# Patient Record
Sex: FEMALE | Race: WHITE | NOT HISPANIC OR LATINO | Employment: OTHER | URBAN - METROPOLITAN AREA
[De-identification: names, ages, dates, MRNs, and addresses within clinical notes are randomized per-mention and may not be internally consistent; named-entity substitution may affect disease eponyms.]

---

## 2017-01-04 RX ORDER — WARFARIN SODIUM 5 MG/1
TABLET ORAL
Qty: 135 TABLET | Refills: 0 | Status: SHIPPED | OUTPATIENT
Start: 2017-01-04 | End: 2017-04-14 | Stop reason: SDUPTHER

## 2017-01-09 ENCOUNTER — HOSPITAL ENCOUNTER (OUTPATIENT)
Dept: CARDIOLOGY | Facility: HOSPITAL | Age: 72
Setting detail: RECURRING SERIES
Discharge: HOME | End: 2017-01-09

## 2017-01-09 PROCEDURE — 85610 PROTHROMBIN TIME: CPT | Performed by: INTERNAL MEDICINE

## 2017-01-09 PROCEDURE — 36416 COLLJ CAPILLARY BLOOD SPEC: CPT | Performed by: INTERNAL MEDICINE

## 2017-01-13 RX ORDER — POTASSIUM CHLORIDE 20 MEQ/1
TABLET, EXTENDED RELEASE ORAL
Qty: 180 TABLET | Refills: 0 | Status: SHIPPED | OUTPATIENT
Start: 2017-01-13 | End: 2017-04-13 | Stop reason: SDUPTHER

## 2017-01-23 ENCOUNTER — HOSPITAL ENCOUNTER (OUTPATIENT)
Dept: CARDIOLOGY | Facility: HOSPITAL | Age: 72
Setting detail: RECURRING SERIES
Discharge: HOME | End: 2017-01-23

## 2017-01-23 PROCEDURE — 36416 COLLJ CAPILLARY BLOOD SPEC: CPT | Performed by: INTERNAL MEDICINE

## 2017-01-23 PROCEDURE — 85610 PROTHROMBIN TIME: CPT | Performed by: INTERNAL MEDICINE

## 2017-01-27 ENCOUNTER — APPOINTMENT (OUTPATIENT)
Dept: WOMENS IMAGING | Facility: HOSPITAL | Age: 72
End: 2017-01-27

## 2017-01-27 PROCEDURE — 77063 BREAST TOMOSYNTHESIS BI: CPT | Performed by: RADIOLOGY

## 2017-01-27 PROCEDURE — G0202 SCR MAMMO BI INCL CAD: HCPCS | Performed by: RADIOLOGY

## 2017-02-07 ENCOUNTER — TELEPHONE (OUTPATIENT)
Dept: ORTHOPEDIC SURGERY | Facility: CLINIC | Age: 72
End: 2017-02-07

## 2017-02-21 ENCOUNTER — HOSPITAL ENCOUNTER (OUTPATIENT)
Dept: CARDIOLOGY | Facility: HOSPITAL | Age: 72
Setting detail: RECURRING SERIES
Discharge: HOME | End: 2017-02-21

## 2017-02-21 PROCEDURE — 85610 PROTHROMBIN TIME: CPT

## 2017-02-21 PROCEDURE — 36416 COLLJ CAPILLARY BLOOD SPEC: CPT

## 2017-03-21 ENCOUNTER — HOSPITAL ENCOUNTER (OUTPATIENT)
Dept: CARDIOLOGY | Facility: HOSPITAL | Age: 72
Setting detail: RECURRING SERIES
Discharge: HOME | End: 2017-03-21

## 2017-03-21 PROCEDURE — 85610 PROTHROMBIN TIME: CPT

## 2017-03-21 PROCEDURE — 36416 COLLJ CAPILLARY BLOOD SPEC: CPT

## 2017-03-28 ENCOUNTER — HOSPITAL ENCOUNTER (OUTPATIENT)
Dept: CARDIOLOGY | Facility: HOSPITAL | Age: 72
Setting detail: RECURRING SERIES
Discharge: HOME | End: 2017-03-28

## 2017-03-28 PROCEDURE — 36416 COLLJ CAPILLARY BLOOD SPEC: CPT

## 2017-03-28 PROCEDURE — 85610 PROTHROMBIN TIME: CPT

## 2017-04-14 ENCOUNTER — OFFICE VISIT (OUTPATIENT)
Dept: CARDIOLOGY | Facility: CLINIC | Age: 72
End: 2017-04-14

## 2017-04-14 VITALS
BODY MASS INDEX: 26 KG/M2 | SYSTOLIC BLOOD PRESSURE: 122 MMHG | HEIGHT: 59 IN | HEART RATE: 78 BPM | DIASTOLIC BLOOD PRESSURE: 80 MMHG | WEIGHT: 129 LBS

## 2017-04-14 DIAGNOSIS — I10 ESSENTIAL HYPERTENSION: ICD-10-CM

## 2017-04-14 DIAGNOSIS — E78.2 MIXED HYPERLIPIDEMIA: ICD-10-CM

## 2017-04-14 DIAGNOSIS — I26.99 OTHER PULMONARY EMBOLISM WITHOUT ACUTE COR PULMONALE, UNSPECIFIED CHRONICITY (HCC): Primary | ICD-10-CM

## 2017-04-14 PROCEDURE — 93000 ELECTROCARDIOGRAM COMPLETE: CPT | Performed by: INTERNAL MEDICINE

## 2017-04-14 PROCEDURE — 99213 OFFICE O/P EST LOW 20 MIN: CPT | Performed by: INTERNAL MEDICINE

## 2017-04-14 RX ORDER — WARFARIN SODIUM 5 MG/1
7.5 TABLET ORAL
Qty: 135 TABLET | Refills: 0 | Status: SHIPPED | OUTPATIENT
Start: 2017-04-14 | End: 2017-09-03 | Stop reason: SDUPTHER

## 2017-04-14 RX ORDER — POTASSIUM CHLORIDE 20 MEQ/1
TABLET, EXTENDED RELEASE ORAL
Qty: 180 TABLET | Refills: 3 | Status: SHIPPED | OUTPATIENT
Start: 2017-04-14 | End: 2018-02-28 | Stop reason: SDUPTHER

## 2017-04-14 NOTE — PROGRESS NOTES
Date of Office Visit: 17  Encounter Provider: Vijay Arango MD  Place of Service: [facility] CARDIOLOGY  Patient Name: Rachel Moser  :1945      Chief Complaint   Patient presents with   • Pulmonary Embolism     History of Present Illness  HPI Comments: Ms. Moser is a pleasant 71-year-old white female who presented to the office in 2011  acutely short of breath, markedly hypoxemic, positive D-dimer. She had a CTA that showed  multiple pulmonary emboli. She was admitted to the hospital and started on Lovenox and  then warfarin. Was found to have bilateral DVTs. She did also have some pleuritic chest  pain with that. She had a followup CT that showed no new clots. She then needed her hip  operated on, and she ended up having inferior vena cava filter placed for that. She comes  in for followup now. She has been doing great. No chest pain or pressure and no   real shortness of breath, orthopnea, or PND.  She did have another hip surgery since she was last here and apparently has some more cyst behind her hip that's getting her need to be removed.  But overall she feels like she's doing well.        Past Medical History:   Diagnosis Date   • High cholesterol    • History of DVT (deep vein thrombosis)    • History of kidney infection     1 MONTH AGO   • History of pulmonary embolus (PE)    • Hypertension    • Paralabral cyst of left hip    • PONV (postoperative nausea and vomiting)    • Presence of vena cava filter    • Rheumatoid arteritis          Past Surgical History:   Procedure Laterality Date   • BLADDER SUSPENSION     • CATARACT EXTRACTION, BILATERAL     • HIP SURGERY Left    • HIP SURGERY Left    • HIP SURGERY Left    • INCISION AND DRAINAGE HIP Left 2016    Procedure: HIP INCISION AND DRAINAGE;  Surgeon: Obed Barney MD;  Location: [facility];  Service:    • TOTAL ABDOMINAL HYSTERECTOMY     • VENA CAVA FILTER INSERTION           Current  Outpatient Prescriptions on File Prior to Visit   Medication Sig Dispense Refill   • albuterol (PROAIR RESPICLICK) 108 (90 BASE) MCG/ACT inhaler TK 2 PUFFS PO TID PRN     • calcium-vitamin D (CALCIUM + D) 250-125 MG-UNIT per tablet Take 1 tablet by mouth 2 (Two) Times a Day.     • Cranberry 600 MG tablet Take  by mouth.     • ferrous sulfate (FERROUSUL) 325 (65 FE) MG tablet Take  by mouth.     • folic acid (FOLVITE) 800 MCG tablet Take 800 mcg by mouth Daily.     • furosemide (LASIX) 40 MG tablet Take 40 mg by mouth Daily.     • HYDROcodone-acetaminophen (NORCO) 7.5-325 MG per tablet 1-2 po q 4-6 hr prn pain 100 tablet 0   • inFLIXimab (REMICADE) 100 MG injection Infuse  into a venous catheter Every 30 (Thirty) Days.     • ipratropium (ATROVENT) 0.03 % nasal spray 2 sprays into each nostril 2 (Two) Times a Day As Needed for rhinitis.     • leucovorin 5 MG tablet Take 5 mg by mouth 1 (One) Time Per Week. Patient takes only on Sunday     • methotrexate 2.5 MG tablet Take 20 mg by mouth 1 (One) Time Per Week. TAKES 8 TABLETS ON SATURDAYS     • metoprolol succinate XL (TOPROL-XL) 50 MG 24 hr tablet Take 50 mg by mouth Every Morning.     • Multiple Vitamin (MULTIVITAMIN) capsule Take  by mouth.     • olopatadine (PATANOL) 0.1 % ophthalmic solution Administer 1 drop to both eyes 2 (Two) Times a Day.     • potassium chloride (K-DUR,KLOR-CON) 20 MEQ CR tablet TAKE 2 TABLETS BY MOUTH DAILY 180 tablet 0   • PredniSONE (DELTASONE) 10 MG (21) tablet pack Take  by mouth Daily.     • simvastatin (ZOCOR) 40 MG tablet Take 40 mg by mouth Every Morning.     • warfarin (COUMADIN) 5 MG tablet TAKE 1 TABLET BY MOUTH DAILY  tablet 0   • warfarin (COUMADIN) 7.5 MG tablet Take 7.5 mg by mouth See Admin Instructions. SUNDAYS ONLY      • [DISCONTINUED] B COMPLEX-BIOTIN-FA ER PO Take 100 mg by mouth Daily.     • [DISCONTINUED] B COMPLEX-BIOTIN-FA ER PO Take  by mouth.     • [DISCONTINUED] Calcium Carbonate-Vitamin D 600-200 MG-UNIT  capsule Take  by mouth.     • [DISCONTINUED] calcium-vitamin D (OSCAL) 250-125 MG-UNIT per tablet Take  by mouth.     • [DISCONTINUED] chlorpheniramine (WAL-FINATE) 4 MG tablet Take 4 mg by mouth 3 (Three) Times a Day.     • [DISCONTINUED] chlorpheniramine (WAL-FINATE) 4 MG tablet TK 1 T PO TID     • [DISCONTINUED] Cranberry 600 MG tablet Take 1 tablet by mouth 2 (Two) Times a Day.     • [DISCONTINUED] ferrous sulfate (FERROUSUL) 325 (65 FE) MG tablet Take 325 mg by mouth Daily With Breakfast.     • [DISCONTINUED] inFLIXimab in sodium chloride 0.9 % 250 mL IVPB Infuse  into a venous catheter.     • [DISCONTINUED] montelukast (SINGULAIR) 10 MG tablet TK 1 T PO QD     • [DISCONTINUED] Multiple Vitamin (MULTIVITAMIN) capsule Take 1 capsule by mouth Daily.     • [DISCONTINUED] Omega-3 Fatty Acids (FISH OIL) 1200 MG capsule capsule Take  by mouth.     • [DISCONTINUED] ondansetron (ZOFRAN) 4 MG tablet Take 1 tablet by mouth Every 6 (Six) Hours As Needed for nausea or vomiting for up to 10 doses. 10 tablet 0   • [DISCONTINUED] polyethylene glycol-electrolytes (NULYTELY) 420 G solution MIX AND DRINK UTD PER INSTRUCTIONS GIVEN FOR COLONOSCOPY     • [DISCONTINUED] predniSONE (DELTASONE) 10 MG tablet Take 10 mg by mouth As Needed (TAKES 1 OR 2 AS NEEDED).     • [DISCONTINUED] predniSONE (DELTASONE) 10 MG tablet TK 1 TO 2 TS PO QAM. TAKE WITH FOOD       No current facility-administered medications on file prior to visit.          Social History     Social History   • Marital status:      Spouse name: N/A   • Number of children: N/A   • Years of education: N/A     Occupational History   • Not on file.     Social History Main Topics   • Smoking status: Never Smoker   • Smokeless tobacco: Never Used   • Alcohol use No   • Drug use: No   • Sexual activity: Not on file     Other Topics Concern   • Not on file     Social History Narrative       No family history on file.      Review of Systems   Constitution: Negative for  "decreased appetite, diaphoresis, fever, weakness, malaise/fatigue, weight gain and weight loss.   HENT: Negative for congestion, headaches, hearing loss, nosebleeds and tinnitus.    Eyes: Negative for blurred vision, double vision, vision loss in left eye, vision loss in right eye and visual disturbance.   Cardiovascular:        As noted in HPI   Respiratory:        As noted HPI   Endocrine: Negative for cold intolerance and heat intolerance.   Hematologic/Lymphatic: Negative for bleeding problem. Does not bruise/bleed easily.   Skin: Negative for color change, flushing, itching and rash.   Musculoskeletal: Positive for joint pain. Negative for arthritis, back pain, joint swelling, muscle weakness and myalgias.   Gastrointestinal: Negative for bloating, abdominal pain, constipation, diarrhea, dysphagia, heartburn, hematemesis, hematochezia, melena, nausea and vomiting.   Genitourinary: Negative for bladder incontinence, dysuria, frequency, nocturia and urgency.   Neurological: Negative for dizziness, focal weakness, light-headedness, loss of balance, numbness, paresthesias and vertigo.   Psychiatric/Behavioral: Negative for depression, memory loss and substance abuse.       Procedures      ECG 12 Lead  Date/Time: 4/14/2017 11:45 AM  Performed by: TOÑO HARDWICK  Authorized by: TOÑO HARDWICK   Comparison: compared with previous ECG   Similar to previous ECG  Rhythm: sinus rhythm  Rate: normal  QRS axis: normal                 Objective:    /80  Pulse 78  Ht 59\" (149.9 cm)  Wt 129 lb (58.5 kg)  BMI 26.05 kg/m2       Physical Exam  Physical Exam   Constitutional: She is oriented to person, place, and time. She appears well-developed and well-nourished. No distress.   HENT:   Head: Normocephalic.   Eyes: Conjunctivae are normal. Pupils are equal, round, and reactive to light. No scleral icterus.   Neck: Normal carotid pulses, no hepatojugular reflux and no JVD present. Carotid bruit is not present. No " tracheal deviation, no edema and no erythema present. No thyromegaly present.   Cardiovascular: Normal rate, regular rhythm, S1 normal, S2 normal, normal heart sounds and intact distal pulses.   No extrasystoles are present. PMI is not displaced.  Exam reveals no gallop, no distant heart sounds and no friction rub.    No murmur heard.  Pulses:       Carotid pulses are 2+ on the right side, and 2+ on the left side.       Radial pulses are 2+ on the right side, and 2+ on the left side.        Femoral pulses are 2+ on the right side, and 2+ on the left side.       Dorsalis pedis pulses are 2+ on the right side, and 2+ on the left side.        Posterior tibial pulses are 2+ on the right side, and 2+ on the left side.   Pulmonary/Chest: Effort normal and breath sounds normal. No respiratory distress. She has no decreased breath sounds. She has no wheezes. She has no rhonchi. She has no rales. She exhibits no tenderness.   Abdominal: Soft. Bowel sounds are normal. She exhibits no distension and no mass. There is no hepatosplenomegaly. There is no tenderness. There is no rebound and no guarding.   Musculoskeletal: She exhibits no edema, tenderness or deformity.   Neurological: She is alert and oriented to person, place, and time.   Skin: Skin is warm and dry. No rash noted. She is not diaphoretic. No cyanosis or erythema. No pallor. Nails show no clubbing.   Psychiatric: She has a normal mood and affect. Her speech is normal and behavior is normal. Judgment and thought content normal.           Assessment:    1. This is a 71  -year-old white female with history of pulmonary emboli, bilateral lower extremity DVT status post vena cava filter. Now doing well. She is to continue the same and see us in follow up in one year.   2. Hyperlipidemia, on simvastatin.  3. Hypertension. Blood pressure adequately controlled.   4. Stop DL with hip issues is probably need another surgery 40 years and.         Plan:

## 2017-04-26 ENCOUNTER — HOSPITAL ENCOUNTER (OUTPATIENT)
Dept: CARDIOLOGY | Facility: HOSPITAL | Age: 72
Setting detail: RECURRING SERIES
Discharge: HOME | End: 2017-04-26

## 2017-04-26 PROCEDURE — 85610 PROTHROMBIN TIME: CPT

## 2017-04-26 PROCEDURE — 36416 COLLJ CAPILLARY BLOOD SPEC: CPT

## 2017-05-25 ENCOUNTER — HOSPITAL ENCOUNTER (OUTPATIENT)
Dept: CARDIOLOGY | Facility: HOSPITAL | Age: 72
Setting detail: RECURRING SERIES
Discharge: HOME | End: 2017-05-25

## 2017-05-25 PROCEDURE — 36416 COLLJ CAPILLARY BLOOD SPEC: CPT

## 2017-05-25 PROCEDURE — 85610 PROTHROMBIN TIME: CPT

## 2017-06-23 ENCOUNTER — HOSPITAL ENCOUNTER (OUTPATIENT)
Dept: CARDIOLOGY | Facility: HOSPITAL | Age: 72
Setting detail: RECURRING SERIES
Discharge: HOME | End: 2017-06-23

## 2017-06-23 PROCEDURE — 36416 COLLJ CAPILLARY BLOOD SPEC: CPT

## 2017-06-23 PROCEDURE — 85610 PROTHROMBIN TIME: CPT

## 2017-07-06 ENCOUNTER — OFFICE VISIT (OUTPATIENT)
Dept: ORTHOPEDIC SURGERY | Facility: CLINIC | Age: 72
End: 2017-07-06

## 2017-07-06 VITALS — TEMPERATURE: 97.4 F | WEIGHT: 131 LBS | HEIGHT: 59 IN | BODY MASS INDEX: 26.41 KG/M2

## 2017-07-06 DIAGNOSIS — Z96.642 HISTORY OF LEFT HIP REPLACEMENT: ICD-10-CM

## 2017-07-06 DIAGNOSIS — Z98.890 HISTORY OF HIP SURGERY: Primary | ICD-10-CM

## 2017-07-06 PROCEDURE — 99213 OFFICE O/P EST LOW 20 MIN: CPT | Performed by: ORTHOPAEDIC SURGERY

## 2017-07-06 PROCEDURE — 73502 X-RAY EXAM HIP UNI 2-3 VIEWS: CPT | Performed by: ORTHOPAEDIC SURGERY

## 2017-07-06 NOTE — PROGRESS NOTES
Patient: Rachel Moser  YOB: 1945 71 y.o. female  Medical Record Number: [id number]    Chief Complaint:   Chief Complaint   Patient presents with   • Left Hip - Follow-up       History of Present Illness:Rachel Moser is a 71 y.o. female who presents for follow-up of  Left hip.  We performed excision of pseudotumor 11/2/16 she still has some mild numbness about the left thigh but overall her pain is markedly improved.  She's getting around without a cane.    Allergies:   Allergies   Allergen Reactions   • Sulfa Antibiotics Rash       Medications:   Current Outpatient Prescriptions   Medication Sig Dispense Refill   • albuterol (PROAIR RESPICLICK) 108 (90 BASE) MCG/ACT inhaler TK 2 PUFFS PO TID PRN     • calcium-vitamin D (CALCIUM + D) 250-125 MG-UNIT per tablet Take 1 tablet by mouth 2 (Two) Times a Day.     • Cranberry 600 MG tablet Take  by mouth.     • ferrous sulfate (FERROUSUL) 325 (65 FE) MG tablet Take  by mouth.     • folic acid (FOLVITE) 800 MCG tablet Take 800 mcg by mouth Daily.     • furosemide (LASIX) 40 MG tablet Take 40 mg by mouth Daily.     • HYDROcodone-acetaminophen (NORCO) 7.5-325 MG per tablet 1-2 po q 4-6 hr prn pain 100 tablet 0   • inFLIXimab (REMICADE) 100 MG injection Infuse  into a venous catheter Every 30 (Thirty) Days.     • ipratropium (ATROVENT) 0.03 % nasal spray 2 sprays into each nostril 2 (Two) Times a Day As Needed for rhinitis.     • leucovorin 5 MG tablet Take 5 mg by mouth 1 (One) Time Per Week. Patient takes only on Sunday     • methotrexate 2.5 MG tablet Take 20 mg by mouth 1 (One) Time Per Week. TAKES 8 TABLETS ON SATURDAYS     • metoprolol succinate XL (TOPROL-XL) 50 MG 24 hr tablet Take 50 mg by mouth Every Morning.     • Multiple Vitamin (MULTIVITAMIN) capsule Take  by mouth.     • olopatadine (PATANOL) 0.1 % ophthalmic solution Administer 1 drop to both eyes 2 (Two) Times a Day.     • potassium chloride (K-DUR,KLOR-CON) 20 MEQ CR tablet TAKE 2 TABLETS BY  "MOUTH DAILY 180 tablet 3   • PredniSONE (DELTASONE) 10 MG (21) tablet pack Take  by mouth Daily.     • simvastatin (ZOCOR) 40 MG tablet Take 40 mg by mouth Every Morning.     • warfarin (COUMADIN) 5 MG tablet Take 1.5 tablets by mouth Daily. Or as directed. 135 tablet 0     No current facility-administered medications for this visit.          The following portions of the patient's history were reviewed and updated as appropriate: allergies, current medications, past family history, past medical history, past social history, past surgical history and problem list.    Review of Systems:   A 14 point review of systems was performed. All systems negative except pertinent positives/negative listed in HPI above    Physical Exam:   Vitals:    07/06/17 1525   Temp: 97.4 °F (36.3 °C)   Weight: 131 lb (59.4 kg)   Height: 59\" (149.9 cm)       General: A and O x 3, ASA, NAD    SCLERA:    Normal    DENTITION:   Normal  There some mild swelling about the left thigh the incision around the buttock is nicely healed I see no palpable masses are no palpable areas of swelling there tender hip motion is non-irritable she walks with a slight Trendelenburg gait    Radiology:    Xrays 2views left hip (AP bilateral hips and lateral hip) were ordered and reviewed for evaluation of hip pain demonstrating a well positioned total hip without evidence of wear, loosening, although there does appear to be a slight lucency around the custom acetabular implant.  The screws show no evidence of lucency and appear to be well fixed within the pelvic bone      Assessment/Plan:  Left hip pseudotumor symptoms are currently stable.  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  Being that her symptoms are currently stable I would favor continued observation.  I will get a CT angiogram and we'll plan on seeing her in 6 months however if the pseudotumor 4 to be " increasing in size we may have to consider discussion with an oncologist or vascular specialist for possible resection and potentially having to consider revision of the acetabular component      Obed Barney MD  7/6/2017

## 2017-07-13 ENCOUNTER — HOSPITAL ENCOUNTER (OUTPATIENT)
Dept: CT IMAGING | Facility: HOSPITAL | Age: 72
Discharge: HOME | End: 2017-07-13
Attending: ORTHOPAEDIC SURGERY | Admitting: ORTHOPAEDIC SURGERY

## 2017-07-13 DIAGNOSIS — Z98.890 HISTORY OF HIP SURGERY: ICD-10-CM

## 2017-07-13 LAB — CREAT BLDA-MCNC: 0.7 MG/DL (ref 0.6–1.3)

## 2017-07-13 PROCEDURE — 72191 CT ANGIOGRAPH PELV W/O&W/DYE: CPT

## 2017-07-13 PROCEDURE — 82565 ASSAY OF CREATININE: CPT

## 2017-07-13 PROCEDURE — 0 IOPAMIDOL PER 1 ML: Performed by: ORTHOPAEDIC SURGERY

## 2017-07-13 RX ADMIN — IOPAMIDOL 95 ML: 755 INJECTION, SOLUTION INTRAVENOUS at 15:15

## 2017-07-20 ENCOUNTER — TELEPHONE (OUTPATIENT)
Dept: ORTHOPEDIC SURGERY | Facility: CLINIC | Age: 72
End: 2017-07-20

## 2017-07-21 ENCOUNTER — HOSPITAL ENCOUNTER (OUTPATIENT)
Dept: CARDIOLOGY | Facility: HOSPITAL | Age: 72
Setting detail: RECURRING SERIES
Discharge: HOME | End: 2017-07-21

## 2017-07-21 PROCEDURE — 36416 COLLJ CAPILLARY BLOOD SPEC: CPT

## 2017-07-21 PROCEDURE — 85610 PROTHROMBIN TIME: CPT

## 2017-08-21 ENCOUNTER — HOSPITAL ENCOUNTER (OUTPATIENT)
Dept: CARDIOLOGY | Facility: HOSPITAL | Age: 72
Setting detail: RECURRING SERIES
Discharge: HOME | End: 2017-08-21

## 2017-08-21 PROCEDURE — 36416 COLLJ CAPILLARY BLOOD SPEC: CPT

## 2017-08-21 PROCEDURE — 85610 PROTHROMBIN TIME: CPT

## 2017-09-05 RX ORDER — WARFARIN SODIUM 5 MG/1
TABLET ORAL
Qty: 135 TABLET | Refills: 0 | Status: SHIPPED | OUTPATIENT
Start: 2017-09-05 | End: 2017-12-13 | Stop reason: SDUPTHER

## 2017-09-18 ENCOUNTER — HOSPITAL ENCOUNTER (OUTPATIENT)
Dept: CARDIOLOGY | Facility: HOSPITAL | Age: 72
Setting detail: RECURRING SERIES
Discharge: HOME | End: 2017-09-18

## 2017-09-18 PROCEDURE — 85610 PROTHROMBIN TIME: CPT

## 2017-09-18 PROCEDURE — 36416 COLLJ CAPILLARY BLOOD SPEC: CPT

## 2017-10-16 ENCOUNTER — HOSPITAL ENCOUNTER (OUTPATIENT)
Dept: CARDIOLOGY | Facility: HOSPITAL | Age: 72
Setting detail: RECURRING SERIES
Discharge: HOME | End: 2017-10-16

## 2017-10-16 PROCEDURE — 36416 COLLJ CAPILLARY BLOOD SPEC: CPT

## 2017-10-16 PROCEDURE — 85610 PROTHROMBIN TIME: CPT

## 2017-10-23 ENCOUNTER — HOSPITAL ENCOUNTER (OUTPATIENT)
Dept: CARDIOLOGY | Facility: HOSPITAL | Age: 72
Setting detail: RECURRING SERIES
Discharge: HOME | End: 2017-10-23

## 2017-10-23 PROCEDURE — 85610 PROTHROMBIN TIME: CPT

## 2017-10-23 PROCEDURE — 36416 COLLJ CAPILLARY BLOOD SPEC: CPT

## 2017-11-06 ENCOUNTER — HOSPITAL ENCOUNTER (OUTPATIENT)
Dept: CARDIOLOGY | Facility: HOSPITAL | Age: 72
Setting detail: RECURRING SERIES
Discharge: HOME | End: 2017-11-06

## 2017-11-06 PROCEDURE — 85610 PROTHROMBIN TIME: CPT

## 2017-11-06 PROCEDURE — 36416 COLLJ CAPILLARY BLOOD SPEC: CPT

## 2017-11-21 ENCOUNTER — HOSPITAL ENCOUNTER (OUTPATIENT)
Dept: CARDIOLOGY | Facility: HOSPITAL | Age: 72
Setting detail: RECURRING SERIES
Discharge: HOME | End: 2017-11-21

## 2017-11-21 PROCEDURE — 85610 PROTHROMBIN TIME: CPT

## 2017-11-21 PROCEDURE — 36416 COLLJ CAPILLARY BLOOD SPEC: CPT

## 2017-11-28 ENCOUNTER — HOSPITAL ENCOUNTER (OUTPATIENT)
Dept: CARDIOLOGY | Facility: HOSPITAL | Age: 72
Setting detail: RECURRING SERIES
Discharge: HOME | End: 2017-11-28

## 2017-11-28 PROCEDURE — 36416 COLLJ CAPILLARY BLOOD SPEC: CPT

## 2017-11-28 PROCEDURE — 85610 PROTHROMBIN TIME: CPT

## 2017-12-01 ENCOUNTER — HOSPITAL ENCOUNTER (OUTPATIENT)
Dept: CARDIOLOGY | Facility: HOSPITAL | Age: 72
Setting detail: RECURRING SERIES
Discharge: HOME | End: 2017-12-01

## 2017-12-01 PROCEDURE — 36416 COLLJ CAPILLARY BLOOD SPEC: CPT

## 2017-12-01 PROCEDURE — 85610 PROTHROMBIN TIME: CPT

## 2017-12-08 ENCOUNTER — HOSPITAL ENCOUNTER (OUTPATIENT)
Dept: CARDIOLOGY | Facility: HOSPITAL | Age: 72
Setting detail: RECURRING SERIES
Discharge: HOME | End: 2017-12-08

## 2017-12-08 PROCEDURE — 36416 COLLJ CAPILLARY BLOOD SPEC: CPT

## 2017-12-08 PROCEDURE — 85610 PROTHROMBIN TIME: CPT

## 2017-12-13 RX ORDER — WARFARIN SODIUM 5 MG/1
TABLET ORAL
Qty: 135 TABLET | Refills: 0 | Status: SHIPPED | OUTPATIENT
Start: 2017-12-13 | End: 2018-02-28 | Stop reason: SDUPTHER

## 2017-12-17 ENCOUNTER — APPOINTMENT (OUTPATIENT)
Dept: GENERAL RADIOLOGY | Facility: HOSPITAL | Age: 72
End: 2017-12-17

## 2017-12-17 ENCOUNTER — HOSPITAL ENCOUNTER (OUTPATIENT)
Facility: HOSPITAL | Age: 72
Discharge: HOME | End: 2017-12-19
Attending: EMERGENCY MEDICINE | Admitting: ORTHOPAEDIC SURGERY

## 2017-12-17 DIAGNOSIS — S73.005A DISLOCATION OF LEFT HIP, INITIAL ENCOUNTER (HCC): Primary | ICD-10-CM

## 2017-12-17 LAB
ALBUMIN SERPL-MCNC: 4.1 G/DL (ref 3.5–5.2)
ALBUMIN/GLOB SERPL: 1.3 G/DL
ALP SERPL-CCNC: 73 U/L (ref 39–117)
ALT SERPL W P-5'-P-CCNC: 16 U/L (ref 1–33)
ANION GAP SERPL CALCULATED.3IONS-SCNC: 17.7 MMOL/L
AST SERPL-CCNC: 25 U/L (ref 1–32)
BASOPHILS # BLD AUTO: 0.03 10*3/MM3 (ref 0–0.2)
BASOPHILS NFR BLD AUTO: 0.3 % (ref 0–1.5)
BILIRUB SERPL-MCNC: 0.4 MG/DL (ref 0.1–1.2)
BUN BLD-MCNC: 14 MG/DL (ref 8–23)
BUN/CREAT SERPL: 20.6 (ref 7–25)
CALCIUM SPEC-SCNC: 9.5 MG/DL (ref 8.6–10.5)
CHLORIDE SERPL-SCNC: 102 MMOL/L (ref 98–107)
CO2 SERPL-SCNC: 23.3 MMOL/L (ref 22–29)
CREAT BLD-MCNC: 0.68 MG/DL (ref 0.57–1)
DEPRECATED RDW RBC AUTO: 53 FL (ref 37–54)
EOSINOPHIL # BLD AUTO: 0.19 10*3/MM3 (ref 0–0.7)
EOSINOPHIL NFR BLD AUTO: 1.9 % (ref 0.3–6.2)
ERYTHROCYTE [DISTWIDTH] IN BLOOD BY AUTOMATED COUNT: 14.9 % (ref 11.7–13)
GFR SERPL CREATININE-BSD FRML MDRD: 85 ML/MIN/1.73
GLOBULIN UR ELPH-MCNC: 3.2 GM/DL
GLUCOSE BLD-MCNC: 103 MG/DL (ref 65–99)
HCT VFR BLD AUTO: 43.5 % (ref 35.6–45.5)
HGB BLD-MCNC: 13.9 G/DL (ref 11.9–15.5)
HOLD SPECIMEN: NORMAL
HOLD SPECIMEN: NORMAL
IMM GRANULOCYTES # BLD: 0.03 10*3/MM3 (ref 0–0.03)
IMM GRANULOCYTES NFR BLD: 0.3 % (ref 0–0.5)
INR PPP: 2.82 (ref 0.9–1.1)
LYMPHOCYTES # BLD AUTO: 2.15 10*3/MM3 (ref 0.9–4.8)
LYMPHOCYTES NFR BLD AUTO: 21.4 % (ref 19.6–45.3)
MCH RBC QN AUTO: 31.5 PG (ref 26.9–32)
MCHC RBC AUTO-ENTMCNC: 32 G/DL (ref 32.4–36.3)
MCV RBC AUTO: 98.6 FL (ref 80.5–98.2)
MONOCYTES # BLD AUTO: 0.73 10*3/MM3 (ref 0.2–1.2)
MONOCYTES NFR BLD AUTO: 7.3 % (ref 5–12)
NEUTROPHILS # BLD AUTO: 6.92 10*3/MM3 (ref 1.9–8.1)
NEUTROPHILS NFR BLD AUTO: 68.8 % (ref 42.7–76)
PLATELET # BLD AUTO: 254 10*3/MM3 (ref 140–500)
PMV BLD AUTO: 12.9 FL (ref 6–12)
POTASSIUM BLD-SCNC: 3.3 MMOL/L (ref 3.5–5.2)
PROT SERPL-MCNC: 7.3 G/DL (ref 6–8.5)
PROTHROMBIN TIME: 28.8 SECONDS (ref 11.7–14.2)
RBC # BLD AUTO: 4.41 10*6/MM3 (ref 3.9–5.2)
SODIUM BLD-SCNC: 143 MMOL/L (ref 136–145)
WBC NRBC COR # BLD: 10.05 10*3/MM3 (ref 4.5–10.7)
WHOLE BLOOD HOLD SPECIMEN: NORMAL
WHOLE BLOOD HOLD SPECIMEN: NORMAL

## 2017-12-17 PROCEDURE — 73502 X-RAY EXAM HIP UNI 2-3 VIEWS: CPT

## 2017-12-17 PROCEDURE — 85025 COMPLETE CBC W/AUTO DIFF WBC: CPT | Performed by: PHYSICIAN ASSISTANT

## 2017-12-17 PROCEDURE — 85610 PROTHROMBIN TIME: CPT | Performed by: PHYSICIAN ASSISTANT

## 2017-12-17 PROCEDURE — 80053 COMPREHEN METABOLIC PANEL: CPT | Performed by: PHYSICIAN ASSISTANT

## 2017-12-17 PROCEDURE — G0378 HOSPITAL OBSERVATION PER HR: HCPCS

## 2017-12-17 PROCEDURE — 96375 TX/PRO/DX INJ NEW DRUG ADDON: CPT

## 2017-12-17 PROCEDURE — 25010000002 HYDROMORPHONE PER 4 MG: Performed by: EMERGENCY MEDICINE

## 2017-12-17 PROCEDURE — 25010000002 ONDANSETRON PER 1 MG: Performed by: EMERGENCY MEDICINE

## 2017-12-17 PROCEDURE — 96376 TX/PRO/DX INJ SAME DRUG ADON: CPT

## 2017-12-17 PROCEDURE — 99284 EMERGENCY DEPT VISIT MOD MDM: CPT

## 2017-12-17 PROCEDURE — 96374 THER/PROPH/DIAG INJ IV PUSH: CPT

## 2017-12-17 RX ORDER — ONDANSETRON 2 MG/ML
4 INJECTION INTRAMUSCULAR; INTRAVENOUS ONCE
Status: COMPLETED | OUTPATIENT
Start: 2017-12-17 | End: 2017-12-17

## 2017-12-17 RX ORDER — PREDNISONE 10 MG/1
10 TABLET ORAL EVERY 24 HOURS
Status: DISCONTINUED | OUTPATIENT
Start: 2017-12-17 | End: 2017-12-18

## 2017-12-17 RX ORDER — SODIUM CHLORIDE 0.9 % (FLUSH) 0.9 %
10 SYRINGE (ML) INJECTION AS NEEDED
Status: DISCONTINUED | OUTPATIENT
Start: 2017-12-17 | End: 2017-12-18 | Stop reason: HOSPADM

## 2017-12-17 RX ORDER — METOPROLOL SUCCINATE 50 MG/1
50 TABLET, EXTENDED RELEASE ORAL DAILY
Status: DISCONTINUED | OUTPATIENT
Start: 2017-12-18 | End: 2017-12-18 | Stop reason: HOSPADM

## 2017-12-17 RX ORDER — SENNA AND DOCUSATE SODIUM 50; 8.6 MG/1; MG/1
2 TABLET, FILM COATED ORAL 2 TIMES DAILY
Status: DISCONTINUED | OUTPATIENT
Start: 2017-12-18 | End: 2017-12-18 | Stop reason: HOSPADM

## 2017-12-17 RX ORDER — WARFARIN SODIUM 5 MG/1
5 TABLET ORAL
Status: DISCONTINUED | OUTPATIENT
Start: 2017-12-17 | End: 2017-12-18 | Stop reason: HOSPADM

## 2017-12-17 RX ORDER — OXYCODONE HYDROCHLORIDE AND ACETAMINOPHEN 5; 325 MG/1; MG/1
1 TABLET ORAL EVERY 4 HOURS PRN
Status: DISCONTINUED | OUTPATIENT
Start: 2017-12-17 | End: 2017-12-18 | Stop reason: HOSPADM

## 2017-12-17 RX ORDER — HYDROMORPHONE HYDROCHLORIDE 1 MG/ML
0.5 INJECTION, SOLUTION INTRAMUSCULAR; INTRAVENOUS; SUBCUTANEOUS
Status: DISCONTINUED | OUTPATIENT
Start: 2017-12-17 | End: 2017-12-17

## 2017-12-17 RX ORDER — ALBUTEROL SULFATE 2.5 MG/3ML
2.5 SOLUTION RESPIRATORY (INHALATION)
Status: DISCONTINUED | OUTPATIENT
Start: 2017-12-17 | End: 2017-12-18

## 2017-12-17 RX ORDER — IPRATROPIUM BROMIDE 21 UG/1
2 SPRAY, METERED NASAL 2 TIMES DAILY PRN
Status: DISCONTINUED | OUTPATIENT
Start: 2017-12-17 | End: 2017-12-18 | Stop reason: HOSPADM

## 2017-12-17 RX ORDER — POTASSIUM CHLORIDE 750 MG/1
40 CAPSULE, EXTENDED RELEASE ORAL DAILY
Status: DISCONTINUED | OUTPATIENT
Start: 2017-12-18 | End: 2017-12-18 | Stop reason: HOSPADM

## 2017-12-17 RX ORDER — NALOXONE HCL 0.4 MG/ML
0.4 VIAL (ML) INJECTION
Status: DISCONTINUED | OUTPATIENT
Start: 2017-12-17 | End: 2017-12-18 | Stop reason: HOSPADM

## 2017-12-17 RX ORDER — SODIUM CHLORIDE 0.9 % (FLUSH) 0.9 %
1-10 SYRINGE (ML) INJECTION AS NEEDED
Status: DISCONTINUED | OUTPATIENT
Start: 2017-12-17 | End: 2017-12-18 | Stop reason: HOSPADM

## 2017-12-17 RX ORDER — FUROSEMIDE 40 MG/1
40 TABLET ORAL DAILY
Status: DISCONTINUED | OUTPATIENT
Start: 2017-12-18 | End: 2017-12-18 | Stop reason: HOSPADM

## 2017-12-17 RX ORDER — FAMOTIDINE 40 MG/1
40 TABLET, FILM COATED ORAL DAILY
Status: DISCONTINUED | OUTPATIENT
Start: 2017-12-18 | End: 2017-12-18 | Stop reason: HOSPADM

## 2017-12-17 RX ORDER — KETOTIFEN FUMARATE 0.35 MG/ML
1 SOLUTION/ DROPS OPHTHALMIC 2 TIMES DAILY
Status: DISCONTINUED | OUTPATIENT
Start: 2017-12-18 | End: 2017-12-18 | Stop reason: HOSPADM

## 2017-12-17 RX ORDER — HYDROMORPHONE HYDROCHLORIDE 1 MG/ML
0.25 INJECTION, SOLUTION INTRAMUSCULAR; INTRAVENOUS; SUBCUTANEOUS ONCE
Status: COMPLETED | OUTPATIENT
Start: 2017-12-17 | End: 2017-12-17

## 2017-12-17 RX ORDER — ONDANSETRON 2 MG/ML
4 INJECTION INTRAMUSCULAR; INTRAVENOUS EVERY 6 HOURS PRN
Status: DISCONTINUED | OUTPATIENT
Start: 2017-12-17 | End: 2017-12-18 | Stop reason: HOSPADM

## 2017-12-17 RX ADMIN — HYDROMORPHONE HYDROCHLORIDE 0.25 MG: 1 INJECTION, SOLUTION INTRAMUSCULAR; INTRAVENOUS; SUBCUTANEOUS at 21:48

## 2017-12-17 RX ADMIN — ONDANSETRON 4 MG: 2 INJECTION INTRAMUSCULAR; INTRAVENOUS at 19:50

## 2017-12-17 RX ADMIN — HYDROMORPHONE HYDROCHLORIDE 0.5 MG: 1 INJECTION, SOLUTION INTRAMUSCULAR; INTRAVENOUS; SUBCUTANEOUS at 19:52

## 2017-12-17 RX ADMIN — HYDROMORPHONE HYDROCHLORIDE 0.5 MG: 1 INJECTION, SOLUTION INTRAMUSCULAR; INTRAVENOUS; SUBCUTANEOUS at 20:56

## 2017-12-18 ENCOUNTER — ANESTHESIA (OUTPATIENT)
Dept: PERIOP | Facility: HOSPITAL | Age: 72
End: 2017-12-18

## 2017-12-18 ENCOUNTER — APPOINTMENT (OUTPATIENT)
Dept: GENERAL RADIOLOGY | Facility: HOSPITAL | Age: 72
End: 2017-12-18

## 2017-12-18 ENCOUNTER — ANESTHESIA EVENT (OUTPATIENT)
Dept: PERIOP | Facility: HOSPITAL | Age: 72
End: 2017-12-18

## 2017-12-18 PROCEDURE — 27266 TREAT HIP DISLOCATION: CPT | Performed by: ORTHOPAEDIC SURGERY

## 2017-12-18 PROCEDURE — 96376 TX/PRO/DX INJ SAME DRUG ADON: CPT

## 2017-12-18 PROCEDURE — G0378 HOSPITAL OBSERVATION PER HR: HCPCS

## 2017-12-18 PROCEDURE — 72170 X-RAY EXAM OF PELVIS: CPT

## 2017-12-18 PROCEDURE — 25010000002 PROPOFOL 10 MG/ML EMULSION: Performed by: NURSE ANESTHETIST, CERTIFIED REGISTERED

## 2017-12-18 PROCEDURE — 25010000002 FENTANYL CITRATE (PF) 100 MCG/2ML SOLUTION: Performed by: NURSE ANESTHETIST, CERTIFIED REGISTERED

## 2017-12-18 PROCEDURE — 25010000002 ONDANSETRON PER 1 MG: Performed by: ORTHOPAEDIC SURGERY

## 2017-12-18 PROCEDURE — 25010000003 CEFAZOLIN IN DEXTROSE 2-4 GM/100ML-% SOLUTION: Performed by: ORTHOPAEDIC SURGERY

## 2017-12-18 PROCEDURE — 25010000002 MIDAZOLAM PER 1 MG: Performed by: ANESTHESIOLOGY

## 2017-12-18 PROCEDURE — L1830 KO IMMOB CANVAS LONG PRE OTS: HCPCS | Performed by: ORTHOPAEDIC SURGERY

## 2017-12-18 PROCEDURE — 25010000002 FENTANYL CITRATE (PF) 100 MCG/2ML SOLUTION: Performed by: ANESTHESIOLOGY

## 2017-12-18 PROCEDURE — 25010000002 HYDROMORPHONE PER 4 MG: Performed by: ORTHOPAEDIC SURGERY

## 2017-12-18 PROCEDURE — 25010000002 ONDANSETRON PER 1 MG: Performed by: NURSE ANESTHETIST, CERTIFIED REGISTERED

## 2017-12-18 RX ORDER — ONDANSETRON 4 MG/1
4 TABLET, ORALLY DISINTEGRATING ORAL EVERY 6 HOURS PRN
Status: DISCONTINUED | OUTPATIENT
Start: 2017-12-18 | End: 2017-12-19 | Stop reason: HOSPADM

## 2017-12-18 RX ORDER — SODIUM CHLORIDE, SODIUM LACTATE, POTASSIUM CHLORIDE, CALCIUM CHLORIDE 600; 310; 30; 20 MG/100ML; MG/100ML; MG/100ML; MG/100ML
9 INJECTION, SOLUTION INTRAVENOUS CONTINUOUS
Status: DISCONTINUED | OUTPATIENT
Start: 2017-12-18 | End: 2017-12-18 | Stop reason: HOSPADM

## 2017-12-18 RX ORDER — HYDROCODONE BITARTRATE AND ACETAMINOPHEN 7.5; 325 MG/1; MG/1
1 TABLET ORAL ONCE AS NEEDED
Status: DISCONTINUED | OUTPATIENT
Start: 2017-12-18 | End: 2017-12-18 | Stop reason: HOSPADM

## 2017-12-18 RX ORDER — ONDANSETRON 4 MG/1
4 TABLET, FILM COATED ORAL EVERY 6 HOURS PRN
Status: DISCONTINUED | OUTPATIENT
Start: 2017-12-18 | End: 2017-12-19 | Stop reason: HOSPADM

## 2017-12-18 RX ORDER — GLYCOPYRROLATE 0.2 MG/ML
INJECTION INTRAMUSCULAR; INTRAVENOUS AS NEEDED
Status: DISCONTINUED | OUTPATIENT
Start: 2017-12-18 | End: 2017-12-18 | Stop reason: SURG

## 2017-12-18 RX ORDER — ONDANSETRON 2 MG/ML
INJECTION INTRAMUSCULAR; INTRAVENOUS AS NEEDED
Status: DISCONTINUED | OUTPATIENT
Start: 2017-12-18 | End: 2017-12-18 | Stop reason: SURG

## 2017-12-18 RX ORDER — MIDAZOLAM HYDROCHLORIDE 1 MG/ML
1 INJECTION INTRAMUSCULAR; INTRAVENOUS
Status: DISCONTINUED | OUTPATIENT
Start: 2017-12-18 | End: 2017-12-18 | Stop reason: HOSPADM

## 2017-12-18 RX ORDER — DIPHENHYDRAMINE HYDROCHLORIDE 50 MG/ML
12.5 INJECTION INTRAMUSCULAR; INTRAVENOUS
Status: DISCONTINUED | OUTPATIENT
Start: 2017-12-18 | End: 2017-12-18 | Stop reason: HOSPADM

## 2017-12-18 RX ORDER — OXYCODONE AND ACETAMINOPHEN 7.5; 325 MG/1; MG/1
1 TABLET ORAL ONCE AS NEEDED
Status: DISCONTINUED | OUTPATIENT
Start: 2017-12-18 | End: 2017-12-18 | Stop reason: HOSPADM

## 2017-12-18 RX ORDER — SODIUM CHLORIDE 0.9 % (FLUSH) 0.9 %
1-10 SYRINGE (ML) INJECTION AS NEEDED
Status: DISCONTINUED | OUTPATIENT
Start: 2017-12-18 | End: 2017-12-18 | Stop reason: HOSPADM

## 2017-12-18 RX ORDER — HYDROCODONE BITARTRATE AND ACETAMINOPHEN 7.5; 325 MG/1; MG/1
2 TABLET ORAL EVERY 4 HOURS PRN
Status: DISCONTINUED | OUTPATIENT
Start: 2017-12-18 | End: 2017-12-19 | Stop reason: HOSPADM

## 2017-12-18 RX ORDER — FAMOTIDINE 10 MG/ML
20 INJECTION, SOLUTION INTRAVENOUS ONCE
Status: COMPLETED | OUTPATIENT
Start: 2017-12-18 | End: 2017-12-18

## 2017-12-18 RX ORDER — FERROUS SULFATE 325(65) MG
325 TABLET ORAL
Status: DISCONTINUED | OUTPATIENT
Start: 2017-12-19 | End: 2017-12-19 | Stop reason: HOSPADM

## 2017-12-18 RX ORDER — FENTANYL CITRATE 50 UG/ML
50 INJECTION, SOLUTION INTRAMUSCULAR; INTRAVENOUS
Status: DISCONTINUED | OUTPATIENT
Start: 2017-12-18 | End: 2017-12-18 | Stop reason: HOSPADM

## 2017-12-18 RX ORDER — DOCUSATE SODIUM 100 MG/1
100 CAPSULE, LIQUID FILLED ORAL 2 TIMES DAILY
Status: DISCONTINUED | OUTPATIENT
Start: 2017-12-18 | End: 2017-12-19 | Stop reason: HOSPADM

## 2017-12-18 RX ORDER — PROMETHAZINE HYDROCHLORIDE 25 MG/1
25 SUPPOSITORY RECTAL ONCE AS NEEDED
Status: DISCONTINUED | OUTPATIENT
Start: 2017-12-18 | End: 2017-12-18 | Stop reason: HOSPADM

## 2017-12-18 RX ORDER — PROMETHAZINE HYDROCHLORIDE 25 MG/ML
12.5 INJECTION, SOLUTION INTRAMUSCULAR; INTRAVENOUS ONCE AS NEEDED
Status: DISCONTINUED | OUTPATIENT
Start: 2017-12-18 | End: 2017-12-18 | Stop reason: HOSPADM

## 2017-12-18 RX ORDER — FLUMAZENIL 0.1 MG/ML
0.2 INJECTION INTRAVENOUS AS NEEDED
Status: DISCONTINUED | OUTPATIENT
Start: 2017-12-18 | End: 2017-12-18 | Stop reason: HOSPADM

## 2017-12-18 RX ORDER — HYDROCODONE BITARTRATE AND ACETAMINOPHEN 7.5; 325 MG/1; MG/1
1 TABLET ORAL EVERY 4 HOURS PRN
Status: DISCONTINUED | OUTPATIENT
Start: 2017-12-18 | End: 2017-12-19 | Stop reason: HOSPADM

## 2017-12-18 RX ORDER — FENTANYL CITRATE 50 UG/ML
INJECTION, SOLUTION INTRAMUSCULAR; INTRAVENOUS AS NEEDED
Status: DISCONTINUED | OUTPATIENT
Start: 2017-12-18 | End: 2017-12-18 | Stop reason: SURG

## 2017-12-18 RX ORDER — LABETALOL HYDROCHLORIDE 5 MG/ML
5 INJECTION, SOLUTION INTRAVENOUS
Status: DISCONTINUED | OUTPATIENT
Start: 2017-12-18 | End: 2017-12-18 | Stop reason: HOSPADM

## 2017-12-18 RX ORDER — ACETAMINOPHEN 325 MG/1
650 TABLET ORAL EVERY 4 HOURS PRN
Status: DISCONTINUED | OUTPATIENT
Start: 2017-12-18 | End: 2017-12-19 | Stop reason: HOSPADM

## 2017-12-18 RX ORDER — PROPOFOL 10 MG/ML
VIAL (ML) INTRAVENOUS AS NEEDED
Status: DISCONTINUED | OUTPATIENT
Start: 2017-12-18 | End: 2017-12-18 | Stop reason: SURG

## 2017-12-18 RX ORDER — CEFAZOLIN SODIUM 2 G/100ML
2 INJECTION, SOLUTION INTRAVENOUS EVERY 8 HOURS
Status: COMPLETED | OUTPATIENT
Start: 2017-12-18 | End: 2017-12-19

## 2017-12-18 RX ORDER — DIPHENHYDRAMINE HCL 25 MG
25 CAPSULE ORAL EVERY 6 HOURS PRN
Status: DISCONTINUED | OUTPATIENT
Start: 2017-12-18 | End: 2017-12-19 | Stop reason: HOSPADM

## 2017-12-18 RX ORDER — MIDAZOLAM HYDROCHLORIDE 1 MG/ML
2 INJECTION INTRAMUSCULAR; INTRAVENOUS
Status: DISCONTINUED | OUTPATIENT
Start: 2017-12-18 | End: 2017-12-18 | Stop reason: HOSPADM

## 2017-12-18 RX ORDER — HYDROMORPHONE HYDROCHLORIDE 1 MG/ML
0.5 INJECTION, SOLUTION INTRAMUSCULAR; INTRAVENOUS; SUBCUTANEOUS
Status: DISCONTINUED | OUTPATIENT
Start: 2017-12-18 | End: 2017-12-18 | Stop reason: HOSPADM

## 2017-12-18 RX ORDER — HYDRALAZINE HYDROCHLORIDE 20 MG/ML
5 INJECTION INTRAMUSCULAR; INTRAVENOUS
Status: DISCONTINUED | OUTPATIENT
Start: 2017-12-18 | End: 2017-12-18 | Stop reason: HOSPADM

## 2017-12-18 RX ORDER — ONDANSETRON 2 MG/ML
4 INJECTION INTRAMUSCULAR; INTRAVENOUS ONCE AS NEEDED
Status: DISCONTINUED | OUTPATIENT
Start: 2017-12-18 | End: 2017-12-18 | Stop reason: HOSPADM

## 2017-12-18 RX ORDER — EPHEDRINE SULFATE 50 MG/ML
5 INJECTION, SOLUTION INTRAVENOUS ONCE AS NEEDED
Status: DISCONTINUED | OUTPATIENT
Start: 2017-12-18 | End: 2017-12-18 | Stop reason: HOSPADM

## 2017-12-18 RX ORDER — NALOXONE HCL 0.4 MG/ML
0.2 VIAL (ML) INJECTION AS NEEDED
Status: DISCONTINUED | OUTPATIENT
Start: 2017-12-18 | End: 2017-12-18 | Stop reason: HOSPADM

## 2017-12-18 RX ORDER — PROMETHAZINE HYDROCHLORIDE 25 MG/1
12.5 TABLET ORAL ONCE AS NEEDED
Status: DISCONTINUED | OUTPATIENT
Start: 2017-12-18 | End: 2017-12-18 | Stop reason: HOSPADM

## 2017-12-18 RX ORDER — ONDANSETRON 2 MG/ML
4 INJECTION INTRAMUSCULAR; INTRAVENOUS EVERY 6 HOURS PRN
Status: DISCONTINUED | OUTPATIENT
Start: 2017-12-18 | End: 2017-12-19 | Stop reason: HOSPADM

## 2017-12-18 RX ORDER — PROMETHAZINE HYDROCHLORIDE 25 MG/1
25 TABLET ORAL ONCE AS NEEDED
Status: DISCONTINUED | OUTPATIENT
Start: 2017-12-18 | End: 2017-12-18 | Stop reason: HOSPADM

## 2017-12-18 RX ORDER — LIDOCAINE HYDROCHLORIDE 20 MG/ML
INJECTION, SOLUTION INFILTRATION; PERINEURAL AS NEEDED
Status: DISCONTINUED | OUTPATIENT
Start: 2017-12-18 | End: 2017-12-18 | Stop reason: SURG

## 2017-12-18 RX ADMIN — ONDANSETRON 4 MG: 2 INJECTION INTRAMUSCULAR; INTRAVENOUS at 16:56

## 2017-12-18 RX ADMIN — WARFARIN SODIUM 5 MG: 5 TABLET ORAL at 02:23

## 2017-12-18 RX ADMIN — FENTANYL CITRATE 50 MCG: 50 INJECTION INTRAMUSCULAR; INTRAVENOUS at 15:06

## 2017-12-18 RX ADMIN — FENTANYL CITRATE 50 MCG: 50 INJECTION INTRAMUSCULAR; INTRAVENOUS at 16:39

## 2017-12-18 RX ADMIN — HYDROMORPHONE HYDROCHLORIDE 1 MG: 1 INJECTION, SOLUTION INTRAMUSCULAR; INTRAVENOUS; SUBCUTANEOUS at 08:12

## 2017-12-18 RX ADMIN — CEFAZOLIN SODIUM 2 G: 2 INJECTION, SOLUTION INTRAVENOUS at 22:26

## 2017-12-18 RX ADMIN — KETOTIFEN FUMARATE 1 DROP: 0.35 SOLUTION/ DROPS OPHTHALMIC at 11:05

## 2017-12-18 RX ADMIN — DOCUSATE SODIUM 100 MG: 100 CAPSULE, LIQUID FILLED ORAL at 21:25

## 2017-12-18 RX ADMIN — ONDANSETRON 4 MG: 2 INJECTION INTRAMUSCULAR; INTRAVENOUS at 08:12

## 2017-12-18 RX ADMIN — FENTANYL CITRATE 50 MCG: 50 INJECTION INTRAMUSCULAR; INTRAVENOUS at 16:05

## 2017-12-18 RX ADMIN — Medication 0.5 MG: at 16:05

## 2017-12-18 RX ADMIN — GLYCOPYRROLATE 0.2 MG: 0.2 INJECTION INTRAMUSCULAR; INTRAVENOUS at 16:39

## 2017-12-18 RX ADMIN — LIDOCAINE HYDROCHLORIDE 100 MG: 20 INJECTION, SOLUTION INFILTRATION; PERINEURAL at 16:42

## 2017-12-18 RX ADMIN — PROPOFOL 150 MG: 10 INJECTION, EMULSION INTRAVENOUS at 16:42

## 2017-12-18 RX ADMIN — POLYETHYLENE GLYCOL 3350 17 G: 17 POWDER, FOR SOLUTION ORAL at 21:26

## 2017-12-18 RX ADMIN — HYDROMORPHONE HYDROCHLORIDE 1 MG: 1 INJECTION, SOLUTION INTRAMUSCULAR; INTRAVENOUS; SUBCUTANEOUS at 02:28

## 2017-12-18 RX ADMIN — Medication 0.5 MG: at 15:05

## 2017-12-18 RX ADMIN — ONDANSETRON 4 MG: 2 INJECTION INTRAMUSCULAR; INTRAVENOUS at 02:28

## 2017-12-18 RX ADMIN — FAMOTIDINE 20 MG: 10 INJECTION, SOLUTION INTRAVENOUS at 15:05

## 2017-12-18 RX ADMIN — SODIUM CHLORIDE, POTASSIUM CHLORIDE, SODIUM LACTATE AND CALCIUM CHLORIDE 9 ML/HR: 600; 310; 30; 20 INJECTION, SOLUTION INTRAVENOUS at 15:06

## 2017-12-18 RX ADMIN — HYDROCODONE BITARTRATE AND ACETAMINOPHEN 2 TABLET: 7.5; 325 TABLET ORAL at 21:28

## 2017-12-18 NOTE — ED PROVIDER NOTES
Pt presents to the ED complaining of L hip pain.  Pt was sitting in a chair cleaning a magazine rack when she felt it pop. Pt denies falling or making unusual movements.     I reviewed Pt imaging results.  Discussed diagnosis with Pt.  Discussed admitting Pt to have hip reduced.   Pt agrees with plan.  All questions were answered.      On exam, there is tenderness to L lateral hip , leg is shortened, and internally rotated.  NVI. Currently very stable and no distress.    I supervised care provided by the midlevel provider.    We have discussed this patient's history, physical exam, and treatment plan.   I have reviewed the note and personally saw and examined the patient and agree with the plan of care.    Documentation assistance provided by joel Cross for Dr. Marshall.  Information recorded by the scribe was done at my direction and has been verified and validated by me.       Addie Cross  12/17/17 1922       Tyler Marshall MD  12/17/17 7941

## 2017-12-18 NOTE — ANESTHESIA POSTPROCEDURE EVALUATION
Patient: Rachel BATISTA Stone    Procedure Summary     Date Anesthesia Start Anesthesia Stop Room / Location    12/18/17 1636 1706  [facility]       Procedure Diagnosis Surgeon Provider    LEFT HIP CLOSED REDUCTION (Left Hip) No diagnosis on file. MD Lalito Santos MD          Anesthesia Type: general  Last vitals  BP   103/56 (12/18/17 1710)   Temp   37.1 °C (98.7 °F) (12/18/17 1706)   Pulse   94 (12/18/17 1710)   Resp   14 (12/18/17 1710)     SpO2   100 % (12/18/17 1710)     Post Anesthesia Care and Evaluation    Patient location during evaluation: PACU  Patient participation: complete - patient participated  Level of consciousness: awake and alert  Pain management: adequate  Airway patency: patent  Anesthetic complications: No anesthetic complications    Cardiovascular status: acceptable  Respiratory status: acceptable  Hydration status: acceptable    Comments: --------------------            12/18/17 1710     --------------------   BP:       103/56     Pulse:      94       Resp:       14       Temp:                SpO2:      100%     --------------------

## 2017-12-18 NOTE — ED PROVIDER NOTES
"EMERGENCY DEPARTMENT ENCOUNTER    CHIEF COMPLAINT  Chief Complaint: Hip injury  History given by: pt  History limited by: N/A  Room Number: P888/1  PMD: Manan Cintron MD      HPI:  Pt is a 72 y.o. female who presents complaining of sudden L Hip injury with associated constant pain that occurred PTA. Pt states she went to stand up and felt her L hip dislocated. Pt states she has had previous similar episodes and the current pain feels the same as prior episodes. Pt states that movement aggravates the pain and there is no definitive activity that alleviates the pain. PT has a hx of hip replacements. Pt has no other complaints at this time. Pt is currently taking Coumadin for hx of blood clots.     Duration:  PTA  Onset: sudden   Timing: constant pain   Location: L hip  Radiation: None reported   Quality: \"pain and hip dislocation\"  Intensity/Severity: moderate   Progression: unchanged   Associated Symptoms: None reported   Aggravating Factors: Movement  Alleviating Factors: None reported   Previous Episodes: PT has a hx of previous similar episodes and the current pain feels the same as prior episodes.   Treatment before arrival: Pt is currently taking Coumadin.     PAST MEDICAL HISTORY  Active Ambulatory Problems     Diagnosis Date Noted   • Chronic left hip pain 11/02/2016   • OA (osteoarthritis) of hip 11/02/2016     Resolved Ambulatory Problems     Diagnosis Date Noted   • No Resolved Ambulatory Problems     Past Medical History:   Diagnosis Date   • High cholesterol    • History of DVT (deep vein thrombosis)    • History of kidney infection    • History of pulmonary embolus (PE)    • Hypertension    • Paralabral cyst of left hip    • PONV (postoperative nausea and vomiting)    • Presence of vena cava filter    • Rheumatoid arteritis        PAST SURGICAL HISTORY  Past Surgical History:   Procedure Laterality Date   • BLADDER SUSPENSION     • CATARACT EXTRACTION, BILATERAL     • HIP SURGERY Left 1983   • " HIP SURGERY Left 20069   • HIP SURGERY Left 2011   • INCISION AND DRAINAGE HIP Left 11/2/2016    Procedure: HIP INCISION AND DRAINAGE;  Surgeon: Obed Barney MD;  Location: [facility];  Service:    • TOTAL ABDOMINAL HYSTERECTOMY     • VENA CAVA FILTER INSERTION         FAMILY HISTORY  History reviewed. No pertinent family history.    SOCIAL HISTORY  Social History     Social History   • Marital status:      Spouse name: N/A   • Number of children: N/A   • Years of education: N/A     Occupational History   • Not on file.     Social History Main Topics   • Smoking status: Former Smoker     Types: Cigarettes   • Smokeless tobacco: Never Used      Comment: quit 50 years ago   • Alcohol use No   • Drug use: No   • Sexual activity: Not on file     Other Topics Concern   • Not on file     Social History Narrative       ALLERGIES  Sulfa antibiotics    REVIEW OF SYSTEMS  Review of Systems   Constitutional: Negative for fever.   Respiratory: Negative for shortness of breath.    Cardiovascular: Negative for chest pain.   Musculoskeletal: Positive for arthralgias (L hip injury with pain ).       PHYSICAL EXAM  ED Triage Vitals   Temp Heart Rate Resp BP SpO2   12/17/17 1904 12/17/17 1904 12/17/17 1904 12/17/17 1904 12/17/17 1904   98.2 °F (36.8 °C) 80 17 145/88 99 %      Temp src Heart Rate Source Patient Position BP Location FiO2 (%)   -- -- -- -- --              Physical Exam   Constitutional: She is oriented to person, place, and time and well-developed, well-nourished, and in no distress. No distress.   HENT:   Head: Normocephalic and atraumatic.   Eyes: EOM are normal. Pupils are equal, round, and reactive to light.   Neck: Normal range of motion. Neck supple.   Cardiovascular: Normal rate, regular rhythm and normal heart sounds.    Pulmonary/Chest: Effort normal and breath sounds normal. No respiratory distress.   Abdominal: Soft. There is no tenderness. There is no rebound and no guarding.   Musculoskeletal:  She exhibits tenderness and deformity. She exhibits no edema.   Movement not tested due to deformity   L leg is shortened and internally rotation    Neurological: She is alert and oriented to person, place, and time. She has normal sensation and normal strength.   Skin: Skin is warm and dry. No rash noted.   Psychiatric: Mood and affect normal.   Nursing note and vitals reviewed.      RADIOLOGY  XR Hip With or Without Pelvis 2 - 3 View Left   Preliminary Result   Left total hip prosthesis with changes related to dislocation.                   I ordered the above noted radiological studies. Interpreted by radiologist. Reviewed by me in PACS.       PROCEDURES  Procedures      PROGRESS AND CONSULTS  ED Course     1945  Ordered IVF, labs, and L Hip XR for further evaluation. Ordered Zofran and Dilaudid for pain.     2030  PT on her way to radiology for XR. Pt states her pain is mildly improved. Plan to administer more pain medication to manage pt's pain. Discussed plan to consult ortho.     2053  Placed consult to Ortho.     2119  Rechecked pt who states she is feeling improved. Discussed pending consult to Dr. Obed Barney. Pt is resting comfortably in bed and in no acute distress.     2140  Discussed pt's case with Dr. Boland [on call for Dr. Barney's office] who agrees to admit.     2150  Dr. Marshall evaluated pt and discussed admission plan with pt/ spouse. Pt understands and agrees to plan, all questions addressed at this time.     MEDICAL DECISION MAKING  Results were reviewed/discussed with the patient and they were also made aware of online access. Pt also made aware that some labs, such as cultures, will not be resulted during ER visit and follow up with PMD is necessary.     MDM  Number of Diagnoses or Management Options     Amount and/or Complexity of Data Reviewed  Tests in the radiology section of CPT®: ordered and reviewed (L hip XR  Impression: Left total hip prosthesis with changes related to dislocation.         )  Discussion of test results with the performing providers: yes (Dr. Boland (ortho) )  Independent visualization of images, tracings, or specimens: yes           DIAGNOSIS  Final diagnoses:   Dislocation of left hip, initial encounter       DISPOSITION  ADMISSION    Discussed treatment plan and reason for admission with pt/family and admitting physician.  Pt/family voiced understanding of the plan for admission for further testing/treatment as needed.         Latest Documented Vital Signs:  As of 5:59 AM  BP- 91/53 HR- 63 Temp- 97.2 °F (36.2 °C) (Oral) O2 sat- 92%    --  Documentation assistance provided by joel Ruby for Raymundo STEVEN.  Information recorded by the scribe was done at my direction and has been verified and validated by me.          Shemar Ruby  12/17/17 2146       ANDREZ Madrigal III  12/18/17 1959

## 2017-12-18 NOTE — OP NOTE
Rachel Moser  YOB: 1945    DATE OF SURGERY: 12/18/2017    PREOPERATIVE DIAGNOSIS:LeftTotal hip dislocation    POSTOPERATIVE DIAGNOSIS: Left Total hip dislocation    PROCEDURE PERFORMED: Left  total hip replacement closed reduction under anesthesia    SURGEON: Obed Barney M.D.    EBL : none  Specimens : none  Complications: none    DESCRIPTION OF PROCEDURE: The patient was taken to the operating room.  After adequate induction of general anesthesia the leg was carefully examined and noted to be short in comparison to the opposite side.  Using a gentle flexion abduction and internal rotation technique we were able to reduce the hip with an audible and palpable clunk.  Leg lengths were checked and noted to be roughly equal which was different from pre-reduction.  We then checked carefully the range of motion.  The hip was stable to flexion of 100 degrees, and was stable to 90° of flexion with 10-15 degrees internal rotation.  An intraoperative x-ray was then taken which confirmed concentric reduction of the hip.  A knee immobilizer was then placed.  The patient was then transferred to the PACU for recovery from anesthesia.  Patient will follow up in my office in 2 weeks.          Obed Barney M.D.  12/18/2017

## 2017-12-18 NOTE — ED NOTES
"Pt to room 3 via stretcher for c/o left hip pain.  Pt states was sitting at table and felt left hip \"go out\" - states history of 3 previous hip replacements and numerous dislocations.  Pt distal pulses palpable, good color and warmth to left toes.  Pt was due to see ortho next month to discuss alternative hip replacement.  Pt is alert and oriented X4, PERRLA, respirations are even and unlabored, chest rise and fall is equal in expansion.  Pt does not appear to be in distress at this time.  Bed in low position, call light within reach, side rails up X2.      Katina Painting RN  12/17/17 2020    "

## 2017-12-18 NOTE — PLAN OF CARE
Problem: Patient Care Overview (Adult)  Goal: Plan of Care Review  Outcome: Ongoing (interventions implemented as appropriate)    12/18/17 0551   Coping/Psychosocial Response Interventions   Plan Of Care Reviewed With patient   Patient Care Overview   Progress no change   Outcome Evaluation   Outcome Summary/Follow up Plan Admitted patient with Dislocated Left hip. Patient's LLE is shorter and internally rotated. Placed patient on NPO as ordered. .Spoke with Dr. Boland regarding the Coumadin scheduled for tonight, MD stated that it is okay for the patient to have the Coumadin tonight. Pain controlled with pain medicine.  Educated on the importance of BP monitoring r/t history of Hypertension.       Goal: Adult Individualization and Mutuality  Outcome: Ongoing (interventions implemented as appropriate)  Goal: Discharge Needs Assessment  Outcome: Ongoing (interventions implemented as appropriate)    Problem: Pain, Acute (Adult)  Goal: Acceptable Pain Control/Comfort Level  Outcome: Ongoing (interventions implemented as appropriate)    Problem: Fall Risk (Adult)  Goal: Absence of Falls  Outcome: Ongoing (interventions implemented as appropriate)

## 2017-12-18 NOTE — ANESTHESIA PREPROCEDURE EVALUATION
Anesthesia Evaluation     Patient summary reviewed and Nursing notes reviewed   history of anesthetic complications: PONV  NPO Solid Status: > 8 hours  NPO Liquid Status: > 8 hours     Airway   Mallampati: II  TM distance: >3 FB  Neck ROM: full  no difficulty expected  Dental - normal exam     Pulmonary - negative pulmonary ROS and normal exam   Cardiovascular - normal exam    (+) hypertension, hyperlipidemia      Neuro/Psych- negative ROS  GI/Hepatic/Renal/Endo - negative ROS     Musculoskeletal     Abdominal  - normal exam   Substance History - negative use     OB/GYN          Other   (+) arthritis                                     Anesthesia Plan    ASA 2     general     intravenous induction   Anesthetic plan and risks discussed with patient and spouse/significant other.

## 2017-12-18 NOTE — H&P
Patient Care Team:  Manan Cintron MD as PCP - General (Family Medicine)  Vijay Arango MD as Consulting Physician (Cardiology)    Chief complaint  Left hip pain    Subjective     Hip Injury       The patient is a very pleasant 72-year-old female who is status post left hip revision surgery by Dr. Barney who presented with left hip pain.  She reports she was putting up Bere lights when she felt a pop in the back of her hip.  She difficulty bearing weight.  She was brought to emergency department for evaluation and management.  Radiographs confirmed a periprosthetic hip dislocation without associated fracture.    I thus admitted for stabilization and closed reduction.    Review of Systems   Review of Systems:  Constitutional: Negative  HENT: Negative  Eyes: Negative  Respiratory: Negative; no shortness of breath  Cardiovascular: Negative; no current chest pain  Gastrointestinal: Negative  : Negative  Skin: Negative except as listed in HPI  Neurological: Negative, no numbness or deficits  Hematological: Negative  Muscoloskeletal: Per HPI      Past Medical History:   Diagnosis Date   • High cholesterol    • History of DVT (deep vein thrombosis)    • History of kidney infection     1 MONTH AGO   • History of pulmonary embolus (PE)    • Hypertension    • Paralabral cyst of left hip    • PONV (postoperative nausea and vomiting)    • Presence of vena cava filter    • Rheumatoid arteritis      Past Surgical History:   Procedure Laterality Date   • BLADDER SUSPENSION     • CATARACT EXTRACTION, BILATERAL     • HIP SURGERY Left 1983   • HIP SURGERY Left 20069   • HIP SURGERY Left 2011   • INCISION AND DRAINAGE HIP Left 11/2/2016    Procedure: HIP INCISION AND DRAINAGE;  Surgeon: Obde Barney MD;  Location: [facility];  Service:    • TOTAL ABDOMINAL HYSTERECTOMY     • VENA CAVA FILTER INSERTION       History reviewed. No pertinent family history.  Social History   Substance Use Topics   • Smoking  status: Former Smoker     Types: Cigarettes   • Smokeless tobacco: Never Used      Comment: quit 50 years ago   • Alcohol use No     Prescriptions Prior to Admission   Medication Sig Dispense Refill Last Dose   • calcium-vitamin D (CALCIUM + D) 250-125 MG-UNIT per tablet Take 1 tablet by mouth 2 (Two) Times a Day.   12/17/2017 at Unknown time   • Cranberry 600 MG tablet Take  by mouth 2 (Two) Times a Day.   12/17/2017 at Unknown time   • ferrous sulfate (FERROUSUL) 325 (65 FE) MG tablet Take  by mouth Daily With Breakfast.   12/17/2017 at Unknown time   • folic acid (FOLVITE) 800 MCG tablet Take 800 mcg by mouth Daily.   12/17/2017 at Unknown time   • furosemide (LASIX) 40 MG tablet Take 40 mg by mouth Daily.   12/17/2017 at Unknown time   • inFLIXimab (REMICADE) 100 MG injection Infuse  into a venous catheter Every 30 (Thirty) Days.   12/14/2017   • leucovorin 5 MG tablet Take 5 mg by mouth 1 (One) Time Per Week. Patient takes only on Sunday 12/17/2017   • methotrexate 2.5 MG tablet Take 20 mg by mouth 1 (One) Time Per Week. TAKES 8 TABLETS ON SATURDAYS 12/16/2017   • metoprolol succinate XL (TOPROL-XL) 50 MG 24 hr tablet Take 50 mg by mouth Every Morning.   12/17/2017 at Unknown time   • Multiple Vitamin (MULTIVITAMIN) capsule Take  by mouth Daily.   12/17/2017 at Unknown time   • potassium chloride (K-DUR,KLOR-CON) 20 MEQ CR tablet TAKE 2 TABLETS BY MOUTH DAILY (Patient taking differently: TAKE 1 TABLET BY MOUTH twice a day) 180 tablet 3 12/17/2017 at Unknown time   • simvastatin (ZOCOR) 40 MG tablet Take 40 mg by mouth Every Morning.   12/17/2017 at Unknown time   • warfarin (COUMADIN) 5 MG tablet TAKE 1 AND 1/2 TABLETS BY MOUTH DAILY OR AS DIRECTED (Patient taking differently: 5 mg every Monday and Friday, 7.5 mg every Tuesday, Wednesday,Thursday,Saturday and Sunday.) 135 tablet 0 12/16/2017 at Unknown time   • albuterol (PROAIR RESPICLICK) 108 (90 BASE) MCG/ACT inhaler TK 2 PUFFS PO TID PRN   Unknown at  Unknown time   • HYDROcodone-acetaminophen (NORCO) 7.5-325 MG per tablet 1-2 po q 4-6 hr prn pain 100 tablet 0 Taking   • ipratropium (ATROVENT) 0.03 % nasal spray 2 sprays into each nostril 2 (Two) Times a Day As Needed for rhinitis.   Unknown at Unknown time   • olopatadine (PATANOL) 0.1 % ophthalmic solution Administer 1 drop to both eyes 2 (Two) Times a Day.   Unknown at Unknown time   • PredniSONE (DELTASONE) 10 MG (21) tablet pack Take  by mouth Daily.   Unknown at Unknown time     Allergies:  Sulfa antibiotics    Objective      Vital Signs  Temp:  [96.8 °F (36 °C)-98.2 °F (36.8 °C)] 97.2 °F (36.2 °C)  Heart Rate:  [63-80] 63  Resp:  [16-17] 16  BP: ()/(53-88) 91/53    Physical Exam  ,Physical Exam:  Vital signs reviewed.  Constitutional:  Appears well-developed, well nourished.  HEENT:  Head: normocephalic, atraumatic  Eyes: EOMI, grossly normal.  No scleral icterus.  Neck: Normal range of motion.  Supple, no tracheal deviation.  Cardiovascular: Regular rate.  Pulmonary: Effort normal, symmetric chest expansion, no labored breathing.  Abdominal: Soft, non distended  Neurological: No gross deficits, oriented to person, place, and time.  Skin: Warm and dry  Psychiatric: Normal mood and affect  Musculoskeletal:  Left lower extremity shortened and internally rotated.  Severe pain with logroll of the hip.  No tenderness with palpation of the distal femur or knee.  Active ankle dorsiflexion and plantarflexion.  Sensation is intact to light touch in sural, saphenous, deep and superficial peroneal, tibial nerve distribution.  Toes are warm and well perfused with brisk capillary refill.  Palpable DP            Results Review:   Radiographs from the emergency department confirm a posterior lateral dislocation of a total hip prosthesis.  No evidence of periprosthetic fracture identified.      Assessment/Plan     Active Problems:    Hip dislocation, left      Assessment & Plan    This is a pleasant 72-year-old  female with history of multiple left hip surgeries presenting with a closed left periprosthetic hip dislocation.  She remains neurovascularly intact, and her pain is well controlled.    I have discussed the case with Dr. Barney.  He plans on formal closed reduction in the operating room later today.    - Will continue nothing by mouth  - Continue home meds  - PT: NWKATHY LLE  - DVT: On home coumadin, ERNESTO/SCDs    Dispo: pending, likely home following CR    Claudio Boland Jr, MD  12/18/17  6:52 AM

## 2017-12-18 NOTE — ANESTHESIA PROCEDURE NOTES
Airway  Urgency: elective    Airway not difficult    General Information and Staff    Patient location during procedure: OR  Anesthesiologist: AJ SANTIAGO  CRNA: MAX HOWARD    Indications and Patient Condition  Indications for airway management: airway protection    Preoxygenated: yes  MILS maintained throughout  Mask difficulty assessment: 1 - vent by mask    Final Airway Details  Final airway type: supraglottic airway      Successful airway: unique  Size 4  Cuff Pressure (cm H2O): 14  Airway Seal Pressure (cm H2O): 22    Number of attempts at approach: 1

## 2017-12-19 ENCOUNTER — APPOINTMENT (OUTPATIENT)
Dept: CT IMAGING | Facility: HOSPITAL | Age: 72
End: 2017-12-19
Attending: ORTHOPAEDIC SURGERY

## 2017-12-19 ENCOUNTER — TELEPHONE (OUTPATIENT)
Dept: ORTHOPEDIC SURGERY | Facility: CLINIC | Age: 72
End: 2017-12-19

## 2017-12-19 VITALS
SYSTOLIC BLOOD PRESSURE: 107 MMHG | DIASTOLIC BLOOD PRESSURE: 60 MMHG | OXYGEN SATURATION: 97 % | BODY MASS INDEX: 27.52 KG/M2 | RESPIRATION RATE: 20 BRPM | TEMPERATURE: 98.6 F | WEIGHT: 136.5 LBS | HEART RATE: 78 BPM | HEIGHT: 59 IN

## 2017-12-19 PROCEDURE — G0378 HOSPITAL OBSERVATION PER HR: HCPCS

## 2017-12-19 PROCEDURE — G8979 MOBILITY GOAL STATUS: HCPCS

## 2017-12-19 PROCEDURE — G8980 MOBILITY D/C STATUS: HCPCS

## 2017-12-19 PROCEDURE — 97161 PT EVAL LOW COMPLEX 20 MIN: CPT

## 2017-12-19 PROCEDURE — 72191 CT ANGIOGRAPH PELV W/O&W/DYE: CPT

## 2017-12-19 PROCEDURE — 0 IOPAMIDOL 61 % SOLUTION: Performed by: ORTHOPAEDIC SURGERY

## 2017-12-19 PROCEDURE — G8978 MOBILITY CURRENT STATUS: HCPCS

## 2017-12-19 PROCEDURE — 25010000003 CEFAZOLIN IN DEXTROSE 2-4 GM/100ML-% SOLUTION: Performed by: ORTHOPAEDIC SURGERY

## 2017-12-19 RX ADMIN — CEFAZOLIN SODIUM 2 G: 2 INJECTION, SOLUTION INTRAVENOUS at 06:06

## 2017-12-19 RX ADMIN — DOCUSATE SODIUM 100 MG: 100 CAPSULE, LIQUID FILLED ORAL at 18:02

## 2017-12-19 RX ADMIN — FERROUS SULFATE TAB 325 MG (65 MG ELEMENTAL FE) 325 MG: 325 (65 FE) TAB at 09:16

## 2017-12-19 RX ADMIN — POLYETHYLENE GLYCOL 3350 17 G: 17 POWDER, FOR SOLUTION ORAL at 09:17

## 2017-12-19 RX ADMIN — POLYETHYLENE GLYCOL 3350 17 G: 17 POWDER, FOR SOLUTION ORAL at 18:02

## 2017-12-19 RX ADMIN — DOCUSATE SODIUM 100 MG: 100 CAPSULE, LIQUID FILLED ORAL at 09:17

## 2017-12-19 RX ADMIN — IOPAMIDOL 85 ML: 612 INJECTION, SOLUTION INTRAVENOUS at 12:45

## 2017-12-19 NOTE — THERAPY DISCHARGE NOTE
Acute Care - Physical Therapy Initial Eval/Discharge  [facility]     Patient Name: Rachel Moser  : 1945  MRN: [id number]  Today's Date: 2017   Onset of Illness/Injury or Date of Surgery Date: 17     Referring Physician: Dr. Boland      Admit Date: 2017    Visit Dx:    ICD-10-CM ICD-9-CM   1. Dislocation of left hip, initial encounter S73.005A 835.00     Patient Active Problem List   Diagnosis   • Chronic left hip pain   • OA (osteoarthritis) of hip   • Hip dislocation, left     Past Medical History:   Diagnosis Date   • High cholesterol    • History of DVT (deep vein thrombosis)    • History of kidney infection     1 MONTH AGO   • History of pulmonary embolus (PE)    • Hypertension    • Paralabral cyst of left hip    • PONV (postoperative nausea and vomiting)    • Presence of vena cava filter    • Rheumatoid arteritis      Past Surgical History:   Procedure Laterality Date   • BLADDER SUSPENSION     • CATARACT EXTRACTION, BILATERAL     • HIP SURGERY Left    • HIP SURGERY Left    • HIP SURGERY Left    • INCISION AND DRAINAGE HIP Left 2016    Procedure: HIP INCISION AND DRAINAGE;  Surgeon: Obed Barney MD;  Location: [facility];  Service:    • JOINT REPLACEMENT Left     HIP   • AK CLOSED RX TRAUMATIC HIP DISLOCATN Left 2017    Procedure: LEFT HIP CLOSED REDUCTION;  Surgeon: Obed Barney MD;  Location: [facility];  Service: Orthopedics   • TOTAL ABDOMINAL HYSTERECTOMY     • VENA CAVA FILTER INSERTION            PT ASSESSMENT (last 72 hours)      PT Evaluation       17 1030 17 1247    Rehab Evaluation    Document Type evaluation  -AA     Subjective Information agree to therapy;no complaints  -AA     Patient Effort, Rehab Treatment good  -AA     Symptoms Noted During/After Treatment none  -AA     General Information    Patient Profile Review yes  -AA     Onset of Illness/Injury or Date of Surgery Date 17  -AA     Referring Physician   Krystian  -AA     General Observations Pt sitting in chair no signs distress/discomfort. Pt spouse at bedside. Pt L knee immobilizer donned pre and post therapy evaluation.  -AA     Pertinent History Of Current Problem Pt admitted post dislocation of L hip requiring reduction. Pt has h/o chronic L hip pain.   -AA     Precautions/Limitations fall precautions;hip precautions- left   strict L posterior hip precautions  -AA     Prior Level of Function independent:;all household mobility;ADL's  -AA     Equipment Currently Used at Home walker, rolling;cane, straight;shower chair  -AA walker, rolling;cane, straight  -AR    Plans/Goals Discussed With patient and family  -AA     Barriers to Rehab none identified  -AA     Living Environment    Lives With spouse  -AA     Living Arrangements house  -AA     Home Accessibility no concerns  -AA     Stair Railings at Home none  -AA     Clinical Impression    Patient/Family Goals Statement Pt would like to return home [facility].   -AA     Criteria for Skilled Therapeutic Interventions Met no problems identified which require skilled intervention  -AA     Pain Assessment    Pain Assessment 0-10  -AA     Pain Score 3  -AA     Pain Type Acute pain  -AA     Pain Location Hip  -AA     Pain Orientation Left  -AA     Pain Intervention(s) Ambulation/increased activity  -AA     Response to Interventions tolerated  -AA     Cognitive Assessment/Intervention    Current Cognitive/Communication Assessment functional  -AA     Orientation Status oriented x 4  -AA     Follows Commands/Answers Questions 100% of the time  -AA     Personal Safety WNL/WFL  -AA     Personal Safety Interventions fall prevention program maintained;gait belt;nonskid shoes/slippers when out of bed  -AA     ROM (Range of Motion)    General ROM Detail WNL except LLE  -AA     MMT (Manual Muscle Testing)    General MMT Assessment Detail WNL except LLE  -AA     Bed Mobility, Assessment/Treatment    Bed Mob, Supine to Sit, [place]  not tested  -AA     Bed Mob, Sit to Supine, [place] not tested  -AA     Transfer Assessment/Treatment    Transfers, Sit-Stand [place] supervision required  -AA     Transfers, Stand-Sit [place] supervision required  -AA     Transfers, Sit-Stand-Sit, Assist Device rolling walker  -AA     Gait Assessment/Treatment    Gait, [place] Level supervision required;hand held assist  -AA     Gait, Assistive Device rolling walker;other (see comments)   RW ~10ft; remainder 90ft L hand held assist  -AA     Gait, Distance (Feet) 100  -AA     Gait, Gait Pattern Analysis swing-through gait  -AA     Gait, Comment Pt able to ambulate 10ft with walker SPV and no deviations noted, thus remainder 90ft ambulation with L hand held assist SPV and no LOB or difficulty noted. Pt states she has canes, walkers at home but typically does not use AD.   -AA     Motor Skills/Interventions    Additional Documentation Balance Skills Training (Group)  -AA     Balance Skills Training    Sitting-Level of Assistance Independent  -AA     Sitting-Balance Support Feet supported  -AA     Standing-Level of Assistance Close supervision  -AA     Static Standing Balance Support Left upper extremity supported  -AA     Gait Balance-Level of Assistance Close supervision  -AA     Gait Balance Support Left upper extremity supported  -AA     Positioning and Restraints    Pre-Treatment Position sitting in chair/recliner  -AA     Post Treatment Position chair  -AA     In Chair sitting;reclined;call light within reach;with family/caregiver  -       12/17/17 4844       Living Environment    Lives With spouse  -MI     Living Arrangements house  -MI     Home Accessibility no concerns  -MI       User Key  (r) = Recorded By, (t) = Taken By, (c) = Cosigned By    Initials Name Provider Type    MI Opal De La Garza RN Registered Nurse    CHRISTIANO Wesley RN Registered Nurse    ABDULLAHI Gross, PT Physical Therapist          Physical Therapy Education      Title: PT OT SLP Therapies (Resolved)     Topic: Physical Therapy (Resolved)     Point: Mobility training (Resolved)    Learning Progress Summary    Learner Readiness Method Response Comment Documented by Status   Patient Acceptance E,TIARRA,TONY VU,DU PT provided thorough explanation of L posterior hip precuations with pt ability to teach back. PT also provided education on safety with pivoting, ambulating and transfering (to include not sitting on low surfaces as this breaks precautions). AA 12/19/17 1053 Done   Family Acceptance E,TONY MAYEN,DU PT provided thorough explanation of L posterior hip precuations with pt ability to teach back. PT also provided education on safety with pivoting, ambulating and transfering (to include not sitting on low surfaces as this breaks precautions). AA 12/19/17 1053 Done               Point: Body mechanics (Resolved)    Learning Progress Summary    Learner Readiness Method Response Comment Documented by Status   Patient Acceptance E,TB,D VU,DU PT provided thorough explanation of L posterior hip precuations with pt ability to teach back. PT also provided education on safety with pivoting, ambulating and transfering (to include not sitting on low surfaces as this breaks precautions). AA 12/19/17 1053 Done   Family Acceptance E,TONY MAYEN VU,DU PT provided thorough explanation of L posterior hip precuations with pt ability to teach back. PT also provided education on safety with pivoting, ambulating and transfering (to include not sitting on low surfaces as this breaks precautions). AA 12/19/17 1053 Done               Point: Precautions (Resolved)    Learning Progress Summary    Learner Readiness Method Response Comment Documented by Status   Patient Acceptance E,TONY MAYEN,FLY PT provided thorough explanation of L posterior hip precuations with pt ability to teach back. PT also provided education on safety with pivoting, ambulating and transfering (to include not sitting on low surfaces as this breaks  precautions).  12/19/17 1053 Done   Family Acceptance E,TB,D TALIA,DU PT provided thorough explanation of L posterior hip precuations with pt ability to teach back. PT also provided education on safety with pivoting, ambulating and transfering (to include not sitting on low surfaces as this breaks precautions).  12/19/17 1053 Done                      User Key     Initials Effective Dates Name Provider Type Discipline     09/05/17 -  Chelsea Gross PT Physical Therapist PT                PT Recommendation and Plan  Anticipated Discharge Disposition: home  PT Frequency: evaluation only  Plan of Care Review  Plan Of Care Reviewed With: patient  Outcome Summary/Follow up Plan: Pt admitted secondary to L hip dislocation requiring reduction. Pt has h/o chronic L hip pain. Pt able to perform transfers SPV with RW or no AD. Pt able to ambulate 10ft with RW SPV as well as ambulate 90ft L hand held assist with SPV and no LOB noted. PT educated pt on strict posterior hip precautions with pt ability to teach back. PT recommends pt d/c home maintaining L hip precautions.               Outcome Measures       12/19/17 1000          How much help from another person do you currently need...    Turning from your back to your side while in flat bed without using bedrails? 4  -AA      Moving from lying on back to sitting on the side of a flat bed without bedrails? 4  -AA      Moving to and from a bed to a chair (including a wheelchair)? 4  -AA      Standing up from a chair using your arms (e.g., wheelchair, bedside chair)? 4  -AA      Climbing 3-5 steps with a railing? 3  -AA      To walk in hospital room? 4  -AA      AM-PAC 6 Clicks Score 23  -AA      Functional Assessment    Outcome Measure Options AM-PAC 6 Clicks Basic Mobility (PT)  -AA        User Key  (r) = Recorded By, (t) = Taken By, (c) = Cosigned By    Initials Name Provider Type    AA Chelsea Gross PT Physical Therapist           Time Calculation:         PT  Charges       12/19/17 1058          Time Calculation    Start Time 1030  -AA      Stop Time 1042  -AA      Time Calculation (min) 12 min  -AA      PT Received On 12/19/17  -AA        User Key  (r) = Recorded By, (t) = Taken By, (c) = Cosigned By    Initials Name Provider Type    AA Chelsea Gross PT Physical Therapist          Therapy Charges for Today     Code Description Service Date Service Provider Modifiers Qty    93438271350 HC PT EVAL LOW COMPLEXITY 1 12/19/2017 Chelsea Gross, PT GP 1    95976186107 HC PT MOBILITY CURRENT 12/19/2017 Chelsea Gross, PT GP, [place] 1    58484387156 HC PT MOBILITY DISCHARGE 12/19/2017 Chelsea Gross, PT GP, CH 1          PT G-Codes  Outcome Measure Options: AM-PAC 6 Clicks Basic Mobility (PT)  Functional Limitation: Mobility: Walking and moving around  Mobility: Walking and Moving Around Current Status (): 0 percent impaired, limited or restricted  Mobility: Walking and Moving Around Discharge Status (): 0 percent impaired, limited or restricted    PT Discharge Summary  Anticipated Discharge Disposition: home  Outcomes Achieved: Able to achieve all goals within established timeline  Discharge Destination: Home    Chelsea Gross PT  12/19/2017

## 2017-12-19 NOTE — PLAN OF CARE
Problem: Patient Care Overview (Adult)  Goal: Plan of Care Review  Outcome: Ongoing (interventions implemented as appropriate)    12/18/17 1926   Coping/Psychosocial Response Interventions   Plan Of Care Reviewed With patient   Patient Care Overview   Progress improving   Outcome Evaluation   Outcome Summary/Follow up Plan s/P Closed reduction of L hip. VSS. Denies pain. Voiding without difficulty. Discussed hx PONV. No symptoms noted. Eating and drinking without difficulty. Plans to d/c home.          Problem: Pain, Acute (Adult)  Goal: Identify Related Risk Factors and Signs and Symptoms  Outcome: Outcome(s) achieved Date Met:  12/18/17  Goal: Acceptable Pain Control/Comfort Level  Outcome: Ongoing (interventions implemented as appropriate)    Problem: Fall Risk (Adult)  Goal: Identify Related Risk Factors and Signs and Symptoms  Outcome: Outcome(s) achieved Date Met:  12/18/17  Goal: Absence of Falls  Outcome: Ongoing (interventions implemented as appropriate)    Problem: Perioperative Period (Adult)  Goal: Signs and Symptoms of Listed Potential Problems Will be Absent or Manageable (Perioperative Period)  Outcome: Ongoing (interventions implemented as appropriate)

## 2017-12-19 NOTE — PLAN OF CARE
Problem: Patient Care Overview (Adult)  Goal: Plan of Care Review  Outcome: Ongoing (interventions implemented as appropriate)    12/19/17 0223   Coping/Psychosocial Response Interventions   Plan Of Care Reviewed With patient   Patient Care Overview   Progress progress toward functional goals as expected   Outcome Evaluation   Outcome Summary/Follow up Plan meds per order, pain meds per request, no falls, amb per order, see vs and labs, pt educated on bp monitoring r./t htn       Goal: Adult Individualization and Mutuality  Outcome: Ongoing (interventions implemented as appropriate)  Goal: Discharge Needs Assessment  Outcome: Ongoing (interventions implemented as appropriate)    Problem: Pain, Acute (Adult)  Goal: Acceptable Pain Control/Comfort Level  Outcome: Ongoing (interventions implemented as appropriate)    Problem: Fall Risk (Adult)  Goal: Absence of Falls  Outcome: Ongoing (interventions implemented as appropriate)

## 2017-12-19 NOTE — PLAN OF CARE
Problem: Patient Care Overview (Adult)  Goal: Plan of Care Review    12/19/17 1060   Coping/Psychosocial Response Interventions   Plan Of Care Reviewed With patient   Outcome Evaluation   Outcome Summary/Follow up Plan Pt admitted secondary to L hip dislocation requiring reduction. Pt has h/o chronic L hip pain. Pt able to perform transfers SPV with RW or no AD. Pt able to ambulate 10ft with RW SPV as well as ambulate 90ft L hand held assist with SPV and no LOB noted. PT educated pt on strict posterior hip precautions with pt ability to teach back. PT to d/c from skilled PT services in hospital as she is SPV for all mobility. PT recommends pt d/c home maintaining L hip precautions.

## 2017-12-19 NOTE — PLAN OF CARE
Problem: Fall Risk (Adult)  Goal: Identify Related Risk Factors and Signs and Symptoms  Outcome: Outcome(s) achieved Date Met:  12/18/17  Goal: Absence of Falls  Outcome: Ongoing (interventions implemented as appropriate)    Problem: Perioperative Period (Adult)  Goal: Signs and Symptoms of Listed Potential Problems Will be Absent or Manageable (Perioperative Period)  Outcome: Ongoing (interventions implemented as appropriate)

## 2017-12-19 NOTE — PLAN OF CARE
Problem: Patient Care Overview (Adult)  Goal: Plan of Care Review  Outcome: Ongoing (interventions implemented as appropriate)    12/19/17 1553   Coping/Psychosocial Response Interventions   Plan Of Care Reviewed With patient   Patient Care Overview   Progress improving   Outcome Evaluation   Outcome Summary/Follow up Plan VSS. Pt denies pain this shift. Ambulating with assist x1 and walker. Worked with therapy this morning. Voiding without difficulty. Knee immobilizer remains in place. Plans to d/c home with spouse today. Discussed BP monitoring and med r/t to HTN.          Problem: Pain, Acute (Adult)  Goal: Acceptable Pain Control/Comfort Level  Outcome: Ongoing (interventions implemented as appropriate)    Problem: Fall Risk (Adult)  Goal: Absence of Falls  Outcome: Ongoing (interventions implemented as appropriate)    Problem: Perioperative Period (Adult)  Goal: Signs and Symptoms of Listed Potential Problems Will be Absent or Manageable (Perioperative Period)  Outcome: Ongoing (interventions implemented as appropriate)    Problem: VTE, DVT and PE (Adult)  Goal: Signs and Symptoms of Listed Potential Problems Will be Absent or Manageable (VTE, DVT and PE)  Outcome: Outcome(s) achieved Date Met:  12/19/17 12/19/17 1553   VTE, DVT and PE   Problems Assessed (VTE, DVT, PE) pulmonary embolism  (history of PE)   Problems Present (VTE, DVT, PE) none         12/19/17 1553   VTE, DVT and PE   Problems Assessed (VTE, DVT, PE) pulmonary embolism  (history of PE)   Problems Present (VTE, DVT, PE) none

## 2017-12-19 NOTE — DISCHARGE SUMMARY
Patient Name: Rachel Moser  Patient YOB: 1945    Date of Admission:  12/17/2017  Date of Discharge:  12/19/2017  Discharge Diagnosis: CO CLOSED RX TRAUMATIC HIP DISLOCATN [15104] (LEFT HIP CLOSED REDUCTION)  Presenting Problem/History of Present Illness: Dislocation of left hip, initial encounter [S73.005A]  Admitting Physician: Dr Obed Barney  Consults:   Consults     Date and Time Order Name Status Description    12/17/2017 2053 Ortho (on-call MD unless specified) Completed           DETAILS OF HOSPITAL STAY:  Patient is a 72 y.o. female was admitted to the floor following the above procedure and underwent an uncomplicated hospital stay.  Patient did well with physical therapy and was ambulating well without problems.  She had a CT scan of the left hip for surveillance of what appears to be a pseudotumor.  I explained to the patient that I am going to consult a musculoskeletal oncologist for discussion of further treatment.  I will contact the patient at home for further treatment plans.  She should keep the knee immobilizer on to prevent knee flexion greater than 90°.    Condition on Discharge:  Stable    Vital Signs  Temp:  [97 °F (36.1 °C)-99.5 °F (37.5 °C)] 98.6 °F (37 °C)  Heart Rate:  [] 78  Resp:  [16-20] 20  BP: ()/(53-80) 107/60    LABS:   Admission on 12/17/2017   Component Date Value Ref Range Status   • Extra Tube 12/17/2017 hold for add-on   Final    Auto resulted   • Extra Tube 12/17/2017 Hold for add-ons.   Final    Auto resulted.   • Extra Tube 12/17/2017 hold for add-on   Final    Auto resulted   • Extra Tube 12/17/2017 Hold for add-ons.   Final    Auto resulted.   • Protime 12/17/2017 28.8* 11.7 - 14.2 Seconds Final   • INR 12/17/2017 2.82* 0.90 - 1.10 Final   • Glucose 12/17/2017 103* 65 - 99 mg/dL Final   • BUN 12/17/2017 14  8 - 23 mg/dL Final   • Creatinine 12/17/2017 0.68  0.57 - 1.00 mg/dL Final   • Sodium 12/17/2017 143  136 - 145 mmol/L Final   • Potassium  12/17/2017 3.3* 3.5 - 5.2 mmol/L Final   • Chloride 12/17/2017 102  98 - 107 mmol/L Final   • CO2 12/17/2017 23.3  22.0 - 29.0 mmol/L Final   • Calcium 12/17/2017 9.5  8.6 - 10.5 mg/dL Final   • Total Protein 12/17/2017 7.3  6.0 - 8.5 g/dL Final   • Albumin 12/17/2017 4.10  3.50 - 5.20 g/dL Final   • ALT (SGPT) 12/17/2017 16  1 - 33 U/L Final   • AST (SGOT) 12/17/2017 25  1 - 32 U/L Final   • Alkaline Phosphatase 12/17/2017 73  39 - 117 U/L Final   • Total Bilirubin 12/17/2017 0.4  0.1 - 1.2 mg/dL Final   • eGFR Non African Amer 12/17/2017 85  >60 mL/min/1.73 Final   • Globulin 12/17/2017 3.2  gm/dL Final   • A/G Ratio 12/17/2017 1.3  g/dL Final   • BUN/Creatinine Ratio 12/17/2017 20.6  7.0 - 25.0 Final   • Anion Gap 12/17/2017 17.7  mmol/L Final   • WBC 12/17/2017 10.05  4.50 - 10.70 10*3/mm3 Final   • RBC 12/17/2017 4.41  3.90 - 5.20 10*6/mm3 Final   • Hemoglobin 12/17/2017 13.9  11.9 - 15.5 g/dL Final   • Hematocrit 12/17/2017 43.5  35.6 - 45.5 % Final   • MCV 12/17/2017 98.6* 80.5 - 98.2 fL Final   • MCH 12/17/2017 31.5  26.9 - 32.0 pg Final   • MCHC 12/17/2017 32.0* 32.4 - 36.3 g/dL Final   • RDW 12/17/2017 14.9* 11.7 - 13.0 % Final   • RDW-SD 12/17/2017 53.0  37.0 - 54.0 fl Final   • MPV 12/17/2017 12.9* 6.0 - 12.0 fL Final   • Platelets 12/17/2017 254  140 - 500 10*3/mm3 Final   • Neutrophil % 12/17/2017 68.8  42.7 - 76.0 % Final   • Lymphocyte % 12/17/2017 21.4  19.6 - 45.3 % Final   • Monocyte % 12/17/2017 7.3  5.0 - 12.0 % Final   • Eosinophil % 12/17/2017 1.9  0.3 - 6.2 % Final   • Basophil % 12/17/2017 0.3  0.0 - 1.5 % Final   • Immature Grans % 12/17/2017 0.3  0.0 - 0.5 % Final   • Neutrophils, Absolute 12/17/2017 6.92  1.90 - 8.10 10*3/mm3 Final   • Lymphocytes, Absolute 12/17/2017 2.15  0.90 - 4.80 10*3/mm3 Final   • Monocytes, Absolute 12/17/2017 0.73  0.20 - 1.20 10*3/mm3 Final   • Eosinophils, Absolute 12/17/2017 0.19  0.00 - 0.70 10*3/mm3 Final   • Basophils, Absolute 12/17/2017 0.03  0.00 - 0.20  10*3/mm3 Final   • Immature Grans, Absolute 12/17/2017 0.03  0.00 - 0.03 10*3/mm3 Final       Ct Angiogram Pelvis With & Without Contrast    Result Date: 12/19/2017  Narrative: CT ANGIOGRAM OF THE PELVIS WITH AND WITHOUT CONTRAST  HISTORY: Evaluate total hip replacement, pelvic pseudotumor.  TECHNIQUE: Axial CT images of the pelvis were obtained following administration of intravenous contrast in the angiographic phase. Coronal, sagittal and 3D volume rendered images were then obtained.  COMPARISON: 07/03/2017  FINDINGS: The patient is status post left hip arthroplasty with previously demonstrated dislocation of the femoral head being corrected. 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 The internal component measures approximately 8.8 x 7.8 cm and shows peripheral hyperdensity, most suggestive of evolving calcification. This continues into the region of the iliopsoas bursa.  There is good contrast opacification of the infrarenal abdominal aorta and the pelvic arteries. There is ostial calcification at the origin of bilateral common iliac arteries without narrowing. Bilateral internal and external iliac arteries demonstrate normal luminal caliber. The left external iliac artery is displaced     secondary to the pseudotumor. There is an abrupt occlusion of the left external iliac artery within its most distal portion just as it exits the pelvis for an extremely short segment of approximately 3-4 mm following which it is reconstituted normally as the common femoral artery that demonstrates normal branching into the profunda and superficial femoral artery. The right-sided external iliac, common femoral, superficial femoral and visualized profunda is unremarkable. Partly imaged IVC filter. Visualized small bowel and large bowel loops demonstrate normal caliber. The urinary bladder is unremarkable.  A fluid collection seen along the posterior aspect of the  left hip arthroplasty deep to the gluteus is reidentified, now measuring approximately 8.4 x 4.1 cm without significant change in comparison to prior imaging.      Impression: Recent dislocation of the right hip, postreduction. Known left pelvic pseudotumor and large fluid collection posterior to the left hip arthroplasty remain unchanged. Today's CT demonstrates short segment new occlusion of the most distal left external iliac artery with immediate reconstitution. There is significant medial deviation of the left external iliac artery that is unchanged.  These findings were discussed with Dr. Barney by telephone at 1:20 PM on 12/19/2017.  Radiation dose reduction techniques were utilized, including automated exposure control and exposure modulation based on body size.       Xr Pelvis 1 Or 2 View    Result Date: 12/18/2017  Narrative: XR PELVIS 1 OR 2 VW-  INDICATIONS: Intraoperative evaluation.  TECHNIQUE:     Intraoperative frontal view of the pelvis    COMPARISON: 12/17/2017  FINDINGS:   Intact appearing and properly located left hip arthroplasty hardware is seen. No acute fracture is identified.       Impression:  Interval reduction of previous left hip dislocation.    This report was finalized on 12/18/2017 6:45 PM by Dr. Nahun Brock MD.      Xr Hip With Or Without Pelvis 2 - 3 View Left    Result Date: 12/18/2017  Narrative: 2 VIEWS OF THE LEFT HIP, SINGLE VIEW OF THE PELVIS.  HISTORY: Possible left hip dislocation.  COMPARISON: 11/09/2011.  FINDINGS: Patient is status post left total hip replacement, hardware is intact. Dislocation of the left hip joint is noted, the prosthetic femoral head is displaced superiorly compared to the upper sternum.     Soft tissues deformity.      Impression: Left total hip prosthesis with changes related to dislocation.               Discharge Medications   Rachel Moser   Home Medication Instructions RADHA:[id number]    Printed on:12/19/17 6293   Medication Information                       albuterol (PROAIR RESPICLICK) 108 (90 BASE) MCG/ACT inhaler  TK 2 PUFFS PO TID PRN             calcium-vitamin D (CALCIUM + D) 250-125 MG-UNIT per tablet  Take 1 tablet by mouth 2 (Two) Times a Day.             Cranberry 600 MG tablet  Take  by mouth 2 (Two) Times a Day.             ferrous sulfate (FERROUSUL) 325 (65 FE) MG tablet  Take  by mouth Daily With Breakfast.             folic acid (FOLVITE) 800 MCG tablet  Take 800 mcg by mouth Daily.             furosemide (LASIX) 40 MG tablet  Take 40 mg by mouth Daily.             HYDROcodone-acetaminophen (NORCO) 7.5-325 MG per tablet  1-2 po q 4-6 hr prn pain             inFLIXimab (REMICADE) 100 MG injection  Infuse  into a venous catheter Every 30 (Thirty) Days.             ipratropium (ATROVENT) 0.03 % nasal spray  2 sprays into each nostril 2 (Two) Times a Day As Needed for rhinitis.             leucovorin 5 MG tablet  Take 5 mg by mouth 1 (One) Time Per Week. Patient takes only on Sunday             methotrexate 2.5 MG tablet  Take 20 mg by mouth 1 (One) Time Per Week. TAKES 8 TABLETS ON SATURDAYS             metoprolol succinate XL (TOPROL-XL) 50 MG 24 hr tablet  Take 50 mg by mouth Every Morning.             Multiple Vitamin (MULTIVITAMIN) capsule  Take  by mouth Daily.             olopatadine (PATANOL) 0.1 % ophthalmic solution  Administer 1 drop to both eyes 2 (Two) Times a Day.             potassium chloride (K-DUR,KLOR-CON) 20 MEQ CR tablet  TAKE 2 TABLETS BY MOUTH DAILY             PredniSONE (DELTASONE) 10 MG (21) tablet pack  Take  by mouth Daily.             simvastatin (ZOCOR) 40 MG tablet  Take 40 mg by mouth Every Morning.             warfarin (COUMADIN) 5 MG tablet  TAKE 1 AND 1/2 TABLETS BY MOUTH DAILY OR AS DIRECTED                 Activity at Discharge:     Discharge Instructions:   1)  Patient is to continue with physical therapy exercises daily and continue working with the physical therapist as ordered.  2)  Follow  Posterior hip precautions.   3)  Patient may weight bear as tolerated.   4)  Apply ice regularly if needed for pain. You may ice for long periods of time as long as ice is not directly on the skin. Patient instructed on frequent calf pumping exercises.    5) I will contact patient to arrange further follow-up   6)  Patient will be discharged on coumadin as directed to keep INR 2-3.  Please draw PT / INR in 2 days and call to office during business hours at [phone].    Complete Discharge Diagnosis:    Patient Active Problem List   Diagnosis   • Chronic left hip pain   • OA (osteoarthritis) of hip   • Hip dislocation, left           Follow-up Appointments  Future Appointments  Date Time Provider Department Center   1/11/2018 11:20 AM MD [facility] None   4/13/2018 12:00 PM Vijay Arango MD [facility] None     Additional Instructions for the Follow-ups that You Need to Schedule     Referral to home health    As directed    Should follow posterior hip precautions.  Needs a knee immobilizer on when out of bed   Order Comments:  Should follow posterior hip precautions.  Needs a knee immobilizer on when out of bed     Face to Face Visit Date:  12/19/2017    Follow-up Provider for Plan of Care?:  I will be treating the patient on an ongoing basis.  Please send me the Plan of Care for signature.    Follow-up Provider:  JAQUELIN PATEL [[id number]]    Reason/Clinical Findings:  post op total hip    Describe mobility limitations that make leaving home difficult:  pain, swelling, decreased strength an mobility, gait abnormality, difficulty ambualting without assistive devices    Nursing/Therapeutic Services Requested:  Physicial Therapy                          Jaquelin CHAVEZ. MD [place]  12/19/17  5:54 PM

## 2017-12-20 NOTE — PROGRESS NOTES
Case Management Discharge Note    Final Note: Home, denies home health needs. No additional needs noted.     Discharge Placement     No information found        Other:  (Private car)    Discharge Codes: 01  Discharge to home

## 2017-12-22 ENCOUNTER — HOSPITAL ENCOUNTER (OUTPATIENT)
Dept: CARDIOLOGY | Facility: HOSPITAL | Age: 72
Setting detail: RECURRING SERIES
Discharge: HOME | End: 2017-12-22

## 2017-12-22 PROCEDURE — 85610 PROTHROMBIN TIME: CPT | Performed by: INTERNAL MEDICINE

## 2017-12-22 PROCEDURE — 36416 COLLJ CAPILLARY BLOOD SPEC: CPT | Performed by: INTERNAL MEDICINE

## 2017-12-29 ENCOUNTER — HOSPITAL ENCOUNTER (OUTPATIENT)
Dept: CARDIOLOGY | Facility: HOSPITAL | Age: 72
Setting detail: RECURRING SERIES
Discharge: HOME | End: 2017-12-29

## 2017-12-29 PROCEDURE — 85610 PROTHROMBIN TIME: CPT

## 2017-12-29 PROCEDURE — 36416 COLLJ CAPILLARY BLOOD SPEC: CPT

## 2018-01-11 ENCOUNTER — HOSPITAL ENCOUNTER (OUTPATIENT)
Dept: CARDIOLOGY | Facility: HOSPITAL | Age: 73
Setting detail: RECURRING SERIES
Discharge: HOME | End: 2018-01-11

## 2018-01-11 ENCOUNTER — OFFICE VISIT (OUTPATIENT)
Dept: ORTHOPEDIC SURGERY | Facility: CLINIC | Age: 73
End: 2018-01-11

## 2018-01-11 VITALS — WEIGHT: 134 LBS | TEMPERATURE: 98.3 F | BODY MASS INDEX: 27.01 KG/M2 | HEIGHT: 59 IN

## 2018-01-11 DIAGNOSIS — Z09 SURGERY FOLLOW-UP: Primary | ICD-10-CM

## 2018-01-11 PROCEDURE — 85610 PROTHROMBIN TIME: CPT

## 2018-01-11 PROCEDURE — 36416 COLLJ CAPILLARY BLOOD SPEC: CPT

## 2018-01-11 PROCEDURE — 73502 X-RAY EXAM HIP UNI 2-3 VIEWS: CPT | Performed by: ORTHOPAEDIC SURGERY

## 2018-01-11 PROCEDURE — 99024 POSTOP FOLLOW-UP VISIT: CPT | Performed by: ORTHOPAEDIC SURGERY

## 2018-01-11 NOTE — PROGRESS NOTES
Patient: Rachel Moser  YOB: 1945 72 y.o. female  Medical Record Number: [id number]    Chief Complaint:   Chief Complaint   Patient presents with   • Left Hip - Follow-up       History of Present Illness:Rachel Moser is a 72 y.o. female who presents for follow-up of  Left hip.  She has a large pseudotumor surrounding her multiply revised hip.  She last had a dislocation a few weeks ago and is here for follow-up.  She has been mindful of posterior hip precautions but has not been using the knee immobilizer.  She has a diffuse aching throughout the hip region but otherwise her symptoms are essentially unchanged.    Allergies:   Allergies   Allergen Reactions   • Sulfa Antibiotics Rash       Medications:   Current Outpatient Prescriptions   Medication Sig Dispense Refill   • albuterol (PROAIR RESPICLICK) 108 (90 BASE) MCG/ACT inhaler TK 2 PUFFS PO TID PRN     • calcium-vitamin D (CALCIUM + D) 250-125 MG-UNIT per tablet Take 1 tablet by mouth 2 (Two) Times a Day.     • Cranberry 600 MG tablet Take  by mouth 2 (Two) Times a Day.     • ferrous sulfate (FERROUSUL) 325 (65 FE) MG tablet Take  by mouth Daily With Breakfast.     • folic acid (FOLVITE) 800 MCG tablet Take 800 mcg by mouth Daily.     • furosemide (LASIX) 40 MG tablet Take 40 mg by mouth Daily.     • HYDROcodone-acetaminophen (NORCO) 7.5-325 MG per tablet 1-2 po q 4-6 hr prn pain 100 tablet 0   • inFLIXimab (REMICADE) 100 MG injection Infuse  into a venous catheter Every 30 (Thirty) Days.     • ipratropium (ATROVENT) 0.03 % nasal spray 2 sprays into each nostril 2 (Two) Times a Day As Needed for rhinitis.     • leucovorin 5 MG tablet Take 5 mg by mouth 1 (One) Time Per Week. Patient takes only on Sunday     • methotrexate 2.5 MG tablet Take 20 mg by mouth 1 (One) Time Per Week. TAKES 8 TABLETS ON SATURDAYS     • metoprolol succinate XL (TOPROL-XL) 50 MG 24 hr tablet Take 50 mg by mouth Every Morning.     • Multiple Vitamin (MULTIVITAMIN) capsule  "Take  by mouth Daily.     • olopatadine (PATANOL) 0.1 % ophthalmic solution Administer 1 drop to both eyes 2 (Two) Times a Day.     • potassium chloride (K-DUR,KLOR-CON) 20 MEQ CR tablet TAKE 2 TABLETS BY MOUTH DAILY (Patient taking differently: TAKE 1 TABLET BY MOUTH twice a day) 180 tablet 3   • PredniSONE (DELTASONE) 10 MG (21) tablet pack Take  by mouth Daily.     • simvastatin (ZOCOR) 40 MG tablet Take 40 mg by mouth Every Morning.     • warfarin (COUMADIN) 5 MG tablet TAKE 1 AND 1/2 TABLETS BY MOUTH DAILY OR AS DIRECTED (Patient taking differently: 5 mg every Monday and Friday, 7.5 mg every Tuesday, Wednesday,Thursday,Saturday and Sunday.) 135 tablet 0     No current facility-administered medications for this visit.          The following portions of the patient's history were reviewed and updated as appropriate: allergies, current medications, past family history, past medical history, past social history, past surgical history and problem list.    Review of Systems:   A 14 point review of systems was performed. All systems negative except pertinent positives/negative listed in HPI above    Physical Exam:   Vitals:    01/11/18 1148   Temp: 98.3 °F (36.8 °C)   Weight: 60.8 kg (134 lb)   Height: 149.9 cm (59\")       General: A and O x 3, ASA, NAD    SCLERA:    Normal    DENTITION:   NormalThere is moderate diffuse swelling about the left hip and thigh region nontender to palpation.  She has no pain with gentle hip range of motion she walks with slight antalgic gait.  She has palpable distal pulses and the toes are well-perfused distally.  Her hip incisions are healed without sign of infection    Radiology:    Xrays 2views left hip (AP bilateral hips and lateral hip) were ordered and reviewed for evaluation of hip pain demonstrating a well positioned total hip with custom pelvic reconstruction tri-flange component.  There is no signs of gross loosening however there is a hint of lucency especially in the " superior bone prosthesis interface.  I compared them to previous films and they are unchanged.      Assessment/Plan:  Left pelvic pseudotumor.  This is essentially unchanged in size but the recent dislocation of the hip would suggest that there may be progression with laxity of her soft tissues.  I'm going to try and get in touch with Dr. Kam who is a local tumor/orthopedic reconstruction surgeon.  I explained to Mrs. Moser that removal of an intrapelvic pseudotumor is outside my realm of expertise and I will try and obtain a specialist with familiarity in these types of procedures.      Obed Barney MD  1/11/2018

## 2018-01-15 ENCOUNTER — TELEPHONE (OUTPATIENT)
Dept: ORTHOPEDIC SURGERY | Facility: CLINIC | Age: 73
End: 2018-01-15

## 2018-01-22 ENCOUNTER — HOSPITAL ENCOUNTER (OUTPATIENT)
Dept: CARDIOLOGY | Facility: HOSPITAL | Age: 73
Setting detail: RECURRING SERIES
Discharge: HOME | End: 2018-01-22

## 2018-01-22 PROCEDURE — 85610 PROTHROMBIN TIME: CPT

## 2018-01-22 PROCEDURE — 36416 COLLJ CAPILLARY BLOOD SPEC: CPT

## 2018-02-01 ENCOUNTER — APPOINTMENT (OUTPATIENT)
Dept: WOMENS IMAGING | Facility: HOSPITAL | Age: 73
End: 2018-02-01

## 2018-02-01 PROCEDURE — 77063 BREAST TOMOSYNTHESIS BI: CPT | Performed by: RADIOLOGY

## 2018-02-01 PROCEDURE — 77067 SCR MAMMO BI INCL CAD: CPT | Performed by: RADIOLOGY

## 2018-02-01 PROCEDURE — MDREVIEWSP: Performed by: RADIOLOGY

## 2018-02-06 ENCOUNTER — HOSPITAL ENCOUNTER (OUTPATIENT)
Dept: CARDIOLOGY | Facility: HOSPITAL | Age: 73
Setting detail: RECURRING SERIES
Discharge: HOME | End: 2018-02-06

## 2018-02-06 PROCEDURE — 85610 PROTHROMBIN TIME: CPT

## 2018-02-06 PROCEDURE — 36416 COLLJ CAPILLARY BLOOD SPEC: CPT

## 2018-02-13 ENCOUNTER — TELEPHONE (OUTPATIENT)
Dept: ORTHOPEDIC SURGERY | Facility: CLINIC | Age: 73
End: 2018-02-13

## 2018-02-23 ENCOUNTER — OFFICE VISIT (OUTPATIENT)
Dept: ORTHOPEDIC SURGERY | Facility: CLINIC | Age: 73
End: 2018-02-23

## 2018-02-23 VITALS — WEIGHT: 134 LBS | HEIGHT: 59 IN | BODY MASS INDEX: 27.01 KG/M2 | TEMPERATURE: 98.4 F

## 2018-02-23 DIAGNOSIS — Z96.642 STATUS POST LEFT HIP REPLACEMENT: Primary | ICD-10-CM

## 2018-02-23 PROCEDURE — 99214 OFFICE O/P EST MOD 30 MIN: CPT | Performed by: ORTHOPAEDIC SURGERY

## 2018-02-23 RX ORDER — PREGABALIN 25 MG/1
150 CAPSULE ORAL ONCE
Status: CANCELLED | OUTPATIENT
Start: 2018-04-25 | End: 2018-04-25

## 2018-02-23 RX ORDER — MELOXICAM 7.5 MG/1
15 TABLET ORAL ONCE
Status: CANCELLED | OUTPATIENT
Start: 2018-04-25 | End: 2018-04-25

## 2018-02-23 RX ORDER — CEFAZOLIN SODIUM 2 G/100ML
2 INJECTION, SOLUTION INTRAVENOUS ONCE
Status: CANCELLED | OUTPATIENT
Start: 2018-04-25 | End: 2018-04-25

## 2018-02-23 RX ORDER — VANCOMYCIN HYDROCHLORIDE 1 G/200ML
15 INJECTION, SOLUTION INTRAVENOUS ONCE
Status: CANCELLED | OUTPATIENT
Start: 2018-04-25 | End: 2018-04-25

## 2018-02-23 NOTE — PROGRESS NOTES
Patient: Rachel Moser  YOB: 1945 72 y.o. female  Medical Record Number: [id number]    Chief Complaint:   Chief Complaint   Patient presents with   • Left Hip - Follow-up       History of Present Illness:Rachel Moser is a 72 y.o. female who presents for follow-up of  Left hip.  She has a very large pseudotumor along the inner table of the pelvis which is displacing her femoral artery.  This is believed to be related to her custom acetabular component.  She is having worsening pressure and discomfort within the left hip.  I sent her to see Dr. Kam and he and I have had discussions about this process.  The plan at this point is for Dr. Kam to perform excision of the pseudotumor through the inner table of the pelvis along with the vascular surgeon.  Once that incision is appropriately healed I will then perform an acetabular revision.    Allergies:   Allergies   Allergen Reactions   • Sulfa Antibiotics Rash       Medications:   Current Outpatient Prescriptions   Medication Sig Dispense Refill   • albuterol (PROAIR RESPICLICK) 108 (90 BASE) MCG/ACT inhaler TK 2 PUFFS PO TID PRN     • calcium-vitamin D (CALCIUM + D) 250-125 MG-UNIT per tablet Take 1 tablet by mouth 2 (Two) Times a Day.     • Cranberry 600 MG tablet Take  by mouth 2 (Two) Times a Day.     • ferrous sulfate (FERROUSUL) 325 (65 FE) MG tablet Take  by mouth Daily With Breakfast.     • folic acid (FOLVITE) 800 MCG tablet Take 800 mcg by mouth Daily.     • furosemide (LASIX) 40 MG tablet Take 40 mg by mouth Daily.     • HYDROcodone-acetaminophen (NORCO) 7.5-325 MG per tablet 1-2 po q 4-6 hr prn pain 100 tablet 0   • inFLIXimab (REMICADE) 100 MG injection Infuse  into a venous catheter Every 30 (Thirty) Days.     • ipratropium (ATROVENT) 0.03 % nasal spray 2 sprays into each nostril 2 (Two) Times a Day As Needed for rhinitis.     • leucovorin 5 MG tablet Take 5 mg by mouth 1 (One) Time Per Week. Patient takes only on Sunday     •  "methotrexate 2.5 MG tablet Take 20 mg by mouth 1 (One) Time Per Week. TAKES 8 TABLETS ON SATURDAYS     • metoprolol succinate XL (TOPROL-XL) 50 MG 24 hr tablet Take 50 mg by mouth Every Morning.     • Multiple Vitamin (MULTIVITAMIN) capsule Take  by mouth Daily.     • olopatadine (PATANOL) 0.1 % ophthalmic solution Administer 1 drop to both eyes 2 (Two) Times a Day.     • potassium chloride (K-DUR,KLOR-CON) 20 MEQ CR tablet TAKE 2 TABLETS BY MOUTH DAILY (Patient taking differently: TAKE 1 TABLET BY MOUTH twice a day) 180 tablet 3   • PredniSONE (DELTASONE) 10 MG (21) tablet pack Take  by mouth Daily.     • simvastatin (ZOCOR) 40 MG tablet Take 40 mg by mouth Every Morning.     • warfarin (COUMADIN) 5 MG tablet TAKE 1 AND 1/2 TABLETS BY MOUTH DAILY OR AS DIRECTED (Patient taking differently: 5 mg every Monday and Friday, 7.5 mg every Tuesday, Wednesday,Thursday,Saturday and Sunday.) 135 tablet 0     No current facility-administered medications for this visit.          The following portions of the patient's history were reviewed and updated as appropriate: allergies, current medications, past family history, past medical history, past social history, past surgical history and problem list.    Review of Systems:   A 14 point review of systems was performed. All systems negative except pertinent positives/negative listed in HPI above    Physical Exam:   Vitals:    02/23/18 1625   Temp: 98.4 °F (36.9 °C)   TempSrc: Temporal Artery    Weight: 60.8 kg (134 lb)   Height: 149.9 cm (59\")       General: A and O x 3, ASA, NAD    SCLERA:    Normal    DENTITION:   Normal  Her leg and left thigh are moderately diffusely swollen.  She has well-healed surgical incision she has no pain with gentle range of motion but she does walk with a mild antalgic gait.  She has palpable distal pulses there is no significant distal edema.  The pelvis is nontender    Radiology:    Xrays 2views left hip (AP bilateral hips and lateral hip) taken " previously demonstrating a well positioned total hip there is a custom tri-flange acetabular component and there are slight lucencies which appears circumferential.  The screws are well fixed and there is no signs of loosening.    Assessment/Plan:  Left hip pseudotumor which is believed to be a result of failure of ingrowth into the acetabular component.  This is a very severe and unusual situation.  We have done everything possible to try and avoid significant surgery however the pseudotumor has progressed and is bordering on vascular compromise of the extremity.  After long discussions with Dr. Kam we have elected to proceed with a two-stage procedure.  He will perform an intrapelvic excision of pseudotumor along with a vascular specialist.  Approximately 3 weeks later I will then perform revision of her custom acetabular component.  I plan on using a [employer] readapt cup and locking screws.  I explained that the locking screw mechanism is similar to the current locking screw mechanism and I could not guarantee her that she wouldn't develop a pseudotumor in the future.  She also understands that this is in extreme surgery and complications are certainly possible.  There is also possibility that the implant will not be able to be removed.  We even discussed the possibility of having used a metal cutting bur to remove the flanges and leave them in situ.  She understands that this is a very complicated situation and that it may require some degree of exploration in figuring out the appropriate plan while in surgery.  At this point plan a on my part would be removal of the custom cage placement of press-fit readapt cup with possible use of allograft cancellus or structural bone grafting and also possible use of metal augments.  Planned B would be use a metal cutting bur to remove the flanges or screws and cementing a cup into the existing metal with the use of locking screws.  She understands that there is a  risk of neurovascular damage fracture of the pelvis infection dislocation persistent pain return of pseudotumor and severe situations including loss of life or limb.          Obed Barney MD  2/23/2018

## 2018-02-26 ENCOUNTER — TELEPHONE (OUTPATIENT)
Dept: CARDIOLOGY | Facility: CLINIC | Age: 73
End: 2018-02-26

## 2018-02-26 NOTE — TELEPHONE ENCOUNTER
Patient called, informing me that she an appt scheduled with Dr. Arango on April 13.  But, she needs to have surgery on March 19 to have tumors removed off of her hip.  Then on April 18 she is going to have a hip replacement.  Shima is going to need surgery clearance for these surgeries.  Can MI see her or should I put her on Chelsea's schedule.  Please advise.  Thalia

## 2018-02-27 PROBLEM — Z96.642 STATUS POST LEFT HIP REPLACEMENT: Status: ACTIVE | Noted: 2018-02-27

## 2018-02-28 ENCOUNTER — OFFICE VISIT (OUTPATIENT)
Dept: CARDIOLOGY | Facility: CLINIC | Age: 73
End: 2018-02-28

## 2018-02-28 VITALS
WEIGHT: 136 LBS | HEART RATE: 90 BPM | DIASTOLIC BLOOD PRESSURE: 84 MMHG | HEIGHT: 59 IN | BODY MASS INDEX: 27.42 KG/M2 | SYSTOLIC BLOOD PRESSURE: 136 MMHG

## 2018-02-28 DIAGNOSIS — I10 ESSENTIAL HYPERTENSION: ICD-10-CM

## 2018-02-28 DIAGNOSIS — E78.2 MIXED HYPERLIPIDEMIA: ICD-10-CM

## 2018-02-28 DIAGNOSIS — I26.99 OTHER PULMONARY EMBOLISM WITHOUT ACUTE COR PULMONALE, UNSPECIFIED CHRONICITY (HCC): Primary | ICD-10-CM

## 2018-02-28 PROCEDURE — 99214 OFFICE O/P EST MOD 30 MIN: CPT | Performed by: INTERNAL MEDICINE

## 2018-02-28 PROCEDURE — 93000 ELECTROCARDIOGRAM COMPLETE: CPT | Performed by: INTERNAL MEDICINE

## 2018-02-28 RX ORDER — WARFARIN SODIUM 5 MG/1
TABLET ORAL
Qty: 135 TABLET | Refills: 0 | Status: SHIPPED | OUTPATIENT
Start: 2018-02-28 | End: 2018-04-05

## 2018-02-28 RX ORDER — POTASSIUM CHLORIDE 20 MEQ/1
20 TABLET, EXTENDED RELEASE ORAL 2 TIMES DAILY
Qty: 180 TABLET | Refills: 3 | Status: SHIPPED | OUTPATIENT
Start: 2018-02-28 | End: 2019-02-12 | Stop reason: HOSPADM

## 2018-02-28 NOTE — PROGRESS NOTES
Date of Office Visit: 18  Encounter Provider: Vijay Arango MD  Place of Service: [facility] CARDIOLOGY  Patient Name: Rachel Moser  :1945      Chief Complaint   Patient presents with   • surgery clearance     History of Present Illness  HPI Comments: Ms. Moser is a pleasant 72-year-old white female who presented to the office in 2011  acutely short of breath, markedly hypoxemic, positive D-dimer. She had a CTA that showed  multiple pulmonary emboli. She was admitted to the hospital and started on Lovenox and  then warfarin. Was found to have bilateral DVTs. She did also have some pleuritic chest  pain with that. She had a followup CT that showed no new clots. She then needed her hip  operated on, and she ended up having inferior vena cava filter placed for that. She comes  in for followup now.  She's been doing great.  No chest pain or pressure.  No shortness of breath, orthopnea, or PND.  No palpitations, near-syncope or syncope.  No stroke type symptoms no blood in her stool or black tarry stools.  Unfortunately should have another hip surgery back in March and then again in 2017.  She's now needing surgery again on her hip to have some tumors removed and then have her hip replaced [facility].        Past Medical History:   Diagnosis Date   • High cholesterol    • History of DVT (deep vein thrombosis)    • History of kidney infection     1 MONTH AGO   • History of pulmonary embolus (PE)    • Hypertension    • Paralabral cyst of left hip    • PONV (postoperative nausea and vomiting)    • Presence of vena cava filter    • Rheumatoid arteritis          Past Surgical History:   Procedure Laterality Date   • BLADDER SUSPENSION     • CATARACT EXTRACTION, BILATERAL     • HIP SURGERY Left    • HIP SURGERY Left    • HIP SURGERY Left    • INCISION AND DRAINAGE HIP Left 2016    Procedure: HIP INCISION AND DRAINAGE;  Surgeon: Obed CHAVEZ  MD Ector;  Location: [facility] MAIN OR;  Service:    • JOINT REPLACEMENT Left     HIP   • [place] CLOSED RX TRAUMATIC HIP DISLOCATN Left 12/18/2017    Procedure: LEFT HIP CLOSED REDUCTION;  Surgeon: Obed Barney MD;  Location: [facility] MAIN OR;  Service: Orthopedics   • TOTAL ABDOMINAL HYSTERECTOMY     • VENA CAVA FILTER INSERTION           Current Outpatient Prescriptions on File Prior to Visit   Medication Sig Dispense Refill   • albuterol (PROAIR RESPICLICK) 108 (90 BASE) MCG/ACT inhaler TK 2 PUFFS PO TID PRN     • calcium-vitamin D (CALCIUM + D) 250-125 MG-UNIT per tablet Take 1 tablet by mouth 2 (Two) Times a Day.     • Cranberry 600 MG tablet Take  by mouth 2 (Two) Times a Day.     • ferrous sulfate (FERROUSUL) 325 (65 FE) MG tablet Take  by mouth Daily With Breakfast.     • folic acid (FOLVITE) 800 MCG tablet Take 800 mcg by mouth Daily.     • furosemide (LASIX) 40 MG tablet Take 40 mg by mouth Daily.     • HYDROcodone-acetaminophen (NORCO) 7.5-325 MG per tablet 1-2 po q 4-6 hr prn pain 100 tablet 0   • inFLIXimab (REMICADE) 100 MG injection Infuse  into a venous catheter Every 30 (Thirty) Days.     • ipratropium (ATROVENT) 0.03 % nasal spray 2 sprays into each nostril 2 (Two) Times a Day As Needed for rhinitis.     • leucovorin 5 MG tablet Take 5 mg by mouth 1 (One) Time Per Week. Patient takes only on Sunday     • methotrexate 2.5 MG tablet Take 20 mg by mouth 1 (One) Time Per Week. TAKES 8 TABLETS ON SATURDAYS     • metoprolol succinate XL (TOPROL-XL) 50 MG 24 hr tablet Take 50 mg by mouth Every Morning.     • Multiple Vitamin (MULTIVITAMIN) capsule Take  by mouth Daily.     • olopatadine (PATANOL) 0.1 % ophthalmic solution Administer 1 drop to both eyes As Needed.     • PredniSONE (DELTASONE) 10 MG (21) tablet pack Take  by mouth As Needed.     • simvastatin (ZOCOR) 40 MG tablet Take 40 mg by mouth Every Morning.     • [DISCONTINUED] potassium chloride (K-DUR,KLOR-CON) 20 MEQ CR tablet TAKE 2 TABLETS BY MOUTH  DAILY (Patient taking differently: TAKE 1 TABLET BY MOUTH twice a day) 180 tablet 3   • [DISCONTINUED] warfarin (COUMADIN) 5 MG tablet TAKE 1 AND 1/2 TABLETS BY MOUTH DAILY OR AS DIRECTED (Patient taking differently: 5 mg every Monday and Friday, 7.5 mg every Tuesday, Wednesday,Thursday,Saturday and Sunday.) 135 tablet 0     No current facility-administered medications on file prior to visit.          Social History     Social History   • Marital status:      Spouse name: N/A   • Number of children: N/A   • Years of education: N/A     Occupational History   • Not on file.     Social History Main Topics   • Smoking status: Former Smoker     Types: Cigarettes   • Smokeless tobacco: Never Used      Comment: quit 50 years ago   • Alcohol use No   • Drug use: No   • Sexual activity: Not on file     Other Topics Concern   • Not on file     Social History Narrative       History reviewed. No pertinent family history.      Review of Systems   Constitution: Negative for decreased appetite, diaphoresis, fever, weakness, malaise/fatigue, weight gain and weight loss.   HENT: Negative for congestion, hearing loss, nosebleeds and tinnitus.    Eyes: Negative for blurred vision, double vision, vision loss in left eye, vision loss in right eye and visual disturbance.   Cardiovascular:        As noted in HPI   Respiratory:        As noted HPI   Endocrine: Negative for cold intolerance and heat intolerance.   Hematologic/Lymphatic: Negative for bleeding problem. Does not bruise/bleed easily.   Skin: Negative for color change, flushing, itching and rash.   Musculoskeletal: Negative for arthritis, back pain, joint pain, joint swelling, muscle weakness and myalgias.   Gastrointestinal: Negative for bloating, abdominal pain, constipation, diarrhea, dysphagia, heartburn, hematemesis, hematochezia, melena, nausea and vomiting.   Genitourinary: Negative for bladder incontinence, dysuria, frequency, nocturia and urgency.  "  Neurological: Positive for paresthesias. Negative for dizziness, focal weakness, headaches, light-headedness, loss of balance, numbness and vertigo.   Psychiatric/Behavioral: Negative for depression, memory loss and substance abuse.       Procedures      ECG 12 Lead  Date/Time: 2/28/2018 2:58 PM  Performed by: TOÑO HARDWICK  Authorized by: TOÑO HARDWICK   Comparison: compared with previous ECG   Similar to previous ECG  Rhythm: sinus rhythm  Rate: normal  QRS axis: left                 Objective:    /84 (BP Location: Right arm)  Pulse 90  Ht 149.9 cm (59\")  Wt 61.7 kg (136 lb)  BMI 27.47 kg/m2       Physical Exam  Physical Exam   Constitutional: She is oriented to person, place, and time. She appears well-developed and well-nourished. No distress.   HENT:   Head: Normocephalic.   Eyes: Conjunctivae are normal. Pupils are equal, round, and reactive to light. No scleral icterus.   Neck: Normal carotid pulses, no hepatojugular reflux and no JVD present. Carotid bruit is not present. No tracheal deviation, no edema and no erythema present. No thyromegaly present.   Cardiovascular: Normal rate, regular rhythm, S1 normal, S2 normal, normal heart sounds and intact distal pulses.   No extrasystoles are present. PMI is not displaced.  Exam reveals no gallop, no distant heart sounds and no friction rub.    No murmur heard.  Pulses:       Carotid pulses are 2+ on the right side, and 2+ on the left side.       Radial pulses are 2+ on the right side, and 2+ on the left side.        Femoral pulses are 2+ on the right side, and 2+ on the left side.       Dorsalis pedis pulses are 2+ on the right side, and 2+ on the left side.        Posterior tibial pulses are 2+ on the right side, and 2+ on the left side.   Pulmonary/Chest: Effort normal and breath sounds normal. No respiratory distress. She has no decreased breath sounds. She has no wheezes. She has no rhonchi. She has no rales. She exhibits no tenderness. "   Abdominal: Soft. Bowel sounds are normal. She exhibits no distension and no mass. There is no hepatosplenomegaly. There is no tenderness. There is no rebound and no guarding.   Musculoskeletal: She exhibits no tenderness or deformity. Edema: Trace bilateral pretibial.   Neurological: She is alert and oriented to person, place, and time.   Skin: Skin is warm and dry. No rash noted. She is not diaphoretic. No cyanosis or erythema. No pallor. Nails show no clubbing.   Psychiatric: She has a normal mood and affect. Her speech is normal and behavior is normal. Judgment and thought content normal.           Assessment:    1. This is a 72-year-old white female with history of pulmonary emboli, bilateral lower extremity DVT status post vena cava filter. Now doing well. She is to continue the same and see us in follow up in one year.   2. Hyperlipidemia, on simvastatin.  3. Hypertension. Blood pressure adequately controlled.   4. Recurrent hip problems.  Now requiring surgery again.  She is Italo's revised low risk.  She is out of her high risk for pulmonary emboli and DVT does have an IVC filter in place.  I think be reasonable to hold her warfarin for 5 days but would start Lovenox on the third day stop the Lovenox the day before her surgery and then reinstituted that postoperatively and maintain her on that until her INR is therapeutic.         Plan:

## 2018-03-07 ENCOUNTER — HOSPITAL ENCOUNTER (OUTPATIENT)
Dept: CARDIOLOGY | Facility: HOSPITAL | Age: 73
Setting detail: RECURRING SERIES
Discharge: HOME | End: 2018-03-07

## 2018-03-07 PROCEDURE — 36416 COLLJ CAPILLARY BLOOD SPEC: CPT

## 2018-03-07 PROCEDURE — 85610 PROTHROMBIN TIME: CPT

## 2018-03-27 ENCOUNTER — HOSPITAL ENCOUNTER (OUTPATIENT)
Dept: CARDIOLOGY | Facility: HOSPITAL | Age: 73
Setting detail: RECURRING SERIES
Discharge: HOME | End: 2018-03-27

## 2018-03-27 PROCEDURE — 85610 PROTHROMBIN TIME: CPT

## 2018-03-27 PROCEDURE — 36416 COLLJ CAPILLARY BLOOD SPEC: CPT

## 2018-03-29 ENCOUNTER — HOSPITAL ENCOUNTER (OUTPATIENT)
Dept: CARDIOLOGY | Facility: HOSPITAL | Age: 73
Setting detail: RECURRING SERIES
Discharge: HOME | End: 2018-03-29

## 2018-03-29 PROCEDURE — 36416 COLLJ CAPILLARY BLOOD SPEC: CPT

## 2018-03-29 PROCEDURE — 85610 PROTHROMBIN TIME: CPT

## 2018-04-05 ENCOUNTER — APPOINTMENT (OUTPATIENT)
Dept: PREADMISSION TESTING | Facility: HOSPITAL | Age: 73
End: 2018-04-05

## 2018-04-05 ENCOUNTER — HOSPITAL ENCOUNTER (OUTPATIENT)
Dept: CARDIOLOGY | Facility: HOSPITAL | Age: 73
Setting detail: RECURRING SERIES
Discharge: HOME | End: 2018-04-05

## 2018-04-05 VITALS
TEMPERATURE: 98.2 F | RESPIRATION RATE: 20 BRPM | DIASTOLIC BLOOD PRESSURE: 74 MMHG | HEART RATE: 86 BPM | WEIGHT: 127 LBS | BODY MASS INDEX: 25.6 KG/M2 | SYSTOLIC BLOOD PRESSURE: 114 MMHG | HEIGHT: 59 IN | OXYGEN SATURATION: 97 %

## 2018-04-05 DIAGNOSIS — Z96.642 STATUS POST LEFT HIP REPLACEMENT: ICD-10-CM

## 2018-04-05 LAB
ANION GAP SERPL CALCULATED.3IONS-SCNC: 16.2 MMOL/L
BACTERIA UR QL AUTO: ABNORMAL /HPF
BILIRUB UR QL STRIP: NEGATIVE
BUN BLD-MCNC: 12 MG/DL (ref 8–23)
BUN/CREAT SERPL: 20 (ref 7–25)
CALCIUM SPEC-SCNC: 9.5 MG/DL (ref 8.6–10.5)
CHLORIDE SERPL-SCNC: 101 MMOL/L (ref 98–107)
CLARITY UR: CLEAR
CO2 SERPL-SCNC: 23.8 MMOL/L (ref 22–29)
COLOR UR: YELLOW
CREAT BLD-MCNC: 0.6 MG/DL (ref 0.57–1)
DEPRECATED RDW RBC AUTO: 49.6 FL (ref 37–54)
ERYTHROCYTE [DISTWIDTH] IN BLOOD BY AUTOMATED COUNT: 14.4 % (ref 11.7–13)
GFR SERPL CREATININE-BSD FRML MDRD: 99 ML/MIN/1.73
GLUCOSE BLD-MCNC: 135 MG/DL (ref 65–99)
GLUCOSE UR STRIP-MCNC: NEGATIVE MG/DL
HCT VFR BLD AUTO: 39.9 % (ref 35.6–45.5)
HGB BLD-MCNC: 12.8 G/DL (ref 11.9–15.5)
HGB UR QL STRIP.AUTO: NEGATIVE
HYALINE CASTS UR QL AUTO: ABNORMAL /LPF
KETONES UR QL STRIP: NEGATIVE
LEUKOCYTE ESTERASE UR QL STRIP.AUTO: ABNORMAL
MCH RBC QN AUTO: 30.8 PG (ref 26.9–32)
MCHC RBC AUTO-ENTMCNC: 32.1 G/DL (ref 32.4–36.3)
MCV RBC AUTO: 96.1 FL (ref 80.5–98.2)
NITRITE UR QL STRIP: NEGATIVE
PH UR STRIP.AUTO: 6 [PH] (ref 5–8)
PLATELET # BLD AUTO: 357 10*3/MM3 (ref 140–500)
PMV BLD AUTO: 12.3 FL (ref 6–12)
POTASSIUM BLD-SCNC: 3.6 MMOL/L (ref 3.5–5.2)
PROT UR QL STRIP: NEGATIVE
RBC # BLD AUTO: 4.15 10*6/MM3 (ref 3.9–5.2)
RBC # UR: ABNORMAL /HPF
REF LAB TEST METHOD: ABNORMAL
SODIUM BLD-SCNC: 141 MMOL/L (ref 136–145)
SP GR UR STRIP: 1.01 (ref 1–1.03)
SQUAMOUS #/AREA URNS HPF: ABNORMAL /HPF
UROBILINOGEN UR QL STRIP: ABNORMAL
WBC NRBC COR # BLD: 13.02 10*3/MM3 (ref 4.5–10.7)
WBC UR QL AUTO: ABNORMAL /HPF

## 2018-04-05 PROCEDURE — 81001 URINALYSIS AUTO W/SCOPE: CPT | Performed by: ORTHOPAEDIC SURGERY

## 2018-04-05 PROCEDURE — 80048 BASIC METABOLIC PNL TOTAL CA: CPT | Performed by: ORTHOPAEDIC SURGERY

## 2018-04-05 PROCEDURE — 36416 COLLJ CAPILLARY BLOOD SPEC: CPT

## 2018-04-05 PROCEDURE — 85610 PROTHROMBIN TIME: CPT

## 2018-04-05 PROCEDURE — 85027 COMPLETE CBC AUTOMATED: CPT | Performed by: ORTHOPAEDIC SURGERY

## 2018-04-05 PROCEDURE — 36415 COLL VENOUS BLD VENIPUNCTURE: CPT

## 2018-04-05 RX ORDER — WARFARIN SODIUM 5 MG/1
5 TABLET ORAL
Status: ON HOLD | COMMUNITY
End: 2018-07-07

## 2018-04-05 RX ORDER — PREDNISONE 10 MG/1
10 TABLET ORAL AS NEEDED
COMMUNITY
End: 2018-06-08

## 2018-04-05 RX ORDER — CEFDINIR 300 MG/1
300 CAPSULE ORAL 2 TIMES DAILY
Status: ON HOLD | COMMUNITY
End: 2018-04-25

## 2018-04-05 RX ORDER — CIPROFLOXACIN 250 MG/1
250 TABLET, FILM COATED ORAL 2 TIMES DAILY
Status: ON HOLD | COMMUNITY
End: 2018-04-25

## 2018-04-05 RX ORDER — GABAPENTIN 300 MG/1
300 CAPSULE ORAL AS NEEDED
COMMUNITY
End: 2018-09-20 | Stop reason: SDUPTHER

## 2018-04-05 ASSESSMENT — HOOS JR
HOOS JR SCORE: 12
HOOS JR SCORE: 52.965

## 2018-04-12 ENCOUNTER — TELEPHONE (OUTPATIENT)
Dept: ORTHOPEDIC SURGERY | Facility: CLINIC | Age: 73
End: 2018-04-12

## 2018-04-12 ENCOUNTER — OFFICE VISIT (OUTPATIENT)
Dept: ORTHOPEDIC SURGERY | Facility: CLINIC | Age: 73
End: 2018-04-12

## 2018-04-12 VITALS
SYSTOLIC BLOOD PRESSURE: 120 MMHG | BODY MASS INDEX: 28.18 KG/M2 | TEMPERATURE: 98.3 F | HEIGHT: 57 IN | DIASTOLIC BLOOD PRESSURE: 80 MMHG | WEIGHT: 130.6 LBS

## 2018-04-12 DIAGNOSIS — Z96.642 STATUS POST LEFT HIP REPLACEMENT: Primary | ICD-10-CM

## 2018-04-12 PROCEDURE — S0260 H&P FOR SURGERY: HCPCS | Performed by: NURSE PRACTITIONER

## 2018-04-12 NOTE — H&P
Patient: Rachel Moser    Date of Admission: 4/18/18    YOB: 1945    Medical Record Number: [id number]    Admitting Physician: Dr. Obed Barney    Reason for Admission: Left hip revision    History of Present Illness: 72 y.o. female presents with severe hip pain which has not been responsive to the full compliment of conservative measures. Despite conservative attempts, there is still severe, constant activity limiting hip pain. Given the severity of the pain, the patient has elected to proceed with hip revision.    Allergies:   Allergies   Allergen Reactions   • Sulfa Antibiotics Rash         Current Medications:  Scheduled Meds:  PRN Meds:.    PMH:  Past Medical History:   Diagnosis Date   • High cholesterol    • History of DVT (deep vein thrombosis)    • History of kidney infection     1 MONTH AGO   • History of pulmonary embolus (PE)    • Hypertension    • Paralabral cyst of left hip    • PONV (postoperative nausea and vomiting)    • Presence of vena cava filter    • Rheumatoid arteritis    • Seasonal allergies    • UTI (urinary tract infection)     diagnosed 4/2/18  medically treated presently        PSURGH:  Past Surgical History:   Procedure Laterality Date   • BLADDER SUSPENSION     • CATARACT EXTRACTION, BILATERAL     • ENDOSCOPIC FUNCTIONAL SINUS SURGERY (FESS)     • HIP SURGERY Left 1983   • HIP SURGERY Left 20069   • HIP SURGERY Left 2011   • INCISION AND DRAINAGE HIP Left 11/2/2016    Procedure: HIP INCISION AND DRAINAGE;  Surgeon: Obed Barney MD;  Location: [facility];  Service:    • JOINT REPLACEMENT Left     HIP   • [place] CLOSED RX TRAUMATIC HIP DISLOCATN Left 12/18/2017    Procedure: LEFT HIP CLOSED REDUCTION;  Surgeon: Obed Barney MD;  Location: [facility];  Service: Orthopedics   • SOFT TISSUE MASS EXCISION Left     hip 3/19/18   • TOTAL ABDOMINAL HYSTERECTOMY     • VENA CAVA FILTER INSERTION         SocialHx:  Social History     Occupational History   • Not on file.  "    Social History Main Topics   • Smoking status: Former Smoker     Types: Cigarettes     Quit date: 1975   • Smokeless tobacco: Never Used      Comment: quit 50 years ago   • Alcohol use No   • Drug use: No   • Sexual activity: Not on file    Social History     Social History Narrative   • No narrative on file       FamHx:  Family History   Problem Relation Age of Onset   • Malig Hyperthermia Neg Hx          Review of Systems:   A 14 point review of systems was performed, pertinent positives discussed above, all other systems are negative    Physical Exam: 72 y.o. female  Vital Signs :   Vitals:    04/12/18 1346   BP: 120/80   BP Location: Left arm   Patient Position: Sitting   Cuff Size: Adult   Temp: 98.3 °F (36.8 °C)   TempSrc: Temporal Artery    Weight: 59.2 kg (130 lb 9.6 oz)   Height: 144.8 cm (57\")     General: Alert and Oriented x 3, No acute distress.  Psych: mood and affect appropriate; recent and remote memory intact  Eyes: conjunctiva clear; pupils equally round and reactive, sclera nonicteric  CV: no peripheral edema  Resp: normal respiratory effort  Skin: no rashes or wounds; normal turgor  Musculosketetal; pain with hip range of motion. Positive stinchfeld test. No trochanteric  Tenderness.  Vascular: palpable distal pulses    Labs:    Appointment on 04/05/2018   Component Date Value Ref Range Status   • Glucose 04/05/2018 135* 65 - 99 mg/dL Final   • BUN 04/05/2018 12  8 - 23 mg/dL Final   • Creatinine 04/05/2018 0.60  0.57 - 1.00 mg/dL Final   • Sodium 04/05/2018 141  136 - 145 mmol/L Final   • Potassium 04/05/2018 3.6  3.5 - 5.2 mmol/L Final   • Chloride 04/05/2018 101  98 - 107 mmol/L Final   • CO2 04/05/2018 23.8  22.0 - 29.0 mmol/L Final   • Calcium 04/05/2018 9.5  8.6 - 10.5 mg/dL Final   • eGFR Non African Amer 04/05/2018 99  >60 mL/min/1.73 Final   • BUN/Creatinine Ratio 04/05/2018 20.0  7.0 - 25.0 Final   • Anion Gap 04/05/2018 16.2  mmol/L Final   • WBC 04/05/2018 13.02* 4.50 - 10.70 " 10*3/mm3 Final   • RBC 04/05/2018 4.15  3.90 - 5.20 10*6/mm3 Final   • Hemoglobin 04/05/2018 12.8  11.9 - 15.5 g/dL Final   • Hematocrit 04/05/2018 39.9  35.6 - 45.5 % Final   • MCV 04/05/2018 96.1  80.5 - 98.2 fL Final   • MCH 04/05/2018 30.8  26.9 - 32.0 pg Final   • MCHC 04/05/2018 32.1* 32.4 - 36.3 g/dL Final   • RDW 04/05/2018 14.4* 11.7 - 13.0 % Final   • RDW-SD 04/05/2018 49.6  37.0 - 54.0 fl Final   • MPV 04/05/2018 12.3* 6.0 - 12.0 fL Final   • Platelets 04/05/2018 357  140 - 500 10*3/mm3 Final   • Color, UA 04/05/2018 Yellow  Yellow, Straw Final   • Appearance, UA 04/05/2018 Clear  Clear Final   • pH, UA 04/05/2018 6.0  5.0 - 8.0 Final   • Specific Gravity, UA 04/05/2018 1.006  1.005 - 1.030 Final   • Glucose, UA 04/05/2018 Negative  Negative Final   • Ketones, UA 04/05/2018 Negative  Negative Final   • Bilirubin, UA 04/05/2018 Negative  Negative Final   • Blood, UA 04/05/2018 Negative  Negative Final   • Protein, UA 04/05/2018 Negative  Negative Final   • Leuk Esterase, UA 04/05/2018 Trace* Negative Final   • Nitrite, UA 04/05/2018 Negative  Negative Final   • Urobilinogen, UA 04/05/2018 0.2 E.U./dL  0.2 - 1.0 E.U./dL Final   • RBC, UA 04/05/2018 0-2  None Seen, 0-2 /HPF Final   • WBC, UA 04/05/2018 3-5* None Seen, 0-2 /HPF Final   • Bacteria, UA 04/05/2018 None Seen  None Seen /HPF Final   • Squamous Epithelial Cells, UA 04/05/2018 0-2  None Seen, 0-2 /HPF Final   • Hyaline Casts, UA 04/05/2018 0-2  None Seen /LPF Final   • Methodology 04/05/2018 Automated Microscopy   Final     Assessment:  Painful left hip replacement. Conservative measures have failed.      Plan:  The plan is to proceed with left hip revision .The patient voiced understanding of the risks, benefits, and alternative forms of treatment that were discussed with Dr Barney at the time of scheduling.  Patient is planning on going home after 2-3 day stay.  In addition she has a history of DVT with pulmonary embolus. She has a filter in place  her cardiologist has recommended a Lovenox bridge but no prescription has Been sent in.  Callie our nurse has contacted Dr. Arango's office to make sure that they take care of the prescription to the patient could be properly anticoagulated preoperatively.  Also T acid to be given locally in OR    Zoe Shaw, APRN  4/12/2018

## 2018-04-12 NOTE — TELEPHONE ENCOUNTER
Contacted the patient to let her know the Dr. Arango's office would be calling in her Lovenox.  She is to stop her Coumadin 5 days prior to surgery begin Lovenox 3 days prior and discontinue the Lovenox 24 hours prior to her surgery.  Lovenox and Coumadin should be resumed postoperatively

## 2018-04-25 ENCOUNTER — ANESTHESIA EVENT (OUTPATIENT)
Dept: PERIOP | Facility: HOSPITAL | Age: 73
End: 2018-04-25

## 2018-04-25 ENCOUNTER — APPOINTMENT (OUTPATIENT)
Dept: GENERAL RADIOLOGY | Facility: HOSPITAL | Age: 73
End: 2018-04-25
Attending: ORTHOPAEDIC SURGERY

## 2018-04-25 ENCOUNTER — ANESTHESIA (OUTPATIENT)
Dept: PERIOP | Facility: HOSPITAL | Age: 73
End: 2018-04-25

## 2018-04-25 ENCOUNTER — HOSPITAL ENCOUNTER (INPATIENT)
Facility: HOSPITAL | Age: 73
LOS: 4 days | Discharge: HOME HEALTH | End: 2018-04-29
Attending: ORTHOPAEDIC SURGERY | Admitting: ORTHOPAEDIC SURGERY

## 2018-04-25 DIAGNOSIS — R26.2 DIFFICULTY WALKING: Primary | ICD-10-CM

## 2018-04-25 DIAGNOSIS — Z96.642 STATUS POST LEFT HIP REPLACEMENT: ICD-10-CM

## 2018-04-25 LAB
INR PPP: 1.18 (ref 0.9–1.1)
PROTHROMBIN TIME: 14.8 SECONDS (ref 11.7–14.2)

## 2018-04-25 PROCEDURE — C1713 ANCHOR/SCREW BN/BN,TIS/BN: HCPCS | Performed by: ORTHOPAEDIC SURGERY

## 2018-04-25 PROCEDURE — 25010000002 KETOROLAC TROMETHAMINE PER 15 MG: Performed by: ORTHOPAEDIC SURGERY

## 2018-04-25 PROCEDURE — 87070 CULTURE OTHR SPECIMN AEROBIC: CPT | Performed by: ORTHOPAEDIC SURGERY

## 2018-04-25 PROCEDURE — 82495 ASSAY OF CHROMIUM: CPT | Performed by: ORTHOPAEDIC SURGERY

## 2018-04-25 PROCEDURE — 25010000002 PHENYLEPHRINE PER 1 ML: Performed by: ANESTHESIOLOGY

## 2018-04-25 PROCEDURE — 25010000003 CEFAZOLIN IN DEXTROSE 2-4 GM/100ML-% SOLUTION: Performed by: ORTHOPAEDIC SURGERY

## 2018-04-25 PROCEDURE — 0SRE0J9 REPLACEMENT OF LEFT HIP JOINT, ACETABULAR SURFACE WITH SYNTHETIC SUBSTITUTE, CEMENTED, OPEN APPROACH: ICD-10-PCS | Performed by: ORTHOPAEDIC SURGERY

## 2018-04-25 PROCEDURE — 87075 CULTR BACTERIA EXCEPT BLOOD: CPT | Performed by: ORTHOPAEDIC SURGERY

## 2018-04-25 PROCEDURE — 25010000002 ROPIVACAINE PER 1 MG: Performed by: ORTHOPAEDIC SURGERY

## 2018-04-25 PROCEDURE — 25010000002 PROPOFOL 10 MG/ML EMULSION: Performed by: ANESTHESIOLOGY

## 2018-04-25 PROCEDURE — 73501 X-RAY EXAM HIP UNI 1 VIEW: CPT

## 2018-04-25 PROCEDURE — 27137 REVISE HIP JOINT REPLACEMENT: CPT | Performed by: ORTHOPAEDIC SURGERY

## 2018-04-25 PROCEDURE — 25010000002 ONDANSETRON PER 1 MG: Performed by: ANESTHESIOLOGY

## 2018-04-25 PROCEDURE — C1776 JOINT DEVICE (IMPLANTABLE): HCPCS | Performed by: ORTHOPAEDIC SURGERY

## 2018-04-25 PROCEDURE — 25010000002 DEXAMETHASONE PER 1 MG: Performed by: ANESTHESIOLOGY

## 2018-04-25 PROCEDURE — 25010000002 FENTANYL CITRATE (PF) 100 MCG/2ML SOLUTION: Performed by: ANESTHESIOLOGY

## 2018-04-25 PROCEDURE — 25010000002 MIDAZOLAM PER 1 MG: Performed by: ANESTHESIOLOGY

## 2018-04-25 PROCEDURE — 25010000002 MORPHINE PER 10 MG: Performed by: ORTHOPAEDIC SURGERY

## 2018-04-25 PROCEDURE — 83018 HEAVY METAL QUAN EACH NES: CPT | Performed by: ORTHOPAEDIC SURGERY

## 2018-04-25 PROCEDURE — 25010000002 EPINEPHRINE PER 0.1 MG: Performed by: ORTHOPAEDIC SURGERY

## 2018-04-25 PROCEDURE — 85610 PROTHROMBIN TIME: CPT | Performed by: ORTHOPAEDIC SURGERY

## 2018-04-25 PROCEDURE — 87205 SMEAR GRAM STAIN: CPT | Performed by: ORTHOPAEDIC SURGERY

## 2018-04-25 PROCEDURE — 25010000002 HEPARIN (PORCINE) PER 1000 UNITS: Performed by: ORTHOPAEDIC SURGERY

## 2018-04-25 PROCEDURE — 0SPE0JZ REMOVAL OF SYNTHETIC SUBSTITUTE FROM LEFT HIP JOINT, ACETABULAR SURFACE, OPEN APPROACH: ICD-10-PCS | Performed by: ORTHOPAEDIC SURGERY

## 2018-04-25 PROCEDURE — 87176 TISSUE HOMOGENIZATION CULTR: CPT | Performed by: ORTHOPAEDIC SURGERY

## 2018-04-25 PROCEDURE — 25010000002 HYDROMORPHONE PER 4 MG: Performed by: ANESTHESIOLOGY

## 2018-04-25 PROCEDURE — 25010000002 VANCOMYCIN PER 500 MG: Performed by: ORTHOPAEDIC SURGERY

## 2018-04-25 DEVICE — REDAPT LOCKING SCREW 35MM
Type: IMPLANTABLE DEVICE | Site: HIP | Status: FUNCTIONAL
Brand: REDAPT

## 2018-04-25 DEVICE — CMT BONE PALACOS R HI/VISC 1X40: Type: IMPLANTABLE DEVICE | Site: HIP | Status: FUNCTIONAL

## 2018-04-25 DEVICE — FEMORAL HEAD 14/16 TAPER +0 DIAMETER 28MM: Type: IMPLANTABLE DEVICE | Site: HIP | Status: FUNCTIONAL

## 2018-04-25 DEVICE — REFLECTION SPHERICAL HEAD SCREW 20MM
Type: IMPLANTABLE DEVICE | Site: HIP | Status: FUNCTIONAL
Brand: REFLECTION

## 2018-04-25 DEVICE — REDAPT LOCKING SCREW 20MM
Type: IMPLANTABLE DEVICE | Site: HIP | Status: FUNCTIONAL
Brand: REDAPT

## 2018-04-25 DEVICE — REDAPT LOCKING SCREW 25MM
Type: IMPLANTABLE DEVICE | Site: HIP | Status: FUNCTIONAL
Brand: REDAPT

## 2018-04-25 DEVICE — POLARCUP PE INSERT 47/28 NON-CEMENTED
Type: IMPLANTABLE DEVICE | Site: HIP | Status: FUNCTIONAL
Brand: POLARCUP

## 2018-04-25 DEVICE — REDAPT HOLE COVER KIT
Type: IMPLANTABLE DEVICE | Site: HIP | Status: FUNCTIONAL
Brand: REDAPT

## 2018-04-25 DEVICE — BONE CANC CHIPS 1/4MM 15CC: Type: IMPLANTABLE DEVICE | Site: HIP | Status: FUNCTIONAL

## 2018-04-25 DEVICE — REDAPT FULLY POROUS SHELL 58MM
Type: IMPLANTABLE DEVICE | Site: HIP | Status: FUNCTIONAL
Brand: REDAPT

## 2018-04-25 DEVICE — POLARCUP SHELL 47 CEMENTED
Type: IMPLANTABLE DEVICE | Site: HIP | Status: FUNCTIONAL
Brand: POLARCUP

## 2018-04-25 RX ORDER — PREGABALIN 75 MG/1
CAPSULE ORAL
Status: COMPLETED
Start: 2018-04-25 | End: 2018-04-25

## 2018-04-25 RX ORDER — PREGABALIN 25 MG/1
150 CAPSULE ORAL ONCE
Status: COMPLETED | OUTPATIENT
Start: 2018-04-25 | End: 2018-04-25

## 2018-04-25 RX ORDER — METOPROLOL SUCCINATE 50 MG/1
50 TABLET, EXTENDED RELEASE ORAL EVERY MORNING
Status: DISCONTINUED | OUTPATIENT
Start: 2018-04-26 | End: 2018-04-26

## 2018-04-25 RX ORDER — PROMETHAZINE HYDROCHLORIDE 25 MG/1
25 SUPPOSITORY RECTAL ONCE AS NEEDED
Status: DISCONTINUED | OUTPATIENT
Start: 2018-04-25 | End: 2018-04-25 | Stop reason: HOSPADM

## 2018-04-25 RX ORDER — DEXAMETHASONE SODIUM PHOSPHATE 10 MG/ML
INJECTION INTRAMUSCULAR; INTRAVENOUS AS NEEDED
Status: DISCONTINUED | OUTPATIENT
Start: 2018-04-25 | End: 2018-04-25 | Stop reason: SURG

## 2018-04-25 RX ORDER — FENTANYL CITRATE 50 UG/ML
50 INJECTION, SOLUTION INTRAMUSCULAR; INTRAVENOUS
Status: DISCONTINUED | OUTPATIENT
Start: 2018-04-25 | End: 2018-04-25 | Stop reason: HOSPADM

## 2018-04-25 RX ORDER — PROPOFOL 10 MG/ML
VIAL (ML) INTRAVENOUS AS NEEDED
Status: DISCONTINUED | OUTPATIENT
Start: 2018-04-25 | End: 2018-04-25 | Stop reason: SURG

## 2018-04-25 RX ORDER — LIDOCAINE HYDROCHLORIDE 10 MG/ML
0.5 INJECTION, SOLUTION EPIDURAL; INFILTRATION; INTRACAUDAL; PERINEURAL ONCE AS NEEDED
Status: DISCONTINUED | OUTPATIENT
Start: 2018-04-25 | End: 2018-04-25 | Stop reason: HOSPADM

## 2018-04-25 RX ORDER — ONDANSETRON 2 MG/ML
4 INJECTION INTRAMUSCULAR; INTRAVENOUS EVERY 6 HOURS PRN
Status: DISCONTINUED | OUTPATIENT
Start: 2018-04-25 | End: 2018-04-29 | Stop reason: HOSPADM

## 2018-04-25 RX ORDER — EPHEDRINE SULFATE 50 MG/ML
INJECTION, SOLUTION INTRAVENOUS AS NEEDED
Status: DISCONTINUED | OUTPATIENT
Start: 2018-04-25 | End: 2018-04-25 | Stop reason: SURG

## 2018-04-25 RX ORDER — MELOXICAM 7.5 MG/1
15 TABLET ORAL ONCE
Status: COMPLETED | OUTPATIENT
Start: 2018-04-25 | End: 2018-04-25

## 2018-04-25 RX ORDER — PROMETHAZINE HYDROCHLORIDE 25 MG/ML
12.5 INJECTION, SOLUTION INTRAMUSCULAR; INTRAVENOUS ONCE AS NEEDED
Status: DISCONTINUED | OUTPATIENT
Start: 2018-04-25 | End: 2018-04-25 | Stop reason: HOSPADM

## 2018-04-25 RX ORDER — PROMETHAZINE HYDROCHLORIDE 25 MG/1
25 TABLET ORAL ONCE AS NEEDED
Status: DISCONTINUED | OUTPATIENT
Start: 2018-04-25 | End: 2018-04-25 | Stop reason: HOSPADM

## 2018-04-25 RX ORDER — DOCUSATE SODIUM 100 MG/1
100 CAPSULE, LIQUID FILLED ORAL 2 TIMES DAILY
Status: DISCONTINUED | OUTPATIENT
Start: 2018-04-25 | End: 2018-04-29 | Stop reason: HOSPADM

## 2018-04-25 RX ORDER — ACETAMINOPHEN 10 MG/ML
1000 INJECTION, SOLUTION INTRAVENOUS ONCE
Status: COMPLETED | OUTPATIENT
Start: 2018-04-25 | End: 2018-04-25

## 2018-04-25 RX ORDER — PROMETHAZINE HYDROCHLORIDE 25 MG/1
12.5 TABLET ORAL ONCE AS NEEDED
Status: DISCONTINUED | OUTPATIENT
Start: 2018-04-25 | End: 2018-04-25 | Stop reason: HOSPADM

## 2018-04-25 RX ORDER — ROCURONIUM BROMIDE 10 MG/ML
INJECTION, SOLUTION INTRAVENOUS AS NEEDED
Status: DISCONTINUED | OUTPATIENT
Start: 2018-04-25 | End: 2018-04-25 | Stop reason: SURG

## 2018-04-25 RX ORDER — ALBUTEROL SULFATE 2.5 MG/3ML
2.5 SOLUTION RESPIRATORY (INHALATION) EVERY 4 HOURS PRN
Status: DISCONTINUED | OUTPATIENT
Start: 2018-04-25 | End: 2018-04-29 | Stop reason: HOSPADM

## 2018-04-25 RX ORDER — KETOROLAC TROMETHAMINE 30 MG/ML
15 INJECTION, SOLUTION INTRAMUSCULAR; INTRAVENOUS EVERY 8 HOURS
Status: COMPLETED | OUTPATIENT
Start: 2018-04-25 | End: 2018-04-26

## 2018-04-25 RX ORDER — ONDANSETRON 4 MG/1
4 TABLET, FILM COATED ORAL EVERY 6 HOURS PRN
Status: DISCONTINUED | OUTPATIENT
Start: 2018-04-25 | End: 2018-04-29 | Stop reason: HOSPADM

## 2018-04-25 RX ORDER — SODIUM CHLORIDE, SODIUM LACTATE, POTASSIUM CHLORIDE, CALCIUM CHLORIDE 600; 310; 30; 20 MG/100ML; MG/100ML; MG/100ML; MG/100ML
9 INJECTION, SOLUTION INTRAVENOUS CONTINUOUS
Status: DISCONTINUED | OUTPATIENT
Start: 2018-04-25 | End: 2018-04-25 | Stop reason: HOSPADM

## 2018-04-25 RX ORDER — HYDROCODONE BITARTRATE AND ACETAMINOPHEN 7.5; 325 MG/1; MG/1
1 TABLET ORAL ONCE AS NEEDED
Status: DISCONTINUED | OUTPATIENT
Start: 2018-04-25 | End: 2018-04-25 | Stop reason: HOSPADM

## 2018-04-25 RX ORDER — MELOXICAM 15 MG/1
15 TABLET ORAL DAILY
Status: DISCONTINUED | OUTPATIENT
Start: 2018-04-26 | End: 2018-04-29 | Stop reason: HOSPADM

## 2018-04-25 RX ORDER — FENTANYL CITRATE 50 UG/ML
INJECTION, SOLUTION INTRAMUSCULAR; INTRAVENOUS AS NEEDED
Status: DISCONTINUED | OUTPATIENT
Start: 2018-04-25 | End: 2018-04-25 | Stop reason: SURG

## 2018-04-25 RX ORDER — MIDAZOLAM HYDROCHLORIDE 1 MG/ML
2 INJECTION INTRAMUSCULAR; INTRAVENOUS
Status: DISCONTINUED | OUTPATIENT
Start: 2018-04-25 | End: 2018-04-25 | Stop reason: HOSPADM

## 2018-04-25 RX ORDER — HYDROCODONE BITARTRATE AND ACETAMINOPHEN 7.5; 325 MG/1; MG/1
1 TABLET ORAL EVERY 4 HOURS PRN
Status: DISCONTINUED | OUTPATIENT
Start: 2018-04-25 | End: 2018-04-29 | Stop reason: HOSPADM

## 2018-04-25 RX ORDER — EPHEDRINE SULFATE 50 MG/ML
5 INJECTION, SOLUTION INTRAVENOUS ONCE AS NEEDED
Status: DISCONTINUED | OUTPATIENT
Start: 2018-04-25 | End: 2018-04-25 | Stop reason: HOSPADM

## 2018-04-25 RX ORDER — LABETALOL HYDROCHLORIDE 5 MG/ML
5 INJECTION, SOLUTION INTRAVENOUS
Status: DISCONTINUED | OUTPATIENT
Start: 2018-04-25 | End: 2018-04-25 | Stop reason: HOSPADM

## 2018-04-25 RX ORDER — MIDAZOLAM HYDROCHLORIDE 1 MG/ML
1 INJECTION INTRAMUSCULAR; INTRAVENOUS
Status: DISCONTINUED | OUTPATIENT
Start: 2018-04-25 | End: 2018-04-25 | Stop reason: HOSPADM

## 2018-04-25 RX ORDER — FAMOTIDINE 10 MG/ML
20 INJECTION, SOLUTION INTRAVENOUS ONCE
Status: COMPLETED | OUTPATIENT
Start: 2018-04-25 | End: 2018-04-25

## 2018-04-25 RX ORDER — SCOLOPAMINE TRANSDERMAL SYSTEM 1 MG/1
1 PATCH, EXTENDED RELEASE TRANSDERMAL ONCE
Status: DISCONTINUED | OUTPATIENT
Start: 2018-04-25 | End: 2018-04-26

## 2018-04-25 RX ORDER — HYDROCODONE BITARTRATE AND ACETAMINOPHEN 7.5; 325 MG/1; MG/1
2 TABLET ORAL EVERY 4 HOURS PRN
Status: DISCONTINUED | OUTPATIENT
Start: 2018-04-25 | End: 2018-04-29 | Stop reason: HOSPADM

## 2018-04-25 RX ORDER — WARFARIN SODIUM 5 MG/1
5 TABLET ORAL
Status: DISCONTINUED | OUTPATIENT
Start: 2018-04-26 | End: 2018-04-29 | Stop reason: HOSPADM

## 2018-04-25 RX ORDER — FLUMAZENIL 0.1 MG/ML
0.2 INJECTION INTRAVENOUS AS NEEDED
Status: DISCONTINUED | OUTPATIENT
Start: 2018-04-25 | End: 2018-04-25 | Stop reason: HOSPADM

## 2018-04-25 RX ORDER — FUROSEMIDE 40 MG/1
40 TABLET ORAL EVERY MORNING
Status: DISCONTINUED | OUTPATIENT
Start: 2018-04-26 | End: 2018-04-26

## 2018-04-25 RX ORDER — CEFAZOLIN SODIUM 2 G/100ML
2 INJECTION, SOLUTION INTRAVENOUS EVERY 8 HOURS
Status: COMPLETED | OUTPATIENT
Start: 2018-04-25 | End: 2018-04-26

## 2018-04-25 RX ORDER — OXYCODONE AND ACETAMINOPHEN 7.5; 325 MG/1; MG/1
1 TABLET ORAL ONCE AS NEEDED
Status: DISCONTINUED | OUTPATIENT
Start: 2018-04-25 | End: 2018-04-25 | Stop reason: HOSPADM

## 2018-04-25 RX ORDER — ONDANSETRON 2 MG/ML
INJECTION INTRAMUSCULAR; INTRAVENOUS AS NEEDED
Status: DISCONTINUED | OUTPATIENT
Start: 2018-04-25 | End: 2018-04-25 | Stop reason: SURG

## 2018-04-25 RX ORDER — DIPHENHYDRAMINE HYDROCHLORIDE 50 MG/ML
12.5 INJECTION INTRAMUSCULAR; INTRAVENOUS
Status: DISCONTINUED | OUTPATIENT
Start: 2018-04-25 | End: 2018-04-25 | Stop reason: HOSPADM

## 2018-04-25 RX ORDER — MAGNESIUM HYDROXIDE 1200 MG/15ML
LIQUID ORAL AS NEEDED
Status: DISCONTINUED | OUTPATIENT
Start: 2018-04-25 | End: 2018-04-25 | Stop reason: HOSPADM

## 2018-04-25 RX ORDER — PANTOPRAZOLE SODIUM 40 MG/1
40 TABLET, DELAYED RELEASE ORAL
Status: DISCONTINUED | OUTPATIENT
Start: 2018-04-26 | End: 2018-04-29 | Stop reason: HOSPADM

## 2018-04-25 RX ORDER — LIDOCAINE HYDROCHLORIDE 20 MG/ML
INJECTION, SOLUTION INFILTRATION; PERINEURAL AS NEEDED
Status: DISCONTINUED | OUTPATIENT
Start: 2018-04-25 | End: 2018-04-25 | Stop reason: SURG

## 2018-04-25 RX ORDER — ONDANSETRON 2 MG/ML
4 INJECTION INTRAMUSCULAR; INTRAVENOUS ONCE AS NEEDED
Status: DISCONTINUED | OUTPATIENT
Start: 2018-04-25 | End: 2018-04-25 | Stop reason: HOSPADM

## 2018-04-25 RX ORDER — UREA 10 %
3 LOTION (ML) TOPICAL NIGHTLY PRN
Status: DISCONTINUED | OUTPATIENT
Start: 2018-04-25 | End: 2018-04-29 | Stop reason: HOSPADM

## 2018-04-25 RX ORDER — NALOXONE HCL 0.4 MG/ML
0.2 VIAL (ML) INJECTION AS NEEDED
Status: DISCONTINUED | OUTPATIENT
Start: 2018-04-25 | End: 2018-04-25 | Stop reason: HOSPADM

## 2018-04-25 RX ORDER — LANOLIN ALCOHOL/MO/W.PET/CERES
800 CREAM (GRAM) TOPICAL EVERY MORNING
Status: DISCONTINUED | OUTPATIENT
Start: 2018-04-26 | End: 2018-04-29 | Stop reason: HOSPADM

## 2018-04-25 RX ORDER — CEFAZOLIN SODIUM 2 G/100ML
2 INJECTION, SOLUTION INTRAVENOUS ONCE
Status: COMPLETED | OUTPATIENT
Start: 2018-04-25 | End: 2018-04-25

## 2018-04-25 RX ORDER — SODIUM CHLORIDE 0.9 % (FLUSH) 0.9 %
1-10 SYRINGE (ML) INJECTION AS NEEDED
Status: DISCONTINUED | OUTPATIENT
Start: 2018-04-25 | End: 2018-04-25 | Stop reason: HOSPADM

## 2018-04-25 RX ORDER — MELOXICAM 15 MG/1
TABLET ORAL
Status: COMPLETED
Start: 2018-04-25 | End: 2018-04-25

## 2018-04-25 RX ORDER — VANCOMYCIN HYDROCHLORIDE 1 G/200ML
15 INJECTION, SOLUTION INTRAVENOUS ONCE
Status: COMPLETED | OUTPATIENT
Start: 2018-04-25 | End: 2018-04-25

## 2018-04-25 RX ORDER — ONDANSETRON 4 MG/1
4 TABLET, ORALLY DISINTEGRATING ORAL EVERY 6 HOURS PRN
Status: DISCONTINUED | OUTPATIENT
Start: 2018-04-25 | End: 2018-04-29 | Stop reason: HOSPADM

## 2018-04-25 RX ORDER — IPRATROPIUM BROMIDE 21 UG/1
2 SPRAY, METERED NASAL 2 TIMES DAILY PRN
Status: DISCONTINUED | OUTPATIENT
Start: 2018-04-25 | End: 2018-04-29 | Stop reason: HOSPADM

## 2018-04-25 RX ORDER — HYDRALAZINE HYDROCHLORIDE 20 MG/ML
5 INJECTION INTRAMUSCULAR; INTRAVENOUS
Status: DISCONTINUED | OUTPATIENT
Start: 2018-04-25 | End: 2018-04-25 | Stop reason: HOSPADM

## 2018-04-25 RX ORDER — HYDROMORPHONE HCL 110MG/55ML
0.5 PATIENT CONTROLLED ANALGESIA SYRINGE INTRAVENOUS
Status: DISCONTINUED | OUTPATIENT
Start: 2018-04-25 | End: 2018-04-25 | Stop reason: HOSPADM

## 2018-04-25 RX ADMIN — PHENYLEPHRINE HYDROCHLORIDE 100 MCG: 10 INJECTION INTRAVENOUS at 16:35

## 2018-04-25 RX ADMIN — FENTANYL CITRATE 50 MCG: 50 INJECTION INTRAMUSCULAR; INTRAVENOUS at 15:23

## 2018-04-25 RX ADMIN — ROCURONIUM BROMIDE 15 MG: 10 INJECTION INTRAVENOUS at 16:55

## 2018-04-25 RX ADMIN — FAMOTIDINE 20 MG: 10 INJECTION, SOLUTION INTRAVENOUS at 13:07

## 2018-04-25 RX ADMIN — KETOROLAC TROMETHAMINE 15 MG: 30 INJECTION, SOLUTION INTRAMUSCULAR at 20:23

## 2018-04-25 RX ADMIN — SUGAMMADEX 150 MG: 100 INJECTION, SOLUTION INTRAVENOUS at 18:36

## 2018-04-25 RX ADMIN — ACETAMINOPHEN 1000 MG: 10 INJECTION, SOLUTION INTRAVENOUS at 15:47

## 2018-04-25 RX ADMIN — SODIUM CHLORIDE, POTASSIUM CHLORIDE, SODIUM LACTATE AND CALCIUM CHLORIDE: 600; 310; 30; 20 INJECTION, SOLUTION INTRAVENOUS at 15:23

## 2018-04-25 RX ADMIN — HYDROMORPHONE HYDROCHLORIDE 0.5 MG: 2 INJECTION INTRAMUSCULAR; INTRAVENOUS; SUBCUTANEOUS at 20:36

## 2018-04-25 RX ADMIN — PHENYLEPHRINE HYDROCHLORIDE 100 MCG: 10 INJECTION INTRAVENOUS at 16:17

## 2018-04-25 RX ADMIN — PHENYLEPHRINE HYDROCHLORIDE 100 MCG: 10 INJECTION INTRAVENOUS at 16:55

## 2018-04-25 RX ADMIN — SODIUM CHLORIDE, POTASSIUM CHLORIDE, SODIUM LACTATE AND CALCIUM CHLORIDE 9 ML/HR: 600; 310; 30; 20 INJECTION, SOLUTION INTRAVENOUS at 19:11

## 2018-04-25 RX ADMIN — SODIUM CHLORIDE, POTASSIUM CHLORIDE, SODIUM LACTATE AND CALCIUM CHLORIDE 500 ML: 600; 310; 30; 20 INJECTION, SOLUTION INTRAVENOUS at 12:30

## 2018-04-25 RX ADMIN — FENTANYL CITRATE 100 MCG: 50 INJECTION INTRAMUSCULAR; INTRAVENOUS at 16:55

## 2018-04-25 RX ADMIN — TRANEXAMIC ACID 1000 MG: 100 INJECTION, SOLUTION INTRAVENOUS at 18:25

## 2018-04-25 RX ADMIN — ROCURONIUM BROMIDE 35 MG: 10 INJECTION INTRAVENOUS at 15:24

## 2018-04-25 RX ADMIN — TRANEXAMIC ACID 1000 MG: 100 INJECTION, SOLUTION INTRAVENOUS at 15:46

## 2018-04-25 RX ADMIN — CEFAZOLIN SODIUM 2 G: 2 INJECTION, SOLUTION INTRAVENOUS at 15:45

## 2018-04-25 RX ADMIN — PREGABALIN 150 MG: 75 CAPSULE ORAL at 12:31

## 2018-04-25 RX ADMIN — EPHEDRINE SULFATE 5 MG: 50 INJECTION INTRAMUSCULAR; INTRAVENOUS; SUBCUTANEOUS at 16:33

## 2018-04-25 RX ADMIN — PHENYLEPHRINE HYDROCHLORIDE 100 MCG: 10 INJECTION INTRAVENOUS at 15:50

## 2018-04-25 RX ADMIN — PROPOFOL 200 MG: 10 INJECTION, EMULSION INTRAVENOUS at 15:23

## 2018-04-25 RX ADMIN — FENTANYL CITRATE 50 MCG: 50 INJECTION INTRAMUSCULAR; INTRAVENOUS at 18:44

## 2018-04-25 RX ADMIN — MELOXICAM 15 MG: 15 TABLET ORAL at 12:31

## 2018-04-25 RX ADMIN — MIDAZOLAM HYDROCHLORIDE 1 MG: 2 INJECTION, SOLUTION INTRAMUSCULAR; INTRAVENOUS at 13:11

## 2018-04-25 RX ADMIN — VANCOMYCIN HYDROCHLORIDE 1000 MG: 1 INJECTION, SOLUTION INTRAVENOUS at 12:31

## 2018-04-25 RX ADMIN — EPHEDRINE SULFATE 10 MG: 50 INJECTION INTRAMUSCULAR; INTRAVENOUS; SUBCUTANEOUS at 16:20

## 2018-04-25 RX ADMIN — FENTANYL CITRATE 50 MCG: 50 INJECTION INTRAMUSCULAR; INTRAVENOUS at 20:05

## 2018-04-25 RX ADMIN — ROCURONIUM BROMIDE 10 MG: 10 INJECTION INTRAVENOUS at 17:15

## 2018-04-25 RX ADMIN — PHENYLEPHRINE HYDROCHLORIDE 100 MCG: 10 INJECTION INTRAVENOUS at 15:37

## 2018-04-25 RX ADMIN — PREGABALIN 150 MG: 25 CAPSULE ORAL at 12:31

## 2018-04-25 RX ADMIN — PHENYLEPHRINE HYDROCHLORIDE 100 MCG: 10 INJECTION INTRAVENOUS at 18:05

## 2018-04-25 RX ADMIN — ROCURONIUM BROMIDE 10 MG: 10 INJECTION INTRAVENOUS at 18:02

## 2018-04-25 RX ADMIN — SCOPALAMINE 1 PATCH: 1 PATCH, EXTENDED RELEASE TRANSDERMAL at 13:06

## 2018-04-25 RX ADMIN — DEXAMETHASONE SODIUM PHOSPHATE 8 MG: 10 INJECTION INTRAMUSCULAR; INTRAVENOUS at 15:47

## 2018-04-25 RX ADMIN — SODIUM CHLORIDE, POTASSIUM CHLORIDE, SODIUM LACTATE AND CALCIUM CHLORIDE: 600; 310; 30; 20 INJECTION, SOLUTION INTRAVENOUS at 16:55

## 2018-04-25 RX ADMIN — MELOXICAM 15 MG: 7.5 TABLET ORAL at 12:31

## 2018-04-25 RX ADMIN — ONDANSETRON 4 MG: 2 INJECTION INTRAMUSCULAR; INTRAVENOUS at 18:35

## 2018-04-25 RX ADMIN — LIDOCAINE HYDROCHLORIDE 60 MG: 20 INJECTION, SOLUTION INFILTRATION; PERINEURAL at 15:23

## 2018-04-25 RX ADMIN — PHENYLEPHRINE HYDROCHLORIDE 100 MCG: 10 INJECTION INTRAVENOUS at 17:20

## 2018-04-25 NOTE — ANESTHESIA PROCEDURE NOTES
Airway  Urgency: elective    Airway not difficult    General Information and Staff    Patient location during procedure: OR  Anesthesiologist: TIFFANY SORENSEN    Indications and Patient Condition  Indications for airway management: airway protection    Preoxygenated: yes  Mask difficulty assessment: 1 - vent by mask    Final Airway Details  Final airway type: endotracheal airway      Successful airway: ETT  Cuffed: yes   Successful intubation technique: direct laryngoscopy  Facilitating devices/methods: intubating stylet  Endotracheal tube insertion site: oral  Blade: Isis  Blade size: #3  ETT size: 7.0 mm  Cormack-Lehane Classification: grade I - full view of glottis  Placement verified by: chest auscultation and capnometry   Measured from: gums  Number of attempts at approach: 1

## 2018-04-25 NOTE — ANESTHESIA PREPROCEDURE EVALUATION
Anesthesia Evaluation     history of anesthetic complications: PONV  NPO Solid Status: > 8 hours  NPO Liquid Status: > 8 hours           Airway   Mallampati: II  TM distance: >3 FB  Neck ROM: full  no difficulty expected  Dental - normal exam     Pulmonary - normal exam   Cardiovascular - normal exam    (+) hypertension, hyperlipidemia,       Neuro/Psych  GI/Hepatic/Renal/Endo      Musculoskeletal     Abdominal  - normal exam   Substance History      OB/GYN          Other   (+) arthritis                     Anesthesia Plan    ASA 3     general     intravenous induction   Anesthetic plan and risks discussed with patient and spouse/significant other.

## 2018-04-26 PROBLEM — Z86.718 HISTORY OF DVT (DEEP VEIN THROMBOSIS): Status: ACTIVE | Noted: 2018-04-26

## 2018-04-26 PROBLEM — I10 HYPERTENSION: Status: ACTIVE | Noted: 2018-04-26

## 2018-04-26 PROBLEM — I95.81 POSTOPERATIVE HYPOTENSION: Status: ACTIVE | Noted: 2018-04-26

## 2018-04-26 PROBLEM — M05.20 RHEUMATOID ARTERITIS (HCC): Status: ACTIVE | Noted: 2018-04-26

## 2018-04-26 LAB
ANION GAP SERPL CALCULATED.3IONS-SCNC: 12.2 MMOL/L
BUN BLD-MCNC: 15 MG/DL (ref 8–23)
BUN/CREAT SERPL: 16.5 (ref 7–25)
CALCIUM SPEC-SCNC: 8.3 MG/DL (ref 8.6–10.5)
CHLORIDE SERPL-SCNC: 104 MMOL/L (ref 98–107)
CO2 SERPL-SCNC: 25.8 MMOL/L (ref 22–29)
COBALT SERPL-MCNC: NORMAL UG/L (ref 0–0.9)
CR SERPL-MCNC: 1.1 UG/L (ref 0.1–2.1)
CREAT BLD-MCNC: 0.91 MG/DL (ref 0.57–1)
GFR SERPL CREATININE-BSD FRML MDRD: 61 ML/MIN/1.73
GLUCOSE BLD-MCNC: 169 MG/DL (ref 65–99)
HCT VFR BLD AUTO: 34.7 % (ref 35.6–45.5)
HGB BLD-MCNC: 10.6 G/DL (ref 11.9–15.5)
INR PPP: 1.19 (ref 0.9–1.1)
POTASSIUM BLD-SCNC: 3.6 MMOL/L (ref 3.5–5.2)
PROTHROMBIN TIME: 14.9 SECONDS (ref 11.7–14.2)
SODIUM BLD-SCNC: 142 MMOL/L (ref 136–145)

## 2018-04-26 PROCEDURE — 97110 THERAPEUTIC EXERCISES: CPT | Performed by: PHYSICAL THERAPIST

## 2018-04-26 PROCEDURE — 80048 BASIC METABOLIC PNL TOTAL CA: CPT | Performed by: HOSPITALIST

## 2018-04-26 PROCEDURE — 85018 HEMOGLOBIN: CPT | Performed by: ORTHOPAEDIC SURGERY

## 2018-04-26 PROCEDURE — 99024 POSTOP FOLLOW-UP VISIT: CPT | Performed by: NURSE PRACTITIONER

## 2018-04-26 PROCEDURE — 25010000003 CEFAZOLIN IN DEXTROSE 2-4 GM/100ML-% SOLUTION: Performed by: ORTHOPAEDIC SURGERY

## 2018-04-26 PROCEDURE — 85610 PROTHROMBIN TIME: CPT | Performed by: ORTHOPAEDIC SURGERY

## 2018-04-26 PROCEDURE — 97110 THERAPEUTIC EXERCISES: CPT

## 2018-04-26 PROCEDURE — 25010000002 KETOROLAC TROMETHAMINE PER 15 MG: Performed by: ORTHOPAEDIC SURGERY

## 2018-04-26 PROCEDURE — 97162 PT EVAL MOD COMPLEX 30 MIN: CPT

## 2018-04-26 PROCEDURE — 85014 HEMATOCRIT: CPT | Performed by: ORTHOPAEDIC SURGERY

## 2018-04-26 PROCEDURE — 97150 GROUP THERAPEUTIC PROCEDURES: CPT | Performed by: PHYSICAL THERAPIST

## 2018-04-26 RX ORDER — METOPROLOL SUCCINATE 25 MG/1
25 TABLET, EXTENDED RELEASE ORAL EVERY MORNING
Status: DISCONTINUED | OUTPATIENT
Start: 2018-04-27 | End: 2018-04-29 | Stop reason: HOSPADM

## 2018-04-26 RX ADMIN — DOCUSATE SODIUM 100 MG: 100 CAPSULE, LIQUID FILLED ORAL at 08:56

## 2018-04-26 RX ADMIN — KETOROLAC TROMETHAMINE 15 MG: 30 INJECTION, SOLUTION INTRAMUSCULAR at 14:16

## 2018-04-26 RX ADMIN — POLYETHYLENE GLYCOL 3350 17 G: 17 POWDER, FOR SOLUTION ORAL at 00:38

## 2018-04-26 RX ADMIN — PANTOPRAZOLE SODIUM 40 MG: 40 TABLET, DELAYED RELEASE ORAL at 05:01

## 2018-04-26 RX ADMIN — CEFAZOLIN SODIUM 2 G: 2 INJECTION, SOLUTION INTRAVENOUS at 00:36

## 2018-04-26 RX ADMIN — WARFARIN SODIUM 5 MG: 5 TABLET ORAL at 16:47

## 2018-04-26 RX ADMIN — MUPIROCIN: 20 OINTMENT TOPICAL at 09:00

## 2018-04-26 RX ADMIN — DOCUSATE SODIUM 100 MG: 100 CAPSULE, LIQUID FILLED ORAL at 00:35

## 2018-04-26 RX ADMIN — CEFAZOLIN SODIUM 2 G: 2 INJECTION, SOLUTION INTRAVENOUS at 08:55

## 2018-04-26 RX ADMIN — WARFARIN SODIUM 5 MG: 5 TABLET ORAL at 00:35

## 2018-04-26 RX ADMIN — HYDROCODONE BITARTRATE AND ACETAMINOPHEN 1 TABLET: 7.5; 325 TABLET ORAL at 10:26

## 2018-04-26 RX ADMIN — KETOROLAC TROMETHAMINE 15 MG: 30 INJECTION, SOLUTION INTRAMUSCULAR at 20:15

## 2018-04-26 RX ADMIN — MUPIROCIN: 20 OINTMENT TOPICAL at 00:36

## 2018-04-26 RX ADMIN — POLYETHYLENE GLYCOL 3350 17 G: 17 POWDER, FOR SOLUTION ORAL at 08:56

## 2018-04-26 RX ADMIN — POLYETHYLENE GLYCOL 3350 17 G: 17 POWDER, FOR SOLUTION ORAL at 20:14

## 2018-04-26 RX ADMIN — KETOROLAC TROMETHAMINE 15 MG: 30 INJECTION, SOLUTION INTRAMUSCULAR at 04:55

## 2018-04-26 RX ADMIN — FUROSEMIDE 40 MG: 40 TABLET ORAL at 08:56

## 2018-04-26 RX ADMIN — DOCUSATE SODIUM 100 MG: 100 CAPSULE, LIQUID FILLED ORAL at 20:14

## 2018-04-26 RX ADMIN — MELOXICAM 15 MG: 15 TABLET ORAL at 08:56

## 2018-04-26 RX ADMIN — ACETAMINOPHEN 800 MCG: 400 TABLET ORAL at 08:56

## 2018-04-26 NOTE — ANESTHESIA POSTPROCEDURE EVALUATION
"Patient: Rachel BATISTA Stone    Procedure Summary     Date:  04/25/18 Room / Location:  [facility] OR [address] [place] [address] MAIN OR    Anesthesia Start:  1520 Anesthesia Stop:  1901    Procedure:  REVISION OF LEFT ACETABULAR COMPONENT  (Left Hip) Diagnosis:       Status post left hip replacement      (Status post left hip replacement [Z96.642])    Surgeon:  Obed Barney MD Provider:  Radha Guthrie MD    Anesthesia Type:  general ASA Status:  3          Anesthesia Type: general  Last vitals  BP   106/60 (04/25/18 2015)   Temp   36.2 °C (97.2 °F) (04/25/18 1856)   Pulse   69 (04/25/18 2015)   Resp   12 (04/25/18 2015)     SpO2   97 % (04/25/18 2015)     Post Anesthesia Care and Evaluation    Patient location during evaluation: PACU  Patient participation: complete - patient participated  Level of consciousness: awake  Pain management: adequate  Airway patency: patent  Anesthetic complications: No anesthetic complications    Cardiovascular status: acceptable  Respiratory status: acceptable  Hydration status: acceptable    Comments: /60   Pulse 69   Temp 36.2 °C (97.2 °F) (Oral)   Resp 12   Ht 149.9 cm (59\")   Wt 57.7 kg (127 lb 3.3 oz)   SpO2 97%   BMI 25.69 kg/m²       "

## 2018-04-27 LAB
ANION GAP SERPL CALCULATED.3IONS-SCNC: 14.3 MMOL/L
ANION GAP SERPL CALCULATED.3IONS-SCNC: 9 MMOL/L
BUN BLD-MCNC: 14 MG/DL (ref 8–23)
BUN BLD-MCNC: 14 MG/DL (ref 8–23)
BUN/CREAT SERPL: 22.6 (ref 7–25)
BUN/CREAT SERPL: 23 (ref 7–25)
CALCIUM SPEC-SCNC: 8.6 MG/DL (ref 8.6–10.5)
CALCIUM SPEC-SCNC: 8.7 MG/DL (ref 8.6–10.5)
CHLORIDE SERPL-SCNC: 107 MMOL/L (ref 98–107)
CHLORIDE SERPL-SCNC: 116 MMOL/L (ref 98–107)
CO2 SERPL-SCNC: 25.7 MMOL/L (ref 22–29)
CO2 SERPL-SCNC: 28 MMOL/L (ref 22–29)
CREAT BLD-MCNC: 0.61 MG/DL (ref 0.57–1)
CREAT BLD-MCNC: 0.62 MG/DL (ref 0.57–1)
GFR SERPL CREATININE-BSD FRML MDRD: 95 ML/MIN/1.73
GFR SERPL CREATININE-BSD FRML MDRD: 97 ML/MIN/1.73
GLUCOSE BLD-MCNC: 103 MG/DL (ref 65–99)
GLUCOSE BLD-MCNC: 104 MG/DL (ref 65–99)
HCT VFR BLD AUTO: 30.4 % (ref 35.6–45.5)
HGB BLD-MCNC: 9.3 G/DL (ref 11.9–15.5)
INR PPP: 1.63 (ref 0.9–1.1)
POTASSIUM BLD-SCNC: 3.4 MMOL/L (ref 3.5–5.2)
POTASSIUM BLD-SCNC: 3.6 MMOL/L (ref 3.5–5.2)
PROTHROMBIN TIME: 19 SECONDS (ref 11.7–14.2)
SODIUM BLD-SCNC: 144 MMOL/L (ref 136–145)
SODIUM BLD-SCNC: 156 MMOL/L (ref 136–145)

## 2018-04-27 PROCEDURE — 97110 THERAPEUTIC EXERCISES: CPT

## 2018-04-27 PROCEDURE — 85018 HEMOGLOBIN: CPT | Performed by: ORTHOPAEDIC SURGERY

## 2018-04-27 PROCEDURE — 85014 HEMATOCRIT: CPT | Performed by: ORTHOPAEDIC SURGERY

## 2018-04-27 PROCEDURE — 85610 PROTHROMBIN TIME: CPT | Performed by: ORTHOPAEDIC SURGERY

## 2018-04-27 PROCEDURE — 97150 GROUP THERAPEUTIC PROCEDURES: CPT

## 2018-04-27 PROCEDURE — 80048 BASIC METABOLIC PNL TOTAL CA: CPT | Performed by: HOSPITALIST

## 2018-04-27 RX ADMIN — POLYETHYLENE GLYCOL 3350 17 G: 17 POWDER, FOR SOLUTION ORAL at 21:32

## 2018-04-27 RX ADMIN — PANTOPRAZOLE SODIUM 40 MG: 40 TABLET, DELAYED RELEASE ORAL at 06:43

## 2018-04-27 RX ADMIN — WARFARIN SODIUM 5 MG: 5 TABLET ORAL at 19:37

## 2018-04-27 RX ADMIN — MELOXICAM 15 MG: 15 TABLET ORAL at 09:31

## 2018-04-27 RX ADMIN — ACETAMINOPHEN 800 MCG: 400 TABLET ORAL at 09:30

## 2018-04-27 RX ADMIN — POLYETHYLENE GLYCOL 3350 17 G: 17 POWDER, FOR SOLUTION ORAL at 09:30

## 2018-04-27 RX ADMIN — HYDROCODONE BITARTRATE AND ACETAMINOPHEN 2 TABLET: 7.5; 325 TABLET ORAL at 21:33

## 2018-04-27 RX ADMIN — HYDROCODONE BITARTRATE AND ACETAMINOPHEN 1 TABLET: 7.5; 325 TABLET ORAL at 09:30

## 2018-04-27 RX ADMIN — HYDROCODONE BITARTRATE AND ACETAMINOPHEN 1 TABLET: 7.5; 325 TABLET ORAL at 03:41

## 2018-04-27 RX ADMIN — DOCUSATE SODIUM 100 MG: 100 CAPSULE, LIQUID FILLED ORAL at 09:30

## 2018-04-27 RX ADMIN — DOCUSATE SODIUM 100 MG: 100 CAPSULE, LIQUID FILLED ORAL at 21:33

## 2018-04-28 LAB
ANION GAP SERPL CALCULATED.3IONS-SCNC: 9.7 MMOL/L
BACTERIA SPEC AEROBE CULT: NORMAL
BUN BLD-MCNC: 15 MG/DL (ref 8–23)
BUN/CREAT SERPL: 31.3 (ref 7–25)
CALCIUM SPEC-SCNC: 7.8 MG/DL (ref 8.6–10.5)
CHLORIDE SERPL-SCNC: 107 MMOL/L (ref 98–107)
CO2 SERPL-SCNC: 26.3 MMOL/L (ref 22–29)
CREAT BLD-MCNC: 0.48 MG/DL (ref 0.57–1)
GFR SERPL CREATININE-BSD FRML MDRD: 127 ML/MIN/1.73
GLUCOSE BLD-MCNC: 98 MG/DL (ref 65–99)
GRAM STN SPEC: NORMAL
HCT VFR BLD AUTO: 29.7 % (ref 35.6–45.5)
HGB BLD-MCNC: 9.1 G/DL (ref 11.9–15.5)
INR PPP: 1.7 (ref 0.9–1.1)
POTASSIUM BLD-SCNC: 3.8 MMOL/L (ref 3.5–5.2)
PROTHROMBIN TIME: 19.7 SECONDS (ref 11.7–14.2)
SODIUM BLD-SCNC: 143 MMOL/L (ref 136–145)

## 2018-04-28 PROCEDURE — 80048 BASIC METABOLIC PNL TOTAL CA: CPT | Performed by: HOSPITALIST

## 2018-04-28 PROCEDURE — 85018 HEMOGLOBIN: CPT | Performed by: ORTHOPAEDIC SURGERY

## 2018-04-28 PROCEDURE — 97150 GROUP THERAPEUTIC PROCEDURES: CPT

## 2018-04-28 PROCEDURE — 85610 PROTHROMBIN TIME: CPT | Performed by: ORTHOPAEDIC SURGERY

## 2018-04-28 PROCEDURE — 85014 HEMATOCRIT: CPT | Performed by: ORTHOPAEDIC SURGERY

## 2018-04-28 PROCEDURE — 97110 THERAPEUTIC EXERCISES: CPT

## 2018-04-28 PROCEDURE — 25010000002 ONDANSETRON PER 1 MG: Performed by: ORTHOPAEDIC SURGERY

## 2018-04-28 RX ORDER — ONDANSETRON 4 MG/1
4 TABLET, FILM COATED ORAL EVERY 6 HOURS PRN
Qty: 10 TABLET | Refills: 0 | Status: SHIPPED | OUTPATIENT
Start: 2018-04-28 | End: 2018-05-08

## 2018-04-28 RX ORDER — HYDROCODONE BITARTRATE AND ACETAMINOPHEN 7.5; 325 MG/1; MG/1
TABLET ORAL
Qty: 84 TABLET | Refills: 0 | Status: SHIPPED | OUTPATIENT
Start: 2018-04-28 | End: 2018-07-06

## 2018-04-28 RX ORDER — PANTOPRAZOLE SODIUM 40 MG/1
40 TABLET, DELAYED RELEASE ORAL DAILY
Qty: 14 TABLET | Refills: 0 | Status: SHIPPED | OUTPATIENT
Start: 2018-04-28 | End: 2018-05-12

## 2018-04-28 RX ORDER — PSEUDOEPHEDRINE HCL 30 MG
100 TABLET ORAL 2 TIMES DAILY
Qty: 23 CAPSULE | Refills: 0 | Status: SHIPPED | OUTPATIENT
Start: 2018-04-28 | End: 2018-05-10

## 2018-04-28 RX ADMIN — WARFARIN SODIUM 5 MG: 5 TABLET ORAL at 17:04

## 2018-04-28 RX ADMIN — ACETAMINOPHEN 800 MCG: 400 TABLET ORAL at 06:27

## 2018-04-28 RX ADMIN — HYDROCODONE BITARTRATE AND ACETAMINOPHEN 2 TABLET: 7.5; 325 TABLET ORAL at 06:27

## 2018-04-28 RX ADMIN — HYDROCODONE BITARTRATE AND ACETAMINOPHEN 2 TABLET: 7.5; 325 TABLET ORAL at 10:21

## 2018-04-28 RX ADMIN — ONDANSETRON 4 MG: 2 INJECTION INTRAMUSCULAR; INTRAVENOUS at 13:49

## 2018-04-28 RX ADMIN — PANTOPRAZOLE SODIUM 40 MG: 40 TABLET, DELAYED RELEASE ORAL at 06:27

## 2018-04-28 RX ADMIN — MELOXICAM 15 MG: 15 TABLET ORAL at 09:19

## 2018-04-29 VITALS
DIASTOLIC BLOOD PRESSURE: 70 MMHG | BODY MASS INDEX: 25.64 KG/M2 | HEART RATE: 86 BPM | SYSTOLIC BLOOD PRESSURE: 129 MMHG | WEIGHT: 127.21 LBS | HEIGHT: 59 IN | RESPIRATION RATE: 16 BRPM | OXYGEN SATURATION: 98 % | TEMPERATURE: 97.6 F

## 2018-04-29 LAB
ANION GAP SERPL CALCULATED.3IONS-SCNC: 9.6 MMOL/L
BUN BLD-MCNC: 9 MG/DL (ref 8–23)
BUN/CREAT SERPL: 19.6 (ref 7–25)
CALCIUM SPEC-SCNC: 8.3 MG/DL (ref 8.6–10.5)
CHLORIDE SERPL-SCNC: 109 MMOL/L (ref 98–107)
CO2 SERPL-SCNC: 26.4 MMOL/L (ref 22–29)
CREAT BLD-MCNC: 0.46 MG/DL (ref 0.57–1)
GFR SERPL CREATININE-BSD FRML MDRD: 134 ML/MIN/1.73
GLUCOSE BLD-MCNC: 100 MG/DL (ref 65–99)
HCT VFR BLD AUTO: 32.7 % (ref 35.6–45.5)
HGB BLD-MCNC: 10.1 G/DL (ref 11.9–15.5)
INR PPP: 1.7 (ref 0.9–1.1)
POTASSIUM BLD-SCNC: 4.3 MMOL/L (ref 3.5–5.2)
PROTHROMBIN TIME: 19.7 SECONDS (ref 11.7–14.2)
SODIUM BLD-SCNC: 145 MMOL/L (ref 136–145)

## 2018-04-29 PROCEDURE — 85018 HEMOGLOBIN: CPT | Performed by: ORTHOPAEDIC SURGERY

## 2018-04-29 PROCEDURE — 97150 GROUP THERAPEUTIC PROCEDURES: CPT

## 2018-04-29 PROCEDURE — 85610 PROTHROMBIN TIME: CPT | Performed by: ORTHOPAEDIC SURGERY

## 2018-04-29 PROCEDURE — 97110 THERAPEUTIC EXERCISES: CPT

## 2018-04-29 PROCEDURE — 80048 BASIC METABOLIC PNL TOTAL CA: CPT | Performed by: HOSPITALIST

## 2018-04-29 PROCEDURE — 85014 HEMATOCRIT: CPT | Performed by: ORTHOPAEDIC SURGERY

## 2018-04-29 RX ADMIN — ACETAMINOPHEN 800 MCG: 400 TABLET ORAL at 06:33

## 2018-04-29 RX ADMIN — PANTOPRAZOLE SODIUM 40 MG: 40 TABLET, DELAYED RELEASE ORAL at 06:33

## 2018-04-29 RX ADMIN — MELOXICAM 15 MG: 15 TABLET ORAL at 08:11

## 2018-04-30 ENCOUNTER — TELEPHONE (OUTPATIENT)
Dept: ORTHOPEDIC SURGERY | Facility: CLINIC | Age: 73
End: 2018-04-30

## 2018-04-30 LAB
BACTERIA SPEC ANAEROBE CULT: NORMAL

## 2018-04-30 NOTE — TELEPHONE ENCOUNTER
ProTime today is 18.0 INR is 1.5.  Will increase her Coumadin to 7.5 mg daily and recheck her ProTime again on Thursday.  Was on Lovenox 60 mg preoperatively and will check with RBB to see if she does need to be bridged with Lovenox until her INR is therapeutic

## 2018-05-03 ENCOUNTER — TELEPHONE (OUTPATIENT)
Dept: ORTHOPEDIC SURGERY | Facility: CLINIC | Age: 73
End: 2018-05-03

## 2018-05-07 ENCOUNTER — TELEPHONE (OUTPATIENT)
Dept: ORTHOPEDIC SURGERY | Facility: CLINIC | Age: 73
End: 2018-05-07

## 2018-05-07 NOTE — TELEPHONE ENCOUNTER
Protime today is 42.0, INR 3.5.  Have left a message with home health to hold her Coumadin today and tomorrow and will recheck protime again on Wed, per RBB. AMB

## 2018-05-09 ENCOUNTER — TELEPHONE (OUTPATIENT)
Dept: ORTHOPEDIC SURGERY | Facility: CLINIC | Age: 73
End: 2018-05-09

## 2018-05-09 NOTE — TELEPHONE ENCOUNTER
ProTime today is 28.0 INR is 2.3.  Will restart her Coumadin at 5 mg daily and recheck her ProTime again on Monday per RBB.  Patient was on long-term Coumadin therapy prior to surgery and which was followed by Dr. Arango area did patient is being discharged from home health possibly next week and will have Dr. Arango take over management after of her Coumadin once she is discharged from home health

## 2018-05-10 ENCOUNTER — OFFICE VISIT (OUTPATIENT)
Dept: ORTHOPEDIC SURGERY | Facility: CLINIC | Age: 73
End: 2018-05-10

## 2018-05-10 VITALS — WEIGHT: 130 LBS | BODY MASS INDEX: 26.21 KG/M2 | TEMPERATURE: 97.7 F | HEIGHT: 59 IN

## 2018-05-10 DIAGNOSIS — S74.22XA: ICD-10-CM

## 2018-05-10 DIAGNOSIS — Z96.642 H/O TOTAL HIP ARTHROPLASTY, LEFT: Primary | ICD-10-CM

## 2018-05-10 PROCEDURE — 73502 X-RAY EXAM HIP UNI 2-3 VIEWS: CPT | Performed by: ORTHOPAEDIC SURGERY

## 2018-05-10 PROCEDURE — 99024 POSTOP FOLLOW-UP VISIT: CPT | Performed by: ORTHOPAEDIC SURGERY

## 2018-05-10 NOTE — PROGRESS NOTES
is here for follow-up of her left hip revision.  She is 2 weeks out.  She still has no motor or sensory quad function.  Her incision is healed and she has little pain in the hip to speak of.  Calf is soft, negative Homans sign.    I ordered and reviewed x-rays 2 views of her left hip which show well-positioned revision cup    Femoral nerve palsy following revision.  I'm going to get an EMG.  I'll put her in a drop lock knee brace.  I would like to see her back in 1 month but will to have discussions with her following the EMG

## 2018-05-14 ENCOUNTER — TELEPHONE (OUTPATIENT)
Dept: ORTHOPEDIC SURGERY | Facility: CLINIC | Age: 73
End: 2018-05-14

## 2018-05-14 NOTE — TELEPHONE ENCOUNTER
ProTime today is 24.7 INR is 2.1.  Will continue her Coumadin at 5 mg a day.  Patient is being discharged from home health and was on Coumadin prior to surgery which was followed by Dr. Arango.  Will have the patient follow-up with Dr. Arango for any further instructions regarding her Coumadin and her pro times

## 2018-05-15 ENCOUNTER — HOSPITAL ENCOUNTER (OUTPATIENT)
Dept: INFUSION THERAPY | Facility: HOSPITAL | Age: 73
Discharge: HOME | End: 2018-05-15
Attending: ORTHOPAEDIC SURGERY | Admitting: PSYCHIATRY & NEUROLOGY

## 2018-05-15 ENCOUNTER — TELEPHONE (OUTPATIENT)
Dept: ORTHOPEDIC SURGERY | Facility: CLINIC | Age: 73
End: 2018-05-15

## 2018-05-15 DIAGNOSIS — Z96.642 H/O TOTAL HIP ARTHROPLASTY, LEFT: ICD-10-CM

## 2018-05-15 DIAGNOSIS — S74.22XA: ICD-10-CM

## 2018-05-15 PROCEDURE — 95907 NVR CNDJ TST 1-2 STUDIES: CPT | Performed by: PSYCHIATRY & NEUROLOGY

## 2018-05-15 PROCEDURE — 95886 MUSC TEST DONE W/N TEST COMP: CPT | Performed by: PSYCHIATRY & NEUROLOGY

## 2018-05-15 PROCEDURE — 95886 MUSC TEST DONE W/N TEST COMP: CPT

## 2018-05-15 PROCEDURE — 95907 NVR CNDJ TST 1-2 STUDIES: CPT

## 2018-05-15 NOTE — PROCEDURES
EMG REPORT    Indication: Weakness of left thigh muscles    Findings: Left peroneal motor study showed normal distal latency velocity and amplitude.  Left tibial motor study showed normal distal latency, amplitude, F wave latency.    Concentric needle EMG of the left anterior tibialis, peroneus, gastrocnemius muscles showed no abnormality.  In the left vastus lateralis there was no activity at rest and no motor units under voluntary control.  In the left vastus medialis muscle there was activity at rest and form fibrillation potentials and positive sharp waves.  There are no motor units under voluntary control.  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  Patient is 3-1/2 weeks since onset of symptoms.  There are a few motor units under voluntary control in the iliopsoas muscle.  Prognosis at this point is not good for recovery.  However I would recommend repeat study in 4-6 weeks if there is no significant improvement.    Thank you for sending this patient for further evaluation.  Giovanni Chanel M.D.

## 2018-05-22 ENCOUNTER — HOSPITAL ENCOUNTER (OUTPATIENT)
Dept: CARDIOLOGY | Facility: HOSPITAL | Age: 73
Setting detail: RECURRING SERIES
Discharge: HOME | End: 2018-05-22

## 2018-05-22 PROCEDURE — 85610 PROTHROMBIN TIME: CPT

## 2018-05-22 PROCEDURE — 36416 COLLJ CAPILLARY BLOOD SPEC: CPT

## 2018-06-08 ENCOUNTER — OFFICE VISIT (OUTPATIENT)
Dept: ORTHOPEDIC SURGERY | Facility: CLINIC | Age: 73
End: 2018-06-08

## 2018-06-08 VITALS — WEIGHT: 128 LBS | TEMPERATURE: 98.1 F | BODY MASS INDEX: 25.8 KG/M2 | HEIGHT: 59 IN

## 2018-06-08 DIAGNOSIS — Z96.649 S/P REVISION OF TOTAL HIP: Primary | ICD-10-CM

## 2018-06-08 PROCEDURE — 99024 POSTOP FOLLOW-UP VISIT: CPT | Performed by: ORTHOPAEDIC SURGERY

## 2018-06-08 PROCEDURE — 73502 X-RAY EXAM HIP UNI 2-3 VIEWS: CPT | Performed by: ORTHOPAEDIC SURGERY

## 2018-06-08 NOTE — PROGRESS NOTES
Patient: Rachel Moser  YOB: 1945 72 y.o. female  Medical Record Number: [id number]    Chief Complaint:   Chief Complaint   Patient presents with   • Left Hip - Post-op       History of Present Illness:Rachel Moser is a 72 y.o. female who presents for follow-up of  Left hip.  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  Hip overall is not painful.  Her incision is nicely healed she is walking with cane although she is doing some walking around the house without a cane.    Allergies:   Allergies   Allergen Reactions   • Sulfa Antibiotics Rash       Medications:   Current Outpatient Prescriptions   Medication Sig Dispense Refill   • albuterol (PROAIR RESPICLICK) 108 (90 Base) MCG/ACT inhaler Inhale 2 puffs Every 4 (Four) Hours As Needed for Wheezing.     • calcium-vitamin D (CALCIUM + D) 250-125 MG-UNIT per tablet Take 1 tablet by mouth 2 (Two) Times a Day.     • ferrous sulfate (FERROUSUL) 325 (65 FE) MG tablet Take 325 mg by mouth Daily With Breakfast.     • folic acid (FOLVITE) 800 MCG tablet Take 800 mcg by mouth Every Morning. 4 tabs am     • furosemide (LASIX) 40 MG tablet Take 40 mg by mouth Every Morning.     • gabapentin (NEURONTIN) 300 MG capsule Take 300 mg by mouth As Needed.     • HYDROcodone-acetaminophen (NORCO) 7.5-325 MG per tablet 1-2 po q 4-6 hr prn pain 84 tablet 0   • inFLIXimab (REMICADE) 100 MG injection Infuse  into a venous catheter Every 30 (Thirty) Days. Last dose 2/14/18     • ipratropium (ATROVENT) 0.03 % nasal spray 2 sprays into each nostril 2 (Two) Times a Day As Needed for rhinitis.     • leucovorin 5 MG tablet Take 5 mg by mouth 1 (One) Time Per Week. Patient takes only on Sunday     • methotrexate 2.5 MG tablet Take 20 mg by mouth 1 (One) Time Per Week. TAKES 8 TABLETS ON SATURDAYS     • metoprolol succinate XL (TOPROL-XL) 50 MG 24 hr tablet Take 50 mg by mouth Every Morning.     • Multiple Vitamin (MULTIVITAMIN) capsule Take 1 capsule by mouth Every  "Morning.     • olopatadine (PATANOL) 0.1 % ophthalmic solution Administer 1 drop to both eyes As Needed for Allergies.     • potassium chloride (K-DUR,KLOR-CON) 20 MEQ CR tablet Take 1 tablet by mouth 2 (Two) Times a Day. 180 tablet 3   • simvastatin (ZOCOR) 40 MG tablet Take 40 mg by mouth Every Morning.     • warfarin (COUMADIN) 5 MG tablet Take 5 mg by mouth Daily. To be determined by dr cee elena day to d/c.       No current facility-administered medications for this visit.          The following portions of the patient's history were reviewed and updated as appropriate: allergies, current medications, past family history, past medical history, past social history, past surgical history and problem list.    Review of Systems:   A 14 point review of systems was performed. All systems negative except pertinent positives/negative listed in HPI above    Physical Exam:   Vitals:    06/08/18 1542   Temp: 98.1 °F (36.7 °C)   Weight: 58.1 kg (128 lb)   Height: 149.9 cm (59\")       General: A and O x 3, ASA, NAD    SCLERA:    Normal    DENTITION:   Normal  Surgeon well-healed.  She is unable to perform a knee extension while seated.  She walks with an antalgic quad deficient gait she has decreased sensibility about the mid thigh    Radiology:    Xrays 2views left hip (AP bilateral hips and lateral hip) were ordered and reviewed for evaluation of hip pain demonstrating well positioned revision total hip no signs of loosening of the implant.  Comparison views: todays xrays were compared to previous xrays and demonstrate no change    Assessment/Plan:  Left hip revision and overall the hip is doing well.  Unfortunate she still displays signs of femoral nerve palsy.  She is able to walk with a cane without the use of a brace and I think she has some distal innervation of the quads which are helping.  I will send her to physical therapy I'm also going to order an MRI of the pelvis to evaluate her femoral nerve if " possible      Obed Barney MD  6/8/2018

## 2018-06-11 ENCOUNTER — HOSPITAL ENCOUNTER (OUTPATIENT)
Dept: CARDIOLOGY | Facility: HOSPITAL | Age: 73
Setting detail: RECURRING SERIES
Discharge: HOME | End: 2018-06-11

## 2018-06-11 PROCEDURE — 36416 COLLJ CAPILLARY BLOOD SPEC: CPT

## 2018-06-11 PROCEDURE — 85610 PROTHROMBIN TIME: CPT

## 2018-06-18 ENCOUNTER — HOSPITAL ENCOUNTER (OUTPATIENT)
Dept: CARDIOLOGY | Facility: HOSPITAL | Age: 73
Setting detail: RECURRING SERIES
Discharge: HOME | End: 2018-06-18

## 2018-06-18 ENCOUNTER — TREATMENT (OUTPATIENT)
Dept: PHYSICAL THERAPY | Facility: CLINIC | Age: 73
End: 2018-06-18

## 2018-06-18 ENCOUNTER — HOSPITAL ENCOUNTER (OUTPATIENT)
Dept: MRI IMAGING | Facility: HOSPITAL | Age: 73
Discharge: HOME | End: 2018-06-18
Attending: ORTHOPAEDIC SURGERY | Admitting: ORTHOPAEDIC SURGERY

## 2018-06-18 DIAGNOSIS — Z98.890 HISTORY OF HIP SURGERY: Primary | ICD-10-CM

## 2018-06-18 DIAGNOSIS — Z96.649 S/P REVISION OF TOTAL HIP: ICD-10-CM

## 2018-06-18 PROCEDURE — 97161 PT EVAL LOW COMPLEX 20 MIN: CPT | Performed by: PHYSICAL THERAPIST

## 2018-06-18 PROCEDURE — G8978 MOBILITY CURRENT STATUS: HCPCS | Performed by: PHYSICAL THERAPIST

## 2018-06-18 PROCEDURE — 72197 MRI PELVIS W/O & W/DYE: CPT

## 2018-06-18 PROCEDURE — 36416 COLLJ CAPILLARY BLOOD SPEC: CPT

## 2018-06-18 PROCEDURE — 0 GADOBENATE DIMEGLUMINE 529 MG/ML SOLUTION: Performed by: ORTHOPAEDIC SURGERY

## 2018-06-18 PROCEDURE — 82565 ASSAY OF CREATININE: CPT

## 2018-06-18 PROCEDURE — A9577 INJ MULTIHANCE: HCPCS | Performed by: ORTHOPAEDIC SURGERY

## 2018-06-18 PROCEDURE — 97110 THERAPEUTIC EXERCISES: CPT | Performed by: PHYSICAL THERAPIST

## 2018-06-18 PROCEDURE — 85610 PROTHROMBIN TIME: CPT

## 2018-06-18 PROCEDURE — G8979 MOBILITY GOAL STATUS: HCPCS | Performed by: PHYSICAL THERAPIST

## 2018-06-18 RX ADMIN — GADOBENATE DIMEGLUMINE 10 ML: 529 INJECTION, SOLUTION INTRAVENOUS at 12:40

## 2018-06-18 NOTE — PROGRESS NOTES
Physical Therapy Initial Evaluation and Plan of Care    Time In  303  Time Out  346    Subjective Evaluation    History of Present Illness  Date of surgery: 2018  Mechanism of injury: Hip replacement , ,  and the recent one in April.  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        Patient Occupation: Retired Pain  Current pain ratin  At worst pain ratin  Location: left LE to the ankle  Quality: sharp (numbness, tingling)  Alleviating factors: nothing   Exacerbated by: nothing specific.  Progression: worsening    Diagnostic Tests  Abnormal MRI: had an MRI today.    Treatments  Previous treatment: home therapy  Patient Goals  Patient goal: walk with cane better           Objective       Observations     Additional Observation Details  Patient ambulates with a flexed trunk and a cane on the right side, ambulates with an antalgic gait pattern.    Palpation     Additional Palpation Details  TTP to left anterior, medial and lateral thigh.    Active Range of Motion   Left Hip   Abduction: Active abduction with hip flexed: unable to abd in a supine position.   Adduction: Left hip active adduction: unable to add in a supine position.     Additional Active Range of Motion Details  Left Knee:  Flexion 75, Extension WNL    Strength/Myotome Testing     Left Hip   Planes of Motion   Flexion: 0  Abduction: 4  Adduction: 4    Additional Strength Details  Left Ankle DF 5/5         Assessment & Plan     Assessment  Impairments: abnormal gait, abnormal or restricted ROM, activity intolerance, impaired physical strength, lacks appropriate home exercise program and pain with function  Assessment details: Patient presents with c/o pain, TTP, limited AROM, decreased strength and an antalgic gait pattern which is limiting her ability to perform ADL'S.  Barriers to therapy: none  Prognosis: good  Prognosis details: STG's to be met by 4 weeks  1)  Independent with HEP  2)  Decrease pain by  50% or more    LTG's to be met by 8 weeks  1)  Independent with HEP progression  2)  Decrease pain by 75% or more  3)  Increase strength for left hip flexion to 3/5  4)  Increase hip add and abd strength to 4+/5  5)  Min to no TTP to the left thigh  6)  AROM left hip WFL  7)  Patient to ambulate without the use of the cane      Plan  Therapy options: will be seen for skilled physical therapy services  Planned modality interventions: cryotherapy  Planned therapy interventions: manual therapy, strengthening, stretching, therapeutic activities and home exercise program  Frequency: 3x week  Duration in weeks: 8  Treatment plan discussed with: patient        Manual Therapy:    0     mins  62746;  Therapeutic Exercise:    24     mins  01618;    Neuromuscular Brayan:    0    mins  73518;    Therapeutic Activity:     0     mins  52757;     Gait Trainin     mins  51800;     Ultrasound:     0     mins  81049;    Work Hardening           0      mins 02379  Iontophoresis               0   mins 84034    Timed Treatment:   24   mins   Total Treatment:     43   mins    PT SIGNATURE: Adi Tirado, PT   DATE TREATMENT INITIATED: 2018    Initial Certification  Certification Period: 2018  I certify that the therapy services are furnished while this patient is under my care.  The services outlined above are required by this patient, and will be reviewed every 90 days.     PHYSICIAN: Obed Barney MD      DATE:     Please sign and return via fax to [phone].. Thank you, [facility].

## 2018-06-19 LAB — CREAT BLDA-MCNC: 0.5 MG/DL (ref 0.6–1.3)

## 2018-06-20 ENCOUNTER — TELEPHONE (OUTPATIENT)
Dept: ORTHOPEDIC SURGERY | Facility: CLINIC | Age: 73
End: 2018-06-20

## 2018-06-21 ENCOUNTER — TREATMENT (OUTPATIENT)
Dept: PHYSICAL THERAPY | Facility: CLINIC | Age: 73
End: 2018-06-21

## 2018-06-21 DIAGNOSIS — Z98.890 HISTORY OF HIP SURGERY: Primary | ICD-10-CM

## 2018-06-21 PROCEDURE — 97110 THERAPEUTIC EXERCISES: CPT | Performed by: PHYSICAL THERAPIST

## 2018-06-21 NOTE — PROGRESS NOTES
Physical Therapy Daily Progress Note    Time In 133  Time Out 219        Subjective  Patient reports that the hip and leg are feeling fine, but continues to report numbness in the left LE to the ankle.    Objective   See Exercise, Manual, and Modality Logs for complete treatment.       Assessment/Plan  Patient continues to exhibit weakness in the left LE, as seen with transitional movements and minimal ROM with the exercises.  Patient tolerated the progression of open and closed chain activities without c/o pain, but was fatigued with the routine.                     Manual Therapy:    0     mins  39212;  Therapeutic Exercise:    43     mins  77304;     Neuromuscular Brayan:    0    mins  66111;    Therapeutic Activity:     0     mins  67709;     Gait Trainin     mins  48600;     Ultrasound:     0     mins  86201;    Work Hardening           0      mins 34579  Iontophoresis               0   mins 70646    Timed Treatment:   43   mins   Total Treatment:     46   mins    Adi Tirado, PT  Physical Therapist

## 2018-06-25 ENCOUNTER — TREATMENT (OUTPATIENT)
Dept: PHYSICAL THERAPY | Facility: CLINIC | Age: 73
End: 2018-06-25

## 2018-06-25 DIAGNOSIS — Z98.890 HISTORY OF HIP SURGERY: Primary | ICD-10-CM

## 2018-06-25 PROCEDURE — 97110 THERAPEUTIC EXERCISES: CPT | Performed by: PHYSICAL THERAPIST

## 2018-06-25 NOTE — PROGRESS NOTES
Physical Therapy Daily Progress Note    Time In 125  Time Out 222        Subjective  Patient reports that the hip is doing pretty good.    Objective   See Exercise, Manual, and Modality Logs for complete treatment.       Assessment/Plan  Patient continues to have minimal AROM with some of the exercises, especially clams and LAQ.  Patient continues to be unable to raise the right knee is sitting, exhibiting weak hip flexors.  Patient tolerated the progression of strengthening exercises well, reported soreness and fatigue with the routine.                     Manual Therapy:    0     mins  57305;  Therapeutic Exercise:    53     mins  72323;    Neuromuscular Brayan:    0    mins  53608;    Therapeutic Activity:     0     mins  50889;     Gait Trainin     mins  07806;     Ultrasound:     0     mins  60784;    Work Hardening           0      mins 42750  Iontophoresis               0   mins 12676    Timed Treatment:   53   mins   Total Treatment:     57   mins    Adi Tirado, PT  Physical Therapist

## 2018-06-28 ENCOUNTER — TREATMENT (OUTPATIENT)
Dept: PHYSICAL THERAPY | Facility: CLINIC | Age: 73
End: 2018-06-28

## 2018-06-28 DIAGNOSIS — Z98.890 HISTORY OF HIP SURGERY: Primary | ICD-10-CM

## 2018-06-28 PROCEDURE — 97110 THERAPEUTIC EXERCISES: CPT | Performed by: PHYSICAL THERAPIST

## 2018-06-28 NOTE — PROGRESS NOTES
Physical Therapy Daily Progress Note    Time In 138  Time Out 235        Subjective  Patient reports that she is about the same.      Objective   See Exercise, Manual, and Modality Logs for complete treatment.       Assessment/Plan  Subjective reports remain about the same.  Patient continues to have limited AROM in the hip and knee.  Patient continues to have fatigue in the left LE with the routine.                     Manual Therapy:    0     mins  25829;  Therapeutic Exercise:    53     mins  12419;     Neuromuscular Brayan:    0    mins  52107;    Therapeutic Activity:     0     mins  22054;     Gait Trainin     mins  34249;     Ultrasound:     0     mins  45750;    Work Hardening           0      mins 72098  Iontophoresis               0   mins 64268    Timed Treatment:   53   mins   Total Treatment:     57   mins    Adi Tirado, PT  Physical Therapist

## 2018-07-06 ENCOUNTER — PREP FOR SURGERY (OUTPATIENT)
Dept: OTHER | Facility: HOSPITAL | Age: 73
End: 2018-07-06

## 2018-07-06 ENCOUNTER — APPOINTMENT (OUTPATIENT)
Dept: CT IMAGING | Facility: HOSPITAL | Age: 73
End: 2018-07-06

## 2018-07-06 ENCOUNTER — OFFICE VISIT (OUTPATIENT)
Dept: ORTHOPEDIC SURGERY | Facility: CLINIC | Age: 73
End: 2018-07-06

## 2018-07-06 ENCOUNTER — LAB (OUTPATIENT)
Dept: LAB | Facility: HOSPITAL | Age: 73
End: 2018-07-06

## 2018-07-06 ENCOUNTER — HOSPITAL ENCOUNTER (INPATIENT)
Facility: HOSPITAL | Age: 73
LOS: 7 days | Discharge: HOME HEALTH | End: 2018-07-13
Attending: ORTHOPAEDIC SURGERY | Admitting: ORTHOPAEDIC SURGERY

## 2018-07-06 VITALS — WEIGHT: 128 LBS | HEIGHT: 59 IN | BODY MASS INDEX: 25.8 KG/M2 | TEMPERATURE: 98.2 F

## 2018-07-06 DIAGNOSIS — Z96.649 S/P REVISION OF TOTAL HIP: Primary | ICD-10-CM

## 2018-07-06 DIAGNOSIS — Z51.81 ENCOUNTER FOR MONITORING COUMADIN THERAPY: ICD-10-CM

## 2018-07-06 DIAGNOSIS — R26.2 DIFFICULTY WALKING: Primary | ICD-10-CM

## 2018-07-06 DIAGNOSIS — Z96.642 STATUS POST TOTAL HIP REPLACEMENT, LEFT: Primary | ICD-10-CM

## 2018-07-06 DIAGNOSIS — Z96.649 S/P REVISION OF TOTAL HIP: ICD-10-CM

## 2018-07-06 DIAGNOSIS — M00.852 ARTHRITIS OF LEFT HIP DUE TO OTHER BACTERIA (HCC): ICD-10-CM

## 2018-07-06 DIAGNOSIS — Z79.01 ENCOUNTER FOR MONITORING COUMADIN THERAPY: ICD-10-CM

## 2018-07-06 DIAGNOSIS — Z96.652 HISTORY OF TOTAL LEFT KNEE REPLACEMENT: ICD-10-CM

## 2018-07-06 LAB
ABO GROUP BLD: NORMAL
ANION GAP SERPL CALCULATED.3IONS-SCNC: 10.2 MMOL/L
BASOPHILS # BLD AUTO: 0.05 10*3/MM3 (ref 0–0.2)
BASOPHILS NFR BLD AUTO: 0.2 % (ref 0–1.5)
BLD GP AB SCN SERPL QL: NEGATIVE
BUN BLD-MCNC: 13 MG/DL (ref 8–23)
BUN/CREAT SERPL: 22.8 (ref 7–25)
CALCIUM SPEC-SCNC: 9.3 MG/DL (ref 8.6–10.5)
CHLORIDE SERPL-SCNC: 105 MMOL/L (ref 98–107)
CO2 SERPL-SCNC: 26.8 MMOL/L (ref 22–29)
CREAT BLD-MCNC: 0.57 MG/DL (ref 0.57–1)
CRP SERPL-MCNC: 11.64 MG/DL (ref 0–0.5)
DEPRECATED RDW RBC AUTO: 54 FL (ref 37–54)
EOSINOPHIL # BLD AUTO: 0.01 10*3/MM3 (ref 0–0.7)
EOSINOPHIL NFR BLD AUTO: 0 % (ref 0.3–6.2)
ERYTHROCYTE [DISTWIDTH] IN BLOOD BY AUTOMATED COUNT: 15.9 % (ref 11.7–13)
ERYTHROCYTE [SEDIMENTATION RATE] IN BLOOD: 72 MM/HR (ref 0–30)
GFR SERPL CREATININE-BSD FRML MDRD: 105 ML/MIN/1.73
GLUCOSE BLD-MCNC: 160 MG/DL (ref 65–99)
HCT VFR BLD AUTO: 36.6 % (ref 35.6–45.5)
HGB BLD-MCNC: 11.5 G/DL (ref 11.9–15.5)
IMM GRANULOCYTES # BLD: 0.85 10*3/MM3 (ref 0–0.03)
IMM GRANULOCYTES NFR BLD: 3 % (ref 0–0.5)
INR PPP: 4.9 (ref 0.9–1.1)
LYMPHOCYTES # BLD AUTO: 2 10*3/MM3 (ref 0.9–4.8)
LYMPHOCYTES NFR BLD AUTO: 7 % (ref 19.6–45.3)
MCH RBC QN AUTO: 29.3 PG (ref 26.9–32)
MCHC RBC AUTO-ENTMCNC: 31.4 G/DL (ref 32.4–36.3)
MCV RBC AUTO: 93.1 FL (ref 80.5–98.2)
MONOCYTES # BLD AUTO: 1.61 10*3/MM3 (ref 0.2–1.2)
MONOCYTES NFR BLD AUTO: 5.6 % (ref 5–12)
NEUTROPHILS # BLD AUTO: 25.08 10*3/MM3 (ref 1.9–8.1)
NEUTROPHILS NFR BLD AUTO: 87.2 % (ref 42.7–76)
PLATELET # BLD AUTO: 455 10*3/MM3 (ref 140–500)
PMV BLD AUTO: 10.9 FL (ref 6–12)
POTASSIUM BLD-SCNC: 4.3 MMOL/L (ref 3.5–5.2)
PROTHROMBIN TIME: 45 SECONDS (ref 11.7–14.2)
RBC # BLD AUTO: 3.93 10*6/MM3 (ref 3.9–5.2)
RH BLD: POSITIVE
SODIUM BLD-SCNC: 142 MMOL/L (ref 136–145)
T&S EXPIRATION DATE: NORMAL
WBC NRBC COR # BLD: 28.75 10*3/MM3 (ref 4.5–10.7)

## 2018-07-06 PROCEDURE — 85610 PROTHROMBIN TIME: CPT | Performed by: ORTHOPAEDIC SURGERY

## 2018-07-06 PROCEDURE — 86900 BLOOD TYPING SEROLOGIC ABO: CPT | Performed by: ORTHOPAEDIC SURGERY

## 2018-07-06 PROCEDURE — 80048 BASIC METABOLIC PNL TOTAL CA: CPT | Performed by: ORTHOPAEDIC SURGERY

## 2018-07-06 PROCEDURE — 86850 RBC ANTIBODY SCREEN: CPT | Performed by: ORTHOPAEDIC SURGERY

## 2018-07-06 PROCEDURE — 73502 X-RAY EXAM HIP UNI 2-3 VIEWS: CPT | Performed by: ORTHOPAEDIC SURGERY

## 2018-07-06 PROCEDURE — 86927 PLASMA FRESH FROZEN: CPT

## 2018-07-06 PROCEDURE — 25010000002 VITAMIN K1 1 MG/1 ML SOLUTION: Performed by: INTERNAL MEDICINE

## 2018-07-06 PROCEDURE — 99024 POSTOP FOLLOW-UP VISIT: CPT | Performed by: ORTHOPAEDIC SURGERY

## 2018-07-06 PROCEDURE — 36415 COLL VENOUS BLD VENIPUNCTURE: CPT | Performed by: ORTHOPAEDIC SURGERY

## 2018-07-06 PROCEDURE — P9017 PLASMA 1 DONOR FRZ W/IN 8 HR: HCPCS

## 2018-07-06 PROCEDURE — 3E0U029 INTRODUCTION OF OTHER ANTI-INFECTIVE INTO JOINTS, OPEN APPROACH: ICD-10-PCS | Performed by: ORTHOPAEDIC SURGERY

## 2018-07-06 PROCEDURE — 85025 COMPLETE CBC W/AUTO DIFF WBC: CPT | Performed by: ORTHOPAEDIC SURGERY

## 2018-07-06 PROCEDURE — 0SBB0ZZ EXCISION OF LEFT HIP JOINT, OPEN APPROACH: ICD-10-PCS | Performed by: ORTHOPAEDIC SURGERY

## 2018-07-06 PROCEDURE — 86140 C-REACTIVE PROTEIN: CPT | Performed by: ORTHOPAEDIC SURGERY

## 2018-07-06 PROCEDURE — 36430 TRANSFUSION BLD/BLD COMPNT: CPT

## 2018-07-06 PROCEDURE — 85652 RBC SED RATE AUTOMATED: CPT

## 2018-07-06 PROCEDURE — 86901 BLOOD TYPING SEROLOGIC RH(D): CPT | Performed by: ORTHOPAEDIC SURGERY

## 2018-07-06 RX ORDER — FERROUS SULFATE 325(65) MG
325 TABLET ORAL
Status: DISCONTINUED | OUTPATIENT
Start: 2018-07-07 | End: 2018-07-13 | Stop reason: HOSPADM

## 2018-07-06 RX ORDER — SODIUM CHLORIDE 0.9 % (FLUSH) 0.9 %
1-10 SYRINGE (ML) INJECTION AS NEEDED
Status: DISCONTINUED | OUTPATIENT
Start: 2018-07-06 | End: 2018-07-07

## 2018-07-06 RX ORDER — METOPROLOL SUCCINATE 50 MG/1
50 TABLET, EXTENDED RELEASE ORAL EVERY MORNING
Status: DISCONTINUED | OUTPATIENT
Start: 2018-07-07 | End: 2018-07-11

## 2018-07-06 RX ORDER — ONDANSETRON 2 MG/ML
4 INJECTION INTRAMUSCULAR; INTRAVENOUS EVERY 6 HOURS PRN
Status: DISCONTINUED | OUTPATIENT
Start: 2018-07-06 | End: 2018-07-13 | Stop reason: HOSPADM

## 2018-07-06 RX ORDER — POTASSIUM CHLORIDE 750 MG/1
20 CAPSULE, EXTENDED RELEASE ORAL DAILY
Status: DISCONTINUED | OUTPATIENT
Start: 2018-07-07 | End: 2018-07-13 | Stop reason: HOSPADM

## 2018-07-06 RX ORDER — LEVOFLOXACIN 500 MG/1
500 TABLET, FILM COATED ORAL EVERY 24 HOURS
Status: DISCONTINUED | OUTPATIENT
Start: 2018-07-06 | End: 2018-07-07

## 2018-07-06 RX ORDER — ONDANSETRON 4 MG/1
4 TABLET, FILM COATED ORAL EVERY 6 HOURS PRN
Status: DISCONTINUED | OUTPATIENT
Start: 2018-07-06 | End: 2018-07-07

## 2018-07-06 RX ORDER — BISACODYL 10 MG
10 SUPPOSITORY, RECTAL RECTAL DAILY PRN
Status: DISCONTINUED | OUTPATIENT
Start: 2018-07-06 | End: 2018-07-07

## 2018-07-06 RX ORDER — HYDROCODONE BITARTRATE AND ACETAMINOPHEN 7.5; 325 MG/1; MG/1
1 TABLET ORAL EVERY 4 HOURS PRN
Status: DISCONTINUED | OUTPATIENT
Start: 2018-07-06 | End: 2018-07-07

## 2018-07-06 RX ORDER — ACETAMINOPHEN 325 MG/1
650 TABLET ORAL EVERY 4 HOURS PRN
Status: DISCONTINUED | OUTPATIENT
Start: 2018-07-06 | End: 2018-07-13 | Stop reason: HOSPADM

## 2018-07-06 RX ORDER — PHYTONADIONE 2 MG/ML
5 INJECTION, EMULSION INTRAMUSCULAR; INTRAVENOUS; SUBCUTANEOUS ONCE
Status: COMPLETED | OUTPATIENT
Start: 2018-07-06 | End: 2018-07-06

## 2018-07-06 RX ORDER — ATORVASTATIN CALCIUM 20 MG/1
20 TABLET, FILM COATED ORAL DAILY
Status: DISCONTINUED | OUTPATIENT
Start: 2018-07-07 | End: 2018-07-13 | Stop reason: HOSPADM

## 2018-07-06 RX ORDER — MELOXICAM 7.5 MG/1
15 TABLET ORAL ONCE
Status: CANCELLED | OUTPATIENT
Start: 2018-07-06 | End: 2018-07-06

## 2018-07-06 RX ORDER — IPRATROPIUM BROMIDE 21 UG/1
2 SPRAY, METERED NASAL 2 TIMES DAILY PRN
Status: DISCONTINUED | OUTPATIENT
Start: 2018-07-06 | End: 2018-07-13 | Stop reason: HOSPADM

## 2018-07-06 RX ORDER — ONDANSETRON 4 MG/1
4 TABLET, ORALLY DISINTEGRATING ORAL EVERY 6 HOURS PRN
Status: DISCONTINUED | OUTPATIENT
Start: 2018-07-06 | End: 2018-07-07

## 2018-07-06 RX ORDER — FUROSEMIDE 40 MG/1
40 TABLET ORAL EVERY MORNING
Status: DISCONTINUED | OUTPATIENT
Start: 2018-07-07 | End: 2018-07-11

## 2018-07-06 RX ORDER — LANOLIN ALCOHOL/MO/W.PET/CERES
800 CREAM (GRAM) TOPICAL EVERY MORNING
Status: DISCONTINUED | OUTPATIENT
Start: 2018-07-07 | End: 2018-07-11

## 2018-07-06 RX ORDER — CEPHALEXIN 500 MG/1
500 CAPSULE ORAL EVERY 6 HOURS
Qty: 40 CAPSULE | Refills: 0 | Status: SHIPPED | OUTPATIENT
Start: 2018-07-06 | End: 2018-07-13 | Stop reason: HOSPADM

## 2018-07-06 RX ORDER — DOCUSATE SODIUM 100 MG/1
100 CAPSULE, LIQUID FILLED ORAL 2 TIMES DAILY
Status: DISCONTINUED | OUTPATIENT
Start: 2018-07-06 | End: 2018-07-07

## 2018-07-06 RX ORDER — HYDROCODONE BITARTRATE AND ACETAMINOPHEN 7.5; 325 MG/1; MG/1
2 TABLET ORAL EVERY 4 HOURS PRN
Status: DISCONTINUED | OUTPATIENT
Start: 2018-07-06 | End: 2018-07-07

## 2018-07-06 RX ORDER — LEVOFLOXACIN 500 MG/1
TABLET, FILM COATED ORAL
COMMUNITY
Start: 2018-07-02 | End: 2018-07-13 | Stop reason: HOSPADM

## 2018-07-06 RX ADMIN — PHYTONADIONE 5 MG: 1 INJECTION, EMULSION INTRAMUSCULAR; INTRAVENOUS; SUBCUTANEOUS at 22:37

## 2018-07-06 RX ADMIN — DOCUSATE SODIUM 100 MG: 100 CAPSULE, LIQUID FILLED ORAL at 22:19

## 2018-07-06 NOTE — PROGRESS NOTES
Patient: Rachel Moser  YOB: 1945 72 y.o. female  Medical Record Number: [id number]    Chief Complaint:   Chief Complaint   Patient presents with   • Left Hip - Follow-up, Pain       History of Present Illness:Rachel Moser is a 72 y.o. female who presents for follow-up of  Left hip.  She is about 2 and half months out from massive revision of the acetabular component.  Her hip has been doing great all along until about a week and a half ago when she developed increasing lateral hip pain.  A few days ago she began spiking temperatures to 102.  She reportedly has had some urinary tract infections over the past month or so and has been treated recently with Levaquin.  Her pain over the last few days has been moderate to at times severe about the lateral hip.  We have also been following her for a femoral nerve neuropathy she feels that her sensation is returning about the distal femur.  Her major issue today is the hip pain.    Allergies:   Allergies   Allergen Reactions   • Sulfa Antibiotics Rash       Medications:   Current Outpatient Prescriptions   Medication Sig Dispense Refill   • calcium-vitamin D (CALCIUM + D) 250-125 MG-UNIT per tablet Take 1 tablet by mouth 2 (Two) Times a Day.     • ferrous sulfate (FERROUSUL) 325 (65 FE) MG tablet Take 325 mg by mouth Daily With Breakfast.     • folic acid (FOLVITE) 800 MCG tablet Take 800 mcg by mouth Every Morning. 4 tabs am     • furosemide (LASIX) 40 MG tablet Take 40 mg by mouth Every Morning.     • gabapentin (NEURONTIN) 300 MG capsule Take 300 mg by mouth As Needed.     • inFLIXimab (REMICADE) 100 MG injection Infuse  into a venous catheter Every 30 (Thirty) Days. Last dose 2/14/18     • ipratropium (ATROVENT) 0.03 % nasal spray 2 sprays into each nostril 2 (Two) Times a Day As Needed for rhinitis.     • leucovorin 5 MG tablet Take 5 mg by mouth 1 (One) Time Per Week. Patient takes only on Sunday     • levoFLOXacin (LEVAQUIN) 500 MG tablet      •  "methotrexate 2.5 MG tablet Take 20 mg by mouth 1 (One) Time Per Week. TAKES 8 TABLETS ON SATURDAYS     • metoprolol succinate XL (TOPROL-XL) 50 MG 24 hr tablet Take 50 mg by mouth Every Morning.     • Multiple Vitamin (MULTIVITAMIN) capsule Take 1 capsule by mouth Every Morning.     • olopatadine (PATANOL) 0.1 % ophthalmic solution Administer 1 drop to both eyes As Needed for Allergies.     • potassium chloride (K-DUR,KLOR-CON) 20 MEQ CR tablet Take 1 tablet by mouth 2 (Two) Times a Day. 180 tablet 3   • simvastatin (ZOCOR) 40 MG tablet Take 40 mg by mouth Every Morning.     • warfarin (COUMADIN) 5 MG tablet Take 5 mg by mouth Daily. To be determined by dr cee elena day to d/c.     • albuterol (PROAIR RESPICLICK) 108 (90 Base) MCG/ACT inhaler Inhale 2 puffs Every 4 (Four) Hours As Needed for Wheezing.     • cephalexin (KEFLEX) 500 MG capsule Take 1 capsule by mouth Every 6 (Six) Hours for 10 days. 40 capsule 0     No current facility-administered medications for this visit.          The following portions of the patient's history were reviewed and updated as appropriate: allergies, current medications, past family history, past medical history, past social history, past surgical history and problem list.    Review of Systems:   A 14 point review of systems was performed. All systems negative except pertinent positives/negative listed in HPI above    Physical Exam:   Vitals:    07/06/18 1318   Temp: 98.2 °F (36.8 °C)   TempSrc: Temporal Artery    Weight: 58.1 kg (128 lb)   Height: 149.9 cm (59\")       General: A and O x 3, ASA, NAD    SCLERA:    Normal    DENTITION:   Normal  Hip motion is full and non-irritable.  The incision shows an area about the size of a golf ball to distal and it is somewhat red indurated and slightly swollen and it is tender to palpation in this area    Radiology:    Xrays 2views left hip (AP bilateral hips and lateral hip) were ordered and reviewed for evaluation of hip pain demonstrating a " well positioned total hip without evidence of wear, loosening, or osteoarthritis  Comparison views: todays xrays were compared to previous xrays and demonstrate no change    Assessment/Plan:  Left hip pain recent fevers redness about the incision at about 10 weeks.  This is somewhat confusing as she had done well and showed no signs of infection for nearly a first 2 months.  I'm concerned that she could have either seated this from a urinary tract infection or have a low level infection that has been brewing.  I'm going send her for sedimentation rate and C-reactive protein and will also send her for a CT guided hip aspiration of either the joint or any abscess that were encountered.  I'm going to admit her to the hospital we'll make her nothing by mouth and may consider irrigation and debridement.      Obed Barney MD  7/6/2018

## 2018-07-07 ENCOUNTER — APPOINTMENT (OUTPATIENT)
Dept: GENERAL RADIOLOGY | Facility: HOSPITAL | Age: 73
End: 2018-07-07

## 2018-07-07 ENCOUNTER — ANESTHESIA (OUTPATIENT)
Dept: PERIOP | Facility: HOSPITAL | Age: 73
End: 2018-07-07

## 2018-07-07 ENCOUNTER — ANESTHESIA EVENT (OUTPATIENT)
Dept: PERIOP | Facility: HOSPITAL | Age: 73
End: 2018-07-07

## 2018-07-07 LAB
ABO + RH BLD: NORMAL
BH BB BLOOD EXPIRATION DATE: NORMAL
BH BB BLOOD TYPE BARCODE: 6200
BH BB DISPENSE STATUS: NORMAL
BH BB PRODUCT CODE: NORMAL
BH BB UNIT NUMBER: NORMAL
INR PPP: 1.44 (ref 0.9–1.1)
INR PPP: 1.64 (ref 0.9–1.1)
INR PPP: 2.05 (ref 0.9–1.1)
PROTHROMBIN TIME: 17.3 SECONDS (ref 11.7–14.2)
PROTHROMBIN TIME: 19.2 SECONDS (ref 11.7–14.2)
PROTHROMBIN TIME: 22.7 SECONDS (ref 11.7–14.2)
UNIT  ABO: NORMAL
UNIT  RH: NORMAL

## 2018-07-07 PROCEDURE — 94799 UNLISTED PULMONARY SVC/PX: CPT

## 2018-07-07 PROCEDURE — 25010000002 FENTANYL CITRATE (PF) 100 MCG/2ML SOLUTION: Performed by: NURSE ANESTHETIST, CERTIFIED REGISTERED

## 2018-07-07 PROCEDURE — 86927 PLASMA FRESH FROZEN: CPT

## 2018-07-07 PROCEDURE — 85610 PROTHROMBIN TIME: CPT | Performed by: ORTHOPAEDIC SURGERY

## 2018-07-07 PROCEDURE — 25010000002 PROPOFOL 10 MG/ML EMULSION: Performed by: NURSE ANESTHETIST, CERTIFIED REGISTERED

## 2018-07-07 PROCEDURE — 27030 ARTHROTOMY HIP W/DRAINAGE: CPT | Performed by: ORTHOPAEDIC SURGERY

## 2018-07-07 PROCEDURE — 87075 CULTR BACTERIA EXCEPT BLOOD: CPT | Performed by: ORTHOPAEDIC SURGERY

## 2018-07-07 PROCEDURE — 25010000002 PHENYLEPHRINE PER 1 ML: Performed by: NURSE ANESTHETIST, CERTIFIED REGISTERED

## 2018-07-07 PROCEDURE — C1713 ANCHOR/SCREW BN/BN,TIS/BN: HCPCS | Performed by: ORTHOPAEDIC SURGERY

## 2018-07-07 PROCEDURE — 25010000002 MIDAZOLAM PER 1 MG: Performed by: ANESTHESIOLOGY

## 2018-07-07 PROCEDURE — 87186 SC STD MICRODIL/AGAR DIL: CPT | Performed by: ORTHOPAEDIC SURGERY

## 2018-07-07 PROCEDURE — 25010000002 HYDROMORPHONE PER 4 MG: Performed by: NURSE ANESTHETIST, CERTIFIED REGISTERED

## 2018-07-07 PROCEDURE — 99222 1ST HOSP IP/OBS MODERATE 55: CPT | Performed by: INTERNAL MEDICINE

## 2018-07-07 PROCEDURE — 25010000002 FENTANYL CITRATE (PF) 100 MCG/2ML SOLUTION: Performed by: ANESTHESIOLOGY

## 2018-07-07 PROCEDURE — 73501 X-RAY EXAM HIP UNI 1 VIEW: CPT

## 2018-07-07 PROCEDURE — 25010000002 VANCOMYCIN PER 500 MG: Performed by: ORTHOPAEDIC SURGERY

## 2018-07-07 PROCEDURE — P9017 PLASMA 1 DONOR FRZ W/IN 8 HR: HCPCS

## 2018-07-07 PROCEDURE — 87070 CULTURE OTHR SPECIMN AEROBIC: CPT | Performed by: ORTHOPAEDIC SURGERY

## 2018-07-07 PROCEDURE — 93005 ELECTROCARDIOGRAM TRACING: CPT | Performed by: INTERNAL MEDICINE

## 2018-07-07 PROCEDURE — 93010 ELECTROCARDIOGRAM REPORT: CPT | Performed by: INTERNAL MEDICINE

## 2018-07-07 PROCEDURE — 36430 TRANSFUSION BLD/BLD COMPNT: CPT

## 2018-07-07 PROCEDURE — 87205 SMEAR GRAM STAIN: CPT | Performed by: ORTHOPAEDIC SURGERY

## 2018-07-07 DEVICE — STIMULAN® RAPID CURE PROVIDED STERILE FOR SINGLE PATIENT USE. STIMULAN® RAPID CURE CONTAINS CALCIUM SULFATE POWDER AND MIXING SOLUTION IN PRE-MEASURED QUANTITIES SO THAT WHEN MIXED TOGETHER IN A STERILE MIXING BOWL, THE RESULTANT PASTE IS TO BE DIGITALLY PACKED INTO OPEN BONE VOID/GAP TO SET INSITU OR PLACED INTO THE MOULD PROVIDED, THE MIXTURE SETS TO FORM BEADS. THE BIODEGRADABLE, RADIOPAQUE BEADS ARE RESORBED IN APPROXIMATELY 30 – 60 DAYS WHEN USED IN ACCORDANCE WITH THE DEVICE LABELLING. STIMULAN® RAPID CURE IS MANUFACTURED FROM SYNTHETIC IMPLANT GRADE CALCIUM SULFATE DIHYDRATE(CASO4.2H2O) THAT RESORBS AND IS REPLACED WITH BONE DURING THE HEALING PROCESS. ALSO, AS THE BONE VOID FILLER BEADS ARE BIODEGRADABLE AND BIOCOMPATIBLE, THEY MAY BE USED AT AN INFECTED SITE.
Type: IMPLANTABLE DEVICE | Site: HIP | Status: FUNCTIONAL
Brand: STIMULAN® RAPID CURE

## 2018-07-07 RX ORDER — KETOROLAC TROMETHAMINE 30 MG/ML
30 INJECTION, SOLUTION INTRAMUSCULAR; INTRAVENOUS EVERY 8 HOURS
Status: DISPENSED | OUTPATIENT
Start: 2018-07-07 | End: 2018-07-09

## 2018-07-07 RX ORDER — SODIUM CHLORIDE 0.9 % (FLUSH) 0.9 %
1-10 SYRINGE (ML) INJECTION AS NEEDED
Status: DISCONTINUED | OUTPATIENT
Start: 2018-07-07 | End: 2018-07-07 | Stop reason: HOSPADM

## 2018-07-07 RX ORDER — LIDOCAINE HYDROCHLORIDE 20 MG/ML
INJECTION, SOLUTION INFILTRATION; PERINEURAL AS NEEDED
Status: DISCONTINUED | OUTPATIENT
Start: 2018-07-07 | End: 2018-07-07 | Stop reason: SURG

## 2018-07-07 RX ORDER — ONDANSETRON 4 MG/1
4 TABLET, FILM COATED ORAL EVERY 6 HOURS PRN
Status: DISCONTINUED | OUTPATIENT
Start: 2018-07-07 | End: 2018-07-13 | Stop reason: HOSPADM

## 2018-07-07 RX ORDER — EPHEDRINE SULFATE 50 MG/ML
INJECTION, SOLUTION INTRAVENOUS AS NEEDED
Status: DISCONTINUED | OUTPATIENT
Start: 2018-07-07 | End: 2018-07-07 | Stop reason: SURG

## 2018-07-07 RX ORDER — WARFARIN SODIUM 10 MG/1
10 TABLET ORAL
Status: COMPLETED | OUTPATIENT
Start: 2018-07-07 | End: 2018-07-07

## 2018-07-07 RX ORDER — ALBUTEROL SULFATE 2.5 MG/3ML
2.5 SOLUTION RESPIRATORY (INHALATION) ONCE AS NEEDED
Status: DISCONTINUED | OUTPATIENT
Start: 2018-07-07 | End: 2018-07-07 | Stop reason: HOSPADM

## 2018-07-07 RX ORDER — PROPOFOL 10 MG/ML
VIAL (ML) INTRAVENOUS AS NEEDED
Status: DISCONTINUED | OUTPATIENT
Start: 2018-07-07 | End: 2018-07-07 | Stop reason: SURG

## 2018-07-07 RX ORDER — OXYCODONE AND ACETAMINOPHEN 7.5; 325 MG/1; MG/1
1 TABLET ORAL ONCE AS NEEDED
Status: COMPLETED | OUTPATIENT
Start: 2018-07-07 | End: 2018-07-07

## 2018-07-07 RX ORDER — ONDANSETRON 2 MG/ML
4 INJECTION INTRAMUSCULAR; INTRAVENOUS EVERY 6 HOURS PRN
Status: DISCONTINUED | OUTPATIENT
Start: 2018-07-07 | End: 2018-07-13 | Stop reason: HOSPADM

## 2018-07-07 RX ORDER — FENTANYL CITRATE 50 UG/ML
INJECTION, SOLUTION INTRAMUSCULAR; INTRAVENOUS AS NEEDED
Status: DISCONTINUED | OUTPATIENT
Start: 2018-07-07 | End: 2018-07-07 | Stop reason: SURG

## 2018-07-07 RX ORDER — FENTANYL CITRATE 50 UG/ML
50 INJECTION, SOLUTION INTRAMUSCULAR; INTRAVENOUS
Status: DISCONTINUED | OUTPATIENT
Start: 2018-07-07 | End: 2018-07-07 | Stop reason: HOSPADM

## 2018-07-07 RX ORDER — NALOXONE HCL 0.4 MG/ML
0.2 VIAL (ML) INJECTION AS NEEDED
Status: DISCONTINUED | OUTPATIENT
Start: 2018-07-07 | End: 2018-07-07 | Stop reason: HOSPADM

## 2018-07-07 RX ORDER — ACETAMINOPHEN 500 MG
1000 TABLET ORAL ONCE
Status: COMPLETED | OUTPATIENT
Start: 2018-07-07 | End: 2018-07-07

## 2018-07-07 RX ORDER — TOBRAMYCIN 1.2 G/30ML
INJECTION, POWDER, LYOPHILIZED, FOR SOLUTION INTRAVENOUS
Status: DISCONTINUED
Start: 2018-07-07 | End: 2018-07-07 | Stop reason: WASHOUT

## 2018-07-07 RX ORDER — MIDAZOLAM HYDROCHLORIDE 1 MG/ML
2 INJECTION INTRAMUSCULAR; INTRAVENOUS
Status: DISCONTINUED | OUTPATIENT
Start: 2018-07-07 | End: 2018-07-07 | Stop reason: HOSPADM

## 2018-07-07 RX ORDER — MAGNESIUM HYDROXIDE 1200 MG/15ML
LIQUID ORAL AS NEEDED
Status: DISCONTINUED | OUTPATIENT
Start: 2018-07-07 | End: 2018-07-07 | Stop reason: HOSPADM

## 2018-07-07 RX ORDER — ROCURONIUM BROMIDE 10 MG/ML
INJECTION, SOLUTION INTRAVENOUS AS NEEDED
Status: DISCONTINUED | OUTPATIENT
Start: 2018-07-07 | End: 2018-07-07 | Stop reason: SURG

## 2018-07-07 RX ORDER — MELOXICAM 15 MG/1
15 TABLET ORAL DAILY
Status: DISCONTINUED | OUTPATIENT
Start: 2018-07-08 | End: 2018-07-13 | Stop reason: HOSPADM

## 2018-07-07 RX ORDER — ONDANSETRON 4 MG/1
4 TABLET, ORALLY DISINTEGRATING ORAL EVERY 6 HOURS PRN
Status: DISCONTINUED | OUTPATIENT
Start: 2018-07-07 | End: 2018-07-13 | Stop reason: HOSPADM

## 2018-07-07 RX ORDER — PROMETHAZINE HYDROCHLORIDE 25 MG/1
25 TABLET ORAL ONCE AS NEEDED
Status: DISCONTINUED | OUTPATIENT
Start: 2018-07-07 | End: 2018-07-07 | Stop reason: HOSPADM

## 2018-07-07 RX ORDER — TRANEXAMIC ACID 100 MG/ML
INJECTION, SOLUTION INTRAVENOUS
Status: DISPENSED
Start: 2018-07-07 | End: 2018-07-08

## 2018-07-07 RX ORDER — LABETALOL HYDROCHLORIDE 5 MG/ML
5 INJECTION, SOLUTION INTRAVENOUS
Status: DISCONTINUED | OUTPATIENT
Start: 2018-07-07 | End: 2018-07-07 | Stop reason: HOSPADM

## 2018-07-07 RX ORDER — LIDOCAINE HYDROCHLORIDE 10 MG/ML
0.5 INJECTION, SOLUTION EPIDURAL; INFILTRATION; INTRACAUDAL; PERINEURAL ONCE AS NEEDED
Status: DISCONTINUED | OUTPATIENT
Start: 2018-07-07 | End: 2018-07-07 | Stop reason: HOSPADM

## 2018-07-07 RX ORDER — PROMETHAZINE HYDROCHLORIDE 25 MG/1
25 SUPPOSITORY RECTAL ONCE AS NEEDED
Status: DISCONTINUED | OUTPATIENT
Start: 2018-07-07 | End: 2018-07-07 | Stop reason: HOSPADM

## 2018-07-07 RX ORDER — FLUMAZENIL 0.1 MG/ML
0.2 INJECTION INTRAVENOUS AS NEEDED
Status: DISCONTINUED | OUTPATIENT
Start: 2018-07-07 | End: 2018-07-07 | Stop reason: HOSPADM

## 2018-07-07 RX ORDER — DOCUSATE SODIUM 100 MG/1
100 CAPSULE, LIQUID FILLED ORAL 2 TIMES DAILY
Status: DISCONTINUED | OUTPATIENT
Start: 2018-07-07 | End: 2018-07-13 | Stop reason: HOSPADM

## 2018-07-07 RX ORDER — HYDRALAZINE HYDROCHLORIDE 20 MG/ML
5 INJECTION INTRAMUSCULAR; INTRAVENOUS
Status: DISCONTINUED | OUTPATIENT
Start: 2018-07-07 | End: 2018-07-07 | Stop reason: HOSPADM

## 2018-07-07 RX ORDER — MIDAZOLAM HYDROCHLORIDE 1 MG/ML
1 INJECTION INTRAMUSCULAR; INTRAVENOUS
Status: DISCONTINUED | OUTPATIENT
Start: 2018-07-07 | End: 2018-07-07 | Stop reason: HOSPADM

## 2018-07-07 RX ORDER — WARFARIN SODIUM 5 MG/1
5 TABLET ORAL
Status: DISCONTINUED | OUTPATIENT
Start: 2018-07-08 | End: 2018-07-10

## 2018-07-07 RX ORDER — SODIUM CHLORIDE, SODIUM LACTATE, POTASSIUM CHLORIDE, CALCIUM CHLORIDE 600; 310; 30; 20 MG/100ML; MG/100ML; MG/100ML; MG/100ML
9 INJECTION, SOLUTION INTRAVENOUS CONTINUOUS
Status: DISCONTINUED | OUTPATIENT
Start: 2018-07-07 | End: 2018-07-07

## 2018-07-07 RX ORDER — HYDROCODONE BITARTRATE AND ACETAMINOPHEN 7.5; 325 MG/1; MG/1
1 TABLET ORAL EVERY 4 HOURS PRN
Status: DISCONTINUED | OUTPATIENT
Start: 2018-07-07 | End: 2018-07-13 | Stop reason: HOSPADM

## 2018-07-07 RX ORDER — ACETAMINOPHEN 325 MG/1
650 TABLET ORAL ONCE AS NEEDED
Status: DISCONTINUED | OUTPATIENT
Start: 2018-07-07 | End: 2018-07-07 | Stop reason: HOSPADM

## 2018-07-07 RX ORDER — FENTANYL CITRATE 50 UG/ML
INJECTION, SOLUTION INTRAMUSCULAR; INTRAVENOUS
Status: COMPLETED
Start: 2018-07-07 | End: 2018-07-07

## 2018-07-07 RX ORDER — FAMOTIDINE 10 MG/ML
20 INJECTION, SOLUTION INTRAVENOUS ONCE
Status: COMPLETED | OUTPATIENT
Start: 2018-07-07 | End: 2018-07-07

## 2018-07-07 RX ORDER — ALBUTEROL SULFATE 2.5 MG/3ML
2.5 SOLUTION RESPIRATORY (INHALATION) EVERY 4 HOURS PRN
Status: DISCONTINUED | OUTPATIENT
Start: 2018-07-07 | End: 2018-07-13 | Stop reason: HOSPADM

## 2018-07-07 RX ORDER — HYDROCODONE BITARTRATE AND ACETAMINOPHEN 7.5; 325 MG/1; MG/1
2 TABLET ORAL EVERY 4 HOURS PRN
Status: DISCONTINUED | OUTPATIENT
Start: 2018-07-07 | End: 2018-07-13 | Stop reason: HOSPADM

## 2018-07-07 RX ORDER — SCOLOPAMINE TRANSDERMAL SYSTEM 1 MG/1
1 PATCH, EXTENDED RELEASE TRANSDERMAL ONCE
Status: DISCONTINUED | OUTPATIENT
Start: 2018-07-07 | End: 2018-07-08

## 2018-07-07 RX ORDER — MELOXICAM 15 MG/1
15 TABLET ORAL ONCE
Status: DISCONTINUED | OUTPATIENT
Start: 2018-07-07 | End: 2018-07-07 | Stop reason: HOSPADM

## 2018-07-07 RX ORDER — TOBRAMYCIN 1.2 G/30ML
INJECTION, POWDER, LYOPHILIZED, FOR SOLUTION INTRAVENOUS
Status: DISPENSED
Start: 2018-07-07 | End: 2018-07-07

## 2018-07-07 RX ORDER — PROMETHAZINE HYDROCHLORIDE 25 MG/ML
6.25 INJECTION, SOLUTION INTRAMUSCULAR; INTRAVENOUS ONCE AS NEEDED
Status: DISCONTINUED | OUTPATIENT
Start: 2018-07-07 | End: 2018-07-07 | Stop reason: HOSPADM

## 2018-07-07 RX ORDER — UREA 10 %
3 LOTION (ML) TOPICAL NIGHTLY PRN
Status: DISCONTINUED | OUTPATIENT
Start: 2018-07-07 | End: 2018-07-13 | Stop reason: HOSPADM

## 2018-07-07 RX ORDER — ONDANSETRON 2 MG/ML
4 INJECTION INTRAMUSCULAR; INTRAVENOUS ONCE AS NEEDED
Status: DISCONTINUED | OUTPATIENT
Start: 2018-07-07 | End: 2018-07-07 | Stop reason: HOSPADM

## 2018-07-07 RX ADMIN — PHENYLEPHRINE HYDROCHLORIDE 100 MCG: 10 INJECTION INTRAVENOUS at 13:12

## 2018-07-07 RX ADMIN — PHENYLEPHRINE HYDROCHLORIDE 100 MCG: 10 INJECTION INTRAVENOUS at 13:02

## 2018-07-07 RX ADMIN — EPHEDRINE SULFATE 5 MG: 50 INJECTION INTRAMUSCULAR; INTRAVENOUS; SUBCUTANEOUS at 12:38

## 2018-07-07 RX ADMIN — PHENYLEPHRINE HYDROCHLORIDE 100 MCG: 10 INJECTION INTRAVENOUS at 13:34

## 2018-07-07 RX ADMIN — HYDROMORPHONE HYDROCHLORIDE 0.5 MG: 1 INJECTION, SOLUTION INTRAMUSCULAR; INTRAVENOUS; SUBCUTANEOUS at 14:59

## 2018-07-07 RX ADMIN — MUPIROCIN 10 APPLICATION: 20 OINTMENT TOPICAL at 08:40

## 2018-07-07 RX ADMIN — FENTANYL CITRATE 25 MCG: 50 INJECTION, SOLUTION INTRAMUSCULAR; INTRAVENOUS at 11:36

## 2018-07-07 RX ADMIN — LIDOCAINE HYDROCHLORIDE 100 MG: 20 INJECTION, SOLUTION INFILTRATION; PERINEURAL at 12:16

## 2018-07-07 RX ADMIN — HYDROMORPHONE HYDROCHLORIDE 0.5 MG: 1 INJECTION, SOLUTION INTRAMUSCULAR; INTRAVENOUS; SUBCUTANEOUS at 15:15

## 2018-07-07 RX ADMIN — ROCURONIUM BROMIDE 40 MG: 10 INJECTION INTRAVENOUS at 12:16

## 2018-07-07 RX ADMIN — VANCOMYCIN HYDROCHLORIDE 750 MG: 750 INJECTION, POWDER, LYOPHILIZED, FOR SOLUTION INTRAVENOUS at 12:42

## 2018-07-07 RX ADMIN — PHENYLEPHRINE HYDROCHLORIDE 100 MCG: 10 INJECTION INTRAVENOUS at 12:56

## 2018-07-07 RX ADMIN — PROPOFOL 180 MG: 10 INJECTION, EMULSION INTRAVENOUS at 12:16

## 2018-07-07 RX ADMIN — EPHEDRINE SULFATE 10 MG: 50 INJECTION INTRAMUSCULAR; INTRAVENOUS; SUBCUTANEOUS at 12:47

## 2018-07-07 RX ADMIN — MUPIROCIN 10 APPLICATION: 20 OINTMENT TOPICAL at 20:20

## 2018-07-07 RX ADMIN — SODIUM CHLORIDE, POTASSIUM CHLORIDE, SODIUM LACTATE AND CALCIUM CHLORIDE: 600; 310; 30; 20 INJECTION, SOLUTION INTRAVENOUS at 13:12

## 2018-07-07 RX ADMIN — ACETAMINOPHEN 1000 MG: 500 TABLET, FILM COATED ORAL at 11:35

## 2018-07-07 RX ADMIN — METOPROLOL SUCCINATE 50 MG: 50 TABLET, FILM COATED, EXTENDED RELEASE ORAL at 08:40

## 2018-07-07 RX ADMIN — SCOPOLAMINE 1 PATCH: 1 PATCH, EXTENDED RELEASE TRANSDERMAL at 11:36

## 2018-07-07 RX ADMIN — PHENYLEPHRINE HYDROCHLORIDE 200 MCG: 10 INJECTION INTRAVENOUS at 13:47

## 2018-07-07 RX ADMIN — PHENYLEPHRINE HYDROCHLORIDE 100 MCG: 10 INJECTION INTRAVENOUS at 12:53

## 2018-07-07 RX ADMIN — WARFARIN SODIUM 10 MG: 10 TABLET ORAL at 17:45

## 2018-07-07 RX ADMIN — PHENYLEPHRINE HYDROCHLORIDE 100 MCG: 10 INJECTION INTRAVENOUS at 13:52

## 2018-07-07 RX ADMIN — PHENYLEPHRINE HYDROCHLORIDE 100 MCG: 10 INJECTION INTRAVENOUS at 13:22

## 2018-07-07 RX ADMIN — OXYCODONE HYDROCHLORIDE AND ACETAMINOPHEN 1 TABLET: 7.5; 325 TABLET ORAL at 15:07

## 2018-07-07 RX ADMIN — SODIUM CHLORIDE, POTASSIUM CHLORIDE, SODIUM LACTATE AND CALCIUM CHLORIDE 9 ML/HR: 600; 310; 30; 20 INJECTION, SOLUTION INTRAVENOUS at 11:35

## 2018-07-07 RX ADMIN — MIDAZOLAM 1 MG: 1 INJECTION INTRAMUSCULAR; INTRAVENOUS at 11:36

## 2018-07-07 RX ADMIN — PHENYLEPHRINE HYDROCHLORIDE 100 MCG: 10 INJECTION INTRAVENOUS at 14:07

## 2018-07-07 RX ADMIN — DOCUSATE SODIUM 100 MG: 100 CAPSULE, LIQUID FILLED ORAL at 20:20

## 2018-07-07 RX ADMIN — FAMOTIDINE 20 MG: 10 INJECTION, SOLUTION INTRAVENOUS at 11:35

## 2018-07-07 RX ADMIN — SUGAMMADEX 120 MG: 100 INJECTION, SOLUTION INTRAVENOUS at 14:11

## 2018-07-07 RX ADMIN — PHENYLEPHRINE HYDROCHLORIDE 100 MCG: 10 INJECTION INTRAVENOUS at 12:33

## 2018-07-07 RX ADMIN — FENTANYL CITRATE 50 MCG: 50 INJECTION INTRAMUSCULAR; INTRAVENOUS at 12:12

## 2018-07-07 RX ADMIN — PHENYLEPHRINE HYDROCHLORIDE 100 MCG: 10 INJECTION INTRAVENOUS at 13:42

## 2018-07-07 NOTE — ANESTHESIA PREPROCEDURE EVALUATION
Anesthesia Evaluation     history of anesthetic complications: PONV  NPO Solid Status: > 8 hours             Airway   Mallampati: II  Dental      Pulmonary - normal exam   (+) pulmonary embolism, a smoker Former,   (-) asthma, sleep apnea  Cardiovascular - normal exam  Exercise tolerance: good (4-7 METS)    (+) hypertension, hyperlipidemia,   (-) past MI, CAD, angina, WADE, CABG      Neuro/Psych  (-) CVA  GI/Hepatic/Renal/Endo    (-) GERD, no renal disease    Musculoskeletal         ROS comment: Rheumatoid arthritis  Abdominal    Substance History      OB/GYN          Other                        Anesthesia Plan    ASA 3     general     Anesthetic plan and risks discussed with patient.

## 2018-07-07 NOTE — H&P
Patient: Rachel Moser  YOB: 1945 72 y.o. female  Medical Record Number: [id number]     Chief Complaint:       Chief Complaint   Patient presents with   • Left Hip - Follow-up, Pain         History of Present Illness:Rachel Moser is a 72 y.o. female who presents for follow-up of  Left hip.  She is about 2 and half months out from massive revision of the acetabular component.  Her hip has been doing great all along until about a week and a half ago when she developed increasing lateral hip pain.  A few days ago she began spiking temperatures to 102.  She reportedly has had some urinary tract infections over the past month or so and has been treated recently with Levaquin.  Her pain over the last few days has been moderate to at times severe about the lateral hip.  We have also been following her for a femoral nerve neuropathy she feels that her sensation is returning about the distal femur.  Her major issue today is the hip pain.     Allergies:        Allergies   Allergen Reactions   • Sulfa Antibiotics Rash         Medications:          Current Outpatient Prescriptions   Medication Sig Dispense Refill   • calcium-vitamin D (CALCIUM + D) 250-125 MG-UNIT per tablet Take 1 tablet by mouth 2 (Two) Times a Day.       • ferrous sulfate (FERROUSUL) 325 (65 FE) MG tablet Take 325 mg by mouth Daily With Breakfast.       • folic acid (FOLVITE) 800 MCG tablet Take 800 mcg by mouth Every Morning. 4 tabs am       • furosemide (LASIX) 40 MG tablet Take 40 mg by mouth Every Morning.       • gabapentin (NEURONTIN) 300 MG capsule Take 300 mg by mouth As Needed.       • inFLIXimab (REMICADE) 100 MG injection Infuse  into a venous catheter Every 30 (Thirty) Days. Last dose 2/14/18       • ipratropium (ATROVENT) 0.03 % nasal spray 2 sprays into each nostril 2 (Two) Times a Day As Needed for rhinitis.       • leucovorin 5 MG tablet Take 5 mg by mouth 1 (One) Time Per Week. Patient takes only on Sunday       •  "levoFLOXacin (LEVAQUIN) 500 MG tablet         • methotrexate 2.5 MG tablet Take 20 mg by mouth 1 (One) Time Per Week. TAKES 8 TABLETS ON SATURDAYS       • metoprolol succinate XL (TOPROL-XL) 50 MG 24 hr tablet Take 50 mg by mouth Every Morning.       • Multiple Vitamin (MULTIVITAMIN) capsule Take 1 capsule by mouth Every Morning.       • olopatadine (PATANOL) 0.1 % ophthalmic solution Administer 1 drop to both eyes As Needed for Allergies.       • potassium chloride (K-DUR,KLOR-CON) 20 MEQ CR tablet Take 1 tablet by mouth 2 (Two) Times a Day. 180 tablet 3   • simvastatin (ZOCOR) 40 MG tablet Take 40 mg by mouth Every Morning.       • warfarin (COUMADIN) 5 MG tablet Take 5 mg by mouth Daily. To be determined by dr cee elena day to d/c.       • albuterol (PROAIR RESPICLICK) 108 (90 Base) MCG/ACT inhaler Inhale 2 puffs Every 4 (Four) Hours As Needed for Wheezing.       • cephalexin (KEFLEX) 500 MG capsule Take 1 capsule by mouth Every 6 (Six) Hours for 10 days. 40 capsule 0      No current facility-administered medications for this visit.             The following portions of the patient's history were reviewed and updated as appropriate: allergies, current medications, past family history, past medical history, past social history, past surgical history and problem list.     Review of Systems:   A 14 point review of systems was performed. All systems negative except pertinent positives/negative listed in HPI above     Physical Exam:   Vitals       Vitals:     07/06/18 1318   Temp: 98.2 °F (36.8 °C)   TempSrc: Temporal Artery    Weight: 58.1 kg (128 lb)   Height: 149.9 cm (59\")            General: A and O x 3, ASA, NAD                          SCLERA:    Normal                          DENTITION:   Normal  Hip motion is full and non-irritable.  The incision shows an area about the size of a golf ball to distal and it is somewhat red indurated and slightly swollen and it is tender to palpation in this " area     Radiology:    Xrays 2views left hip (AP bilateral hips and lateral hip) were ordered and reviewed for evaluation of hip pain demonstrating a well positioned total hip without evidence of wear, loosening, or osteoarthritis  Comparison views: todays xrays were compared to previous xrays and demonstrate no change     Assessment/Plan:  Left hip pain recent fevers redness about the incision at about 10 weeks.  This is somewhat confusing as she had done well and showed no signs of infection for nearly a first 2 months.  I'm concerned that she could have either seated this from a urinary tract infection or have a low level infection that has been brewing.  I'm going send her for sedimentation rate and C-reactive protein and will also send her for a CT guided hip aspiration of either the joint or any abscess that were encountered.  I'm going to admit her to the hospital we'll make her nothing by mouth and may consider irrigation and debridement.        Obed Barney MD  7/6/18

## 2018-07-07 NOTE — PLAN OF CARE
Problem: Patient Care Overview  Goal: Plan of Care Review  Outcome: Ongoing (interventions implemented as appropriate)   07/06/18 2018   Coping/Psychosocial   Plan of Care Reviewed With patient;family   Plan of Care Review   Progress no change   OTHER   Outcome Summary Direct admit today with infection in left hip, plan to have aspiration of left hip but INR and Protime levels too high, Callie is aware and stated she will call Dr. Barney and let him know, educated pt on bp monitoring     Goal: Individualization and Mutuality  Outcome: Ongoing (interventions implemented as appropriate)      Problem: Fall Risk (Adult)  Goal: Identify Related Risk Factors and Signs and Symptoms  Outcome: Ongoing (interventions implemented as appropriate)    Goal: Absence of Fall  Outcome: Ongoing (interventions implemented as appropriate)      Problem: Self-Care Deficit (Adult,Obstetrics,Pediatric)  Goal: Identify Related Risk Factors and Signs and Symptoms  Outcome: Ongoing (interventions implemented as appropriate)    Goal: Improved Ability to Perform BADL and IADL  Outcome: Ongoing (interventions implemented as appropriate)

## 2018-07-07 NOTE — NURSING NOTE
If cardiology able to reduce INR further today, possible surgery later today, will make patient NPO. Per Dr Barney

## 2018-07-07 NOTE — CONSULTS
Patient Name: Rachel Moser  Age/Sex: 72 y.o. female  : 1945  MRN: [id number]    Date of Admission: 2018  Date of Encounter Visit: 18  Encounter Provider: Denis Dillard MD  Place of Service: [facility] CARDIOLOGY      Referring Provider: Obed Barney MD  Patient Care Team:  Manan Cintron MD as PCP - General (Family Medicine)  Vijay Arango MD as Consulting Physician (Cardiology)    Subjective:     Admitted/Consulted for: warfarin management    Chief Complaint: hip pain     History of Present Illness:  Rachel Moser is a 72 y.o. female who follows with Dr. Arango and has a history of pulmonary emboli and bilateral DVT s/p IVC filter in  (warfarin), HTN, and multiple hip surgeries. She was admitted yesterday for I&D of left hip following revision of acetabular component 2-3 months ago.Her INR on admission was 4.90. Other labs: BUN 13, Crea 0.57, WBC 28.75, Hgb 11.5, Hct 36.6, Plt 455. She was given 1 unit FFP and 5mg vitamin K. We have been asked been asked to see for management of warfarin. This morning, INR is 2.05. Her home dose of warfarin is 7.5mg Sun, Tues, Thurs, and Sat with 5mg on Mon, Wed, Fri. She received 5mg yesterday. The patient states that a few days ago she noticed temperatures up to 102°F.  She denied any rigors.  She does report increasing left lateral hip pain that is at least moderate in intensity and increased with movement.    She was last seen in office by Dr. Arango on 18 for preop evaluation for hip surgery. Dr. Arango indicated that she was Italo's revised low risk and recommended that warfarin be held 5 days prior to surgery with Lovenox given given for two days prior to surgery and after until INR was therapeutic. Her last echocardiogram was in 2011 following PE, which showed EF 55-60%, mildly enlarged RV with mildly reduced systolic function, and mild to moderate tricuspid regurgitation.    INR  this a.m. is 1.6.      Previous testing:   Echocardiogram July 2011      Past Medical History:  Past Medical History:   Diagnosis Date   • Allergic    • Cataract    • High cholesterol    • History of DVT (deep vein thrombosis)    • History of kidney infection     1 MONTH AGO   • History of pulmonary embolus (PE)    • Hypertension    • Paralabral cyst of left hip    • PONV (postoperative nausea and vomiting)    • Presence of vena cava filter    • Rheumatoid arteritis    • Seasonal allergies    • UTI (urinary tract infection)     diagnosed 4/2/18  medically treated presently       Past Surgical History:   Procedure Laterality Date   • BLADDER SUSPENSION     • CATARACT EXTRACTION, BILATERAL     • ENDOSCOPIC FUNCTIONAL SINUS SURGERY (FESS)     • HIP SURGERY Left 1983   • HIP SURGERY Left 20069   • HIP SURGERY Left 2011   • INCISION AND DRAINAGE HIP Left 11/2/2016    Procedure: HIP INCISION AND DRAINAGE;  Surgeon: Obed Barney MD;  Location: [facility];  Service:    • JOINT REPLACEMENT Left     HIP   • MS CLOSED RX TRAUMATIC HIP DISLOCATN Left 12/18/2017    Procedure: LEFT HIP CLOSED REDUCTION;  Surgeon: Obed Barney MD;  Location: [facility] OR;  Service: Orthopedics   • SOFT TISSUE MASS EXCISION Left     hip 3/19/18   • TOTAL ABDOMINAL HYSTERECTOMY     • TOTAL HIP ARTHROPLASTY REVISION Left 4/25/2018    Procedure: REVISION OF LEFT ACETABULAR COMPONENT ;  Surgeon: Obed Barney MD;  Location: [facility] OR;  Service: Orthopedics   • VENA CAVA FILTER INSERTION         Home Medications:   Prescriptions Prior to Admission   Medication Sig Dispense Refill Last Dose   • calcium-vitamin D (CALCIUM + D) 250-125 MG-UNIT per tablet Take 1 tablet by mouth 2 (Two) Times a Day.   7/6/2018 at Unknown time   • ferrous sulfate (FERROUSUL) 325 (65 FE) MG tablet Take 325 mg by mouth Daily With Breakfast.   7/6/2018 at Unknown time   • folic acid (FOLVITE) 800 MCG tablet Take 800 mcg by mouth Every Morning. 4 tabs am   7/6/2018  at Unknown time   • furosemide (LASIX) 40 MG tablet Take 40 mg by mouth Every Morning.   7/6/2018 at Unknown time   • gabapentin (NEURONTIN) 300 MG capsule Take 300 mg by mouth As Needed.   7/6/2018 at Unknown time   • inFLIXimab (REMICADE) 100 MG injection Infuse  into a venous catheter Every 30 (Thirty) Days. Last dose 2/14/18   Past Month at Unknown time   • levoFLOXacin (LEVAQUIN) 500 MG tablet    7/6/2018 at Unknown time   • methotrexate 2.5 MG tablet Take 20 mg by mouth 1 (One) Time Per Week. TAKES 8 TABLETS ON SATURDAYS   Past Week at Unknown time   • metoprolol succinate XL (TOPROL-XL) 50 MG 24 hr tablet Take 50 mg by mouth Every Morning.   7/6/2018 at Unknown time   • Multiple Vitamin (MULTIVITAMIN) capsule Take 1 capsule by mouth Every Morning.   7/6/2018 at Unknown time   • potassium chloride (K-DUR,KLOR-CON) 20 MEQ CR tablet Take 1 tablet by mouth 2 (Two) Times a Day. 180 tablet 3 7/6/2018 at Unknown time   • simvastatin (ZOCOR) 40 MG tablet Take 40 mg by mouth Every Morning.   7/6/2018 at Unknown time   • warfarin (COUMADIN) 5 MG tablet Take 5 mg by mouth Daily. To be determined by dr cee elena day to d/c.   7/5/2018 at Unknown time   • albuterol (PROAIR RESPICLICK) 108 (90 Base) MCG/ACT inhaler Inhale 2 puffs Every 4 (Four) Hours As Needed for Wheezing.   Not Taking   • cephalexin (KEFLEX) 500 MG capsule Take 1 capsule by mouth Every 6 (Six) Hours for 10 days. 40 capsule 0 Unknown at Unknown time   • ipratropium (ATROVENT) 0.03 % nasal spray 2 sprays into each nostril 2 (Two) Times a Day As Needed for rhinitis.   Taking   • leucovorin 5 MG tablet Take 5 mg by mouth 1 (One) Time Per Week. Patient takes only on Sunday   Taking   • olopatadine (PATANOL) 0.1 % ophthalmic solution Administer 1 drop to both eyes As Needed for Allergies.   Taking       Allergies:  Allergies   Allergen Reactions   • Sulfa Antibiotics Rash       Past Social History:  Social History     Social History   • Marital status:       Spouse name: N/A   • Number of children: N/A   • Years of education: N/A     Occupational History   • Not on file.     Social History Main Topics   • Smoking status: Former Smoker     Types: Cigarettes     Quit date: 1975   • Smokeless tobacco: Never Used      Comment: quit 50 years ago   • Alcohol use No   • Drug use: No   • Sexual activity: Not on file     Other Topics Concern   • Not on file     Social History Narrative   • No narrative on file        Past Family History:  Family History   Problem Relation Age of Onset   • Malig Hyperthermia Neg Hx          Review of Systems:  All systems reviewed. Pertinent positives identified in HPI. All other systems are negative      Objective:     Objective:  Temp:  [97 °F (36.1 °C)-98.7 °F (37.1 °C)] 97.2 °F (36.2 °C)  Heart Rate:  [79-97] 80  Resp:  [16] 16  BP: (100-141)/(55-80) 114/56    Intake/Output Summary (Last 24 hours) at 07/07/18 1016  Last data filed at 07/07/18 0034   Gross per 24 hour   Intake              328 ml   Output              300 ml   Net               28 ml     Body mass index is 25.86 kg/m².  1    07/07/18  0811   Weight: 58.1 kg (128 lb 1.4 oz)           Physical Exam:   General Appearance Well developed, cooperative and well nourished and no acute distress   Head Normocephalic, without abnormality, atraumatic   Ears Ears appear intact with no abnormalities noted   Throat No oral lesions, no thrush, oral mucosa moist   Neck No adenopathy, supple, trachea midline, no thyromegaly, no carotid bruit, no JVD   Back No skin lesions, erythema or scars, no tenderness to percussion or palptaion,range of motion is normal   Lungs Clear to auscultation,respirations regular, even and unlabored   Heart Regular rhythm and normal rate, normal S1 and S2, no murmur, no gallop, no rub, no click   Chest wall No abnormalities observed   Abdomen Normal bowel sounds, no masses, no hepatomegaly,    Extremities Moves all extremities well, no edema, no cyanosis,  no redness   Pulses Pulses palpable and equal bilaterally. Normal radial, carotid, femoral, dorsalis pedis and posterior tibial pulses bilaterally. Normal abdominal aorta   Skin Mild erythema and induration at the left hip incision site.     Psyhiatric Alert and oriented x 3, normal mood and affect       Lab Review:       Results from last 7 days  Lab Units 07/06/18  1743   SODIUM mmol/L 142   POTASSIUM mmol/L 4.3   CHLORIDE mmol/L 105   CO2 mmol/L 26.8   BUN mg/dL 13   CREATININE mg/dL 0.57   GLUCOSE mg/dL 160*   CALCIUM mg/dL 9.3             Results from last 7 days  Lab Units 07/06/18  1743   WBC 10*3/mm3 28.75*   HEMOGLOBIN g/dL 11.5*   HEMATOCRIT % 36.6   PLATELETS 10*3/mm3 455       Results from last 7 days  Lab Units 07/07/18  0922 07/07/18  0404 07/06/18  1743   INR  1.64* 2.05* 4.90*                               Imaging:    Imaging Results (most recent)     None                   I personally viewed and interpreted the patient's EKG/Telemetry data.    Assessment:   Assessment/Plan   1.  History of DVT and pulmonary embolus: In 2011.  Sounds to be unprovoked  2.  History of IVC filter  3.  Left hip infection.  Unclear if this is in the joint.  4.  Chronic anticoagulation use    -Patient has received FFP and vitamin K.  INR is low today.  I do think that she is low risk to go to the operating room today and requires no additional cardiac workup prior  -I would recommend starting heparin drip without bolus tomorrow and monitoring.  It will likely take us 5-7 days to have an INR that is therapeutic after that dose of vitamin K.  I think that we could restart Coumadin therapy tomorrow.  -I will continue to follow    Thank you for allowing me to participate in the care of Rachel Moser. Feel free to contact me directly with any further questions or concerns.    Denis Dillard MD  [facility]  07/07/18  10:16 AM

## 2018-07-07 NOTE — OP NOTE
Name: Rachel Moser  YOB: 1945    DATE OF SURGERY: 7/7/2018     PREOPERATIVE DIAGNOSIS: Left Infected hip revision replacement    POSTOPERATIVE DIAGNOSIS: Left Infected hip revision replacement    PROCEDURE PERFORMED: Left hip I&D, placement of antibiotic beads    SURGEON: Obed Barney M.D.    ASSISTANT: none    IMPLANTS:   Implant Name Type Inv. Item Serial No.  Lot No. LRB No. Used   BONE FILLER VOID RAPID CURE STIMULAN 20CC - [id number] Implant BONE FILLER VOID RAPID CURE STIMULAN 20CC   BIOCOMPOSITES [id number]/16-[id number] Left 1                               Estimated Blood Loss: 200cc  Specimens : none  Complications: none    DESCRIPTION OF PROCEDURE:    The patient was taken to the operating room and placed in the supine position. A sequential compression device was carefully placed on the non-operative leg.  Surgical time out was performed. After adequate induction of anesthesia the patient was then transferred onto the  table and positioned appropriately in the lateral decubitus position. The hip was then prepped and draped in the usual sterile fashion.    A posterior lateral surgical incision was then made through the previous scar.  There was a large amount of  cloudy fluid and an opening in the gluteus max fascia for about 4-6 cm distally.  The gluteus max fascia was then divided the gluteus max muscle was then bluntly dissected. A large amount of cloudy purulent appearing fluid emanated from the joint replacement itself.  A Charnley self-retaining retractor was then placed.  Cultures were then taken times four  with the superficial and deep tissues and marked separately.  After the cultures were taken antibiotics were then administered.  A posterior capsulotomy was then performed.    The hip was then dislocated.  The scar tissue and inflammed synovium  was then excised with a combination of electrocautery, sharp dissection, and curettage.  The hip was then copiously  irrigated with 3 L of pulse lavage.  We then carefully examined all of the surrounding tissue and the hip and debrided any nonviable or infectious-appearing tissue.  We then performed a number of cycles using Betadine lavage with standard pulse lavage.  The prosthesis was then scrubbed with Betadine soaked laparotomy sponges.  We did not have a dual mobility had available so we took the bipolar head and scrubbed it in concentrated Betadine solution and allowed to soak in Betadine solution for the entirety of the procedure which was roughly 1 hour.  The hip was then reduced ,copiously irrigated and 20 cc of rapid cure absorbable antibiotic beads with vancomycin were placed both in the deep tissues around the implant and also superficially. At this point the hip was copiously irrigated with pulse lavage and the capsule was then reapproximated with #1 PDS suture, and the remainder of the hip was closed in multiple layers in standard fashion.  I placed 1 1/8 inch Hemovac drain deep around the prosthesis and a second one eighth inch Hemovac drain superficially in the soft tissue..  There was excellent hemostasis.  Sterile dressing was applied. At the end of the case, the sponge and needle counts were reported as being correct. There were no known complications. The patient was then transported to the recovery room.      Obed Barney M.D.  7/7/2018

## 2018-07-07 NOTE — ANESTHESIA POSTPROCEDURE EVALUATION
"Patient: Rachel BATISTA Stone    Procedure Summary     Date:  07/07/18 Room / Location:  [facility] OR [address] [place] [address] MAIN OR    Anesthesia Start:  1209 Anesthesia Stop:  1437    Procedure:  I&D and antibiotic bead placement left hip (Left Hip) Diagnosis:       S/P revision of total hip      (S/P revision of total hip [Z96.649])    Surgeon:  Obed Barney MD Provider:  Emmanuel Magdaleno MD    Anesthesia Type:  general ASA Status:  3          Anesthesia Type: general  Last vitals  BP   90/41 (07/07/18 1445)   Temp   36.5 °C (97.7 °F) (07/07/18 1430)   Pulse   80 (07/07/18 1445)   Resp   16 (07/07/18 1445)     SpO2   95 % (07/07/18 1445)     Post Anesthesia Care and Evaluation    Patient location during evaluation: bedside  Patient participation: complete - patient participated  Level of consciousness: awake  Pain management: adequate  Airway patency: patent  Anesthetic complications: No anesthetic complications    Cardiovascular status: acceptable  Respiratory status: acceptable  Hydration status: acceptable    Comments: BP 90/41 (BP Location: Left arm, Patient Position: Lying)   Pulse 80   Temp 36.5 °C (97.7 °F) (Oral)   Resp 16   Ht 149.9 cm (59.02\")   Wt 58.1 kg (128 lb 1.4 oz)   SpO2 95%   BMI 25.86 kg/m²         "

## 2018-07-07 NOTE — ANESTHESIA PROCEDURE NOTES
Airway  Urgency: elective    Airway not difficult    General Information and Staff    Patient location during procedure: OR  Anesthesiologist: ANNEMARIE VERMA  CRNA: MICHAEL LEE    Indications and Patient Condition  Indications for airway management: airway protection    Preoxygenated: yes (pt pre-O2 with 100% O2)  MILS not maintained throughout  Mask difficulty assessment: 2 - vent by mask + OA or adjuvant +/- NMBA (easy BMV )    Final Airway Details  Final airway type: endotracheal airway      Successful airway: ETT  Cuffed: yes   Successful intubation technique: direct laryngoscopy  Endotracheal tube insertion site: oral  Blade: Isis  Blade size: #3  ETT size: 7.0 mm  Cormack-Lehane Classification: grade I - full view of glottis  Placement verified by: chest auscultation and capnometry   Cuff volume (mL): 7  Measured from: lips  ETT to lips (cm): 20  Number of attempts at approach: 1    Additional Comments  ATOETx1. No change in dentition.

## 2018-07-07 NOTE — PLAN OF CARE
Problem: Patient Care Overview  Goal: Plan of Care Review  Outcome: Ongoing (interventions implemented as appropriate)   07/07/18 1706   Coping/Psychosocial   Plan of Care Reviewed With patient   Plan of Care Review   Progress improving   OTHER   Outcome Summary pt received another unit of FFP this am, INR at a safe level for surgery, I & D of left hip with abx beads performed by Dr. Barney, returned from surgery @1600, very drowsy related to dilaudid and percocet in PACU, BP low, but VSS otherwise, coumadin restarted per Dr. Barney, ID consulted, educated on the importance of BP monitoring related to history of HTN      Goal: Individualization and Mutuality  Outcome: Ongoing (interventions implemented as appropriate)   07/07/18 0523   Individualization   Patient Specific Goals (Include Timeframe) maintain pain control, continue with good mobility, infection prevention and resolution   Patient Specific Interventions monitor VS and labs, offer pain medication as needed     Goal: Discharge Needs Assessment  Outcome: Ongoing (interventions implemented as appropriate)   07/06/18 1800 07/07/18 1706   Discharge Needs Assessment   Readmission Within the Last 30 Days --  no previous admission in last 30 days   Concerns to be Addressed --  basic needs   Patient/Family Anticipates Transition to --  home with family   Patient/Family Anticipated Services at Transition --  home health care   Transportation Concerns --  car, none   Transportation Anticipated --  family or friend will provide   Anticipated Changes Related to Illness --  inability to care for self   Equipment Needed After Discharge --  wound care supplies;walker, rolling   Discharge Facility/Level of Care Needs --  home with home health   Disability   Equipment Currently Used at Home cane, straight --        Problem: Fall Risk (Adult)  Goal: Identify Related Risk Factors and Signs and Symptoms  Outcome: Outcome(s) achieved Date Met: 07/07/18 07/07/18 1706   Fall  Risk (Adult)   Related Risk Factors (Fall Risk) gait/mobility problems;environment unfamiliar   Signs and Symptoms (Fall Risk) presence of risk factors     Goal: Absence of Fall  Outcome: Ongoing (interventions implemented as appropriate)   07/07/18 1706   Fall Risk (Adult)   Absence of Fall achieves outcome       Problem: Self-Care Deficit (Adult,Obstetrics,Pediatric)  Goal: Identify Related Risk Factors and Signs and Symptoms  Outcome: Outcome(s) achieved Date Met: 07/07/18 07/07/18 1706   Self-Care Deficit (Adult,Obstetrics,Pediatric)   Related Risk Factors (Self-Care Deficit) pain/discomfort;surgery   Signs and Symptoms (Self-Care Deficit) gait unstable;painful movement     Goal: Improved Ability to Perform BADL and IADL  Outcome: Ongoing (interventions implemented as appropriate)   07/07/18 1706   Self-Care Deficit (Adult,Obstetrics,Pediatric)   Improved Ability to Perform BADL and IADL making progress toward outcome       Problem: Infection, Risk/Actual (Adult)  Goal: Identify Related Risk Factors and Signs and Symptoms  Outcome: Outcome(s) achieved Date Met: 07/07/18 07/07/18 1706   Infection, Risk/Actual (Adult)   Related Risk Factors (Infection, Risk/Actual) surgery/procedure;treatment plan   Signs and Symptoms (Infection, Risk/Actual) pain;body temperature changes     Goal: Infection Prevention/Resolution  Outcome: Ongoing (interventions implemented as appropriate)   07/07/18 1706   Infection, Risk/Actual (Adult)   Infection Prevention/Resolution making progress toward outcome       Problem: Pain, Acute (Adult)  Goal: Identify Related Risk Factors and Signs and Symptoms  Outcome: Outcome(s) achieved Date Met: 07/07/18 07/07/18 1706   Pain, Acute (Adult)   Related Risk Factors (Acute Pain) infection;persistent pain;surgery   Signs and Symptoms (Acute Pain) verbalization of pain descriptors     Goal: Acceptable Pain Control/Comfort Level  Outcome: Ongoing (interventions implemented as appropriate)    07/07/18 1706   Pain, Acute (Adult)   Acceptable Pain Control/Comfort Level making progress toward outcome       Problem: Mobility, Physical Impaired (Adult)  Goal: Identify Related Risk Factors and Signs and Symptoms  Outcome: Outcome(s) achieved Date Met: 07/07/18 07/07/18 1706   Mobility, Physical Impaired (Adult)   Related Risk Factors (Physical Mobility, Impaired) fatigue;pain/discomfort;surgery/procedure   Signs and Symptoms (Physical Mobility Impaired) decreased balance;unsafe transfers/ambulation     Goal: Enhanced Mobility Skills  Outcome: Ongoing (interventions implemented as appropriate)   07/07/18 1706   Mobility, Physical Impaired (Adult)   Enhanced Mobility Skills making progress toward outcome     Goal: Enhanced Functional Ability  Outcome: Ongoing (interventions implemented as appropriate)   07/07/18 1706   Mobility, Physical Impaired (Adult)   Enhanced Functional Ability making progress toward outcome

## 2018-07-07 NOTE — PLAN OF CARE
Problem: Patient Care Overview  Goal: Plan of Care Review  Outcome: Ongoing (interventions implemented as appropriate)   07/07/18 0587   Coping/Psychosocial   Plan of Care Reviewed With patient   OTHER   Outcome Summary surgery post-poned untill Sunday, possible aspiration today if INR decreased, recieved 1 unit of FFP and Vitamin K. repeat labs in am. VS and afebrile. discussed monitoring blood pressure at home.     Goal: Individualization and Mutuality  Outcome: Ongoing (interventions implemented as appropriate)    Goal: Discharge Needs Assessment  Outcome: Ongoing (interventions implemented as appropriate)      Problem: Fall Risk (Adult)  Goal: Identify Related Risk Factors and Signs and Symptoms  Outcome: Ongoing (interventions implemented as appropriate)    Goal: Absence of Fall  Outcome: Ongoing (interventions implemented as appropriate)      Problem: Self-Care Deficit (Adult,Obstetrics,Pediatric)  Goal: Identify Related Risk Factors and Signs and Symptoms  Outcome: Ongoing (interventions implemented as appropriate)    Goal: Improved Ability to Perform BADL and IADL  Outcome: Ongoing (interventions implemented as appropriate)      Problem: Infection, Risk/Actual (Adult)  Goal: Identify Related Risk Factors and Signs and Symptoms  Outcome: Ongoing (interventions implemented as appropriate)    Goal: Infection Prevention/Resolution  Outcome: Ongoing (interventions implemented as appropriate)

## 2018-07-07 NOTE — NURSING NOTE
Spoke to Callie with critical protime and INR levels; she will call Dr. Barney and we are to wait to hear back from either her or Dr. Barney; she said to hold coumadine.

## 2018-07-08 LAB
ABO + RH BLD: NORMAL
BH BB BLOOD EXPIRATION DATE: NORMAL
BH BB BLOOD TYPE BARCODE: 600
BH BB DISPENSE STATUS: NORMAL
BH BB PRODUCT CODE: NORMAL
BH BB UNIT NUMBER: NORMAL
HCT VFR BLD AUTO: 26.5 % (ref 35.6–45.5)
HGB BLD-MCNC: 8 G/DL (ref 11.9–15.5)
INR PPP: 1.45 (ref 0.9–1.1)
PROTHROMBIN TIME: 17.4 SECONDS (ref 11.7–14.2)
UNIT  ABO: NORMAL
UNIT  RH: NORMAL

## 2018-07-08 PROCEDURE — 87040 BLOOD CULTURE FOR BACTERIA: CPT | Performed by: INTERNAL MEDICINE

## 2018-07-08 PROCEDURE — 85610 PROTHROMBIN TIME: CPT | Performed by: ORTHOPAEDIC SURGERY

## 2018-07-08 PROCEDURE — 25010000002 ENOXAPARIN PER 10 MG: Performed by: ORTHOPAEDIC SURGERY

## 2018-07-08 PROCEDURE — 25010000003 CEFTRIAXONE PER 250 MG: Performed by: INTERNAL MEDICINE

## 2018-07-08 PROCEDURE — 85018 HEMOGLOBIN: CPT | Performed by: ORTHOPAEDIC SURGERY

## 2018-07-08 PROCEDURE — 25010000002 KETOROLAC TROMETHAMINE PER 15 MG: Performed by: ORTHOPAEDIC SURGERY

## 2018-07-08 PROCEDURE — 85014 HEMATOCRIT: CPT | Performed by: ORTHOPAEDIC SURGERY

## 2018-07-08 PROCEDURE — 97161 PT EVAL LOW COMPLEX 20 MIN: CPT

## 2018-07-08 PROCEDURE — 25010000002 VANCOMYCIN 750 MG RECONSTITUTED SOLUTION: Performed by: ORTHOPAEDIC SURGERY

## 2018-07-08 PROCEDURE — 99232 SBSQ HOSP IP/OBS MODERATE 35: CPT | Performed by: INTERNAL MEDICINE

## 2018-07-08 PROCEDURE — 99223 1ST HOSP IP/OBS HIGH 75: CPT | Performed by: INTERNAL MEDICINE

## 2018-07-08 RX ORDER — CEFTRIAXONE SODIUM 2 G/50ML
2 INJECTION, SOLUTION INTRAVENOUS EVERY 24 HOURS
Status: DISCONTINUED | OUTPATIENT
Start: 2018-07-08 | End: 2018-07-09

## 2018-07-08 RX ADMIN — WARFARIN SODIUM 5 MG: 5 TABLET ORAL at 18:31

## 2018-07-08 RX ADMIN — KETOROLAC TROMETHAMINE 30 MG: 30 INJECTION, SOLUTION INTRAMUSCULAR at 10:23

## 2018-07-08 RX ADMIN — MELOXICAM 15 MG: 15 TABLET ORAL at 10:23

## 2018-07-08 RX ADMIN — ATORVASTATIN CALCIUM 20 MG: 20 TABLET, FILM COATED ORAL at 10:23

## 2018-07-08 RX ADMIN — POLYETHYLENE GLYCOL 3350 17 G: 17 POWDER, FOR SOLUTION ORAL at 10:24

## 2018-07-08 RX ADMIN — DOCUSATE SODIUM 100 MG: 100 CAPSULE, LIQUID FILLED ORAL at 20:59

## 2018-07-08 RX ADMIN — ENOXAPARIN SODIUM 30 MG: 30 INJECTION SUBCUTANEOUS at 11:11

## 2018-07-08 RX ADMIN — DOCUSATE SODIUM 100 MG: 100 CAPSULE, LIQUID FILLED ORAL at 10:24

## 2018-07-08 RX ADMIN — POTASSIUM CHLORIDE 20 MEQ: 750 CAPSULE, EXTENDED RELEASE ORAL at 10:23

## 2018-07-08 RX ADMIN — MUPIROCIN 10 APPLICATION: 20 OINTMENT TOPICAL at 10:24

## 2018-07-08 RX ADMIN — CEFTRIAXONE SODIUM 2 G: 2 INJECTION, SOLUTION INTRAVENOUS at 12:07

## 2018-07-08 RX ADMIN — ENOXAPARIN SODIUM 30 MG: 30 INJECTION SUBCUTANEOUS at 23:33

## 2018-07-08 RX ADMIN — SODIUM CHLORIDE 750 MG: 900 INJECTION, SOLUTION INTRAVENOUS at 01:12

## 2018-07-08 RX ADMIN — POLYETHYLENE GLYCOL 3350 17 G: 17 POWDER, FOR SOLUTION ORAL at 20:59

## 2018-07-08 RX ADMIN — KETOROLAC TROMETHAMINE 30 MG: 30 INJECTION, SOLUTION INTRAMUSCULAR at 16:21

## 2018-07-08 RX ADMIN — FERROUS SULFATE TAB 325 MG (65 MG ELEMENTAL FE) 325 MG: 325 (65 FE) TAB at 10:23

## 2018-07-08 RX ADMIN — ENOXAPARIN SODIUM 30 MG: 30 INJECTION SUBCUTANEOUS at 01:05

## 2018-07-08 RX ADMIN — ACETAMINOPHEN 800 MCG: 400 TABLET ORAL at 10:23

## 2018-07-08 NOTE — DISCHARGE PLACEMENT REQUEST
"Meli Moser (72 y.o. Female)     Date of Birth Social Security Number Address Home Phone MRN    1945  [address] [id number] [phone] [id number]    Adventism Marital Status          None        Admission Date Admission Type Admitting Provider Attending Provider Department, Room/Bed    7/6/18 Urgent Obed Barney MD Brown, Reid B, MD [address], P886/1    Discharge Date Discharge Disposition Discharge Destination                       Attending Provider:  Obed Barney MD    Allergies:  Sulfa Antibiotics    Isolation:  None   Infection:  None   Code Status:  CPR    Ht:  149.9 cm (59.02\")   Wt:  58.1 kg (128 lb 1.4 oz)    Admission Cmt:  MEDICARE   Principal Problem:  None                Active Insurance as of 7/6/2018     Primary Coverage     Payor Plan Insurance Group Employer/Plan Group    MEDICARE MEDICARE A & B      Payor Plan Address Payor Plan Phone Number Effective From Effective To    [address] [phone] 12/1/2011     [facility] [address]       Subscriber Name Subscriber Birth Date Member ID       MELI MOSER 1945 [id number]           Secondary Coverage     Payor Plan Insurance Group Employer/Plan Group    [employer] [place] [employer] [place]      Payor Plan Address Payor Plan Phone Number Effective From Effective To    [employer] [place] STEPHANIE [phone] 9/1/2012     [place] [address]       Subscriber Name Subscriber Birth Date Member ID       MELI MOSER 1945 [id number]                 Emergency Contacts      (Rel.) Home Phone Work Phone Mobile Phone    Ramone Moser (Spouse) [phone] -- [phone]    Ramone Moser Jr (Son) -- -- [phone]              "

## 2018-07-08 NOTE — PLAN OF CARE
Problem: Patient Care Overview  Goal: Plan of Care Review  Outcome: Ongoing (interventions implemented as appropriate)   07/08/18 0859   Coping/Psychosocial   Plan of Care Reviewed With patient   OTHER   Outcome Summary ambulating in room, voiding, dressing dry right hip, two hemovac drains, minimal pain, infectious MD to see. blood pressure in 90's. denies symptoms.educated on monitoring blood pressure before taking own meds at home.     Goal: Individualization and Mutuality  Outcome: Ongoing (interventions implemented as appropriate)    Goal: Discharge Needs Assessment  Outcome: Ongoing (interventions implemented as appropriate)      Problem: Fall Risk (Adult)  Goal: Absence of Fall  Outcome: Ongoing (interventions implemented as appropriate)      Problem: Self-Care Deficit (Adult,Obstetrics,Pediatric)  Goal: Improved Ability to Perform BADL and IADL  Outcome: Ongoing (interventions implemented as appropriate)      Problem: Infection, Risk/Actual (Adult)  Goal: Infection Prevention/Resolution  Outcome: Ongoing (interventions implemented as appropriate)      Problem: Pain, Acute (Adult)  Goal: Acceptable Pain Control/Comfort Level  Outcome: Ongoing (interventions implemented as appropriate)

## 2018-07-08 NOTE — THERAPY EVALUATION
Acute Care - Physical Therapy Initial Evaluation  [facility]     Patient Name: Rachel Moser  : 1945  MRN: [id number]  Today's Date: 2018   Onset of Illness/Injury or Date of Surgery: 18            Admit Date: 2018    Visit Dx:     ICD-10-CM ICD-9-CM   1. Difficulty walking R26.2 719.7   2. S/P revision of total hip Z96.649 V43.64     Patient Active Problem List   Diagnosis   • Chronic left hip pain   • OA (osteoarthritis) of hip   • Hip dislocation, left (CMS/HCC)   • Status post left hip replacement   • Hypertension   • Rheumatoid arteritis   • History of DVT (deep vein thrombosis)   • Postoperative hypotension   • S/P revision of total hip     Past Medical History:   Diagnosis Date   • Allergic    • Cataract    • High cholesterol    • History of DVT (deep vein thrombosis)    • History of kidney infection     1 MONTH AGO   • History of pulmonary embolus (PE)    • Hypertension    • Paralabral cyst of left hip    • PONV (postoperative nausea and vomiting)    • Presence of vena cava filter    • Rheumatoid arteritis    • Seasonal allergies    • UTI (urinary tract infection)     diagnosed 18  medically treated presently     Past Surgical History:   Procedure Laterality Date   • BLADDER SUSPENSION     • CATARACT EXTRACTION, BILATERAL     • ENDOSCOPIC FUNCTIONAL SINUS SURGERY (FESS)     • HIP SURGERY Left    • HIP SURGERY Left    • HIP SURGERY Left    • INCISION AND DRAINAGE HIP Left 2016    Procedure: HIP INCISION AND DRAINAGE;  Surgeon: Obed Barney MD;  Location: [facility];  Service:    • JOINT REPLACEMENT Left     HIP   • SC CLOSED RX TRAUMATIC HIP DISLOCATN Left 2017    Procedure: LEFT HIP CLOSED REDUCTION;  Surgeon: Obed Barney MD;  Location: [facility];  Service: Orthopedics   • SOFT TISSUE MASS EXCISION Left     hip 3/19/18   • TOTAL ABDOMINAL HYSTERECTOMY     • TOTAL HIP ARTHROPLASTY REVISION Left 2018    Procedure: REVISION OF LEFT  ACETABULAR COMPONENT ;  Surgeon: Obed Barney MD;  Location: [facility] MAIN OR;  Service: Orthopedics   • VENA CAVA FILTER INSERTION          PT ASSESSMENT (last 12 hours)      Physical Therapy Evaluation     Row Name 07/08/18 0900          PT Evaluation Time/Intention    Subjective Information complains of;pain  -CN     Document Type evaluation  -CN     Mode of Treatment physical therapy  -CN     Patient Effort good  -CN     Symptoms Noted During/After Treatment fatigue  -CN     Row Name 07/08/18 0900          General Information    Patient Profile Reviewed? yes  -CN     Onset of Illness/Injury or Date of Surgery 07/07/18  -CN     Patient Observations agree to therapy;alert;cooperative  -CN     Prior Level of Function min assist:;bed mobility;bathing  -CN     Equipment Currently Used at Home cane, straight  -CN     Pertinent History of Current Functional Problem Pt underwent L hip revision due to infection on 7/7/18.   -CN     Existing Precautions/Restrictions fall;hip, posterior  -CN     Row Name 07/08/18 0900          Relationship/Environment    Lives With spouse  -CN     Row Name 07/08/18 0900          Resource/Environmental Concerns    Current Living Arrangements home/apartment/condo  -CN     Row Name 07/08/18 0900          Home Main Entrance    Number of Stairs, Main Entrance one  -CN     Stair Railings, Main Entrance none  -CN     Row Name 07/08/18 0900          Cognitive Assessment/Intervention- PT/OT    Orientation Status (Cognition) oriented x 4  -CN     Follows Commands (Cognition) WFL  -CN     Personal Safety Interventions fall prevention program maintained;gait belt;nonskid shoes/slippers when out of bed  -CN     Row Name 07/08/18 0900          Mobility Assessment/Treatment    Extremity Weight-bearing Status left lower extremity  -CN     Left Lower Extremity (Weight-bearing Status) weight-bearing as tolerated (WBAT)  -CN     Row Name 07/08/18 0900          Bed Mobility Assessment/Treatment    Bed Mobility  Assessment/Treatment bed mobility (all) activities  -CN     Comment (Bed Mobility) not tested, up in chair  -CN     Row Name 07/08/18 0900          Transfer Assessment/Treatment    Transfer Assessment/Treatment sit-stand transfer;stand-sit transfer  -CN     Sit-Stand [place] (Transfers) contact guard  -CN     Stand-Sit [place] (Transfers) contact guard  -CN     Row Name 07/08/18 0900          Sit-Stand Transfer    Assistive Device (Sit-Stand Transfers) walker, front-wheeled  -CN     Row Name 07/08/18 0900          Stand-Sit Transfer    Assistive Device (Stand-Sit Transfers) walker, front-wheeled  -CN     Row Name 07/08/18 0900          Gait/Stairs Assessment/Training    [place] Level (Gait) contact guard  -CN     Assistive Device (Gait) walker, front-wheeled  -CN     Distance in Feet (Gait) 50  -CN     Pattern (Gait) step-through  -CN     Deviations/Abnormal Patterns (Gait) yanni decreased;gait speed decreased;stride length decreased  -CN     Bilateral Gait Deviations heel strike decreased;forward flexed posture  -CN     Row Name 07/08/18 0900          General ROM    GENERAL ROM COMMENTS Dec L hip ROM   -CN     Row Name 07/08/18 0900          General Assessment (Manual Muscle Testing)    Comment, General Manual Muscle Testing (MMT) Assessment Functional MMT rated 4/5, L hip abduction rated 4-/5  -CN     Row Name 07/08/18 0900          Pain Assessment    Additional Documentation Pain Scale: Numbers Pre/Post-Treatment (Group)  -CN     Row Name 07/08/18 0900          Pain Scale: Numbers Pre/Post-Treatment    Pain Scale: Numbers, Pretreatment 4/10  -CN     Pain Location - Side Left  -CN     Pain Location hip  -CN     Pain Intervention(s) Cold applied;Repositioned;Ambulation/increased activity  -CN     Row Name             Wound 07/07/18 1358 Left hip incision    Wound - Properties Group Date first assessed: 07/07/18  -HH Time first assessed: 1358  -HH Side: Left  -HH Location: hip  -HH Type: incision   -HH    Row Name 07/08/18 0900          Physical Therapy Clinical Impression    PT Diagnosis (PT Clinical Impression) generalized weakness, difficutly walking  -CN     Patient/Family Goals Statement (PT Clinical Impression) Pt would like to return home to PLOF  -CN     Criteria for Skilled Interventions Met (PT Clinical Impression) yes;treatment indicated  -CN     Pathology/Pathophysiology Noted (Describe Specifically for Each System) musculoskeletal  -CN     Impairments Found (describe specific impairments) gait, locomotion, and balance  -CN     Functional Limitations in Following Categories (Describe Specific Limitations) self-care;home management  -CN     Rehab Potential (PT Clinical Summary) good, to achieve stated therapy goals  -CN     Care Plan Review (PT) evaluation/treatment results reviewed  -CN     Row Name 07/08/18 0900          Physical Therapy Goals    Bed Mobility Goal Selection (PT) bed mobility, PT goal 1  -CN     Transfer Goal Selection (PT) transfer, PT goal 1  -CN     Gait Training Goal Selection (PT) gait training, PT goal 1  -CN     Row Name 07/08/18 0900          Bed Mobility Goal 1 (PT)    Activity/Assistive Device (Bed Mobility Goal 1, PT) bed mobility activities, all  -CN     [place] Level/Cues Needed (Bed Mobility Goal 1, PT) conditional independence  -CN     Time Frame (Bed Mobility Goal 1, PT) 2 weeks  -CN     Row Name 07/08/18 0900          Transfer Goal 1 (PT)    Activity/Assistive Device (Transfer Goal 1, PT) transfers, all  -CN     [place] Level/Cues Needed (Transfer Goal 1, PT) conditional independence  -CN     Time Frame (Transfer Goal 1, PT) 2 weeks  -CN     Row Name 07/08/18 0900          Gait Training Goal 1 (PT)    Activity/Assistive Device (Gait Training Goal 1, PT) gait (walking locomotion)  -CN     [place] Level (Gait Training Goal 1, PT) conditional independence  -CN     Distance (Gait Goal 1, PT) 250  -CN     Time Frame (Gait Training Goal 1, PT) 2 weeks  -CN      Row Name 07/08/18 0900          Plan for Physical Therapy Intervention    Strengthening Performed PANKAJ exercises including ankle pumps, quad sets, glute sets, hip abd/add  -CN     Row Name 07/08/18 0900          Positioning and Restraints    Pre-Treatment Position sitting in chair/recliner  -CN     Post Treatment Position chair  -CN     In Chair reclined;encouraged to call for assist;call light within reach  -CN     Row Name 07/08/18 0900          Living Environment    Home Accessibility stairs to enter home  -CN       User Key  (r) = Recorded By, (t) = Taken By, (c) = Cosigned By    Initials Name Provider Type     Tamara Lyons, PAWEL Registered Nurse    COLLINS Painting, PT Physical Therapist          Physical Therapy Education     Title: PT OT SLP Therapies (Done)     Topic: Physical Therapy (Done)     Point: Mobility training (Done)    Learning Progress Summary     Learner Status Readiness Method Response Comment Documented by    Patient Done Acceptance [facility] 07/08/18 0929          Point: Home exercise program (Done)    Learning Progress Summary     Learner Status Readiness Method Response Comment Documented by    Patient Done Acceptance [facility] 07/08/18 0929          Point: Body mechanics (Done)    Learning Progress Summary     Learner Status Readiness Method Response Comment Documented by    Patient Done Acceptance [facility] 07/08/18 0929          Point: Precautions (Done)    Learning Progress Summary     Learner Status Readiness Method Response Comment Documented by    Patient Done Acceptance [facility] 07/08/18 0929                      User Key     Initials Effective Dates Name Provider Type [facility] 04/03/18 -  Carmelina Painting PT Physical Therapist PT                PT Recommendation and Plan  Anticipated Discharge Disposition (PT): home with home health  Planned Therapy Interventions (PT Eval): balance training, bed mobility training, gait training, home exercise program,  neuromuscular re-education, patient/family education, postural re-education, ROM (range of motion), strengthening, stretching, stair training  Therapy Frequency (PT Clinical Impression): daily  Outcome Summary/Treatment Plan (PT)  Anticipated Discharge Disposition (PT): home with home health  Plan of Care Reviewed With: patient  Outcome Summary: Pt is a 72 year old female admitted to [facility] with infection of PANKAJ. Pt underwent revision of L hip on 7/7/18 and requires assistance with bed mobility, transfers and gait at this time. Pt with weakness in hip abductors and instructed on initial exercises. Pt is a candidate for skilled PT services.           Outcome Measures     Row Name 07/08/18 0900             How much help from another person do you currently need...    Turning from your back to your side while in flat bed without using bedrails? 2  -CN      Moving from lying on back to sitting on the side of a flat bed without bedrails? 2  -CN      Moving to and from a bed to a chair (including a wheelchair)? 3  -CN      Standing up from a chair using your arms (e.g., wheelchair, bedside chair)? 3  -CN      Climbing 3-5 steps with a railing? 2  -CN      To walk in hospital room? 3  -CN      AM-PAC 6 Clicks Score 15  -CN         Functional Assessment    Outcome Measure Options AM-PAC 6 Clicks Basic Mobility (PT)  -CN        User Key  (r) = Recorded By, (t) = Taken By, (c) = Cosigned By    Initials Name Provider Type    COLLINS Painting PT Physical Therapist           Time Calculation:         PT Charges     Row Name 07/08/18 0946             Time Calculation    Start Time 0900  -CN      Stop Time 0919  -CN      Time Calculation (min) 19 min  -CN        User Key  (r) = Recorded By, (t) = Taken By, (c) = Cosigned By    Initials Name Provider Type    COLLINS Painting PT Physical Therapist        Therapy Suggested Charges     Code   Minutes Charges    None           Therapy Charges for Today     Code  Description Service Date Service Provider Modifiers Qty    29189328658 HC PT EVAL LOW COMPLEXITY 1 7/8/2018 Carmelina Painting, PT GP 1          PT G-Codes  Outcome Measure Options: AM-PAC 6 Clicks Basic Mobility (PT)      Carmelina Painting, PT  7/8/2018

## 2018-07-08 NOTE — PROGRESS NOTES
Discharge Planning Assessment  [facility]     Patient Name: Rachel Moser  MRN: [id number]  Today's Date: 7/8/2018    Admit Date: 7/6/2018          Discharge Needs Assessment     Row Name 07/08/18 155       Living Environment    Lives With spouse    Name(s) of Who Lives With Patient Ramone Moser(spouse)    Current Living Arrangements home/apartment/condo   ranch style house with no steps    Primary Care Provided by self    Provides Primary Care For no one    Family Caregiver if Needed spouse    Family Caregiver Names Ramone Moser(spouse)    Quality of Family Relationships helpful;involved;supportive    Able to Return to Prior Arrangements yes       Resource/Environmental Concerns    Resource/Environmental Concerns none    Transportation Concerns car, none       Transition Planning    Patient/Family Anticipates Transition to home with family;home with help/services    Patient/Family Anticipated Services at Transition home health care    Transportation Anticipated family or friend will provide       Discharge Needs Assessment    Readmission Within the Last 30 Days no previous admission in last 30 days    Concerns to be Addressed basic needs    Equipment Currently Used at Home shower chair;cane, straight;rollator    Anticipated Changes Related to Illness inability to care for self    Equipment Needed After Discharge none    Outpatient/Agency/Support Group Needs homecare agency    Discharge Facility/Level of Care Needs home with home health    Offered/Gave Vendor List no            Discharge Plan     Row Name 07/08/18 1553       Plan    Plan Home with spouse and [facility]    Patient/Family in Agreement with Plan yes    Plan Comments Introduced self and explained role of CCP, IMM checked and facesheet verified with patient, who is alert and oriented x 4, at chairside.  Patient states her spouse, Ramone Moser([phone]) is her HCS and her emergency contact and she lives with him in a ranch style house  with no steps and is independent with ADLs and currently has a shower chair, Straight cane, and rollator walker and denies any DME needs at discharge.  Patient states she has used [facility] in the past and would like for them to follow her at discharge. Referral called to Doris at [facility] at ext. [phone].  Patient states her PCP is Manan Cintron MD and currently uses [facility] pharmacy at [address] [place] [address] and denies any issues with affording or obtaining medications.  Patient states she plans to return home with spouse, who will provide transportation at discharge and [facility].. CCP will follow and assist as needed.... Kiarra GoldmanRN,CCP         Destination     No service coordination in this encounter.      Durable Medical Equipment     No service coordination in this encounter.      Dialysis/Infusion     No service coordination in this encounter.      Home Medical Care     Service Request Status Selected Specialties Address Phone Number Fax Number    [facility] Pending - No Request Sent N/A [address] [address] [phone]      Social Care     No service coordination in this encounter.                Demographic Summary     Row Name 07/08/18 1550       General Information    Admission Type inpatient    Arrived From home    Referral Source nursing;physician    Reason for Consult discharge planning    Preferred Language English     Used During This Interaction no       Contact Information    Permission Granted to Share Info With     Contact Information Obtained for             Functional Status     Row Name 07/08/18 1556       Functional Status    Usual Activity Tolerance good    Current Activity Tolerance moderate       Functional Status, IADL    Medications independent    Meal Preparation independent    Housekeeping independent    Laundry independent    Shopping independent        Mental Status    General Appearance WDL WDL       Mental Status Summary    Recent Changes in Mental Status/Cognitive Functioning no changes            Psychosocial    No documentation.           Abuse/Neglect    No documentation.           Legal    No documentation.           Substance Abuse    No documentation.           Patient Forms    No documentation.         Kiarra Goldman RN

## 2018-07-08 NOTE — PLAN OF CARE
Problem: Patient Care Overview  Goal: Plan of Care Review  Outcome: Ongoing (interventions implemented as appropriate)   07/08/18 4220   Coping/Psychosocial   Plan of Care Reviewed With patient   Plan of Care Review   Progress improving   OTHER   Outcome Summary Pt POD 1 Left hip I & D and Abx spacer placement, assist x1 w/ ambulation, voiding per BRP, denies having pain, no pain meds given only scheduled toradol, continue to monitor     Goal: Individualization and Mutuality  Outcome: Ongoing (interventions implemented as appropriate)      Problem: Fall Risk (Adult)  Goal: Absence of Fall  Outcome: Ongoing (interventions implemented as appropriate)      Problem: Self-Care Deficit (Adult,Obstetrics,Pediatric)  Goal: Improved Ability to Perform BADL and IADL  Outcome: Ongoing (interventions implemented as appropriate)      Problem: Infection, Risk/Actual (Adult)  Goal: Infection Prevention/Resolution  Outcome: Ongoing (interventions implemented as appropriate)      Problem: Pain, Acute (Adult)  Goal: Acceptable Pain Control/Comfort Level  Outcome: Ongoing (interventions implemented as appropriate)      Problem: Mobility, Physical Impaired (Adult)  Goal: Enhanced Mobility Skills  Outcome: Ongoing (interventions implemented as appropriate)    Goal: Enhanced Functional Ability  Outcome: Ongoing (interventions implemented as appropriate)

## 2018-07-08 NOTE — PROGRESS NOTES
"Patient Care Team:  Manan Cintron MD as PCP - General (Family Medicine)  Vijay Arango MD as Consulting Physician (Cardiology)    Chief Complaint: Follow-up chronic anticoagulation.    Interval History:   Patient states that she feels well.  No complaints.    Objective   Vital Signs  Temp:  [96.5 °F (35.8 °C)-97.8 °F (36.6 °C)] 97.6 °F (36.4 °C)  Heart Rate:  [65-88] 84  Resp:  [14-16] 16  BP: ()/(41-60) 101/60    Intake/Output Summary (Last 24 hours) at 07/08/18 1126  Last data filed at 07/08/18 0900   Gross per 24 hour   Intake             2140 ml   Output              665 ml   Net             1475 ml     Flowsheet Rows      First Filed Value   Admission Height  149.9 cm (59.02\") Documented at 07/07/2018 0811   Admission Weight  58.1 kg (128 lb 1.4 oz) Documented at 07/07/2018 0811          General Appearance:    Alert, cooperative, in no acute distress   Head:    Normocephalic, without obvious abnormality, atraumatic       Neck:   No adenopathy, supple, no thyromegaly, no carotid bruit, no    JVD   Lungs:     Clear to auscultation bilaterally, no wheezes, rales, or     rhonchi    Heart:    Normal rate, regular rhythm,  No murmur, rub, or gallop   Chest Wall:    No abnormalities observed   Abdomen:     Normal bowel sounds, soft, non-tender, non-distended,            no rebound tenderness   Extremities:   No cyanosis, clubbing, or edema   Pulses:   Pulses palpable and equal bilaterally   Skin:   No bleeding or rash       Neurologic:   Cranial nerves 2 - 12 grossly intact, sensation intact             atorvastatin 20 mg Oral Daily   ceftriaxone 2 g Intravenous Q24H   docusate sodium 100 mg Oral BID   enoxaparin 30 mg Subcutaneous Q12H   ferrous sulfate 325 mg Oral Daily With Breakfast   folic acid 800 mcg Oral QAM   furosemide 40 mg Oral QAM   ketorolac 30 mg Intravenous Q8H   meloxicam 15 mg Oral Daily   metoprolol succinate XL 50 mg Oral QAM   polyethylene glycol 17 g Oral BID   potassium chloride " 20 mEq Oral Daily   warfarin 5 mg Oral Daily            Results Review:      Results from last 7 days  Lab Units 07/06/18  1743   SODIUM mmol/L 142   POTASSIUM mmol/L 4.3   CHLORIDE mmol/L 105   CO2 mmol/L 26.8   BUN mg/dL 13   CREATININE mg/dL 0.57   GLUCOSE mg/dL 160*   CALCIUM mg/dL 9.3           Results from last 7 days  Lab Units 07/08/18  0317 07/06/18  1743   WBC 10*3/mm3  --  28.75*   HEMOGLOBIN g/dL 8.0* 11.5*   HEMATOCRIT % 26.5* 36.6   PLATELETS 10*3/mm3  --  455       Results from last 7 days  Lab Units 07/08/18  0317 07/07/18  1038 07/07/18  0922   INR  1.45* 1.44* 1.64*                 @LABNT(bnp)@  I reviewed the patient's new clinical results.  I personally viewed and interpreted the patient's EKG/Telemetry data        Assessment/Plan   1.  History of DVT and pulmonary embolus: In 2011.  Sounds to be unprovoked  2.  History of IVC filter  3.  Left hip infection.  Unclear if this is in the joint.  4.  Chronic anticoagulation use  5.  Anemia    -Received 10 mg of Coumadin yesterday.  Will receive 5 mg today.  -Hematocrit is down to 26.  We will continue to monitor.  CBC in the a.m.  -Right now she is on 30 mg subcutaneous twice a day of Lovenox.  Half of therapeutic dosing.  Would recommend increase to full dosing when surgery is comfortable with that move.  This may need to be tomorrow with the lower hematocrit documented today

## 2018-07-08 NOTE — NURSING NOTE
Spoke to Dr. Barney about Dr. Dillard wanting to increase lovenox and he said whatever Dr. Dillard thinks is best is okay with him to start for tomorrow and to see where her Hgb level is tomorrow as well; encourage pt to perform ankle pumps qhr and scds at all times.

## 2018-07-08 NOTE — PLAN OF CARE
Problem: Patient Care Overview  Goal: Plan of Care Review   07/08/18 0929   Coping/Psychosocial   Plan of Care Reviewed With patient   OTHER   Outcome Summary [facility] 72 [facility]. Pt underwent revision of L hip on 7/7/18 and requires assistance with bed mobility, transfers and gait at this time. Pt with weakness in hip abductors and instructed on initial exercises. Pt is a candidate for skilled PT services.

## 2018-07-08 NOTE — CONSULTS
Referring Provider: Obed Barney MD  [facility] [address] [address]  Reason for Consultation: Left prosthetic hip septic arthritis    Subjective   History of present illness:  This is a 72-year-old female with a history of DVT/PE, hypertension and left prosthetic hip requiring multiple revisions most recent one in April who was admitted on July 6 with left prosthetic hip septic arthritis.  The patient has had multiple left hip revision and developed a left pelvic pseudotumor.  She was admitted to the hospital in April of this year and underwent left hip acetabular revision and excision of pseudotumor on April 25.  She did well post operatively.  All cultures from surgery were negative.  Her incision healed well but she was having issues with femoral nerve palsy.  Unfortunately at the end of June beginning of July she started experiencing increasing pain in the hip.  She also developed fever about one week ago of 102.  She was seen by a physician and started on empiric Levaquin.  Her fevers resolved but she continued to have increasing left hip pain with swelling and erythema.  She was admitted to the hospital and underwent left hip I&D with placement of antibiotic beads.  Operative cultures are growing Escherichia coli.  Admission blood work revealed leukocytosis.  The patient has been afebrile in the hospital.  Of note the patient developed a urinary tract infection right after her surgery in April.  The patient was told her urine cultures were negative but she was empirically treated for Levaquin for 7 days.  After that her urinary symptoms resolved.  Currently she denies any urinary symptoms specifically no dysuria or increasing urinary frequency.  She denies any shortness of breath cough rhinorrhea or sore throat.  No abdominal pain nausea vomiting or diarrhea.    Past Medical History:   Diagnosis Date   • Cataract    • High cholesterol    • History of DVT (deep vein thrombosis)    • History of  pulmonary embolus (PE)    • Hypertension    • Rheumatoid arteritis        Past Surgical History:   Procedure Laterality Date   • BLADDER SUSPENSION     • CATARACT EXTRACTION, BILATERAL     • ENDOSCOPIC FUNCTIONAL SINUS SURGERY (FESS)     • AR CLOSED RX TRAUMATIC HIP DISLOCATN Left 12/18/2017    Procedure: LEFT HIP CLOSED REDUCTION;  Surgeon: Obed Barney MD;  Location: [facility];  Service: Orthopedics   • SOFT TISSUE MASS EXCISION Left     hip 3/19/18   • TOTAL ABDOMINAL HYSTERECTOMY     • TOTAL HIP ARTHROPLASTY REVISION Left 4/25/2018    Procedure: REVISION OF LEFT ACETABULAR COMPONENT ;  Surgeon: Obed Barney MD;  Location: [facility];  Service: Orthopedics   • VENA CAVA FILTER INSERTION          reports that she quit smoking about 43 years ago. Her smoking use included Cigarettes. She has never used smokeless tobacco. She reports that she does not drink alcohol or use drugs.    family history is not on file.    Allergies   Allergen Reactions   • Sulfa Antibiotics Rash       Medication:  Antibiotics:  None    Please refer to the medical record for a full medication list    Review of Systems  Pertinent items are noted in HPI, all other systems reviewed and negative    Objective   Vital Signs   Temp:  [96.5 °F (35.8 °C)-98.6 °F (37 °C)] 97.6 °F (36.4 °C)  Heart Rate:  [65-91] 84  Resp:  [14-20] 16  BP: ()/(41-67) 101/60    Physical Exam:   General: In no acute distress  HEENT: Normocephalic, atraumatic, PERRL, EOMI, no scleral icterus. Oropharynx is clear and moist  Neck: Supple, trachea is midline  Cardiovascular: Normal rate, regular rhythm, patito S1 and S2, no murmurs, rubs, or gallops    Respiratory: Lungs are cleat to ascultation bilaterally, no wheezing   GI: Abdomen is soft, non-tender, non-distended, positive bowel sounds bilaterally, no masses  Musculoskeletal:  no edema, tenderness or deformity, hip with drain in place, dressing in place  Skin: No rashes or lesions  Extremities: no  E/C/C  Neurological: Alert and oriented, cranial nerves 2-12 intact, motor strength 5/5 in all four extremities  Psychiatric: Normal mood and affect     Results Review:   I reviewed the patient's new clinical results.  I reviewed the patient's new imaging results and agree with the interpretation.    Lab Results   Component Value Date    WBC 28.75 (H) 07/06/2018    HGB 8.0 (L) 07/08/2018    HCT 26.5 (L) 07/08/2018    MCV 93.1 07/06/2018     07/06/2018       Lab Results   Component Value Date    GLUCOSE 160 (H) 07/06/2018    BUN 13 07/06/2018    CREATININE 0.57 07/06/2018    EGFRIFNONA 105 07/06/2018    BCR 22.8 07/06/2018    CO2 26.8 07/06/2018    CALCIUM 9.3 07/06/2018    ALBUMIN 4.10 12/17/2017    LABIL2 1.3 12/17/2017    AST 25 12/17/2017    ALT 16 12/17/2017     CRP 11.64  ESR 72    Microbiology:  7/7 L hip wound E coli    Radiology:  7/6 MRI of the left hip shows evidence of left hip arthroplasty.  Some fluid in the sitter So posterior to the left greater trochanter.    Assessment/Plan   1.  Left prosthetic hip septic arthritis status post operative incision and drainage with retained hardware on July 7  - Cultures with Escherichia coli  - Start the patient on ceftriaxone 2 g IV every 24 hours.  Final antibiotic choice pending sensitivities  - Patient will require a 6 week course of antibiotics followed by suppressive therapy if possible    2.  Fever  - Blood cultures ×2    3.  Leukocytosis secondary to above  - Continue to monitor    I discussed the patients findings and my recommendations with patient, family and nursing staff

## 2018-07-08 NOTE — PROGRESS NOTES
Orthopedic Spine Progress Note    Subjective     Post-Operative Day: 1 post-spine procedure HIP INCISION AND DRAINAGE left hip  Systemic or Specific Complaints: No Complaints.  Very pleasant.      Objective     Vital signs in last 24 hours:  Temp:  [96.5 °F (35.8 °C)-97.8 °F (36.6 °C)] 97.4 °F (36.3 °C)  Heart Rate:  [65-84] 80  Resp:  [14-16] 16  BP: ()/(41-60) 90/48    General: alert, appears stated age and cooperative   Neurovascular: stable   Wound: Wound clean and dry with drains in place   Range of Motion: limited   DVT Exam: No evidence of DVT seen on physical exam.     Data Review  CBC:  Results from last 7 days  Lab Units 07/08/18  0317 07/06/18  1743   WBC 10*3/mm3  --  28.75*   RBC 10*6/mm3  --  3.93   HEMOGLOBIN g/dL 8.0* 11.5*   HEMATOCRIT % 26.5* 36.6   PLATELETS 10*3/mm3  --  455                          Wound Culture   Date Value Ref Range Status   07/07/2018 Heavy growth (4+) Escherichia coli (A)  Preliminary       Assessment/Plan    POD #1 I&D left PANKAJ with 4+Ecoli on rocephin    Continues current post-op course  Drain care per Dr. Barney  Mobilize  ID consulted    Activity: out of bed and ambulate    Weight Bearing: FWB     LOS: 2 days     Lucien Alfonso DO    Date: 7/8/2018

## 2018-07-09 ENCOUNTER — APPOINTMENT (OUTPATIENT)
Dept: CT IMAGING | Facility: HOSPITAL | Age: 73
End: 2018-07-09
Attending: ORTHOPAEDIC SURGERY

## 2018-07-09 LAB
ALBUMIN SERPL-MCNC: 2.5 G/DL (ref 3.5–5.2)
ALBUMIN/GLOB SERPL: 0.9 G/DL
ALP SERPL-CCNC: 54 U/L (ref 39–117)
ALT SERPL W P-5'-P-CCNC: 9 U/L (ref 1–33)
ANION GAP SERPL CALCULATED.3IONS-SCNC: 10.9 MMOL/L
AST SERPL-CCNC: 7 U/L (ref 1–32)
BACTERIA SPEC AEROBE CULT: ABNORMAL
BILIRUB SERPL-MCNC: <0.2 MG/DL (ref 0.1–1.2)
BUN BLD-MCNC: 11 MG/DL (ref 8–23)
BUN/CREAT SERPL: 21.6 (ref 7–25)
CALCIUM SPEC-SCNC: 8.7 MG/DL (ref 8.6–10.5)
CHLORIDE SERPL-SCNC: 107 MMOL/L (ref 98–107)
CO2 SERPL-SCNC: 27.1 MMOL/L (ref 22–29)
CREAT BLD-MCNC: 0.51 MG/DL (ref 0.57–1)
DEPRECATED RDW RBC AUTO: 56.4 FL (ref 37–54)
ERYTHROCYTE [DISTWIDTH] IN BLOOD BY AUTOMATED COUNT: 16.3 % (ref 11.7–13)
GFR SERPL CREATININE-BSD FRML MDRD: 119 ML/MIN/1.73
GLOBULIN UR ELPH-MCNC: 2.9 GM/DL
GLUCOSE BLD-MCNC: 105 MG/DL (ref 65–99)
GRAM STN SPEC: ABNORMAL
HCT VFR BLD AUTO: 26.4 % (ref 35.6–45.5)
HGB BLD-MCNC: 8.1 G/DL (ref 11.9–15.5)
INR PPP: 1.76 (ref 0.9–1.1)
LARGE PLATELETS: ABNORMAL
LYMPHOCYTES # BLD MANUAL: 3.19 10*3/MM3 (ref 0.9–4.8)
LYMPHOCYTES NFR BLD MANUAL: 23 % (ref 19.6–45.3)
LYMPHOCYTES NFR BLD MANUAL: 5 % (ref 5–12)
MCH RBC QN AUTO: 28.9 PG (ref 26.9–32)
MCHC RBC AUTO-ENTMCNC: 30.7 G/DL (ref 32.4–36.3)
MCV RBC AUTO: 94.3 FL (ref 80.5–98.2)
METAMYELOCYTES NFR BLD MANUAL: 4 % (ref 0–0)
MONOCYTES # BLD AUTO: 0.69 10*3/MM3 (ref 0.2–1.2)
MYELOCYTES NFR BLD MANUAL: 1 % (ref 0–0)
NEUTROPHILS # BLD AUTO: 9.31 10*3/MM3 (ref 1.9–8.1)
NEUTROPHILS NFR BLD MANUAL: 67 % (ref 42.7–76)
PLATELET # BLD AUTO: 382 10*3/MM3 (ref 140–500)
PMV BLD AUTO: 11.1 FL (ref 6–12)
POTASSIUM BLD-SCNC: 4 MMOL/L (ref 3.5–5.2)
PROT SERPL-MCNC: 5.4 G/DL (ref 6–8.5)
PROTHROMBIN TIME: 20.2 SECONDS (ref 11.7–14.2)
RBC # BLD AUTO: 2.8 10*6/MM3 (ref 3.9–5.2)
RBC MORPH BLD: NORMAL
SCAN SLIDE: NORMAL
SODIUM BLD-SCNC: 145 MMOL/L (ref 136–145)
WBC MORPH BLD: NORMAL
WBC NRBC COR # BLD: 13.89 10*3/MM3 (ref 4.5–10.7)

## 2018-07-09 PROCEDURE — 25010000002 ONDANSETRON PER 1 MG: Performed by: ORTHOPAEDIC SURGERY

## 2018-07-09 PROCEDURE — 85007 BL SMEAR W/DIFF WBC COUNT: CPT | Performed by: INTERNAL MEDICINE

## 2018-07-09 PROCEDURE — 97110 THERAPEUTIC EXERCISES: CPT

## 2018-07-09 PROCEDURE — 25010000002 ERTAPENEM PER 500 MG: Performed by: INTERNAL MEDICINE

## 2018-07-09 PROCEDURE — 99232 SBSQ HOSP IP/OBS MODERATE 35: CPT | Performed by: INTERNAL MEDICINE

## 2018-07-09 PROCEDURE — 85025 COMPLETE CBC W/AUTO DIFF WBC: CPT | Performed by: INTERNAL MEDICINE

## 2018-07-09 PROCEDURE — 80053 COMPREHEN METABOLIC PANEL: CPT | Performed by: INTERNAL MEDICINE

## 2018-07-09 PROCEDURE — 85610 PROTHROMBIN TIME: CPT | Performed by: ORTHOPAEDIC SURGERY

## 2018-07-09 PROCEDURE — 25010000002 ENOXAPARIN PER 10 MG: Performed by: ORTHOPAEDIC SURGERY

## 2018-07-09 RX ADMIN — ENOXAPARIN SODIUM 30 MG: 30 INJECTION SUBCUTANEOUS at 23:52

## 2018-07-09 RX ADMIN — POLYETHYLENE GLYCOL 3350 17 G: 17 POWDER, FOR SOLUTION ORAL at 22:21

## 2018-07-09 RX ADMIN — SODIUM CHLORIDE 1 G: 900 INJECTION INTRAVENOUS at 12:04

## 2018-07-09 RX ADMIN — ENOXAPARIN SODIUM 30 MG: 30 INJECTION SUBCUTANEOUS at 12:04

## 2018-07-09 RX ADMIN — ACETAMINOPHEN 800 MCG: 400 TABLET ORAL at 06:29

## 2018-07-09 RX ADMIN — DOCUSATE SODIUM 100 MG: 100 CAPSULE, LIQUID FILLED ORAL at 22:21

## 2018-07-09 RX ADMIN — ONDANSETRON 4 MG: 2 INJECTION, SOLUTION INTRAMUSCULAR; INTRAVENOUS at 08:47

## 2018-07-09 RX ADMIN — FUROSEMIDE 40 MG: 40 TABLET ORAL at 06:29

## 2018-07-09 RX ADMIN — WARFARIN SODIUM 5 MG: 5 TABLET ORAL at 18:13

## 2018-07-09 NOTE — PROGRESS NOTES
Orthopedic Progress Note      Patient: Rachel Moser    YOB: 1945    Medical Record Number: [id number]    Attending Physician: Obed Barney MD    Date of Admission: 7/6/2018  5:05 PM    Admitting Dx:  S/P revision of total hip [Z96.649]  S/P revision of total hip [Z96.649]  S/P revision of total hip [Z96.649]    Status Post: HIP INCISION AND DRAINAGE left hip    Post Operative Day Number: 2    Current Problem List:   Patient Active Problem List   Diagnosis   • Chronic left hip pain   • OA (osteoarthritis) of hip   • Hip dislocation, left (CMS/HCC)   • Status post left hip replacement   • Hypertension   • Rheumatoid arteritis   • History of DVT (deep vein thrombosis)   • Postoperative hypotension   • S/P revision of total hip         Past Medical History:   Diagnosis Date   • Allergic    • Cataract    • High cholesterol    • History of DVT (deep vein thrombosis)    • History of kidney infection     1 MONTH AGO   • History of pulmonary embolus (PE)    • Hypertension    • Paralabral cyst of left hip    • PONV (postoperative nausea and vomiting)    • Presence of vena cava filter    • Rheumatoid arteritis    • Seasonal allergies    • UTI (urinary tract infection)     diagnosed 4/2/18  medically treated presently       SUBJECTIVE: 72 y.o.  female. Awake and alert.  Color pale.  Nauseated.      OBJECTIVE:   Vitals:    07/08/18 1930 07/08/18 2309 07/09/18 0346 07/09/18 0744   BP: (!) 85/47 97/57 102/52 111/55   BP Location: Left arm Left arm Left arm Left arm   Patient Position: Lying Lying Lying Sitting   Pulse: 69 79 86 86   Resp: 16 16 16 16   Temp: 97.8 °F (36.6 °C) 98.3 °F (36.8 °C) 100.3 °F (37.9 °C) 98.9 °F (37.2 °C)   TempSrc: Oral Oral Oral Oral   SpO2: 98% 98% 98% 98%   Weight:       Height:         I/O last 3 completed shifts:  In: 1250 [P.O.:1200; IV Piggyback:50]  Out: 1455 [Urine:1100; Drains:355]    Current Medications:  Scheduled Meds:  atorvastatin 20 mg Oral Daily   ceftriaxone 2 g  Intravenous Q24H   docusate sodium 100 mg Oral BID   enoxaparin 30 mg Subcutaneous Q12H   ferrous sulfate 325 mg Oral Daily With Breakfast   folic acid 800 mcg Oral QAM   furosemide 40 mg Oral QAM   meloxicam 15 mg Oral Daily   metoprolol succinate XL 50 mg Oral QAM   polyethylene glycol 17 g Oral BID   potassium chloride 20 mEq Oral Daily   warfarin 5 mg Oral Daily     PRN Meds:.•  acetaminophen  •  albuterol  •  HYDROcodone-acetaminophen  •  HYDROcodone-acetaminophen  •  ipratropium  •  melatonin  •  [DISCONTINUED] ondansetron **OR** [DISCONTINUED] ondansetron ODT **OR** ondansetron  •  ondansetron **OR** ondansetron ODT **OR** ondansetron    Diagnostic Tests:   Lab Results (last 24 hours)     Procedure Component Value Units Date/Time    Wound Culture - Wound, Hip, Left [[id number]]  (Abnormal) Collected:  07/07/18 1329    Specimen:  Wound from Hip, Left Updated:  07/09/18 0730     Wound Culture Heavy growth (4+) Escherichia coli ESBL (C)     Comment: Refer to previous wound culture collected on 07/07 at 1245 for KOURTNEY's.   Consider infectious disease consult.  Susceptibility results may not correlate to clinical outcomes.        Gram Stain Result Moderate (3+) WBCs per low power field      Rare (1+) Gram positive bacilli    Wound Culture - Wound, Hip, Left [[id number]]  (Abnormal) Collected:  07/07/18 1247    Specimen:  Wound from Hip, Left Updated:  07/09/18 0729     Wound Culture Scant growth (1+) Escherichia coli ESBL (C)     Comment: Refer to previous wound culture collected on 07/07 at 1245 for KOURTNEY's.   Consider infectious disease consult.  Susceptibility results may not correlate to clinical outcomes.        Gram Stain Result Rare (1+) WBCs per low power field      No organisms seen    Wound Culture - Wound, Hip, Left [[id number]]  (Abnormal) Collected:  07/07/18 1246    Specimen:  Wound from Hip, Left Updated:  07/09/18 0728     Wound Culture Scant growth (1+) Escherichia coli ESBL (C)     Comment: Refer to  previous wound culture collected on 07/07 at 1245 for KOURTNEY's.     Consider infectious disease consult.  Susceptibility results may not correlate to clinical outcomes.        Gram Stain Result No WBCs seen      No organisms seen    Wound Culture - Wound, Hip, Left [[id number]]  (Abnormal)  (Susceptibility) Collected:  07/07/18 1245    Specimen:  Wound from Hip, Left Updated:  07/09/18 0727     Wound Culture Scant growth (1+) Escherichia coli ESBL (C)     Comment:   Consider infectious disease consult.  Susceptibility results may not correlate to clinical outcomes.        Gram Stain Result Rare (1+) WBCs per low power field      No organisms seen    Susceptibility      Escherichia coli ESBL     KOURTNEY     Ampicillin >=32 ug/ml Resistant     Ampicillin + Sulbactam 8 ug/ml Susceptible     Cefazolin >=64 ug/ml Resistant     Cefepime 2 ug/ml Resistant     Cefoxitin 8 ug/ml Susceptible     Ceftriaxone >=64 ug/ml Resistant     Ertapenem <=0.5 ug/ml Susceptible     Gentamicin <=1 ug/ml Susceptible     Levofloxacin >=8 ug/ml Resistant     Meropenem <=0.25 ug/ml Susceptible     Piperacillin + Tazobactam <=4 ug/ml Susceptible     Trimethoprim + Sulfamethoxazole >=320 ug/ml Resistant                    Blood Culture - Blood, [[id number]]  (Normal) Collected:  07/08/18 1241    Specimen:  Blood from Arm, Left Updated:  07/09/18 0722     Blood Culture No growth at less than 24 hours    CBC & Differential [[id number]] Collected:  07/09/18 0352    Specimen:  Blood Updated:  07/09/18 0501    Narrative:       The following orders were created for panel order CBC & Differential.  Procedure                               Abnormality         Status                     ---------                               -----------         ------                     Manual Differential[[id number]]          Abnormal            Final result               Scan Slide[[id number]]                                       Final result               CBC Auto  Differential[[id number]]        Abnormal            Final result                 Please view results for these tests on the individual orders.    CBC Auto Differential [[id number]]  (Abnormal) Collected:  07/09/18 0352    Specimen:  Blood Updated:  07/09/18 0501     WBC 13.89 (H) 10*3/mm3      RBC 2.80 (L) 10*6/mm3      Hemoglobin 8.1 (L) g/dL      Hematocrit 26.4 (L) %      MCV 94.3 fL      MCH 28.9 pg      MCHC 30.7 (L) g/dL      RDW 16.3 (H) %      RDW-SD 56.4 (H) fl      MPV 11.1 fL      Platelets 382 10*3/mm3     Scan Slide [[id number]] Collected:  07/09/18 0352    Specimen:  Blood Updated:  07/09/18 0501     Scan Slide --     Comment: See Manual Differential Results       Manual Differential [[id number]]  (Abnormal) Collected:  07/09/18 0352    Specimen:  Blood Updated:  07/09/18 0501     Neutrophil % 67.0 %      Lymphocyte % 23.0 %      Monocyte % 5.0 %      Metamyelocyte % 4.0 (H) %      Myelocyte % 1.0 (H) %      Neutrophils Absolute 9.31 (H) 10*3/mm3      Lymphocytes Absolute 3.19 10*3/mm3      Monocytes Absolute 0.69 10*3/mm3      RBC Morphology Normal     WBC Morphology Normal     Large Platelets Slight/1+    Comprehensive Metabolic Panel [[id number]]  (Abnormal) Collected:  07/09/18 0352    Specimen:  Blood Updated:  07/09/18 0456     Glucose 105 (H) mg/dL      BUN 11 mg/dL      Creatinine 0.51 (L) mg/dL      Sodium 145 mmol/L      Potassium 4.0 mmol/L      Chloride 107 mmol/L      CO2 27.1 mmol/L      Calcium 8.7 mg/dL      Total Protein 5.4 (L) g/dL      Albumin 2.50 (L) g/dL      ALT (SGPT) 9 U/L      AST (SGOT) 7 U/L      Alkaline Phosphatase 54 U/L      Total Bilirubin <0.2 mg/dL      eGFR Non African Amer 119 mL/min/1.73      Globulin 2.9 gm/dL      A/G Ratio 0.9 g/dL      BUN/Creatinine Ratio 21.6     Anion Gap 10.9 mmol/L     Narrative:       The MDRD GFR formula is only valid for adults with stable renal function between ages 18 and 70.    Protime-INR [[id number]]  (Abnormal) Collected:  07/09/18  0352    Specimen:  Blood Updated:  07/09/18 0437     Protime 20.2 (H) Seconds      INR 1.76 (H)    Blood Culture - Blood, [[id number]]  (Normal) Collected:  07/08/18 1413    Specimen:  Blood from Arm, Right Updated:  07/09/18 0245     Blood Culture No growth at less than 24 hours          PHYSICAL EXAM:   Left hip dressing dry and intact.  Drains intact.  75cc from #1 and 5cc from #2.  Moderate edema to LLE, but calf soft and nontender.  Good motion and sensation.  Not requiring much pain medication.  Nauseated today.  BM during the night.  ABD soft with BS.  HG 8.1.  Complains of occasional light headedness.  Cultures positive for E-coli. ID following. INR 1.76.  Remains of Lovenox bridge.       ASSESSMENT & PLAN:  Continue present treatment.  May require blood transfusion if she becomes more symptomatic.            Date: 7/9/2018    Callie Ruby RN

## 2018-07-09 NOTE — PLAN OF CARE
Problem: Patient Care Overview  Goal: Plan of Care Review  Outcome: Ongoing (interventions implemented as appropriate)   07/09/18 0258   Coping/Psychosocial   Plan of Care Reviewed With patient   Plan of Care Review   Progress improving   OTHER   Outcome Summary client POD 1 L hip I&D with abt spacer placement. Bp running low, client asymptomatic, afebrile; denies any pain or discomfort. up with assist x1 with walker and gait belt, tolerated well. hvacs to LLE both patent and draining. discussed SCDs to BLE at all times r/t hx DVT.      Goal: Individualization and Mutuality  Outcome: Ongoing (interventions implemented as appropriate)    Goal: Discharge Needs Assessment  Outcome: Ongoing (interventions implemented as appropriate)      Problem: Fall Risk (Adult)  Goal: Absence of Fall  Outcome: Ongoing (interventions implemented as appropriate)      Problem: Self-Care Deficit (Adult,Obstetrics,Pediatric)  Goal: Improved Ability to Perform BADL and IADL  Outcome: Ongoing (interventions implemented as appropriate)      Problem: Infection, Risk/Actual (Adult)  Goal: Infection Prevention/Resolution  Outcome: Ongoing (interventions implemented as appropriate)      Problem: Pain, Acute (Adult)  Goal: Acceptable Pain Control/Comfort Level  Outcome: Ongoing (interventions implemented as appropriate)      Problem: Mobility, Physical Impaired (Adult)  Goal: Enhanced Mobility Skills  Outcome: Ongoing (interventions implemented as appropriate)    Goal: Enhanced Functional Ability  Outcome: Ongoing (interventions implemented as appropriate)

## 2018-07-09 NOTE — PROGRESS NOTES
"CC: DVT/ PE/    Interval History:   No real complaints today.    Vital Signs  Temp:  [97.4 °F (36.3 °C)-100.3 °F (37.9 °C)] 100.3 °F (37.9 °C)  Heart Rate:  [69-86] 86  Resp:  [16] 16  BP: ()/(45-60) 102/52    Intake/Output Summary (Last 24 hours) at 07/09/18 0656  Last data filed at 07/09/18 0627   Gross per 24 hour   Intake             1250 ml   Output             1005 ml   Net              245 ml     Flowsheet Rows      First Filed Value   Admission Height  149.9 cm (59.02\") Documented at 07/07/2018 0811   Admission Weight  58.1 kg (128 lb 1.4 oz) Documented at 07/07/2018 0811          PHYSICAL EXAM:  General: No acute distress  Resp:NL Rate, unlabored, clear  CV:NL rate and rhythm, NL PMI, Nl S1 and S2, no Murmur, no gallop, no rub, No JVD. Normal pedal pulses  ABD:Nl sounds, no masses or tenderness, nondistended, no guarding or rebound  Neuro: alert,cooperative and oriented  Extr: No edema or cyanosis, moves all extremities      Results Review:      Results from last 7 days  Lab Units 07/09/18  0352   SODIUM mmol/L 145   POTASSIUM mmol/L 4.0   CHLORIDE mmol/L 107   CO2 mmol/L 27.1   BUN mg/dL 11   CREATININE mg/dL 0.51*   GLUCOSE mg/dL 105*   CALCIUM mg/dL 8.7           Results from last 7 days  Lab Units 07/09/18  0352   WBC 10*3/mm3 13.89*   HEMOGLOBIN g/dL 8.1*   HEMATOCRIT % 26.4*   PLATELETS 10*3/mm3 382       Results from last 7 days  Lab Units 07/09/18  0352 07/08/18  0317 07/07/18  1038   INR  1.76* 1.45* 1.44*                 I reviewed the patient's new clinical results.  I personally viewed and interpreted the patient's EKG/Telemetry data        Medication Review:   Meds reviewed         Assessment/Plan  1.  History of DVT and pulmonary embolus: In 2011.  INR up to 1.8.  Should be able to DC the Lovenox when INR is over 2.  2.  History of IVC filter  3.  Left hip infection.  Reoperation.  White count better per orthopedic.  4.  Chronic anticoagulation use  5.  Anemia        Vijay Arango, " MD  07/09/18  6:56 AM

## 2018-07-09 NOTE — PLAN OF CARE
Problem: Patient Care Overview  Goal: Plan of Care Review  Outcome: Ongoing (interventions implemented as appropriate)   07/09/18 7799   Coping/Psychosocial   Plan of Care Reviewed With patient;spouse;family   Plan of Care Review   Progress improving   OTHER   Outcome Summary pt agreeable to amb , but nausea limiting dist, min pain reported

## 2018-07-09 NOTE — PROGRESS NOTES
LOS: 3 days     Chief Complaint:  Left prosthetic hip septic arthritis    Interval History:  Low grade temp this morning at 4am. Resolved now.  Reports some nausea today.  No vomiting.  No abdominal pain or diarrhea.  Postoperative pain is well-controlled.  No shortness of breath or cough.  Tolerating antibiotics without a rash.    Vital Signs  Temp:  [97.4 °F (36.3 °C)-100.3 °F (37.9 °C)] 98.9 °F (37.2 °C)  Heart Rate:  [69-86] 86  Resp:  [16] 16  BP: ()/(45-57) 111/55    Physical Exam:  General: In no acute distress  Cardiovascular: RRR, no LE edema   Respiratory: Breathing comfortably on room air  GI: Soft, NT/ND, + bowel sounds bilaterall  Skin: No rashes  Extremities: Left knee with dressing in place, drain in place    Antibiotics:  Ceftriaxone 2g IV q24hrs     Results Review:      Lab Results   Component Value Date    WBC 13.89 (H) 07/09/2018    HGB 8.1 (L) 07/09/2018    HCT 26.4 (L) 07/09/2018    MCV 94.3 07/09/2018     07/09/2018     Lab Results   Component Value Date    GLUCOSE 105 (H) 07/09/2018    BUN 11 07/09/2018    CREATININE 0.51 (L) 07/09/2018    EGFRIFNONA 119 07/09/2018    BCR 21.6 07/09/2018    CO2 27.1 07/09/2018    CALCIUM 8.7 07/09/2018    ALBUMIN 2.50 (L) 07/09/2018    LABIL2 0.9 07/09/2018    AST 7 07/09/2018    ALT 9 07/09/2018       Microbiology:  7/8 BCx NGTD x 2  7/7 L hip wound ESBL E coli     Assessment/Plan   1.  Left prosthetic hip septic arthritis status post operative incision and drainage with retained hardware on July 7  - Cultures with ESBL Escherichia coli  - DC ceftriaxone and start ertapenem 1g IV q24hrs  - Patient will require a 6 week course of antibiotics - ertapenem 1g IV q24hrs x 6 weeks. Abx stop date 8/25  - Please monitor weekly CBC with differential and CMP while on antibiotics and fax the results to the infectious disease clinic at [phone]  - We'll arrange for ID follow-up on 8/24  - Patient will need a PICC line but I would like to wait at least  48 hours to make sure the blood cultures remain negative.  We'll order a PICC line tomorrow.  - I will consult CCP for home IV antibiotics     2.  Fever  - Blood cultures ×2 are NGTD     3.  Leukocytosis secondary to above - decreased   - Continue to monitor    We will see again on Wednesday. Call with questions and concerns

## 2018-07-10 LAB
ABO GROUP BLD: NORMAL
BLD GP AB SCN SERPL QL: NEGATIVE
HCT VFR BLD AUTO: 25.2 % (ref 35.6–45.5)
HGB BLD-MCNC: 8.1 G/DL (ref 11.9–15.5)
INR PPP: 1.7 (ref 0.9–1.1)
PROTHROMBIN TIME: 19.7 SECONDS (ref 11.7–14.2)
RH BLD: POSITIVE
T&S EXPIRATION DATE: NORMAL

## 2018-07-10 PROCEDURE — 25010000002 ERTAPENEM PER 500 MG: Performed by: INTERNAL MEDICINE

## 2018-07-10 PROCEDURE — 36430 TRANSFUSION BLD/BLD COMPNT: CPT

## 2018-07-10 PROCEDURE — 86900 BLOOD TYPING SEROLOGIC ABO: CPT

## 2018-07-10 PROCEDURE — 86901 BLOOD TYPING SEROLOGIC RH(D): CPT | Performed by: ORTHOPAEDIC SURGERY

## 2018-07-10 PROCEDURE — 99232 SBSQ HOSP IP/OBS MODERATE 35: CPT | Performed by: INTERNAL MEDICINE

## 2018-07-10 PROCEDURE — P9016 RBC LEUKOCYTES REDUCED: HCPCS

## 2018-07-10 PROCEDURE — 85014 HEMATOCRIT: CPT | Performed by: ORTHOPAEDIC SURGERY

## 2018-07-10 PROCEDURE — 94799 UNLISTED PULMONARY SVC/PX: CPT

## 2018-07-10 PROCEDURE — 86900 BLOOD TYPING SEROLOGIC ABO: CPT | Performed by: ORTHOPAEDIC SURGERY

## 2018-07-10 PROCEDURE — 86850 RBC ANTIBODY SCREEN: CPT | Performed by: ORTHOPAEDIC SURGERY

## 2018-07-10 PROCEDURE — 85610 PROTHROMBIN TIME: CPT | Performed by: ORTHOPAEDIC SURGERY

## 2018-07-10 PROCEDURE — 85018 HEMOGLOBIN: CPT | Performed by: ORTHOPAEDIC SURGERY

## 2018-07-10 PROCEDURE — 97110 THERAPEUTIC EXERCISES: CPT

## 2018-07-10 PROCEDURE — 86923 COMPATIBILITY TEST ELECTRIC: CPT

## 2018-07-10 RX ORDER — WARFARIN SODIUM 7.5 MG/1
7.5 TABLET ORAL
Status: DISCONTINUED | OUTPATIENT
Start: 2018-07-10 | End: 2018-07-12

## 2018-07-10 RX ADMIN — ATORVASTATIN CALCIUM 20 MG: 20 TABLET, FILM COATED ORAL at 07:58

## 2018-07-10 RX ADMIN — ACETAMINOPHEN 800 MCG: 400 TABLET ORAL at 06:56

## 2018-07-10 RX ADMIN — POTASSIUM CHLORIDE 20 MEQ: 750 CAPSULE, EXTENDED RELEASE ORAL at 07:58

## 2018-07-10 RX ADMIN — SODIUM CHLORIDE 1 G: 900 INJECTION INTRAVENOUS at 09:49

## 2018-07-10 RX ADMIN — FERROUS SULFATE TAB 325 MG (65 MG ELEMENTAL FE) 325 MG: 325 (65 FE) TAB at 07:57

## 2018-07-10 RX ADMIN — WARFARIN SODIUM 7.5 MG: 7.5 TABLET ORAL at 17:31

## 2018-07-10 RX ADMIN — MELOXICAM 15 MG: 15 TABLET ORAL at 07:58

## 2018-07-10 NOTE — PLAN OF CARE
Problem: Patient Care Overview  Goal: Plan of Care Review  Outcome: Ongoing (interventions implemented as appropriate)   07/10/18 8023   Coping/Psychosocial   Plan of Care Reviewed With patient   Plan of Care Review   Progress improving   OTHER   Outcome Summary Pt doing well, progressing with ambulation distance this session, also able to complete total hip exercises.

## 2018-07-10 NOTE — PROGRESS NOTES
Orthopedic Total Hip Progress Note      Patient: Rachel Moser  Date of Admission: 7/6/2018  YOB: 1945  Medical Record Number: [id number]    POD # :  3 Days Post-Op Procedure(s) (LRB):  I&D and antibiotic bead placement left hip (Left)    Systemic or Specific Complaints: No Complaints    Pain Relief: some relief    Physical Exam:  72 y.o.  female  Vitals:  Temp:  [98.7 °F (37.1 °C)-99 °F (37.2 °C)] 98.7 °F (37.1 °C)  Heart Rate:  [] 87  Resp:  [16-20] 18  BP: ()/(45-55) 102/51  alert and oriented  Chest: Clear to auscultation  CV: Regular Rate and Rhythm  Abd: Soft, NT, with BS +  Ext: NV intact. ROM appropriate. Calf is soft and nontender. Negative Homans Sn  Skin: Incision clean dry and intact w/out signs or  symptoms of infection.    Activity: Mobilizing Per P.T.   Weight Bearing: As Tolerated    Data Review     Admission on 07/06/2018   Component Date Value Ref Range Status   • Glucose 07/06/2018 160* 65 - 99 mg/dL Final   • BUN 07/06/2018 13  8 - 23 mg/dL Final   • Creatinine 07/06/2018 0.57  0.57 - 1.00 mg/dL Final   • Sodium 07/06/2018 142  136 - 145 mmol/L Final   • Potassium 07/06/2018 4.3  3.5 - 5.2 mmol/L Final   • Chloride 07/06/2018 105  98 - 107 mmol/L Final   • CO2 07/06/2018 26.8  22.0 - 29.0 mmol/L Final   • Calcium 07/06/2018 9.3  8.6 - 10.5 mg/dL Final   • eGFR Non African Amer 07/06/2018 105  >60 mL/min/1.73 Final   • BUN/Creatinine Ratio 07/06/2018 22.8  7.0 - 25.0 Final   • Anion Gap 07/06/2018 10.2  mmol/L Final   • WBC 07/06/2018 28.75* 4.50 - 10.70 10*3/mm3 Final   • RBC 07/06/2018 3.93  3.90 - 5.20 10*6/mm3 Final   • Hemoglobin 07/06/2018 11.5* 11.9 - 15.5 g/dL Final   • Hematocrit 07/06/2018 36.6  35.6 - 45.5 % Final   • MCV 07/06/2018 93.1  80.5 - 98.2 fL Final   • MCH 07/06/2018 29.3  26.9 - 32.0 pg Final   • MCHC 07/06/2018 31.4* 32.4 - 36.3 g/dL Final   • RDW 07/06/2018 15.9* 11.7 - 13.0 % Final   • RDW-SD 07/06/2018 54.0  37.0 - 54.0 fl Final   • MPV  07/06/2018 10.9  6.0 - 12.0 fL Final   • Platelets 07/06/2018 455  140 - 500 10*3/mm3 Final   • Neutrophil % 07/06/2018 87.2* 42.7 - 76.0 % Final   • Lymphocyte % 07/06/2018 7.0* 19.6 - 45.3 % Final   • Monocyte % 07/06/2018 5.6  5.0 - 12.0 % Final   • Eosinophil % 07/06/2018 0.0* 0.3 - 6.2 % Final   • Basophil % 07/06/2018 0.2  0.0 - 1.5 % Final   • Immature Grans % 07/06/2018 3.0* 0.0 - 0.5 % Final   • Neutrophils, Absolute 07/06/2018 25.08* 1.90 - 8.10 10*3/mm3 Final   • Lymphocytes, Absolute 07/06/2018 2.00  0.90 - 4.80 10*3/mm3 Final   • Monocytes, Absolute 07/06/2018 1.61* 0.20 - 1.20 10*3/mm3 Final   • Eosinophils, Absolute 07/06/2018 0.01  0.00 - 0.70 10*3/mm3 Final   • Basophils, Absolute 07/06/2018 0.05  0.00 - 0.20 10*3/mm3 Final   • Immature Grans, Absolute 07/06/2018 0.85* 0.00 - 0.03 10*3/mm3 Final   • ABO Type 07/06/2018 A   Final   • RH type 07/06/2018 Positive   Final   • Antibody Screen 07/06/2018 Negative   Final   • T&S Expiration Date 07/06/2018 7/9/2018 11:59:59 PM   Final   • Protime 07/06/2018 45.0* 11.7 - 14.2 Seconds Final   • INR 07/06/2018 4.90* 0.90 - 1.10 Final   • Product Code 07/07/2018 [id number]   Final   • Unit Number 07/07/2018 [id number]   Final   • UNIT  ABO 07/07/2018 A   Final   • UNIT  RH 07/07/2018 POS   Final   • Dispense Status 07/07/2018 PT   Final   • Blood Type 07/07/2018 APOS   Final   • Blood Expiration Date 07/07/2018 [id number]   Final   • Blood Type Barcode 07/07/2018 6200   Final   • Protime 07/07/2018 22.7* 11.7 - 14.2 Seconds Final   • INR 07/07/2018 2.05* 0.90 - 1.10 Final   • Product Code 07/08/2018 [id number]   Final   • Unit Number 07/08/2018 [id number]   Final   • UNIT  ABO 07/08/2018 A   Final   • UNIT  RH 07/08/2018 NEG   Final   • Dispense Status 07/08/2018 PT   Final   • Blood Type 07/08/2018 ANEG   Final   • Blood Expiration Date 07/08/2018 [id number]   Final   • Blood Type Barcode 07/08/2018 0600   Final   • Protime 07/07/2018 19.2* 11.7 -  14.2 Seconds Final   • INR 07/07/2018 1.64* 0.90 - 1.10 Final   • Protime 07/07/2018 17.3* 11.7 - 14.2 Seconds Final   • INR 07/07/2018 1.44* 0.90 - 1.10 Final   • Wound Culture 07/07/2018 Scant growth (1+) Escherichia coli ESBL*  Final      Consider infectious disease consult.  Susceptibility results may not correlate to clinical outcomes.   • Gram Stain Result 07/07/2018 Rare (1+) WBCs per low power field   Final   • Gram Stain Result 07/07/2018 No organisms seen   Final   • Wound Culture 07/07/2018 Scant growth (1+) Escherichia coli ESBL*  Final    Refer to previous wound culture collected on 07/07 at 1245 for KOURTNEY's.     Consider infectious disease consult.  Susceptibility results may not correlate to clinical outcomes.   • Gram Stain Result 07/07/2018 No WBCs seen   Final   • Gram Stain Result 07/07/2018 No organisms seen   Final   • Wound Culture 07/07/2018 Scant growth (1+) Escherichia coli ESBL*  Final    Refer to previous wound culture collected on 07/07 at 1245 for KOURTNEY's.   Consider infectious disease consult.  Susceptibility results may not correlate to clinical outcomes.   • Gram Stain Result 07/07/2018 Rare (1+) WBCs per low power field   Final   • Gram Stain Result 07/07/2018 No organisms seen   Final   • Wound Culture 07/07/2018 Heavy growth (4+) Escherichia coli ESBL*  Final    Refer to previous wound culture collected on 07/07 at 1245 for KOURTNEY's.   Consider infectious disease consult.  Susceptibility results may not correlate to clinical outcomes.   • Gram Stain Result 07/07/2018 Moderate (3+) WBCs per low power field   Final   • Gram Stain Result 07/07/2018 Rare (1+) Gram positive bacilli   Final   • Protime 07/08/2018 17.4* 11.7 - 14.2 Seconds Final   • INR 07/08/2018 1.45* 0.90 - 1.10 Final   • Hemoglobin 07/08/2018 8.0* 11.9 - 15.5 g/dL Final   • Hematocrit 07/08/2018 26.5* 35.6 - 45.5 % Final   • Blood Culture 07/08/2018 No growth at 24 hours   Preliminary   • Blood Culture 07/08/2018 No growth  at 24 hours   Preliminary   • Protime 07/09/2018 20.2* 11.7 - 14.2 Seconds Final   • INR 07/09/2018 1.76* 0.90 - 1.10 Final   • Glucose 07/09/2018 105* 65 - 99 mg/dL Final   • BUN 07/09/2018 11  8 - 23 mg/dL Final   • Creatinine 07/09/2018 0.51* 0.57 - 1.00 mg/dL Final   • Sodium 07/09/2018 145  136 - 145 mmol/L Final   • Potassium 07/09/2018 4.0  3.5 - 5.2 mmol/L Final   • Chloride 07/09/2018 107  98 - 107 mmol/L Final   • CO2 07/09/2018 27.1  22.0 - 29.0 mmol/L Final   • Calcium 07/09/2018 8.7  8.6 - 10.5 mg/dL Final   • Total Protein 07/09/2018 5.4* 6.0 - 8.5 g/dL Final   • Albumin 07/09/2018 2.50* 3.50 - 5.20 g/dL Final   • ALT (SGPT) 07/09/2018 9  1 - 33 U/L Final   • AST (SGOT) 07/09/2018 7  1 - 32 U/L Final   • Alkaline Phosphatase 07/09/2018 54  39 - 117 U/L Final   • Total Bilirubin 07/09/2018 <0.2  0.1 - 1.2 mg/dL Final   • eGFR Non African Amer 07/09/2018 119  >60 mL/min/1.73 Final   • Globulin 07/09/2018 2.9  gm/dL Final   • A/G Ratio 07/09/2018 0.9  g/dL Final   • BUN/Creatinine Ratio 07/09/2018 21.6  7.0 - 25.0 Final   • Anion Gap 07/09/2018 10.9  mmol/L Final   • WBC 07/09/2018 13.89* 4.50 - 10.70 10*3/mm3 Final   • RBC 07/09/2018 2.80* 3.90 - 5.20 10*6/mm3 Final   • Hemoglobin 07/09/2018 8.1* 11.9 - 15.5 g/dL Final   • Hematocrit 07/09/2018 26.4* 35.6 - 45.5 % Final   • MCV 07/09/2018 94.3  80.5 - 98.2 fL Final   • MCH 07/09/2018 28.9  26.9 - 32.0 pg Final   • MCHC 07/09/2018 30.7* 32.4 - 36.3 g/dL Final   • RDW 07/09/2018 16.3* 11.7 - 13.0 % Final   • RDW-SD 07/09/2018 56.4* 37.0 - 54.0 fl Final   • MPV 07/09/2018 11.1  6.0 - 12.0 fL Final   • Platelets 07/09/2018 382  140 - 500 10*3/mm3 Final   • Scan Slide 07/09/2018    Final    See Manual Differential Results   • Neutrophil % 07/09/2018 67.0  42.7 - 76.0 % Final   • Lymphocyte % 07/09/2018 23.0  19.6 - 45.3 % Final   • Monocyte % 07/09/2018 5.0  5.0 - 12.0 % Final   • Metamyelocyte % 07/09/2018 4.0* 0.0 - 0.0 % Final   • Myelocyte % 07/09/2018  1.0* 0.0 - 0.0 % Final   • Neutrophils Absolute 07/09/2018 9.31* 1.90 - 8.10 10*3/mm3 Final   • Lymphocytes Absolute 07/09/2018 3.19  0.90 - 4.80 10*3/mm3 Final   • Monocytes Absolute 07/09/2018 0.69  0.20 - 1.20 10*3/mm3 Final   • RBC Morphology 07/09/2018 Normal  Normal Final   • WBC Morphology 07/09/2018 Normal  Normal Final   • Large Platelets 07/09/2018 Slight/1+  None Seen Final   • Protime 07/10/2018 19.7* 11.7 - 14.2 Seconds Final   • INR 07/10/2018 1.70* 0.90 - 1.10 Final   • Hemoglobin 07/10/2018 8.1* 11.9 - 15.5 g/dL Final   • Hematocrit 07/10/2018 25.2* 35.6 - 45.5 % Final       No results found.    Medications:    atorvastatin 20 mg Oral Daily   docusate sodium 100 mg Oral BID   ertapenem 1 g Intravenous Q24H   ferrous sulfate 325 mg Oral Daily With Breakfast   folic acid 800 mcg Oral QAM   furosemide 40 mg Oral QAM   meloxicam 15 mg Oral Daily   metoprolol succinate XL 50 mg Oral QAM   polyethylene glycol 17 g Oral BID   potassium chloride 20 mEq Oral Daily   warfarin 7.5 mg Oral Daily     •  acetaminophen  •  albuterol  •  HYDROcodone-acetaminophen  •  HYDROcodone-acetaminophen  •  ipratropium  •  melatonin  •  [DISCONTINUED] ondansetron **OR** [DISCONTINUED] ondansetron ODT **OR** ondansetron  •  ondansetron **OR** ondansetron ODT **OR** ondansetron    Assessment:  Doing well POD  # 3 Days Post-Op Procedure(s) (LRB):  I&D and antibiotic bead placement left hip (Left)  Problem List Items Addressed This Visit        Other    S/P revision of total hip    Relevant Medications    vancomycin 750 mg/250 mL 0.9% NS add-vantage (Completed)    Other Relevant Orders    Body fluid cell count - Body Fluid,    Anaerobic Culture - Wound, Hip, Left    Anaerobic Culture - Wound, Hip, Left    Anaerobic Culture - Wound, Hip, Left    Wound Culture - Wound, Hip, Left (Completed)    Wound Culture - Wound, Hip, Left (Completed)    Wound Culture - Wound, Hip, Left (Completed)    Anaerobic Culture - Wound, Hip, Left    Wound  Culture - Wound, Hip, Left (Completed)          Plan:  Posterior hip precautions  Continue efforts to mobilize  Continue Pain Control Measures  Continue incisional Care- low output drain to be discontinued today  DVT prophylaxis  - h/o dvt/pe. INR currently 1.7 will increase Coumadin.  Will discontinue Lovenox when INR at 2 above  Symptomatic blood loss anemia.  We'll transfuse 2 units of packed red blood cells  Escherichia coli left periprosthetic hip infection.  On antibiotics per ID    Discharge Plan:Home when medically ready    Obed Barney MD    Date: 7/10/2018  Time: 7:26 AM

## 2018-07-10 NOTE — THERAPY TREATMENT NOTE
Acute Care - Physical Therapy Treatment Note  [facility]     Patient Name: Rachel Moser  : 1945  MRN: [id number]  Today's Date: 7/10/2018  Onset of Illness/Injury or Date of Surgery: 18          Admit Date: 2018    Visit Dx:    ICD-10-CM ICD-9-CM   1. Difficulty walking R26.2 719.7   2. S/P revision of total hip Z96.649 V43.64     Patient Active Problem List   Diagnosis   • Chronic left hip pain   • OA (osteoarthritis) of hip   • Hip dislocation, left (CMS/HCC)   • Status post left hip replacement   • Hypertension   • Rheumatoid arteritis   • History of DVT (deep vein thrombosis)   • Postoperative hypotension   • S/P revision of total hip       Therapy Treatment          Rehabilitation Treatment Summary     Row Name 07/10/18 1415             Treatment Time/Intention    Discipline physical therapist  -EJ      Document Type therapy note (daily note)  -EJ      Subjective Information no complaints  -EJ      Mode of Treatment physical therapy  -EJ      Patient/Family Observations reclined in chair, no acute distress  -EJ      Patient Effort good  -EJ      Existing Precautions/Restrictions hip, posterior;left  -EJ      Recorded by [EJ] Misty Bradford, PT 07/10/18 1434      Row Name 07/10/18 1415             Cognitive Assessment/Intervention- PT/OT    Personal Safety Interventions fall prevention program maintained;gait belt;supervised activity;nonskid shoes/slippers when out of bed  -EJ      Recorded by [EJ] Misty Bradford, PT 07/10/18 1434      Row Name 07/10/18 1415             Bed Mobility Assessment/Treatment    Comment (Bed Mobility) up in chair  -EJ      Recorded by [EJ] Misty Bradford, PT 07/10/18 1434      Row Name 07/10/18 1415             Sit-Stand Transfer    Sit-Stand [place] (Transfers) verbal cues;stand by assist  -EJ      Assistive Device (Sit-Stand Transfers) walker, front-wheeled  -EJ      Recorded by [EJ] Misty Bradford, PT 07/10/18 1434      Row Name 07/10/18 1415              Stand-Sit Transfer    Stand-Sit [place] (Transfers) verbal cues;stand by assist  -EJ      Assistive Device (Stand-Sit Transfers) walker, front-wheeled  -EJ      Recorded by [EJ] Misty Bradford, PT 07/10/18 1434      Row Name 07/10/18 1415             Gait/Stairs Assessment/Training    [place] Level (Gait) verbal cues;contact guard  -EJ      Assistive Device (Gait) walker, front-wheeled  -EJ      Distance in Feet (Gait) 120  -EJ      Deviations/Abnormal Patterns (Gait) antalgic;yanni decreased;stride length decreased  -EJ      Bilateral Gait Deviations forward flexed posture  -EJ      Recorded by [EJ] Misty Bradford, PT 07/10/18 1434      Row Name 07/10/18 1415             Motor Skills Assessment/Interventions    Additional Documentation --   L THR protocol x 10 reps  -EJ      Recorded by [EJ] Misty Bradford, PT 07/10/18 1434      Row Name 07/10/18 1415             Positioning and Restraints    Pre-Treatment Position sitting in chair/recliner  -EJ      Post Treatment Position chair  -EJ      In Chair notified nsg;reclined;call light within reach;encouraged to call for assist;with family/caregiver  -EJ      Recorded by [EJ] Misty Bradford, PT 07/10/18 1434      Row Name 07/10/18 1415             Pain Scale: Numbers Pre/Post-Treatment    Pain Scale: Numbers, Pretreatment 2/10  -EJ      Pain Location - Side Left  -EJ      Pain Location hip  -EJ      Recorded by [EJ] Misty Bradford, PT 07/10/18 1434      Row Name                Wound 07/07/18 1358 Left hip incision    Wound - Properties Group Date first assessed: 07/07/18 [HH] Time first assessed: 1358 [HH] Side: Left [HH] Location: hip [HH] Type: incision [HH] Recorded by:  [HH] Tamara Lyons RN 07/07/18 1358      User Key  (r) = Recorded By, (t) = Taken By, (c) = Cosigned By    Initials Name Effective Dates Discipline    HH Tamara Lyons RN 06/16/16 -  Nurse    EJ Misty Bradford, PT 04/03/18 -  PT          Wound  07/07/18 1358 Left hip incision (Active)   Dressing Appearance dry;intact;no drainage 7/10/2018 12:00 PM   Closure REINALDO 7/10/2018 12:00 PM   Base dressing in place, unable to visualize 7/9/2018  8:00 PM   Drainage Amount none 7/10/2018 12:00 PM   Dressing Care, Wound gauze 7/9/2018  8:00 PM             Physical Therapy Education     Title: PT OT SLP Therapies (Done)     Topic: Physical Therapy (Done)     Point: Mobility training (Done)    Learning Progress Summary     Learner Status Readiness Method Response Comment Documented by    Patient Done Acceptance E,TB,D VU,NR   07/10/18 1434     Done Acceptance E,TB TALIA   07/09/18 1719     Done Acceptance E VU   07/08/18 0929    Family Done Acceptance E,TB TALIA   07/09/18 1719          Point: Home exercise program (Done)    Learning Progress Summary     Learner Status Readiness Method Response Comment Documented by    Patient Done Acceptance E,TB,D TALIA,NR  EJ 07/10/18 1434     Done Acceptance E,TB TALIA   07/09/18 1719     Done Acceptance E VU   07/08/18 0929    Family Done Acceptance E,TB TALIA   07/09/18 1719          Point: Body mechanics (Done)    Learning Progress Summary     Learner Status Readiness Method Response Comment Documented by    Patient Done Acceptance E,TB TALIA   07/09/18 1719     Done Acceptance E VU   07/08/18 0929    Family Done Acceptance E,TB [facility] 07/09/18 1719          Point: Precautions (Done)    Learning Progress Summary     Learner Status Readiness Method Response Comment Documented by    Patient Done Acceptance E,TB TALIA   07/09/18 1719     Done Acceptance E VU   07/08/18 0929    Family Done Acceptance E,TB [facility] 07/09/18 1719                      User Key     Initials Effective Dates Name Provider Type Discipline     03/07/18 -  Michelle Jamison, PTA Physical Therapy Assistant PT    EJ 04/03/18 -  Misty Bradford, PT Physical Therapist PT    CN 04/03/18 -  Carmelina Painting, PT Physical Therapist PT                    PT  Recommendation and Plan     Plan of Care Reviewed With: patient  Progress: improving  Outcome Summary: Pt doing well, progressing with ambulation distance this session, also able to complete total hip exercises.           Outcome Measures     Row Name 07/10/18 1400 07/09/18 1200 07/08/18 0900       How much help from another person do you currently need...    Turning from your back to your side while in flat bed without using bedrails? 3  -EJ 3  -JM 2  -CN    Moving from lying on back to sitting on the side of a flat bed without bedrails? 3  -EJ 3  -JM 2  -CN    Moving to and from a bed to a chair (including a wheelchair)? 3  -EJ 3  -JM 3  -CN    Standing up from a chair using your arms (e.g., wheelchair, bedside chair)? 3  -EJ 3  -JM 3  -CN    Climbing 3-5 steps with a railing? 3  -EJ 2  -JM 2  -CN    To walk in hospital room? 3  -EJ 3  -JM 3  -CN    AM-PAC 6 Clicks Score 18  -EJ 17  -JM 15  -CN       Functional Assessment    Outcome Measure Options AM-PAC 6 Clicks Basic Mobility (PT)  -EJ  -- AM-PAC 6 Clicks Basic Mobility (PT)  -CN      User Key  (r) = Recorded By, (t) = Taken By, (c) = Cosigned By    Initials Name Provider Type    LEXA Jamison, PTA Physical Therapy Assistant    MICHAEL Bradford, PT Physical Therapist    COLLINS Painting, PT Physical Therapist           Time Calculation:         PT Charges     Row Name 07/10/18 1435             Time Calculation    Start Time 1415  -EJ      Stop Time 1432  -EJ      Time Calculation (min) 17 min  -EJ      PT Received On 07/10/18  -EJ      PT - Next Appointment 07/11/18  -EJ        User Key  (r) = Recorded By, (t) = Taken By, (c) = Cosigned By    Initials Name Provider Type    MICHAEL Bradford, PT Physical Therapist        Therapy Suggested Charges     Code   Minutes Charges    None           Therapy Charges for Today     Code Description Service Date Service Provider Modifiers Qty    00901217111 HC PT THER PROC EA 15 MIN 7/10/2018 Misty BROWNLEE  Suzette, PT GP 1          PT G-Codes  Outcome Measure Options: AM-PAC 6 Clicks Basic Mobility (PT)    Misty Bradford, PT  7/10/2018

## 2018-07-10 NOTE — PROGRESS NOTES
"CC: DVT/PE    Interval History:   Some soreness but has been up moving around    Vital Signs  Temp:  [98.7 °F (37.1 °C)-99 °F (37.2 °C)] 98.7 °F (37.1 °C)  Heart Rate:  [] 87  Resp:  [16-20] 18  BP: ()/(45-55) 102/51    Intake/Output Summary (Last 24 hours) at 07/10/18 0650  Last data filed at 07/10/18 0603   Gross per 24 hour   Intake              450 ml   Output              175 ml   Net              275 ml     Flowsheet Rows      First Filed Value   Admission Height  149.9 cm (59.02\") Documented at 07/07/2018 0811   Admission Weight  58.1 kg (128 lb 1.4 oz) Documented at 07/07/2018 0811          PHYSICAL EXAM:  General: No acute distress  Resp:NL Rate, unlabored, clear  CV:NL rate and rhythm, NL PMI, Nl S1 and S2, no Murmur, no gallop, no rub, No JVD. Normal pedal pulses  ABD:Nl sounds, no masses or tenderness, nondistended, no guarding or rebound  Neuro: alert,cooperative and oriented  Extr: No edema or cyanosis, moves all extremities      Results Review:      Results from last 7 days  Lab Units 07/09/18  0352   SODIUM mmol/L 145   POTASSIUM mmol/L 4.0   CHLORIDE mmol/L 107   CO2 mmol/L 27.1   BUN mg/dL 11   CREATININE mg/dL 0.51*   GLUCOSE mg/dL 105*   CALCIUM mg/dL 8.7           Results from last 7 days  Lab Units 07/10/18  0345 07/09/18  0352   WBC 10*3/mm3  --  13.89*   HEMOGLOBIN g/dL 8.1* 8.1*   HEMATOCRIT % 25.2* 26.4*   PLATELETS 10*3/mm3  --  382       Results from last 7 days  Lab Units 07/10/18  0345 07/09/18  0352 07/08/18  0317   INR  1.70* 1.76* 1.45*                 I reviewed the patient's new clinical results.  I personally viewed and interpreted the patient's EKG/Telemetry data        Medication Review:   Meds reviewed         Assessment/Plan  1.  History of DVT and pulmonary embolus: In 2011.  INR up to 1.8.  Should be able to DC the Lovenox when INR is over 2.Will increase warfarin today  2.  History of IVC filter  3.  Left hip infection.  Reoperation.  E Coli. Per ID.  4. "  Chronic anticoagulation use  5.  Anemia.  To receive transfusion.        Vijay Arango MD  07/10/18  6:50 AM

## 2018-07-10 NOTE — PLAN OF CARE
Problem: Patient Care Overview  Goal: Plan of Care Review  Outcome: Ongoing (interventions implemented as appropriate)   07/10/18 1326   Coping/Psychosocial   Plan of Care Reviewed With patient   Plan of Care Review   Progress improving   OTHER   Outcome Summary PT PROGRESSING WELL WITH AMBULATION. PAIN WELL CONTROLLED WITH NO PAIN MEDS. CONTINUES IV ABT FOR INFECTION. H/V DRAINS STILL INTACT. RECEIVING 2ND UNIT OF BLOOD. PT EDUCATED REGARDING LOW B/P AND IMPROVING WITH BLOOD ADMIN.     Goal: Individualization and Mutuality  Outcome: Ongoing (interventions implemented as appropriate)    Goal: Interprofessional Rounds/Family Conf  Outcome: Ongoing (interventions implemented as appropriate)      Problem: Fall Risk (Adult)  Goal: Absence of Fall  Outcome: Ongoing (interventions implemented as appropriate)      Problem: Self-Care Deficit (Adult,Obstetrics,Pediatric)  Goal: Improved Ability to Perform BADL and IADL  Outcome: Ongoing (interventions implemented as appropriate)      Problem: Infection, Risk/Actual (Adult)  Goal: Infection Prevention/Resolution  Outcome: Ongoing (interventions implemented as appropriate)      Problem: Pain, Acute (Adult)  Goal: Acceptable Pain Control/Comfort Level  Outcome: Ongoing (interventions implemented as appropriate)      Problem: Mobility, Physical Impaired (Adult)  Goal: Enhanced Functional Ability  Outcome: Ongoing (interventions implemented as appropriate)

## 2018-07-10 NOTE — PLAN OF CARE
Problem: Patient Care Overview  Goal: Plan of Care Review  Outcome: Ongoing (interventions implemented as appropriate)   07/10/18 0522   Plan of Care Review   Progress improving   OTHER   Outcome Summary VSS except low BP, Hbg 8.1, no c/o pain or nausea this shift, rested well, family at bedside, dressing c/d/i     Goal: Individualization and Mutuality  Outcome: Ongoing (interventions implemented as appropriate)    Goal: Discharge Needs Assessment  Outcome: Ongoing (interventions implemented as appropriate)    Goal: Interprofessional Rounds/Family Conf  Outcome: Ongoing (interventions implemented as appropriate)      Problem: Fall Risk (Adult)  Goal: Absence of Fall  Outcome: Ongoing (interventions implemented as appropriate)      Problem: Self-Care Deficit (Adult,Obstetrics,Pediatric)  Goal: Improved Ability to Perform BADL and IADL  Outcome: Ongoing (interventions implemented as appropriate)      Problem: Infection, Risk/Actual (Adult)  Goal: Infection Prevention/Resolution  Outcome: Ongoing (interventions implemented as appropriate)      Problem: Pain, Acute (Adult)  Goal: Acceptable Pain Control/Comfort Level  Outcome: Ongoing (interventions implemented as appropriate)      Problem: Mobility, Physical Impaired (Adult)  Goal: Enhanced Mobility Skills  Outcome: Ongoing (interventions implemented as appropriate)    Goal: Enhanced Functional Ability  Outcome: Ongoing (interventions implemented as appropriate)

## 2018-07-11 LAB
ABO + RH BLD: NORMAL
ABO + RH BLD: NORMAL
ALBUMIN SERPL-MCNC: 2.4 G/DL (ref 3.5–5.2)
ALBUMIN/GLOB SERPL: 0.8 G/DL
ALP SERPL-CCNC: 55 U/L (ref 39–117)
ALT SERPL W P-5'-P-CCNC: 7 U/L (ref 1–33)
ANION GAP SERPL CALCULATED.3IONS-SCNC: 9.9 MMOL/L
AST SERPL-CCNC: 7 U/L (ref 1–32)
BASOPHILS # BLD AUTO: 0.06 10*3/MM3 (ref 0–0.2)
BASOPHILS NFR BLD AUTO: 0.4 % (ref 0–1.5)
BH BB BLOOD EXPIRATION DATE: NORMAL
BH BB BLOOD EXPIRATION DATE: NORMAL
BH BB BLOOD TYPE BARCODE: 6200
BH BB BLOOD TYPE BARCODE: 6200
BH BB DISPENSE STATUS: NORMAL
BH BB DISPENSE STATUS: NORMAL
BH BB PRODUCT CODE: NORMAL
BH BB PRODUCT CODE: NORMAL
BH BB UNIT NUMBER: NORMAL
BH BB UNIT NUMBER: NORMAL
BILIRUB SERPL-MCNC: 0.3 MG/DL (ref 0.1–1.2)
BUN BLD-MCNC: 10 MG/DL (ref 8–23)
BUN/CREAT SERPL: 14.9 (ref 7–25)
CALCIUM SPEC-SCNC: 9.3 MG/DL (ref 8.6–10.5)
CHLORIDE SERPL-SCNC: 107 MMOL/L (ref 98–107)
CO2 SERPL-SCNC: 27.1 MMOL/L (ref 22–29)
CREAT BLD-MCNC: 0.67 MG/DL (ref 0.57–1)
DEPRECATED RDW RBC AUTO: 54.8 FL (ref 37–54)
EOSINOPHIL # BLD AUTO: 0.33 10*3/MM3 (ref 0–0.7)
EOSINOPHIL NFR BLD AUTO: 2.2 % (ref 0.3–6.2)
ERYTHROCYTE [DISTWIDTH] IN BLOOD BY AUTOMATED COUNT: 16.2 % (ref 11.7–13)
GFR SERPL CREATININE-BSD FRML MDRD: 87 ML/MIN/1.73
GLOBULIN UR ELPH-MCNC: 3.1 GM/DL
GLUCOSE BLD-MCNC: 103 MG/DL (ref 65–99)
HCT VFR BLD AUTO: 33.8 % (ref 35.6–45.5)
HCT VFR BLD AUTO: 33.9 % (ref 35.6–45.5)
HGB BLD-MCNC: 10.4 G/DL (ref 11.9–15.5)
HGB BLD-MCNC: 10.5 G/DL (ref 11.9–15.5)
IMM GRANULOCYTES # BLD: 0.71 10*3/MM3 (ref 0–0.03)
IMM GRANULOCYTES NFR BLD: 4.7 % (ref 0–0.5)
INR PPP: 1.98 (ref 0.9–1.1)
LYMPHOCYTES # BLD AUTO: 3 10*3/MM3 (ref 0.9–4.8)
LYMPHOCYTES NFR BLD AUTO: 19.9 % (ref 19.6–45.3)
MCH RBC QN AUTO: 28.8 PG (ref 26.9–32)
MCHC RBC AUTO-ENTMCNC: 31 G/DL (ref 32.4–36.3)
MCV RBC AUTO: 93.1 FL (ref 80.5–98.2)
MONOCYTES # BLD AUTO: 2.13 10*3/MM3 (ref 0.2–1.2)
MONOCYTES NFR BLD AUTO: 14.1 % (ref 5–12)
NEUTROPHILS # BLD AUTO: 8.87 10*3/MM3 (ref 1.9–8.1)
NEUTROPHILS NFR BLD AUTO: 58.7 % (ref 42.7–76)
PLATELET # BLD AUTO: 367 10*3/MM3 (ref 140–500)
PMV BLD AUTO: 11.8 FL (ref 6–12)
POTASSIUM BLD-SCNC: 3.8 MMOL/L (ref 3.5–5.2)
PROT SERPL-MCNC: 5.5 G/DL (ref 6–8.5)
PROTHROMBIN TIME: 22.2 SECONDS (ref 11.7–14.2)
RBC # BLD AUTO: 3.64 10*6/MM3 (ref 3.9–5.2)
SODIUM BLD-SCNC: 144 MMOL/L (ref 136–145)
UNIT  ABO: NORMAL
UNIT  ABO: NORMAL
UNIT  RH: NORMAL
UNIT  RH: NORMAL
WBC NRBC COR # BLD: 15.1 10*3/MM3 (ref 4.5–10.7)

## 2018-07-11 PROCEDURE — 25010000002 ERTAPENEM PER 500 MG: Performed by: INTERNAL MEDICINE

## 2018-07-11 PROCEDURE — 80053 COMPREHEN METABOLIC PANEL: CPT | Performed by: INTERNAL MEDICINE

## 2018-07-11 PROCEDURE — 85025 COMPLETE CBC W/AUTO DIFF WBC: CPT | Performed by: INTERNAL MEDICINE

## 2018-07-11 PROCEDURE — 99232 SBSQ HOSP IP/OBS MODERATE 35: CPT | Performed by: INTERNAL MEDICINE

## 2018-07-11 PROCEDURE — 85014 HEMATOCRIT: CPT | Performed by: ORTHOPAEDIC SURGERY

## 2018-07-11 PROCEDURE — 99024 POSTOP FOLLOW-UP VISIT: CPT | Performed by: ORTHOPAEDIC SURGERY

## 2018-07-11 PROCEDURE — 85610 PROTHROMBIN TIME: CPT | Performed by: ORTHOPAEDIC SURGERY

## 2018-07-11 PROCEDURE — 97110 THERAPEUTIC EXERCISES: CPT

## 2018-07-11 PROCEDURE — 85018 HEMOGLOBIN: CPT | Performed by: ORTHOPAEDIC SURGERY

## 2018-07-11 RX ORDER — FUROSEMIDE 40 MG/1
40 TABLET ORAL EVERY 24 HOURS
Status: DISCONTINUED | OUTPATIENT
Start: 2018-07-12 | End: 2018-07-13 | Stop reason: HOSPADM

## 2018-07-11 RX ORDER — LANOLIN ALCOHOL/MO/W.PET/CERES
800 CREAM (GRAM) TOPICAL EVERY 24 HOURS
Status: DISCONTINUED | OUTPATIENT
Start: 2018-07-12 | End: 2018-07-13 | Stop reason: HOSPADM

## 2018-07-11 RX ORDER — METOPROLOL SUCCINATE 50 MG/1
50 TABLET, EXTENDED RELEASE ORAL EVERY 24 HOURS
Status: DISCONTINUED | OUTPATIENT
Start: 2018-07-12 | End: 2018-07-13 | Stop reason: HOSPADM

## 2018-07-11 RX ADMIN — SODIUM CHLORIDE 1 G: 900 INJECTION INTRAVENOUS at 11:26

## 2018-07-11 RX ADMIN — POLYETHYLENE GLYCOL 3350 17 G: 17 POWDER, FOR SOLUTION ORAL at 08:15

## 2018-07-11 RX ADMIN — DOCUSATE SODIUM 100 MG: 100 CAPSULE, LIQUID FILLED ORAL at 08:15

## 2018-07-11 RX ADMIN — POTASSIUM CHLORIDE 20 MEQ: 750 CAPSULE, EXTENDED RELEASE ORAL at 08:16

## 2018-07-11 RX ADMIN — MELOXICAM 15 MG: 15 TABLET ORAL at 08:16

## 2018-07-11 RX ADMIN — FERROUS SULFATE TAB 325 MG (65 MG ELEMENTAL FE) 325 MG: 325 (65 FE) TAB at 08:16

## 2018-07-11 RX ADMIN — ATORVASTATIN CALCIUM 20 MG: 20 TABLET, FILM COATED ORAL at 08:16

## 2018-07-11 RX ADMIN — WARFARIN SODIUM 7.5 MG: 7.5 TABLET ORAL at 18:50

## 2018-07-11 NOTE — NURSING NOTE
Patient refused PICC placement at this time. Wants to wait until tomorrow to make sure IV antibiotics are covered by insurance.

## 2018-07-11 NOTE — PLAN OF CARE
Problem: Patient Care Overview  Goal: Plan of Care Review  Outcome: Ongoing (interventions implemented as appropriate)   07/11/18 7624   Coping/Psychosocial   Plan of Care Reviewed With patient;spouse   Plan of Care Review   Progress improving   OTHER   Outcome Summary incr amb dist w/o c/o pain; perf isometrics ad ron per protocol

## 2018-07-11 NOTE — PROGRESS NOTES
LOS: 5 days     Chief Complaint:  Left prosthetic hip septic arthritis    Interval History: Afebrile, no new complaints or events.  Postoperative pain well-controlled.  Drains remain in place.  Tolerating antibiotics without abdominal pain nausea vomiting or diarrhea.  No rash    Vital Signs  Temp:  [97.8 °F (36.6 °C)-99.6 °F (37.6 °C)] 98.7 °F (37.1 °C)  Heart Rate:  [58-89] 72  Resp:  [16] 16  BP: (100-126)/(51-68) 110/62    Physical Exam:  General: In no acute distress  Cardiovascular: RRR, no LE edema   Respiratory: Breathing comfortably on room air  GI: Soft, NT/ND, + bowel sounds bilaterall  Skin: No rashes  Extremities: Left hip with dressing in place, drain in place    Antibiotics:  Ertapenem 1g IV q24hrs     Results Review:      Lab Results   Component Value Date    WBC 13.89 (H) 07/09/2018    HGB 10.4 (L) 07/11/2018    HCT 33.8 (L) 07/11/2018    MCV 94.3 07/09/2018     07/09/2018     Lab Results   Component Value Date    GLUCOSE 105 (H) 07/09/2018    BUN 11 07/09/2018    CREATININE 0.51 (L) 07/09/2018    EGFRIFNONA 119 07/09/2018    BCR 21.6 07/09/2018    CO2 27.1 07/09/2018    CALCIUM 8.7 07/09/2018    ALBUMIN 2.50 (L) 07/09/2018    LABIL2 0.9 07/09/2018    AST 7 07/09/2018    ALT 9 07/09/2018       Microbiology:  7/8 BCx NGTD x 2  7/7 L hip wound ESBL E coli     Assessment/Plan   1.  Left prosthetic hip septic arthritis status post operative incision and drainage with retained hardware on July 7  - Cultures with ESBL Escherichia coli  - Patient will require a 6 week course of antibiotics - ertapenem 1g IV q24hrs x 6 weeks. Abx stop date 8/25  - Please monitor weekly CBC with differential and CMP while on antibiotics and fax the results to the infectious disease clinic at [phone]  - We'll arrange for ID follow-up on 8/24  - Will order PICC today      2.  Fever  - Blood cultures ×2 are NGTD     3.  Leukocytosis secondary to above - decreased   - Continue to monitor    ID will sign off. Please  call with questions or concerns

## 2018-07-11 NOTE — PROGRESS NOTES
Continued Stay Note  [facility]     Patient Name: Rachel Moser  MRN: [id number]  Today's Date: 7/11/2018    Admit Date: 7/6/2018          Discharge Plan     Row Name 07/11/18 1548       Plan    Plan Home with [facility] and ACU for daily antibiotic therapy x 6 weeks.     Plan Comments Patient unable to afford IV Ertapenem through [facility] and has agreed to ACU once daily for 6 weeks. CCP contacted [facility] and was instructed to call when patient has official discharge date. CCP to follow up 7/12. PAWEL Acevedo              Discharge Codes    No documentation.           Amara Berman RN

## 2018-07-11 NOTE — PROGRESS NOTES
"CC: DVT    Interval History:   No complaints today.  Seems to be doing well    Vital Signs  Temp:  [97.8 °F (36.6 °C)-99.6 °F (37.6 °C)] 99.2 °F (37.3 °C)  Heart Rate:  [58-89] 78  Resp:  [16] 16  BP: ()/(48-68) 122/58    Intake/Output Summary (Last 24 hours) at 07/11/18 0710  Last data filed at 07/11/18 0300   Gross per 24 hour   Intake          2492.92 ml   Output              135 ml   Net          2357.92 ml     Flowsheet Rows      First Filed Value   Admission Height  149.9 cm (59.02\") Documented at 07/07/2018 0811   Admission Weight  58.1 kg (128 lb 1.4 oz) Documented at 07/07/2018 0811          PHYSICAL EXAM:  General: No acute distress  Resp:NL Rate, unlabored, clear  CV:NL rate and rhythm, NL PMI, Nl S1 and S2, no Murmur, no gallop, no rub, No JVD. Normal pedal pulses  ABD:Nl sounds, no masses or tenderness, nondistended, no guarding or rebound  Neuro: alert,cooperative and oriented  Extr: No edema or cyanosis, moves all extremities      Results Review:      Results from last 7 days  Lab Units 07/09/18  0352   SODIUM mmol/L 145   POTASSIUM mmol/L 4.0   CHLORIDE mmol/L 107   CO2 mmol/L 27.1   BUN mg/dL 11   CREATININE mg/dL 0.51*   GLUCOSE mg/dL 105*   CALCIUM mg/dL 8.7           Results from last 7 days  Lab Units 07/11/18  0433  07/09/18  0352   WBC 10*3/mm3  --   --  13.89*   HEMOGLOBIN g/dL 10.4*  < > 8.1*   HEMATOCRIT % 33.8*  < > 26.4*   PLATELETS 10*3/mm3  --   --  382   < > = values in this interval not displayed.    Results from last 7 days  Lab Units 07/11/18  0433 07/10/18  0345 07/09/18  0352   INR  1.98* 1.70* 1.76*                 I reviewed the patient's new clinical results.  I personally viewed and interpreted the patient's EKG/Telemetry data        Medication Review:   Meds reviewed         Assessment/Plan  1.  History of DVT and pulmonary embolus: In 2011.  INR up to 2.0.  Should be able to DC the Lovenox when INR is over 2.Would DC Lovenox in the morning if her INRs above 2.   2. "  History of IVC filter  3.  Left hip infection.  Reoperation.  E Coli. Per ID.  4.  Chronic anticoagulation use  5.  Anemia.  To receive transfusion.        Vijay Arango MD  07/11/18  7:10 AM

## 2018-07-11 NOTE — PLAN OF CARE
Problem: Patient Care Overview  Goal: Plan of Care Review  Outcome: Ongoing (interventions implemented as appropriate)   07/11/18 0448   Coping/Psychosocial   Plan of Care Reviewed With patient   Plan of Care Review   Progress improving   OTHER   Outcome Summary temp max 99.6, dsg c/d/i, hemovac drains x2 intact with serosangious drainage, ambulating well, neurovascualr status wnl, reports adequate pain control, discussed monitoring b/p due to low b/p, cont on iv ABX, will continue to monitor     Goal: Individualization and Mutuality  Outcome: Ongoing (interventions implemented as appropriate)    Goal: Discharge Needs Assessment  Outcome: Ongoing (interventions implemented as appropriate)      Problem: Fall Risk (Adult)  Goal: Absence of Fall  Outcome: Ongoing (interventions implemented as appropriate)      Problem: Self-Care Deficit (Adult,Obstetrics,Pediatric)  Goal: Improved Ability to Perform BADL and IADL  Outcome: Ongoing (interventions implemented as appropriate)      Problem: Infection, Risk/Actual (Adult)  Goal: Infection Prevention/Resolution  Outcome: Ongoing (interventions implemented as appropriate)      Problem: Pain, Acute (Adult)  Goal: Acceptable Pain Control/Comfort Level  Outcome: Ongoing (interventions implemented as appropriate)      Problem: Mobility, Physical Impaired (Adult)  Goal: Enhanced Mobility Skills  Outcome: Ongoing (interventions implemented as appropriate)    Goal: Enhanced Functional Ability  Outcome: Ongoing (interventions implemented as appropriate)

## 2018-07-11 NOTE — PROGRESS NOTES
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]    Status Post: HIP INCISION AND DRAINAGE left hip    Post Operative Day Number: 4    Current Problem List:   Patient Active Problem List   Diagnosis   • Chronic left hip pain   • OA (osteoarthritis) of hip   • Hip dislocation, left (CMS/HCC)   • Status post left hip replacement   • Hypertension   • Rheumatoid arteritis   • History of DVT (deep vein thrombosis)   • Postoperative hypotension   • S/P revision of total hip         Past Medical History:   Diagnosis Date   • Allergic    • Cataract    • High cholesterol    • History of DVT (deep vein thrombosis)    • History of kidney infection     1 MONTH AGO   • History of pulmonary embolus (PE)    • Hypertension    • Paralabral cyst of left hip    • PONV (postoperative nausea and vomiting)    • Presence of vena cava filter    • Rheumatoid arteritis    • Seasonal allergies    • UTI (urinary tract infection)     diagnosed 4/2/18  medically treated presently       SUBJECTIVE: 72 y.o.  female    OBJECTIVE:   Vitals:    07/11/18 0300 07/11/18 0418 07/11/18 0714 07/11/18 1136   BP: 122/58  110/62 108/76   BP Location: Left arm  Left arm Left arm   Patient Position: Lying  Sitting Sitting   Pulse: 78  72 76   Resp: 16  16 16   Temp: 99.2 °F (37.3 °C)  98.7 °F (37.1 °C) 97.6 °F (36.4 °C)   TempSrc: Oral  Oral Oral   SpO2:  95% 96% 97%   Weight:       Height:         I/O last 3 completed shifts:  In: 2492.9 [P.O.:1800; Blood:692.9]  Out: 310 [Drains:310]    Current Medications:  Scheduled Meds:  atorvastatin 20 mg Oral Daily   docusate sodium 100 mg Oral BID   ertapenem 1 g Intravenous Q24H   ferrous sulfate 325 mg Oral Daily With Breakfast   folic  acid 800 mcg Oral QAM   furosemide 40 mg Oral QAM   meloxicam 15 mg Oral Daily   metoprolol succinate XL 50 mg Oral QAM   polyethylene glycol 17 g Oral BID   potassium chloride 20 mEq Oral Daily   warfarin 7.5 mg Oral Daily     PRN Meds:.•  acetaminophen  •  albuterol  •  HYDROcodone-acetaminophen  •  HYDROcodone-acetaminophen  •  ipratropium  •  melatonin  •  [DISCONTINUED] ondansetron **OR** [DISCONTINUED] ondansetron ODT **OR** ondansetron  •  ondansetron **OR** ondansetron ODT **OR** ondansetron    Diagnostic Tests:   Lab Results (last 24 hours)     Procedure Component Value Units Date/Time    Comprehensive Metabolic Panel [[id number]]  (Abnormal) Collected:  07/11/18 0443    Specimen:  Blood Updated:  07/11/18 1050     Glucose 103 (H) mg/dL      BUN 10 mg/dL      Creatinine 0.67 mg/dL      Sodium 144 mmol/L      Potassium 3.8 mmol/L      Chloride 107 mmol/L      CO2 27.1 mmol/L      Calcium 9.3 mg/dL      Total Protein 5.5 (L) g/dL      Albumin 2.40 (L) g/dL      ALT (SGPT) 7 U/L      AST (SGOT) 7 U/L      Alkaline Phosphatase 55 U/L      Total Bilirubin 0.3 mg/dL      eGFR Non African Amer 87 mL/min/1.73      Globulin 3.1 gm/dL      A/G Ratio 0.8 g/dL      BUN/Creatinine Ratio 14.9     Anion Gap 9.9 mmol/L     Narrative:       The MDRD GFR formula is only valid for adults with stable renal function between ages 18 and 70.    CBC & Differential [[id number]] Collected:  07/11/18 0433    Specimen:  Blood Updated:  07/11/18 0921    Narrative:       The following orders were created for panel order CBC & Differential.  Procedure                               Abnormality         Status                     ---------                               -----------         ------                     CBC Auto Differential[[id number]]        Abnormal            Final result                 Please view results for these tests on the individual orders.    CBC Auto Differential [[id number]]  (Abnormal) Collected:  07/11/18 0433     Specimen:  Blood Updated:  07/11/18 0921     WBC 15.10 (H) 10*3/mm3      RBC 3.64 (L) 10*6/mm3      Hemoglobin 10.5 (L) g/dL      Hematocrit 33.9 (L) %      MCV 93.1 fL      MCH 28.8 pg      MCHC 31.0 (L) g/dL      RDW 16.2 (H) %      RDW-SD 54.8 (H) fl      MPV 11.8 fL      Platelets 367 10*3/mm3      Neutrophil % 58.7 %      Lymphocyte % 19.9 %      Monocyte % 14.1 (H) %      Eosinophil % 2.2 %      Basophil % 0.4 %      Immature Grans % 4.7 (H) %      Neutrophils, Absolute 8.87 (H) 10*3/mm3      Lymphocytes, Absolute 3.00 10*3/mm3      Monocytes, Absolute 2.13 (H) 10*3/mm3      Eosinophils, Absolute 0.33 10*3/mm3      Basophils, Absolute 0.06 10*3/mm3      Immature Grans, Absolute 0.71 (H) 10*3/mm3     Protime-INR [[id number]]  (Abnormal) Collected:  07/11/18 0433    Specimen:  Blood Updated:  07/11/18 0522     Protime 22.2 (H) Seconds      INR 1.98 (H)    Hemoglobin & Hematocrit, Blood [[id number]]  (Abnormal) Collected:  07/11/18 0433    Specimen:  Blood Updated:  07/11/18 0516     Hemoglobin 10.4 (L) g/dL      Hematocrit 33.8 (L) %     Blood Culture - Blood, [[id number]]  (Normal) Collected:  07/08/18 1413    Specimen:  Blood from Arm, Right Updated:  07/10/18 1445     Blood Culture No growth at 2 days    Blood Culture - Blood, [[id number]]  (Normal) Collected:  07/08/18 1241    Specimen:  Blood from Arm, Left Updated:  07/10/18 1315     Blood Culture No growth at 2 days          PHYSICAL EXAM:  Left hip dressing dry and intact.  Drains remain intact.  #1 with 20cc, #2 with 50cc.  Calf soft and nontender.  Progressing well with PT.  Pain well controlled.  INR 1.9.       ASSESSMENT & PLAN:  Have discussed with ID.  Plan 6 weeks of IV Invdomonique Velez PICC insertion today.  Possibly home tomorrow with home health        Date: 7/11/2018    PAWEL Santos MD

## 2018-07-11 NOTE — THERAPY TREATMENT NOTE
Acute Care - Physical Therapy Treatment Note  [facility]     Patient Name: Rachel Moser  : 1945  MRN: [id number]  Today's Date: 2018  Onset of Illness/Injury or Date of Surgery: 18          Admit Date: 2018    Visit Dx:    ICD-10-CM ICD-9-CM   1. Difficulty walking R26.2 719.7   2. S/P revision of total hip Z96.649 V43.64     Patient Active Problem List   Diagnosis   • Chronic left hip pain   • OA (osteoarthritis) of hip   • Hip dislocation, left (CMS/HCC)   • Status post left hip replacement   • Hypertension   • Rheumatoid arteritis   • History of DVT (deep vein thrombosis)   • Postoperative hypotension   • S/P revision of total hip       Therapy Treatment          Rehabilitation Treatment Summary     Row Name 18 1015             Treatment Time/Intention    Discipline physical therapy assistant  -      Document Type therapy note (daily note)  -      Subjective Information complains of;fatigue;swelling  -      Care Plan Review patient/other agree to care plan  -      Existing Precautions/Restrictions fall;left;hip, posterior  -      Treatment Considerations/Comments isol prec, 2 drains  -      Recorded by [JM] Michelle Jamison, hospitals 18 1406      Row Name 18 1015             Bed Mobility Assessment/Treatment    Comment (Bed Mobility) in chair  -      Recorded by [] Michelle Jamison, DIETER 18 1406      Row Name 18 1015             Sit-Stand Transfer    Sit-Stand [place] (Transfers) contact guard  -      Assistive Device (Sit-Stand Transfers) walker, front-wheeled  -      Recorded by [] Mihcelle Jamison PTA 18 1406      Row Name 18 1015             Stand-Sit Transfer    Stand-Sit [place] (Transfers) stand by assist;verbal cues  -      Assistive Device (Stand-Sit Transfers) walker front-wheeled  -      Recorded by [] Michelle Jamison PTA 18 1406      Row Name 18 1015             Gait/Stairs  Assessment/Training    [place] Level (Gait) stand by assist;verbal cues  -      Assistive Device (Gait) walker, front-wheeled  -      Distance in Feet (Gait) 150  -JM      Deviations/Abnormal Patterns (Gait) yanni decreased;base of support, narrow  -      Bilateral Gait Deviations forward flexed posture  -JM      Recorded by [] Michelle Jamison, DIETER 07/11/18 1406      Row Name 07/11/18 1015             Pain Scale: Numbers Pre/Post-Treatment    Pain Scale: Numbers, Pretreatment 1/10  -JM      Pain Location - Side Left  -JM      Pain Location hip  -JM      Recorded by [] Michelle Jamison, DIETER 07/11/18 1406      Row Name                Wound 07/07/18 1358 Left hip incision    Wound - Properties Group Date first assessed: 07/07/18 [] Time first assessed: 1358 [] Side: Left [HH] Location: hip [HH] Type: incision [HH] Recorded by:  [] Tamara Lyons RN 07/07/18 1358      User Key  (r) = Recorded By, (t) = Taken By, (c) = Cosigned By    Initials Name Effective Dates Discipline     Tamara Lyons RN 06/16/16 -  Nurse    LEXA Jamison, [facility] 03/07/18 -  PT          Wound 07/07/18 1358 Left hip incision (Active)   Dressing Appearance dry;intact;no drainage 7/11/2018 12:00 PM   Closure REINALDO 7/11/2018 12:00 PM   Base dressing in place, unable to visualize 7/11/2018 12:00 PM   Drainage Amount none 7/11/2018 12:00 PM   Dressing Care, Wound dressing changed 7/10/2018  4:00 PM             Physical Therapy Education     Title: PT OT SLP Therapies (Done)     Topic: Physical Therapy (Done)     Point: Mobility training (Done)    Learning Progress Summary     Learner Status Readiness Method Response Comment Documented by    Patient Done Acceptance TIARRA NOONAN 07/11/18 1406     Done Acceptance ETIARRA D VU, NR EJ 07/10/18 1434     Done Acceptance TIARRA NOONAN   07/09/18 1719     Done Acceptance E TALIA GUADALUPE 07/08/18 0929    Family Done Acceptance TIARRA NOONAN   07/11/18 1406     Done Acceptance ETIARRA   07/09/18 6139           Point: Home exercise program (Done)    Learning Progress Summary     Learner Status Readiness Method Response Comment Documented by    Patient Done Acceptance E,TB DU   07/11/18 1406     Done Acceptance E,TB,TONY VU,NR   07/10/18 1434     Done Acceptance E,TB VU   07/09/18 1719     Done Acceptance E VU  CN 07/08/18 0929    Family Done Acceptance E,TB DU   07/11/18 1406     Done Acceptance E,TB VU   07/09/18 1719          Point: Body mechanics (Done)    Learning Progress Summary     Learner Status Readiness Method Response Comment Documented by    Patient Done Acceptance E,TB FLY   07/11/18 1406     Done Acceptance E,TB TALIA   07/09/18 1719     Done Acceptance E VU  CN 07/08/18 0929    Family Done Acceptance E,TB FLY   07/11/18 1406     Done Acceptance E,TB VU   07/09/18 1719          Point: Precautions (Done)    Learning Progress Summary     Learner Status Readiness Method Response Comment Documented by    Patient Done Acceptance E,TIARRA BILL   07/11/18 1406     Done Acceptance E,TB TALIA   07/09/18 1719     Done Acceptance E VU  CN 07/08/18 0929    Family Done Acceptance E,TIARRA BILL   07/11/18 1406     Done Acceptance E,TB TALIA   07/09/18 1719                      User Key     Initials Effective Dates Name Provider Type Discipline     03/07/18 -  Michelle Jamison, PTA Physical Therapy Assistant PT     04/03/18 -  Misty Bradford, PT Physical Therapist PT     04/03/18 -  Carmelina Painting, PT Physical Therapist PT                    PT Recommendation and Plan     Plan of Care Reviewed With: patient, spouse  Progress: improving  Outcome Summary: incr amb dist w/o c/o pain; perf isometrics ad ron per protocol          Outcome Measures     Row Name 07/11/18 1400 07/10/18 1400 07/09/18 1200       How much help from another person do you currently need...    Turning from your back to your side while in flat bed without using bedrails? 3  -JM 3  -EJ 3  -JM    Moving from lying on back to sitting  on the side of a flat bed without bedrails? 3  -JM 3  -EJ 3  -JM    Moving to and from a bed to a chair (including a wheelchair)? 3  -JM 3  -EJ 3  -JM    Standing up from a chair using your arms (e.g., wheelchair, bedside chair)? 3  -JM 3  -EJ 3  -JM    Climbing 3-5 steps with a railing? 3  -JM 3  -EJ 2  -JM    To walk in hospital room? 3  -JM 3  -EJ 3  -JM    AM-PAC 6 Clicks Score 18  -JM 18  -EJ 17  -JM       Functional Assessment    Outcome Measure Options  -- AM-PAC 6 Clicks Basic Mobility (PT)  -EJ  --      User Key  (r) = Recorded By, (t) = Taken By, (c) = Cosigned By    Initials Name Provider Type    LEXA Jamison PTA Physical Therapy Assistant    MICHAEL Bradford, PT Physical Therapist           Time Calculation:         PT Charges     Row Name 07/11/18 1407             Time Calculation    Start Time 1015  -      Stop Time 1030  -      Time Calculation (min) 15 min  -      PT Received On 07/11/18  -      PT - Next Appointment 07/12/18  -         Time Calculation- PT    Total Timed Code Minutes- PT 15 minute(s)  -        User Key  (r) = Recorded By, (t) = Taken By, (c) = Cosigned By    Initials Name Provider Type    LEXA Jamison PTA Physical Therapy Assistant        Therapy Suggested Charges     Code   Minutes Charges    None           Therapy Charges for Today     Code Description Service Date Service Provider Modifiers Qty    22361085406 HC PT THER PROC EA 15 MIN 7/11/2018 Michelle Jamison PTA GP 1          PT G-Codes  Outcome Measure Options: AM-PAC 6 Clicks Basic Mobility (PT)    Michelle Jamison PTA  7/11/2018

## 2018-07-11 NOTE — PLAN OF CARE
Problem: Patient Care Overview  Goal: Plan of Care Review  Outcome: Ongoing (interventions implemented as appropriate)   07/11/18 9494   Coping/Psychosocial   Plan of Care Reviewed With patient   Plan of Care Review   Progress improving   OTHER   Outcome Summary Pt up to chair with assist of one. Removed one drain today. Pt to have PICC placed tommorrow. VSS. Pain controlled at this time. Pt educated on BP monitoring.      Goal: Individualization and Mutuality  Outcome: Ongoing (interventions implemented as appropriate)    Goal: Discharge Needs Assessment  Outcome: Ongoing (interventions implemented as appropriate)    Goal: Interprofessional Rounds/Family Conf  Outcome: Ongoing (interventions implemented as appropriate)      Problem: Fall Risk (Adult)  Goal: Absence of Fall  Outcome: Ongoing (interventions implemented as appropriate)      Problem: Self-Care Deficit (Adult,Obstetrics,Pediatric)  Goal: Improved Ability to Perform BADL and IADL  Outcome: Ongoing (interventions implemented as appropriate)      Problem: Infection, Risk/Actual (Adult)  Goal: Infection Prevention/Resolution  Outcome: Ongoing (interventions implemented as appropriate)      Problem: Pain, Acute (Adult)  Goal: Acceptable Pain Control/Comfort Level  Outcome: Ongoing (interventions implemented as appropriate)      Problem: Mobility, Physical Impaired (Adult)  Goal: Enhanced Mobility Skills  Outcome: Ongoing (interventions implemented as appropriate)    Goal: Enhanced Functional Ability  Outcome: Ongoing (interventions implemented as appropriate)

## 2018-07-12 ENCOUNTER — APPOINTMENT (OUTPATIENT)
Dept: GENERAL RADIOLOGY | Facility: HOSPITAL | Age: 73
End: 2018-07-12
Attending: ORTHOPAEDIC SURGERY

## 2018-07-12 LAB
BACTERIA SPEC ANAEROBE CULT: NORMAL
HCT VFR BLD AUTO: 34.8 % (ref 35.6–45.5)
HGB BLD-MCNC: 10.7 G/DL (ref 11.9–15.5)
INR PPP: 2.24 (ref 0.9–1.1)
PROTHROMBIN TIME: 24.4 SECONDS (ref 11.7–14.2)

## 2018-07-12 PROCEDURE — 85018 HEMOGLOBIN: CPT | Performed by: ORTHOPAEDIC SURGERY

## 2018-07-12 PROCEDURE — 25010000002 ONDANSETRON PER 1 MG: Performed by: ORTHOPAEDIC SURGERY

## 2018-07-12 PROCEDURE — 99024 POSTOP FOLLOW-UP VISIT: CPT | Performed by: NURSE PRACTITIONER

## 2018-07-12 PROCEDURE — 71045 X-RAY EXAM CHEST 1 VIEW: CPT

## 2018-07-12 PROCEDURE — 97110 THERAPEUTIC EXERCISES: CPT

## 2018-07-12 PROCEDURE — C1751 CATH, INF, PER/CENT/MIDLINE: HCPCS

## 2018-07-12 PROCEDURE — 25010000002 ERTAPENEM PER 500 MG: Performed by: INTERNAL MEDICINE

## 2018-07-12 PROCEDURE — 02HV33Z INSERTION OF INFUSION DEVICE INTO SUPERIOR VENA CAVA, PERCUTANEOUS APPROACH: ICD-10-PCS | Performed by: ORTHOPAEDIC SURGERY

## 2018-07-12 PROCEDURE — 85610 PROTHROMBIN TIME: CPT | Performed by: ORTHOPAEDIC SURGERY

## 2018-07-12 PROCEDURE — 85014 HEMATOCRIT: CPT | Performed by: ORTHOPAEDIC SURGERY

## 2018-07-12 RX ORDER — SODIUM CHLORIDE 0.9 % (FLUSH) 0.9 %
20 SYRINGE (ML) INJECTION AS NEEDED
Status: DISCONTINUED | OUTPATIENT
Start: 2018-07-12 | End: 2018-07-13 | Stop reason: HOSPADM

## 2018-07-12 RX ORDER — WARFARIN SODIUM 5 MG/1
5 TABLET ORAL
Status: DISCONTINUED | OUTPATIENT
Start: 2018-07-12 | End: 2018-07-13 | Stop reason: HOSPADM

## 2018-07-12 RX ORDER — SODIUM CHLORIDE 0.9 % (FLUSH) 0.9 %
10 SYRINGE (ML) INJECTION AS NEEDED
Status: DISCONTINUED | OUTPATIENT
Start: 2018-07-12 | End: 2018-07-13 | Stop reason: HOSPADM

## 2018-07-12 RX ADMIN — ATORVASTATIN CALCIUM 20 MG: 20 TABLET, FILM COATED ORAL at 09:07

## 2018-07-12 RX ADMIN — METOPROLOL SUCCINATE 50 MG: 50 TABLET, FILM COATED, EXTENDED RELEASE ORAL at 09:07

## 2018-07-12 RX ADMIN — WARFARIN SODIUM 5 MG: 5 TABLET ORAL at 18:11

## 2018-07-12 RX ADMIN — MELOXICAM 15 MG: 15 TABLET ORAL at 09:07

## 2018-07-12 RX ADMIN — SODIUM CHLORIDE 1 G: 900 INJECTION INTRAVENOUS at 10:48

## 2018-07-12 RX ADMIN — DOCUSATE SODIUM 100 MG: 100 CAPSULE, LIQUID FILLED ORAL at 10:48

## 2018-07-12 RX ADMIN — ONDANSETRON 4 MG: 2 INJECTION, SOLUTION INTRAMUSCULAR; INTRAVENOUS at 12:30

## 2018-07-12 RX ADMIN — FUROSEMIDE 40 MG: 40 TABLET ORAL at 09:07

## 2018-07-12 RX ADMIN — FERROUS SULFATE TAB 325 MG (65 MG ELEMENTAL FE) 325 MG: 325 (65 FE) TAB at 09:07

## 2018-07-12 RX ADMIN — POTASSIUM CHLORIDE 20 MEQ: 750 CAPSULE, EXTENDED RELEASE ORAL at 09:07

## 2018-07-12 RX ADMIN — ACETAMINOPHEN 800 MCG: 400 TABLET ORAL at 09:07

## 2018-07-12 NOTE — CONSULTS
Iv TEAM consulted to place a picc line for outpatient ABT. Evaluation and assessment performed. Vena cava filter not seen in SVC via chest xray and is currently the only implant pt has. Proceeded with single lumen picc line in RUE. Good blood return, flushes easily without discomfort or pain. Placement was verified SVC via sherlock 3cg. Picc is ok to use at this time.

## 2018-07-12 NOTE — PLAN OF CARE
Problem: Patient Care Overview  Goal: Plan of Care Review  Outcome: Ongoing (interventions implemented as appropriate)   07/12/18 3642   Plan of Care Review   Progress improving   OTHER   Outcome Summary temp max 99.3, dsg c/d/i, neurovascular status wnl, hemovac patent,  voiding, reports adequate pain control, to have Picc line placed today, continue on iv ABX, pt educated on importance of B/P monitoring due to hx hypertension, possible discharge today with      Goal: Individualization and Mutuality  Outcome: Ongoing (interventions implemented as appropriate)    Goal: Discharge Needs Assessment  Outcome: Ongoing (interventions implemented as appropriate)    Goal: Interprofessional Rounds/Family Conf  Outcome: Ongoing (interventions implemented as appropriate)      Problem: Fall Risk (Adult)  Goal: Absence of Fall  Outcome: Ongoing (interventions implemented as appropriate)      Problem: Self-Care Deficit (Adult,Obstetrics,Pediatric)  Goal: Improved Ability to Perform BADL and IADL  Outcome: Ongoing (interventions implemented as appropriate)      Problem: Infection, Risk/Actual (Adult)  Goal: Infection Prevention/Resolution  Outcome: Ongoing (interventions implemented as appropriate)      Problem: Pain, Acute (Adult)  Goal: Acceptable Pain Control/Comfort Level  Outcome: Ongoing (interventions implemented as appropriate)      Problem: Mobility, Physical Impaired (Adult)  Goal: Enhanced Mobility Skills  Outcome: Ongoing (interventions implemented as appropriate)    Goal: Enhanced Functional Ability  Outcome: Ongoing (interventions implemented as appropriate)

## 2018-07-12 NOTE — THERAPY TREATMENT NOTE
Acute Care - Physical Therapy Treatment Note  [facility]     Patient Name: Rachel Moser  : 1945  MRN: [id number]  Today's Date: 2018  Onset of Illness/Injury or Date of Surgery: 18          Admit Date: 2018    Visit Dx:    ICD-10-CM ICD-9-CM   1. Difficulty walking R26.2 719.7   2. S/P revision of total hip Z96.649 V43.64   3. 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 (CMS/HCC) M00.852 711.05     Patient Active Problem List   Diagnosis   • Chronic left hip pain   • OA (osteoarthritis) of hip   • Hip dislocation, left (CMS/HCC)   • Status post left hip replacement   • Hypertension   • Rheumatoid arteritis   • History of DVT (deep vein thrombosis)   • Postoperative hypotension   • S/P revision of total hip       Therapy Treatment          Rehabilitation Treatment Summary     Row Name 18 0850             Treatment Time/Intention    Discipline physical therapy assistant  -      Document Type therapy note (daily note)  -      Subjective Information complains of;fatigue  -      Care Plan Review patient/other agree to care plan  -      Existing Precautions/Restrictions fall;left;hip, posterior  -      Recorded by [] Michelle Jamison, PTA 18 0854      Row Name 18 0850             Sit-Stand Transfer    Sit-Stand [place] (Transfers) contact guard  -      Assistive Device (Sit-Stand Transfers) walker, front-wheeled  -      Recorded by [JM] Michelle Jamison, PTA 18 0854      Row Name 18 0850             Stand-Sit Transfer    Stand-Sit [place] (Transfers) stand by assist;verbal cues  -      Assistive Device (Stand-Sit Transfers) walker, front-wheeled  -      Recorded by [] Michelle Jamison, PTA 18 0854      Row Name 18 0850             Gait/Stairs Assessment/Training    [place] Level (Gait) stand by assist;verbal cues  -      Assistive Device (Gait) walker, front-wheeled  -      Distance in Feet (Gait) 180  -       Deviations/Abnormal Patterns (Gait) yanni decreased;base of support, narrow  -      Bilateral Gait Deviations forward flexed posture  -      Recorded by [JM] Michelle Jamison, DIETER 07/12/18 0854      Row Name 07/12/18 0850             Positioning and Restraints    Pre-Treatment Position sitting in chair/recliner  -JM      In Chair reclined;call light within reach;encouraged to call for assist;with family/caregiver  -JM      Recorded by [JM] Michelle Jamison PTA 07/12/18 0854      Row Name 07/12/18 0850             Pain Scale: Numbers Pre/Post-Treatment    Pain Scale: Numbers, Pretreatment 2/10  -JM      Pain Location - Side Left  -JM      Pain Location hip  -JM      Recorded by [] Michelle Jamison PTA 07/12/18 0854      Row Name                Wound 07/07/18 1358 Left hip incision    Wound - Properties Group Date first assessed: 07/07/18 [HH] Time first assessed: 1358 [HH] Side: Left [HH] Location: hip [HH] Type: incision [] Recorded by:  [] Tamara Lyons RN 07/07/18 1358      User Key  (r) = Recorded By, (t) = Taken By, (c) = Cosigned By    Initials Name Effective Dates Discipline     Tamara Lyons RN 06/16/16 -  Nurse    LEXA Jamison PTA 03/07/18 -  PT          Wound 07/07/18 1358 Left hip incision (Active)   Dressing Appearance dry;intact;no drainage 7/12/2018  4:06 AM   Closure REINALDO 7/12/2018  4:06 AM   Base dressing in place, unable to visualize 7/12/2018  4:06 AM   Drainage Amount none 7/12/2018  4:06 AM             Physical Therapy Education     Title: PT OT SLP Therapies (Done)     Topic: Physical Therapy (Done)     Point: Mobility training (Done)    Learning Progress Summary     Learner Status Readiness Method Response Comment Documented by    Patient Done Acceptance TIARRA NOONAN 07/12/18 0856     Done Acceptance ETIARRA 07/11/18 1406     Done Acceptance E,TONY MAYEN NR EJ 07/10/18 1434     Done Acceptance ETIARRA 07/09/18 1719     Done Acceptance E TALIA GUADALUPE 07/08/18 0957     Family Done Acceptance E,TB FLY   07/12/18 0856     Done Acceptance E,TB FLY   07/11/18 1406     Done Acceptance E,TB VU   07/09/18 1719          Point: Home exercise program (Done)    Learning Progress Summary     Learner Status Readiness Method Response Comment Documented by    Patient Done Acceptance E,TB DU   07/12/18 0856     Done Acceptance E,TB DU   07/11/18 1406     Done Acceptance E,TBTONY VU,NR  EJ 07/10/18 1434     Done Acceptance E,TB TALIA   07/09/18 1719     Done Acceptance E VU  CN 07/08/18 0929    Family Done Acceptance E,TB FLY   07/12/18 0856     Done Acceptance E,TB FLY   07/11/18 1406     Done Acceptance E,TB TALIA   07/09/18 1719          Point: Body mechanics (Done)    Learning Progress Summary     Learner Status Readiness Method Response Comment Documented by    Patient Done Acceptance E,TB FLY   07/12/18 0856     Done Acceptance E,TB FLY   07/11/18 1406     Done Acceptance E,TB TALIA   07/09/18 1719     Done Acceptance E VU  CN 07/08/18 0929    Family Done Acceptance E,TB FLY   07/12/18 0856     Done Acceptance E,TB FLY   07/11/18 1406     Done Acceptance E,TB TALIA   07/09/18 1719          Point: Precautions (Done)    Learning Progress Summary     Learner Status Readiness Method Response Comment Documented by    Patient Done Acceptance E,TB FLY   07/12/18 0856     Done Acceptance E,TB FLY   07/11/18 1406     Done Acceptance E,TB TALIA   07/09/18 1719     Done Acceptance E VU  CN 07/08/18 0929    Family Done Acceptance E,TB FLY   07/12/18 0856     Done Acceptance E,TB FLY   07/11/18 1406     Done Acceptance E,TB VU   07/09/18 1719                      User Key     Initials Effective Dates Name Provider Type Discipline     03/07/18 -  Michelle Jamison, PTA Physical Therapy Assistant PT    EJ 04/03/18 -  Misty Bradford, PT Physical Therapist PT    CN 04/03/18 -  Carmelina Painting, PT Physical Therapist PT                    PT Recommendation and Plan     Plan of Care  Reviewed With: patient, family  Progress: improving  Outcome Summary: incr amb dist w/o incr pain          Outcome Measures     Row Name 07/11/18 1400 07/10/18 1400 07/09/18 1200       How much help from another person do you currently need...    Turning from your back to your side while in flat bed without using bedrails? 3  -JM 3  -EJ 3  -JM    Moving from lying on back to sitting on the side of a flat bed without bedrails? 3  -JM 3  -EJ 3  -JM    Moving to and from a bed to a chair (including a wheelchair)? 3  -JM 3  -EJ 3  -JM    Standing up from a chair using your arms (e.g., wheelchair, bedside chair)? 3  -JM 3  -EJ 3  -JM    Climbing 3-5 steps with a railing? 3  -JM 3  -EJ 2  -JM    To walk in hospital room? 3  -JM 3  -EJ 3  -JM    AM-PAC 6 Clicks Score 18  -JM 18  -EJ 17  -JM       Functional Assessment    Outcome Measure Options  -- AM-PAC 6 Clicks Basic Mobility (PT)  -EJ  --      User Key  (r) = Recorded By, (t) = Taken By, (c) = Cosigned By    Initials Name Provider Type    LEXA Jamison PTA Physical Therapy Assistant    MICHAEL Bradford, OSIEL Physical Therapist           Time Calculation:         PT Charges     Row Name 07/12/18 0851             Time Calculation    Start Time 0825  -      Stop Time 0840  -      Time Calculation (min) 15 min  -      PT Received On 07/12/18  -         Time Calculation- PT    Total Timed Code Minutes- PT 15 minute(s)  -        User Key  (r) = Recorded By, (t) = Taken By, (c) = Cosigned By    Initials Name Provider Type    LEXA Jamison PTA Physical Therapy Assistant        Therapy Suggested Charges     Code   Minutes Charges    None           Therapy Charges for Today     Code Description Service Date Service Provider Modifiers Qty    62183052625 HC PT THER PROC EA 15 MIN 7/11/2018 Michelle Jamison PTA GP 1    49289518942 HC PT THER PROC EA 15 MIN 7/12/2018 Michelle Jamison PTA GP 1          PT G-Codes  Outcome Measure Options: AM-PAC 6 Clicks Basic  Mobility (PT)    Michelle Jamison, PTA  7/12/2018

## 2018-07-12 NOTE — NURSING NOTE
Spoke to Dr. Barney-output of drain total 75 ml so far today; he wants to keep drain in today and may take out tomorrow; also ordered sed rate and c-reactive protein lab for am.

## 2018-07-12 NOTE — PROGRESS NOTES
Orthopedic Progress Note      Patient: Rachel Moser  Date of Admission: 7/6/2018  YOB: 1945  Medical Record Number: [id number]    POD # :  5 Days Post-Op Procedure(s) (LRB):  I&D and antibiotic bead placement left hip (Left)    Systemic or Specific Complaints: No Complaints    Pain Relief: complete resolution    Physical Exam:  72 y.o.  female  Vitals:  Temp:  [97.3 °F (36.3 °C)-99.3 °F (37.4 °C)] 97.3 °F (36.3 °C)  Heart Rate:  [72-89] 89  Resp:  [16-20] 20  BP: (108-130)/(52-76) 117/65  alert and oriented  Ext: NV intact. ROM appropriate. Calf is soft and nontender. Negative Homans Sn  Skin: Dressing clean dry and intact w/out signs or  symptoms of infection.    Activity: Mobilizing Per P.T.   Weight Bearing: As Tolerated    Data Review     Admission on 07/06/2018   No results displayed because visit has over 200 results.          No results found.    Medications:    atorvastatin 20 mg Oral Daily   docusate sodium 100 mg Oral BID   ertapenem 1 g Intravenous Q24H   ferrous sulfate 325 mg Oral Daily With Breakfast   folic acid 800 mcg Oral Q24H   furosemide 40 mg Oral Q24H   meloxicam 15 mg Oral Daily   metoprolol succinate XL 50 mg Oral Q24H   polyethylene glycol 17 g Oral BID   potassium chloride 20 mEq Oral Daily   warfarin 5 mg Oral Daily     •  acetaminophen  •  albuterol  •  hepatitis A  •  HYDROcodone-acetaminophen  •  HYDROcodone-acetaminophen  •  ipratropium  •  melatonin  •  [DISCONTINUED] ondansetron **OR** [DISCONTINUED] ondansetron ODT **OR** ondansetron  •  ondansetron **OR** ondansetron ODT **OR** ondansetron    Assessment:  Doing well POD  # 5 Days Post-Op Procedure(s) (LRB):  I&D and antibiotic bead placement left hip (Left)  Problem List Items Addressed This Visit        Other    S/P revision of total hip    Relevant Medications    vancomycin 750 mg/250 mL 0.9% NS add-vantage (Completed)    Other Relevant Orders    Body fluid cell count - Body Fluid,    Anaerobic Culture -  Wound, Hip, Left (Completed)    Anaerobic Culture - Wound, Hip, Left (Completed)    Anaerobic Culture - Wound, Hip, Left (Completed)    Wound Culture - Wound, Hip, Left (Completed)    Wound Culture - Wound, Hip, Left (Completed)    Wound Culture - Wound, Hip, Left (Completed)    Anaerobic Culture - Wound, Hip, Left (Completed)    Wound Culture - Wound, Hip, Left (Completed)      Other Visit Diagnoses     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    Zoe Shaw, APRN    Date: 7/12/2018  Time: 9:21 AM

## 2018-07-12 NOTE — PLAN OF CARE
Problem: Patient Care Overview  Goal: Plan of Care Review  Outcome: Ongoing (interventions implemented as appropriate)   07/12/18 8624   Coping/Psychosocial   Plan of Care Reviewed With patient;spouse   Plan of Care Review   Progress improving   OTHER   Outcome Summary POD 5 Left I&D w/Abx bead placement, assist x1 with walker, voiding well, denies pain, dressing c/d/i, drain leaked and lost suction so was removed, PICC placed in RUE, continue to monitor, educated pt on bp monitoring     Goal: Individualization and Mutuality  Outcome: Ongoing (interventions implemented as appropriate)      Problem: Fall Risk (Adult)  Goal: Absence of Fall  Outcome: Ongoing (interventions implemented as appropriate)      Problem: Self-Care Deficit (Adult,Obstetrics,Pediatric)  Goal: Improved Ability to Perform BADL and IADL  Outcome: Ongoing (interventions implemented as appropriate)      Problem: Infection, Risk/Actual (Adult)  Goal: Infection Prevention/Resolution  Outcome: Ongoing (interventions implemented as appropriate)      Problem: Pain, Acute (Adult)  Goal: Acceptable Pain Control/Comfort Level  Outcome: Ongoing (interventions implemented as appropriate)      Problem: Mobility, Physical Impaired (Adult)  Goal: Enhanced Mobility Skills  Outcome: Ongoing (interventions implemented as appropriate)    Goal: Enhanced Functional Ability  Outcome: Ongoing (interventions implemented as appropriate)

## 2018-07-12 NOTE — PLAN OF CARE
Problem: Patient Care Overview  Goal: Plan of Care Review  Outcome: Ongoing (interventions implemented as appropriate)   07/12/18 0885   Coping/Psychosocial   Plan of Care Reviewed With patient;family   Plan of Care Review   Progress improving   OTHER   Outcome Summary incr amb dist w/o incr pain

## 2018-07-12 NOTE — PROGRESS NOTES
"CC: DVT    Interval History:       Vital Signs  Temp:  [97.3 °F (36.3 °C)-99.3 °F (37.4 °C)] 97.3 °F (36.3 °C)  Heart Rate:  [72-89] 89  Resp:  [16-20] 20  BP: (108-130)/(52-76) 117/65    Intake/Output Summary (Last 24 hours) at 07/12/18 0910  Last data filed at 07/12/18 0848   Gross per 24 hour   Intake              480 ml   Output               95 ml   Net              385 ml     Flowsheet Rows      First Filed Value   Admission Height  149.9 cm (59.02\") Documented at 07/07/2018 0811   Admission Weight  58.1 kg (128 lb 1.4 oz) Documented at 07/07/2018 0811          PHYSICAL EXAM:        Results Review:      Results from last 7 days  Lab Units 07/11/18  0443   SODIUM mmol/L 144   POTASSIUM mmol/L 3.8   CHLORIDE mmol/L 107   CO2 mmol/L 27.1   BUN mg/dL 10   CREATININE mg/dL 0.67   GLUCOSE mg/dL 103*   CALCIUM mg/dL 9.3           Results from last 7 days  Lab Units 07/12/18  0352 07/11/18  0433   WBC 10*3/mm3  --  15.10*   HEMOGLOBIN g/dL 10.7* 10.5*  10.4*   HEMATOCRIT % 34.8* 33.9*  33.8*   PLATELETS 10*3/mm3  --  367       Results from last 7 days  Lab Units 07/12/18  0352 07/11/18  0433 07/10/18  0345   INR  2.24* 1.98* 1.70*                 I reviewed the patient's new clinical results.  I personally viewed and interpreted the patient's EKG/Telemetry data        Medication Review:   Meds reviewed         Assessment/Plan  1.  History of DVT and pulmonary embolus: In 2011. INR 2.2. We will see PRN.  2.  History of IVC filter  3.  Left hip infection.  Reoperation.  E Coli. Per ID.  4.  Chronic anticoagulation use  5.  Anemia.  To receive transfusion.        Vijay rAango MD  07/12/18  9:10 AM        "

## 2018-07-13 ENCOUNTER — TRANSCRIBE ORDERS (OUTPATIENT)
Dept: ADMINISTRATIVE | Facility: HOSPITAL | Age: 73
End: 2018-07-13

## 2018-07-13 VITALS
DIASTOLIC BLOOD PRESSURE: 51 MMHG | RESPIRATION RATE: 16 BRPM | HEIGHT: 59 IN | BODY MASS INDEX: 25.82 KG/M2 | OXYGEN SATURATION: 96 % | WEIGHT: 128.09 LBS | TEMPERATURE: 97.9 F | HEART RATE: 79 BPM | SYSTOLIC BLOOD PRESSURE: 93 MMHG

## 2018-07-13 DIAGNOSIS — M00.80 ARTHRITIS DUE TO OTHER BACTERIA, SEPTIC ARTHRITIS OF UNSPECIFIED LOCATION (HCC): Primary | ICD-10-CM

## 2018-07-13 PROBLEM — M00.9 SEPTIC ARTHRITIS (HCC): Status: ACTIVE | Noted: 2018-07-13

## 2018-07-13 LAB
BACTERIA SPEC AEROBE CULT: NORMAL
BACTERIA SPEC AEROBE CULT: NORMAL
CRP SERPL-MCNC: 4.49 MG/DL (ref 0–0.5)
ERYTHROCYTE [SEDIMENTATION RATE] IN BLOOD: 83 MM/HR (ref 0–30)
HCT VFR BLD AUTO: 34.6 % (ref 35.6–45.5)
HGB BLD-MCNC: 10.5 G/DL (ref 11.9–15.5)
INR PPP: 2.82 (ref 0.9–1.1)
PROTHROMBIN TIME: 29.2 SECONDS (ref 11.7–14.2)

## 2018-07-13 PROCEDURE — 25010000002 ERTAPENEM PER 500 MG: Performed by: INTERNAL MEDICINE

## 2018-07-13 PROCEDURE — 85014 HEMATOCRIT: CPT | Performed by: ORTHOPAEDIC SURGERY

## 2018-07-13 PROCEDURE — 85652 RBC SED RATE AUTOMATED: CPT | Performed by: ORTHOPAEDIC SURGERY

## 2018-07-13 PROCEDURE — 97110 THERAPEUTIC EXERCISES: CPT

## 2018-07-13 PROCEDURE — 90632 HEPA VACCINE ADULT IM: CPT | Performed by: INTERNAL MEDICINE

## 2018-07-13 PROCEDURE — 85018 HEMOGLOBIN: CPT | Performed by: ORTHOPAEDIC SURGERY

## 2018-07-13 PROCEDURE — 86140 C-REACTIVE PROTEIN: CPT | Performed by: ORTHOPAEDIC SURGERY

## 2018-07-13 PROCEDURE — 90471 IMMUNIZATION ADMIN: CPT | Performed by: INTERNAL MEDICINE

## 2018-07-13 PROCEDURE — 25010000002 HEPATITIS A 1440 EL U/ML SUSPENSION: Performed by: INTERNAL MEDICINE

## 2018-07-13 PROCEDURE — 85610 PROTHROMBIN TIME: CPT | Performed by: ORTHOPAEDIC SURGERY

## 2018-07-13 RX ORDER — ONDANSETRON 4 MG/1
4 TABLET, FILM COATED ORAL EVERY 6 HOURS PRN
Qty: 10 TABLET | Refills: 0 | Status: ON HOLD | OUTPATIENT
Start: 2018-07-13 | End: 2018-07-27

## 2018-07-13 RX ORDER — MELOXICAM 15 MG/1
15 TABLET ORAL DAILY
Qty: 25 TABLET | Refills: 0 | Status: SHIPPED | OUTPATIENT
Start: 2018-07-14 | End: 2018-07-27 | Stop reason: HOSPADM

## 2018-07-13 RX ADMIN — SODIUM CHLORIDE 1 G: 900 INJECTION INTRAVENOUS at 12:04

## 2018-07-13 RX ADMIN — HEPATITIS A VACCINE 1440 UNITS: 1440 INJECTION, SUSPENSION INTRAMUSCULAR at 16:01

## 2018-07-13 RX ADMIN — POTASSIUM CHLORIDE 20 MEQ: 750 CAPSULE, EXTENDED RELEASE ORAL at 08:53

## 2018-07-13 RX ADMIN — ACETAMINOPHEN 800 MCG: 400 TABLET ORAL at 08:53

## 2018-07-13 RX ADMIN — ATORVASTATIN CALCIUM 20 MG: 20 TABLET, FILM COATED ORAL at 08:53

## 2018-07-13 RX ADMIN — FERROUS SULFATE TAB 325 MG (65 MG ELEMENTAL FE) 325 MG: 325 (65 FE) TAB at 08:53

## 2018-07-13 RX ADMIN — MELOXICAM 15 MG: 15 TABLET ORAL at 08:53

## 2018-07-13 NOTE — DISCHARGE SUMMARY
Patient Name: Rachel Moser  Patient YOB: 1945    Date of Admission:  7/6/2018  Date of Discharge:  7/13/2018  Discharge Diagnosis: HIP INCISION AND DRAINAGE left hip  Presenting Problem/History of Present Illness: S/P revision of total hip [Z96.649]  S/P revision of total hip [Z96.649]  S/P revision of total hip [Z96.649]  Admitting Physician: Dr Obed Barney  Consults:   Consults     Date and Time Order Name Status Description    7/7/2018 1607 Inpatient Infectious Diseases Consult Completed     7/6/2018 2013 Inpatient Cardiology Consult Completed           DETAILS OF HOSPITAL STAY:  Patient is a 72 y.o. female was admitted to the floor following the above procedure. Cultures were taken intraoperatively and an infectious disease consult was obtained.  Her cultures ended up growing Escherichia coli which was resistant to multiple medications.  She had 2 drains which were left in place for around 4 and 5 days respectively.  Drain output decreased and overall she was feeling fairly well from a hip standpoint.  She showed no signs of systemic infection.  Her antibiotics were changed to Invanz every 24 under the direction of Dr. Minda Souza with infectious disease service.  Patient did well with physical therapy and was ambulating well without problems.  On the day of discharge the wound was clean, dry and intact and calf was soft and non tender and Homans sign was negative.  Patient was tolerating  without problems.  Patient will be discharged home.    Condition on Discharge:  Stable    Vital Signs  Temp:  [98.2 °F (36.8 °C)-98.5 °F (36.9 °C)] 98.2 °F (36.8 °C)  Heart Rate:  [74-87] 87  Resp:  [16-20] 16  BP: ()/(44-68) 80/44    LABS:   Admission on 07/06/2018   No results displayed because visit has over 200 results.          No results found.        Discharge Medications     Discharge Medications      New Medications      Instructions Start Date   ertapenem 1 gm/100ml solution IV  Commonly known as:   INVanz   1 g, Intravenous, Every 24 Hours   Start Date:  7/14/2018     meloxicam 15 MG tablet  Commonly known as:  MOBIC   15 mg, Oral, Daily   Start Date:  7/14/2018     ondansetron 4 MG tablet  Commonly known as:  ZOFRAN   4 mg, Oral, Every 6 Hours PRN         Continue These Medications      Instructions Start Date   albuterol 108 (90 Base) MCG/ACT inhaler  Commonly known as:  PROAIR RESPICLICK   2 puffs, Inhalation, Every 4 Hours PRN      CALCIUM + D 250-125 MG-UNIT per tablet  Generic drug:  calcium-vitamin D   1 tablet, Oral, 2 Times Daily      FERROUSUL 325 (65 FE) MG tablet  Generic drug:  ferrous sulfate   325 mg, Oral, Daily With Breakfast      folic acid 800 MCG tablet  Commonly known as:  FOLVITE   800 mcg, Oral, Every Morning, 4 tabs am      gabapentin 300 MG capsule  Commonly known as:  NEURONTIN   300 mg, Oral, As Needed      ipratropium 0.03 % nasal spray  Commonly known as:  ATROVENT   2 sprays, Nasal, 2 Times Daily PRN      LASIX 40 MG tablet  Generic drug:  furosemide   40 mg, Oral, Every Morning      metoprolol succinate XL 50 MG 24 hr tablet  Commonly known as:  TOPROL-XL   50 mg, Oral, Every Morning      multivitamin capsule   1 capsule, Oral, Every Morning      olopatadine 0.1 % ophthalmic solution  Commonly known as:  PATANOL   1 drop, Both Eyes, As Needed      potassium chloride 20 MEQ CR tablet  Commonly known as:  K-DUR,KLOR-CON   20 mEq, Oral, 2 Times Daily      simvastatin 40 MG tablet  Commonly known as:  ZOCOR   40 mg, Oral, Every Morning         Stop These Medications    cephalexin 500 MG capsule  Commonly known as:  KEFLEX     leucovorin 5 MG tablet     levoFLOXacin 500 MG tablet  Commonly known as:  LEVAQUIN     methotrexate 2.5 MG tablet     REMICADE 100 MG injection  Generic drug:  inFLIXimab            Activity at Discharge:     Discharge Instructions:   1)  Patient is to continue with physical therapy exercises daily and continue working with the physical therapist as  ordered.  2)  Follow Posterior hip precautions.   3)  Patient may weight bear as tolerated.   4)  Apply ice regularly. You may ice for long periods of time as long as ice is not directly on the skin. Patient instructed on frequent calf pumping exercises.  Patient also instructed on incentive spirometer during hospitalization and encouraged to continue to use at home regularly.   5)  The dressing should be left in place.  If the dressing becomes disloged or saturated it should be changed.  If you have a home health nurse or therapist they can be contacted to assist with dressing change or repair.    6)  Follow up appointment in 2 weeks - patient to call the office at [phone] to schedule  7)  Patient will be discharged on coumadin as directed to keep INR 2-3.  Please draw PT / INR in 2 days and call to office during business hours at [phone].   8) she should follow up with the ACU daily for antibiotic administration for a total of 6 weeks.  Further by mouth antibiotic administration will be determined by infectious disease service.    Complete Discharge Diagnosis:    Patient Active Problem List   Diagnosis   • Chronic left hip pain   • OA (osteoarthritis) of hip   • Hip dislocation, left (CMS/HCC)   • Status post left hip replacement   • Hypertension   • Rheumatoid arteritis   • History of DVT (deep vein thrombosis)   • Postoperative hypotension   • S/P revision of total hip           Follow-up Appointments  Future Appointments  Date Time Provider Department Center   7/14/2018 8:30 AM ROOM 8 [facility]   7/15/2018 8:30 AM ROOM 8 [facility]   8/9/2018 1:00 PM Obed Barney MD [facility] None   8/24/2018 10:00 AM MD [facility] None   2/28/2019 1:20 PM Vijay Arango MD [facility] None     Additional Instructions for the Follow-ups that You Need to Schedule     Ambulatory Referral to [facility]U For Infusion Treatment    As directed      Place medication orders in the Therapy Plan  section of Epic.    ertapenem 1g IV q24hrs x 6 weeks. Abx stop date 8/25  - Please monitor weekly CBC with differential and CMP while on antibiotics and fax the results to the infectious disease clinic at [phone]    What is the duration of the infusion treatment?:  1 Hr         Referral to home health    As directed      Face to Face Visit Date:  7/13/2018    Follow-up Provider for Plan of Care?:  I will be treating the patient on an ongoing basis.  Please send me the Plan of Care for signature.    Follow-up Provider:  JAQUELIN BARNEY [[id number]]    Reason/Clinical Findings:  post op total hip    Describe mobility limitations that make leaving home difficult:  pain, swelling, decreased strength an mobility, gait abnormality, difficulty ambualting without assistive devices    Nursing/Therapeutic Services Requested:  Skilled Nursing Physicial Therapy    Skilled nursing orders:  Other (dressing changes prn)                    Jaquelin Barney MD  07/13/18  12:07 PM

## 2018-07-13 NOTE — PLAN OF CARE
Problem: Patient Care Overview  Goal: Plan of Care Review  Outcome: Ongoing (interventions implemented as appropriate)   07/13/18 0122   Coping/Psychosocial   Plan of Care Reviewed With patient   Plan of Care Review   Progress improving   OTHER   Outcome Summary DSG TO LEFT HIP C/D/I, DRAIN FELL OUT, NEUROVASCULAR STATUS WNL, VOIDING WELL, REPORTS ADEQUATE PAIN CONTROL, PICC PATENT WITH GOOD BLOOD RETURN, DISCUSSED B/P MONITORING DUE TO HX HYPERTENSION, POSSIBLE DISCHARGE TODAY     Goal: Individualization and Mutuality  Outcome: Ongoing (interventions implemented as appropriate)    Goal: Discharge Needs Assessment  Outcome: Ongoing (interventions implemented as appropriate)    Goal: Interprofessional Rounds/Family Conf  Outcome: Ongoing (interventions implemented as appropriate)      Problem: Fall Risk (Adult)  Goal: Absence of Fall  Outcome: Ongoing (interventions implemented as appropriate)      Problem: Self-Care Deficit (Adult,Obstetrics,Pediatric)  Goal: Improved Ability to Perform BADL and IADL  Outcome: Ongoing (interventions implemented as appropriate)      Problem: Infection, Risk/Actual (Adult)  Goal: Infection Prevention/Resolution  Outcome: Ongoing (interventions implemented as appropriate)      Problem: Pain, Acute (Adult)  Goal: Acceptable Pain Control/Comfort Level  Outcome: Ongoing (interventions implemented as appropriate)

## 2018-07-13 NOTE — PROGRESS NOTES
Continued Stay Note  [facility]     Patient Name: Rachel Moser  MRN: [id number]  Today's Date: 7/13/2018    Admit Date: 7/6/2018          Discharge Plan     Row Name 07/13/18 1524       Plan    Final Discharge Disposition Code 01 - home or self-care    Final Note Appt made with ACU for 7/14 and 7/15 at 8:30am. Informed pt to go through radiology triage. Pt being d/c'ed home and will go to ACU daily for IV antibiotics.              Discharge Codes    No documentation.       Expected Discharge Date and Time     Expected Discharge Date Expected Discharge Time    Jul 13, 2018             Seble Arboleda RN

## 2018-07-13 NOTE — PROGRESS NOTES
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]    Status Post: HIP INCISION AND DRAINAGE left hip    Post Operative Day Number: 6    Current Problem List:   Patient Active Problem List   Diagnosis   • Chronic left hip pain   • OA (osteoarthritis) of hip   • Hip dislocation, left (CMS/HCC)   • Status post left hip replacement   • Hypertension   • Rheumatoid arteritis   • History of DVT (deep vein thrombosis)   • Postoperative hypotension   • S/P revision of total hip         Past Medical History:   Diagnosis Date   • Allergic    • Cataract    • High cholesterol    • History of DVT (deep vein thrombosis)    • History of kidney infection     1 MONTH AGO   • History of pulmonary embolus (PE)    • Hypertension    • Paralabral cyst of left hip    • PONV (postoperative nausea and vomiting)    • Presence of vena cava filter    • Rheumatoid arteritis    • Seasonal allergies    • UTI (urinary tract infection)     diagnosed 4/2/18  medically treated presently       SUBJECTIVE: 72 y.o.  female. Awake and alert.  No complaints    OBJECTIVE:   Vitals:    07/12/18 1915 07/13/18 0033 07/13/18 0349 07/13/18 0838   BP: 108/62 111/64 107/66 (!) 80/44   BP Location: Left arm Left arm Left arm Left arm   Patient Position: Lying Lying Lying Sitting   Pulse: 76 80 74 87   Resp: 16 16 16 16   Temp: 98.2 °F (36.8 °C) 98.4 °F (36.9 °C) 98.5 °F (36.9 °C) 98.2 °F (36.8 °C)   TempSrc: Oral Oral Oral Oral   SpO2: 96% 95% 95% 95%   Weight:       Height:         I/O last 3 completed shifts:  In: 1880 [P.O.:1880]  Out: 125 [Drains:125]    Current Medications:  Scheduled Meds:  atorvastatin 20 mg Oral Daily   docusate sodium 100 mg Oral BID   ertapenem 1 g Intravenous  Q24H   ferrous sulfate 325 mg Oral Daily With Breakfast   folic acid 800 mcg Oral Q24H   furosemide 40 mg Oral Q24H   meloxicam 15 mg Oral Daily   metoprolol succinate XL 50 mg Oral Q24H   polyethylene glycol 17 g Oral BID   potassium chloride 20 mEq Oral Daily   warfarin 5 mg Oral Daily     PRN Meds:.•  acetaminophen  •  albuterol  •  hepatitis A  •  HYDROcodone-acetaminophen  •  HYDROcodone-acetaminophen  •  ipratropium  •  melatonin  •  [DISCONTINUED] ondansetron **OR** [DISCONTINUED] ondansetron ODT **OR** ondansetron  •  ondansetron **OR** ondansetron ODT **OR** ondansetron  •  sodium chloride  •  sodium chloride    Diagnostic Tests:   Lab Results (last 24 hours)     Procedure Component Value Units Date/Time    Sedimentation Rate [[id number]]  (Abnormal) Collected:  07/13/18 0336    Specimen:  Blood Updated:  07/13/18 0500     Sed Rate 83 (H) mm/hr     C-reactive Protein [[id number]]  (Abnormal) Collected:  07/13/18 0336    Specimen:  Blood Updated:  07/13/18 0455     C-Reactive Protein 4.49 (H) mg/dL     Protime-INR [[id number]]  (Abnormal) Collected:  07/13/18 0336    Specimen:  Blood Updated:  07/13/18 0444     Protime 29.2 (H) Seconds      INR 2.82 (H)    Hemoglobin & Hematocrit, Blood [[id number]]  (Abnormal) Collected:  07/13/18 0336    Specimen:  Blood Updated:  07/13/18 0439     Hemoglobin 10.5 (L) g/dL      Hematocrit 34.6 (L) %     Blood Culture - Blood, [[id number]]  (Normal) Collected:  07/08/18 1413    Specimen:  Blood from Arm, Right Updated:  07/12/18 1445     Blood Culture No growth at 4 days    Blood Culture - Blood, [[id number]]  (Normal) Collected:  07/08/18 1241    Specimen:  Blood from Arm, Left Updated:  07/12/18 1315     Blood Culture No growth at 4 days    Anaerobic Culture - Wound, Hip, Left [[id number]]  (Normal) Collected:  07/07/18 1329    Specimen:  Wound from Hip, Left Updated:  07/12/18 1032     Culture No anaerobes isolated    Anaerobic Culture - Wound, Hip, Left [[id number]]  (Normal)  Collected:  07/07/18 1247    Specimen:  Wound from Hip, Left Updated:  07/12/18 1032     Culture No anaerobes isolated    Anaerobic Culture - Wound, Hip, Left [[id number]]  (Normal) Collected:  07/07/18 1246    Specimen:  Wound from Hip, Left Updated:  07/12/18 1031     Culture No anaerobes isolated    Anaerobic Culture - Wound, Hip, Left [[id number]]  (Normal) Collected:  07/07/18 1245    Specimen:  Wound from Hip, Left Updated:  07/12/18 1031     Culture No anaerobes isolated          PHYSICAL EXAM:  Left hip dressing dry and intact.  Drains out.  Calf soft and nontender. Good motion and sensation to left  Foot and ankle.  PICC in place.  Plans to go to [facility] for IV antibiotics.  Weekly labs per ID.       ASSESSMENT & PLAN:  Plan home possibly later today when all arrangements are made.          Date: 7/13/2018    PAWEL Santos MD

## 2018-07-13 NOTE — THERAPY TREATMENT NOTE
Acute Care - Physical Therapy Treatment Note  [facility]     Patient Name: Rachel Moser  : 1945  MRN: [id number]  Today's Date: 2018  Onset of Illness/Injury or Date of Surgery: 18          Admit Date: 2018    Visit Dx:    ICD-10-CM ICD-9-CM   1. Difficulty walking R26.2 719.7   2. S/P revision of total hip Z96.649 V43.64   3. 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 (CMS/HCC) M00.852 711.05     Patient Active Problem List   Diagnosis   • Chronic left hip pain   • OA (osteoarthritis) of hip   • Hip dislocation, left (CMS/[facility])   • Status post left hip replacement   • Hypertension   • Rheumatoid arteritis   • History of DVT (deep vein thrombosis)   • Postoperative hypotension   • S/P revision of total hip       Therapy Treatment          Rehabilitation Treatment Summary     Row Name 18             Treatment Time/Intention    Discipline physical therapy assistant  -      Document Type therapy note (daily note)  -      Subjective Information complains of   stiffness left hip  -      Care Plan Review patient/other agree to care plan  -      Care Plan Review, Other Participant(s) spouse  -      Existing Precautions/Restrictions fall;left;hip, posterior  -      Recorded by [] Michelle Jamison, DIETER 18      Row Name 18             Sit-Stand Transfer    Sit-Stand Cameron (Transfers) contact guard  -      Assistive Device (Sit-Stand Transfers) walker, front-wheeled  -      Recorded by [] Michelle Jamison PTA 18      Row Name 18             Stand-Sit Transfer    Stand-Sit Cameron (Transfers) stand by assist;verbal cues  -      Assistive Device (Stand-Sit Transfers) walker, front-wheeled  -      Recorded by [] Michelle Jamison PTA 18      Row Name 18             Gait/Stairs Assessment/Training    Cameron Level (Gait) stand by assist;verbal cues  -      Assistive Device (Gait)  walker, front-wheeled  -JM      Distance in Feet (Gait) 200  -JM      Deviations/Abnormal Patterns (Gait) yanni decreased  -JM      Bilateral Gait Deviations forward flexed posture  -JM      Comment (Gait/Stairs) slight assist to guide rwx away from wall  -JM      Recorded by [] Michelle Jamison, PTA 07/13/18 0933      Row Name 07/13/18 0930             Therapeutic Exercise    Comment (Therapeutic Exercise) LAQs x 10 , isometrics ad ron x 10 every hr while awake  -JM      Recorded by [] Michelle Jamison, DIETER 07/13/18 0933      Row Name 07/13/18 0930             Positioning and Restraints    Pre-Treatment Position sitting in chair/recliner  -JM      In Chair reclined;call light within reach;encouraged to call for assist;with family/caregiver;LLE elevated  -JM      Recorded by [] Michelle Jamison, [facility] 07/13/18 0933      Row Name 07/13/18 0930             Pain Scale: Numbers Pre/Post-Treatment    Pain Scale: Numbers, Pretreatment 2/10  -JM      Pain Location - Side Left  -JM      Pain Location hip  -JM      Recorded by [] Michelle Jamison, [facility] 07/13/18 0933      Row Name                Wound 07/07/18 1358 Left hip incision    Wound - Properties Group Date first assessed: 07/07/18 [HH] Time first assessed: 1358 [] Side: Left [HH] Location: hip [HH] Type: incision [HH] Recorded by:  [] Tamara Lyons RN 07/07/18 1358      User Key  (r) = Recorded By, (t) = Taken By, (c) = Cosigned By    Initials Name Effective Dates Discipline     Tamara Lyons RN 06/16/16 -  Nurse     Michelle Jamison, [facility] 03/07/18 -  PT          Wound 07/07/18 1358 Left hip incision (Active)   Dressing Appearance dry;intact;no drainage 7/13/2018  4:22 AM   Closure REINALDO 7/13/2018  4:22 AM   Base dressing in place, unable to visualize 7/13/2018  4:22 AM   Drainage Amount none 7/13/2018  4:22 AM             Physical Therapy Education     Title: PT OT SLP Therapies (Done)     Topic: Physical Therapy (Done)     Point: Mobility training  (Done)    Learning Progress Summary     Learner Status Readiness Method Response Comment Documented by    Patient Done Acceptance E,TB FLY   07/12/18 0856     Done Acceptance E,TB FLY   07/11/18 1406     Done Acceptance E,TB,D VU,NR  EJ 07/10/18 1434     Done Acceptance E,TB TALIA   07/09/18 1719     Done Acceptance E VU  CN 07/08/18 0929    Family Done Acceptance E,TB FLY   07/12/18 0856     Done Acceptance E,TB FLY   07/11/18 1406     Done Acceptance E,TB TALIA   07/09/18 1719          Point: Home exercise program (Done)    Learning Progress Summary     Learner Status Readiness Method Response Comment Documented by    Patient Done Acceptance E,TIARRA BILL   07/12/18 0856     Done Acceptance E,TIARRA BILL   07/11/18 1406     Done Acceptance E,TBTONY VU,NR  EJ 07/10/18 1434     Done Acceptance E,TB TALIA   07/09/18 1719     Done Acceptance E VU   07/08/18 0929    Family Done Acceptance E,TB FLY   07/12/18 0856     Done Acceptance E,TB [facility] 07/11/18 1406     Done Acceptance E,TB TALIA   07/09/18 1719          Point: Body mechanics (Done)    Learning Progress Summary     Learner Status Readiness Method Response Comment Documented by    Patient Done Acceptance E,TIARRA BILL   07/12/18 0856     Done Acceptance E,TIARRA [facility] 07/11/18 1406     Done Acceptance E,TB TALIA   07/09/18 1719     Done Acceptance E VU   07/08/18 0929    Family Done Acceptance E,TIARRA BILL   07/12/18 0856     Done Acceptance E,TB [facility] 07/11/18 1406     Done Acceptance E,TB [facility] 07/09/18 1719          Point: Precautions (Done)    Learning Progress Summary     Learner Status Readiness Method Response Comment Documented by    Patient Done Acceptance E,TB FLY   07/12/18 0856     Done Acceptance E,TB FLY   07/11/18 1406     Done Acceptance E,TB TALIA   07/09/18 1719     Done Acceptance E VU  CN 07/08/18 0929    Family Done Acceptance E,TB FLY   07/12/18 0856     Done Acceptance E,TB FLY   07/11/18 1406     Done Acceptance E,TB [facility] 07/09/18 1719                       User Key     Initials Effective Dates Name Provider Type [facility] 03/07/18 -  Michelle Jamison PTA Physical Therapy Assistant PT    EJ 04/03/18 -  Misty Bradford, PT Physical Therapist PT    CN 04/03/18 -  Carmelina Painting, PT Physical Therapist PT                    PT Recommendation and Plan     Plan of Care Reviewed With: patient, family  Progress: improving  Outcome Summary: incr amb dist w/o incr pain          Outcome Measures     Row Name 07/13/18 0900 07/11/18 1400 07/10/18 1400       How much help from another person do you currently need...    Turning from your back to your side while in flat bed without using bedrails? 3  - 3  -JM 3  -EJ    Moving from lying on back to sitting on the side of a flat bed without bedrails? 3  - 3  -JM 3  -EJ    Moving to and from a bed to a chair (including a wheelchair)? 3  - 3  -JM 3  -EJ    Standing up from a chair using your arms (e.g., wheelchair, bedside chair)? 3  - 3  -JM 3  -EJ    Climbing 3-5 steps with a railing? 3  - 3  -JM 3  -EJ    To walk in hospital room? 4  - 3  -JM 3  -EJ    AM-PAC 6 Clicks Score 19  - 18  -JM 18  -EJ       Functional Assessment    Outcome Measure Options  --  -- AM-PAC 6 Clicks Basic Mobility (PT)  -      User Key  (r) = Recorded By, (t) = Taken By, (c) = Cosigned By    Initials Name Provider Type    LEXA Jamison PTA Physical Therapy Assistant    EJ Misty Bradford, PT Physical Therapist           Time Calculation:         PT Charges     Row Name 07/13/18 0933             Time Calculation    Start Time 0900  -      Stop Time 0916  -      Time Calculation (min) 16 min  -      PT Received On 07/13/18  -         Time Calculation- PT    Total Timed Code Minutes- PT 16 minute(s)  -        User Key  (r) = Recorded By, (t) = Taken By, (c) = Cosigned By    Initials Name Provider Type    LEXA Jamison PTA Physical Therapy Assistant        Therapy Suggested Charges     Code    Minutes Charges    None           Therapy Charges for Today     Code Description Service Date Service Provider Modifiers Qty    58788917538 HC PT THER PROC EA 15 MIN 7/12/2018 Michelle Jamison PTA GP 1    09313516178 HC PT THER PROC EA 15 MIN 7/13/2018 Michelle Jamison PTA GP 1          PT G-Codes  Outcome Measure Options: AM-PAC 6 Clicks Basic Mobility (PT)    Michelle Jamison PTA  7/13/2018

## 2018-07-14 ENCOUNTER — HOSPITAL ENCOUNTER (OUTPATIENT)
Dept: INFUSION THERAPY | Facility: HOSPITAL | Age: 73
Discharge: HOME | End: 2018-07-14
Attending: INTERNAL MEDICINE | Admitting: INTERNAL MEDICINE

## 2018-07-14 VITALS
RESPIRATION RATE: 18 BRPM | DIASTOLIC BLOOD PRESSURE: 70 MMHG | OXYGEN SATURATION: 96 % | SYSTOLIC BLOOD PRESSURE: 112 MMHG | TEMPERATURE: 97.6 F | HEART RATE: 104 BPM

## 2018-07-14 DIAGNOSIS — M00.80 ARTHRITIS DUE TO OTHER BACTERIA, SEPTIC ARTHRITIS OF UNSPECIFIED LOCATION (HCC): ICD-10-CM

## 2018-07-14 PROCEDURE — 25010000002 ERTAPENEM 1 GM/100ML SOLUTION: Performed by: INTERNAL MEDICINE

## 2018-07-14 PROCEDURE — 96365 THER/PROPH/DIAG IV INF INIT: CPT

## 2018-07-14 RX ADMIN — ERTAPENEM SODIUM 1 G: 1 INJECTION, POWDER, LYOPHILIZED, FOR SOLUTION INTRAMUSCULAR; INTRAVENOUS at 08:56

## 2018-07-15 ENCOUNTER — HOSPITAL ENCOUNTER (OUTPATIENT)
Dept: INFUSION THERAPY | Facility: HOSPITAL | Age: 73
Discharge: HOME | End: 2018-07-15
Attending: INTERNAL MEDICINE | Admitting: INTERNAL MEDICINE

## 2018-07-15 VITALS
HEART RATE: 90 BPM | RESPIRATION RATE: 18 BRPM | SYSTOLIC BLOOD PRESSURE: 109 MMHG | DIASTOLIC BLOOD PRESSURE: 70 MMHG | OXYGEN SATURATION: 97 % | TEMPERATURE: 97.2 F

## 2018-07-15 DIAGNOSIS — M00.80 ARTHRITIS DUE TO OTHER BACTERIA, SEPTIC ARTHRITIS OF UNSPECIFIED LOCATION (HCC): ICD-10-CM

## 2018-07-15 PROCEDURE — 25010000002 ERTAPENEM 1 GM/100ML SOLUTION: Performed by: INTERNAL MEDICINE

## 2018-07-15 PROCEDURE — 96365 THER/PROPH/DIAG IV INF INIT: CPT

## 2018-07-15 RX ADMIN — ERTAPENEM SODIUM 1 G: 1 INJECTION, POWDER, LYOPHILIZED, FOR SOLUTION INTRAMUSCULAR; INTRAVENOUS at 10:43

## 2018-07-15 NOTE — PROGRESS NOTES
Pt arrived to xray triage with left hip dressing soaked and blood tinged drainage running down her leg.  Removed old dressing, place 2 abd pads on wound, wrapped it with kerlix.  She states is going to call her doctor in the morning.

## 2018-07-16 ENCOUNTER — TELEPHONE (OUTPATIENT)
Dept: ORTHOPEDIC SURGERY | Facility: CLINIC | Age: 73
End: 2018-07-16

## 2018-07-16 ENCOUNTER — HOSPITAL ENCOUNTER (OUTPATIENT)
Dept: INFUSION THERAPY | Facility: HOSPITAL | Age: 73
Discharge: HOME | End: 2018-07-16
Admitting: INTERNAL MEDICINE

## 2018-07-16 VITALS
HEART RATE: 98 BPM | OXYGEN SATURATION: 100 % | SYSTOLIC BLOOD PRESSURE: 120 MMHG | BODY MASS INDEX: 24.39 KG/M2 | RESPIRATION RATE: 16 BRPM | WEIGHT: 121 LBS | TEMPERATURE: 97.9 F | HEIGHT: 59 IN | DIASTOLIC BLOOD PRESSURE: 69 MMHG

## 2018-07-16 DIAGNOSIS — M00.80 ARTHRITIS DUE TO OTHER BACTERIA, SEPTIC ARTHRITIS OF UNSPECIFIED LOCATION (HCC): ICD-10-CM

## 2018-07-16 DIAGNOSIS — M00.852 ARTHRITIS OF LEFT HIP DUE TO OTHER BACTERIA (HCC): ICD-10-CM

## 2018-07-16 LAB
ALBUMIN SERPL-MCNC: 3.2 G/DL (ref 3.5–5.2)
ALBUMIN/GLOB SERPL: 0.9 G/DL
ALP SERPL-CCNC: 72 U/L (ref 39–117)
ALT SERPL W P-5'-P-CCNC: 8 U/L (ref 1–33)
ANION GAP SERPL CALCULATED.3IONS-SCNC: 15 MMOL/L
AST SERPL-CCNC: 12 U/L (ref 1–32)
BASOPHILS # BLD AUTO: 0.04 10*3/MM3 (ref 0–0.2)
BASOPHILS NFR BLD AUTO: 0.3 % (ref 0–1.5)
BILIRUB SERPL-MCNC: 0.4 MG/DL (ref 0.1–1.2)
BUN BLD-MCNC: 14 MG/DL (ref 8–23)
BUN/CREAT SERPL: 22.6 (ref 7–25)
CALCIUM SPEC-SCNC: 9.9 MG/DL (ref 8.6–10.5)
CHLORIDE SERPL-SCNC: 100 MMOL/L (ref 98–107)
CO2 SERPL-SCNC: 25 MMOL/L (ref 22–29)
CREAT BLD-MCNC: 0.62 MG/DL (ref 0.57–1)
DEPRECATED RDW RBC AUTO: 51.2 FL (ref 37–54)
EOSINOPHIL # BLD AUTO: 0.3 10*3/MM3 (ref 0–0.7)
EOSINOPHIL NFR BLD AUTO: 2.3 % (ref 0.3–6.2)
ERYTHROCYTE [DISTWIDTH] IN BLOOD BY AUTOMATED COUNT: 15.1 % (ref 11.7–13)
GFR SERPL CREATININE-BSD FRML MDRD: 95 ML/MIN/1.73
GLOBULIN UR ELPH-MCNC: 3.5 GM/DL
GLUCOSE BLD-MCNC: 105 MG/DL (ref 65–99)
HCT VFR BLD AUTO: 36.1 % (ref 35.6–45.5)
HGB BLD-MCNC: 11.2 G/DL (ref 11.9–15.5)
IMM GRANULOCYTES # BLD: 0.12 10*3/MM3 (ref 0–0.03)
IMM GRANULOCYTES NFR BLD: 0.9 % (ref 0–0.5)
LYMPHOCYTES # BLD AUTO: 2.34 10*3/MM3 (ref 0.9–4.8)
LYMPHOCYTES NFR BLD AUTO: 17.7 % (ref 19.6–45.3)
MCH RBC QN AUTO: 28.7 PG (ref 26.9–32)
MCHC RBC AUTO-ENTMCNC: 31 G/DL (ref 32.4–36.3)
MCV RBC AUTO: 92.6 FL (ref 80.5–98.2)
MONOCYTES # BLD AUTO: 1.43 10*3/MM3 (ref 0.2–1.2)
MONOCYTES NFR BLD AUTO: 10.8 % (ref 5–12)
NEUTROPHILS # BLD AUTO: 9.11 10*3/MM3 (ref 1.9–8.1)
NEUTROPHILS NFR BLD AUTO: 68.9 % (ref 42.7–76)
PLATELET # BLD AUTO: 396 10*3/MM3 (ref 140–500)
PMV BLD AUTO: 11.4 FL (ref 6–12)
POTASSIUM BLD-SCNC: 3.5 MMOL/L (ref 3.5–5.2)
PROT SERPL-MCNC: 6.7 G/DL (ref 6–8.5)
RBC # BLD AUTO: 3.9 10*6/MM3 (ref 3.9–5.2)
SODIUM BLD-SCNC: 140 MMOL/L (ref 136–145)
WBC NRBC COR # BLD: 13.22 10*3/MM3 (ref 4.5–10.7)

## 2018-07-16 PROCEDURE — 96365 THER/PROPH/DIAG IV INF INIT: CPT

## 2018-07-16 PROCEDURE — 25010000002 ERTAPENEM PER 500 MG: Performed by: INTERNAL MEDICINE

## 2018-07-16 PROCEDURE — 36592 COLLECT BLOOD FROM PICC: CPT

## 2018-07-16 PROCEDURE — 80053 COMPREHEN METABOLIC PANEL: CPT | Performed by: INTERNAL MEDICINE

## 2018-07-16 PROCEDURE — 85025 COMPLETE CBC W/AUTO DIFF WBC: CPT | Performed by: INTERNAL MEDICINE

## 2018-07-16 RX ADMIN — SODIUM CHLORIDE 1 G: 900 INJECTION INTRAVENOUS at 10:10

## 2018-07-16 NOTE — TELEPHONE ENCOUNTER
INR today is 4.1.  Have asked them to hold her Coumadin today and to contact Dr. Vijay Arango for further instructions since he did monitor her Coumadin preoperatively.  Have advised the patient also hold off on any exercises.  The nurse reports that she's had started having some bloody drainage yesterday from her hip incision however today the drainage is only serous.  There is no erythema no increased swelling and the patient is afebrile.  She is applied a pressure dressing instructed the patient's  on how to do so as well.  Have advised them not to change the dressing unless it bleeds through.  Will check with RBB.  Patient has decided that she will do outpatient PT other than home PT.

## 2018-07-16 NOTE — PATIENT INSTRUCTIONS
Ertapenem injection  What is this medicine?  ERTAPENEM (er ta PEN em) is a carbapenem antibiotic. It is used to treat certain kinds of bacterial infections. It will not work for colds, flu, or other viral infections.  This medicine may be used for other purposes; ask your health care provider or pharmacist if you have questions.  COMMON BRAND NAME(S): Invanz  What should I tell my health care provider before I take this medicine?  They need to know if you have any of these conditions:  -brain tumor, lesion  -kidney disease  -seizure disorder  -an unusual or allergic reaction to ertapenem, other antibiotics, amide local anesthetics like lidocaine, or other medicines, foods, dyes, or preservatives  -pregnant or trying to get pregnant  -breast-feeding  How should I use this medicine?  This medicine is infused into a vein or injected deep into a muscle. It is usually given by a health care professional in a hospital or clinic setting.  If you get this medicine at home, you will be taught how to prepare and give this medicine. Use exactly as directed. Take your medicine at regular intervals. Do not take your medicine more often than directed.  It is important that you put your used needles and syringes in a special sharps container. Do not put them in a trash can. If you do not have a sharps container, call your pharmacist or healthcare provider to get one.  Talk to your pediatrician regarding the use of this medicine in children. While this drug may be prescribed for children as young as 3 months old for selected conditions, precautions do apply.  Overdosage: If you think you have taken too much of this medicine contact a poison control center or emergency room at once.  NOTE: This medicine is only for you. Do not share this medicine with others.  What if I miss a dose?  If you miss a dose, take it as soon as you can. If it is almost time for your next dose, take only that dose. Do not take double or extra doses.  What  may interact with this medicine?  -birth control pills  -probenecid  This list may not describe all possible interactions. Give your health care provider a list of all the medicines, herbs, non-prescription drugs, or dietary supplements you use. Also tell them if you smoke, drink alcohol, or use illegal drugs. Some items may interact with your medicine.  What should I watch for while using this medicine?  Tell your doctor or health care professional if your symptoms do not improve or if you get new symptoms. Your doctor will monitor your condition and blood work as needed.  Do not treat diarrhea with over the counter products. Contact your doctor if you have diarrhea that lasts more than 2 days or if it is severe and watery.  What side effects may I notice from receiving this medicine?  Side effects that you should report to your doctor or health care professional as soon as possible:  -allergic reactions like skin rash, itching or hives, swelling of the face, lips, or tongue  -anxiety, confusion, dizzy  -chest pain  -difficulty breathing, wheezing  -edema, swelling  -fever  -irregular heart rate, blood pressure  -pain or difficulty passing urine  -seizures  -unusually weak or tired  -white or red patches in mouth  Side effects that usually do not require medical attention (report to your doctor or health care professional if they continue or are bothersome):  -constipation or diarrhea  -difficulty sleeping  -headache  -nausea, vomiting  -pain, swelling or irritation where injected  -stomach upset  -vaginal itch, irritation  This list may not describe all possible side effects. Call your doctor for medical advice about side effects. You may report side effects to FDA at 2-486-FDA-4784.  Where should I keep my medicine?  Keep out of the reach of children.  You will be instructed on how to store this medicine. Throw away any unused medicine after the expiration date on the label.  NOTE: This sheet is a summary. It may  not cover all possible information. If you have questions about this medicine, talk to your doctor, pharmacist, or health care provider.  © 2018 [employer]/Gold Standard (2014-07-25 07:36:44)

## 2018-07-17 ENCOUNTER — HOSPITAL ENCOUNTER (OUTPATIENT)
Dept: INFUSION THERAPY | Facility: HOSPITAL | Age: 73
Discharge: HOME | End: 2018-07-17
Admitting: INTERNAL MEDICINE

## 2018-07-17 VITALS
SYSTOLIC BLOOD PRESSURE: 112 MMHG | OXYGEN SATURATION: 99 % | RESPIRATION RATE: 16 BRPM | DIASTOLIC BLOOD PRESSURE: 62 MMHG | TEMPERATURE: 98.7 F | HEART RATE: 93 BPM

## 2018-07-17 DIAGNOSIS — M00.80 ARTHRITIS DUE TO OTHER BACTERIA, SEPTIC ARTHRITIS OF UNSPECIFIED LOCATION (HCC): ICD-10-CM

## 2018-07-17 PROCEDURE — 96365 THER/PROPH/DIAG IV INF INIT: CPT

## 2018-07-17 PROCEDURE — 25010000002 ERTAPENEM PER 500 MG: Performed by: INTERNAL MEDICINE

## 2018-07-17 RX ADMIN — SODIUM CHLORIDE 1 G: 900 INJECTION INTRAVENOUS at 14:23

## 2018-07-17 NOTE — TELEPHONE ENCOUNTER
Have spoken with Dr. elena about possible surgery for tomorrow.  He informed me that he did talk to the patient's  earlier today and gave him instructions on dressing changes.  Patient is scheduled to see him on Thursday for wound check.  No surgery has been arranged per RBB

## 2018-07-18 ENCOUNTER — HOSPITAL ENCOUNTER (OUTPATIENT)
Dept: INFUSION THERAPY | Facility: HOSPITAL | Age: 73
Discharge: HOME | End: 2018-07-18
Admitting: INTERNAL MEDICINE

## 2018-07-18 VITALS
DIASTOLIC BLOOD PRESSURE: 63 MMHG | TEMPERATURE: 96.6 F | OXYGEN SATURATION: 98 % | HEART RATE: 95 BPM | RESPIRATION RATE: 16 BRPM | SYSTOLIC BLOOD PRESSURE: 121 MMHG

## 2018-07-18 DIAGNOSIS — M00.80 ARTHRITIS DUE TO OTHER BACTERIA, SEPTIC ARTHRITIS OF UNSPECIFIED LOCATION (HCC): ICD-10-CM

## 2018-07-18 PROCEDURE — 25010000002 ERTAPENEM PER 500 MG: Performed by: INTERNAL MEDICINE

## 2018-07-18 PROCEDURE — 96365 THER/PROPH/DIAG IV INF INIT: CPT

## 2018-07-18 RX ADMIN — SODIUM CHLORIDE 1 G: 900 INJECTION INTRAVENOUS at 14:29

## 2018-07-19 ENCOUNTER — HOSPITAL ENCOUNTER (OUTPATIENT)
Dept: MRI IMAGING | Facility: HOSPITAL | Age: 73
Discharge: HOME | End: 2018-07-19
Attending: ORTHOPAEDIC SURGERY

## 2018-07-19 ENCOUNTER — OFFICE VISIT (OUTPATIENT)
Dept: ORTHOPEDIC SURGERY | Facility: CLINIC | Age: 73
End: 2018-07-19

## 2018-07-19 ENCOUNTER — HOSPITAL ENCOUNTER (OUTPATIENT)
Dept: INFUSION THERAPY | Facility: HOSPITAL | Age: 73
Discharge: HOME | End: 2018-07-19
Admitting: INTERNAL MEDICINE

## 2018-07-19 ENCOUNTER — TELEPHONE (OUTPATIENT)
Dept: ORTHOPEDIC SURGERY | Facility: CLINIC | Age: 73
End: 2018-07-19

## 2018-07-19 VITALS — TEMPERATURE: 98.3 F | HEIGHT: 59 IN | WEIGHT: 126 LBS | BODY MASS INDEX: 25.4 KG/M2

## 2018-07-19 VITALS
HEART RATE: 102 BPM | TEMPERATURE: 95.9 F | RESPIRATION RATE: 16 BRPM | SYSTOLIC BLOOD PRESSURE: 123 MMHG | DIASTOLIC BLOOD PRESSURE: 61 MMHG | OXYGEN SATURATION: 98 %

## 2018-07-19 DIAGNOSIS — M25.552 PAIN OF LEFT HIP JOINT: ICD-10-CM

## 2018-07-19 DIAGNOSIS — M25.552 PAIN OF LEFT HIP JOINT: Primary | ICD-10-CM

## 2018-07-19 DIAGNOSIS — M00.80 ARTHRITIS DUE TO OTHER BACTERIA, SEPTIC ARTHRITIS OF UNSPECIFIED LOCATION (HCC): ICD-10-CM

## 2018-07-19 PROBLEM — Z96.642 STATUS POST TOTAL HIP REPLACEMENT, LEFT: Status: ACTIVE | Noted: 2018-07-19

## 2018-07-19 LAB
CRP SERPL-MCNC: 5.26 MG/DL (ref 0–0.5)
ERYTHROCYTE [SEDIMENTATION RATE] IN BLOOD: 80 MM/HR (ref 0–30)

## 2018-07-19 PROCEDURE — G0463 HOSPITAL OUTPT CLINIC VISIT: HCPCS

## 2018-07-19 PROCEDURE — 85652 RBC SED RATE AUTOMATED: CPT | Performed by: ORTHOPAEDIC SURGERY

## 2018-07-19 PROCEDURE — 99024 POSTOP FOLLOW-UP VISIT: CPT | Performed by: ORTHOPAEDIC SURGERY

## 2018-07-19 PROCEDURE — 86140 C-REACTIVE PROTEIN: CPT | Performed by: ORTHOPAEDIC SURGERY

## 2018-07-19 PROCEDURE — 73721 MRI JNT OF LWR EXTRE W/O DYE: CPT

## 2018-07-19 PROCEDURE — 36592 COLLECT BLOOD FROM PICC: CPT

## 2018-07-19 PROCEDURE — 25010000002 ERTAPENEM PER 500 MG: Performed by: INTERNAL MEDICINE

## 2018-07-19 PROCEDURE — 96365 THER/PROPH/DIAG IV INF INIT: CPT

## 2018-07-19 RX ADMIN — SODIUM CHLORIDE 1 G: 900 INJECTION INTRAVENOUS at 09:46

## 2018-07-19 NOTE — PROGRESS NOTES
Labs drawn per order prior to invanz infusion.  Right PICC line drsg change performed using sterile technique.  Pt tolerated well. Pt dc'ed ambulatory using cane with .  Pt to radiology for MRI>

## 2018-07-19 NOTE — PROGRESS NOTES
Mrs Moser returns today she is about a week and a half out from irrigation debridement and antibiotic bead placement for an infected complex left hip revision.  Over the last few days she has developed some clear serous drainage from a small pinhole in the incision and there is also been some white-appearing material exuding from the incision.  She denies any fever chills or any feelings of systemic infection.    On exam there is a small area about 2 mm in length at the distal aspect of the incision which is draining a clear serous fluid.  There is no fluctuance and I cannot express any further fluid by palpation.  On the dressing there is a small amount of whitish material which appears to be partially resorbed antibiotic beads.    Drainage in recently operated revision hip for infection.  She has Escherichia coli.  She is currently getting Invanz treatments daily.  I'm going to repeat a sedimentation rate C-reactive protein and MRI.  I have a low index of suspicion to reoperate given the complexity of her hip revision surgery.

## 2018-07-20 ENCOUNTER — HOSPITAL ENCOUNTER (OUTPATIENT)
Dept: INFUSION THERAPY | Facility: HOSPITAL | Age: 73
Discharge: HOME | End: 2018-07-20
Admitting: INTERNAL MEDICINE

## 2018-07-20 VITALS
SYSTOLIC BLOOD PRESSURE: 133 MMHG | RESPIRATION RATE: 20 BRPM | DIASTOLIC BLOOD PRESSURE: 65 MMHG | HEART RATE: 111 BPM | OXYGEN SATURATION: 97 % | TEMPERATURE: 99.3 F

## 2018-07-20 DIAGNOSIS — M00.80 ARTHRITIS DUE TO OTHER BACTERIA, SEPTIC ARTHRITIS OF UNSPECIFIED LOCATION (HCC): ICD-10-CM

## 2018-07-20 PROCEDURE — 25010000002 ERTAPENEM PER 500 MG: Performed by: INTERNAL MEDICINE

## 2018-07-20 PROCEDURE — 96365 THER/PROPH/DIAG IV INF INIT: CPT

## 2018-07-20 RX ORDER — WARFARIN SODIUM 7.5 MG/1
7.5 TABLET ORAL
COMMUNITY
End: 2018-07-27 | Stop reason: HOSPADM

## 2018-07-20 RX ADMIN — SODIUM CHLORIDE 1 G: 900 INJECTION INTRAVENOUS at 15:24

## 2018-07-20 NOTE — TELEPHONE ENCOUNTER
I called Melissa back to discuss this because I do not show record that we were managing this patients Coumadin.    She states that  was managing, but she called us with the results since the patient was possibly going back in for surgery. She states that she spoke with the patient yesterday evening and she told her that  called her to discuss the surgery and RBB directed the patient to stop the Coumadin and take a dose of Lovenox. Patient is scheduled for surgery for tomorrow.

## 2018-07-21 ENCOUNTER — HOSPITAL ENCOUNTER (OUTPATIENT)
Dept: INFUSION THERAPY | Facility: HOSPITAL | Age: 73
End: 2018-07-21

## 2018-07-21 ENCOUNTER — APPOINTMENT (OUTPATIENT)
Dept: GENERAL RADIOLOGY | Facility: HOSPITAL | Age: 73
End: 2018-07-21

## 2018-07-21 ENCOUNTER — ANESTHESIA (OUTPATIENT)
Dept: PERIOP | Facility: HOSPITAL | Age: 73
End: 2018-07-21

## 2018-07-21 ENCOUNTER — HOSPITAL ENCOUNTER (INPATIENT)
Facility: HOSPITAL | Age: 73
LOS: 6 days | Discharge: HOME | End: 2018-07-27
Attending: ORTHOPAEDIC SURGERY | Admitting: ORTHOPAEDIC SURGERY

## 2018-07-21 ENCOUNTER — ANESTHESIA EVENT (OUTPATIENT)
Dept: PERIOP | Facility: HOSPITAL | Age: 73
End: 2018-07-21

## 2018-07-21 DIAGNOSIS — Z51.81 ENCOUNTER FOR MONITORING COUMADIN THERAPY: ICD-10-CM

## 2018-07-21 DIAGNOSIS — Z79.01 ENCOUNTER FOR MONITORING COUMADIN THERAPY: ICD-10-CM

## 2018-07-21 DIAGNOSIS — Z96.642 STATUS POST TOTAL HIP REPLACEMENT, LEFT: ICD-10-CM

## 2018-07-21 DIAGNOSIS — M00.052 STAPHYLOCOCCAL ARTHRITIS OF LEFT HIP (HCC): ICD-10-CM

## 2018-07-21 DIAGNOSIS — M00.852 ARTHRITIS OF LEFT HIP DUE TO OTHER BACTERIA (HCC): Primary | ICD-10-CM

## 2018-07-21 DIAGNOSIS — Z74.09 DECREASED MOBILITY AND ENDURANCE: ICD-10-CM

## 2018-07-21 LAB
ABO GROUP BLD: NORMAL
BASOPHILS # BLD AUTO: 0.06 10*3/MM3 (ref 0–0.2)
BASOPHILS NFR BLD AUTO: 0.6 % (ref 0–1.5)
BLD GP AB SCN SERPL QL: NEGATIVE
DEPRECATED RDW RBC AUTO: 50.4 FL (ref 37–54)
EOSINOPHIL # BLD AUTO: 0.25 10*3/MM3 (ref 0–0.7)
EOSINOPHIL NFR BLD AUTO: 2.7 % (ref 0.3–6.2)
ERYTHROCYTE [DISTWIDTH] IN BLOOD BY AUTOMATED COUNT: 14.7 % (ref 11.7–13)
GLUCOSE BLDC GLUCOMTR-MCNC: 136 MG/DL (ref 70–130)
GLUCOSE BLDC GLUCOMTR-MCNC: 162 MG/DL (ref 70–130)
HCT VFR BLD AUTO: 37.4 % (ref 35.6–45.5)
HGB BLD-MCNC: 11.3 G/DL (ref 11.9–15.5)
IMM GRANULOCYTES # BLD: 0.04 10*3/MM3 (ref 0–0.03)
IMM GRANULOCYTES NFR BLD: 0.4 % (ref 0–0.5)
INR PPP: 1.44 (ref 0.9–1.1)
INR PPP: 1.44 (ref 0.9–1.1)
LYMPHOCYTES # BLD AUTO: 1.79 10*3/MM3 (ref 0.9–4.8)
LYMPHOCYTES NFR BLD AUTO: 19.3 % (ref 19.6–45.3)
MCH RBC QN AUTO: 28 PG (ref 26.9–32)
MCHC RBC AUTO-ENTMCNC: 30.2 G/DL (ref 32.4–36.3)
MCV RBC AUTO: 92.8 FL (ref 80.5–98.2)
MONOCYTES # BLD AUTO: 1.39 10*3/MM3 (ref 0.2–1.2)
MONOCYTES NFR BLD AUTO: 15 % (ref 5–12)
NEUTROPHILS # BLD AUTO: 5.74 10*3/MM3 (ref 1.9–8.1)
NEUTROPHILS NFR BLD AUTO: 62 % (ref 42.7–76)
PLATELET # BLD AUTO: 313 10*3/MM3 (ref 140–500)
PMV BLD AUTO: 11.8 FL (ref 6–12)
PROTHROMBIN TIME: 17.3 SECONDS (ref 11.7–14.2)
PROTHROMBIN TIME: 17.3 SECONDS (ref 11.7–14.2)
RBC # BLD AUTO: 4.03 10*6/MM3 (ref 3.9–5.2)
RH BLD: POSITIVE
T&S EXPIRATION DATE: NORMAL
WBC NRBC COR # BLD: 9.27 10*3/MM3 (ref 4.5–10.7)

## 2018-07-21 PROCEDURE — C1713 ANCHOR/SCREW BN/BN,TIS/BN: HCPCS | Performed by: ORTHOPAEDIC SURGERY

## 2018-07-21 PROCEDURE — 85025 COMPLETE CBC W/AUTO DIFF WBC: CPT | Performed by: ORTHOPAEDIC SURGERY

## 2018-07-21 PROCEDURE — 86901 BLOOD TYPING SEROLOGIC RH(D): CPT | Performed by: ORTHOPAEDIC SURGERY

## 2018-07-21 PROCEDURE — 82962 GLUCOSE BLOOD TEST: CPT

## 2018-07-21 PROCEDURE — 73501 X-RAY EXAM HIP UNI 1 VIEW: CPT

## 2018-07-21 PROCEDURE — 94799 UNLISTED PULMONARY SVC/PX: CPT

## 2018-07-21 PROCEDURE — 25010000002 DEXAMETHASONE PER 1 MG: Performed by: NURSE ANESTHETIST, CERTIFIED REGISTERED

## 2018-07-21 PROCEDURE — 27137 REVISE HIP JOINT REPLACEMENT: CPT | Performed by: ORTHOPAEDIC SURGERY

## 2018-07-21 PROCEDURE — 25010000002 FENTANYL CITRATE (PF) 100 MCG/2ML SOLUTION: Performed by: ANESTHESIOLOGY

## 2018-07-21 PROCEDURE — 86850 RBC ANTIBODY SCREEN: CPT | Performed by: ORTHOPAEDIC SURGERY

## 2018-07-21 PROCEDURE — 25010000002 FENTANYL CITRATE (PF) 100 MCG/2ML SOLUTION: Performed by: NURSE ANESTHETIST, CERTIFIED REGISTERED

## 2018-07-21 PROCEDURE — 25010000002 PHENYLEPHRINE PER 1 ML: Performed by: NURSE ANESTHETIST, CERTIFIED REGISTERED

## 2018-07-21 PROCEDURE — 85610 PROTHROMBIN TIME: CPT | Performed by: ORTHOPAEDIC SURGERY

## 2018-07-21 PROCEDURE — 86900 BLOOD TYPING SEROLOGIC ABO: CPT | Performed by: ORTHOPAEDIC SURGERY

## 2018-07-21 PROCEDURE — 25010000002 PROPOFOL 10 MG/ML EMULSION: Performed by: NURSE ANESTHETIST, CERTIFIED REGISTERED

## 2018-07-21 PROCEDURE — 0SBB0ZZ EXCISION OF LEFT HIP JOINT, OPEN APPROACH: ICD-10-PCS | Performed by: ORTHOPAEDIC SURGERY

## 2018-07-21 PROCEDURE — 87205 SMEAR GRAM STAIN: CPT | Performed by: ORTHOPAEDIC SURGERY

## 2018-07-21 PROCEDURE — 25010000002 MIDAZOLAM PER 1 MG: Performed by: ANESTHESIOLOGY

## 2018-07-21 PROCEDURE — 86923 COMPATIBILITY TEST ELECTRIC: CPT

## 2018-07-21 PROCEDURE — 87070 CULTURE OTHR SPECIMN AEROBIC: CPT | Performed by: ORTHOPAEDIC SURGERY

## 2018-07-21 PROCEDURE — 99223 1ST HOSP IP/OBS HIGH 75: CPT | Performed by: INTERNAL MEDICINE

## 2018-07-21 PROCEDURE — 25010000002 ONDANSETRON PER 1 MG: Performed by: NURSE ANESTHETIST, CERTIFIED REGISTERED

## 2018-07-21 PROCEDURE — 25010000002 ERTAPENEM PER 500 MG: Performed by: NURSE ANESTHETIST, CERTIFIED REGISTERED

## 2018-07-21 PROCEDURE — C1776 JOINT DEVICE (IMPLANTABLE): HCPCS | Performed by: ORTHOPAEDIC SURGERY

## 2018-07-21 PROCEDURE — 87075 CULTR BACTERIA EXCEPT BLOOD: CPT | Performed by: ORTHOPAEDIC SURGERY

## 2018-07-21 PROCEDURE — 25010000003 CEFAZOLIN IN DEXTROSE 2-4 GM/100ML-% SOLUTION: Performed by: ORTHOPAEDIC SURGERY

## 2018-07-21 PROCEDURE — 3E0102A INTRODUCTION OF ANTI-INFECTIVE ENVELOPE INTO SUBCUTANEOUS TISSUE, OPEN APPROACH: ICD-10-PCS | Performed by: ORTHOPAEDIC SURGERY

## 2018-07-21 PROCEDURE — 02HV33Z INSERTION OF INFUSION DEVICE INTO SUPERIOR VENA CAVA, PERCUTANEOUS APPROACH: ICD-10-PCS | Performed by: ORTHOPAEDIC SURGERY

## 2018-07-21 DEVICE — STIMULAN® RAPID CURE PROVIDED STERILE FOR SINGLE PATIENT USE. STIMULAN® RAPID CURE CONTAINS CALCIUM SULFATE POWDER AND MIXING SOLUTION IN PRE-MEASURED QUANTITIES SO THAT WHEN MIXED TOGETHER IN A STERILE MIXING BOWL, THE RESULTANT PASTE IS TO BE DIGITALLY PACKED INTO OPEN BONE VOID/GAP TO SET INSITU OR PLACED INTO THE MOULD PROVIDED, THE MIXTURE SETS TO FORM BEADS. THE BIODEGRADABLE, RADIOPAQUE BEADS ARE RESORBED IN APPROXIMATELY 30 – 60 DAYS WHEN USED IN ACCORDANCE WITH THE DEVICE LABELLING. STIMULAN® RAPID CURE IS MANUFACTURED FROM SYNTHETIC IMPLANT GRADE CALCIUM SULFATE DIHYDRATE(CASO4.2H2O) THAT RESORBS AND IS REPLACED WITH BONE DURING THE HEALING PROCESS. ALSO, AS THE BONE VOID FILLER BEADS ARE BIODEGRADABLE AND BIOCOMPATIBLE, THEY MAY BE USED AT AN INFECTED SITE.
Type: IMPLANTABLE DEVICE | Site: HIP | Status: FUNCTIONAL
Brand: STIMULAN® RAPID CURE

## 2018-07-21 DEVICE — POLARCUP XLPE INSERT 47/28 NON-CEMENTED
Type: IMPLANTABLE DEVICE | Site: HIP | Status: FUNCTIONAL
Brand: POLARCUP

## 2018-07-21 DEVICE — FEMORAL HEAD 14/16 TAPER +0 DIAMETER 28MM: Type: IMPLANTABLE DEVICE | Site: HIP | Status: FUNCTIONAL

## 2018-07-21 RX ORDER — SODIUM CHLORIDE 0.9 % (FLUSH) 0.9 %
1-10 SYRINGE (ML) INJECTION AS NEEDED
Status: DISCONTINUED | OUTPATIENT
Start: 2018-07-21 | End: 2018-07-21 | Stop reason: HOSPADM

## 2018-07-21 RX ORDER — HYDROMORPHONE HYDROCHLORIDE 1 MG/ML
0.25 INJECTION, SOLUTION INTRAMUSCULAR; INTRAVENOUS; SUBCUTANEOUS
Status: DISCONTINUED | OUTPATIENT
Start: 2018-07-21 | End: 2018-07-21 | Stop reason: HOSPADM

## 2018-07-21 RX ORDER — PROMETHAZINE HYDROCHLORIDE 25 MG/ML
12.5 INJECTION, SOLUTION INTRAMUSCULAR; INTRAVENOUS ONCE AS NEEDED
Status: DISCONTINUED | OUTPATIENT
Start: 2018-07-21 | End: 2018-07-21 | Stop reason: HOSPADM

## 2018-07-21 RX ORDER — ROCURONIUM BROMIDE 10 MG/ML
INJECTION, SOLUTION INTRAVENOUS AS NEEDED
Status: DISCONTINUED | OUTPATIENT
Start: 2018-07-21 | End: 2018-07-21 | Stop reason: SURG

## 2018-07-21 RX ORDER — HYDROCODONE BITARTRATE AND ACETAMINOPHEN 7.5; 325 MG/1; MG/1
1 TABLET ORAL EVERY 4 HOURS PRN
Status: DISCONTINUED | OUTPATIENT
Start: 2018-07-21 | End: 2018-07-27 | Stop reason: HOSPADM

## 2018-07-21 RX ORDER — LIDOCAINE HYDROCHLORIDE 10 MG/ML
0.5 INJECTION, SOLUTION EPIDURAL; INFILTRATION; INTRACAUDAL; PERINEURAL ONCE AS NEEDED
Status: DISCONTINUED | OUTPATIENT
Start: 2018-07-21 | End: 2018-07-21 | Stop reason: HOSPADM

## 2018-07-21 RX ORDER — ALBUTEROL SULFATE 2.5 MG/3ML
2.5 SOLUTION RESPIRATORY (INHALATION) EVERY 4 HOURS PRN
Status: DISCONTINUED | OUTPATIENT
Start: 2018-07-21 | End: 2018-07-27 | Stop reason: HOSPADM

## 2018-07-21 RX ORDER — TOBRAMYCIN 1.2 G/30ML
INJECTION, POWDER, LYOPHILIZED, FOR SOLUTION INTRAVENOUS AS NEEDED
Status: DISCONTINUED | OUTPATIENT
Start: 2018-07-21 | End: 2018-07-21 | Stop reason: HOSPADM

## 2018-07-21 RX ORDER — METOPROLOL SUCCINATE 50 MG/1
50 TABLET, EXTENDED RELEASE ORAL EVERY MORNING
Status: DISCONTINUED | OUTPATIENT
Start: 2018-07-22 | End: 2018-07-22

## 2018-07-21 RX ORDER — MELOXICAM 15 MG/1
15 TABLET ORAL DAILY
Status: DISCONTINUED | OUTPATIENT
Start: 2018-07-22 | End: 2018-07-23

## 2018-07-21 RX ORDER — MEPERIDINE HYDROCHLORIDE 25 MG/ML
12.5 INJECTION INTRAMUSCULAR; INTRAVENOUS; SUBCUTANEOUS
Status: DISCONTINUED | OUTPATIENT
Start: 2018-07-21 | End: 2018-07-21 | Stop reason: HOSPADM

## 2018-07-21 RX ORDER — PROPOFOL 10 MG/ML
VIAL (ML) INTRAVENOUS AS NEEDED
Status: DISCONTINUED | OUTPATIENT
Start: 2018-07-21 | End: 2018-07-21 | Stop reason: SURG

## 2018-07-21 RX ORDER — SODIUM CHLORIDE, SODIUM LACTATE, POTASSIUM CHLORIDE, CALCIUM CHLORIDE 600; 310; 30; 20 MG/100ML; MG/100ML; MG/100ML; MG/100ML
9 INJECTION, SOLUTION INTRAVENOUS CONTINUOUS
Status: DISCONTINUED | OUTPATIENT
Start: 2018-07-21 | End: 2018-07-21 | Stop reason: HOSPADM

## 2018-07-21 RX ORDER — NALOXONE HCL 0.4 MG/ML
0.2 VIAL (ML) INJECTION AS NEEDED
Status: DISCONTINUED | OUTPATIENT
Start: 2018-07-21 | End: 2018-07-21 | Stop reason: HOSPADM

## 2018-07-21 RX ORDER — WARFARIN SODIUM 7.5 MG/1
7.5 TABLET ORAL
Status: DISCONTINUED | OUTPATIENT
Start: 2018-07-21 | End: 2018-07-23

## 2018-07-21 RX ORDER — SODIUM CHLORIDE, SODIUM LACTATE, POTASSIUM CHLORIDE, CALCIUM CHLORIDE 600; 310; 30; 20 MG/100ML; MG/100ML; MG/100ML; MG/100ML
100 INJECTION, SOLUTION INTRAVENOUS CONTINUOUS
Status: DISCONTINUED | OUTPATIENT
Start: 2018-07-21 | End: 2018-07-22

## 2018-07-21 RX ORDER — MAGNESIUM HYDROXIDE 1200 MG/15ML
LIQUID ORAL AS NEEDED
Status: DISCONTINUED | OUTPATIENT
Start: 2018-07-21 | End: 2018-07-21 | Stop reason: HOSPADM

## 2018-07-21 RX ORDER — LANOLIN ALCOHOL/MO/W.PET/CERES
800 CREAM (GRAM) TOPICAL EVERY MORNING
Status: DISCONTINUED | OUTPATIENT
Start: 2018-07-22 | End: 2018-07-27 | Stop reason: HOSPADM

## 2018-07-21 RX ORDER — ALBUTEROL SULFATE 2.5 MG/3ML
2.5 SOLUTION RESPIRATORY (INHALATION) ONCE AS NEEDED
Status: DISCONTINUED | OUTPATIENT
Start: 2018-07-21 | End: 2018-07-21 | Stop reason: HOSPADM

## 2018-07-21 RX ORDER — PROMETHAZINE HYDROCHLORIDE 25 MG/1
25 TABLET ORAL ONCE AS NEEDED
Status: DISCONTINUED | OUTPATIENT
Start: 2018-07-21 | End: 2018-07-21 | Stop reason: HOSPADM

## 2018-07-21 RX ORDER — HYDROCODONE BITARTRATE AND ACETAMINOPHEN 5; 325 MG/1; MG/1
1 TABLET ORAL ONCE AS NEEDED
Status: DISCONTINUED | OUTPATIENT
Start: 2018-07-21 | End: 2018-07-21 | Stop reason: HOSPADM

## 2018-07-21 RX ORDER — FENTANYL CITRATE 50 UG/ML
INJECTION, SOLUTION INTRAMUSCULAR; INTRAVENOUS
Status: COMPLETED
Start: 2018-07-21 | End: 2018-07-21

## 2018-07-21 RX ORDER — DEXAMETHASONE SODIUM PHOSPHATE 10 MG/ML
INJECTION INTRAMUSCULAR; INTRAVENOUS AS NEEDED
Status: DISCONTINUED | OUTPATIENT
Start: 2018-07-21 | End: 2018-07-21 | Stop reason: SURG

## 2018-07-21 RX ORDER — DIPHENHYDRAMINE HYDROCHLORIDE 50 MG/ML
12.5 INJECTION INTRAMUSCULAR; INTRAVENOUS
Status: DISCONTINUED | OUTPATIENT
Start: 2018-07-21 | End: 2018-07-21 | Stop reason: HOSPADM

## 2018-07-21 RX ORDER — PROMETHAZINE HYDROCHLORIDE 25 MG/1
25 SUPPOSITORY RECTAL ONCE AS NEEDED
Status: DISCONTINUED | OUTPATIENT
Start: 2018-07-21 | End: 2018-07-21 | Stop reason: HOSPADM

## 2018-07-21 RX ORDER — FENTANYL CITRATE 50 UG/ML
50 INJECTION, SOLUTION INTRAMUSCULAR; INTRAVENOUS
Status: DISCONTINUED | OUTPATIENT
Start: 2018-07-21 | End: 2018-07-21 | Stop reason: HOSPADM

## 2018-07-21 RX ORDER — FENTANYL CITRATE 50 UG/ML
100 INJECTION, SOLUTION INTRAMUSCULAR; INTRAVENOUS
Status: DISCONTINUED | OUTPATIENT
Start: 2018-07-21 | End: 2018-07-21 | Stop reason: HOSPADM

## 2018-07-21 RX ORDER — TOBRAMYCIN 1.2 G/30ML
INJECTION, POWDER, LYOPHILIZED, FOR SOLUTION INTRAVENOUS
Status: DISPENSED
Start: 2018-07-21 | End: 2018-07-21

## 2018-07-21 RX ORDER — MIDAZOLAM HYDROCHLORIDE 1 MG/ML
1 INJECTION INTRAMUSCULAR; INTRAVENOUS
Status: DISCONTINUED | OUTPATIENT
Start: 2018-07-21 | End: 2018-07-21 | Stop reason: HOSPADM

## 2018-07-21 RX ORDER — FUROSEMIDE 40 MG/1
40 TABLET ORAL EVERY MORNING
Status: DISCONTINUED | OUTPATIENT
Start: 2018-07-22 | End: 2018-07-22

## 2018-07-21 RX ORDER — ONDANSETRON 4 MG/1
4 TABLET, ORALLY DISINTEGRATING ORAL EVERY 6 HOURS PRN
Status: DISCONTINUED | OUTPATIENT
Start: 2018-07-21 | End: 2018-07-27 | Stop reason: HOSPADM

## 2018-07-21 RX ORDER — FERROUS SULFATE 325(65) MG
325 TABLET ORAL
Status: DISCONTINUED | OUTPATIENT
Start: 2018-07-22 | End: 2018-07-27 | Stop reason: HOSPADM

## 2018-07-21 RX ORDER — ONDANSETRON 2 MG/ML
INJECTION INTRAMUSCULAR; INTRAVENOUS AS NEEDED
Status: DISCONTINUED | OUTPATIENT
Start: 2018-07-21 | End: 2018-07-21 | Stop reason: SURG

## 2018-07-21 RX ORDER — FAMOTIDINE 10 MG/ML
20 INJECTION, SOLUTION INTRAVENOUS ONCE
Status: COMPLETED | OUTPATIENT
Start: 2018-07-21 | End: 2018-07-21

## 2018-07-21 RX ORDER — MELOXICAM 15 MG/1
15 TABLET ORAL DAILY
Status: DISCONTINUED | OUTPATIENT
Start: 2018-07-21 | End: 2018-07-23 | Stop reason: SDUPTHER

## 2018-07-21 RX ORDER — ONDANSETRON 2 MG/ML
4 INJECTION INTRAMUSCULAR; INTRAVENOUS EVERY 6 HOURS PRN
Status: DISCONTINUED | OUTPATIENT
Start: 2018-07-21 | End: 2018-07-27 | Stop reason: HOSPADM

## 2018-07-21 RX ORDER — HYDROCODONE BITARTRATE AND ACETAMINOPHEN 7.5; 325 MG/1; MG/1
2 TABLET ORAL EVERY 4 HOURS PRN
Status: DISCONTINUED | OUTPATIENT
Start: 2018-07-21 | End: 2018-07-27 | Stop reason: HOSPADM

## 2018-07-21 RX ORDER — ASPIRIN 325 MG
325 TABLET, DELAYED RELEASE (ENTERIC COATED) ORAL EVERY 12 HOURS SCHEDULED
Status: DISCONTINUED | OUTPATIENT
Start: 2018-07-22 | End: 2018-07-23

## 2018-07-21 RX ORDER — LABETALOL HYDROCHLORIDE 5 MG/ML
5 INJECTION, SOLUTION INTRAVENOUS
Status: DISCONTINUED | OUTPATIENT
Start: 2018-07-21 | End: 2018-07-21 | Stop reason: HOSPADM

## 2018-07-21 RX ORDER — DOCUSATE SODIUM 100 MG/1
100 CAPSULE, LIQUID FILLED ORAL 2 TIMES DAILY
Status: DISCONTINUED | OUTPATIENT
Start: 2018-07-21 | End: 2018-07-27 | Stop reason: HOSPADM

## 2018-07-21 RX ORDER — ONDANSETRON 4 MG/1
4 TABLET, FILM COATED ORAL EVERY 6 HOURS PRN
Status: DISCONTINUED | OUTPATIENT
Start: 2018-07-21 | End: 2018-07-27 | Stop reason: HOSPADM

## 2018-07-21 RX ORDER — SCOLOPAMINE TRANSDERMAL SYSTEM 1 MG/1
1 PATCH, EXTENDED RELEASE TRANSDERMAL
Status: DISCONTINUED | OUTPATIENT
Start: 2018-07-21 | End: 2018-07-21 | Stop reason: HOSPADM

## 2018-07-21 RX ORDER — ONDANSETRON 2 MG/ML
4 INJECTION INTRAMUSCULAR; INTRAVENOUS ONCE AS NEEDED
Status: DISCONTINUED | OUTPATIENT
Start: 2018-07-21 | End: 2018-07-21 | Stop reason: HOSPADM

## 2018-07-21 RX ORDER — MIDAZOLAM HYDROCHLORIDE 1 MG/ML
2 INJECTION INTRAMUSCULAR; INTRAVENOUS
Status: DISCONTINUED | OUTPATIENT
Start: 2018-07-21 | End: 2018-07-21 | Stop reason: HOSPADM

## 2018-07-21 RX ORDER — CEFAZOLIN SODIUM 2 G/100ML
2 INJECTION, SOLUTION INTRAVENOUS EVERY 8 HOURS
Status: DISCONTINUED | OUTPATIENT
Start: 2018-07-21 | End: 2018-07-22

## 2018-07-21 RX ORDER — EPHEDRINE SULFATE 50 MG/ML
5 INJECTION, SOLUTION INTRAVENOUS ONCE AS NEEDED
Status: DISCONTINUED | OUTPATIENT
Start: 2018-07-21 | End: 2018-07-21 | Stop reason: HOSPADM

## 2018-07-21 RX ORDER — EPHEDRINE SULFATE 50 MG/ML
INJECTION, SOLUTION INTRAVENOUS AS NEEDED
Status: DISCONTINUED | OUTPATIENT
Start: 2018-07-21 | End: 2018-07-21 | Stop reason: SURG

## 2018-07-21 RX ORDER — FLUMAZENIL 0.1 MG/ML
0.2 INJECTION INTRAVENOUS AS NEEDED
Status: DISCONTINUED | OUTPATIENT
Start: 2018-07-21 | End: 2018-07-21 | Stop reason: HOSPADM

## 2018-07-21 RX ORDER — LIDOCAINE HYDROCHLORIDE 20 MG/ML
INJECTION, SOLUTION INFILTRATION; PERINEURAL AS NEEDED
Status: DISCONTINUED | OUTPATIENT
Start: 2018-07-21 | End: 2018-07-21 | Stop reason: SURG

## 2018-07-21 RX ORDER — UREA 10 %
3 LOTION (ML) TOPICAL NIGHTLY PRN
Status: DISCONTINUED | OUTPATIENT
Start: 2018-07-21 | End: 2018-07-27 | Stop reason: HOSPADM

## 2018-07-21 RX ADMIN — SODIUM CHLORIDE, POTASSIUM CHLORIDE, SODIUM LACTATE AND CALCIUM CHLORIDE: 600; 310; 30; 20 INJECTION, SOLUTION INTRAVENOUS at 13:29

## 2018-07-21 RX ADMIN — ROCURONIUM BROMIDE 20 MG: 10 INJECTION INTRAVENOUS at 11:37

## 2018-07-21 RX ADMIN — PHENYLEPHRINE HYDROCHLORIDE 100 MCG: 10 INJECTION INTRAVENOUS at 10:54

## 2018-07-21 RX ADMIN — ONDANSETRON 4 MG: 2 INJECTION INTRAMUSCULAR; INTRAVENOUS at 13:05

## 2018-07-21 RX ADMIN — PROPOFOL 150 MG: 10 INJECTION, EMULSION INTRAVENOUS at 10:20

## 2018-07-21 RX ADMIN — DEXAMETHASONE SODIUM PHOSPHATE 8 MG: 10 INJECTION INTRAMUSCULAR; INTRAVENOUS at 11:50

## 2018-07-21 RX ADMIN — PHENYLEPHRINE HYDROCHLORIDE 100 MCG: 10 INJECTION INTRAVENOUS at 10:32

## 2018-07-21 RX ADMIN — SODIUM CHLORIDE, POTASSIUM CHLORIDE, SODIUM LACTATE AND CALCIUM CHLORIDE 9 ML/HR: 600; 310; 30; 20 INJECTION, SOLUTION INTRAVENOUS at 09:21

## 2018-07-21 RX ADMIN — SUGAMMADEX 200 MG: 100 INJECTION, SOLUTION INTRAVENOUS at 13:09

## 2018-07-21 RX ADMIN — MEPERIDINE HYDROCHLORIDE 12.5 MG: 25 INJECTION INTRAMUSCULAR; INTRAVENOUS; SUBCUTANEOUS at 14:18

## 2018-07-21 RX ADMIN — WARFARIN SODIUM 7.5 MG: 7.5 TABLET ORAL at 20:48

## 2018-07-21 RX ADMIN — FAMOTIDINE 20 MG: 10 INJECTION, SOLUTION INTRAVENOUS at 10:05

## 2018-07-21 RX ADMIN — LIDOCAINE HYDROCHLORIDE 60 MG: 20 INJECTION, SOLUTION INFILTRATION; PERINEURAL at 10:20

## 2018-07-21 RX ADMIN — PHENYLEPHRINE HYDROCHLORIDE 1 MCG/KG/MIN: 10 INJECTION, SOLUTION INTRAMUSCULAR; INTRAVENOUS; SUBCUTANEOUS at 11:37

## 2018-07-21 RX ADMIN — MIDAZOLAM 2 MG: 1 INJECTION INTRAMUSCULAR; INTRAVENOUS at 10:05

## 2018-07-21 RX ADMIN — SODIUM CHLORIDE 1 G: 900 INJECTION INTRAVENOUS at 23:43

## 2018-07-21 RX ADMIN — POLYETHYLENE GLYCOL 3350 17 G: 17 POWDER, FOR SOLUTION ORAL at 20:48

## 2018-07-21 RX ADMIN — PHENYLEPHRINE HYDROCHLORIDE 100 MCG: 10 INJECTION INTRAVENOUS at 11:13

## 2018-07-21 RX ADMIN — PHENYLEPHRINE HYDROCHLORIDE 100 MCG: 10 INJECTION INTRAVENOUS at 11:32

## 2018-07-21 RX ADMIN — PHENYLEPHRINE HYDROCHLORIDE 100 MCG: 10 INJECTION INTRAVENOUS at 10:38

## 2018-07-21 RX ADMIN — PHENYLEPHRINE HYDROCHLORIDE 100 MCG: 10 INJECTION INTRAVENOUS at 10:31

## 2018-07-21 RX ADMIN — ENOXAPARIN SODIUM 30 MG: 30 INJECTION SUBCUTANEOUS at 23:43

## 2018-07-21 RX ADMIN — CALCIUM CARBONATE-VITAMIN D TAB 500 MG-200 UNIT 500 MG: 500-200 TAB at 20:48

## 2018-07-21 RX ADMIN — ROCURONIUM BROMIDE 50 MG: 10 INJECTION INTRAVENOUS at 10:20

## 2018-07-21 RX ADMIN — PHENYLEPHRINE HYDROCHLORIDE 100 MCG: 10 INJECTION INTRAVENOUS at 10:53

## 2018-07-21 RX ADMIN — SODIUM CHLORIDE, POTASSIUM CHLORIDE, SODIUM LACTATE AND CALCIUM CHLORIDE: 600; 310; 30; 20 INJECTION, SOLUTION INTRAVENOUS at 11:16

## 2018-07-21 RX ADMIN — CEFAZOLIN SODIUM 2 G: 2 INJECTION, SOLUTION INTRAVENOUS at 20:48

## 2018-07-21 RX ADMIN — FENTANYL CITRATE 50 MCG: 50 INJECTION INTRAMUSCULAR; INTRAVENOUS at 10:20

## 2018-07-21 RX ADMIN — FENTANYL CITRATE 50 MCG: 50 INJECTION, SOLUTION INTRAMUSCULAR; INTRAVENOUS at 14:00

## 2018-07-21 RX ADMIN — SODIUM CHLORIDE 1 G: 900 INJECTION INTRAVENOUS at 11:15

## 2018-07-21 RX ADMIN — SODIUM CHLORIDE, POTASSIUM CHLORIDE, SODIUM LACTATE AND CALCIUM CHLORIDE 100 ML/HR: 600; 310; 30; 20 INJECTION, SOLUTION INTRAVENOUS at 16:58

## 2018-07-21 RX ADMIN — PHENYLEPHRINE HYDROCHLORIDE 100 MCG: 10 INJECTION INTRAVENOUS at 11:18

## 2018-07-21 RX ADMIN — DOCUSATE SODIUM 100 MG: 100 CAPSULE, LIQUID FILLED ORAL at 20:48

## 2018-07-21 RX ADMIN — SCOPALAMINE 1 PATCH: 1 PATCH, EXTENDED RELEASE TRANSDERMAL at 10:05

## 2018-07-21 RX ADMIN — MELOXICAM 15 MG: 15 TABLET ORAL at 20:47

## 2018-07-21 RX ADMIN — EPHEDRINE SULFATE 5 MG: 50 INJECTION INTRAMUSCULAR; INTRAVENOUS; SUBCUTANEOUS at 11:33

## 2018-07-21 RX ADMIN — PHENYLEPHRINE HYDROCHLORIDE 100 MCG: 10 INJECTION INTRAVENOUS at 10:28

## 2018-07-21 RX ADMIN — PHENYLEPHRINE HYDROCHLORIDE 1 MCG/KG/MIN: 10 INJECTION, SOLUTION INTRAMUSCULAR; INTRAVENOUS; SUBCUTANEOUS at 11:35

## 2018-07-21 NOTE — ANESTHESIA POSTPROCEDURE EVALUATION
Patient: Rachel BATISTA Stone    Procedure Summary     Date:  07/21/18 Room / Location:  [facility] OR [address] [place] [address] MAIN OR    Anesthesia Start:  1015 Anesthesia Stop:  1332    Procedure:  HIP INCISION AND DRAINAGE, LEFT WITH POLY HEAD CHANGE AND ANTIBIOTIC BEAD PLACEMENT (Left Hip) Diagnosis:       Status post total hip replacement, left      (Status post total hip replacement, left [Z96.642])    Surgeon:  Obed Barney MD Provider:  Claudio Childress MD    Anesthesia Type:  general ASA Status:  3          Anesthesia Type: general  Last vitals  BP   125/91 (07/21/18 1340)   Temp   36.3 °C (97.4 °F) (07/21/18 1324)   Pulse   63 (07/21/18 1340)   Resp   16 (07/21/18 1340)     SpO2   100 % (07/21/18 1340)     Post Anesthesia Care and Evaluation    Patient location during evaluation: bedside  Patient participation: complete - patient participated  Level of consciousness: awake  Pain score: 1  Pain management: adequate  Airway patency: patent  Anesthetic complications: No anesthetic complications    Cardiovascular status: acceptable  Respiratory status: acceptable  Hydration status: acceptable    Comments: --------------------            07/21/18               1340     --------------------   BP:       125/91     Pulse:      63       Resp:       16       Temp:                SpO2:      100%     --------------------

## 2018-07-21 NOTE — ANESTHESIA PROCEDURE NOTES
Airway  Urgency: elective    Airway not difficult    General Information and Staff    Patient location during procedure: OR  Anesthesiologist: MIGUEL ÁNGEL MOYA  CRNA: MEAGHAN KARIMI    Indications and Patient Condition  Indications for airway management: airway protection    Preoxygenated: yes  MILS not maintained throughout  Mask difficulty assessment: 1 - vent by mask    Final Airway Details  Final airway type: endotracheal airway      Successful airway: ETT  Cuffed: yes   Successful intubation technique: direct laryngoscopy  Facilitating devices/methods: intubating stylet  Endotracheal tube insertion site: oral  Blade: Isis  Blade size: #3  ETT size: 7.0 mm  Cormack-Lehane Classification: grade I - full view of glottis  Placement verified by: chest auscultation   Cuff volume (mL): 8  Measured from: lips  ETT to lips (cm): 20  Number of attempts at approach: 1    Additional Comments  PreO2 100% face mask, IV induction, easy mask, DVL x1, cords noted, tube through, cuff up, EBBSH, +etCO2, = chest movement, tube secured in place, atraumatic, teeth and lips intact as preop.

## 2018-07-22 ENCOUNTER — APPOINTMENT (OUTPATIENT)
Dept: INFUSION THERAPY | Facility: HOSPITAL | Age: 73
End: 2018-07-22

## 2018-07-22 LAB
ALBUMIN SERPL-MCNC: 2.4 G/DL (ref 3.5–5.2)
ALBUMIN/GLOB SERPL: 0.9 G/DL
ALP SERPL-CCNC: 49 U/L (ref 39–117)
ALT SERPL W P-5'-P-CCNC: 6 U/L (ref 1–33)
ANION GAP SERPL CALCULATED.3IONS-SCNC: 10.5 MMOL/L
AST SERPL-CCNC: 10 U/L (ref 1–32)
BILIRUB SERPL-MCNC: 0.2 MG/DL (ref 0.1–1.2)
BUN BLD-MCNC: 6 MG/DL (ref 8–23)
BUN/CREAT SERPL: 15.8 (ref 7–25)
CALCIUM SPEC-SCNC: 7.8 MG/DL (ref 8.6–10.5)
CHLORIDE SERPL-SCNC: 112 MMOL/L (ref 98–107)
CO2 SERPL-SCNC: 25.5 MMOL/L (ref 22–29)
CORTIS SERPL-MCNC: 1.43 MCG/DL
CORTIS SERPL-MCNC: 12.02 MCG/DL
CORTIS SERPL-MCNC: 15.75 MCG/DL
CORTIS SERPL-MCNC: 2.13 MCG/DL
CREAT BLD-MCNC: 0.38 MG/DL (ref 0.57–1)
CRP SERPL-MCNC: 5.05 MG/DL (ref 0–0.5)
D-LACTATE SERPL-SCNC: 2.3 MMOL/L (ref 0.5–2)
D-LACTATE SERPL-SCNC: 3.1 MMOL/L (ref 0.5–2)
DEPRECATED RDW RBC AUTO: 50.2 FL (ref 37–54)
ERYTHROCYTE [DISTWIDTH] IN BLOOD BY AUTOMATED COUNT: 14.9 % (ref 11.7–13)
GFR SERPL CREATININE-BSD FRML MDRD: >150 ML/MIN/1.73
GLOBULIN UR ELPH-MCNC: 2.7 GM/DL
GLUCOSE BLD-MCNC: 127 MG/DL (ref 65–99)
GLUCOSE BLDC GLUCOMTR-MCNC: 122 MG/DL (ref 70–130)
GLUCOSE BLDC GLUCOMTR-MCNC: 131 MG/DL (ref 70–130)
GLUCOSE BLDC GLUCOMTR-MCNC: 144 MG/DL (ref 70–130)
GLUCOSE BLDC GLUCOMTR-MCNC: 195 MG/DL (ref 70–130)
HCT VFR BLD AUTO: 24.5 % (ref 35.6–45.5)
HCT VFR BLD AUTO: 25.8 % (ref 35.6–45.5)
HCT VFR BLD AUTO: 29.4 % (ref 35.6–45.5)
HGB BLD-MCNC: 7.3 G/DL (ref 11.9–15.5)
HGB BLD-MCNC: 8.1 G/DL (ref 11.9–15.5)
HGB BLD-MCNC: 9.1 G/DL (ref 11.9–15.5)
HOLD SPECIMEN: NORMAL
INR PPP: 1.65 (ref 0.9–1.1)
MCH RBC QN AUTO: 28.8 PG (ref 26.9–32)
MCHC RBC AUTO-ENTMCNC: 31.4 G/DL (ref 32.4–36.3)
MCV RBC AUTO: 91.8 FL (ref 80.5–98.2)
PLATELET # BLD AUTO: 311 10*3/MM3 (ref 140–500)
PMV BLD AUTO: 12.6 FL (ref 6–12)
POTASSIUM BLD-SCNC: 3.8 MMOL/L (ref 3.5–5.2)
PROT SERPL-MCNC: 5.1 G/DL (ref 6–8.5)
PROTHROMBIN TIME: 19.3 SECONDS (ref 11.7–14.2)
RBC # BLD AUTO: 2.81 10*6/MM3 (ref 3.9–5.2)
SODIUM BLD-SCNC: 148 MMOL/L (ref 136–145)
WBC NRBC COR # BLD: 13.86 10*3/MM3 (ref 4.5–10.7)

## 2018-07-22 PROCEDURE — 36430 TRANSFUSION BLD/BLD COMPNT: CPT

## 2018-07-22 PROCEDURE — 82024 ASSAY OF ACTH: CPT | Performed by: INTERNAL MEDICINE

## 2018-07-22 PROCEDURE — 99233 SBSQ HOSP IP/OBS HIGH 50: CPT | Performed by: INTERNAL MEDICINE

## 2018-07-22 PROCEDURE — 85610 PROTHROMBIN TIME: CPT | Performed by: ORTHOPAEDIC SURGERY

## 2018-07-22 PROCEDURE — 82533 TOTAL CORTISOL: CPT | Performed by: INTERNAL MEDICINE

## 2018-07-22 PROCEDURE — 25010000002 ERTAPENEM PER 500 MG: Performed by: INTERNAL MEDICINE

## 2018-07-22 PROCEDURE — 86140 C-REACTIVE PROTEIN: CPT | Performed by: INTERNAL MEDICINE

## 2018-07-22 PROCEDURE — 25010000002 ERTAPENEM PER 500 MG: Performed by: ORTHOPAEDIC SURGERY

## 2018-07-22 PROCEDURE — 85027 COMPLETE CBC AUTOMATED: CPT | Performed by: INTERNAL MEDICINE

## 2018-07-22 PROCEDURE — 83605 ASSAY OF LACTIC ACID: CPT | Performed by: INTERNAL MEDICINE

## 2018-07-22 PROCEDURE — 82962 GLUCOSE BLOOD TEST: CPT

## 2018-07-22 PROCEDURE — P9016 RBC LEUKOCYTES REDUCED: HCPCS

## 2018-07-22 PROCEDURE — 94799 UNLISTED PULMONARY SVC/PX: CPT

## 2018-07-22 PROCEDURE — 80053 COMPREHEN METABOLIC PANEL: CPT | Performed by: INTERNAL MEDICINE

## 2018-07-22 PROCEDURE — 25010000002 ENOXAPARIN PER 10 MG: Performed by: ORTHOPAEDIC SURGERY

## 2018-07-22 PROCEDURE — 86900 BLOOD TYPING SEROLOGIC ABO: CPT

## 2018-07-22 PROCEDURE — 97162 PT EVAL MOD COMPLEX 30 MIN: CPT

## 2018-07-22 PROCEDURE — 25010000002 PHENYLEPHRINE 10 MG/ML SOLUTION 5 ML VIAL: Performed by: NURSE ANESTHETIST, CERTIFIED REGISTERED

## 2018-07-22 PROCEDURE — 25010000002 COSYNTROPIN PER 0.25 MG: Performed by: INTERNAL MEDICINE

## 2018-07-22 PROCEDURE — 85014 HEMATOCRIT: CPT | Performed by: INTERNAL MEDICINE

## 2018-07-22 PROCEDURE — 85018 HEMOGLOBIN: CPT | Performed by: INTERNAL MEDICINE

## 2018-07-22 PROCEDURE — 25010000002 HYDROCORTISONE SODIUM SUCCINATE 100 MG RECONSTITUTED SOLUTION: Performed by: INTERNAL MEDICINE

## 2018-07-22 RX ORDER — COSYNTROPIN 0.25 MG/ML
0.25 INJECTION, POWDER, FOR SOLUTION INTRAMUSCULAR; INTRAVENOUS ONCE
Status: COMPLETED | OUTPATIENT
Start: 2018-07-22 | End: 2018-07-22

## 2018-07-22 RX ORDER — SODIUM CHLORIDE 450 MG/100ML
100 INJECTION, SOLUTION INTRAVENOUS CONTINUOUS
Status: DISCONTINUED | OUTPATIENT
Start: 2018-07-22 | End: 2018-07-23

## 2018-07-22 RX ADMIN — DOCUSATE SODIUM 100 MG: 100 CAPSULE, LIQUID FILLED ORAL at 20:16

## 2018-07-22 RX ADMIN — POLYETHYLENE GLYCOL 3350 17 G: 17 POWDER, FOR SOLUTION ORAL at 08:15

## 2018-07-22 RX ADMIN — CALCIUM CARBONATE-VITAMIN D TAB 500 MG-200 UNIT 500 MG: 500-200 TAB at 08:15

## 2018-07-22 RX ADMIN — ENOXAPARIN SODIUM 30 MG: 30 INJECTION SUBCUTANEOUS at 23:46

## 2018-07-22 RX ADMIN — POLYETHYLENE GLYCOL 3350 17 G: 17 POWDER, FOR SOLUTION ORAL at 20:16

## 2018-07-22 RX ADMIN — ASPIRIN 325 MG: 325 TABLET, DELAYED RELEASE ORAL at 20:16

## 2018-07-22 RX ADMIN — SODIUM CHLORIDE, POTASSIUM CHLORIDE, SODIUM LACTATE AND CALCIUM CHLORIDE 100 ML/HR: 600; 310; 30; 20 INJECTION, SOLUTION INTRAVENOUS at 01:22

## 2018-07-22 RX ADMIN — COSYNTROPIN 0.25 MG: 0.25 INJECTION, POWDER, LYOPHILIZED, FOR SOLUTION INTRAMUSCULAR; INTRAVENOUS at 14:14

## 2018-07-22 RX ADMIN — SODIUM CHLORIDE 100 ML/HR: 4.5 INJECTION, SOLUTION INTRAVENOUS at 14:14

## 2018-07-22 RX ADMIN — MELOXICAM 15 MG: 15 TABLET ORAL at 16:45

## 2018-07-22 RX ADMIN — ENOXAPARIN SODIUM 30 MG: 30 INJECTION SUBCUTANEOUS at 12:16

## 2018-07-22 RX ADMIN — SODIUM CHLORIDE 1 G: 900 INJECTION INTRAVENOUS at 22:04

## 2018-07-22 RX ADMIN — ACETAMINOPHEN 800 MCG: 400 TABLET ORAL at 08:14

## 2018-07-22 RX ADMIN — FERROUS SULFATE TAB 325 MG (65 MG ELEMENTAL FE) 325 MG: 325 (65 FE) TAB at 08:15

## 2018-07-22 RX ADMIN — PHENYLEPHRINE HYDROCHLORIDE 0.5 MCG/KG/MIN: 10 INJECTION INTRAVENOUS at 14:17

## 2018-07-22 RX ADMIN — HYDROCORTISONE SODIUM SUCCINATE 50 MG: 100 INJECTION, POWDER, FOR SOLUTION INTRAMUSCULAR; INTRAVENOUS at 16:45

## 2018-07-22 RX ADMIN — SODIUM CHLORIDE, POTASSIUM CHLORIDE, SODIUM LACTATE AND CALCIUM CHLORIDE 100 ML/HR: 600; 310; 30; 20 INJECTION, SOLUTION INTRAVENOUS at 12:16

## 2018-07-22 RX ADMIN — DOCUSATE SODIUM 100 MG: 100 CAPSULE, LIQUID FILLED ORAL at 08:13

## 2018-07-22 RX ADMIN — HYDROCORTISONE SODIUM SUCCINATE 50 MG: 100 INJECTION, POWDER, FOR SOLUTION INTRAMUSCULAR; INTRAVENOUS at 23:46

## 2018-07-22 RX ADMIN — ASPIRIN 325 MG: 325 TABLET, DELAYED RELEASE ORAL at 08:14

## 2018-07-22 RX ADMIN — WARFARIN SODIUM 7.5 MG: 7.5 TABLET ORAL at 18:42

## 2018-07-23 ENCOUNTER — APPOINTMENT (OUTPATIENT)
Dept: CARDIOLOGY | Facility: HOSPITAL | Age: 73
End: 2018-07-23
Attending: INTERNAL MEDICINE

## 2018-07-23 ENCOUNTER — HOSPITAL ENCOUNTER (OUTPATIENT)
Dept: INFUSION THERAPY | Facility: HOSPITAL | Age: 73
Discharge: HOME | End: 2018-07-23

## 2018-07-23 LAB
ABO + RH BLD: NORMAL
ANION GAP SERPL CALCULATED.3IONS-SCNC: 10.2 MMOL/L
BH BB BLOOD EXPIRATION DATE: NORMAL
BH BB BLOOD TYPE BARCODE: 6200
BH BB DISPENSE STATUS: NORMAL
BH BB PRODUCT CODE: NORMAL
BH BB UNIT NUMBER: NORMAL
BH CV UPPER VENOUS LEFT INTERNAL JUGULAR AUGMENT: NORMAL
BH CV UPPER VENOUS LEFT INTERNAL JUGULAR COMPETENT: NORMAL
BH CV UPPER VENOUS LEFT INTERNAL JUGULAR COMPRESS: NORMAL
BH CV UPPER VENOUS LEFT INTERNAL JUGULAR PHASIC: NORMAL
BH CV UPPER VENOUS LEFT INTERNAL JUGULAR SPONT: NORMAL
BH CV UPPER VENOUS LEFT SUBCLAVIAN AUGMENT: NORMAL
BH CV UPPER VENOUS LEFT SUBCLAVIAN COMPETENT: NORMAL
BH CV UPPER VENOUS LEFT SUBCLAVIAN COMPRESS: NORMAL
BH CV UPPER VENOUS LEFT SUBCLAVIAN PHASIC: NORMAL
BH CV UPPER VENOUS LEFT SUBCLAVIAN SPONT: NORMAL
BH CV UPPER VENOUS RIGHT AXILLARY AUGMENT: NORMAL
BH CV UPPER VENOUS RIGHT AXILLARY COMPETENT: NORMAL
BH CV UPPER VENOUS RIGHT AXILLARY COMPRESS: NORMAL
BH CV UPPER VENOUS RIGHT AXILLARY PHASIC: NORMAL
BH CV UPPER VENOUS RIGHT AXILLARY SPONT: NORMAL
BH CV UPPER VENOUS RIGHT BASILIC FOREARM COMPRESS: NORMAL
BH CV UPPER VENOUS RIGHT BASILIC UPPER COMPRESS: NORMAL
BH CV UPPER VENOUS RIGHT BRACHIAL COMPRESS: NORMAL
BH CV UPPER VENOUS RIGHT CEPHALIC FOREARM COMPRESS: NORMAL
BH CV UPPER VENOUS RIGHT CEPHALIC UPPER COMPRESS: NORMAL
BH CV UPPER VENOUS RIGHT INTERNAL JUGULAR AUGMENT: NORMAL
BH CV UPPER VENOUS RIGHT INTERNAL JUGULAR COMPETENT: NORMAL
BH CV UPPER VENOUS RIGHT INTERNAL JUGULAR COMPRESS: NORMAL
BH CV UPPER VENOUS RIGHT INTERNAL JUGULAR PHASIC: NORMAL
BH CV UPPER VENOUS RIGHT INTERNAL JUGULAR SPONT: NORMAL
BH CV UPPER VENOUS RIGHT RADIAL COMPRESS: NORMAL
BH CV UPPER VENOUS RIGHT SUBCLAVIAN AUGMENT: NORMAL
BH CV UPPER VENOUS RIGHT SUBCLAVIAN COMPETENT: NORMAL
BH CV UPPER VENOUS RIGHT SUBCLAVIAN COMPRESS: NORMAL
BH CV UPPER VENOUS RIGHT SUBCLAVIAN PHASIC: NORMAL
BH CV UPPER VENOUS RIGHT SUBCLAVIAN SPONT: NORMAL
BH CV UPPER VENOUS RIGHT ULNAR COMPRESS: NORMAL
BUN BLD-MCNC: 9 MG/DL (ref 8–23)
BUN/CREAT SERPL: 20.9 (ref 7–25)
CALCIUM SPEC-SCNC: 8.1 MG/DL (ref 8.6–10.5)
CHLORIDE SERPL-SCNC: 113 MMOL/L (ref 98–107)
CO2 SERPL-SCNC: 25.8 MMOL/L (ref 22–29)
CREAT BLD-MCNC: 0.43 MG/DL (ref 0.57–1)
DEPRECATED RDW RBC AUTO: 51.7 FL (ref 37–54)
ERYTHROCYTE [DISTWIDTH] IN BLOOD BY AUTOMATED COUNT: 15.3 % (ref 11.7–13)
GFR SERPL CREATININE-BSD FRML MDRD: 145 ML/MIN/1.73
GLUCOSE BLD-MCNC: 139 MG/DL (ref 65–99)
GLUCOSE BLDC GLUCOMTR-MCNC: 112 MG/DL (ref 70–130)
GLUCOSE BLDC GLUCOMTR-MCNC: 114 MG/DL (ref 70–130)
GLUCOSE BLDC GLUCOMTR-MCNC: 132 MG/DL (ref 70–130)
GLUCOSE BLDC GLUCOMTR-MCNC: 145 MG/DL (ref 70–130)
HCT VFR BLD AUTO: 27.5 % (ref 35.6–45.5)
HCT VFR BLD AUTO: 29.2 % (ref 35.6–45.5)
HGB BLD-MCNC: 8.4 G/DL (ref 11.9–15.5)
HGB BLD-MCNC: 9 G/DL (ref 11.9–15.5)
INR PPP: 2.59 (ref 0.9–1.1)
MCH RBC QN AUTO: 28.1 PG (ref 26.9–32)
MCHC RBC AUTO-ENTMCNC: 30.5 G/DL (ref 32.4–36.3)
MCV RBC AUTO: 92 FL (ref 80.5–98.2)
PLATELET # BLD AUTO: 235 10*3/MM3 (ref 140–500)
PMV BLD AUTO: 12.8 FL (ref 6–12)
POTASSIUM BLD-SCNC: 3.8 MMOL/L (ref 3.5–5.2)
PROTHROMBIN TIME: 27.3 SECONDS (ref 11.7–14.2)
RBC # BLD AUTO: 2.99 10*6/MM3 (ref 3.9–5.2)
SODIUM BLD-SCNC: 149 MMOL/L (ref 136–145)
UNIT  ABO: NORMAL
UNIT  RH: NORMAL
WBC NRBC COR # BLD: 9.7 10*3/MM3 (ref 4.5–10.7)

## 2018-07-23 PROCEDURE — 85610 PROTHROMBIN TIME: CPT | Performed by: ORTHOPAEDIC SURGERY

## 2018-07-23 PROCEDURE — 80048 BASIC METABOLIC PNL TOTAL CA: CPT | Performed by: INTERNAL MEDICINE

## 2018-07-23 PROCEDURE — 25010000002 HYDROCORTISONE SODIUM SUCCINATE 100 MG RECONSTITUTED SOLUTION: Performed by: INTERNAL MEDICINE

## 2018-07-23 PROCEDURE — 93971 EXTREMITY STUDY: CPT

## 2018-07-23 PROCEDURE — 25010000002 ERTAPENEM PER 500 MG: Performed by: INTERNAL MEDICINE

## 2018-07-23 PROCEDURE — 82962 GLUCOSE BLOOD TEST: CPT

## 2018-07-23 PROCEDURE — 85014 HEMATOCRIT: CPT | Performed by: INTERNAL MEDICINE

## 2018-07-23 PROCEDURE — 85018 HEMOGLOBIN: CPT | Performed by: INTERNAL MEDICINE

## 2018-07-23 PROCEDURE — 85027 COMPLETE CBC AUTOMATED: CPT | Performed by: INTERNAL MEDICINE

## 2018-07-23 PROCEDURE — 99232 SBSQ HOSP IP/OBS MODERATE 35: CPT | Performed by: INTERNAL MEDICINE

## 2018-07-23 PROCEDURE — 94799 UNLISTED PULMONARY SVC/PX: CPT

## 2018-07-23 PROCEDURE — 99223 1ST HOSP IP/OBS HIGH 75: CPT | Performed by: INTERNAL MEDICINE

## 2018-07-23 RX ORDER — ACETAMINOPHEN 325 MG/1
650 TABLET ORAL EVERY 4 HOURS PRN
Status: DISCONTINUED | OUTPATIENT
Start: 2018-07-23 | End: 2018-07-27 | Stop reason: HOSPADM

## 2018-07-23 RX ORDER — HYDROCORTISONE 10 MG/1
20 TABLET ORAL
Status: DISCONTINUED | OUTPATIENT
Start: 2018-07-23 | End: 2018-07-24

## 2018-07-23 RX ORDER — DEXTROSE MONOHYDRATE 50 MG/ML
100 INJECTION, SOLUTION INTRAVENOUS CONTINUOUS
Status: ACTIVE | OUTPATIENT
Start: 2018-07-23 | End: 2018-07-24

## 2018-07-23 RX ORDER — WARFARIN SODIUM 5 MG/1
5 TABLET ORAL
Status: DISCONTINUED | OUTPATIENT
Start: 2018-07-23 | End: 2018-07-24

## 2018-07-23 RX ADMIN — ACETAMINOPHEN 800 MCG: 400 TABLET ORAL at 09:21

## 2018-07-23 RX ADMIN — WARFARIN SODIUM 5 MG: 5 TABLET ORAL at 20:13

## 2018-07-23 RX ADMIN — HYDROCORTISONE 20 MG: 10 TABLET ORAL at 12:44

## 2018-07-23 RX ADMIN — CALCIUM CARBONATE-VITAMIN D TAB 500 MG-200 UNIT 500 MG: 500-200 TAB at 09:21

## 2018-07-23 RX ADMIN — FERROUS SULFATE TAB 325 MG (65 MG ELEMENTAL FE) 325 MG: 325 (65 FE) TAB at 09:21

## 2018-07-23 RX ADMIN — ACETAMINOPHEN 650 MG: 325 TABLET, FILM COATED ORAL at 20:13

## 2018-07-23 RX ADMIN — DEXTROSE MONOHYDRATE 100 ML/HR: 50 INJECTION, SOLUTION INTRAVENOUS at 15:15

## 2018-07-23 RX ADMIN — ASPIRIN 325 MG: 325 TABLET, DELAYED RELEASE ORAL at 09:21

## 2018-07-23 RX ADMIN — SODIUM CHLORIDE 1 G: 900 INJECTION INTRAVENOUS at 22:30

## 2018-07-23 RX ADMIN — HYDROCORTISONE 20 MG: 10 TABLET ORAL at 20:13

## 2018-07-23 RX ADMIN — MELOXICAM 15 MG: 15 TABLET ORAL at 09:21

## 2018-07-24 ENCOUNTER — HOSPITAL ENCOUNTER (OUTPATIENT)
Dept: INFUSION THERAPY | Facility: HOSPITAL | Age: 73
Discharge: HOME | End: 2018-07-24

## 2018-07-24 LAB
ACTH PLAS-MCNC: <1.1 PG/ML (ref 7.2–63.3)
ANION GAP SERPL CALCULATED.3IONS-SCNC: 9.9 MMOL/L
BACTERIA SPEC AEROBE CULT: NO GROWTH
BUN BLD-MCNC: 15 MG/DL (ref 8–23)
BUN/CREAT SERPL: 31.3 (ref 7–25)
CALCIUM SPEC-SCNC: 8 MG/DL (ref 8.6–10.5)
CHLORIDE SERPL-SCNC: 111 MMOL/L (ref 98–107)
CO2 SERPL-SCNC: 25.1 MMOL/L (ref 22–29)
CREAT BLD-MCNC: 0.48 MG/DL (ref 0.57–1)
DEPRECATED RDW RBC AUTO: 53.3 FL (ref 37–54)
ERYTHROCYTE [DISTWIDTH] IN BLOOD BY AUTOMATED COUNT: 15.6 % (ref 11.7–13)
GFR SERPL CREATININE-BSD FRML MDRD: 127 ML/MIN/1.73
GLUCOSE BLD-MCNC: 120 MG/DL (ref 65–99)
GRAM STN SPEC: NORMAL
HCT VFR BLD AUTO: 27.1 % (ref 35.6–45.5)
HGB BLD-MCNC: 8.2 G/DL (ref 11.9–15.5)
INR PPP: 3.69 (ref 0.9–1.1)
MCH RBC QN AUTO: 28.2 PG (ref 26.9–32)
MCHC RBC AUTO-ENTMCNC: 30.3 G/DL (ref 32.4–36.3)
MCV RBC AUTO: 93.1 FL (ref 80.5–98.2)
PLATELET # BLD AUTO: 268 10*3/MM3 (ref 140–500)
PMV BLD AUTO: 12.7 FL (ref 6–12)
POTASSIUM BLD-SCNC: 3.7 MMOL/L (ref 3.5–5.2)
PROTHROMBIN TIME: 36 SECONDS (ref 11.7–14.2)
RBC # BLD AUTO: 2.91 10*6/MM3 (ref 3.9–5.2)
SODIUM BLD-SCNC: 146 MMOL/L (ref 136–145)
WBC NRBC COR # BLD: 9.47 10*3/MM3 (ref 4.5–10.7)

## 2018-07-24 PROCEDURE — 85027 COMPLETE CBC AUTOMATED: CPT | Performed by: INTERNAL MEDICINE

## 2018-07-24 PROCEDURE — 25010000002 ERTAPENEM PER 500 MG: Performed by: INTERNAL MEDICINE

## 2018-07-24 PROCEDURE — 99233 SBSQ HOSP IP/OBS HIGH 50: CPT | Performed by: INTERNAL MEDICINE

## 2018-07-24 PROCEDURE — 97110 THERAPEUTIC EXERCISES: CPT

## 2018-07-24 PROCEDURE — 80048 BASIC METABOLIC PNL TOTAL CA: CPT | Performed by: INTERNAL MEDICINE

## 2018-07-24 PROCEDURE — 85610 PROTHROMBIN TIME: CPT | Performed by: ORTHOPAEDIC SURGERY

## 2018-07-24 RX ORDER — HYDROCORTISONE 10 MG/1
20 TABLET ORAL 2 TIMES DAILY WITH MEALS
Status: DISCONTINUED | OUTPATIENT
Start: 2018-07-25 | End: 2018-07-25

## 2018-07-24 RX ORDER — WARFARIN SODIUM 5 MG/1
5 TABLET ORAL
Status: DISCONTINUED | OUTPATIENT
Start: 2018-07-25 | End: 2018-07-25

## 2018-07-24 RX ADMIN — HYDROCORTISONE 20 MG: 10 TABLET ORAL at 12:54

## 2018-07-24 RX ADMIN — HYDROCORTISONE 20 MG: 10 TABLET ORAL at 07:54

## 2018-07-24 RX ADMIN — CALCIUM CARBONATE-VITAMIN D TAB 500 MG-200 UNIT 500 MG: 500-200 TAB at 07:54

## 2018-07-24 RX ADMIN — ACETAMINOPHEN 650 MG: 325 TABLET, FILM COATED ORAL at 12:54

## 2018-07-24 RX ADMIN — FERROUS SULFATE TAB 325 MG (65 MG ELEMENTAL FE) 325 MG: 325 (65 FE) TAB at 07:54

## 2018-07-24 RX ADMIN — SODIUM CHLORIDE 1 G: 900 INJECTION INTRAVENOUS at 23:49

## 2018-07-24 RX ADMIN — ACETAMINOPHEN 800 MCG: 400 TABLET ORAL at 07:54

## 2018-07-24 RX ADMIN — HYDROCORTISONE 20 MG: 10 TABLET ORAL at 17:02

## 2018-07-25 ENCOUNTER — HOSPITAL ENCOUNTER (OUTPATIENT)
Dept: INFUSION THERAPY | Facility: HOSPITAL | Age: 73
Discharge: HOME | End: 2018-07-25

## 2018-07-25 LAB
ANION GAP SERPL CALCULATED.3IONS-SCNC: 10 MMOL/L
BUN BLD-MCNC: 19 MG/DL (ref 8–23)
BUN/CREAT SERPL: 40.4 (ref 7–25)
CALCIUM SPEC-SCNC: 8.3 MG/DL (ref 8.6–10.5)
CHLORIDE SERPL-SCNC: 109 MMOL/L (ref 98–107)
CO2 SERPL-SCNC: 27 MMOL/L (ref 22–29)
CREAT BLD-MCNC: 0.47 MG/DL (ref 0.57–1)
DEPRECATED RDW RBC AUTO: 52.4 FL (ref 37–54)
ERYTHROCYTE [DISTWIDTH] IN BLOOD BY AUTOMATED COUNT: 15.5 % (ref 11.7–13)
GFR SERPL CREATININE-BSD FRML MDRD: 131 ML/MIN/1.73
GLUCOSE BLD-MCNC: 104 MG/DL (ref 65–99)
HCT VFR BLD AUTO: 29 % (ref 35.6–45.5)
HCT VFR BLD AUTO: 30.2 % (ref 35.6–45.5)
HGB BLD-MCNC: 8.9 G/DL (ref 11.9–15.5)
HGB BLD-MCNC: 9.4 G/DL (ref 11.9–15.5)
INR PPP: 2.94 (ref 0.9–1.1)
MCH RBC QN AUTO: 28.8 PG (ref 26.9–32)
MCHC RBC AUTO-ENTMCNC: 30.7 G/DL (ref 32.4–36.3)
MCV RBC AUTO: 93.9 FL (ref 80.5–98.2)
PLATELET # BLD AUTO: 303 10*3/MM3 (ref 140–500)
PMV BLD AUTO: 12.7 FL (ref 6–12)
POTASSIUM BLD-SCNC: 3.3 MMOL/L (ref 3.5–5.2)
PROTHROMBIN TIME: 30.2 SECONDS (ref 11.7–14.2)
RBC # BLD AUTO: 3.09 10*6/MM3 (ref 3.9–5.2)
SODIUM BLD-SCNC: 146 MMOL/L (ref 136–145)
WBC NRBC COR # BLD: 12.81 10*3/MM3 (ref 4.5–10.7)

## 2018-07-25 PROCEDURE — 85610 PROTHROMBIN TIME: CPT | Performed by: ORTHOPAEDIC SURGERY

## 2018-07-25 PROCEDURE — 99024 POSTOP FOLLOW-UP VISIT: CPT | Performed by: ORTHOPAEDIC SURGERY

## 2018-07-25 PROCEDURE — 97110 THERAPEUTIC EXERCISES: CPT

## 2018-07-25 PROCEDURE — 25010000002 ERTAPENEM PER 500 MG: Performed by: INTERNAL MEDICINE

## 2018-07-25 PROCEDURE — 80048 BASIC METABOLIC PNL TOTAL CA: CPT | Performed by: INTERNAL MEDICINE

## 2018-07-25 PROCEDURE — 85027 COMPLETE CBC AUTOMATED: CPT | Performed by: INTERNAL MEDICINE

## 2018-07-25 PROCEDURE — 85014 HEMATOCRIT: CPT | Performed by: INTERNAL MEDICINE

## 2018-07-25 PROCEDURE — 99232 SBSQ HOSP IP/OBS MODERATE 35: CPT | Performed by: INTERNAL MEDICINE

## 2018-07-25 PROCEDURE — 85018 HEMOGLOBIN: CPT | Performed by: INTERNAL MEDICINE

## 2018-07-25 PROCEDURE — 99233 SBSQ HOSP IP/OBS HIGH 50: CPT | Performed by: INTERNAL MEDICINE

## 2018-07-25 RX ORDER — HYDROCORTISONE 10 MG/1
10 TABLET ORAL EVERY EVENING
Status: DISCONTINUED | OUTPATIENT
Start: 2018-07-25 | End: 2018-07-26

## 2018-07-25 RX ORDER — WARFARIN SODIUM 4 MG/1
4 TABLET ORAL
Status: DISCONTINUED | OUTPATIENT
Start: 2018-07-25 | End: 2018-07-27 | Stop reason: HOSPADM

## 2018-07-25 RX ORDER — HYDROCORTISONE 10 MG/1
20 TABLET ORAL
Status: DISCONTINUED | OUTPATIENT
Start: 2018-07-26 | End: 2018-07-26

## 2018-07-25 RX ADMIN — SODIUM CHLORIDE 1 G: 900 INJECTION INTRAVENOUS at 23:57

## 2018-07-25 RX ADMIN — ACETAMINOPHEN 800 MCG: 400 TABLET ORAL at 08:55

## 2018-07-25 RX ADMIN — HYDROCORTISONE 10 MG: 10 TABLET ORAL at 16:34

## 2018-07-25 RX ADMIN — DOCUSATE SODIUM 100 MG: 100 CAPSULE, LIQUID FILLED ORAL at 21:08

## 2018-07-25 RX ADMIN — FERROUS SULFATE TAB 325 MG (65 MG ELEMENTAL FE) 325 MG: 325 (65 FE) TAB at 08:55

## 2018-07-25 RX ADMIN — ACETAMINOPHEN 650 MG: 325 TABLET, FILM COATED ORAL at 09:03

## 2018-07-25 RX ADMIN — WARFARIN SODIUM 4 MG: 4 TABLET ORAL at 21:08

## 2018-07-25 RX ADMIN — DOCUSATE SODIUM 100 MG: 100 CAPSULE, LIQUID FILLED ORAL at 08:54

## 2018-07-25 RX ADMIN — CALCIUM CARBONATE-VITAMIN D TAB 500 MG-200 UNIT 500 MG: 500-200 TAB at 08:55

## 2018-07-26 ENCOUNTER — HOSPITAL ENCOUNTER (OUTPATIENT)
Dept: INFUSION THERAPY | Facility: HOSPITAL | Age: 73
Discharge: HOME | End: 2018-07-26

## 2018-07-26 LAB
BACTERIA SPEC ANAEROBE CULT: NORMAL
DEPRECATED RDW RBC AUTO: 52 FL (ref 37–54)
ERYTHROCYTE [DISTWIDTH] IN BLOOD BY AUTOMATED COUNT: 15.7 % (ref 11.7–13)
HCT VFR BLD AUTO: 27.7 % (ref 35.6–45.5)
HGB BLD-MCNC: 8.6 G/DL (ref 11.9–15.5)
INR PPP: 2.45 (ref 0.9–1.1)
MCH RBC QN AUTO: 28.7 PG (ref 26.9–32)
MCHC RBC AUTO-ENTMCNC: 31 G/DL (ref 32.4–36.3)
MCV RBC AUTO: 92.3 FL (ref 80.5–98.2)
PLATELET # BLD AUTO: 312 10*3/MM3 (ref 140–500)
PMV BLD AUTO: 12.7 FL (ref 6–12)
PROTHROMBIN TIME: 26.2 SECONDS (ref 11.7–14.2)
RBC # BLD AUTO: 3 10*6/MM3 (ref 3.9–5.2)
WBC NRBC COR # BLD: 10.83 10*3/MM3 (ref 4.5–10.7)

## 2018-07-26 PROCEDURE — 85027 COMPLETE CBC AUTOMATED: CPT | Performed by: INTERNAL MEDICINE

## 2018-07-26 PROCEDURE — 85610 PROTHROMBIN TIME: CPT | Performed by: ORTHOPAEDIC SURGERY

## 2018-07-26 PROCEDURE — 97110 THERAPEUTIC EXERCISES: CPT

## 2018-07-26 PROCEDURE — 99024 POSTOP FOLLOW-UP VISIT: CPT | Performed by: NURSE PRACTITIONER

## 2018-07-26 PROCEDURE — 25010000002 ERTAPENEM PER 500 MG: Performed by: INTERNAL MEDICINE

## 2018-07-26 RX ORDER — HYDROCORTISONE 10 MG/1
10 TABLET ORAL
Status: COMPLETED | OUTPATIENT
Start: 2018-07-27 | End: 2018-07-27

## 2018-07-26 RX ADMIN — ACETAMINOPHEN 650 MG: 325 TABLET, FILM COATED ORAL at 21:21

## 2018-07-26 RX ADMIN — WARFARIN SODIUM 4 MG: 4 TABLET ORAL at 18:08

## 2018-07-26 RX ADMIN — CALCIUM CARBONATE-VITAMIN D TAB 500 MG-200 UNIT 500 MG: 500-200 TAB at 09:18

## 2018-07-26 RX ADMIN — DOCUSATE SODIUM 100 MG: 100 CAPSULE, LIQUID FILLED ORAL at 09:18

## 2018-07-26 RX ADMIN — DOCUSATE SODIUM 100 MG: 100 CAPSULE, LIQUID FILLED ORAL at 21:21

## 2018-07-26 RX ADMIN — ACETAMINOPHEN 800 MCG: 400 TABLET ORAL at 09:18

## 2018-07-26 RX ADMIN — SODIUM CHLORIDE 1 G: 900 INJECTION INTRAVENOUS at 22:31

## 2018-07-26 RX ADMIN — FERROUS SULFATE TAB 325 MG (65 MG ELEMENTAL FE) 325 MG: 325 (65 FE) TAB at 09:18

## 2018-07-27 ENCOUNTER — TELEPHONE (OUTPATIENT)
Dept: ORTHOPEDIC SURGERY | Facility: CLINIC | Age: 73
End: 2018-07-27

## 2018-07-27 ENCOUNTER — APPOINTMENT (OUTPATIENT)
Dept: CARDIOLOGY | Facility: HOSPITAL | Age: 73
End: 2018-07-27

## 2018-07-27 ENCOUNTER — HOSPITAL ENCOUNTER (OUTPATIENT)
Dept: INFUSION THERAPY | Facility: HOSPITAL | Age: 73
Discharge: HOME | End: 2018-07-27

## 2018-07-27 VITALS
WEIGHT: 125.38 LBS | HEIGHT: 59 IN | HEART RATE: 83 BPM | TEMPERATURE: 97.6 F | SYSTOLIC BLOOD PRESSURE: 118 MMHG | OXYGEN SATURATION: 97 % | BODY MASS INDEX: 25.28 KG/M2 | RESPIRATION RATE: 16 BRPM | DIASTOLIC BLOOD PRESSURE: 62 MMHG

## 2018-07-27 LAB
BH CV LOWER VASCULAR LEFT COMMON FEMORAL AUGMENT: NORMAL
BH CV LOWER VASCULAR LEFT COMMON FEMORAL COMPETENT: NORMAL
BH CV LOWER VASCULAR LEFT COMMON FEMORAL COMPRESS: NORMAL
BH CV LOWER VASCULAR LEFT COMMON FEMORAL PHASIC: NORMAL
BH CV LOWER VASCULAR LEFT COMMON FEMORAL SPONT: NORMAL
BH CV LOWER VASCULAR LEFT DISTAL FEMORAL COMPRESS: NORMAL
BH CV LOWER VASCULAR LEFT GASTRONEMIUS COMPRESS: NORMAL
BH CV LOWER VASCULAR LEFT GREATER SAPH AK COMPRESS: NORMAL
BH CV LOWER VASCULAR LEFT GREATER SAPH BK COMPRESS: NORMAL
BH CV LOWER VASCULAR LEFT LESSER SAPH COMPRESS: NORMAL
BH CV LOWER VASCULAR LEFT MID FEMORAL AUGMENT: NORMAL
BH CV LOWER VASCULAR LEFT MID FEMORAL COMPETENT: NORMAL
BH CV LOWER VASCULAR LEFT MID FEMORAL COMPRESS: NORMAL
BH CV LOWER VASCULAR LEFT MID FEMORAL PHASIC: NORMAL
BH CV LOWER VASCULAR LEFT MID FEMORAL SPONT: NORMAL
BH CV LOWER VASCULAR LEFT PERONEAL COMPRESS: NORMAL
BH CV LOWER VASCULAR LEFT POPLITEAL AUGMENT: NORMAL
BH CV LOWER VASCULAR LEFT POPLITEAL COMPETENT: NORMAL
BH CV LOWER VASCULAR LEFT POPLITEAL COMPRESS: NORMAL
BH CV LOWER VASCULAR LEFT POPLITEAL PHASIC: NORMAL
BH CV LOWER VASCULAR LEFT POPLITEAL SPONT: NORMAL
BH CV LOWER VASCULAR LEFT POSTERIOR TIBIAL COMPRESS: NORMAL
BH CV LOWER VASCULAR LEFT PROXIMAL FEMORAL COMPRESS: NORMAL
BH CV LOWER VASCULAR LEFT SAPHENOFEMORAL JUNCTION AUGMENT: NORMAL
BH CV LOWER VASCULAR LEFT SAPHENOFEMORAL JUNCTION COMPETENT: NORMAL
BH CV LOWER VASCULAR LEFT SAPHENOFEMORAL JUNCTION COMPRESS: NORMAL
BH CV LOWER VASCULAR LEFT SAPHENOFEMORAL JUNCTION PHASIC: NORMAL
BH CV LOWER VASCULAR LEFT SAPHENOFEMORAL JUNCTION SPONT: NORMAL
BH CV LOWER VASCULAR RIGHT COMMON FEMORAL AUGMENT: NORMAL
BH CV LOWER VASCULAR RIGHT COMMON FEMORAL COMPETENT: NORMAL
BH CV LOWER VASCULAR RIGHT COMMON FEMORAL COMPRESS: NORMAL
BH CV LOWER VASCULAR RIGHT COMMON FEMORAL PHASIC: NORMAL
BH CV LOWER VASCULAR RIGHT COMMON FEMORAL SPONT: NORMAL
BH CV LOWER VASCULAR RIGHT DISTAL FEMORAL COMPRESS: NORMAL
BH CV LOWER VASCULAR RIGHT GASTRONEMIUS COMPRESS: NORMAL
BH CV LOWER VASCULAR RIGHT GREATER SAPH AK COMPRESS: NORMAL
BH CV LOWER VASCULAR RIGHT GREATER SAPH BK COMPRESS: NORMAL
BH CV LOWER VASCULAR RIGHT LESSER SAPH COMPRESS: NORMAL
BH CV LOWER VASCULAR RIGHT MID FEMORAL AUGMENT: NORMAL
BH CV LOWER VASCULAR RIGHT MID FEMORAL COMPETENT: NORMAL
BH CV LOWER VASCULAR RIGHT MID FEMORAL COMPRESS: NORMAL
BH CV LOWER VASCULAR RIGHT MID FEMORAL PHASIC: NORMAL
BH CV LOWER VASCULAR RIGHT MID FEMORAL SPONT: NORMAL
BH CV LOWER VASCULAR RIGHT PERONEAL COMPRESS: NORMAL
BH CV LOWER VASCULAR RIGHT POPLITEAL AUGMENT: NORMAL
BH CV LOWER VASCULAR RIGHT POPLITEAL COMPETENT: NORMAL
BH CV LOWER VASCULAR RIGHT POPLITEAL COMPRESS: NORMAL
BH CV LOWER VASCULAR RIGHT POPLITEAL PHASIC: NORMAL
BH CV LOWER VASCULAR RIGHT POPLITEAL SPONT: NORMAL
BH CV LOWER VASCULAR RIGHT POSTERIOR TIBIAL COMPRESS: NORMAL
BH CV LOWER VASCULAR RIGHT PROXIMAL FEMORAL COMPRESS: NORMAL
BH CV LOWER VASCULAR RIGHT SAPHENOFEMORAL JUNCTION AUGMENT: NORMAL
BH CV LOWER VASCULAR RIGHT SAPHENOFEMORAL JUNCTION COMPETENT: NORMAL
BH CV LOWER VASCULAR RIGHT SAPHENOFEMORAL JUNCTION COMPRESS: NORMAL
BH CV LOWER VASCULAR RIGHT SAPHENOFEMORAL JUNCTION PHASIC: NORMAL
BH CV LOWER VASCULAR RIGHT SAPHENOFEMORAL JUNCTION SPONT: NORMAL
CRP SERPL-MCNC: 4.79 MG/DL (ref 0–0.5)
DEPRECATED RDW RBC AUTO: 52.1 FL (ref 37–54)
ERYTHROCYTE [DISTWIDTH] IN BLOOD BY AUTOMATED COUNT: 15.8 % (ref 11.7–13)
HCT VFR BLD AUTO: 33 % (ref 35.6–45.5)
HGB BLD-MCNC: 10 G/DL (ref 11.9–15.5)
INR PPP: 2.44 (ref 0.9–1.1)
INR PPP: 2.51 (ref 0.9–1.1)
MCH RBC QN AUTO: 28.4 PG (ref 26.9–32)
MCHC RBC AUTO-ENTMCNC: 30.3 G/DL (ref 32.4–36.3)
MCV RBC AUTO: 93.8 FL (ref 80.5–98.2)
PLATELET # BLD AUTO: 379 10*3/MM3 (ref 140–500)
PMV BLD AUTO: 12.4 FL (ref 6–12)
PROTHROMBIN TIME: 26.1 SECONDS (ref 11.7–14.2)
PROTHROMBIN TIME: 26.7 SECONDS (ref 11.7–14.2)
RBC # BLD AUTO: 3.52 10*6/MM3 (ref 3.9–5.2)
WBC NRBC COR # BLD: 14.04 10*3/MM3 (ref 4.5–10.7)

## 2018-07-27 PROCEDURE — 86140 C-REACTIVE PROTEIN: CPT | Performed by: NURSE PRACTITIONER

## 2018-07-27 PROCEDURE — 85610 PROTHROMBIN TIME: CPT | Performed by: ORTHOPAEDIC SURGERY

## 2018-07-27 PROCEDURE — 93970 EXTREMITY STUDY: CPT

## 2018-07-27 PROCEDURE — 85027 COMPLETE CBC AUTOMATED: CPT | Performed by: INTERNAL MEDICINE

## 2018-07-27 PROCEDURE — 99024 POSTOP FOLLOW-UP VISIT: CPT | Performed by: NURSE PRACTITIONER

## 2018-07-27 PROCEDURE — 97110 THERAPEUTIC EXERCISES: CPT

## 2018-07-27 PROCEDURE — 25010000002 ERTAPENEM PER 500 MG: Performed by: INTERNAL MEDICINE

## 2018-07-27 RX ORDER — ONDANSETRON 4 MG/1
4 TABLET, FILM COATED ORAL EVERY 6 HOURS PRN
Qty: 10 TABLET | Refills: 0 | Status: SHIPPED | OUTPATIENT
Start: 2018-07-27 | End: 2018-08-06

## 2018-07-27 RX ORDER — PSEUDOEPHEDRINE HCL 30 MG
100 TABLET ORAL 2 TIMES DAILY
Qty: 21 CAPSULE | Refills: 0 | Status: SHIPPED | OUTPATIENT
Start: 2018-07-27 | End: 2018-08-07

## 2018-07-27 RX ORDER — WARFARIN SODIUM 6 MG/1
TABLET ORAL
Qty: 30 TABLET | Refills: 0 | Status: SHIPPED | OUTPATIENT
Start: 2018-07-27 | End: 2018-07-27 | Stop reason: HOSPADM

## 2018-07-27 RX ORDER — HYDROCODONE BITARTRATE AND ACETAMINOPHEN 7.5; 325 MG/1; MG/1
2 TABLET ORAL EVERY 4 HOURS PRN
Qty: 60 TABLET | Refills: 0 | Status: SHIPPED | OUTPATIENT
Start: 2018-07-27 | End: 2018-07-31

## 2018-07-27 RX ORDER — HYDROCORTISONE 10 MG/1
10 TABLET ORAL
Qty: 1 TABLET | Refills: 0 | Status: SHIPPED | OUTPATIENT
Start: 2018-07-27 | End: 2018-07-28

## 2018-07-27 RX ORDER — WARFARIN SODIUM 4 MG/1
TABLET ORAL
Qty: 50 TABLET | Refills: 0 | Status: SHIPPED | OUTPATIENT
Start: 2018-07-27 | End: 2018-08-10

## 2018-07-27 RX ADMIN — ACETAMINOPHEN 800 MCG: 400 TABLET ORAL at 08:43

## 2018-07-27 RX ADMIN — DOCUSATE SODIUM 100 MG: 100 CAPSULE, LIQUID FILLED ORAL at 08:43

## 2018-07-27 RX ADMIN — HYDROCORTISONE 10 MG: 10 TABLET ORAL at 08:43

## 2018-07-27 RX ADMIN — SODIUM CHLORIDE 1 G: 900 INJECTION INTRAVENOUS at 15:20

## 2018-07-27 RX ADMIN — FERROUS SULFATE TAB 325 MG (65 MG ELEMENTAL FE) 325 MG: 325 (65 FE) TAB at 08:43

## 2018-07-27 RX ADMIN — ACETAMINOPHEN 650 MG: 325 TABLET, FILM COATED ORAL at 08:43

## 2018-07-27 RX ADMIN — CALCIUM CARBONATE-VITAMIN D TAB 500 MG-200 UNIT 500 MG: 500-200 TAB at 08:43

## 2018-07-27 NOTE — TELEPHONE ENCOUNTER
NOREEN Diez states-Patient feels no need for HH to be continued. Please advise ok for patient to be discharge from HH agency. 0926 07/27/18     [phone]

## 2018-07-27 NOTE — TELEPHONE ENCOUNTER
Pharmacy called stating that the patient was prescribed Hydrocortisone 10mg, 1 tab for 1 dose, but they do not have any 10mg tabs in stock and they were asking if it is ok for them to give her 2-5mg tabs in place of the one 10mg tab.     I checked with [facility] as RBB and LISA are not in the office yet and he approved that it is ok to substitute the 2-5mg tabs for the 10mg tab.

## 2018-07-28 ENCOUNTER — HOSPITAL ENCOUNTER (OUTPATIENT)
Dept: INFUSION THERAPY | Facility: HOSPITAL | Age: 73
Discharge: HOME | End: 2018-07-28
Admitting: INTERNAL MEDICINE

## 2018-07-28 VITALS
SYSTOLIC BLOOD PRESSURE: 132 MMHG | RESPIRATION RATE: 16 BRPM | TEMPERATURE: 97.2 F | DIASTOLIC BLOOD PRESSURE: 74 MMHG | OXYGEN SATURATION: 99 % | HEART RATE: 108 BPM

## 2018-07-28 DIAGNOSIS — M00.80 ARTHRITIS DUE TO OTHER BACTERIA, SEPTIC ARTHRITIS OF UNSPECIFIED LOCATION (HCC): ICD-10-CM

## 2018-07-28 PROCEDURE — 96365 THER/PROPH/DIAG IV INF INIT: CPT

## 2018-07-28 PROCEDURE — 25010000002 ERTAPENEM PER 500 MG: Performed by: INTERNAL MEDICINE

## 2018-07-28 RX ADMIN — SODIUM CHLORIDE 1 G: 900 INJECTION INTRAVENOUS at 09:25

## 2018-07-29 ENCOUNTER — HOSPITAL ENCOUNTER (OUTPATIENT)
Dept: INFUSION THERAPY | Facility: HOSPITAL | Age: 73
Discharge: HOME | End: 2018-07-29
Admitting: INTERNAL MEDICINE

## 2018-07-29 VITALS
OXYGEN SATURATION: 99 % | RESPIRATION RATE: 14 BRPM | SYSTOLIC BLOOD PRESSURE: 118 MMHG | HEART RATE: 103 BPM | DIASTOLIC BLOOD PRESSURE: 56 MMHG | TEMPERATURE: 97.1 F

## 2018-07-29 DIAGNOSIS — M00.80 ARTHRITIS DUE TO OTHER BACTERIA, SEPTIC ARTHRITIS OF UNSPECIFIED LOCATION (HCC): ICD-10-CM

## 2018-07-29 PROCEDURE — 96365 THER/PROPH/DIAG IV INF INIT: CPT

## 2018-07-29 PROCEDURE — 25010000002 ERTAPENEM PER 500 MG: Performed by: INTERNAL MEDICINE

## 2018-07-29 RX ADMIN — SODIUM CHLORIDE 1 G: 900 INJECTION INTRAVENOUS at 10:15

## 2018-07-30 ENCOUNTER — TELEPHONE (OUTPATIENT)
Dept: ORTHOPEDIC SURGERY | Facility: CLINIC | Age: 73
End: 2018-07-30

## 2018-07-30 ENCOUNTER — HOSPITAL ENCOUNTER (OUTPATIENT)
Dept: INFUSION THERAPY | Facility: HOSPITAL | Age: 73
Discharge: HOME | End: 2018-07-30
Admitting: INTERNAL MEDICINE

## 2018-07-30 VITALS
SYSTOLIC BLOOD PRESSURE: 140 MMHG | TEMPERATURE: 97.4 F | HEART RATE: 103 BPM | DIASTOLIC BLOOD PRESSURE: 68 MMHG | RESPIRATION RATE: 16 BRPM | OXYGEN SATURATION: 100 %

## 2018-07-30 DIAGNOSIS — Z51.81 ENCOUNTER FOR MONITORING COUMADIN THERAPY: ICD-10-CM

## 2018-07-30 DIAGNOSIS — M00.852 ARTHRITIS OF LEFT HIP DUE TO OTHER BACTERIA (HCC): ICD-10-CM

## 2018-07-30 DIAGNOSIS — Z79.01 ENCOUNTER FOR MONITORING COUMADIN THERAPY: ICD-10-CM

## 2018-07-30 DIAGNOSIS — M00.80 ARTHRITIS DUE TO OTHER BACTERIA, SEPTIC ARTHRITIS OF UNSPECIFIED LOCATION (HCC): ICD-10-CM

## 2018-07-30 LAB
ALBUMIN SERPL-MCNC: 2.6 G/DL (ref 3.5–5.2)
ALBUMIN/GLOB SERPL: 0.9 G/DL
ALP SERPL-CCNC: 62 U/L (ref 39–117)
ALT SERPL W P-5'-P-CCNC: 8 U/L (ref 1–33)
ANION GAP SERPL CALCULATED.3IONS-SCNC: 12.7 MMOL/L
AST SERPL-CCNC: 11 U/L (ref 1–32)
BASOPHILS # BLD AUTO: 0.02 10*3/MM3 (ref 0–0.2)
BASOPHILS NFR BLD AUTO: 0.1 % (ref 0–1.5)
BILIRUB SERPL-MCNC: 0.4 MG/DL (ref 0.1–1.2)
BUN BLD-MCNC: 11 MG/DL (ref 8–23)
BUN/CREAT SERPL: 26.8 (ref 7–25)
CALCIUM SPEC-SCNC: 9.7 MG/DL (ref 8.6–10.5)
CHLORIDE SERPL-SCNC: 102 MMOL/L (ref 98–107)
CO2 SERPL-SCNC: 27.3 MMOL/L (ref 22–29)
CREAT BLD-MCNC: 0.41 MG/DL (ref 0.57–1)
DEPRECATED RDW RBC AUTO: 51.9 FL (ref 37–54)
EOSINOPHIL # BLD AUTO: 0.46 10*3/MM3 (ref 0–0.7)
EOSINOPHIL NFR BLD AUTO: 3.3 % (ref 0.3–6.2)
ERYTHROCYTE [DISTWIDTH] IN BLOOD BY AUTOMATED COUNT: 15.4 % (ref 11.7–13)
GFR SERPL CREATININE-BSD FRML MDRD: >150 ML/MIN/1.73
GLOBULIN UR ELPH-MCNC: 3 GM/DL
GLUCOSE BLD-MCNC: 107 MG/DL (ref 65–99)
HCT VFR BLD AUTO: 30.1 % (ref 35.6–45.5)
HGB BLD-MCNC: 9.4 G/DL (ref 11.9–15.5)
IMM GRANULOCYTES # BLD: 0.14 10*3/MM3 (ref 0–0.03)
IMM GRANULOCYTES NFR BLD: 1 % (ref 0–0.5)
INR PPP: 1.94 (ref 0.9–1.1)
LYMPHOCYTES # BLD AUTO: 2.06 10*3/MM3 (ref 0.9–4.8)
LYMPHOCYTES NFR BLD AUTO: 14.7 % (ref 19.6–45.3)
MCH RBC QN AUTO: 28.7 PG (ref 26.9–32)
MCHC RBC AUTO-ENTMCNC: 31.2 G/DL (ref 32.4–36.3)
MCV RBC AUTO: 92 FL (ref 80.5–98.2)
MONOCYTES # BLD AUTO: 2.04 10*3/MM3 (ref 0.2–1.2)
MONOCYTES NFR BLD AUTO: 14.5 % (ref 5–12)
NEUTROPHILS # BLD AUTO: 9.46 10*3/MM3 (ref 1.9–8.1)
NEUTROPHILS NFR BLD AUTO: 67.4 % (ref 42.7–76)
PLATELET # BLD AUTO: 381 10*3/MM3 (ref 140–500)
PMV BLD AUTO: 12 FL (ref 6–12)
POTASSIUM BLD-SCNC: 2.6 MMOL/L (ref 3.5–5.2)
PROT SERPL-MCNC: 5.6 G/DL (ref 6–8.5)
PROTHROMBIN TIME: 21.8 SECONDS (ref 11.7–14.2)
RBC # BLD AUTO: 3.27 10*6/MM3 (ref 3.9–5.2)
SODIUM BLD-SCNC: 142 MMOL/L (ref 136–145)
WBC NRBC COR # BLD: 14.04 10*3/MM3 (ref 4.5–10.7)

## 2018-07-30 PROCEDURE — 96365 THER/PROPH/DIAG IV INF INIT: CPT

## 2018-07-30 PROCEDURE — 85025 COMPLETE CBC W/AUTO DIFF WBC: CPT | Performed by: INTERNAL MEDICINE

## 2018-07-30 PROCEDURE — 25010000002 ERTAPENEM PER 500 MG: Performed by: INTERNAL MEDICINE

## 2018-07-30 PROCEDURE — 80053 COMPREHEN METABOLIC PANEL: CPT | Performed by: INTERNAL MEDICINE

## 2018-07-30 PROCEDURE — 36592 COLLECT BLOOD FROM PICC: CPT

## 2018-07-30 PROCEDURE — 85610 PROTHROMBIN TIME: CPT | Performed by: ORTHOPAEDIC SURGERY

## 2018-07-30 RX ADMIN — SODIUM CHLORIDE 1 G: 900 INJECTION INTRAVENOUS at 14:43

## 2018-07-30 NOTE — TELEPHONE ENCOUNTER
ProTime today was 21.8 INR is 1.9.  Her take Coumadin 5 mg today and then tomorrow resume her Coumadin at 4 mg daily.  Will recheck her ProTime again on Thursday.  Has been also states that she does have a moderate amount of bloody drainage on her nessa dressing.  Of asked them to change that dressing and apply a pressure dressing only change once a day unless it becomes saturated sooner.  Have been instructed to notify the office if the drainage does not stop

## 2018-07-31 ENCOUNTER — HOSPITAL ENCOUNTER (OUTPATIENT)
Dept: INFUSION THERAPY | Facility: HOSPITAL | Age: 73
Discharge: HOME | End: 2018-07-31
Admitting: INTERNAL MEDICINE

## 2018-07-31 VITALS
DIASTOLIC BLOOD PRESSURE: 50 MMHG | RESPIRATION RATE: 18 BRPM | TEMPERATURE: 98.1 F | SYSTOLIC BLOOD PRESSURE: 132 MMHG | HEART RATE: 91 BPM | OXYGEN SATURATION: 99 %

## 2018-07-31 DIAGNOSIS — M00.80 ARTHRITIS DUE TO OTHER BACTERIA, SEPTIC ARTHRITIS OF UNSPECIFIED LOCATION (HCC): ICD-10-CM

## 2018-07-31 PROCEDURE — 96365 THER/PROPH/DIAG IV INF INIT: CPT

## 2018-07-31 PROCEDURE — 25010000002 ERTAPENEM PER 500 MG: Performed by: INTERNAL MEDICINE

## 2018-07-31 RX ADMIN — SODIUM CHLORIDE 1 G: 900 INJECTION INTRAVENOUS at 14:44

## 2018-08-01 ENCOUNTER — HOSPITAL ENCOUNTER (OUTPATIENT)
Dept: INFUSION THERAPY | Facility: HOSPITAL | Age: 73
Discharge: HOME | End: 2018-08-01
Admitting: INTERNAL MEDICINE

## 2018-08-01 VITALS
DIASTOLIC BLOOD PRESSURE: 61 MMHG | RESPIRATION RATE: 16 BRPM | SYSTOLIC BLOOD PRESSURE: 120 MMHG | HEART RATE: 96 BPM | TEMPERATURE: 96.9 F | OXYGEN SATURATION: 99 %

## 2018-08-01 DIAGNOSIS — M00.80 ARTHRITIS DUE TO OTHER BACTERIA, SEPTIC ARTHRITIS OF UNSPECIFIED LOCATION (HCC): ICD-10-CM

## 2018-08-01 PROCEDURE — 25010000002 ERTAPENEM PER 500 MG: Performed by: INTERNAL MEDICINE

## 2018-08-01 PROCEDURE — 96365 THER/PROPH/DIAG IV INF INIT: CPT

## 2018-08-01 RX ADMIN — SODIUM CHLORIDE 1 G: 900 INJECTION INTRAVENOUS at 14:35

## 2018-08-02 ENCOUNTER — HOSPITAL ENCOUNTER (OUTPATIENT)
Dept: INFUSION THERAPY | Facility: HOSPITAL | Age: 73
Discharge: HOME | End: 2018-08-02
Admitting: INTERNAL MEDICINE

## 2018-08-02 ENCOUNTER — TELEPHONE (OUTPATIENT)
Dept: ORTHOPEDIC SURGERY | Facility: CLINIC | Age: 73
End: 2018-08-02

## 2018-08-02 VITALS
RESPIRATION RATE: 20 BRPM | DIASTOLIC BLOOD PRESSURE: 65 MMHG | SYSTOLIC BLOOD PRESSURE: 137 MMHG | TEMPERATURE: 98.3 F | HEART RATE: 105 BPM | OXYGEN SATURATION: 95 %

## 2018-08-02 DIAGNOSIS — Z51.81 ENCOUNTER FOR MONITORING COUMADIN THERAPY: ICD-10-CM

## 2018-08-02 DIAGNOSIS — Z79.01 ENCOUNTER FOR MONITORING COUMADIN THERAPY: ICD-10-CM

## 2018-08-02 DIAGNOSIS — M00.80 ARTHRITIS DUE TO OTHER BACTERIA, SEPTIC ARTHRITIS OF UNSPECIFIED LOCATION (HCC): ICD-10-CM

## 2018-08-02 LAB
INR PPP: 1.88 (ref 0.9–1.1)
PROTHROMBIN TIME: 21.3 SECONDS (ref 11.7–14.2)

## 2018-08-02 PROCEDURE — 96365 THER/PROPH/DIAG IV INF INIT: CPT

## 2018-08-02 PROCEDURE — 85610 PROTHROMBIN TIME: CPT | Performed by: ORTHOPAEDIC SURGERY

## 2018-08-02 PROCEDURE — G0463 HOSPITAL OUTPT CLINIC VISIT: HCPCS

## 2018-08-02 PROCEDURE — 25010000002 ERTAPENEM PER 500 MG: Performed by: INTERNAL MEDICINE

## 2018-08-02 RX ADMIN — SODIUM CHLORIDE 1 G: 900 INJECTION INTRAVENOUS at 14:33

## 2018-08-02 NOTE — PROGRESS NOTES
SHANI PICC line flushed easily with good blood return prior to and post infusion.  Pt tolerated infusion well.  PICC line drsg change performed using sterile technique.  Labs drawn per order and sent to lab. Pt dc'ed ambulatory using walker with .

## 2018-08-02 NOTE — TELEPHONE ENCOUNTER
Protime is 21.3, INR is 1.8.  Will increase her Coumadin to her preop dose of alternating 5mg with 7.5mg qod.  Will recheck her proitme again on Monday, per RBB. AMB

## 2018-08-03 ENCOUNTER — HOSPITAL ENCOUNTER (OUTPATIENT)
Dept: INFUSION THERAPY | Facility: HOSPITAL | Age: 73
Discharge: HOME | End: 2018-08-03
Admitting: INTERNAL MEDICINE

## 2018-08-03 VITALS
SYSTOLIC BLOOD PRESSURE: 122 MMHG | OXYGEN SATURATION: 99 % | HEART RATE: 106 BPM | TEMPERATURE: 98.1 F | RESPIRATION RATE: 20 BRPM | DIASTOLIC BLOOD PRESSURE: 58 MMHG

## 2018-08-03 DIAGNOSIS — M00.80 ARTHRITIS DUE TO OTHER BACTERIA, SEPTIC ARTHRITIS OF UNSPECIFIED LOCATION (HCC): ICD-10-CM

## 2018-08-03 PROCEDURE — 96365 THER/PROPH/DIAG IV INF INIT: CPT

## 2018-08-03 PROCEDURE — 25010000002 ERTAPENEM PER 500 MG: Performed by: INTERNAL MEDICINE

## 2018-08-03 PROCEDURE — 96367 TX/PROPH/DG ADDL SEQ IV INF: CPT

## 2018-08-03 RX ADMIN — SODIUM CHLORIDE 1 G: 900 INJECTION INTRAVENOUS at 14:22

## 2018-08-03 NOTE — PROGRESS NOTES
Pt Right arm PICC flushed with 20cc before and after use. Site clear. Tolerating infusion well. D/C with walker at 1500.

## 2018-08-04 ENCOUNTER — HOSPITAL ENCOUNTER (OUTPATIENT)
Dept: INFUSION THERAPY | Facility: HOSPITAL | Age: 73
Discharge: HOME | End: 2018-08-04
Admitting: INTERNAL MEDICINE

## 2018-08-04 VITALS
SYSTOLIC BLOOD PRESSURE: 140 MMHG | TEMPERATURE: 97.4 F | HEART RATE: 104 BPM | RESPIRATION RATE: 20 BRPM | OXYGEN SATURATION: 98 % | DIASTOLIC BLOOD PRESSURE: 60 MMHG

## 2018-08-04 DIAGNOSIS — M00.80 ARTHRITIS DUE TO OTHER BACTERIA, SEPTIC ARTHRITIS OF UNSPECIFIED LOCATION (HCC): ICD-10-CM

## 2018-08-04 PROCEDURE — 25010000002 ERTAPENEM PER 500 MG: Performed by: INTERNAL MEDICINE

## 2018-08-04 PROCEDURE — 96365 THER/PROPH/DIAG IV INF INIT: CPT

## 2018-08-04 RX ADMIN — SODIUM CHLORIDE 1 G: 900 INJECTION INTRAVENOUS at 10:15

## 2018-08-04 NOTE — PROGRESS NOTES
PICC line flushes easily with blood return noted. Pt tolerating antibiotic well. D/C ambulatory with walker and daughter @1059

## 2018-08-05 ENCOUNTER — HOSPITAL ENCOUNTER (OUTPATIENT)
Dept: INFUSION THERAPY | Facility: HOSPITAL | Age: 73
Discharge: HOME | End: 2018-08-05
Admitting: INTERNAL MEDICINE

## 2018-08-05 VITALS
SYSTOLIC BLOOD PRESSURE: 129 MMHG | OXYGEN SATURATION: 99 % | HEART RATE: 91 BPM | DIASTOLIC BLOOD PRESSURE: 74 MMHG | RESPIRATION RATE: 20 BRPM | TEMPERATURE: 95.2 F

## 2018-08-05 DIAGNOSIS — M00.80 ARTHRITIS DUE TO OTHER BACTERIA, SEPTIC ARTHRITIS OF UNSPECIFIED LOCATION (HCC): ICD-10-CM

## 2018-08-05 PROCEDURE — 96365 THER/PROPH/DIAG IV INF INIT: CPT

## 2018-08-05 PROCEDURE — 25010000002 ERTAPENEM PER 500 MG: Performed by: INTERNAL MEDICINE

## 2018-08-05 RX ADMIN — SODIUM CHLORIDE 1 G: 900 INJECTION INTRAVENOUS at 10:48

## 2018-08-05 NOTE — PROGRESS NOTES
PICC line flushed easily in right arm with positive blood return. Tolerated infusion well. D/C with walker and daughter @2651

## 2018-08-06 ENCOUNTER — TELEPHONE (OUTPATIENT)
Dept: ORTHOPEDIC SURGERY | Facility: CLINIC | Age: 73
End: 2018-08-06

## 2018-08-06 ENCOUNTER — HOSPITAL ENCOUNTER (OUTPATIENT)
Dept: INFUSION THERAPY | Facility: HOSPITAL | Age: 73
Discharge: HOME | End: 2018-08-06
Admitting: INTERNAL MEDICINE

## 2018-08-06 VITALS
TEMPERATURE: 96.4 F | HEART RATE: 97 BPM | SYSTOLIC BLOOD PRESSURE: 133 MMHG | RESPIRATION RATE: 16 BRPM | OXYGEN SATURATION: 100 % | DIASTOLIC BLOOD PRESSURE: 60 MMHG

## 2018-08-06 DIAGNOSIS — Z79.01 ENCOUNTER FOR MONITORING COUMADIN THERAPY: ICD-10-CM

## 2018-08-06 DIAGNOSIS — M86.9 BONE INFECTION (HCC): Primary | ICD-10-CM

## 2018-08-06 DIAGNOSIS — Z51.81 ENCOUNTER FOR MONITORING COUMADIN THERAPY: ICD-10-CM

## 2018-08-06 DIAGNOSIS — M00.852 ARTHRITIS OF LEFT HIP DUE TO OTHER BACTERIA (HCC): ICD-10-CM

## 2018-08-06 DIAGNOSIS — M00.80 ARTHRITIS DUE TO OTHER BACTERIA, SEPTIC ARTHRITIS OF UNSPECIFIED LOCATION (HCC): ICD-10-CM

## 2018-08-06 LAB
ALBUMIN SERPL-MCNC: 2.7 G/DL (ref 3.5–5.2)
ALBUMIN/GLOB SERPL: 0.8 G/DL
ALP SERPL-CCNC: 110 U/L (ref 39–117)
ALT SERPL W P-5'-P-CCNC: 6 U/L (ref 1–33)
ANION GAP SERPL CALCULATED.3IONS-SCNC: 12.3 MMOL/L
AST SERPL-CCNC: 8 U/L (ref 1–32)
BASOPHILS # BLD AUTO: 0.05 10*3/MM3 (ref 0–0.2)
BASOPHILS NFR BLD AUTO: 0.4 % (ref 0–1.5)
BILIRUB SERPL-MCNC: <0.2 MG/DL (ref 0.1–1.2)
BUN BLD-MCNC: 9 MG/DL (ref 8–23)
BUN/CREAT SERPL: 17 (ref 7–25)
CALCIUM SPEC-SCNC: 7.2 MG/DL (ref 8.6–10.5)
CHLORIDE SERPL-SCNC: 112 MMOL/L (ref 98–107)
CO2 SERPL-SCNC: 20.7 MMOL/L (ref 22–29)
CREAT BLD-MCNC: 0.53 MG/DL (ref 0.57–1)
DEPRECATED RDW RBC AUTO: 51.9 FL (ref 37–54)
EOSINOPHIL # BLD AUTO: 0.34 10*3/MM3 (ref 0–0.7)
EOSINOPHIL NFR BLD AUTO: 2.9 % (ref 0.3–6.2)
ERYTHROCYTE [DISTWIDTH] IN BLOOD BY AUTOMATED COUNT: 15.3 % (ref 11.7–13)
GFR SERPL CREATININE-BSD FRML MDRD: 114 ML/MIN/1.73
GLOBULIN UR ELPH-MCNC: 3.2 GM/DL
GLUCOSE BLD-MCNC: 125 MG/DL (ref 65–99)
HCT VFR BLD AUTO: 28.9 % (ref 35.6–45.5)
HGB BLD-MCNC: 8.8 G/DL (ref 11.9–15.5)
IMM GRANULOCYTES # BLD: 0.07 10*3/MM3 (ref 0–0.03)
IMM GRANULOCYTES NFR BLD: 0.6 % (ref 0–0.5)
INR PPP: 3.73 (ref 0.9–1.1)
LYMPHOCYTES # BLD AUTO: 2.03 10*3/MM3 (ref 0.9–4.8)
LYMPHOCYTES NFR BLD AUTO: 17 % (ref 19.6–45.3)
MCH RBC QN AUTO: 28.3 PG (ref 26.9–32)
MCHC RBC AUTO-ENTMCNC: 30.4 G/DL (ref 32.4–36.3)
MCV RBC AUTO: 92.9 FL (ref 80.5–98.2)
MONOCYTES # BLD AUTO: 0.88 10*3/MM3 (ref 0.2–1.2)
MONOCYTES NFR BLD AUTO: 7.4 % (ref 5–12)
NEUTROPHILS # BLD AUTO: 8.61 10*3/MM3 (ref 1.9–8.1)
NEUTROPHILS NFR BLD AUTO: 72.3 % (ref 42.7–76)
PLATELET # BLD AUTO: 374 10*3/MM3 (ref 140–500)
PMV BLD AUTO: 11.9 FL (ref 6–12)
POTASSIUM BLD-SCNC: 4.2 MMOL/L (ref 3.5–5.2)
PROT SERPL-MCNC: 5.9 G/DL (ref 6–8.5)
PROTHROMBIN TIME: 36.4 SECONDS (ref 11.7–14.2)
RBC # BLD AUTO: 3.11 10*6/MM3 (ref 3.9–5.2)
SODIUM BLD-SCNC: 145 MMOL/L (ref 136–145)
WBC NRBC COR # BLD: 11.91 10*3/MM3 (ref 4.5–10.7)

## 2018-08-06 PROCEDURE — 85610 PROTHROMBIN TIME: CPT | Performed by: ORTHOPAEDIC SURGERY

## 2018-08-06 PROCEDURE — 80053 COMPREHEN METABOLIC PANEL: CPT | Performed by: INTERNAL MEDICINE

## 2018-08-06 PROCEDURE — 85025 COMPLETE CBC W/AUTO DIFF WBC: CPT | Performed by: INTERNAL MEDICINE

## 2018-08-06 PROCEDURE — 36592 COLLECT BLOOD FROM PICC: CPT

## 2018-08-06 PROCEDURE — 96365 THER/PROPH/DIAG IV INF INIT: CPT

## 2018-08-06 PROCEDURE — 25010000002 ERTAPENEM PER 500 MG: Performed by: INTERNAL MEDICINE

## 2018-08-06 RX ADMIN — SODIUM CHLORIDE 1 G: 900 INJECTION INTRAVENOUS at 13:47

## 2018-08-06 NOTE — TELEPHONE ENCOUNTER
ProTime today is 36.4 INR is 3.7.  I've spoken with the patient's  and ask him to hold her Coumadin tonight and then will discuss with RBB tomorrow as to what dose to resume

## 2018-08-06 NOTE — TELEPHONE ENCOUNTER
"RBB patient had L PANKAJ 7/21/18. Patient's  Ramone stated patient has had \"profuse drainage\" just since this morning.  Clear, slightly pinkish fluid. Not much of open wound, \"hard to tell where it's coming out... In between the staples\". No fever, not red nor warm to touch. L Hip has been swollen x 1-2wks. Please advise.     Ramone aware RBB in OR today 8/06/18. Patient has PO scheduled 8/09/18. Thanks / srh  "

## 2018-08-06 NOTE — TELEPHONE ENCOUNTER
Have called spoken with patient's .  This morning when she was getting up to get ready she had a moderate to severe amount of drainage from her hip incision.  He describes the drainage as pinkish in color and not bloody.  She does have swelling to both lower extremities but her  states that the swelling is no more than it has been over the last few days.  She has remained afebrile there is no erythema and it is not warm to touch.  Patient is not having any increased pain.  He did apply a pressure dressing and finally gotten the area to stop draining.  Her INR from today is pending.  Made her an appointment to see Dr. elena tomorrow for wound check

## 2018-08-07 ENCOUNTER — OFFICE VISIT (OUTPATIENT)
Dept: ORTHOPEDIC SURGERY | Facility: CLINIC | Age: 73
End: 2018-08-07

## 2018-08-07 ENCOUNTER — HOSPITAL ENCOUNTER (OUTPATIENT)
Dept: INFUSION THERAPY | Facility: HOSPITAL | Age: 73
Discharge: HOME | End: 2018-08-07
Admitting: INTERNAL MEDICINE

## 2018-08-07 VITALS — BODY MASS INDEX: 25.4 KG/M2 | TEMPERATURE: 98 F | HEIGHT: 59 IN | WEIGHT: 126 LBS

## 2018-08-07 VITALS
OXYGEN SATURATION: 98 % | SYSTOLIC BLOOD PRESSURE: 137 MMHG | RESPIRATION RATE: 16 BRPM | HEART RATE: 121 BPM | DIASTOLIC BLOOD PRESSURE: 69 MMHG | TEMPERATURE: 97.5 F

## 2018-08-07 DIAGNOSIS — M00.80 ARTHRITIS DUE TO OTHER BACTERIA, SEPTIC ARTHRITIS OF UNSPECIFIED LOCATION (HCC): ICD-10-CM

## 2018-08-07 DIAGNOSIS — M25.552 PAIN OF LEFT HIP JOINT: Primary | ICD-10-CM

## 2018-08-07 PROCEDURE — 96365 THER/PROPH/DIAG IV INF INIT: CPT

## 2018-08-07 PROCEDURE — 99024 POSTOP FOLLOW-UP VISIT: CPT | Performed by: ORTHOPAEDIC SURGERY

## 2018-08-07 PROCEDURE — 25010000002 ERTAPENEM PER 500 MG: Performed by: INTERNAL MEDICINE

## 2018-08-07 RX ORDER — CEFAZOLIN SODIUM 2 G/100ML
2 INJECTION, SOLUTION INTRAVENOUS ONCE
Status: CANCELLED | OUTPATIENT
Start: 2018-08-11 | End: 2018-08-11

## 2018-08-07 RX ADMIN — SODIUM CHLORIDE 1 G: 900 INJECTION INTRAVENOUS at 13:59

## 2018-08-07 NOTE — PROGRESS NOTES
Ms. Moser returns today for follow-up of her second irrigation and debridement and bead placement.  Unfortunately she had an episode of a significant amount of drainage yesterday.  It has been draining a moderate amount over the past week.  She now has increased redness around the incision and she is having increased pain.  She has been on Invanz and has tobramycin antibiotic beads in place.  She is walking with an antalgic gait and there is marketed erythema surrounding the incision although no obvious fluctuance or drainage that I can express.  She states she has been afebrile but is felt cold all along for months.  My concern is that she has recurrent infection despite multiple irrigation and debridements and antibiotic bead placement and appropriate IV antibiotics.  Unfortunately I think the next step is removal of the implants and explained to both the patient and  that this involves significant risk.  Removal of the implants be quite difficult and we could run into fracture of the femur or pelvis neurovascular injury as well as general risks of anesthesia including blood clot heart attack pulmonary embolism stroke and death.  I also explained we would have to perform an osteotomy of the femur with sectioning of the stem and removal.  There is significant bone loss on the acetabular side and reconstruction will become extraordinarily difficult.  I would plan on using a temporary and buttock spacer but given the severe abductor deficiency and history of dislocation as well as bone loss there would be risk of dislocation of the spacer.  I explained to her that this may not be reconstructable.  We'll take this one step at a time.  She and her  understand the severe risks associated with the proposed treatment but in my opinion the risk worsens as time progresses and we will therefore plan on removal of the implants on Saturday with placement of spacer.

## 2018-08-08 ENCOUNTER — HOSPITAL ENCOUNTER (OUTPATIENT)
Dept: INFUSION THERAPY | Facility: HOSPITAL | Age: 73
Discharge: HOME | End: 2018-08-08
Admitting: INTERNAL MEDICINE

## 2018-08-08 VITALS
TEMPERATURE: 97.7 F | DIASTOLIC BLOOD PRESSURE: 65 MMHG | RESPIRATION RATE: 16 BRPM | SYSTOLIC BLOOD PRESSURE: 122 MMHG | HEART RATE: 95 BPM | OXYGEN SATURATION: 100 %

## 2018-08-08 DIAGNOSIS — M00.80 ARTHRITIS DUE TO OTHER BACTERIA, SEPTIC ARTHRITIS OF UNSPECIFIED LOCATION (HCC): ICD-10-CM

## 2018-08-08 PROBLEM — M25.552 PAIN OF LEFT HIP JOINT: Status: ACTIVE | Noted: 2018-08-08

## 2018-08-08 PROCEDURE — 96365 THER/PROPH/DIAG IV INF INIT: CPT

## 2018-08-08 PROCEDURE — 25010000002 ERTAPENEM PER 500 MG: Performed by: INTERNAL MEDICINE

## 2018-08-08 RX ADMIN — SODIUM CHLORIDE 1 G: 900 INJECTION INTRAVENOUS at 14:59

## 2018-08-09 ENCOUNTER — HOSPITAL ENCOUNTER (OUTPATIENT)
Dept: INFUSION THERAPY | Facility: HOSPITAL | Age: 73
Discharge: HOME | End: 2018-08-09
Admitting: INTERNAL MEDICINE

## 2018-08-09 VITALS
RESPIRATION RATE: 16 BRPM | HEART RATE: 109 BPM | OXYGEN SATURATION: 96 % | DIASTOLIC BLOOD PRESSURE: 66 MMHG | TEMPERATURE: 95.7 F | SYSTOLIC BLOOD PRESSURE: 117 MMHG

## 2018-08-09 DIAGNOSIS — Z51.81 ENCOUNTER FOR MONITORING COUMADIN THERAPY: ICD-10-CM

## 2018-08-09 DIAGNOSIS — Z79.01 ENCOUNTER FOR MONITORING COUMADIN THERAPY: ICD-10-CM

## 2018-08-09 DIAGNOSIS — M00.80 ARTHRITIS DUE TO OTHER BACTERIA, SEPTIC ARTHRITIS OF UNSPECIFIED LOCATION (HCC): ICD-10-CM

## 2018-08-09 PROCEDURE — 96365 THER/PROPH/DIAG IV INF INIT: CPT

## 2018-08-09 PROCEDURE — G0463 HOSPITAL OUTPT CLINIC VISIT: HCPCS

## 2018-08-09 PROCEDURE — 36592 COLLECT BLOOD FROM PICC: CPT

## 2018-08-09 PROCEDURE — 25010000002 ERTAPENEM 1 GM/100ML SOLUTION: Performed by: INTERNAL MEDICINE

## 2018-08-09 RX ADMIN — ERTAPENEM SODIUM 1 G: 1 INJECTION, POWDER, LYOPHILIZED, FOR SOLUTION INTRAMUSCULAR; INTRAVENOUS at 15:13

## 2018-08-09 NOTE — PROGRESS NOTES
Patient tolerated infusion and dressing change to [facility] without complaints. Patient ambulatory with walker, D/C'd from ACU via walker with  at 1604. Patient refused lab work this afternoon as she is returning in am for Pre-admissions testing and blood draw.

## 2018-08-10 ENCOUNTER — APPOINTMENT (OUTPATIENT)
Dept: PREADMISSION TESTING | Facility: HOSPITAL | Age: 73
End: 2018-08-10

## 2018-08-10 ENCOUNTER — HOSPITAL ENCOUNTER (OUTPATIENT)
Dept: INFUSION THERAPY | Facility: HOSPITAL | Age: 73
Discharge: HOME | End: 2018-08-10
Admitting: INTERNAL MEDICINE

## 2018-08-10 ENCOUNTER — PREP FOR SURGERY (OUTPATIENT)
Dept: OTHER | Facility: HOSPITAL | Age: 73
End: 2018-08-10

## 2018-08-10 VITALS
DIASTOLIC BLOOD PRESSURE: 76 MMHG | HEART RATE: 102 BPM | SYSTOLIC BLOOD PRESSURE: 135 MMHG | TEMPERATURE: 97.6 F | RESPIRATION RATE: 20 BRPM | OXYGEN SATURATION: 99 %

## 2018-08-10 VITALS
BODY MASS INDEX: 25.2 KG/M2 | OXYGEN SATURATION: 99 % | HEART RATE: 102 BPM | RESPIRATION RATE: 20 BRPM | DIASTOLIC BLOOD PRESSURE: 76 MMHG | WEIGHT: 125 LBS | HEIGHT: 59 IN | TEMPERATURE: 97.6 F | SYSTOLIC BLOOD PRESSURE: 135 MMHG

## 2018-08-10 DIAGNOSIS — D62 POSTOPERATIVE ANEMIA DUE TO ACUTE BLOOD LOSS: Primary | ICD-10-CM

## 2018-08-10 DIAGNOSIS — Z96.649 S/P REVISION OF TOTAL HIP: ICD-10-CM

## 2018-08-10 DIAGNOSIS — M25.552 PAIN OF LEFT HIP JOINT: ICD-10-CM

## 2018-08-10 DIAGNOSIS — M00.80 ARTHRITIS DUE TO OTHER BACTERIA, SEPTIC ARTHRITIS OF UNSPECIFIED LOCATION (HCC): ICD-10-CM

## 2018-08-10 LAB
BILIRUB UR QL STRIP: NEGATIVE
CLARITY UR: CLEAR
COLOR UR: YELLOW
GLUCOSE UR STRIP-MCNC: NEGATIVE MG/DL
HGB UR QL STRIP.AUTO: NEGATIVE
KETONES UR QL STRIP: NEGATIVE
LEUKOCYTE ESTERASE UR QL STRIP.AUTO: NEGATIVE
NITRITE UR QL STRIP: NEGATIVE
PH UR STRIP.AUTO: 6 [PH] (ref 5–8)
PROT UR QL STRIP: NEGATIVE
SP GR UR STRIP: 1.01 (ref 1–1.03)
UROBILINOGEN UR QL STRIP: NORMAL

## 2018-08-10 PROCEDURE — A9270 NON-COVERED ITEM OR SERVICE: HCPCS | Performed by: ORTHOPAEDIC SURGERY

## 2018-08-10 PROCEDURE — 25010000002 ERTAPENEM PER 500 MG: Performed by: INTERNAL MEDICINE

## 2018-08-10 PROCEDURE — 63710000001 MUPIROCIN 2 % OINTMENT: Performed by: ORTHOPAEDIC SURGERY

## 2018-08-10 PROCEDURE — 81003 URINALYSIS AUTO W/O SCOPE: CPT | Performed by: ORTHOPAEDIC SURGERY

## 2018-08-10 PROCEDURE — 96365 THER/PROPH/DIAG IV INF INIT: CPT

## 2018-08-10 RX ORDER — POLYETHYLENE GLYCOL 3350 17 G/17G
17 POWDER, FOR SOLUTION ORAL AS NEEDED
Status: ON HOLD | COMMUNITY
End: 2019-02-12 | Stop reason: SDUPTHER

## 2018-08-10 RX ADMIN — SODIUM CHLORIDE 1 G: 900 INJECTION INTRAVENOUS at 08:17

## 2018-08-10 NOTE — DISCHARGE INSTRUCTIONS
Take the following medications the morning of surgery with a small sip of water:  Metoprolol  If needed        General Instructions:  • Do not eat solid food after midnight the night before surgery.  • You may drink clear liquids day of surgery but must stop at least one hour before your hospital arrival time.  • It is beneficial for you to have a clear drink that contains carbohydrates the day of surgery.  We suggest a 12 to 20 ounce bottle of Gatorade or Powerade for non-diabetic patients or a 12 to 20 ounce bottle of G2 or Powerade Zero for diabetic patients. (Pediatric patients, are not advised to drink a 12 to 20 ounce carbohydrate drink)    Clear liquids are liquids you can see through.  Nothing red in color.     Plain water                               Sports drinks  Sodas                                   Gelatin (Jell-O)  Fruit juices without pulp such as white grape juice and apple juice  Popsicles that contain no fruit or yogurt  Tea or coffee (no cream or milk added)  Gatorade / Powerade  G2 / Powerade Zero    • Infants may have breast milk up to four hours before surgery.  • Infants drinking formula may drink formula up to six hours before surgery.   • Patients who avoid smoking, chewing tobacco and alcohol for 4 weeks prior to surgery have a reduced risk of post-operative complications.  Quit smoking as many days before surgery as you can.  • Do not smoke, use chewing tobacco or drink alcohol the day of surgery.   • If applicable bring your C-PAP/ BI-PAP machine.  • Bring any papers given to you in the doctor’s office.  • Wear clean comfortable clothes and socks.  • Do not wear contact lenses or make-up.  Bring a case for your glasses.   • Bring crutches or walker if applicable.  • Remove all piercings.  Leave jewelry and any other valuables at home.  • Hair extensions with metal clips must be removed prior to surgery.  • The Pre-Admission Testing nurse will instruct you to bring medications if unable  to obtain an accurate list in Pre-Admission Testing.        If you were given a blood bank ID arm band remember to bring it with you the day of surgery.    Preventing a Surgical Site Infection:  • For 2 to 3 days before surgery, avoid shaving with a razor because the razor can irritate skin and make it easier to develop an infection.    • Any areas of open skin can increase the risk of a post-operative wound infection by allowing bacteria to enter and travel throughout the body.  Notify your surgeon if you have any skin wounds / rashes even if it is not near the expected surgical site.  The area will need assessed to determine if surgery should be delayed until it is healed.  • The night prior to surgery sleep in a clean bed with clean clothing.  Do not allow pets to sleep with you.  • Shower on the morning of surgery using a fresh bar of anti-bacterial soap (such as Dial) and clean washcloth.  Dry with a clean towel and dress in clean clothing.  • Ask your surgeon if you will be receiving antibiotics prior to surgery.  • Make sure you, your family, and all healthcare providers clean their hands with soap and water or an alcohol based hand  before caring for you or your wound.    Day of surgery:  Upon arrival, a Pre-op nurse and Anesthesiologist will review your health history, obtain vital signs, and answer questions you may have.  The only belongings needed at this time will be your home medications and if applicable your C-PAP/BI-PAP machine.  If you are staying overnight your family can leave the rest of your belongings in the car and bring them to your room later.  A Pre-op nurse will start an IV and you may receive medication in preparation for surgery, including something to help you relax.  Your family will be able to see you in the Pre-op area.  While you are in surgery your family should notify the waiting room  if they leave the waiting room area and provide a contact phone  number.    Please be aware that surgery does come with discomfort.  We want to make every effort to control your discomfort so please discuss any uncontrolled symptoms with your nurse.   Your doctor will most likely have prescribed pain medications.      If you are going home after surgery you will receive individualized written care instructions before being discharged.  A responsible adult must drive you to and from the hospital on the day of your surgery and stay with you for 24 hours.    If you are staying overnight following surgery, you will be transported to your hospital room following the recovery period.  [facility] has all private rooms.    You have received a list of surgical assistants for your reference.  If you have any questions please call Pre-Admission Testing at [phone].  Deductibles and co-payments are collected on the day of service. Please be prepared to pay the required co-pay, deductible or deposit on the day of service as defined by your plan.    2% CHLORAHEXIDINE GLUCONATE* CLOTH  Preparing or “prepping” skin before surgery can reduce the risk of infection at the surgical site. To make the process easier, [facility] has chosen disposable cloths moistened with a rinse-free, 2% Chlorhexidine Gluconate (CHG) antiseptic solution. The steps below outline the prepping process and should be carefully followed.        Use the prep cloth on the area that is circled in the diagram             Directions Night before Surgery  1) Shower using a fresh bar of anti-bacterial soap (such as Dial) and clean washcloth.  Use a clean towel to completely dry your skin.  2) Do not use any lotions, oils or creams on your skin.  3) Open the package and remove 1 cloth, wipe your skin for 30 seconds in a circular motion.  Allow to dry for 3 minutes.  4) Repeat #3 with second cloth.  5) Do not touch your eyes, ears, or mouth with the prep cloth.  6) Allow the wet prep solution to air  dry.  7) Discard the prep cloth and wash your hands with soap and water.   8) Dress in clean bed clothes and sleep on fresh clean bed sheets.   9) You may experience some temporary itching after the prep.    Directions Day of Surgery  1) Repeat steps 1,2,3,4,5,6,7, and 9.   2) Dress in clean clothes before coming to the hospital.    BACTROBAN NASAL OINTMENT  There are many germs normally in your nose. Bactroban is an ointment that will help reduce these germs. Please follow these instructions for Bactroban use:      ____The day before surgery in the morning  Date________    ____The day before surgery in the evening              Date________    ____The day of surgery in the morning    Date________    **Squirt ½ package of Bactroban Ointment onto a cotton applicator and apply to inside of 1st nostril.  Squirt the remaining Bactroban and apply to the inside of the other nostril.    PERIDEX- ORAL:  Use only if your surgeon has ordered  Use the night before and morning of surgery - Swish, gargle, and spit - do not swallow.

## 2018-08-11 ENCOUNTER — HOSPITAL ENCOUNTER (INPATIENT)
Facility: HOSPITAL | Age: 73
LOS: 5 days | Discharge: HOME HEALTH | End: 2018-08-16
Attending: ORTHOPAEDIC SURGERY | Admitting: ORTHOPAEDIC SURGERY

## 2018-08-11 ENCOUNTER — APPOINTMENT (OUTPATIENT)
Dept: INFUSION THERAPY | Facility: HOSPITAL | Age: 73
End: 2018-08-11

## 2018-08-11 ENCOUNTER — APPOINTMENT (OUTPATIENT)
Dept: GENERAL RADIOLOGY | Facility: HOSPITAL | Age: 73
End: 2018-08-11

## 2018-08-11 ENCOUNTER — ANESTHESIA EVENT (OUTPATIENT)
Dept: PERIOP | Facility: HOSPITAL | Age: 73
End: 2018-08-11

## 2018-08-11 ENCOUNTER — ANESTHESIA (OUTPATIENT)
Dept: PERIOP | Facility: HOSPITAL | Age: 73
End: 2018-08-11

## 2018-08-11 DIAGNOSIS — D62 POSTOPERATIVE ANEMIA DUE TO ACUTE BLOOD LOSS: ICD-10-CM

## 2018-08-11 DIAGNOSIS — M25.552 PAIN OF LEFT HIP JOINT: ICD-10-CM

## 2018-08-11 LAB
ABO GROUP BLD: NORMAL
BLD GP AB SCN SERPL QL: NEGATIVE
DEPRECATED RDW RBC AUTO: 50 FL (ref 37–54)
ERYTHROCYTE [DISTWIDTH] IN BLOOD BY AUTOMATED COUNT: 14.8 % (ref 11.7–13)
HCT VFR BLD AUTO: 31.8 % (ref 35.6–45.5)
HGB BLD-MCNC: 9.3 G/DL (ref 11.9–15.5)
INR PPP: 1.21 (ref 0.9–1.1)
INR PPP: 1.28 (ref 0.9–1.1)
MCH RBC QN AUTO: 27.3 PG (ref 26.9–32)
MCHC RBC AUTO-ENTMCNC: 29.2 G/DL (ref 32.4–36.3)
MCV RBC AUTO: 93.3 FL (ref 80.5–98.2)
PLATELET # BLD AUTO: 429 10*3/MM3 (ref 140–500)
PMV BLD AUTO: 11.6 FL (ref 6–12)
PROTHROMBIN TIME: 15.1 SECONDS (ref 11.7–14.2)
PROTHROMBIN TIME: 15.8 SECONDS (ref 11.7–14.2)
RBC # BLD AUTO: 3.41 10*6/MM3 (ref 3.9–5.2)
RH BLD: POSITIVE
T&S EXPIRATION DATE: NORMAL
WBC NRBC COR # BLD: 10.5 10*3/MM3 (ref 4.5–10.7)

## 2018-08-11 PROCEDURE — 25010000002 VANCOMYCIN 750 MG RECONSTITUTED SOLUTION: Performed by: ORTHOPAEDIC SURGERY

## 2018-08-11 PROCEDURE — 25010000002 PHENYLEPHRINE PER 1 ML: Performed by: NURSE ANESTHETIST, CERTIFIED REGISTERED

## 2018-08-11 PROCEDURE — 25010000002 DEXAMETHASONE PER 1 MG: Performed by: NURSE ANESTHETIST, CERTIFIED REGISTERED

## 2018-08-11 PROCEDURE — 86900 BLOOD TYPING SEROLOGIC ABO: CPT | Performed by: ORTHOPAEDIC SURGERY

## 2018-08-11 PROCEDURE — P9016 RBC LEUKOCYTES REDUCED: HCPCS

## 2018-08-11 PROCEDURE — C1776 JOINT DEVICE (IMPLANTABLE): HCPCS | Performed by: ORTHOPAEDIC SURGERY

## 2018-08-11 PROCEDURE — 25010000003 CEFAZOLIN IN DEXTROSE 2-4 GM/100ML-% SOLUTION: Performed by: ORTHOPAEDIC SURGERY

## 2018-08-11 PROCEDURE — 86923 COMPATIBILITY TEST ELECTRIC: CPT

## 2018-08-11 PROCEDURE — 86850 RBC ANTIBODY SCREEN: CPT | Performed by: ORTHOPAEDIC SURGERY

## 2018-08-11 PROCEDURE — 25010000002 MIDAZOLAM PER 1 MG: Performed by: ANESTHESIOLOGY

## 2018-08-11 PROCEDURE — C1713 ANCHOR/SCREW BN/BN,TIS/BN: HCPCS | Performed by: ORTHOPAEDIC SURGERY

## 2018-08-11 PROCEDURE — 25010000002 HYDROMORPHONE PER 4 MG: Performed by: NURSE ANESTHETIST, CERTIFIED REGISTERED

## 2018-08-11 PROCEDURE — 87070 CULTURE OTHR SPECIMN AEROBIC: CPT | Performed by: ORTHOPAEDIC SURGERY

## 2018-08-11 PROCEDURE — 86900 BLOOD TYPING SEROLOGIC ABO: CPT

## 2018-08-11 PROCEDURE — 87075 CULTR BACTERIA EXCEPT BLOOD: CPT | Performed by: ORTHOPAEDIC SURGERY

## 2018-08-11 PROCEDURE — 25010000002 FENTANYL CITRATE (PF) 100 MCG/2ML SOLUTION: Performed by: NURSE ANESTHETIST, CERTIFIED REGISTERED

## 2018-08-11 PROCEDURE — 0SHB08Z INSERTION OF SPACER INTO LEFT HIP JOINT, OPEN APPROACH: ICD-10-PCS | Performed by: ORTHOPAEDIC SURGERY

## 2018-08-11 PROCEDURE — 25010000002 VANCOMYCIN PER 500 MG: Performed by: ORTHOPAEDIC SURGERY

## 2018-08-11 PROCEDURE — 27091 REMOVAL OF HIP PROSTHESIS: CPT | Performed by: ORTHOPAEDIC SURGERY

## 2018-08-11 PROCEDURE — 36430 TRANSFUSION BLD/BLD COMPNT: CPT

## 2018-08-11 PROCEDURE — 25010000002 ONDANSETRON PER 1 MG: Performed by: NURSE ANESTHETIST, CERTIFIED REGISTERED

## 2018-08-11 PROCEDURE — 0SPB0JZ REMOVAL OF SYNTHETIC SUBSTITUTE FROM LEFT HIP JOINT, OPEN APPROACH: ICD-10-PCS | Performed by: ORTHOPAEDIC SURGERY

## 2018-08-11 PROCEDURE — 25010000002 KETOROLAC TROMETHAMINE PER 15 MG: Performed by: ORTHOPAEDIC SURGERY

## 2018-08-11 PROCEDURE — 86901 BLOOD TYPING SEROLOGIC RH(D): CPT | Performed by: ORTHOPAEDIC SURGERY

## 2018-08-11 PROCEDURE — 85027 COMPLETE CBC AUTOMATED: CPT | Performed by: ORTHOPAEDIC SURGERY

## 2018-08-11 PROCEDURE — 85610 PROTHROMBIN TIME: CPT | Performed by: ORTHOPAEDIC SURGERY

## 2018-08-11 PROCEDURE — 87205 SMEAR GRAM STAIN: CPT | Performed by: ORTHOPAEDIC SURGERY

## 2018-08-11 PROCEDURE — 73501 X-RAY EXAM HIP UNI 1 VIEW: CPT

## 2018-08-11 PROCEDURE — 25010000002 PROPOFOL 10 MG/ML EMULSION: Performed by: NURSE ANESTHETIST, CERTIFIED REGISTERED

## 2018-08-11 PROCEDURE — 25010000002 ERTAPENEM PER 500 MG: Performed by: ORTHOPAEDIC SURGERY

## 2018-08-11 PROCEDURE — 94799 UNLISTED PULMONARY SVC/PX: CPT

## 2018-08-11 DEVICE — CMT BONE SIMPLEX RO FUL DOSE: Type: IMPLANTABLE DEVICE | Site: HIP | Status: FUNCTIONAL

## 2018-08-11 DEVICE — PINNACLE HIP SOLUTIONS ALTRX POLYETHYLENE ACETABULAR LINER NEUTRAL 36MM ID 52MM OD
Type: IMPLANTABLE DEVICE | Site: HIP | Status: FUNCTIONAL
Brand: PINNACLE ALTRX

## 2018-08-11 DEVICE — M-SPEC METAL FEMORAL HEAD 12/14 TAPER DIAMETER 36MM +1.5: Type: IMPLANTABLE DEVICE | Site: HIP | Status: FUNCTIONAL

## 2018-08-11 DEVICE — CABL SLV ST DM SS 2MM: Type: IMPLANTABLE DEVICE | Site: HIP | Status: FUNCTIONAL

## 2018-08-11 DEVICE — IMPLANTABLE DEVICE: Type: IMPLANTABLE DEVICE | Site: HIP | Status: FUNCTIONAL

## 2018-08-11 RX ORDER — EPHEDRINE SULFATE 50 MG/ML
5 INJECTION, SOLUTION INTRAVENOUS ONCE AS NEEDED
Status: DISCONTINUED | OUTPATIENT
Start: 2018-08-11 | End: 2018-08-11 | Stop reason: HOSPADM

## 2018-08-11 RX ORDER — LIDOCAINE HYDROCHLORIDE 10 MG/ML
0.5 INJECTION, SOLUTION EPIDURAL; INFILTRATION; INTRACAUDAL; PERINEURAL ONCE AS NEEDED
Status: DISCONTINUED | OUTPATIENT
Start: 2018-08-11 | End: 2018-08-11 | Stop reason: HOSPADM

## 2018-08-11 RX ORDER — ROCURONIUM BROMIDE 10 MG/ML
INJECTION, SOLUTION INTRAVENOUS AS NEEDED
Status: DISCONTINUED | OUTPATIENT
Start: 2018-08-11 | End: 2018-08-11 | Stop reason: SURG

## 2018-08-11 RX ORDER — SODIUM CHLORIDE 9 MG/ML
INJECTION, SOLUTION INTRAVENOUS CONTINUOUS PRN
Status: DISCONTINUED | OUTPATIENT
Start: 2018-08-11 | End: 2018-08-11 | Stop reason: SURG

## 2018-08-11 RX ORDER — WARFARIN SODIUM 4 MG/1
4 TABLET ORAL
COMMUNITY
End: 2018-08-23 | Stop reason: HOSPADM

## 2018-08-11 RX ORDER — HYDROCODONE BITARTRATE AND ACETAMINOPHEN 7.5; 325 MG/1; MG/1
1 TABLET ORAL ONCE AS NEEDED
Status: DISCONTINUED | OUTPATIENT
Start: 2018-08-11 | End: 2018-08-11 | Stop reason: HOSPADM

## 2018-08-11 RX ORDER — PROMETHAZINE HYDROCHLORIDE 25 MG/1
25 TABLET ORAL ONCE AS NEEDED
Status: DISCONTINUED | OUTPATIENT
Start: 2018-08-11 | End: 2018-08-11 | Stop reason: HOSPADM

## 2018-08-11 RX ORDER — LANOLIN ALCOHOL/MO/W.PET/CERES
800 CREAM (GRAM) TOPICAL EVERY MORNING
Status: DISCONTINUED | OUTPATIENT
Start: 2018-08-12 | End: 2018-08-16 | Stop reason: HOSPADM

## 2018-08-11 RX ORDER — MINERAL OIL
OIL (ML) MISCELLANEOUS
Status: DISPENSED
Start: 2018-08-11 | End: 2018-08-11

## 2018-08-11 RX ORDER — LABETALOL HYDROCHLORIDE 5 MG/ML
5 INJECTION, SOLUTION INTRAVENOUS
Status: DISCONTINUED | OUTPATIENT
Start: 2018-08-11 | End: 2018-08-11 | Stop reason: HOSPADM

## 2018-08-11 RX ORDER — TOBRAMYCIN 1.2 G/30ML
INJECTION, POWDER, LYOPHILIZED, FOR SOLUTION INTRAVENOUS
Status: DISPENSED
Start: 2018-08-11 | End: 2018-08-11

## 2018-08-11 RX ORDER — FLUMAZENIL 0.1 MG/ML
0.2 INJECTION INTRAVENOUS AS NEEDED
Status: DISCONTINUED | OUTPATIENT
Start: 2018-08-11 | End: 2018-08-11 | Stop reason: HOSPADM

## 2018-08-11 RX ORDER — PROMETHAZINE HYDROCHLORIDE 25 MG/1
25 SUPPOSITORY RECTAL ONCE AS NEEDED
Status: DISCONTINUED | OUTPATIENT
Start: 2018-08-11 | End: 2018-08-11 | Stop reason: HOSPADM

## 2018-08-11 RX ORDER — FENTANYL CITRATE 50 UG/ML
50 INJECTION, SOLUTION INTRAMUSCULAR; INTRAVENOUS
Status: DISCONTINUED | OUTPATIENT
Start: 2018-08-11 | End: 2018-08-11 | Stop reason: HOSPADM

## 2018-08-11 RX ORDER — FENTANYL CITRATE 50 UG/ML
INJECTION, SOLUTION INTRAMUSCULAR; INTRAVENOUS
Status: COMPLETED
Start: 2018-08-11 | End: 2018-08-11

## 2018-08-11 RX ORDER — PROPOFOL 10 MG/ML
VIAL (ML) INTRAVENOUS AS NEEDED
Status: DISCONTINUED | OUTPATIENT
Start: 2018-08-11 | End: 2018-08-11 | Stop reason: SURG

## 2018-08-11 RX ORDER — ONDANSETRON 2 MG/ML
4 INJECTION INTRAMUSCULAR; INTRAVENOUS ONCE AS NEEDED
Status: DISCONTINUED | OUTPATIENT
Start: 2018-08-11 | End: 2018-08-11 | Stop reason: HOSPADM

## 2018-08-11 RX ORDER — DEXAMETHASONE SODIUM PHOSPHATE 10 MG/ML
INJECTION INTRAMUSCULAR; INTRAVENOUS AS NEEDED
Status: DISCONTINUED | OUTPATIENT
Start: 2018-08-11 | End: 2018-08-11 | Stop reason: SURG

## 2018-08-11 RX ORDER — SCOLOPAMINE TRANSDERMAL SYSTEM 1 MG/1
1 PATCH, EXTENDED RELEASE TRANSDERMAL
Status: DISCONTINUED | OUTPATIENT
Start: 2018-08-11 | End: 2018-08-11 | Stop reason: HOSPADM

## 2018-08-11 RX ORDER — SODIUM CHLORIDE, SODIUM LACTATE, POTASSIUM CHLORIDE, CALCIUM CHLORIDE 600; 310; 30; 20 MG/100ML; MG/100ML; MG/100ML; MG/100ML
9 INJECTION, SOLUTION INTRAVENOUS CONTINUOUS
Status: DISCONTINUED | OUTPATIENT
Start: 2018-08-11 | End: 2018-08-12 | Stop reason: HOSPADM

## 2018-08-11 RX ORDER — PROMETHAZINE HYDROCHLORIDE 25 MG/ML
12.5 INJECTION, SOLUTION INTRAMUSCULAR; INTRAVENOUS ONCE AS NEEDED
Status: DISCONTINUED | OUTPATIENT
Start: 2018-08-11 | End: 2018-08-11 | Stop reason: HOSPADM

## 2018-08-11 RX ORDER — DOCUSATE SODIUM 100 MG/1
100 CAPSULE, LIQUID FILLED ORAL 2 TIMES DAILY
Status: DISCONTINUED | OUTPATIENT
Start: 2018-08-11 | End: 2018-08-16 | Stop reason: HOSPADM

## 2018-08-11 RX ORDER — UREA 10 %
3 LOTION (ML) TOPICAL NIGHTLY PRN
Status: DISCONTINUED | OUTPATIENT
Start: 2018-08-11 | End: 2018-08-16 | Stop reason: HOSPADM

## 2018-08-11 RX ORDER — FUROSEMIDE 40 MG/1
40 TABLET ORAL EVERY MORNING
Status: DISCONTINUED | OUTPATIENT
Start: 2018-08-12 | End: 2018-08-16 | Stop reason: HOSPADM

## 2018-08-11 RX ORDER — CEFAZOLIN SODIUM 2 G/100ML
2 INJECTION, SOLUTION INTRAVENOUS EVERY 8 HOURS
Status: COMPLETED | OUTPATIENT
Start: 2018-08-11 | End: 2018-08-12

## 2018-08-11 RX ORDER — WARFARIN SODIUM 4 MG/1
4 TABLET ORAL
Status: DISCONTINUED | OUTPATIENT
Start: 2018-08-12 | End: 2018-08-16 | Stop reason: HOSPADM

## 2018-08-11 RX ORDER — ONDANSETRON 2 MG/ML
4 INJECTION INTRAMUSCULAR; INTRAVENOUS EVERY 6 HOURS PRN
Status: DISCONTINUED | OUTPATIENT
Start: 2018-08-11 | End: 2018-08-16 | Stop reason: HOSPADM

## 2018-08-11 RX ORDER — HYDROCODONE BITARTRATE AND ACETAMINOPHEN 7.5; 325 MG/1; MG/1
2 TABLET ORAL EVERY 4 HOURS PRN
Status: DISCONTINUED | OUTPATIENT
Start: 2018-08-11 | End: 2018-08-16 | Stop reason: HOSPADM

## 2018-08-11 RX ORDER — HYDROMORPHONE HYDROCHLORIDE 1 MG/ML
0.5 INJECTION, SOLUTION INTRAMUSCULAR; INTRAVENOUS; SUBCUTANEOUS
Status: DISCONTINUED | OUTPATIENT
Start: 2018-08-11 | End: 2018-08-11 | Stop reason: HOSPADM

## 2018-08-11 RX ORDER — FERROUS SULFATE 325(65) MG
325 TABLET ORAL
Status: DISCONTINUED | OUTPATIENT
Start: 2018-08-12 | End: 2018-08-16 | Stop reason: HOSPADM

## 2018-08-11 RX ORDER — SODIUM CHLORIDE 0.9 % (FLUSH) 0.9 %
1-10 SYRINGE (ML) INJECTION AS NEEDED
Status: DISCONTINUED | OUTPATIENT
Start: 2018-08-11 | End: 2018-08-11 | Stop reason: HOSPADM

## 2018-08-11 RX ORDER — WARFARIN SODIUM 4 MG/1
4 TABLET ORAL
Status: DISCONTINUED | OUTPATIENT
Start: 2018-08-11 | End: 2018-08-11

## 2018-08-11 RX ORDER — KETOROLAC TROMETHAMINE 15 MG/ML
15 INJECTION, SOLUTION INTRAMUSCULAR; INTRAVENOUS EVERY 8 HOURS
Status: DISPENSED | OUTPATIENT
Start: 2018-08-11 | End: 2018-08-13

## 2018-08-11 RX ORDER — LIDOCAINE HYDROCHLORIDE 20 MG/ML
INJECTION, SOLUTION INFILTRATION; PERINEURAL AS NEEDED
Status: DISCONTINUED | OUTPATIENT
Start: 2018-08-11 | End: 2018-08-11 | Stop reason: SURG

## 2018-08-11 RX ORDER — OXYCODONE AND ACETAMINOPHEN 7.5; 325 MG/1; MG/1
1 TABLET ORAL ONCE AS NEEDED
Status: DISCONTINUED | OUTPATIENT
Start: 2018-08-11 | End: 2018-08-11 | Stop reason: HOSPADM

## 2018-08-11 RX ORDER — ONDANSETRON 4 MG/1
4 TABLET, FILM COATED ORAL EVERY 6 HOURS PRN
Status: DISCONTINUED | OUTPATIENT
Start: 2018-08-11 | End: 2018-08-16 | Stop reason: HOSPADM

## 2018-08-11 RX ORDER — MIDAZOLAM HYDROCHLORIDE 1 MG/ML
1 INJECTION INTRAMUSCULAR; INTRAVENOUS
Status: DISCONTINUED | OUTPATIENT
Start: 2018-08-11 | End: 2018-08-11 | Stop reason: HOSPADM

## 2018-08-11 RX ORDER — PANTOPRAZOLE SODIUM 40 MG/1
40 TABLET, DELAYED RELEASE ORAL
Status: DISCONTINUED | OUTPATIENT
Start: 2018-08-12 | End: 2018-08-16 | Stop reason: HOSPADM

## 2018-08-11 RX ORDER — HYDROCODONE BITARTRATE AND ACETAMINOPHEN 7.5; 325 MG/1; MG/1
1 TABLET ORAL EVERY 4 HOURS PRN
Status: DISCONTINUED | OUTPATIENT
Start: 2018-08-11 | End: 2018-08-16 | Stop reason: HOSPADM

## 2018-08-11 RX ORDER — ONDANSETRON 4 MG/1
4 TABLET, ORALLY DISINTEGRATING ORAL EVERY 6 HOURS PRN
Status: DISCONTINUED | OUTPATIENT
Start: 2018-08-11 | End: 2018-08-16 | Stop reason: HOSPADM

## 2018-08-11 RX ORDER — CEFAZOLIN SODIUM 2 G/100ML
2 INJECTION, SOLUTION INTRAVENOUS ONCE
Status: COMPLETED | OUTPATIENT
Start: 2018-08-11 | End: 2018-08-11

## 2018-08-11 RX ORDER — MIDAZOLAM HYDROCHLORIDE 1 MG/ML
2 INJECTION INTRAMUSCULAR; INTRAVENOUS
Status: DISCONTINUED | OUTPATIENT
Start: 2018-08-11 | End: 2018-08-11 | Stop reason: HOSPADM

## 2018-08-11 RX ORDER — PROMETHAZINE HYDROCHLORIDE 25 MG/1
12.5 TABLET ORAL ONCE AS NEEDED
Status: DISCONTINUED | OUTPATIENT
Start: 2018-08-11 | End: 2018-08-11 | Stop reason: HOSPADM

## 2018-08-11 RX ORDER — ONDANSETRON 2 MG/ML
INJECTION INTRAMUSCULAR; INTRAVENOUS AS NEEDED
Status: DISCONTINUED | OUTPATIENT
Start: 2018-08-11 | End: 2018-08-11 | Stop reason: SURG

## 2018-08-11 RX ORDER — HYDROMORPHONE HCL 110MG/55ML
PATIENT CONTROLLED ANALGESIA SYRINGE INTRAVENOUS
Status: COMPLETED
Start: 2018-08-11 | End: 2018-08-11

## 2018-08-11 RX ORDER — NALOXONE HCL 0.4 MG/ML
0.2 VIAL (ML) INJECTION AS NEEDED
Status: DISCONTINUED | OUTPATIENT
Start: 2018-08-11 | End: 2018-08-11 | Stop reason: HOSPADM

## 2018-08-11 RX ORDER — WARFARIN SODIUM 6 MG/1
6 TABLET ORAL
Status: COMPLETED | OUTPATIENT
Start: 2018-08-11 | End: 2018-08-11

## 2018-08-11 RX ORDER — MAGNESIUM HYDROXIDE 1200 MG/15ML
LIQUID ORAL AS NEEDED
Status: DISCONTINUED | OUTPATIENT
Start: 2018-08-11 | End: 2018-08-11 | Stop reason: HOSPADM

## 2018-08-11 RX ORDER — FENTANYL CITRATE 50 UG/ML
INJECTION, SOLUTION INTRAMUSCULAR; INTRAVENOUS AS NEEDED
Status: DISCONTINUED | OUTPATIENT
Start: 2018-08-11 | End: 2018-08-11 | Stop reason: SURG

## 2018-08-11 RX ORDER — ALBUTEROL SULFATE 2.5 MG/3ML
2.5 SOLUTION RESPIRATORY (INHALATION) EVERY 4 HOURS PRN
Status: DISCONTINUED | OUTPATIENT
Start: 2018-08-11 | End: 2018-08-16 | Stop reason: HOSPADM

## 2018-08-11 RX ORDER — DIPHENHYDRAMINE HYDROCHLORIDE 50 MG/ML
12.5 INJECTION INTRAMUSCULAR; INTRAVENOUS
Status: DISCONTINUED | OUTPATIENT
Start: 2018-08-11 | End: 2018-08-11 | Stop reason: HOSPADM

## 2018-08-11 RX ORDER — MINERAL OIL 471.99 G/472ML
OIL TOPICAL AS NEEDED
Status: DISCONTINUED | OUTPATIENT
Start: 2018-08-11 | End: 2018-08-11 | Stop reason: HOSPADM

## 2018-08-11 RX ORDER — FAMOTIDINE 10 MG/ML
20 INJECTION, SOLUTION INTRAVENOUS ONCE
Status: COMPLETED | OUTPATIENT
Start: 2018-08-11 | End: 2018-08-11

## 2018-08-11 RX ORDER — HYDROMORPHONE HCL 110MG/55ML
PATIENT CONTROLLED ANALGESIA SYRINGE INTRAVENOUS AS NEEDED
Status: DISCONTINUED | OUTPATIENT
Start: 2018-08-11 | End: 2018-08-11 | Stop reason: SURG

## 2018-08-11 RX ORDER — ASPIRIN 325 MG
325 TABLET, DELAYED RELEASE (ENTERIC COATED) ORAL EVERY 12 HOURS SCHEDULED
Status: DISCONTINUED | OUTPATIENT
Start: 2018-08-12 | End: 2018-08-16 | Stop reason: HOSPADM

## 2018-08-11 RX ADMIN — EPHEDRINE SULFATE 10 MG: 50 INJECTION INTRAMUSCULAR; INTRAVENOUS; SUBCUTANEOUS at 14:01

## 2018-08-11 RX ADMIN — VANCOMYCIN HYDROCHLORIDE 750 MG: 750 INJECTION, POWDER, LYOPHILIZED, FOR SOLUTION INTRAVENOUS at 07:10

## 2018-08-11 RX ADMIN — DOCUSATE SODIUM 100 MG: 100 CAPSULE, LIQUID FILLED ORAL at 20:46

## 2018-08-11 RX ADMIN — SODIUM CHLORIDE: 9 INJECTION, SOLUTION INTRAVENOUS at 10:17

## 2018-08-11 RX ADMIN — CEFAZOLIN SODIUM 2 G: 2 INJECTION, SOLUTION INTRAVENOUS at 23:32

## 2018-08-11 RX ADMIN — HYDROCODONE BITARTRATE AND ACETAMINOPHEN 1 TABLET: 7.5; 325 TABLET ORAL at 18:28

## 2018-08-11 RX ADMIN — SODIUM CHLORIDE, POTASSIUM CHLORIDE, SODIUM LACTATE AND CALCIUM CHLORIDE 500 ML: 600; 310; 30; 20 INJECTION, SOLUTION INTRAVENOUS at 06:40

## 2018-08-11 RX ADMIN — SODIUM CHLORIDE, POTASSIUM CHLORIDE, SODIUM LACTATE AND CALCIUM CHLORIDE: 600; 310; 30; 20 INJECTION, SOLUTION INTRAVENOUS at 08:24

## 2018-08-11 RX ADMIN — MIDAZOLAM 1 MG: 1 INJECTION INTRAMUSCULAR; INTRAVENOUS at 08:16

## 2018-08-11 RX ADMIN — PHENYLEPHRINE HYDROCHLORIDE 100 MCG: 10 INJECTION INTRAVENOUS at 09:10

## 2018-08-11 RX ADMIN — LIDOCAINE HYDROCHLORIDE 100 MG: 20 INJECTION, SOLUTION INFILTRATION; PERINEURAL at 08:33

## 2018-08-11 RX ADMIN — ONDANSETRON 4 MG: 2 INJECTION INTRAMUSCULAR; INTRAVENOUS at 13:34

## 2018-08-11 RX ADMIN — HYDROMORPHONE HYDROCHLORIDE 0.25 MG: 2 INJECTION INTRAMUSCULAR; INTRAVENOUS; SUBCUTANEOUS at 11:10

## 2018-08-11 RX ADMIN — ROCURONIUM BROMIDE 40 MG: 10 INJECTION INTRAVENOUS at 08:33

## 2018-08-11 RX ADMIN — PHENYLEPHRINE HYDROCHLORIDE 100 MCG: 10 INJECTION INTRAVENOUS at 09:18

## 2018-08-11 RX ADMIN — PROPOFOL 100 MG: 10 INJECTION, EMULSION INTRAVENOUS at 08:33

## 2018-08-11 RX ADMIN — FAMOTIDINE 20 MG: 10 INJECTION, SOLUTION INTRAVENOUS at 07:44

## 2018-08-11 RX ADMIN — PHENYLEPHRINE HYDROCHLORIDE 100 MCG: 10 INJECTION INTRAVENOUS at 12:33

## 2018-08-11 RX ADMIN — KETOROLAC TROMETHAMINE 15 MG: 30 INJECTION, SOLUTION INTRAMUSCULAR at 17:41

## 2018-08-11 RX ADMIN — SUGAMMADEX 200 MG: 100 INJECTION, SOLUTION INTRAVENOUS at 13:34

## 2018-08-11 RX ADMIN — PHENYLEPHRINE HYDROCHLORIDE 100 MCG: 10 INJECTION INTRAVENOUS at 09:29

## 2018-08-11 RX ADMIN — ROCURONIUM BROMIDE 20 MG: 10 INJECTION INTRAVENOUS at 10:49

## 2018-08-11 RX ADMIN — FENTANYL CITRATE 50 MCG: 50 INJECTION INTRAMUSCULAR; INTRAVENOUS at 08:31

## 2018-08-11 RX ADMIN — KETOROLAC TROMETHAMINE 15 MG: 30 INJECTION, SOLUTION INTRAMUSCULAR at 23:33

## 2018-08-11 RX ADMIN — MIDAZOLAM 1 MG: 1 INJECTION INTRAMUSCULAR; INTRAVENOUS at 08:08

## 2018-08-11 RX ADMIN — CEFAZOLIN SODIUM 2 G: 2 INJECTION, SOLUTION INTRAVENOUS at 08:27

## 2018-08-11 RX ADMIN — SCOPOLAMINE 1 PATCH: 1 PATCH, EXTENDED RELEASE TRANSDERMAL at 07:44

## 2018-08-11 RX ADMIN — HYDROMORPHONE HYDROCHLORIDE 0.25 MG: 2 INJECTION INTRAMUSCULAR; INTRAVENOUS; SUBCUTANEOUS at 10:51

## 2018-08-11 RX ADMIN — HYDROMORPHONE HYDROCHLORIDE 0.25 MG: 2 INJECTION INTRAMUSCULAR; INTRAVENOUS; SUBCUTANEOUS at 10:59

## 2018-08-11 RX ADMIN — DEXAMETHASONE SODIUM PHOSPHATE 8 MG: 10 INJECTION INTRAMUSCULAR; INTRAVENOUS at 09:14

## 2018-08-11 RX ADMIN — PHENYLEPHRINE HYDROCHLORIDE 1 MCG/KG/MIN: 10 INJECTION, SOLUTION INTRAMUSCULAR; INTRAVENOUS; SUBCUTANEOUS at 09:43

## 2018-08-11 RX ADMIN — PHENYLEPHRINE HYDROCHLORIDE 100 MCG: 10 INJECTION INTRAVENOUS at 09:44

## 2018-08-11 RX ADMIN — CEFAZOLIN SODIUM 2 G: 2 INJECTION, SOLUTION INTRAVENOUS at 18:27

## 2018-08-11 RX ADMIN — FENTANYL CITRATE 50 MCG: 50 INJECTION INTRAMUSCULAR; INTRAVENOUS at 08:25

## 2018-08-11 RX ADMIN — PHENYLEPHRINE HYDROCHLORIDE 100 MCG: 10 INJECTION INTRAVENOUS at 09:26

## 2018-08-11 RX ADMIN — SODIUM CHLORIDE 1 G: 900 INJECTION INTRAVENOUS at 17:40

## 2018-08-11 RX ADMIN — WARFARIN SODIUM 6 MG: 6 TABLET ORAL at 20:48

## 2018-08-11 RX ADMIN — HYDROMORPHONE HYDROCHLORIDE 0.25 MG: 2 INJECTION INTRAMUSCULAR; INTRAVENOUS; SUBCUTANEOUS at 11:06

## 2018-08-11 NOTE — ANESTHESIA PROCEDURE NOTES
Airway  Urgency: elective    Date/Time: 8/11/2018 8:36 AM  Airway not difficult    General Information and Staff    Patient location during procedure: OR  Anesthesiologist: PIPER HUSSEIN  CRNA: YASMANI MCKENNA    Indications and Patient Condition  Indications for airway management: airway protection    Preoxygenated: yes  MILS not maintained throughout  Mask difficulty assessment: 1 - vent by mask    Final Airway Details  Final airway type: endotracheal airway      Successful airway: ETT  Cuffed: yes   Successful intubation technique: direct laryngoscopy  Endotracheal tube insertion site: oral  Blade: Isis  Blade size: #3  ETT size: 7.0 mm  Cormack-Lehane Classification: grade I - full view of glottis  Placement verified by: chest auscultation and capnometry   Cuff volume (mL): 7  Measured from: lips  ETT to lips (cm): 21  Number of attempts at approach: 1    Additional Comments  Pt preoxygenated prior to induction, easy mask airway, atraumatic intubation,+ ETCO2, + bs bilat,  ETT secured and connected to ventilator.

## 2018-08-11 NOTE — ANESTHESIA POSTPROCEDURE EVALUATION
Patient: Rachel BATISTA Stone    Procedure Summary     Date:  08/11/18 Room / Location:  [facility] OR [address] [place] [address] MAIN OR    Anesthesia Start:  0824 Anesthesia Stop:  1403    Procedure:  TOTAL HIP ARTHROPLASTY REVISION with removal of infected total hip and placement of antibiotic spacer (Left Hip) Diagnosis:       Pain of left hip joint      (Pain of left hip joint [M25.552])    Surgeon:  Obed Barney MD Provider:  Darion Puckett MD    Anesthesia Type:  general ASA Status:  3          Anesthesia Type: general  Last vitals  BP   101/56 (08/11/18 1445)   Temp   36.5 °C (97.7 °F) (08/11/18 1353)   Pulse   92 (08/11/18 1445)   Resp   14 (08/11/18 1445)     SpO2   99 % (08/11/18 1445)     Post Anesthesia Care and Evaluation    Patient location during evaluation: PACU  Anesthetic complications: No anesthetic complications

## 2018-08-11 NOTE — PLAN OF CARE
Problem: Patient Care Overview  Goal: Plan of Care Review  Outcome: Ongoing (interventions implemented as appropriate)   08/11/18 8159   Coping/Psychosocial   Plan of Care Reviewed With patient   Plan of Care Review   Progress improving   OTHER   Outcome Summary More alert at this time, pain control going well, tollorating PO, BP WNL, possible OOB tonight.      Goal: Individualization and Mutuality  Outcome: Ongoing (interventions implemented as appropriate)    Goal: Discharge Needs Assessment  Outcome: Ongoing (interventions implemented as appropriate)    Goal: Interprofessional Rounds/Family Conf  Outcome: Ongoing (interventions implemented as appropriate)      Problem: Infection, Risk/Actual (Adult)  Goal: Identify Related Risk Factors and Signs and Symptoms  Outcome: Ongoing (interventions implemented as appropriate)    Goal: Infection Prevention/Resolution  Outcome: Ongoing (interventions implemented as appropriate)      Problem: Fall Risk (Adult)  Goal: Identify Related Risk Factors and Signs and Symptoms  Outcome: Ongoing (interventions implemented as appropriate)    Goal: Absence of Fall  Outcome: Ongoing (interventions implemented as appropriate)      Problem: Skin Injury Risk (Adult)  Goal: Identify Related Risk Factors and Signs and Symptoms  Outcome: Ongoing (interventions implemented as appropriate)    Goal: Skin Health and Integrity  Outcome: Ongoing (interventions implemented as appropriate)

## 2018-08-11 NOTE — ANESTHESIA PREPROCEDURE EVALUATION
Anesthesia Evaluation     Patient summary reviewed and Nursing notes reviewed   history of anesthetic complications: PONV  NPO Solid Status: > 8 hours  NPO Liquid Status: > 4 hours           Airway   Mallampati: III  Small opening and Possible difficult intubation  Dental      Pulmonary - negative pulmonary ROS and normal exam    breath sounds clear to auscultation  Cardiovascular - normal exam    ECG reviewed  Patient on routine beta blocker    (+) hypertension well controlled less than 2 medications,       Neuro/Psych- negative ROS  GI/Hepatic/Renal/Endo - negative ROS     Musculoskeletal     Abdominal    Substance History - negative use     OB/GYN          Other   (+) arthritis                     Anesthesia Plan    ASA 3     general     intravenous induction   Anesthetic plan and risks discussed with patient.

## 2018-08-11 NOTE — NURSING NOTE
DR HUSSEIN HERE INTERVIEWING THE PT, LET HIM KNOW PT LAST TOOK HER METOPROLOL 4 DAYS AGO-PT STATES SHE TAKES IT PRN. HE STATES OK, NO NEW ORDERS NOTED.

## 2018-08-11 NOTE — PERIOPERATIVE NURSING NOTE
PT/INR RESULTS ARE BACK, DR PATEL HERE LET HIM KNOW INR IS 1.21, HE STATES OK, PLEASE PROCEED TO THE OR.

## 2018-08-11 NOTE — PERIOPERATIVE NURSING NOTE
DR PATEL HERE, LET HIM KNOW WE REPEATED THE PT/INR AND THE RESULTS WERE NOT BACK YET. HE STATES OK, AWAITING RESULTS.

## 2018-08-11 NOTE — OP NOTE
Name: Rachel Moser  YOB: 1945    DATE OF SURGERY: 8/11/2018    PREOPERATIVE DIAGNOSIS: Left infected total hip replacement    POSTOPERATIVE DIAGNOSIS: Left infected total hip replacement    PROCEDURE PERFORMED: Left hip revision (1st stage) - removal of infected total hip replacement with extended trochanteric osteotomy and placement of prosthesis of antibiotic loaded acrylic cement (2 piece)    SURGEON: Obed Barney M.D.    ASSISTANT: SHAN CARR    IMPLANTS:   Implant Name Type Inv. Item Serial No.  Lot No. LRB No. Used   CMT BONE SIMPLEX RO FUL DOSE - [id number] Implant CMT BONE SIMPLEX RO FUL DOSE  BLAKE ROSARIO [id number] Left 1   STEM HIP PROSTALAC STD SZ3 200MM LT - [id number] Implant STEM HIP PROSTALAC STD SZ3 200MM LT  DEPUY [id number] Left 1   CABL SLV ST DM SS 2MM - [id number] Implant CABL SLV ST DM SS 2MM  BLAKE ROSARIO [id number] Left 1   CABL SLV ST DM SS 2MM - [id number] Implant CABL SLV ST DM SS 2MM  BLAKE ROSARIO [id number] Left 1   CMT BONE SIMPLEX RO FUL DOSE - [id number] Implant CMT BONE SIMPLEX RO FUL DOSE  BLAKE ROSARIO [id number] Left 1   CMT BONE SIMPLEX RO FUL DOSE - [id number] Implant CMT BONE SIMPLEX RO FUL DOSE  BLAKE ROSARIO [id number] Left 1   LINER ACET ALTRX PINN NTRL [id number] - [id number] Implant LINER ACET ALTRX PINN NTRL [id number]  DEPUY [id number] Left 1   HD FEM ULTAMET/ART M/COCR  12/14 36MM PLS1.5 - [id number] Implant HD FEM ULTAMET/ART M/COCR  12/14 36MM PLS1.5   DEPUY [id number] Left 1       Estimated Blood Loss: 400cc  Specimens : cx x2  Complications: none    DESCRIPTION OF PROCEDURE:    The patient was taken to the operating room and placed in the supine position. A sequential compression device was carefully placed on the non-operative leg. Preoperative antibiotics were administered. Surgical time out was performed. After adequate induction of anesthesia the patient was then transferred onto the  table and positioned appropriately in the lateral decubitus position. The hip  was then prepped and draped in the usual sterile fashion.    A posterior lateral surgical incision was then made.  We entered through a portion of the previous incision and carried it down to the gluteal fascia.  The gluteus max fascia was then divided the gluteus max muscle was then bluntly dissected.  A Charnley self-retaining retractor was then placed.  A posterior capsulotomy was then performed.  The hip was full of a large amount of infectious-appearing tissue.  We elevated as large a sleeve as possible for later repair.  The fibrous tissue and debris within the hip was then debrided with electrocautery and rongeur.  The hip was then dislocated.    The femoral stem was examined and there was noted to be excellent fixation of the stem.  I started out by dividing the bone implant interface with flexible osteotomes around the proximal femur.  I placed the extraction device and the stem was well fixed and clearly was not going to be removed from above without significant risks to the patient's bone.  It was at this point that I elected to proceed with an extended trochanteric osteotomy.  This was done in standard fashion.  It was a roughly 12 cm osteotomy fragment cut was performed with an oscillating saw, the distal aspect was performed with the pencil tip bur.  We then used a number of wide flat osteotomes to help elevate the osteotomy fragment.  Attention was then placed to the stem we used the pencil tip bur and flexible osteotomes to divide interface anteriorly and posteriorly.  I then used a Gigli saw to divide the medial attachment.  The stem was still very well fixed distally so at this point I had to section the stem with a metal cutting bur and then used the 11.5 mm trial findings to overdrill the stem tip.  We're able to remove the stem tip without significant trauma to the bone.     Attention was then placed to removal of the acetabular component.  We remove the vital liner by using the pencil  tip bur circumferentially around the interface.  After some time with some effort we were able to carefully levered the metal liner out of the cement mantle.  I then had to use a size 47 reamer to ream the cement within the acetabulum down to the metal substrate of the cup.  I was able to identify the screw heads but unfortunately they were buried within cement.  I used the freshbag ultrasonic cement removal device and we were able to clear the cement out of the heads although it did take quite some time.  All the screws were then removed and then I examined the cup which appeared to be well fixed.  I then used the angled spoon osteotomes from the Srikanth set and circumferentially went around the acetabulum.  I was unable to remove the acetabulum.  As there was in the previous revision there was considerable bone loss however don't think there was any bone loss from removal of this implant..   Attention was then placed the femur.  I then reamed with the straight reamers.  Appropriate size we chose a Prostalac which would bypass the osteotomy site by at least 4-6 cm.  At the back table we fashioned the appropriate Prostalac which was made with the molds.  We use 1 gm vancomycin and 3.6 gm tobramycin /bag powder within the mold.  At the same time the hip was copiously irrigated with multiple liters of pulse lavage.  An infectious or nonviable tissue was removed.  We then used a very thin liner for 36 head and cemented it loosely into the acetabulum with antibiotic loaded cement.   The osteotomy site was then reapproximated with 2 DAll miles cables.  The Prostalac was then removed for the mold and cemented for the proximal 1-2 cm with antibiotic loaded cement.  We then trialed and chose the final head.  Hip appeared to be very stable as was the overall construct.   The wounds were copiously irrigated with pulse lavage.    The remainder of the hip was closed in multiple layers in standard fashion and sterile dressings  were applied.  The patient was then extubated and transferred to the PACU for recovery from anesthesia.    Obed Barney M.D.  8/11/2018

## 2018-08-12 ENCOUNTER — APPOINTMENT (OUTPATIENT)
Dept: INFUSION THERAPY | Facility: HOSPITAL | Age: 73
End: 2018-08-12

## 2018-08-12 LAB
ABO + RH BLD: NORMAL
ABO + RH BLD: NORMAL
BH BB BLOOD EXPIRATION DATE: NORMAL
BH BB BLOOD EXPIRATION DATE: NORMAL
BH BB BLOOD TYPE BARCODE: 6200
BH BB BLOOD TYPE BARCODE: 6200
BH BB DISPENSE STATUS: NORMAL
BH BB DISPENSE STATUS: NORMAL
BH BB PRODUCT CODE: NORMAL
BH BB PRODUCT CODE: NORMAL
BH BB UNIT NUMBER: NORMAL
BH BB UNIT NUMBER: NORMAL
HCT VFR BLD AUTO: 28.6 % (ref 35.6–45.5)
HGB BLD-MCNC: 8.9 G/DL (ref 11.9–15.5)
INR PPP: 1.39 (ref 0.9–1.1)
PROTHROMBIN TIME: 16.8 SECONDS (ref 11.7–14.2)
UNIT  ABO: NORMAL
UNIT  ABO: NORMAL
UNIT  RH: NORMAL
UNIT  RH: NORMAL

## 2018-08-12 PROCEDURE — 85018 HEMOGLOBIN: CPT | Performed by: ORTHOPAEDIC SURGERY

## 2018-08-12 PROCEDURE — 97110 THERAPEUTIC EXERCISES: CPT

## 2018-08-12 PROCEDURE — 25010000002 VANCOMYCIN 10 G RECONSTITUTED SOLUTION: Performed by: INTERNAL MEDICINE

## 2018-08-12 PROCEDURE — 25010000002 KETOROLAC TROMETHAMINE PER 15 MG: Performed by: ORTHOPAEDIC SURGERY

## 2018-08-12 PROCEDURE — 97161 PT EVAL LOW COMPLEX 20 MIN: CPT

## 2018-08-12 PROCEDURE — 85610 PROTHROMBIN TIME: CPT | Performed by: ORTHOPAEDIC SURGERY

## 2018-08-12 PROCEDURE — 25010000002 ERTAPENEM PER 500 MG: Performed by: ORTHOPAEDIC SURGERY

## 2018-08-12 PROCEDURE — 99223 1ST HOSP IP/OBS HIGH 75: CPT | Performed by: INTERNAL MEDICINE

## 2018-08-12 PROCEDURE — 85014 HEMATOCRIT: CPT | Performed by: ORTHOPAEDIC SURGERY

## 2018-08-12 RX ORDER — VANCOMYCIN HYDROCHLORIDE 1 G/200ML
1000 INJECTION, SOLUTION INTRAVENOUS EVERY 12 HOURS
Status: DISCONTINUED | OUTPATIENT
Start: 2018-08-13 | End: 2018-08-14

## 2018-08-12 RX ADMIN — WARFARIN SODIUM 4 MG: 4 TABLET ORAL at 18:09

## 2018-08-12 RX ADMIN — VANCOMYCIN HYDROCHLORIDE 1250 MG: 10 INJECTION, POWDER, LYOPHILIZED, FOR SOLUTION INTRAVENOUS at 14:50

## 2018-08-12 RX ADMIN — FERROUS SULFATE TAB 325 MG (65 MG ELEMENTAL FE) 325 MG: 325 (65 FE) TAB at 09:47

## 2018-08-12 RX ADMIN — SODIUM CHLORIDE 1 G: 900 INJECTION INTRAVENOUS at 18:09

## 2018-08-12 RX ADMIN — HYDROCODONE BITARTRATE AND ACETAMINOPHEN 1 TABLET: 7.5; 325 TABLET ORAL at 22:53

## 2018-08-12 RX ADMIN — KETOROLAC TROMETHAMINE 15 MG: 30 INJECTION, SOLUTION INTRAMUSCULAR at 09:47

## 2018-08-12 RX ADMIN — PANTOPRAZOLE SODIUM 40 MG: 40 TABLET, DELAYED RELEASE ORAL at 06:34

## 2018-08-12 RX ADMIN — ACETAMINOPHEN 800 MCG: 400 TABLET ORAL at 06:34

## 2018-08-12 RX ADMIN — DOCUSATE SODIUM 100 MG: 100 CAPSULE, LIQUID FILLED ORAL at 09:47

## 2018-08-12 RX ADMIN — ASPIRIN 325 MG: 325 TABLET, DELAYED RELEASE ORAL at 20:46

## 2018-08-12 RX ADMIN — DOCUSATE SODIUM 100 MG: 100 CAPSULE, LIQUID FILLED ORAL at 20:46

## 2018-08-12 RX ADMIN — ASPIRIN 325 MG: 325 TABLET, DELAYED RELEASE ORAL at 09:47

## 2018-08-12 NOTE — PLAN OF CARE
Problem: Patient Care Overview  Goal: Plan of Care Review  Outcome: Ongoing (interventions implemented as appropriate)   08/12/18 8832   Coping/Psychosocial   Plan of Care Reviewed With patient   Plan of Care Review   Progress improving   OTHER   Outcome Summary Pt POD 1 L hip revision with antibiotic spacer placed. Transfered from monitored bed, VSS, NVI, dressing CDI. Voiding per BSC. Discussed importance of BP monitoring d/t hypotensive episodes. Will cont to monitor      Goal: Individualization and Mutuality  Outcome: Ongoing (interventions implemented as appropriate)    Goal: Discharge Needs Assessment  Outcome: Ongoing (interventions implemented as appropriate)    Goal: Interprofessional Rounds/Family Conf  Outcome: Ongoing (interventions implemented as appropriate)      Problem: Infection, Risk/Actual (Adult)  Goal: Infection Prevention/Resolution  Outcome: Ongoing (interventions implemented as appropriate)      Problem: Fall Risk (Adult)  Goal: Absence of Fall  Outcome: Ongoing (interventions implemented as appropriate)      Problem: Skin Injury Risk (Adult)  Goal: Skin Health and Integrity  Outcome: Ongoing (interventions implemented as appropriate)

## 2018-08-12 NOTE — PLAN OF CARE
Problem: Patient Care Overview  Goal: Plan of Care Review  Outcome: Ongoing (interventions implemented as appropriate)   08/12/18 1401   Coping/Psychosocial   Plan of Care Reviewed With patient   Plan of Care Review   Progress improving   OTHER   Outcome Summary Pt presented to PT today with significant weakness. She performed functional mobility with mod-max A x2 and ambulated within room with walker and TTWBing, she actually shows good ability to maintain weight bearing precautions. Pt will benefit from skilled PT in order to address functional mobility deficits and decrease her risk for falls.

## 2018-08-12 NOTE — PROGRESS NOTES
"Pharmacy to Dose Vancomycin IV    Day 1  Consult for Dr. Tucker  Treating: BJI  Goal trough/random: 15-20 mcg/mL    Anti-Infectives     Ordered     Dose/Rate Route Frequency Start Stop    08/12/18 1128  Pharmacy to dose vancomycin     Ordering Provider:  Edy Tucker MD     Does not apply Continuous PRN 08/12/18 1128 08/15/18 1127    08/11/18 1535  ertapenem (INVanz) 1 g in NS 100mL (MBP)     Ordering Provider:  Obed Barney MD    1 g  200 mL/hr over 30 Minutes Intravenous Every 24 Hours 08/11/18 1630 09/01/18 1629    08/11/18 1534  ceFAZolin in dextrose (ANCEF) IVPB solution 2 g     Ordering Provider:  Obed Barney MD    2 g  over 30 Minutes Intravenous Every 8 Hours 08/11/18 1630 08/12/18 0002    08/11/18 0644  vancomycin (VANCOCIN) 1000 MG injection  - ADS Override Pull     Comments:  Created by cabinet override   Ordering Provider:  Lewis Soni RN       08/11/18 0644 08/11/18 1844    08/11/18 0643  tobramycin (NEBCIN) 1.2 g injection  - ADS Override Pull     Comments:  Created by cabinet override   Ordering Provider:  Lewis Soni RN       08/11/18 0643 08/11/18 1843    08/11/18 0604  ceFAZolin in dextrose (ANCEF) IVPB solution 2 g     Ordering Provider:  Obed Barney MD    2 g  over 30 Minutes Intravenous Once 08/11/18 0606 08/11/18 0827    08/11/18 0604  vancomycin 750 mg/250 mL 0.9% NS add-vantage     Ordering Provider:  Obed Barney MD    15 mg/kg × 57.2 kg Intravenous Once 08/11/18 0606 08/11/18 0710      Relevant clinical data and objective history reviewed:  72 y.o. female 149.8 cm (58.98\") 57.9 kg (127 lb 9 oz)  Body mass index is 25.79 kg/m².    Results from last 7 days  Lab Units 08/06/18  1344   CREATININE mg/dL 0.53*     Estimated Creatinine Clearance: 59 mL/min (A) (by C-G formula based on SCr of 0.53 mg/dL (L)).    Lab Results   Component Value Date    WBC 10.50 08/11/2018     Temp:  [97.4 °F (36.3 °C)-97.7 °F (36.5 °C)] 97.7 °F (36.5 °C)  Heart Rate:  [75-97] " 82  Resp:  [12-16] 16  BP: ()/(48-58) 93/51    Intake/Output Summary (Last 24 hours) at 08/12/18 1204  Last data filed at 08/12/18 0834   Gross per 24 hour   Intake             2450 ml   Output             1450 ml   Net             1000 ml     Baseline cultures/labs/radiology:  7/07 Lt hip wound cx: Ecoli esbl+  8/11 Left hip wound cx: NGTD  8/11 Lt Hip Anaerobic cx: In process    Assessment/Plan:   PMH: Bone infection; Cataract; High cholesterol; DVT; History of kidney infection; PE; History of transfusion; Hypertension; Left hip postoperative wound infection; Paralabral cyst of left hip; PONV (postoperative nausea and vomiting); Presence of vena cava filter; Rheumatoid arteritis; Seasonal allergies; UTI    8/11 @0827 Cefazolin 2gm IV x1 given intra-operatively    8/11 OR for total hip arthroplasty revision with removal of infected total hip and placement of abx beads (Vanc & Tobra beads).     Give Vancomycin 1250 mg IV x1 loading dose then 1000 mg IV q12hrs (34.5 mg/Kg/day). Will follow with levels    Thank you for your consult,    Jazzy Barnes [facility]  08/12/18 12:04 PM

## 2018-08-12 NOTE — PROGRESS NOTES
Orthopedic Progress Note        Patient: Rachel Moser    Date of Admission: 8/11/2018  6:16 AM    YOB: 1945    Medical Record Number: [id number]    Attending Physician: Obed Barney MD    Systemic or Specific Complaints: Reports pain adequately controlled.  Tolerating po well.  No complaints or problems.  Patient up in chair status post hardware removal placement of spacers she states she's comfortable pain is about a 2    Allergies:   Allergies   Allergen Reactions   • Sulfa Antibiotics Rash       Medications:   Current Medications:  Scheduled Meds:  aspirin 325 mg Oral Q12H   docusate sodium 100 mg Oral BID   ertapenem (INVanz) 1 g in NS 100mL (MBP) 1 g Intravenous Q24H   ferrous sulfate 325 mg Oral Daily With Breakfast   folic acid 800 mcg Oral QAM   furosemide 40 mg Oral QAM   ketorolac 15 mg Intravenous Q8H   pantoprazole 40 mg Oral Q AM   polyethylene glycol 17 g Oral BID   warfarin 4 mg Oral Daily     Continuous Infusions:  lactated ringers 9 mL/hr Last Rate: Stopped (08/11/18 1343)     PRN Meds:.•  albuterol  •  HYDROcodone-acetaminophen  •  HYDROcodone-acetaminophen  •  melatonin  •  ondansetron **OR** ondansetron ODT **OR** ondansetron      Physical Exam: 72 y.o. female    General Appearance:   Awake and alert.  NAD.   Vitals:    08/12/18 0224 08/12/18 0636 08/12/18 0637 08/12/18 0728   BP: 101/51 94/53 98/56 93/51   BP Location: Left arm   Left arm   Patient Position: Lying   Lying   Pulse:  90 87 82   Resp:    16   Temp:    97.7 °F (36.5 °C)   TempSrc:    Oral   SpO2:  98% 98% 98%   Weight:       Height:              Extremities:   Operative extremity nno calf tenderness     Pulses:     Pulses palpable and equal bilaterally     Skin:      Skin warm/dry w/out ulceration, ecchymosis, rash, or   cyanosis     Diagnostic Tests:   Lab Results (last 24 hours)     Procedure Component Value Units Date/Time    Wound Culture - Wound, Hip, Left [[id number]]  (Normal) Collected:  08/11/18 0918     Specimen:  Wound from Hip, Left Updated:  08/12/18 0805     Wound Culture No growth     Gram Stain Result No organisms seen    Wound Culture - Wound, Hip, Left [[id number]]  (Normal) Collected:  08/11/18 0919    Specimen:  Wound from Hip, Left Updated:  08/12/18 0805     Wound Culture No growth     Gram Stain Result No organisms seen    Protime-INR [[id number]]  (Abnormal) Collected:  08/12/18 0537    Specimen:  Blood Updated:  08/12/18 0610     Protime 16.8 (H) Seconds      INR 1.39 (H)    Hemoglobin & Hematocrit, Blood [[id number]]  (Abnormal) Collected:  08/12/18 0537    Specimen:  Blood Updated:  08/12/18 0603     Hemoglobin 8.9 (L) g/dL      Hematocrit 28.6 (L) %     Protime-INR [[id number]]  (Abnormal) Collected:  08/11/18 1644    Specimen:  Blood Updated:  08/11/18 1727     Protime 15.8 (H) Seconds      INR 1.28 (H)            Assessment:Status post status post hardware removal with placement of spacer     Plan:    Continue efforts to mobilize  Continue pain control measures  Continue incisional Care    Priya Garnica MD      Date: 8/12/2018  Time: 10:22 AM    Priya Garnica MD

## 2018-08-12 NOTE — THERAPY EVALUATION
Acute Care - Physical Therapy Initial Evaluation  [facility]     Patient Name: Rachel Moser  : 1945  MRN: [id number]  Today's Date: 2018   Onset of Illness/Injury or Date of Surgery: 18  Date of Referral to PT: 18  Referring Physician: Ector      Admit Date: 2018    Visit Dx:     ICD-10-CM ICD-9-CM   1. Postoperative anemia due to acute blood loss D62 285.1   2. Pain of left hip joint M25.552 719.45     Patient Active Problem List   Diagnosis   • Chronic left hip pain   • OA (osteoarthritis) of hip   • Hip dislocation, left (CMS/HCC)   • Status post left hip replacement   • Hypertension   • Rheumatoid arteritis   • History of DVT (deep vein thrombosis)   • Postoperative hypotension   • S/P revision of total hip   • Septic arthritis (CMS/HCC)   • Status post total hip replacement, left   • Pain of left hip joint     Past Medical History:   Diagnosis Date   • Allergic    • Bone infection (CMS/HCC)     hip   • Cataract    • High cholesterol    • History of DVT (deep vein thrombosis)    • History of kidney infection     1 MONTH AGO   • History of pulmonary embolus (PE)    • History of transfusion    • Hypertension    • Left hip postoperative wound infection    • Paralabral cyst of left hip    • PICC (peripherally inserted central catheter) in place    • PONV (postoperative nausea and vomiting)    • Presence of vena cava filter    • Rheumatoid arteritis    • Seasonal allergies    • UTI (urinary tract infection)     diagnosed 18  medically treated presently   • Wears glasses      Past Surgical History:   Procedure Laterality Date   • BLADDER SUSPENSION     • CATARACT EXTRACTION, BILATERAL     • COLONOSCOPY     • ENDOSCOPIC FUNCTIONAL SINUS SURGERY (FESS)     • HIP SURGERY Left    • HIP SURGERY Left    • HIP SURGERY Left    • INCISION AND DRAINAGE HIP Left 2016    Procedure: HIP INCISION AND DRAINAGE;  Surgeon: Obed Barney MD;  Location: [facility] MAIN OR;  Service:     • INCISION AND DRAINAGE HIP Left 7/7/2018    Procedure: I&D and antibiotic bead placement left hip;  Surgeon: Obed Barney MD;  Location: [facility] MAIN OR;  Service: Orthopedics   • INCISION AND DRAINAGE HIP Left 7/21/2018    Procedure: HIP INCISION AND DRAINAGE, LEFT WITH POLY HEAD CHANGE AND ANTIBIOTIC BEAD PLACEMENT;  Surgeon: Obed Barney MD;  Location: [facility] OR;  Service: Orthopedics   • JOINT REPLACEMENT Left     HIP   • PERIPHERALLY INSERTED CENTRAL CATHETER INSERTION     • ME CLOSED RX TRAUMATIC HIP DISLOCATN Left 12/18/2017    Procedure: LEFT HIP CLOSED REDUCTION;  Surgeon: Obed Barney MD;  Location: [facility] OR;  Service: Orthopedics   • SOFT TISSUE MASS EXCISION Left     hip 3/19/18   • TOTAL ABDOMINAL HYSTERECTOMY     • TOTAL HIP ARTHROPLASTY REVISION Left 4/25/2018    Procedure: REVISION OF LEFT ACETABULAR COMPONENT ;  Surgeon: Obed Barney MD;  Location: [facility] OR;  Service: Orthopedics   • VENA CAVA FILTER INSERTION          PT ASSESSMENT (last 12 hours)      Physical Therapy Evaluation     Row Name 08/12/18 1349          PT Evaluation Time/Intention    Subjective Information complains of;weakness;fatigue  -     Document Type evaluation  -     Mode of Treatment physical therapy  -     Total Evaluation Minutes, Physical Therapy 17  -     Patient Effort adequate  -     Symptoms Noted During/After Treatment fatigue  -     Row Name 08/12/18 1340          General Information    Patient Profile Reviewed? yes  -     Onset of Illness/Injury or Date of Surgery 08/11/18  -     Referring Physician Ector  -     Patient Observations alert;cooperative;agree to therapy  -     General Observations of Patient female pt, sitting on BSC, nurse in room, family in room, no acute distress noted  -     Prior Level of Function min assist:;mod assist:;all household mobility;community mobility  -     Equipment Currently Used at Home cane, straight;walker, standard  -     Pertinent  History of Current Functional Problem pt admitted to [facility] for R PANKAJ  -     Existing Precautions/Restrictions hip, anterior;fall;weight bearing   TTWB  -     Risks Reviewed patient:  -     Benefits Reviewed patient:  -     Row Name 08/12/18 1349          Relationship/Environment    Primary Source of Support/Comfort spouse  -     Lives With spouse  -     Row Name 08/12/18 1349          Resource/Environmental Concerns    Current Living Arrangements home/apartment/condo  -     Row Name 08/12/18 1349          Home Main Entrance    Number of Stairs, Main Entrance one  -     Row Name 08/12/18 1349          Cognitive Assessment/Intervention- PT/OT    Orientation Status (Cognition) oriented to;person;place;situation  -     Follows Commands (Cognition) follows one step commands;75-90% accuracy;repetition of directions required  -     Safety Deficit (Cognitive) awareness of need for assistance  -     Row Name 08/12/18 1349          Bed Mobility Assessment/Treatment    Bed Mobility Assessment/Treatment sit-supine  -     Sit-Supine [place] (Bed Mobility) maximum assist (25% patient effort);2 person assist  -     Bed Mobility, Safety Issues decreased use of legs for bridging/pushing  -     Assistive Device (Bed Mobility) draw sheet;head of bed elevated  -     Row Name 08/12/18 1349          Transfer Assessment/Treatment    Transfer Assessment/Treatment sit-stand transfer;stand-sit transfer  -     Sit-Stand [place]way (Transfers) moderate assist (50% patient effort);2 person assist  -     Stand-Sit [place]way (Transfers) moderate assist (50% patient effort);2 person assist  -     Row Name 08/12/18 1349          Sit-Stand Transfer    Assistive Device (Sit-Stand Transfers) walker, front-wheeled  -     Row Name 08/12/18 1349          Stand-Sit Transfer    Assistive Device (Stand-Sit Transfers) walker, front-wheeled  -[facility] Name 08/12/18 1349          Gait/Stairs Assessment/Training     [place] Level (Gait) moderate assist (50% patient effort);2 person assist  -     Assistive Device (Gait) walker, front-wheeled  -     Distance in Feet (Gait) 10  -CH     Pattern (Gait) step-to  -CH     Row Name 08/12/18 1349          General ROM    GENERAL ROM COMMENTS LLE ROM limited 75%  -CH     Row Name 08/12/18 1349          General Assessment (Manual Muscle Testing)    Comment, General Manual Muscle Testing (MMT) Assessment gross BLE MMTs 3-/5  -CH     Row Name             Wound 07/21/18 1304 Left hip incision    Wound - Properties Group Date first assessed: 07/21/18  -JA Time first assessed: 1304  -JA Side: Left  -JA Location: hip  -JA Type: incision  -JA    Row Name             Wound 08/11/18 1244 Left hip incision    Wound - Properties Group Date first assessed: 08/11/18  -RM Time first assessed: 1244  -RM Side: Left  -RM Location: hip  -RM Type: incision  -RM    Row Name 08/12/18 1349          Physical Therapy Clinical Impression    Date of Referral to PT 08/12/18  -     PT Diagnosis (PT Clinical Impression) impaired functional mobility  -     Functional Level at Time of Evaluation (PT Clinical Impression) max A  -CH     Patient/Family Goals Statement (PT Clinical Impression) D/C home  -     Criteria for Skilled Interventions Met (PT Clinical Impression) yes;treatment indicated  -     Pathology/Pathophysiology Noted (Describe Specifically for Each System) musculoskeletal  -     Impairments Found (describe specific impairments) aerobic capacity/endurance;gait, locomotion, and balance;muscle performance  -     Rehab Potential (PT Clinical Summary) fair, will monitor progress closely  -     Care Plan Review (PT) evaluation/treatment results reviewed  -     Row Name 08/12/18 1349          Vital Signs    Post SpO2 (%) 94  -CH     O2 Delivery Post Treatment room air  -     Row Name 08/12/18 1349          Physical Therapy Goals    Bed Mobility Goal Selection (PT) bed mobility, PT goal  1  -CH     Transfer Goal Selection (PT) transfer, PT goal 1  -CH     Gait Training Goal Selection (PT) gait training, PT goal 1  -     Row Name 08/12/18 1349          Bed Mobility Goal 1 (PT)    Activity/Assistive Device (Bed Mobility Goal 1, PT) bed mobility activities, all  -CH     [place] Level/Cues Needed (Bed Mobility Goal 1, PT) moderate assist (50-74% patient effort)  -CH     Time Frame (Bed Mobility Goal 1, PT) 1 week  -     Row Name 08/12/18 1349          Transfer Goal 1 (PT)    Activity/Assistive Device (Transfer Goal 1, PT) transfers, all  -CH     [place] Level/Cues Needed (Transfer Goal 1, PT) minimum assist (75% or more patient effort)  -CH     Time Frame (Transfer Goal 1, PT) 1 week  -     Row Name 08/12/18 1347          Gait Training Goal 1 (PT)    Activity/Assistive Device (Gait Training Goal 1, PT) gait (walking locomotion)  -CH     [place] Level (Gait Training Goal 1, PT) minimum assist (75% or more patient effort)  -CH     Distance (Gait Goal 1, PT) 100  -CH     Time Frame (Gait Training Goal 1, PT) 1 week  -     Row Name 08/12/18 1346          Positioning and Restraints    Pre-Treatment Position bedside commode  -     Post Treatment Position bed  -CH     In Bed supine;call light within reach;encouraged to call for assist;with family/caregiver;pillow between legs  -     Row Name 08/12/18 134          Living Environment    Home Accessibility stairs to enter home  -       User Key  (r) = Recorded By, (t) = Taken By, (c) = Cosigned By    Initials Name Provider Type    Faraz Alejo, RN Registered Nurse     Krissy Cintron RN Registered Nurse    Werner Ennis, PT Physical Therapist          Physical Therapy Education     Title: PT OT SLP Therapies (Active)     Topic: Physical Therapy (Active)     Point: Mobility training (Active)    Learning Progress Summary     Learner Status Readiness Method Response Comment Documented by    Patient Active Acceptance E  NR   08/12/18 1400          Point: Home exercise program (Active)    Learning Progress Summary     Learner Status Readiness Method Response Comment Documented by    Patient Active Acceptance E NR   08/12/18 1400          Point: Body mechanics (Active)    Learning Progress Summary     Learner Status Readiness Method Response Comment Documented by    Patient Active Acceptance E NR   08/12/18 1400          Point: Precautions (Active)    Learning Progress Summary     Learner Status Readiness Method Response Comment Documented by    Patient Active Acceptance E NR   08/12/18 1400                      User Key     Initials Effective Dates Name Provider Type Discipline     04/03/18 -  Werner Espinoza, PT Physical Therapist PT                PT Recommendation and Plan  Anticipated Discharge Disposition (PT): skilled nursing facility  Planned Therapy Interventions (PT Eval): bed mobility training, gait training, strengthening, transfer training  Therapy Frequency (PT Clinical Impression): daily  Outcome Summary/Treatment Plan (PT)  Anticipated Equipment Needs at Discharge (PT): front wheeled walker  Anticipated Discharge Disposition (PT): skilled nursing facility  Plan of Care Reviewed With: patient  Progress: improving  Outcome Summary: Pt presented to PT today with significant weakness. She performed functional mobility with mod-max A x2 and ambulated within room with walker and TTWBing, she actually shows good ability to maintain weight bearing precautions. Pt will benefit from skilled PT in order to address functional mobility deficits and decrease her risk for falls.          Outcome Measures     Row Name 08/12/18 1405             How much help from another person do you currently need...    Turning from your back to your side while in flat bed without using bedrails? 2  -CH      Moving from lying on back to sitting on the side of a flat bed without bedrails? 2  -CH      Moving to and from a bed to a chair  (including a wheelchair)? 2  -CH      Standing up from a chair using your arms (e.g., wheelchair, bedside chair)? 2  -CH      Climbing 3-5 steps with a railing? 1  -CH      To walk in hospital room? 2  -CH      AM-PAC 6 Clicks Score 11  -CH         Functional Assessment    Outcome Measure Options AM-PAC 6 Clicks Basic Mobility (PT)  -        User Key  (r) = Recorded By, (t) = Taken By, (c) = Cosigned By    Initials Name Provider Type     Werner Espinoza, PT Physical Therapist           Time Calculation:         PT Charges     Row Name 08/12/18 1405             Time Calculation    Start Time 1325  -      Stop Time 1342  -      Time Calculation (min) 17 min  -      PT Received On 08/12/18  -      PT - Next Appointment 08/13/18  -      PT Goal Re-Cert Due Date 08/19/18  -        User Key  (r) = Recorded By, (t) = Taken By, (c) = Cosigned By    Initials Name Provider Type     Werner Espinoza, PT Physical Therapist        Therapy Suggested Charges     Code   Minutes Charges    None           Therapy Charges for Today     Code Description Service Date Service Provider Modifiers Qty    31720540112 HC PT EVAL LOW COMPLEXITY 2 8/12/2018 Werner Espinoza, PT GP 1    81111094813 HC PT THER PROC EA 15 MIN 8/12/2018 Werner Espinoza, PT GP 1    31786273787 HC PT THER SUPP EA 15 MIN 8/12/2018 Werner Espinoza, PT GP 1          PT G-Codes  Outcome Measure Options: AM-PAC 6 Clicks Basic Mobility (PT)      Werner Espinoza PT  8/12/2018

## 2018-08-12 NOTE — PLAN OF CARE
Problem: Patient Care Overview  Goal: Plan of Care Review  Outcome: Ongoing (interventions implemented as appropriate)   08/12/18 0310   Coping/Psychosocial   Plan of Care Reviewed With patient   Plan of Care Review   Progress improving   OTHER   Outcome Summary VSS. SBP High 90s-low 100s. Pain controlled with scheduled toradol. Q2 turn. Neurovascular checks. Ice applied to incision. Sat on the edge of the bed. Abx given. Foled d/c'd. No signs of acute distress noted. Will cont to monitor.     Goal: Individualization and Mutuality  Outcome: Ongoing (interventions implemented as appropriate)    Goal: Discharge Needs Assessment  Outcome: Ongoing (interventions implemented as appropriate)    Goal: Interprofessional Rounds/Family Conf  Outcome: Ongoing (interventions implemented as appropriate)      Problem: Infection, Risk/Actual (Adult)  Goal: Identify Related Risk Factors and Signs and Symptoms  Outcome: Outcome(s) achieved Date Met: 08/12/18    Goal: Infection Prevention/Resolution  Outcome: Ongoing (interventions implemented as appropriate)      Problem: Fall Risk (Adult)  Goal: Identify Related Risk Factors and Signs and Symptoms  Outcome: Outcome(s) achieved Date Met: 08/12/18    Goal: Absence of Fall  Outcome: Ongoing (interventions implemented as appropriate)      Problem: Skin Injury Risk (Adult)  Goal: Identify Related Risk Factors and Signs and Symptoms  Outcome: Outcome(s) achieved Date Met: 08/12/18    Goal: Skin Health and Integrity  Outcome: Ongoing (interventions implemented as appropriate)

## 2018-08-12 NOTE — CONSULTS
"Referring Provider: Obed Barney MD  [id number] [address] [address]    Reason for Consultation:     History of present illness:  Ms Moser is a 71 YO who I am asked to evaluate and give opinion for R hip ESBL E col infection. History is obtained from patient, prior discussion with Dr Barney and review of the old medical records which I summarize/synthesize as follows: She has a history of L hip replacement. In November 2016 she required L hip exploration with I&D and removal of pseudotumor. She required another procedure on the L hip in 2017 for dislocation. Then in April 2018 she went to OR for failed acetabular component. Unfortunately at the end of June 2018 she began to have drainage from her incision with associated erythema and fever. She was given levofloxacin without any improvement. Therefore on 7/7/18 she underwent Left hip I&D with placement of vancomcyin beads. Her operative cultures grew an ESBL E coli. My partner saw her at that time and placed her on a 6 weeks course of ertapenem but appropriately pointed out this was going to be a very difficult situation as she had retention of hardware with no good suppression options. Unfortunately even while on the ertapenem the patient continued to have drainage from her incision site. Therefore she went to OR on 7/21/18 for a repeat I&D with tobramycin beads placed. Those cultures were negative and her IV ertapenem was extended out to 9/1/18.    She saw Dr Barney on 8/7/18 for a days' worth of drainage from the incision with increasing erythema. She had associated sharp constant pain worse w/ palpation. Therefore on 8/11/18 she underwent \"Left hip revision (1st stage) - removal of infected total hip replacement with extended trochanteric osteotomy and placement of prosthesis of antibiotic loaded acrylic cement (2 piece).\" vancomycin and tobramycin beads were placed. Operative cultures are pending.    She reports dull constant pain worse w/ " palpation. She has no fevers. She has associated erythema at incision site.    Past Medical History:   Diagnosis Date   • Allergic    • Bone infection (CMS/HCC)     hip   • Cataract    • High cholesterol    • History of DVT (deep vein thrombosis)    • History of kidney infection     1 MONTH AGO   • History of pulmonary embolus (PE)    • History of transfusion    • Hypertension    • Left hip postoperative wound infection    • Paralabral cyst of left hip    • PICC (peripherally inserted central catheter) in place    • PONV (postoperative nausea and vomiting)    • Presence of vena cava filter    • Rheumatoid arteritis    • Seasonal allergies    • UTI (urinary tract infection)     diagnosed 4/2/18  medically treated presently   • Wears glasses    ESBL E coli L hip PJI    Past Surgical History:   Procedure Laterality Date   • BLADDER SUSPENSION     • CATARACT EXTRACTION, BILATERAL     • COLONOSCOPY     • ENDOSCOPIC FUNCTIONAL SINUS SURGERY (FESS)     • HIP SURGERY Left 1983   • HIP SURGERY Left 20069   • HIP SURGERY Left 2011   • INCISION AND DRAINAGE HIP Left 11/2/2016    Procedure: HIP INCISION AND DRAINAGE;  Surgeon: Obed Barney MD;  Location: [facility];  Service:    • INCISION AND DRAINAGE HIP Left 7/7/2018    Procedure: I&D and antibiotic bead placement left hip;  Surgeon: Obed Barney MD;  Location: [facility] OR;  Service: Orthopedics   • INCISION AND DRAINAGE HIP Left 7/21/2018    Procedure: HIP INCISION AND DRAINAGE, LEFT WITH POLY HEAD CHANGE AND ANTIBIOTIC BEAD PLACEMENT;  Surgeon: Obed Barney MD;  Location: [facility] OR;  Service: Orthopedics   • JOINT REPLACEMENT Left     HIP   • PERIPHERALLY INSERTED CENTRAL CATHETER INSERTION     • CO CLOSED RX TRAUMATIC HIP DISLOCATN Left 12/18/2017    Procedure: LEFT HIP CLOSED REDUCTION;  Surgeon: Obed Barney MD;  Location: [facility] OR;  Service: Orthopedics   • SOFT TISSUE MASS EXCISION Left     hip 3/19/18   • TOTAL ABDOMINAL HYSTERECTOMY     • TOTAL  HIP ARTHROPLASTY REVISION Left 4/25/2018    Procedure: REVISION OF LEFT ACETABULAR COMPONENT ;  Surgeon: Obed Barney MD;  Location: [facility] OR;  Service: Orthopedics   • VENA CAVA FILTER INSERTION         Social History:  Quit smoking 1970s  No EtOH or illicits     Family History:  No 1st degree relatives w/ ESBL infections     Allergies:  Sulfa   -  rash    Medications:    Current Facility-Administered Medications:   •  albuterol (PROVENTIL) nebulizer solution 0.083% 2.5 mg/3mL, 2.5 mg, Nebulization, Q4H PRN, Obed Barney MD  •  aspirin EC tablet 325 mg, 325 mg, Oral, Q12H, Obed Barney MD, 325 mg at 08/12/18 0947  •  docusate sodium (COLACE) capsule 100 mg, 100 mg, Oral, BID, Obed Barney MD, 100 mg at 08/12/18 0947  •  ertapenem (INVanz) 1 g in NS 100mL (MBP), 1 g, Intravenous, Q24H, Obed Barney MD, Last Rate: 200 mL/hr at 08/11/18 1740, 1 g at 08/11/18 1740  •  ferrous sulfate tablet 325 mg, 325 mg, Oral, Daily With Breakfast, Obed Barney MD, 325 mg at 08/12/18 0947  •  folic acid (FOLVITE) tablet 800 mcg, 800 mcg, Oral, SOFY, Obed Barney MD, 800 mcg at 08/12/18 0634  •  furosemide (LASIX) tablet 40 mg, 40 mg, Oral, Ector GOETZ Reid B, MD, Stopped at 08/12/18 0634  •  HYDROcodone-acetaminophen (NORCO) 7.5-325 MG per tablet 1 tablet, 1 tablet, Oral, Q4H PRN, Obed Barney MD, 1 tablet at 08/11/18 1828  •  HYDROcodone-acetaminophen (NORCO) 7.5-325 MG per tablet 2 tablet, 2 tablet, Oral, Q4H PRN, Obed Barney MD  •  ketorolac (TORADOL) injection 15 mg, 15 mg, Intravenous, Q8H, Obed Barney MD, 15 mg at 08/12/18 0947  •  lactated ringers infusion, 9 mL/hr, Intravenous, Continuous, Darion Puckett MD, Stopped at 08/11/18 0483  •  melatonin tablet 3 mg, 3 mg, Oral, Nightly PRN, Obed Barney MD  •  ondansetron (ZOFRAN) tablet 4 mg, 4 mg, Oral, Q6H PRN **OR** ondansetron ODT (ZOFRAN-ODT) disintegrating tablet 4 mg, 4 mg, Oral, Q6H PRN **OR** ondansetron (ZOFRAN) injection 4 mg, 4 mg,  Intravenous, Q6H PRN, Obed Barney MD  •  pantoprazole (PROTONIX) EC tablet 40 mg, 40 mg, Oral, Q AM, Obed Barney MD, 40 mg at 08/12/18 0634  •  polyethylene glycol 3350 powder (packet), 17 g, Oral, BID, Obed Barney MD  •  warfarin (COUMADIN) tablet 4 mg, 4 mg, Oral, Daily, Priya Garnica MD    Facility-Administered Medications Ordered in Other Encounters:   •  mupirocin (BACTROBAN) 2 % nasal ointment, , Nasal, BID, Obed Barney MD    Review of Systems  All systems were reviewed and are negative unless otherwise stated above in the HPI    Objective   Vital Signs   Temp:  [97.4 °F (36.3 °C)-97.7 °F (36.5 °C)] 97.7 °F (36.5 °C)  Heart Rate:  [75-97] 82  Resp:  [12-16] 16  BP: ()/(48-58) 93/51    Physical Exam:   General: awake, alert, NAD   Head: Normocephalic, atraumatic  Eyes: no scleral icterus, no conjunctival pallor, no conjunctival hemorrhages.   ENT: MMM, OP clear, no thrush. Good dentition.   Neck: Supple, no visible thyromegaly  Cardiovascular: NR, RR, no murmurs, rubs, or gallops  Respiratory: Lungs are clear to ascultation bilaterally, no rales or wheezing; normal work of breathing on RA  GI: Abdomen is soft, non-tender, non-distended, normal bowel sounds in all four quadrants  : no Mederos catheter present  Musculoskeletal: R hip w/ drain present; erythema on thigh  Skin: erythema R thigh  Neurological: Alert and oriented x 3  Psychiatric: calm, pleasant  Vasc: no cyanosis; RUE PIV w/o erythema    Labs:     Lab Results   Component Value Date    WBC 10.50 08/11/2018    HGB 8.9 (L) 08/12/2018    HCT 28.6 (L) 08/12/2018    MCV 93.3 08/11/2018     08/11/2018       Lab Results   Component Value Date    GLUCOSE 125 (H) 08/06/2018    BUN 9 08/06/2018    CREATININE 0.53 (L) 08/06/2018    EGFRIFNONA 114 08/06/2018    BCR 17.0 08/06/2018    CO2 20.7 (L) 08/06/2018    CALCIUM 7.2 (L) 08/06/2018    ALBUMIN 2.70 (L) 08/06/2018    AST 8 08/06/2018    ALT 6 08/06/2018     Lab Results   Component  Value Date    CRP 4.79 (H) 07/27/2018     Lab Results   Component Value Date    SEDRATE 80 (H) 07/19/2018     UA negative for infection    Microbiology:  8/11 Hip Cx: NGTD    Radiology (personally reviewed report):  XR hip with total arthroplasty in place    Assessment/Plan   1. 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 Appreciate Dr Barney's diligent work with this patient.    Thank you for this consult. ID will follow.

## 2018-08-13 ENCOUNTER — HOSPITAL ENCOUNTER (OUTPATIENT)
Dept: INFUSION THERAPY | Facility: HOSPITAL | Age: 73
Discharge: HOME | End: 2018-08-13

## 2018-08-13 LAB
CREAT BLD-MCNC: 0.45 MG/DL (ref 0.57–1)
CRP SERPL-MCNC: 3.7 MG/DL (ref 0–0.5)
DEPRECATED RDW RBC AUTO: 51.6 FL (ref 37–54)
ERYTHROCYTE [DISTWIDTH] IN BLOOD BY AUTOMATED COUNT: 15.7 % (ref 11.7–13)
ERYTHROCYTE [SEDIMENTATION RATE] IN BLOOD: 89 MM/HR (ref 0–30)
GFR SERPL CREATININE-BSD FRML MDRD: 137 ML/MIN/1.73
HCT VFR BLD AUTO: 21.5 % (ref 35.6–45.5)
HGB BLD-MCNC: 7 G/DL (ref 11.9–15.5)
INR PPP: 1.7 (ref 0.9–1.1)
MCH RBC QN AUTO: 29.7 PG (ref 26.9–32)
MCHC RBC AUTO-ENTMCNC: 32.6 G/DL (ref 32.4–36.3)
MCV RBC AUTO: 91.1 FL (ref 80.5–98.2)
PLATELET # BLD AUTO: 236 10*3/MM3 (ref 140–500)
PMV BLD AUTO: 10.9 FL (ref 6–12)
PROTHROMBIN TIME: 19.7 SECONDS (ref 11.7–14.2)
RBC # BLD AUTO: 2.36 10*6/MM3 (ref 3.9–5.2)
WBC NRBC COR # BLD: 10.47 10*3/MM3 (ref 4.5–10.7)

## 2018-08-13 PROCEDURE — 25010000002 ERTAPENEM PER 500 MG: Performed by: ORTHOPAEDIC SURGERY

## 2018-08-13 PROCEDURE — 85027 COMPLETE CBC AUTOMATED: CPT | Performed by: INTERNAL MEDICINE

## 2018-08-13 PROCEDURE — 85610 PROTHROMBIN TIME: CPT | Performed by: ORTHOPAEDIC SURGERY

## 2018-08-13 PROCEDURE — 86140 C-REACTIVE PROTEIN: CPT | Performed by: INTERNAL MEDICINE

## 2018-08-13 PROCEDURE — 85652 RBC SED RATE AUTOMATED: CPT | Performed by: INTERNAL MEDICINE

## 2018-08-13 PROCEDURE — 97110 THERAPEUTIC EXERCISES: CPT

## 2018-08-13 PROCEDURE — 99232 SBSQ HOSP IP/OBS MODERATE 35: CPT | Performed by: INTERNAL MEDICINE

## 2018-08-13 PROCEDURE — 25010000002 VANCOMYCIN PER 500 MG: Performed by: INTERNAL MEDICINE

## 2018-08-13 PROCEDURE — 82565 ASSAY OF CREATININE: CPT | Performed by: INTERNAL MEDICINE

## 2018-08-13 RX ADMIN — DOCUSATE SODIUM 100 MG: 100 CAPSULE, LIQUID FILLED ORAL at 08:47

## 2018-08-13 RX ADMIN — VANCOMYCIN HYDROCHLORIDE 1000 MG: 1 INJECTION, SOLUTION INTRAVENOUS at 14:15

## 2018-08-13 RX ADMIN — HYDROCODONE BITARTRATE AND ACETAMINOPHEN 1 TABLET: 7.5; 325 TABLET ORAL at 08:50

## 2018-08-13 RX ADMIN — FUROSEMIDE 40 MG: 40 TABLET ORAL at 08:45

## 2018-08-13 RX ADMIN — DOCUSATE SODIUM 100 MG: 100 CAPSULE, LIQUID FILLED ORAL at 21:51

## 2018-08-13 RX ADMIN — WARFARIN SODIUM 4 MG: 4 TABLET ORAL at 18:59

## 2018-08-13 RX ADMIN — PANTOPRAZOLE SODIUM 40 MG: 40 TABLET, DELAYED RELEASE ORAL at 06:23

## 2018-08-13 RX ADMIN — ACETAMINOPHEN 800 MCG: 400 TABLET ORAL at 08:46

## 2018-08-13 RX ADMIN — ASPIRIN 325 MG: 325 TABLET, DELAYED RELEASE ORAL at 08:47

## 2018-08-13 RX ADMIN — ASPIRIN 325 MG: 325 TABLET, DELAYED RELEASE ORAL at 21:51

## 2018-08-13 RX ADMIN — SODIUM CHLORIDE 1 G: 900 INJECTION INTRAVENOUS at 19:42

## 2018-08-13 RX ADMIN — VANCOMYCIN HYDROCHLORIDE 1000 MG: 1 INJECTION, SOLUTION INTRAVENOUS at 02:23

## 2018-08-13 RX ADMIN — FERROUS SULFATE TAB 325 MG (65 MG ELEMENTAL FE) 325 MG: 325 (65 FE) TAB at 08:46

## 2018-08-13 NOTE — THERAPY TREATMENT NOTE
Acute Care - Physical Therapy Treatment Note  [facility]     Patient Name: Rachel Moser  : 1945  MRN: [id number]  Today's Date: 2018  Onset of Illness/Injury or Date of Surgery: 18  Date of Referral to PT: 18  Referring Physician: Ector    Admit Date: 2018    Visit Dx:    ICD-10-CM ICD-9-CM   1. Postoperative anemia due to acute blood loss D62 285.1   2. Pain of left hip joint M25.552 719.45     Patient Active Problem List   Diagnosis   • Chronic left hip pain   • OA (osteoarthritis) of hip   • Hip dislocation, left (CMS/HCC)   • Status post left hip replacement   • Hypertension   • Rheumatoid arteritis   • History of DVT (deep vein thrombosis)   • Postoperative hypotension   • S/P revision of total hip   • Septic arthritis (CMS/HCC)   • Status post total hip replacement, left   • Pain of left hip joint       Therapy Treatment          Rehabilitation Treatment Summary     Row Name 18             Treatment Time/Intention    Discipline physical therapist  -MS      Document Type therapy note (daily note)  -MS      Subjective Information complains of;fatigue;pain  -MS      Mode of Treatment physical therapy;individual therapy  -MS      Patient Effort good  -MS      Comment Pt. reports pain/soreness in her Left hip with general mobility this day. Otherwise, pt. agreeable to work with P.T.  -MS      Existing Precautions/Restrictions fall;hip, anterior   TTWB Left L.E.; x 2 Hemovac drains; Contact Isolation  -MS      Recorded by [MS] Juancarlos Burleson, PT 18      Row Name 18             Cognitive Assessment/Intervention- PT/OT    Orientation Status (Cognition) oriented x 3  -MS      Follows Commands (Cognition) WNL  -MS      Personal Safety Interventions fall prevention program maintained;gait belt;nonskid shoes/slippers when out of bed;supervised activity  -MS      Recorded by [MS] Juancarlos Burleson, PT 18      Row Name 18              Mobility Assessment/Intervention    Extremity Weight-bearing Status left lower extremity  -MS      Left Lower Extremity (Weight-bearing Status) toe touch weight-bearing (TTWB)  -MS      Recorded by [MS] Juancarlos Burleson, PT 08/13/18 0929      Row Name 08/13/18 0924             Bed Mobility Assessment/Treatment    Bed Mobility Assessment/Treatment supine-sit;sit-supine  -MS      Supine-Sit Whiteside (Bed Mobility) minimum assist (75% patient effort)   Assist with pt.'s Left L.E.  -MS      Recorded by [MS] Juancarlos Burleson, PT 08/13/18 0929      Row Name 08/13/18 0924             Sit-Stand Transfer    Sit-Stand Whiteside (Transfers) minimum assist (75% patient effort)  -MS      Assistive Device (Sit-Stand Transfers) walker, front-wheeled  -MS      Recorded by [MS] Juancarlos Burleson, PT 08/13/18 0929      Row Name 08/13/18 0924             Stand-Sit Transfer    Stand-Sit Whiteside (Transfers) minimum assist (75% patient effort)  -MS      Assistive Device (Stand-Sit Transfers) walker, front-wheeled  -MS      Recorded by [MS] Juancarlos Burleson, PT 08/13/18 0929      Row Name 08/13/18 0924             Gait/Stairs Assessment/Training    Whiteside Level (Gait) contact guard;2 person assist  -MS      Assistive Device (Gait) walker, front-wheeled  -MS      Distance in Feet (Gait) 12 feet  -MS      Pattern (Gait) step-to  -MS      Deviations/Abnormal Patterns (Gait) antalgic;yanni decreased  -MS      Bilateral Gait Deviations forward flexed posture  -MS      Maintains Weight-bearing Status (Gait) able to maintain   TTWB Left L.E.  -MS      Comment (Gait/Stairs) Verbal/tactile cues for posture correction.  -MS      Recorded by [MS] Juancarlos Burleson, PT 08/13/18 0929      Row Name 08/13/18 0924             Therapeutic Exercise    Comment (Therapeutic Exercise) Left L.E. Ankle pumps, Quad Sets, Glute Sets x 10 reps completed  -MS      Recorded by [MS] Juancarlos Burleson, PT 08/13/18 0929      Row Name 08/13/18  0924             Positioning and Restraints    Pre-Treatment Position in bed  -MS      Post Treatment Position chair  -MS      In Chair notified nsg;reclined;sitting;call light within reach;encouraged to call for assist;with family/caregiver   All lines intact.  -MS      Recorded by [MS] Juancarlos Burleson, PT 08/13/18 0929      Row Name 08/13/18 0924             Pain Assessment    Additional Documentation Pain Scale: Numbers Pre/Post-Treatment (Group)  -MS      Recorded by [MS] Juancarlos Burleson, PT 08/13/18 0929      Row Name 08/13/18 0924             Pain Scale: Numbers Pre/Post-Treatment    Pain Scale: Numbers, Pretreatment 5/10  -MS      Pain Scale: Numbers, Post-Treatment 3/10  -MS      Pain Location - Side Left  -MS      Pain Location hip  -MS      Pre/Post Treatment Pain Comment Medication (Via nurse), Repositioning, Rest, Ice pack for pain relief.  -MS      Recorded by [MS] Juancarlos Burleson, PT 08/13/18 0929      Row Name                [REMOVED] Wound 07/21/18 1304 Left hip incision    Wound - Properties Group Date first assessed: 07/21/18 [JA] Time first assessed: 1304 [JA] Side: Left [JA] Location: hip [JA] Type: incision [JA] Resolution Date: 08/12/18 [TT] Resolution Time: 2146 [TT] Recorded by:  [JA] Faraz Miranda RN 07/21/18 1304 [TT] Ivis Cornell RN 08/12/18 2146    Row Name                Wound 08/11/18 1244 Left hip incision    Wound - Properties Group Date first assessed: 08/11/18 [RM] Time first assessed: 1244 [RM] Side: Left [RM] Location: hip [RM] Type: incision [RM] Recorded by:  [RM] Krissy Cintron RN 08/11/18 1244    Row Name                [REMOVED] Wound 04/25/18 1606 Left hip incision    Wound - Properties Group Date first assessed: 04/25/18 [AH] Time first assessed: 1606 [AH] Side: Left [AH] Location: hip [AH] Type: incision [AH] Resolution Date: 08/12/18 [TT] Resolution Time: 2146 [TT] Recorded by:  [AH] Susana Flores RN 04/25/18 160 [TT] Ivis Cornell RN 08/12/18 8107     Row Name                [REMOVED] Wound 07/07/18 1358 Left hip incision    Wound - Properties Group Date first assessed: 07/07/18 [HH] Time first assessed: 1358 [HH] Side: Left [HH] Location: hip [HH] Type: incision [HH] Resolution Date: 08/12/18 [TT] Resolution Time: 2146 [TT] Recorded by:  [HH] Tamara Lyons RN 07/07/18 1358 [TT] Ivis Cornell RN 08/12/18 2146      User Key  (r) = Recorded By, (t) = Taken By, (c) = Cosigned By    Initials Name Effective Dates Discipline    Faraz Alejo RN 06/16/16 -  Nurse    Susana Guzman RN 12/07/17 -  Nurse    Tamara Gonzalez RN 06/16/16 -  Nurse    Krissy Moreno RN 06/16/16 -  Nurse    Juancarlos Ryan PT 04/03/18 -  PT    TT Ivis Cornell RN 06/16/16 -  Nurse          Wound 08/11/18 1244 Left hip incision (Active)   Dressing Appearance no drainage;dry;intact 8/13/2018  4:02 AM   Closure REINALDO 8/13/2018  4:02 AM   Base dressing in place, unable to visualize 8/12/2018  3:42 PM   Periwound pink;swelling 8/12/2018  3:42 PM   Periwound Temperature warm 8/12/2018  3:42 PM   Periwound Skin Turgor soft 8/12/2018  3:42 PM   Drainage Amount none 8/13/2018  4:02 AM   Care, Wound negative pressure wound therapy 8/12/2018  1:36 PM             Physical Therapy Education     Title: PT OT SLP Therapies (Done)     Topic: Physical Therapy (Done)     Point: Mobility training (Done)    Learning Progress Summary     Learner Status Readiness Method Response Comment Documented by    Patient Done Acceptance TONY NOONAN NR  MS 08/13/18 0929     Active Acceptance E NR   08/12/18 1400          Point: Home exercise program (Done)    Learning Progress Summary     Learner Status Readiness Method Response Comment Documented by    Patient Done Acceptance TONY NOONAN NR  MS 08/13/18 0929     Active Acceptance E NR   08/12/18 1400          Point: Body mechanics (Done)    Learning Progress Summary     Learner Status Readiness Method Response Comment Documented by    Patient  Done Acceptance TONY NOONAN NR  MS 08/13/18 0929     Active Acceptance E NR   08/12/18 1400          Point: Precautions (Done)    Learning Progress Summary     Learner Status Readiness Method Response Comment Documented by    Patient Done Acceptance TONY NOONAN NR  MS 08/13/18 0929     Active Acceptance E NR   08/12/18 1400                      User Key     Initials Effective Dates Name Provider Type Discipline    MS 04/03/18 -  BurlesonTyson dotsonkaylah BATISTA, PT Physical Therapist PT     04/03/18 -  Werner Espinoza, PT Physical Therapist PT                    PT Recommendation and Plan     Plan of Care Reviewed With: patient  Progress: improving  Outcome Summary: Improved tolerance to functional activity this day with an increase in gait distance, decreased assist required for overall functional mobility, and initiation of L.E. ther. ex. program.          Outcome Measures     Row Name 08/13/18 0900 08/12/18 1404          How much help from another person do you currently need...    Turning from your back to your side while in flat bed without using bedrails? 3  -MS 2  -CH     Moving from lying on back to sitting on the side of a flat bed without bedrails? 2  -MS 2  -CH     Moving to and from a bed to a chair (including a wheelchair)? 3  -MS 2  -CH     Standing up from a chair using your arms (e.g., wheelchair, bedside chair)? 3  -MS 2  -CH     Climbing 3-5 steps with a railing? 2  -MS 1  -CH     To walk in hospital room? 3  -MS 2  -CH     AM-PAC 6 Clicks Score 16  -MS 11  -CH        Functional Assessment    Outcome Measure Options AM-PAC 6 Clicks Basic Mobility (PT)  -MS AM-PAC 6 Clicks Basic Mobility (PT)  -CH       User Key  (r) = Recorded By, (t) = Taken By, (c) = Cosigned By    Initials Name Provider Type    MS Burleson Juancarlos BATISTA, PT Physical Therapist     Werner Espinoza, PT Physical Therapist           Time Calculation:         PT Charges     Row Name 08/13/18 0931             Time Calculation    Start Time 0913  -MS       Stop Time 0929  -MS      Time Calculation (min) 16 min  -MS      PT Received On 08/13/18  -MS      PT - Next Appointment 08/14/18  -MS         Time Calculation- PT    Total Timed Code Minutes- PT 11 minute(s)  -MS        User Key  (r) = Recorded By, (t) = Taken By, (c) = Cosigned By    Initials Name Provider Type    Juancarlos Ryan, PT Physical Therapist        Therapy Suggested Charges     Code   Minutes Charges    None           Therapy Charges for Today     Code Description Service Date Service Provider Modifiers Qty    53766236902 HC PT THER PROC EA 15 MIN 8/13/2018 Juancarlos Burleson, PT GP 1    95756688962 HC PT THER SUPP EA 15 MIN 8/13/2018 Juancarlos Burleson, PT GP 1          PT G-Codes  Outcome Measure Options: AM-PAC 6 Clicks Basic Mobility (PT)    Juancarlos Burleson, PT  8/13/2018

## 2018-08-13 NOTE — PROGRESS NOTES
ID note  CC: f/u PJI  S: C/o L hip pain 4/10. No f/c/ns. Tolerating abx  O: AF, NAD, drains in place on L hip with mild erythema and edema    Microbiology:  8/11 Hip Cx: NGTD    Cr 0.45  CRP 3.7  ESR 89    A/P  1. 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  Should have final cx tomorrow.  -hopefully now that all hardware is removed we will be able to cure this infection. Appreciate Dr Barney's diligent work with this patient.

## 2018-08-13 NOTE — PLAN OF CARE
Problem: Patient Care Overview  Goal: Plan of Care Review  Outcome: Ongoing (interventions implemented as appropriate)   08/13/18 1956   Coping/Psychosocial   Plan of Care Reviewed With patient   Plan of Care Review   Progress improving   OTHER   Outcome Summary PT VSS, afebrile, POD2 Left hip washout with antibiotic spacer placement, pain controlled with po pain medication, cont on IV antibiotics per PICC line. HGB 7 today. No orders for transfusion,        Problem: Fall Risk (Adult)  Goal: Absence of Fall  Outcome: Ongoing (interventions implemented as appropriate)

## 2018-08-13 NOTE — PLAN OF CARE
Problem: Patient Care Overview  Goal: Plan of Care Review  Outcome: Ongoing (interventions implemented as appropriate)   08/13/18 0359   Coping/Psychosocial   Plan of Care Reviewed With patient   Plan of Care Review   Progress improving   OTHER   Outcome Summary VSS, pain minimal, up with assist of 1, voiding per BSC, PICC in RUE-flushes well with good blood return, STAN in place with green light blinking, educated on BP monitoring with HX of post-op hypotension, home on DC      Goal: Individualization and Mutuality  Outcome: Ongoing (interventions implemented as appropriate)    Goal: Discharge Needs Assessment  Outcome: Ongoing (interventions implemented as appropriate)      Problem: Infection, Risk/Actual (Adult)  Goal: Infection Prevention/Resolution  Outcome: Ongoing (interventions implemented as appropriate)      Problem: Fall Risk (Adult)  Goal: Absence of Fall  Outcome: Ongoing (interventions implemented as appropriate)      Problem: Skin Injury Risk (Adult)  Goal: Skin Health and Integrity  Outcome: Ongoing (interventions implemented as appropriate)

## 2018-08-13 NOTE — PLAN OF CARE
Problem: Patient Care Overview  Goal: Plan of Care Review   08/13/18 4318   Coping/Psychosocial   Plan of Care Reviewed With patient   Plan of Care Review   Progress improving   OTHER   Outcome Summary Improved tolerance to functional activity this day with an increase in gait distance, decreased assist required for overall functional mobility, and initiation of L.E. ther. ex. program.

## 2018-08-13 NOTE — PROGRESS NOTES
Discharge Planning Assessment  [facility]     Patient Name: Rachel Moser  MRN: [id number]  Today's Date: 8/13/2018    Admit Date: 8/11/2018          Discharge Needs Assessment     Row Name 08/13/18 1044       Living Environment    Lives With spouse    Current Living Arrangements home/apartment/condo    Family Caregiver if Needed spouse    Quality of Family Relationships helpful;involved;supportive    Able to Return to Prior Arrangements yes       Resource/Environmental Concerns    Resource/Environmental Concerns none       Transition Planning    Patient/Family Anticipates Transition to home with family    Patient/Family Anticipated Services at Transition home health care    Transportation Anticipated family or friend will provide       Discharge Needs Assessment    Readmission Within the Last 30 Days previous discharge plan unsuccessful    Concerns to be Addressed denies needs/concerns at this time    Equipment Currently Used at Home walker, rolling;commode;cane, quad;bath bench    Discharge Facility/Level of Care Needs home with home health;nursing facility, skilled    Offered/Gave Vendor List yes            Discharge Plan     Row Name 08/13/18 1040       Plan    Plan Home w/ spouse and [facility].     Patient/Family in Agreement with Plan yes    Plan Comments S/w pt verified facesheet and d/c plan. Pt plans to d/c home w/ her spouse, she is agreeable w/ [facility] for home IV antibiotics. Pt is current w/ the [facility]U for abx infusions. We discussed possible need for DESMOND ; pt would like to discuss rehab w/ Dr. Barney before making any decisions. Road to Recovery book w/ DESMOND list left w/ pt at the bedside. Bill/[facility] notified. CCP will f/u.         Destination     No service coordination in this encounter.      Durable Medical Equipment     No service coordination in this encounter.      Dialysis/Infusion     No service coordination in this encounter.      Home Medical Care     No service  coordination in this encounter.      Social Care     No service coordination in this encounter.                Demographic Summary    No documentation.           Functional Status     Row Name 08/13/18 1045       Functional Status    Usual Activity Tolerance moderate    Current Activity Tolerance fair       Functional Status, IADL    Medications independent    Meal Preparation independent    Housekeeping assistive equipment    Laundry independent    Shopping assistive equipment       Mental Status    General Appearance WDL WDL       Mental Status Summary    Recent Changes in Mental Status/Cognitive Functioning no changes            Psychosocial    No documentation.           Abuse/Neglect    No documentation.           Legal    No documentation.           Substance Abuse    No documentation.           Patient Forms     Row Name 08/13/18 1045       Patient Forms    Provider Choice List Delivered    Delivered to Patient    Method of delivery In person          Christine Rivera RN

## 2018-08-14 ENCOUNTER — HOSPITAL ENCOUNTER (OUTPATIENT)
Dept: INFUSION THERAPY | Facility: HOSPITAL | Age: 73
Discharge: HOME | End: 2018-08-14

## 2018-08-14 ENCOUNTER — APPOINTMENT (OUTPATIENT)
Dept: MRI IMAGING | Facility: HOSPITAL | Age: 73
End: 2018-08-14

## 2018-08-14 LAB
BACTERIA SPEC AEROBE CULT: NORMAL
BACTERIA SPEC AEROBE CULT: NORMAL
CREAT BLD-MCNC: 0.36 MG/DL (ref 0.57–1)
GFR SERPL CREATININE-BSD FRML MDRD: >150 ML/MIN/1.73
GRAM STN SPEC: NORMAL
GRAM STN SPEC: NORMAL
INR PPP: 1.76 (ref 0.9–1.1)
PROTHROMBIN TIME: 20.2 SECONDS (ref 11.7–14.2)
VANCOMYCIN TROUGH SERPL-MCNC: 15.4 MCG/ML (ref 5–20)

## 2018-08-14 PROCEDURE — P9016 RBC LEUKOCYTES REDUCED: HCPCS

## 2018-08-14 PROCEDURE — 80202 ASSAY OF VANCOMYCIN: CPT | Performed by: INTERNAL MEDICINE

## 2018-08-14 PROCEDURE — 73721 MRI JNT OF LWR EXTRE W/O DYE: CPT

## 2018-08-14 PROCEDURE — 36430 TRANSFUSION BLD/BLD COMPNT: CPT

## 2018-08-14 PROCEDURE — 86923 COMPATIBILITY TEST ELECTRIC: CPT

## 2018-08-14 PROCEDURE — 99232 SBSQ HOSP IP/OBS MODERATE 35: CPT | Performed by: INTERNAL MEDICINE

## 2018-08-14 PROCEDURE — 97110 THERAPEUTIC EXERCISES: CPT

## 2018-08-14 PROCEDURE — 25010000002 VANCOMYCIN PER 500 MG: Performed by: INTERNAL MEDICINE

## 2018-08-14 PROCEDURE — 25010000002 ERTAPENEM PER 500 MG: Performed by: ORTHOPAEDIC SURGERY

## 2018-08-14 PROCEDURE — 82565 ASSAY OF CREATININE: CPT | Performed by: INTERNAL MEDICINE

## 2018-08-14 PROCEDURE — 86900 BLOOD TYPING SEROLOGIC ABO: CPT

## 2018-08-14 PROCEDURE — 85610 PROTHROMBIN TIME: CPT | Performed by: ORTHOPAEDIC SURGERY

## 2018-08-14 RX ADMIN — ACETAMINOPHEN 800 MCG: 400 TABLET ORAL at 07:52

## 2018-08-14 RX ADMIN — DOCUSATE SODIUM 100 MG: 100 CAPSULE, LIQUID FILLED ORAL at 21:58

## 2018-08-14 RX ADMIN — WARFARIN SODIUM 4 MG: 4 TABLET ORAL at 17:51

## 2018-08-14 RX ADMIN — ASPIRIN 325 MG: 325 TABLET, DELAYED RELEASE ORAL at 07:51

## 2018-08-14 RX ADMIN — HYDROCODONE BITARTRATE AND ACETAMINOPHEN 1 TABLET: 7.5; 325 TABLET ORAL at 21:59

## 2018-08-14 RX ADMIN — HYDROCODONE BITARTRATE AND ACETAMINOPHEN 1 TABLET: 7.5; 325 TABLET ORAL at 12:27

## 2018-08-14 RX ADMIN — FERROUS SULFATE TAB 325 MG (65 MG ELEMENTAL FE) 325 MG: 325 (65 FE) TAB at 07:51

## 2018-08-14 RX ADMIN — PANTOPRAZOLE SODIUM 40 MG: 40 TABLET, DELAYED RELEASE ORAL at 05:25

## 2018-08-14 RX ADMIN — SODIUM CHLORIDE 1 G: 900 INJECTION INTRAVENOUS at 16:08

## 2018-08-14 RX ADMIN — FUROSEMIDE 40 MG: 40 TABLET ORAL at 07:51

## 2018-08-14 RX ADMIN — VANCOMYCIN HYDROCHLORIDE 1000 MG: 1 INJECTION, SOLUTION INTRAVENOUS at 02:36

## 2018-08-14 RX ADMIN — ASPIRIN 325 MG: 325 TABLET, DELAYED RELEASE ORAL at 21:58

## 2018-08-14 NOTE — PLAN OF CARE
Problem: Patient Care Overview  Goal: Plan of Care Review  Outcome: Ongoing (interventions implemented as appropriate)   08/14/18 5183   Coping/Psychosocial   Plan of Care Reviewed With patient;family   Plan of Care Review   Progress improving   OTHER   Outcome Summary incr amb dist w/o complaints of incr pain

## 2018-08-14 NOTE — PROGRESS NOTES
ID note  CC: f/u PJI  S: No sig hip pain; no f/c/ns  D/w patient and  in detail today at bedside  O: AF, NAD, drains in place on L hip without sig erythema; trace edema     Microbiology:  8/11 Hip Cx: neg    Cr 0.36     A/P  1. 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 -hopefully now that all hardware is removed we will be able to cure this infection. Appreciate Dr Barney's diligent work with this patient.    2. Warfarin coagulopathy for treatment of DVT, carefully monitor INR while on abx.  -current INR 1.8

## 2018-08-14 NOTE — PLAN OF CARE
Problem: Patient Care Overview  Goal: Plan of Care Review  Outcome: Ongoing (interventions implemented as appropriate)   08/14/18 0135   Coping/Psychosocial   Plan of Care Reviewed With patient   Plan of Care Review   Progress improving   OTHER   Outcome Summary patient tranferring to [facility] with assistance, pain controlled at this time, patient educated on b/p monitoring     Goal: Individualization and Mutuality  Outcome: Ongoing (interventions implemented as appropriate)    Goal: Discharge Needs Assessment  Outcome: Ongoing (interventions implemented as appropriate)    Goal: Interprofessional Rounds/Family Conf  Outcome: Ongoing (interventions implemented as appropriate)      Problem: Infection, Risk/Actual (Adult)  Goal: Infection Prevention/Resolution  Outcome: Ongoing (interventions implemented as appropriate)      Problem: Fall Risk (Adult)  Goal: Absence of Fall  Outcome: Ongoing (interventions implemented as appropriate)      Problem: Skin Injury Risk (Adult)  Goal: Skin Health and Integrity  Outcome: Ongoing (interventions implemented as appropriate)

## 2018-08-14 NOTE — PROGRESS NOTES
Continued Stay Note  [facility]     Patient Name: Rachel Moser  MRN: [id number]  Today's Date: 8/14/2018    Admit Date: 8/11/2018          Discharge Plan     Row Name 08/14/18 1625       Plan    Plan tbd, referral pending w/ [facility].     Plan Comments Cost to patient for home IV abx w/ her part D benefits will be $2,050.00. Pt states she cannot afford home infusions and will return to ACU w/ [facility] for home physical therapy. We discussed DESMOND, pt is agreeable w/ a referral to [facility] notified and will eval.               Discharge Codes    No documentation.           Christine Rivera RN

## 2018-08-14 NOTE — DISCHARGE PLACEMENT REQUEST
"Meli Moser (72 y.o. Female)     Date of Birth Social Security Number Address Home Phone MRN    1945  [address] [address] [phone] [id number]    Muslim Marital Status          None        Admission Date Admission Type Admitting Provider Attending Provider Department, Room/Bed    8/11/18 Elective Obed Barney MD Brown, Reid B, MD [address], 76/1    Discharge Date Discharge Disposition Discharge Destination                       Attending Provider:  Obed Barney MD    Allergies:  Sulfa Antibiotics    Isolation:  Contact   Infection:  ESBL E coli (07/09/18)   Code Status:  CPR    Ht:  149.8 cm (58.98\")   Wt:  57.9 kg (127 lb 9 oz)    Admission Cmt:  None   Principal Problem:  Pain of left hip joint [M25.552] More...                 Active Insurance as of 8/11/2018     Primary Coverage     Payor Plan Insurance Group Employer/Plan Group    MEDICARE MEDICARE A & B      Payor Plan Address Payor Plan Phone Number Effective From Effective To    [address] [phone] 12/1/2011     [facility] [id number]       Subscriber Name Subscriber Birth Date Member ID       MELI MOSER 1945 [id number]           Secondary Coverage     Payor Plan Insurance Group Employer/Plan Group    [employer] [place] [employer] [place]      Payor Plan Address Payor Plan Phone Number Effective From Effective To    [employer] [place] [phone] 9/1/2012     [place] [address]       Subscriber Name Subscriber Birth Date Member ID       MELI MOSER 1945 [id number]                 Emergency Contacts      (Rel.) Home Phone Work Phone Mobile Phone    Ramone Moser (Spouse) [phone] -- [phone]    Ramone Moser Jr (Son) -- -- [phone]              "

## 2018-08-14 NOTE — PROGRESS NOTES
Orthopedic Total Hip Progress Note      Patient: Rachel Moser  Date of Admission: 8/11/2018  YOB: 1945  Medical Record Number: [id number]    POD # :  3 Days Post-Op Procedure(s) (LRB):  TOTAL HIP ARTHROPLASTY REVISION with removal of infected total hip and placement of antibiotic spacer (Left)    Systemic or Specific Complaints: No Complaints    Pain Relief: some relief    Physical Exam:  72 y.o.  female  Vitals:  Temp:  [97.6 °F (36.4 °C)-98.6 °F (37 °C)] 98 °F (36.7 °C)  Heart Rate:  [76-92] 83  Resp:  [16] 16  BP: ()/(51-66) 116/66  alert and oriented  Chest: Clear to auscultation  CV: Regular Rate and Rhythm  Abd: Soft, NT, with BS +  Ext: NV intact. ROM appropriate. Calf is soft and nontender. Negative Homans Sn  Skin: Incision clean dry and intact w/out signs or  symptoms of infection.    Activity: Mobilizing Per P.T.   Weight Bearing: As Tolerated    Data Review     Admission on 08/11/2018   Component Date Value Ref Range Status   • Product Code 08/12/2018 Q4906R89   Final   • Unit Number 08/12/2018 [id number]   Final   • UNIT  ABO 08/12/2018 A   Final   • UNIT  RH 08/12/2018 POS   Final   • Dispense Status 08/12/2018 PT   Final   • Blood Type 08/12/2018 APOS   Final   • Blood Expiration Date 08/12/2018 201808222359   Final   • Blood Type Barcode 08/12/2018 6200   Final   • Product Code 08/12/2018 [id number]   Final   • Unit Number 08/12/2018 [id number]   Final   • UNIT  ABO 08/12/2018 A   Final   • UNIT  RH 08/12/2018 POS   Final   • Dispense Status 08/12/2018 PT   Final   • Blood Type 08/12/2018 APOS   Final   • Blood Expiration Date 08/12/2018 201808222359   Final   • Blood Type Barcode 08/12/2018 6200   Final   • ABO Type 08/11/2018 A   Final   • RH type 08/11/2018 Positive   Final   • Antibody Screen 08/11/2018 Negative   Final   • T&S Expiration Date 08/11/2018 8/14/2018 11:59:59 PM   Final   • Protime 08/11/2018 15.1* 11.7 - 14.2 Seconds Final   • INR 08/11/2018 1.21*  0.90 - 1.10 Final   • WBC 08/11/2018 10.50  4.50 - 10.70 10*3/mm3 Final   • RBC 08/11/2018 3.41* 3.90 - 5.20 10*6/mm3 Final   • Hemoglobin 08/11/2018 9.3* 11.9 - 15.5 g/dL Final   • Hematocrit 08/11/2018 31.8* 35.6 - 45.5 % Final   • MCV 08/11/2018 93.3  80.5 - 98.2 fL Final   • MCH 08/11/2018 27.3  26.9 - 32.0 pg Final   • MCHC 08/11/2018 29.2* 32.4 - 36.3 g/dL Final   • RDW 08/11/2018 14.8* 11.7 - 13.0 % Final   • RDW-SD 08/11/2018 50.0  37.0 - 54.0 fl Final   • MPV 08/11/2018 11.6  6.0 - 12.0 fL Final   • Platelets 08/11/2018 429  140 - 500 10*3/mm3 Final   • Wound Culture 08/11/2018 No growth at 3 days   Final   • Gram Stain Result 08/11/2018 No organisms seen   Final   • Wound Culture 08/11/2018 No growth at 3 days   Final   • Gram Stain Result 08/11/2018 No organisms seen   Final   • Protime 08/11/2018 15.8* 11.7 - 14.2 Seconds Final   • INR 08/11/2018 1.28* 0.90 - 1.10 Final   • Protime 08/12/2018 16.8* 11.7 - 14.2 Seconds Final   • INR 08/12/2018 1.39* 0.90 - 1.10 Final   • Hemoglobin 08/12/2018 8.9* 11.9 - 15.5 g/dL Final   • Hematocrit 08/12/2018 28.6* 35.6 - 45.5 % Final   • Protime 08/13/2018 19.7* 11.7 - 14.2 Seconds Final   • INR 08/13/2018 1.70* 0.90 - 1.10 Final   • WBC 08/13/2018 10.47  4.50 - 10.70 10*3/mm3 Final   • RBC 08/13/2018 2.36* 3.90 - 5.20 10*6/mm3 Final   • Hemoglobin 08/13/2018 7.0* 11.9 - 15.5 g/dL Final   • Hematocrit 08/13/2018 21.5* 35.6 - 45.5 % Final   • MCV 08/13/2018 91.1  80.5 - 98.2 fL Final   • MCH 08/13/2018 29.7  26.9 - 32.0 pg Final   • MCHC 08/13/2018 32.6  32.4 - 36.3 g/dL Final   • RDW 08/13/2018 15.7* 11.7 - 13.0 % Final   • RDW-SD 08/13/2018 51.6  37.0 - 54.0 fl Final   • MPV 08/13/2018 10.9  6.0 - 12.0 fL Final   • Platelets 08/13/2018 236  140 - 500 10*3/mm3 Final   • Creatinine 08/13/2018 0.45* 0.57 - 1.00 mg/dL Final   • eGFR Non African Amer 08/13/2018 137  >60 mL/min/1.73 Final   • Sed Rate 08/13/2018 89* 0 - 30 mm/hr Final   • C-Reactive Protein 08/13/2018  3.70* 0.00 - 0.50 mg/dL Final   • Vancomycin Trough 08/14/2018 15.40  5.00 - 20.00 mcg/mL Final   • Protime 08/14/2018 20.2* 11.7 - 14.2 Seconds Final   • INR 08/14/2018 1.76* 0.90 - 1.10 Final   • Creatinine 08/14/2018 0.36* 0.57 - 1.00 mg/dL Final   • eGFR Non  Amer 08/14/2018 >150  >60 mL/min/1.73 Final       No results found.    Medications:    aspirin 325 mg Oral Q12H   docusate sodium 100 mg Oral BID   ertapenem (INVanz) 1 g in NS 100mL (MBP) 1 g Intravenous Q24H   ferrous sulfate 325 mg Oral Daily With Breakfast   folic acid 800 mcg Oral QAM   furosemide 40 mg Oral QAM   pantoprazole 40 mg Oral Q AM   polyethylene glycol 17 g Oral BID   vancomycin 1,000 mg Intravenous Q12H   warfarin 4 mg Oral Daily     •  albuterol  •  HYDROcodone-acetaminophen  •  HYDROcodone-acetaminophen  •  melatonin  •  ondansetron **OR** ondansetron ODT **OR** ondansetron  •  Pharmacy to dose vancomycin    Assessment:  Doing well POD  # 3 Days Post-Op Procedure(s) (LRB):  TOTAL HIP ARTHROPLASTY REVISION with removal of infected total hip and placement of antibiotic spacer (Left)  Problem List Items Addressed This Visit        Nervous and Auditory    * (Principal)Pain of left hip joint    Relevant Medications    ceFAZolin in dextrose (ANCEF) IVPB solution 2 g (Completed)    Other Relevant Orders    Anaerobic Culture - Wound, Hip, Left    Anaerobic Culture - Wound, Hip, Left    Wound Culture - Wound, Hip, Left (Completed)    Wound Culture - Wound, Hip, Left (Completed)      Other Visit Diagnoses     Postoperative anemia due to acute blood loss        Relevant Orders    Prepare RBC, 2 Units          Plan:  Posterior hip precautions  Continue efforts to mobilize  Continue Pain Control Measures  Continue incisional Care  DVT prophylaxis -  Coumadin  Will order MRI to eval knee and distal thigh pain  Trasfusse 2 u prbcs for blood loss anemia    Discharge Plan:Home tomorrow if deemed safe by PT, if not then Rehab    Obed Barney,  MD    Date: 8/14/2018  Time: 7:35 AM

## 2018-08-14 NOTE — PLAN OF CARE
Problem: Patient Care Overview  Goal: Plan of Care Review  Outcome: Ongoing (interventions implemented as appropriate)   08/14/18 1740   Coping/Psychosocial   Plan of Care Reviewed With patient   Plan of Care Review   Progress improving   OTHER   Outcome Summary POD 3, L. hip antibiotic spacer placed. Posterior hip px. PICC line flushed, blood return noted. VSS. 2 Units RBC's transfused today. MRI of knee performed today, d/t pain. Hopefully d/c tomorrow. Pending cert at [facility].       Problem: Infection, Risk/Actual (Adult)  Goal: Infection Prevention/Resolution  Outcome: Ongoing (interventions implemented as appropriate)   08/14/18 1740   Infection, Risk/Actual (Adult)   Infection Prevention/Resolution making progress toward outcome       Problem: Fall Risk (Adult)  Goal: Absence of Fall  Outcome: Outcome(s) achieved Date Met: 08/14/18 08/14/18 1740   Fall Risk (Adult)   Absence of Fall achieves outcome       Problem: Skin Injury Risk (Adult)  Goal: Skin Health and Integrity  Outcome: Ongoing (interventions implemented as appropriate)   08/14/18 1740   Skin Injury Risk (Adult)   Skin Health and Integrity making progress toward outcome

## 2018-08-14 NOTE — THERAPY TREATMENT NOTE
Acute Care - Physical Therapy Treatment Note  [facility]     Patient Name: Rachel Moser  : 1945  MRN: [id number]  Today's Date: 2018  Onset of Illness/Injury or Date of Surgery: 18  Date of Referral to PT: 18  Referring Physician: Ector    Admit Date: 2018    Visit Dx:    ICD-10-CM ICD-9-CM   1. Postoperative anemia due to acute blood loss D62 285.1   2. Pain of left hip joint M25.552 719.45     Patient Active Problem List   Diagnosis   • Chronic left hip pain   • OA (osteoarthritis) of hip   • Hip dislocation, left (CMS/HCC)   • Status post left hip replacement   • Hypertension   • Rheumatoid arteritis   • History of DVT (deep vein thrombosis)   • Postoperative hypotension   • S/P revision of total hip   • Septic arthritis (CMS/HCC)   • Status post total hip replacement, left   • Pain of left hip joint       Therapy Treatment          Rehabilitation Treatment Summary     Row Name 18 1525             Treatment Time/Intention    Discipline physical therapy assistant  -      Subjective Information complains of;fatigue;pain  -      Care Plan Review patient/other agree to care plan  -      Care Plan Review, Other Participant(s) family  -      Comment pt states the doctor is going to leave hip out for about 9 mos  -      Existing Precautions/Restrictions fall;weight bearing  -      Treatment Considerations/Comments TTWB LLE, has 2 drains, [place]  -      Recorded by [] Michelle Jamison PTA 18 1530      Row Name 18 1525             Bed Mobility Assessment/Treatment    Comment (Bed Mobility) in chair  -      Recorded by [] Michelle Jamison PTA 18 1530      Row Name 18 1525             Sit-Stand Transfer    Sit-Stand Reasnor (Transfers) minimum assist (75% patient effort)  -      Assistive Device (Sit-Stand Transfers) walker, front-wheeled  -      Recorded by [] Michelle Jamison PTA 18 1530      Row Name 18 1525              Stand-Sit Transfer    Stand-Sit [place] (Transfers) contact guard;verbal cues;nonverbal cues (demo/gesture)  -      Assistive Device (Stand-Sit Transfers) walker, front-wheeled  -      Recorded by [JM] Michelle Jamison PTA 08/14/18 1530      Row Name 08/14/18 1525             Positioning and Restraints    Pre-Treatment Position sitting in chair/recliner  -JM      In Chair reclined;call light within reach;encouraged to call for assist;with family/caregiver  -      Recorded by [LEXA] Michelle Jamison PTA 08/14/18 1530      Row Name 08/14/18 1525             Pain Scale: Numbers Pre/Post-Treatment    Pain Scale: Numbers, Pretreatment 2/10  -      Pain Scale: Numbers, Post-Treatment 3/10  -JM      Pain Location - Side Left  -      Pain Location hip  -      Pre/Post Treatment Pain Comment had meds prior to PT, tolerated  -      Recorded by [LEXA] Michelle Jamison PTA 08/14/18 1530      Row Name                Wound 08/11/18 1244 Left hip incision    Wound - Properties Group Date first assessed: 08/11/18 [RM] Time first assessed: 1244 [RM] Side: Left [RM] Location: hip [RM] Type: incision [RM] Recorded by:  [] Krissy Cintron RN 08/11/18 1244      User Key  (r) = Recorded By, (t) = Taken By, (c) = Cosigned By    Initials Name Effective Dates Discipline     Krissy Cintron RN 06/16/16 -  Nurse    Michelle Fay PTA 03/07/18 -  PT          Wound 08/11/18 1244 Left hip incision (Active)   Dressing Appearance dry;intact;no drainage 8/14/2018 12:27 PM   Closure REINALDO 8/14/2018 12:27 PM   Base dressing in place, unable to visualize 8/13/2018  7:42 PM   Periwound pink;swelling 8/13/2018  4:00 PM   Periwound Temperature warm 8/13/2018  4:00 PM   Periwound Skin Turgor soft 8/13/2018  4:00 PM   Drainage Characteristics/Odor sanguineous 8/13/2018  4:00 PM   Drainage Amount none 8/14/2018 12:27 PM             Physical Therapy Education     Title: PT OT SLP Therapies (Done)     Topic: Physical  Therapy (Done)     Point: Mobility training (Done)    Learning Progress Summary     Learner Status Readiness Method Response Comment Documented by    Patient Done Shyxavi NOONAN,TIARRA MELGAR   08/14/18 1530     Done Acceptance E,D VU,NR  MS 08/13/18 0929     Active Acceptance E NR   08/12/18 1400    Family Done Dylan NOONAN,TIARRA MELGAR   08/14/18 1530          Point: Home exercise program (Done)    Learning Progress Summary     Learner Status Readiness Method Response Comment Documented by    Patient Done Dylan SHAISTA,TIARRA MELGAR   08/14/18 1530     Done Acceptance E,D VU,NR  MS 08/13/18 0929     Active Acceptance E NR   08/12/18 1400    Family Done Dylan E,TIARRA MELGAR   08/14/18 1530          Point: Body mechanics (Done)    Learning Progress Summary     Learner Status Readiness Method Response Comment Documented by    Patient Done Dylan SHAISTA,TIARRA MELGAR   08/14/18 1530     Done Acceptance E,D VU,NR  MS 08/13/18 0929     Active Acceptance E NR   08/12/18 1400    Family Done Dylan E,TIARRA MELGAR   08/14/18 1530          Point: Precautions (Done)    Learning Progress Summary     Learner Status Readiness Method Response Comment Documented by    Patient Done Dylan SHAISTA,TIARRA MELGAR   08/14/18 1530     Done Acceptance E,D VU,NR  MS 08/13/18 0929     Active Acceptance E NR   08/12/18 1400    Family Done Shyxavi SHAISTA,TIARRA MELGAR   08/14/18 1530                      User Key     Initials Effective Dates Name Provider Type Discipline     03/07/18 -  Michelle Jamison PTA Physical Therapy Assistant PT    MS 04/03/18 -  Juancarlos Burleson PT Physical Therapist PT     04/03/18 -  Werner Espinoza, OSIEL Physical Therapist PT                    PT Recommendation and Plan     Plan of Care Reviewed With: patient, family  Progress: improving  Outcome Summary: incr amb dist w/o complaints of incr pain          Outcome Measures     Row Name 08/14/18 1500 08/13/18 0900 08/12/18 1404       How much help from another person do you currently need...    Turning from your back to your side  while in flat bed without using bedrails? 3  - 3  -MS 2  -CH    Moving from lying on back to sitting on the side of a flat bed without bedrails? 3  -JM 2  -MS 2  -CH    Moving to and from a bed to a chair (including a wheelchair)? 3  -JM 3  -MS 2  -CH    Standing up from a chair using your arms (e.g., wheelchair, bedside chair)? 3  -JM 3  -MS 2  -CH    Climbing 3-5 steps with a railing? 1  -JM 2  -MS 1  -CH    To walk in hospital room? 3  -JM 3  -MS 2  -CH    AM-PAC 6 Clicks Score 16  -JM 16  -MS 11  -CH       Functional Assessment    Outcome Measure Options  -- AM-PAC 6 Clicks Basic Mobility (PT)  -MS AM-PAC 6 Clicks Basic Mobility (PT)  -      User Key  (r) = Recorded By, (t) = Taken By, (c) = Cosigned By    Initials Name Provider Type    Michelle Fay PTA Physical Therapy Assistant    Juancarlos Ryan, PT Physical Therapist    Werner Ennis, PT Physical Therapist           Time Calculation:         PT Charges     Row Name 08/14/18 1531             Time Calculation    Start Time 1505  -      Stop Time 1525  -      Time Calculation (min) 20 min  -      PT Received On 08/14/18  -      PT - Next Appointment 08/15/18  -         Time Calculation- PT    Total Timed Code Minutes- PT 20 minute(s)  -        User Key  (r) = Recorded By, (t) = Taken By, (c) = Cosigned By    Initials Name Provider Type    Michelle Fay PTA Physical Therapy Assistant        Therapy Suggested Charges     Code   Minutes Charges    None           Therapy Charges for Today     Code Description Service Date Service Provider Modifiers Qty    33088341773 HC PT THER PROC EA 15 MIN 8/14/2018 Michelle Jamison PTA GP 1          PT G-Codes  Outcome Measure Options: AM-PAC 6 Clicks Basic Mobility (PT)    Michelle Jamison PTA  8/14/2018

## 2018-08-15 ENCOUNTER — HOSPITAL ENCOUNTER (OUTPATIENT)
Dept: INFUSION THERAPY | Facility: HOSPITAL | Age: 73
Discharge: HOME | End: 2018-08-15

## 2018-08-15 LAB
ABO + RH BLD: NORMAL
ABO + RH BLD: NORMAL
BH BB BLOOD EXPIRATION DATE: NORMAL
BH BB BLOOD EXPIRATION DATE: NORMAL
BH BB BLOOD TYPE BARCODE: 6200
BH BB BLOOD TYPE BARCODE: 6200
BH BB DISPENSE STATUS: NORMAL
BH BB DISPENSE STATUS: NORMAL
BH BB PRODUCT CODE: NORMAL
BH BB PRODUCT CODE: NORMAL
BH BB UNIT NUMBER: NORMAL
BH BB UNIT NUMBER: NORMAL
INR PPP: 1.8 (ref 0.9–1.1)
PROTHROMBIN TIME: 20.6 SECONDS (ref 11.7–14.2)
UNIT  ABO: NORMAL
UNIT  ABO: NORMAL
UNIT  RH: NORMAL
UNIT  RH: NORMAL

## 2018-08-15 PROCEDURE — 25010000002 ERTAPENEM PER 500 MG: Performed by: ORTHOPAEDIC SURGERY

## 2018-08-15 PROCEDURE — 99232 SBSQ HOSP IP/OBS MODERATE 35: CPT | Performed by: INTERNAL MEDICINE

## 2018-08-15 PROCEDURE — 85610 PROTHROMBIN TIME: CPT | Performed by: ORTHOPAEDIC SURGERY

## 2018-08-15 RX ADMIN — WARFARIN SODIUM 4 MG: 4 TABLET ORAL at 17:15

## 2018-08-15 RX ADMIN — ACETAMINOPHEN 800 MCG: 400 TABLET ORAL at 09:02

## 2018-08-15 RX ADMIN — PANTOPRAZOLE SODIUM 40 MG: 40 TABLET, DELAYED RELEASE ORAL at 05:16

## 2018-08-15 RX ADMIN — HYDROCODONE BITARTRATE AND ACETAMINOPHEN 1 TABLET: 7.5; 325 TABLET ORAL at 17:29

## 2018-08-15 RX ADMIN — SODIUM CHLORIDE 1 G: 900 INJECTION INTRAVENOUS at 15:59

## 2018-08-15 RX ADMIN — DOCUSATE SODIUM 100 MG: 100 CAPSULE, LIQUID FILLED ORAL at 20:39

## 2018-08-15 RX ADMIN — ASPIRIN 325 MG: 325 TABLET, DELAYED RELEASE ORAL at 20:39

## 2018-08-15 RX ADMIN — DOCUSATE SODIUM 100 MG: 100 CAPSULE, LIQUID FILLED ORAL at 09:03

## 2018-08-15 RX ADMIN — POLYETHYLENE GLYCOL 3350 17 G: 17 POWDER, FOR SOLUTION ORAL at 09:02

## 2018-08-15 RX ADMIN — HYDROCODONE BITARTRATE AND ACETAMINOPHEN 1 TABLET: 7.5; 325 TABLET ORAL at 22:17

## 2018-08-15 RX ADMIN — FERROUS SULFATE TAB 325 MG (65 MG ELEMENTAL FE) 325 MG: 325 (65 FE) TAB at 09:03

## 2018-08-15 RX ADMIN — ASPIRIN 325 MG: 325 TABLET, DELAYED RELEASE ORAL at 09:02

## 2018-08-15 RX ADMIN — FUROSEMIDE 40 MG: 40 TABLET ORAL at 09:02

## 2018-08-15 NOTE — PLAN OF CARE
Problem: Patient Care Overview  Goal: Plan of Care Review  Outcome: Ongoing (interventions implemented as appropriate)   08/15/18 0154   Coping/Psychosocial   Plan of Care Reviewed With patient   Plan of Care Review   Progress improving   OTHER   Outcome Summary POD 4. VSS. 2 UNITS OF BLOOD TRANSFUSED YESTERDAY, AMBULATING FINE. PICC LINE TO SHANI. RIGHT HIP SWOLLEN. HV DRAIN IN PLACE. BRP. POSSIBLE D/C TODAY. PATIENT IS IN CONTACT ISOLATION FOE ESPL. EDUCATION PROVIDED ON THE IMPORTANCE OF BLOOD PRESSURE MONITORING AND TAKING BP MEDICATION IF NEEDED.      Goal: Individualization and Mutuality  Outcome: Ongoing (interventions implemented as appropriate)    Goal: Discharge Needs Assessment  Outcome: Ongoing (interventions implemented as appropriate)      Problem: Infection, Risk/Actual (Adult)  Goal: Infection Prevention/Resolution  Outcome: Ongoing (interventions implemented as appropriate)      Problem: Skin Injury Risk (Adult)  Goal: Skin Health and Integrity  Outcome: Ongoing (interventions implemented as appropriate)

## 2018-08-15 NOTE — PROGRESS NOTES
Continued Stay Note  [facility]     Patient Name: Rachel Moser  MRN: [id number]  Today's Date: 8/15/2018    Admit Date: 8/11/2018          Discharge Plan     Row Name 08/15/18 1107       Plan    Plan Home w/ spouse, [facility] (physical therapy) & [facility] (iv abx infusions)     Patient/Family in Agreement with Plan yes    Plan Comments f/u w/ pt and spouse at the bedside. Plan will be for pt to d/c home w/ H for home therapy. She will continue to go to the [facility] for her scheduled IV abx infusions. CCP will f/u w/ ID orders and [facility]. Bill/[facility] following.               Discharge Codes    No documentation.           Christine Rivera RN

## 2018-08-15 NOTE — PLAN OF CARE
Problem: Patient Care Overview  Goal: Plan of Care Review  Outcome: Ongoing (interventions implemented as appropriate)   08/15/18 1321   Coping/Psychosocial   Plan of Care Reviewed With patient   Plan of Care Review   Progress improving   OTHER   Outcome Summary oain under contrl with pain medication and up yto chair with assost. patient educated about htn and htn meds      08/15/18 1321   Coping/Psychosocial   Plan of Care Reviewed With patient   Plan of Care Review   Progress improving   OTHER   Outcome Summary oain under contrl with pain medication and up yto chair with assost. patient educated about htn and htn meds      08/15/18 1321   Coping/Psychosocial   Plan of Care Reviewed With patient   Plan of Care Review   Progress improving   OTHER   Outcome Summary oain under contrl with pain medication and up yto chair with assost. patient educated about htn and htn meds     Goal: Individualization and Mutuality  Outcome: Ongoing (interventions implemented as appropriate)    Goal: Discharge Needs Assessment  Outcome: Ongoing (interventions implemented as appropriate)    Goal: Interprofessional Rounds/Family Conf  Outcome: Ongoing (interventions implemented as appropriate)      Problem: Infection, Risk/Actual (Adult)  Goal: Infection Prevention/Resolution  Outcome: Ongoing (interventions implemented as appropriate)      Problem: Skin Injury Risk (Adult)  Goal: Skin Health and Integrity  Outcome: Ongoing (interventions implemented as appropriate)

## 2018-08-15 NOTE — PLAN OF CARE
Problem: Patient Care Overview  Goal: Plan of Care Review  Outcome: Ongoing (interventions implemented as appropriate)   08/15/18 1232   Coping/Psychosocial   Plan of Care Reviewed With patient;spouse   Plan of Care Review   Progress improving   OTHER   Outcome Summary incr amb dist w/o incr pain

## 2018-08-15 NOTE — THERAPY TREATMENT NOTE
Acute Care - Physical Therapy Treatment Note  [facility]     Patient Name: Rachel Moser  : 1945  MRN: [id number]  Today's Date: 8/15/2018  Onset of Illness/Injury or Date of Surgery: 18  Date of Referral to PT: 18  Referring Physician: Ector    Admit Date: 2018    Visit Dx:    ICD-10-CM ICD-9-CM   1. Postoperative anemia due to acute blood loss D62 285.1   2. Pain of left hip joint M25.552 719.45     Patient Active Problem List   Diagnosis   • Chronic left hip pain   • OA (osteoarthritis) of hip   • Hip dislocation, left (CMS/HCC)   • Status post left hip replacement   • Hypertension   • Rheumatoid arteritis   • History of DVT (deep vein thrombosis)   • Postoperative hypotension   • S/P revision of total hip   • Septic arthritis (CMS/HCC)   • Status post total hip replacement, left   • Pain of left hip joint       Therapy Treatment          Rehabilitation Treatment Summary     Row Name 08/15/18 1120             Treatment Time/Intention    Discipline physical therapy assistant  -      Document Type therapy note (daily note)  -LEXA      Subjective Information complains of;fatigue  -      Care Plan Review patient/other agree to care plan  -      Care Plan Review, Other Participant(s) spouse  -      Comment only one drain from left hip today  -      Existing Precautions/Restrictions fall;weight bearing  -      Treatment Considerations/Comments TTWB LLE  -      Recorded by [] Michelle Jamison PTA 08/15/18 1232      Row Name 08/15/18 1120             Sit-Stand Transfer    Sit-Stand [place] (Transfers) contact guard  -LEXA      Assistive Device (Sit-Stand Transfers) walker, front-wheeled  -LEXA      Recorded by [JM] Michelle Jamison PTA 08/15/18 1232      Row Name 08/15/18 1120             Stand-Sit Transfer    Stand-Sit [place] (Transfers) contact guard;verbal cues;nonverbal cues (demo/gesture)  -LEXA      Assistive Device (Stand-Sit Transfers) walker, front-wheeled  -LEXA       Recorded by [JM] Michelle Jamison PTA 08/15/18 1232      Row Name 08/15/18 1120             Gait/Stairs Assessment/Training    [place] Level (Gait) contact guard  -      Assistive Device (Gait) walker, front-wheeled  -      Distance in Feet (Gait) 50  -JM      Deviations/Abnormal Patterns (Gait) yanni decreased  -      Bilateral Gait Deviations forward flexed posture  -      Recorded by [JM] Michelle Jamison PTA 08/15/18 1232      Row Name 08/15/18 1120             Positioning and Restraints    Pre-Treatment Position sitting in chair/recliner  -      In Chair reclined;call light within reach;encouraged to call for assist;with family/caregiver  -JM      Recorded by [JM] Michelle Jamison PTA 08/15/18 1232      Row Name 08/15/18 1120             Pain Scale: Numbers Pre/Post-Treatment    Pain Scale: Numbers, Pretreatment 1/10  -      Pain Scale: Numbers, Post-Treatment 2/10  -JM      Pain Location - Side Left  -      Pain Location hip  -JM      Recorded by [JM] Michelle Jamison PTA 08/15/18 1232      Row Name                Wound 08/11/18 1244 Left hip incision    Wound - Properties Group Date first assessed: 08/11/18 [RM] Time first assessed: 1244 [RM] Side: Left [RM] Location: hip [RM] Type: incision [RM] Recorded by:  [] Krissy Cintron RN 08/11/18 1244      User Key  (r) = Recorded By, (t) = Taken By, (c) = Cosigned By    Initials Name Effective Dates Discipline     Krissy Cintron RN 06/16/16 -  Nurse    Michelle Fay PTA 03/07/18 -  PT          Wound 08/11/18 1244 Left hip incision (Active)   Dressing Appearance dry;intact;no drainage 8/15/2018 12:00 PM   Closure REINALDO 8/15/2018 12:00 PM   Base dressing in place, unable to visualize 8/15/2018 12:00 PM   Periwound pink;swelling 8/15/2018 12:00 PM   Periwound Temperature warm 8/15/2018 12:00 PM   Periwound Skin Turgor soft 8/15/2018 12:00 PM   Drainage Characteristics/Odor sanguineous 8/15/2018 12:00 PM   Drainage Amount  none 8/15/2018 12:00 PM             Physical Therapy Education     Title: PT OT SLP Therapies (Done)     Topic: Physical Therapy (Done)     Point: Mobility training (Done)    Learning Progress Summary     Learner Status Readiness Method Response Comment Documented by    Patient Done Eager E FLY   08/15/18 1233     Done Eager E,TB VU   08/14/18 1530     Done Acceptance E,D VU,NR  MS 08/13/18 0929     Active Acceptance E NR   08/12/18 1400    Family Done Eager E FLY   08/15/18 1233     Done Eager E,TB VU   08/14/18 1530          Point: Home exercise program (Done)    Learning Progress Summary     Learner Status Readiness Method Response Comment Documented by    Patient Done Eager E FLY   08/15/18 1233     Done Eager E,TB VU   08/14/18 1530     Done Acceptance E,D VU,NR  MS 08/13/18 0929     Active Acceptance E NR   08/12/18 1400    Family Done Eager E FLY   08/15/18 1233     Done Eager E,TB TALIA   08/14/18 1530          Point: Body mechanics (Done)    Learning Progress Summary     Learner Status Readiness Method Response Comment Documented by    Patient Done Eager E FLY   08/15/18 1233     Done Eager E,TB TALIA   08/14/18 1530     Done Acceptance E,D VU,NR  MS 08/13/18 0929     Active Acceptance E NR   08/12/18 1400    Family Done Eager E [facility] 08/15/18 1233     Done Eager E,TB TALIA   08/14/18 1530          Point: Precautions (Done)    Learning Progress Summary     Learner Status Readiness Method Response Comment Documented by    Patient Done Eager E FLY   08/15/18 1233     Done Eager E,TB TALIA   08/14/18 1530     Done Acceptance E,D VU,NR  MS 08/13/18 0929     Active Acceptance E NR   08/12/18 1400    Family Done Eager E FLY   08/15/18 1233     Done Eager E,TB TALIA   08/14/18 1530                      User Key     Initials Effective Dates Name Provider Type Discipline     03/07/18 -  Michelle Jamison, PTA Physical Therapy Assistant PT    MS 04/03/18 -  Juancarlos Burleson, PT Physical Therapist  PT    CH 04/03/18 -  Werner Espinoza, PT Physical Therapist PT                    PT Recommendation and Plan     Plan of Care Reviewed With: patient, spouse  Progress: improving  Outcome Summary: incr amb dist w/o incr pain          Outcome Measures     Row Name 08/14/18 1500 08/13/18 0900 08/12/18 1404       How much help from another person do you currently need...    Turning from your back to your side while in flat bed without using bedrails? 3  - 3  -MS 2  -CH    Moving from lying on back to sitting on the side of a flat bed without bedrails? 3  -JM 2  -MS 2  -CH    Moving to and from a bed to a chair (including a wheelchair)? 3  - 3  -MS 2  -CH    Standing up from a chair using your arms (e.g., wheelchair, bedside chair)? 3  - 3  -MS 2  -CH    Climbing 3-5 steps with a railing? 1  - 2  -MS 1  -CH    To walk in hospital room? 3  - 3  -MS 2  -CH    AM-PAC 6 Clicks Score 16  - 16  -MS 11  -CH       Functional Assessment    Outcome Measure Options  -- AM-PAC 6 Clicks Basic Mobility (PT)  -MS AM-PAC 6 Clicks Basic Mobility (PT)  -      User Key  (r) = Recorded By, (t) = Taken By, (c) = Cosigned By    Initials Name Provider Type    Michelle Fay PTA Physical Therapy Assistant    Juancarlos Ryan LYNNE, PT Physical Therapist    CH Werner Espinoza, PT Physical Therapist           Time Calculation:         PT Charges     Row Name 08/15/18 1144             Time Calculation    Start Time 1055  -      Stop Time 1110  -      Time Calculation (min) 15 min  -LEXA      PT Received On 08/15/18  -LEXA      PT - Next Appointment 08/16/18  -LEXA         Time Calculation- PT    Total Timed Code Minutes- PT 15 minute(s)  -LEXA        User Key  (r) = Recorded By, (t) = Taken By, (c) = Cosigned By    Initials Name Provider Type    Michelle Fay PTA Physical Therapy Assistant        Therapy Suggested Charges     Code   Minutes Charges    None           Therapy Charges for Today     Code Description Service Date  Service Provider Modifiers Qty    18357396161 HC PT THER PROC EA 15 MIN 8/14/2018 Michelle Jamison, PTA GP 1          PT G-Codes  Outcome Measure Options: AM-PAC 6 Clicks Basic Mobility (PT)    Michelle Jamison PTA  8/15/2018

## 2018-08-15 NOTE — PROGRESS NOTES
LOS: 4 days     Chief Complaint:  Prosthetic left hip infection    Interval History:  Afebrile, denies any abdominal pain nausea vomiting or diarrhea.  Left hip pain well-controlled.  Tolerating antibiotics without a rash    Vital Signs  Temp:  [96.6 °F (35.9 °C)-98.5 °F (36.9 °C)] 98.5 °F (36.9 °C)  Heart Rate:  [77-92] 80  Resp:  [16] 16  BP: (100-128)/(55-69) 114/67    Physical Exam:  General: In no acute distress  Cardiovascular: RRR, + LE edema   Respiratory: Breathing comfortably on room air  GI: Soft, NT/ND, + bowel sounds bilaterally,   Skin: No rashes   + RUE PICC without erythema or purulence     Antibiotics:   Ertapenem 1 g IV every 24 hours    Results Review:    Lab Results   Component Value Date    WBC 10.47 08/13/2018    HGB 7.0 (L) 08/13/2018    HCT 21.5 (L) 08/13/2018    MCV 91.1 08/13/2018     08/13/2018     Lab Results   Component Value Date    GLUCOSE 125 (H) 08/06/2018    BUN 9 08/06/2018    CREATININE 0.36 (L) 08/14/2018    EGFRIFNONA >150 08/14/2018    BCR 17.0 08/06/2018    CO2 20.7 (L) 08/06/2018    CALCIUM 7.2 (L) 08/06/2018    ALBUMIN 2.70 (L) 08/06/2018    AST 8 08/06/2018    ALT 6 08/06/2018     Microbiology:  8/11 OR cx NGTD    Assessment/Plan   1. 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  Preliminary antibiotic stop date September 21  - Monitor weekly CBC with differential and CMP while the patient is on antibiotics  - PICC line in place  - Change ID follow-up to September 21    2. Warfarin coagulopathy for treatment of DVT, carefully monitor INR while on abx.    Okay to discharge from an infectious disease standpoint.

## 2018-08-16 ENCOUNTER — HOSPITAL ENCOUNTER (OUTPATIENT)
Dept: INFUSION THERAPY | Facility: HOSPITAL | Age: 73
Discharge: HOME | End: 2018-08-16

## 2018-08-16 ENCOUNTER — APPOINTMENT (OUTPATIENT)
Dept: MRI IMAGING | Facility: HOSPITAL | Age: 73
End: 2018-08-16

## 2018-08-16 VITALS
SYSTOLIC BLOOD PRESSURE: 128 MMHG | RESPIRATION RATE: 16 BRPM | DIASTOLIC BLOOD PRESSURE: 73 MMHG | WEIGHT: 127.56 LBS | BODY MASS INDEX: 25.72 KG/M2 | HEIGHT: 59 IN | TEMPERATURE: 97.5 F | OXYGEN SATURATION: 96 % | HEART RATE: 81 BPM

## 2018-08-16 DIAGNOSIS — Z79.01 ENCOUNTER FOR MONITORING COUMADIN THERAPY: Primary | ICD-10-CM

## 2018-08-16 DIAGNOSIS — Z51.81 ENCOUNTER FOR MONITORING COUMADIN THERAPY: Primary | ICD-10-CM

## 2018-08-16 LAB
BACTERIA SPEC ANAEROBE CULT: NORMAL
BACTERIA SPEC ANAEROBE CULT: NORMAL
CRP SERPL-MCNC: 2.29 MG/DL (ref 0–0.5)
DEPRECATED RDW RBC AUTO: 52.2 FL (ref 37–54)
ERYTHROCYTE [DISTWIDTH] IN BLOOD BY AUTOMATED COUNT: 15.9 % (ref 11.7–13)
HCT VFR BLD AUTO: 38.2 % (ref 35.6–45.5)
HGB BLD-MCNC: 11.8 G/DL (ref 11.9–15.5)
INR PPP: 1.89 (ref 0.9–1.1)
MCH RBC QN AUTO: 28.2 PG (ref 26.9–32)
MCHC RBC AUTO-ENTMCNC: 30.9 G/DL (ref 32.4–36.3)
MCV RBC AUTO: 91.2 FL (ref 80.5–98.2)
PLATELET # BLD AUTO: 351 10*3/MM3 (ref 140–500)
PMV BLD AUTO: 11.4 FL (ref 6–12)
PROTHROMBIN TIME: 21.4 SECONDS (ref 11.7–14.2)
RBC # BLD AUTO: 4.19 10*6/MM3 (ref 3.9–5.2)
WBC NRBC COR # BLD: 10.84 10*3/MM3 (ref 4.5–10.7)

## 2018-08-16 PROCEDURE — 85027 COMPLETE CBC AUTOMATED: CPT | Performed by: NURSE PRACTITIONER

## 2018-08-16 PROCEDURE — 85610 PROTHROMBIN TIME: CPT | Performed by: ORTHOPAEDIC SURGERY

## 2018-08-16 PROCEDURE — 73721 MRI JNT OF LWR EXTRE W/O DYE: CPT

## 2018-08-16 PROCEDURE — 25010000002 ERTAPENEM PER 500 MG: Performed by: ORTHOPAEDIC SURGERY

## 2018-08-16 PROCEDURE — 99024 POSTOP FOLLOW-UP VISIT: CPT | Performed by: NURSE PRACTITIONER

## 2018-08-16 PROCEDURE — 86140 C-REACTIVE PROTEIN: CPT | Performed by: ORTHOPAEDIC SURGERY

## 2018-08-16 RX ORDER — PANTOPRAZOLE SODIUM 40 MG/1
40 TABLET, DELAYED RELEASE ORAL DAILY
Qty: 14 TABLET | Refills: 0 | Status: SHIPPED | OUTPATIENT
Start: 2018-08-16 | End: 2018-08-30

## 2018-08-16 RX ADMIN — ACETAMINOPHEN 800 MCG: 400 TABLET ORAL at 08:44

## 2018-08-16 RX ADMIN — SODIUM CHLORIDE 1 G: 900 INJECTION INTRAVENOUS at 14:44

## 2018-08-16 RX ADMIN — FERROUS SULFATE TAB 325 MG (65 MG ELEMENTAL FE) 325 MG: 325 (65 FE) TAB at 08:44

## 2018-08-16 RX ADMIN — ASPIRIN 325 MG: 325 TABLET, DELAYED RELEASE ORAL at 08:44

## 2018-08-16 RX ADMIN — PANTOPRAZOLE SODIUM 40 MG: 40 TABLET, DELAYED RELEASE ORAL at 05:23

## 2018-08-16 RX ADMIN — POLYETHYLENE GLYCOL 3350 17 G: 17 POWDER, FOR SOLUTION ORAL at 08:44

## 2018-08-16 RX ADMIN — WARFARIN SODIUM 4 MG: 4 TABLET ORAL at 16:43

## 2018-08-16 RX ADMIN — DOCUSATE SODIUM 100 MG: 100 CAPSULE, LIQUID FILLED ORAL at 08:44

## 2018-08-16 NOTE — SIGNIFICANT NOTE
08/16/18 1558   PT Discharge Summary   Reason for Discharge Discharge from facility   Discharge Destination Home with assist;Home with home health

## 2018-08-16 NOTE — DISCHARGE SUMMARY
Patient Name: Rachel Moser  Patient YOB: 1945    Date of Admission:  8/11/2018  Date of Discharge:  8/16/2018  Discharge Diagnosis: TOTAL HIP ARTHROPLASTY REVISION with removal of infected total hip and placement of antibiotic spacer  Presenting Problem/History of Present Illness: Pain of left hip joint [M25.552]  OA (osteoarthritis) of hip [M16.9]  Admitting Physician: Dr Obed Barney  Consults:   Consults     Date and Time Order Name Status Description    8/11/2018 1535 Inpatient Infectious Diseases Consult Completed     7/22/2018 1254 Inpatient Endocrinology Consult Completed     7/21/2018 1527 Inpatient Intensivist Consult Completed     7/7/2018 1607 Inpatient Infectious Diseases Consult Completed     7/6/2018 2013 Inpatient Cardiology Consult Completed           DETAILS OF HOSPITAL STAY:  Patient is a 72 y.o. female was admitted to the floor following the above procedure and underwent an uncomplicated hospital stay she remained on Invanz for antibiotics per infectious disease.  She was also on Coumadin and Lovenox.  On postop day 5 and MRI of the hip was done to evaluate for fluid collection and there was minimal fluid collection in the perioperative area.  Her C-reactive protein was on the way down to 2.2..  Patient did well with physical therapy and was ambulating well 50% weightbearing without problems.  On the day of discharge the wound was clean, dry and intact and calf was soft and non tender and Homans sign was negative.  Patient was tolerating  without problems.  Patient will be discharged home.  She will obtain her antibiotics through the ACU.  Condition on Discharge:  Stable    Vital Signs  Temp:  [97 °F (36.1 °C)-98.4 °F (36.9 °C)] 97.5 °F (36.4 °C)  Heart Rate:  [71-87] 81  Resp:  [16] 16  BP: (105-128)/(61-73) 128/73    LABS:   Admission on 08/11/2018   Component Date Value Ref Range Status   • Product Code 08/12/2018 T5553O09   Final   • Unit Number 08/12/2018 [id number]    Final   • UNIT  ABO 08/12/2018 A   Final   • UNIT  RH 08/12/2018 POS   Final   • Dispense Status 08/12/2018 PT   Final   • Blood Type 08/12/2018 APOS   Final   • Blood Expiration Date 08/12/2018 201808222359   Final   • Blood Type Barcode 08/12/2018 6200   Final   • Product Code 08/12/2018 R0145J90   Final   • Unit Number 08/12/2018 [id number]   Final   • UNIT  ABO 08/12/2018 A   Final   • UNIT  RH 08/12/2018 POS   Final   • Dispense Status 08/12/2018 PT   Final   • Blood Type 08/12/2018 APOS   Final   • Blood Expiration Date 08/12/2018 201808222359   Final   • Blood Type Barcode 08/12/2018 6200   Final   • ABO Type 08/11/2018 A   Final   • RH type 08/11/2018 Positive   Final   • Antibody Screen 08/11/2018 Negative   Final   • T&S Expiration Date 08/11/2018 8/14/2018 11:59:59 PM   Final   • Protime 08/11/2018 15.1* 11.7 - 14.2 Seconds Final   • INR 08/11/2018 1.21* 0.90 - 1.10 Final   • WBC 08/11/2018 10.50  4.50 - 10.70 10*3/mm3 Final   • RBC 08/11/2018 3.41* 3.90 - 5.20 10*6/mm3 Final   • Hemoglobin 08/11/2018 9.3* 11.9 - 15.5 g/dL Final   • Hematocrit 08/11/2018 31.8* 35.6 - 45.5 % Final   • MCV 08/11/2018 93.3  80.5 - 98.2 fL Final   • MCH 08/11/2018 27.3  26.9 - 32.0 pg Final   • MCHC 08/11/2018 29.2* 32.4 - 36.3 g/dL Final   • RDW 08/11/2018 14.8* 11.7 - 13.0 % Final   • RDW-SD 08/11/2018 50.0  37.0 - 54.0 fl Final   • MPV 08/11/2018 11.6  6.0 - 12.0 fL Final   • Platelets 08/11/2018 429  140 - 500 10*3/mm3 Final   • Culture 08/11/2018 No anaerobes isolated at 5 days   Final   • Culture 08/11/2018 No anaerobes isolated at 5 days   Final   • Wound Culture 08/11/2018 No growth at 3 days   Final   • Gram Stain Result 08/11/2018 No organisms seen   Final   • Wound Culture 08/11/2018 No growth at 3 days   Final   • Gram Stain Result 08/11/2018 No organisms seen   Final   • Protime 08/11/2018 15.8* 11.7 - 14.2 Seconds Final   • INR 08/11/2018 1.28* 0.90 - 1.10 Final   • Protime 08/12/2018 16.8* 11.7 - 14.2  Seconds Final   • INR 08/12/2018 1.39* 0.90 - 1.10 Final   • Hemoglobin 08/12/2018 8.9* 11.9 - 15.5 g/dL Final   • Hematocrit 08/12/2018 28.6* 35.6 - 45.5 % Final   • Protime 08/13/2018 19.7* 11.7 - 14.2 Seconds Final   • INR 08/13/2018 1.70* 0.90 - 1.10 Final   • WBC 08/13/2018 10.47  4.50 - 10.70 10*3/mm3 Final   • RBC 08/13/2018 2.36* 3.90 - 5.20 10*6/mm3 Final   • Hemoglobin 08/13/2018 7.0* 11.9 - 15.5 g/dL Final   • Hematocrit 08/13/2018 21.5* 35.6 - 45.5 % Final   • MCV 08/13/2018 91.1  80.5 - 98.2 fL Final   • MCH 08/13/2018 29.7  26.9 - 32.0 pg Final   • MCHC 08/13/2018 32.6  32.4 - 36.3 g/dL Final   • RDW 08/13/2018 15.7* 11.7 - 13.0 % Final   • RDW-SD 08/13/2018 51.6  37.0 - 54.0 fl Final   • MPV 08/13/2018 10.9  6.0 - 12.0 fL Final   • Platelets 08/13/2018 236  140 - 500 10*3/mm3 Final   • Creatinine 08/13/2018 0.45* 0.57 - 1.00 mg/dL Final   • eGFR Non African Amer 08/13/2018 137  >60 mL/min/1.73 Final   • Sed Rate 08/13/2018 89* 0 - 30 mm/hr Final   • C-Reactive Protein 08/13/2018 3.70* 0.00 - 0.50 mg/dL Final   • Vancomycin Trough 08/14/2018 15.40  5.00 - 20.00 mcg/mL Final   • Protime 08/14/2018 20.2* 11.7 - 14.2 Seconds Final   • INR 08/14/2018 1.76* 0.90 - 1.10 Final   • Creatinine 08/14/2018 0.36* 0.57 - 1.00 mg/dL Final   • eGFR Non  Amer 08/14/2018 >150  >60 mL/min/1.73 Final   • Product Code 08/15/2018 [id number]   Final   • Unit Number 08/15/2018 [id number]   Final   • UNIT  ABO 08/15/2018 A   Final   • UNIT  RH 08/15/2018 POS   Final   • Dispense Status 08/15/2018 PT   Final   • Blood Type 08/15/2018 APOS   Final   • Blood Expiration Date 08/15/2018 341658590394   Final   • Blood Type Barcode 08/15/2018 6200   Final   • Product Code 08/15/2018 R3974A73   Final   • Unit Number 08/15/2018 [id number]   Final   • UNIT  ABO 08/15/2018 A   Final   • UNIT  RH 08/15/2018 POS   Final   • Dispense Status 08/15/2018 PT   Final   • Blood Type 08/15/2018 APOS   Final   • Blood Expiration  Date 08/15/2018 209917055488   Final   • Blood Type Barcode 08/15/2018 6200   Final   • Protime 08/15/2018 20.6* 11.7 - 14.2 Seconds Final   • INR 08/15/2018 1.80* 0.90 - 1.10 Final   • Protime 08/16/2018 21.4* 11.7 - 14.2 Seconds Final   • INR 08/16/2018 1.89* 0.90 - 1.10 Final   • C-Reactive Protein 08/16/2018 2.29* 0.00 - 0.50 mg/dL Final   • WBC 08/16/2018 10.84* 4.50 - 10.70 10*3/mm3 Final   • RBC 08/16/2018 4.19  3.90 - 5.20 10*6/mm3 Final   • Hemoglobin 08/16/2018 11.8* 11.9 - 15.5 g/dL Final   • Hematocrit 08/16/2018 38.2  35.6 - 45.5 % Final   • MCV 08/16/2018 91.2  80.5 - 98.2 fL Final   • MCH 08/16/2018 28.2  26.9 - 32.0 pg Final   • MCHC 08/16/2018 30.9* 32.4 - 36.3 g/dL Final   • RDW 08/16/2018 15.9* 11.7 - 13.0 % Final   • RDW-SD 08/16/2018 52.2  37.0 - 54.0 fl Final   • MPV 08/16/2018 11.4  6.0 - 12.0 fL Final   • Platelets 08/16/2018 351  140 - 500 10*3/mm3 Final       No results found.        Discharge Medications     Discharge Medications      New Medications      Instructions Start Date   pantoprazole 40 MG EC tablet  Commonly known as:  PROTONIX   40 mg, Oral, Daily         Continue These Medications      Instructions Start Date   albuterol 108 (90 Base) MCG/ACT inhaler  Commonly known as:  PROAIR RESPICLICK   2 puffs, Inhalation, Every 4 Hours PRN      COLACE PO   1 tablet, Oral, 2 Times Daily      ertapenem (INVanz) 1 g in NS 100mL (MBP)   1 g, Intravenous, Every 24 Hours      FERROUSUL 325 (65 FE) MG tablet  Generic drug:  ferrous sulfate   325 mg, Oral, Daily With Breakfast, On hold for sx.      folic acid 800 MCG tablet  Commonly known as:  FOLVITE   800 mcg, Oral, Every Morning, 4 tabs am-ON HOLD FOR SX.      gabapentin 300 MG capsule  Commonly known as:  NEURONTIN   300 mg, Oral, As Needed      LASIX 40 MG tablet  Generic drug:  furosemide   40 mg, Oral, Every Morning      metoprolol succinate XL 50 MG 24 hr tablet  Commonly known as:  TOPROL-XL   50 mg, Oral, As Needed      polyethylene  glycol packet  Commonly known as:  MIRALAX   17 g, Oral, As Needed      potassium chloride 20 MEQ CR tablet  Commonly known as:  K-DUR,KLOR-CON   20 mEq, Oral, 2 Times Daily      simvastatin 40 MG tablet  Commonly known as:  ZOCOR   40 mg, Oral, Every Morning      warfarin 4 MG tablet  Commonly known as:  COUMADIN   4 mg, Oral, Daily Warfarin, ON HOLD FOR SX.          Stop These Medications    Chlorhexidine Gluconate 2 % pads     LOVENOX SC     mupirocin 2 % ointment  Commonly known as:  BACTROBAN            Activity at Discharge:  50% weightbearing with walker.    Discharge Instructions:   1)  Patient is to continue with physical therapy exercises daily and continue working with the physical therapist as ordered.  2)  Follow Posterior hip precautions.   3)  Patient may weight bear as tolerated.   4)  Apply ice regularly. You may ice for long periods of time as long as ice is not directly on the skin. Patient instructed on frequent calf pumping exercises.  Patient also instructed on incentive spirometer during hospitalization and encouraged to continue to use at home regularly.   5)  The dressing should be left in place. If waterproof dressing is intact the patient may shower immediately following discharge. If the dressing becomes disloged or saturated it should be changed. Please refer to the STAN information sheet if you have any questions about the dressing.  If you have a home health nurse or therapist they can be contacted to assist with dressing change or repair. You may also call the [facility] dressing hotline for questions related to the dressing ([phone]). If there still other problems or questions related to the dressing despite these measures then you can contact Callie at our office [phone].   6)  Follow up appointment in 2 weeks - patient to call the office at [phone] to schedule. 7)  Patient will be discharged on coumadin as directed to keep INR 2-3.  Please draw PT / INR in 2 days and call to  office during business hours at [phone].    Complete Discharge Diagnosis:    Patient Active Problem List   Diagnosis   • Chronic left hip pain   • OA (osteoarthritis) of hip   • Hip dislocation, left (CMS/HCC)   • Status post left hip replacement   • Hypertension   • Rheumatoid arteritis   • History of DVT (deep vein thrombosis)   • Postoperative hypotension   • S/P revision of total hip   • Septic arthritis (CMS/HCC)   • Status post total hip replacement, left   • Pain of left hip joint           Follow-up Appointments  Future Appointments  Date Time Provider Department Center   8/17/2018 2:00 PM ROOM 2 [facility]   8/18/2018 10:30 AM ROOM 9 [facility]   8/19/2018 10:30 AM ROOM 9 [facility]   8/20/2018 3:00 PM ROOM 4 [facility]   8/21/2018 11:30 AM ROOM 1 [facility]   8/22/2018 11:30 AM ROOM 1 [facility]   8/23/2018 11:30 AM ROOM 1 [facility]   8/24/2018 11:00 AM ROOM 2 [facility]   8/25/2018 10:00 AM ROOM 8 [facility]   8/26/2018 10:00 AM ROOM 8 [facility]   8/27/2018 11:30 AM ROOM 1 [facility]   8/28/2018 12:00 PM ROOM 4 [facility]   8/29/2018 10:30 AM ROOM 5 [facility]   8/30/2018 11:30 AM ROOM 1 [facility]   8/31/2018 11:00 AM ROOM 2 [facility]   9/1/2018 10:00 AM ROOM 8 [facility]   9/21/2018 11:20 AM Minda Souza MD [facility] None   2/28/2019 1:20 PM Vijay Arango MD [facility] None     Additional Instructions for the Follow-ups that You Need to Schedule     Referral to home health    As directed      Face to Face Visit Date:  8/16/2018    Follow-up Provider for Plan of Care?:  I will be treating the patient on an ongoing basis.  Please send me the Plan of Care for signature.    Follow-up Provider:  JAQUELIN PATEL [[id number]]    Reason/Clinical Findings:  post op total hip    Describe mobility limitations that make  leaving home difficult:  pain, swelling, decreased strength an mobility, gait abnormality, difficulty ambualting without assistive devices    Nursing/Therapeutic Services Requested:  Physicial Therapy                    Obed Barney MD  08/16/18  1:21 PM

## 2018-08-16 NOTE — PROGRESS NOTES
Case Management Discharge Note    Final Note: D/C home w/ [facility] and [facility] for IV abx; Bill/[facility] and Ezra/[facility]U notified.     Destination     No service has been selected for the patient.      Durable Medical Equipment     No service has been selected for the patient.      Dialysis/Infusion     Service Request Status Selected Specialties Address Phone Number Fax Number    [facility] Selected Infusion Therapy [address] [facility] [place] [address] [phone]        Christine Rivera RN 8/15/2018 1123    .8/15/2018 S/w Purnima.                  Home Medical Care - Selection Complete     Service Request Status Selected Specialties Address Phone Number Fax Number    [facility] [place] [facility] [address] [address] [phone]        Christine Rivera RN 8/13/2018 1049    .8/13/2018  Jim/Jarett notified.                  Social Care     No service has been selected for the patient.             Final Discharge Disposition Code: 06 - home with home health care

## 2018-08-16 NOTE — PLAN OF CARE
Problem: Patient Care Overview  Goal: Plan of Care Review  Outcome: Ongoing (interventions implemented as appropriate)   08/16/18 0157   Coping/Psychosocial   Plan of Care Reviewed With patient   Plan of Care Review   Progress improving   OTHER   Outcome Summary POST DAY 4. VSS. PO PAIN MEDICATION CONTROLLING PAIN. PATIENT IS IN ISOLATION. VOIDING FINE PER BRP. POSSIBLE D/C TODAY. TTWB . PICC LINE TO RIGHT UPPER ARM. EDUCATION PROVIDED ON THE IMPORTANCE OF TAKING BLOOD PRESSURE MEDICATION AS ORDERED.      Goal: Individualization and Mutuality  Outcome: Ongoing (interventions implemented as appropriate)    Goal: Discharge Needs Assessment  Outcome: Outcome(s) achieved Date Met: 08/16/18      Problem: Infection, Risk/Actual (Adult)  Goal: Infection Prevention/Resolution  Outcome: Ongoing (interventions implemented as appropriate)      Problem: Fall Risk (Adult)  Goal: Absence of Fall  Outcome: Ongoing (interventions implemented as appropriate)      Problem: Skin Injury Risk (Adult)  Goal: Skin Health and Integrity  Outcome: Ongoing (interventions implemented as appropriate)

## 2018-08-16 NOTE — PROGRESS NOTES
Orthopedic Progress Note      Patient: Rachel Moser  Date of Admission: 8/11/2018  YOB: 1945  Medical Record Number: [id number]    POD # :  5 Days Post-Op Procedure(s) (LRB):  TOTAL HIP ARTHROPLASTY REVISION with removal of infected total hip and placement of antibiotic spacer (Left)    Systemic or Specific Complaints: No Complaints    Pain Relief: some relief    Physical Exam:  72 y.o.  female  Vitals:  Temp:  [97 °F (36.1 °C)-98.4 °F (36.9 °C)] 98.4 °F (36.9 °C)  Heart Rate:  [71-87] 80  Resp:  [16] 16  BP: (105-117)/(61-67) 109/67  alert and oriented  Ext: NV intact. ROM appropriate. Calf is soft and nontender. Negative Homans Sn  Skin: Left hip the patient does have diffuse erythema noted around the proximal aspect of her dressing.  It is warm to touch and she also does have some swelling.    Activity: Mobilizing Per P.T.   Weight Bearing: As Tolerated    Data Review     Admission on 08/11/2018   Component Date Value Ref Range Status   • Product Code 08/12/2018 T4068K15   Final   • Unit Number 08/12/2018 [id number]   Final   • UNIT  ABO 08/12/2018 A   Final   • UNIT  RH 08/12/2018 POS   Final   • Dispense Status 08/12/2018 PT   Final   • Blood Type 08/12/2018 APOS   Final   • Blood Expiration Date 08/12/2018 201808222359   Final   • Blood Type Barcode 08/12/2018 6200   Final   • Product Code 08/12/2018 E7220O87   Final   • Unit Number 08/12/2018 [id number]   Final   • UNIT  ABO 08/12/2018 A   Final   • UNIT  RH 08/12/2018 POS   Final   • Dispense Status 08/12/2018 PT   Final   • Blood Type 08/12/2018 APOS   Final   • Blood Expiration Date 08/12/2018 201808222359   Final   • Blood Type Barcode 08/12/2018 6200   Final   • ABO Type 08/11/2018 A   Final   • RH type 08/11/2018 Positive   Final   • Antibody Screen 08/11/2018 Negative   Final   • T&S Expiration Date 08/11/2018 8/14/2018 11:59:59 PM   Final   • Protime 08/11/2018 15.1* 11.7 - 14.2 Seconds Final   • INR 08/11/2018 1.21*  0.90 - 1.10 Final   • WBC 08/11/2018 10.50  4.50 - 10.70 10*3/mm3 Final   • RBC 08/11/2018 3.41* 3.90 - 5.20 10*6/mm3 Final   • Hemoglobin 08/11/2018 9.3* 11.9 - 15.5 g/dL Final   • Hematocrit 08/11/2018 31.8* 35.6 - 45.5 % Final   • MCV 08/11/2018 93.3  80.5 - 98.2 fL Final   • MCH 08/11/2018 27.3  26.9 - 32.0 pg Final   • MCHC 08/11/2018 29.2* 32.4 - 36.3 g/dL Final   • RDW 08/11/2018 14.8* 11.7 - 13.0 % Final   • RDW-SD 08/11/2018 50.0  37.0 - 54.0 fl Final   • MPV 08/11/2018 11.6  6.0 - 12.0 fL Final   • Platelets 08/11/2018 429  140 - 500 10*3/mm3 Final   • Culture 08/11/2018 No anaerobes isolated at 3 days   Preliminary   • Culture 08/11/2018 No anaerobes isolated at 3 days   Preliminary   • Wound Culture 08/11/2018 No growth at 3 days   Final   • Gram Stain Result 08/11/2018 No organisms seen   Final   • Wound Culture 08/11/2018 No growth at 3 days   Final   • Gram Stain Result 08/11/2018 No organisms seen   Final   • Protime 08/11/2018 15.8* 11.7 - 14.2 Seconds Final   • INR 08/11/2018 1.28* 0.90 - 1.10 Final   • Protime 08/12/2018 16.8* 11.7 - 14.2 Seconds Final   • INR 08/12/2018 1.39* 0.90 - 1.10 Final   • Hemoglobin 08/12/2018 8.9* 11.9 - 15.5 g/dL Final   • Hematocrit 08/12/2018 28.6* 35.6 - 45.5 % Final   • Protime 08/13/2018 19.7* 11.7 - 14.2 Seconds Final   • INR 08/13/2018 1.70* 0.90 - 1.10 Final   • WBC 08/13/2018 10.47  4.50 - 10.70 10*3/mm3 Final   • RBC 08/13/2018 2.36* 3.90 - 5.20 10*6/mm3 Final   • Hemoglobin 08/13/2018 7.0* 11.9 - 15.5 g/dL Final   • Hematocrit 08/13/2018 21.5* 35.6 - 45.5 % Final   • MCV 08/13/2018 91.1  80.5 - 98.2 fL Final   • MCH 08/13/2018 29.7  26.9 - 32.0 pg Final   • MCHC 08/13/2018 32.6  32.4 - 36.3 g/dL Final   • RDW 08/13/2018 15.7* 11.7 - 13.0 % Final   • RDW-SD 08/13/2018 51.6  37.0 - 54.0 fl Final   • MPV 08/13/2018 10.9  6.0 - 12.0 fL Final   • Platelets 08/13/2018 236  140 - 500 10*3/mm3 Final   • Creatinine 08/13/2018 0.45* 0.57 - 1.00 mg/dL Final   • eGFR  Non  Amer 08/13/2018 137  >60 mL/min/1.73 Final   • Sed Rate 08/13/2018 89* 0 - 30 mm/hr Final   • C-Reactive Protein 08/13/2018 3.70* 0.00 - 0.50 mg/dL Final   • Vancomycin Trough 08/14/2018 15.40  5.00 - 20.00 mcg/mL Final   • Protime 08/14/2018 20.2* 11.7 - 14.2 Seconds Final   • INR 08/14/2018 1.76* 0.90 - 1.10 Final   • Creatinine 08/14/2018 0.36* 0.57 - 1.00 mg/dL Final   • eGFR Non  Amer 08/14/2018 >150  >60 mL/min/1.73 Final   • Product Code 08/15/2018 [id number]   Final   • Unit Number 08/15/2018 [id number]   Final   • UNIT  ABO 08/15/2018 A   Final   • UNIT  RH 08/15/2018 POS   Final   • Dispense Status 08/15/2018 PT   Final   • Blood Type 08/15/2018 APOS   Final   • Blood Expiration Date 08/15/2018 903108098306   Final   • Blood Type Barcode 08/15/2018 6200   Final   • Product Code 08/15/2018 [id number]   Final   • Unit Number 08/15/2018 [id number]   Final   • UNIT  ABO 08/15/2018 A   Final   • UNIT  RH 08/15/2018 POS   Final   • Dispense Status 08/15/2018 PT   Final   • Blood Type 08/15/2018 APOS   Final   • Blood Expiration Date 08/15/2018 230845300703   Final   • Blood Type Barcode 08/15/2018 6200   Final   • Protime 08/15/2018 20.6* 11.7 - 14.2 Seconds Final   • INR 08/15/2018 1.80* 0.90 - 1.10 Final   • Protime 08/16/2018 21.4* 11.7 - 14.2 Seconds Final   • INR 08/16/2018 1.89* 0.90 - 1.10 Final   • C-Reactive Protein 08/16/2018 2.29* 0.00 - 0.50 mg/dL Final       No results found.    Medications:    aspirin 325 mg Oral Q12H   docusate sodium 100 mg Oral BID   ertapenem (INVanz) 1 g in NS 100mL (MBP) 1 g Intravenous Q24H   ferrous sulfate 325 mg Oral Daily With Breakfast   folic acid 800 mcg Oral QAM   furosemide 40 mg Oral QAM   pantoprazole 40 mg Oral Q AM   polyethylene glycol 17 g Oral BID   warfarin 4 mg Oral Daily     •  albuterol  •  HYDROcodone-acetaminophen  •  HYDROcodone-acetaminophen  •  melatonin  •  ondansetron **OR** ondansetron ODT **OR**  ondansetron    Assessment:  Doing well POD  # 5 Days Post-Op Procedure(s) (LRB):  TOTAL HIP ARTHROPLASTY REVISION with removal of infected total hip and placement of antibiotic spacer (Left)  Problem List Items Addressed This Visit        Nervous and Auditory    * (Principal)Pain of left hip joint    Relevant Orders    Anaerobic Culture - Wound, Hip, Left (Completed)    Anaerobic Culture - Wound, Hip, Left (Completed)    Wound Culture - Wound, Hip, Left (Completed)    Wound Culture - Wound, Hip, Left (Completed)      Other Visit Diagnoses     Postoperative anemia due to acute blood loss        Relevant Orders    Prepare RBC, 2 Units          Plan:  Continue efforts to mobilize  Continue Pain Control Measures  Continue IV antibiotics per ID  DVT prophylaxis  Patient did have a transfusion 2 days ago.  We will recheck CBC this morning  Dr. Barney is going to come by and see the patient today to check the hip erythema    Zoe Shaw, JELLY    Date: 8/16/2018  Time: 9:00 AM

## 2018-08-16 NOTE — SIGNIFICANT NOTE
08/16/18 1025   Rehab Treatment   Discipline physical therapy assistant   Reason Treatment Not Performed unavailable for treatment  (gone to MRI, check in PM; possible DC )   Recommendation   PT - Next Appointment 08/16/18

## 2018-08-17 ENCOUNTER — HOSPITAL ENCOUNTER (OUTPATIENT)
Dept: INFUSION THERAPY | Facility: HOSPITAL | Age: 73
Discharge: HOME | End: 2018-08-17
Admitting: INTERNAL MEDICINE

## 2018-08-17 ENCOUNTER — READMISSION MANAGEMENT (OUTPATIENT)
Dept: CALL CENTER | Facility: HOSPITAL | Age: 73
End: 2018-08-17

## 2018-08-17 ENCOUNTER — TELEPHONE (OUTPATIENT)
Dept: ORTHOPEDIC SURGERY | Facility: CLINIC | Age: 73
End: 2018-08-17

## 2018-08-17 VITALS
OXYGEN SATURATION: 97 % | DIASTOLIC BLOOD PRESSURE: 64 MMHG | SYSTOLIC BLOOD PRESSURE: 129 MMHG | TEMPERATURE: 97.8 F | RESPIRATION RATE: 20 BRPM | HEART RATE: 90 BPM

## 2018-08-17 DIAGNOSIS — M00.80 ARTHRITIS DUE TO OTHER BACTERIA, SEPTIC ARTHRITIS OF UNSPECIFIED LOCATION (HCC): ICD-10-CM

## 2018-08-17 PROCEDURE — G0463 HOSPITAL OUTPT CLINIC VISIT: HCPCS

## 2018-08-17 PROCEDURE — 25010000002 ERTAPENEM PER 500 MG: Performed by: INTERNAL MEDICINE

## 2018-08-17 PROCEDURE — 96365 THER/PROPH/DIAG IV INF INIT: CPT

## 2018-08-17 RX ADMIN — SODIUM CHLORIDE 1 G: 900 INJECTION INTRAVENOUS at 14:42

## 2018-08-17 NOTE — TELEPHONE ENCOUNTER
Patient called to schedule post op appt. RBB out of office on 8/24. Scheduled patient at EP on 8/28. Is that date ok?

## 2018-08-17 NOTE — PROGRESS NOTES
Patient tolerated infusion and dressing change to [facility] PICC without complaints. Patient ambulatory with walker for a few steps, D/C'd from ACU riding on walker seat with  at 1536.

## 2018-08-18 ENCOUNTER — HOSPITAL ENCOUNTER (OUTPATIENT)
Dept: INFUSION THERAPY | Facility: HOSPITAL | Age: 73
Discharge: HOME | End: 2018-08-18
Admitting: INTERNAL MEDICINE

## 2018-08-18 VITALS
HEART RATE: 91 BPM | OXYGEN SATURATION: 97 % | RESPIRATION RATE: 16 BRPM | TEMPERATURE: 97.5 F | DIASTOLIC BLOOD PRESSURE: 68 MMHG | SYSTOLIC BLOOD PRESSURE: 105 MMHG

## 2018-08-18 DIAGNOSIS — M00.80 ARTHRITIS DUE TO OTHER BACTERIA, SEPTIC ARTHRITIS OF UNSPECIFIED LOCATION (HCC): ICD-10-CM

## 2018-08-18 PROCEDURE — 25010000002 ERTAPENEM PER 500 MG: Performed by: INTERNAL MEDICINE

## 2018-08-18 PROCEDURE — 96365 THER/PROPH/DIAG IV INF INIT: CPT

## 2018-08-18 RX ADMIN — SODIUM CHLORIDE 1 G: 900 INJECTION INTRAVENOUS at 11:01

## 2018-08-18 NOTE — PROGRESS NOTES
Patient tolerated infusion without complaints. D/C'd from radiology triage at 1139 via wheelchair with  at 1139.

## 2018-08-18 NOTE — OUTREACH NOTE
Prep Survey      Responses   Facility patient discharged from?  [facility]   Is patient eligible?  Yes   Discharge diagnosis  Total Hip arthroplast revision w/ removal of infected hardware   Does the patient have one of the following disease processes/diagnoses(primary or secondary)?  Total Joint Replacement   Does the patient have Home health ordered?  Yes   What is the Home health agency?   [facility], [facility] for IV antibiotics   General alerts for this patient  Daily AB infusion   Prep survey completed?  Yes          Zoë Hall RN

## 2018-08-19 ENCOUNTER — HOSPITAL ENCOUNTER (OUTPATIENT)
Dept: INFUSION THERAPY | Facility: HOSPITAL | Age: 73
Discharge: HOME | End: 2018-08-19

## 2018-08-19 ENCOUNTER — HOSPITAL ENCOUNTER (INPATIENT)
Facility: HOSPITAL | Age: 73
LOS: 4 days | Discharge: NURSING, REHAB OR LONG-TERM CARE | End: 2018-08-23
Attending: ORTHOPAEDIC SURGERY | Admitting: ORTHOPAEDIC SURGERY

## 2018-08-19 ENCOUNTER — APPOINTMENT (OUTPATIENT)
Dept: MRI IMAGING | Facility: HOSPITAL | Age: 73
End: 2018-08-19

## 2018-08-19 ENCOUNTER — APPOINTMENT (OUTPATIENT)
Dept: GENERAL RADIOLOGY | Facility: HOSPITAL | Age: 73
End: 2018-08-19

## 2018-08-19 VITALS
RESPIRATION RATE: 16 BRPM | DIASTOLIC BLOOD PRESSURE: 84 MMHG | HEART RATE: 103 BPM | SYSTOLIC BLOOD PRESSURE: 152 MMHG | TEMPERATURE: 97 F | OXYGEN SATURATION: 98 %

## 2018-08-19 DIAGNOSIS — R29.898 WEAKNESS OF BOTH LOWER EXTREMITIES: Primary | ICD-10-CM

## 2018-08-19 DIAGNOSIS — M00.80 ARTHRITIS DUE TO OTHER BACTERIA, SEPTIC ARTHRITIS OF UNSPECIFIED LOCATION (HCC): ICD-10-CM

## 2018-08-19 LAB
ALBUMIN SERPL-MCNC: 2.6 G/DL (ref 3.5–5.2)
ALBUMIN/GLOB SERPL: 0.9 G/DL
ALP SERPL-CCNC: 113 U/L (ref 39–117)
ALT SERPL W P-5'-P-CCNC: 8 U/L (ref 1–33)
ANION GAP SERPL CALCULATED.3IONS-SCNC: 12.6 MMOL/L
AST SERPL-CCNC: 12 U/L (ref 1–32)
BILIRUB SERPL-MCNC: 0.2 MG/DL (ref 0.1–1.2)
BUN BLD-MCNC: 10 MG/DL (ref 8–23)
BUN/CREAT SERPL: 18.5 (ref 7–25)
CALCIUM SPEC-SCNC: 8.3 MG/DL (ref 8.6–10.5)
CHLORIDE SERPL-SCNC: 109 MMOL/L (ref 98–107)
CO2 SERPL-SCNC: 23.4 MMOL/L (ref 22–29)
CREAT BLD-MCNC: 0.54 MG/DL (ref 0.57–1)
CRP SERPL-MCNC: 4.23 MG/DL (ref 0–0.5)
DEPRECATED RDW RBC AUTO: 51.6 FL (ref 37–54)
ERYTHROCYTE [DISTWIDTH] IN BLOOD BY AUTOMATED COUNT: 15.4 % (ref 11.7–13)
GFR SERPL CREATININE-BSD FRML MDRD: 111 ML/MIN/1.73
GLOBULIN UR ELPH-MCNC: 3 GM/DL
GLUCOSE BLD-MCNC: 160 MG/DL (ref 65–99)
HCT VFR BLD AUTO: 38.2 % (ref 35.6–45.5)
HGB BLD-MCNC: 12 G/DL (ref 11.9–15.5)
INR PPP: 1.87 (ref 0.9–1.1)
MCH RBC QN AUTO: 28.7 PG (ref 26.9–32)
MCHC RBC AUTO-ENTMCNC: 31.4 G/DL (ref 32.4–36.3)
MCV RBC AUTO: 91.4 FL (ref 80.5–98.2)
PLATELET # BLD AUTO: 359 10*3/MM3 (ref 140–500)
PMV BLD AUTO: 11 FL (ref 6–12)
POTASSIUM BLD-SCNC: 3.6 MMOL/L (ref 3.5–5.2)
PROT SERPL-MCNC: 5.6 G/DL (ref 6–8.5)
PROTHROMBIN TIME: 21.2 SECONDS (ref 11.7–14.2)
RBC # BLD AUTO: 4.18 10*6/MM3 (ref 3.9–5.2)
SODIUM BLD-SCNC: 145 MMOL/L (ref 136–145)
WBC NRBC COR # BLD: 10.69 10*3/MM3 (ref 4.5–10.7)

## 2018-08-19 PROCEDURE — G0463 HOSPITAL OUTPT CLINIC VISIT: HCPCS

## 2018-08-19 PROCEDURE — 72148 MRI LUMBAR SPINE W/O DYE: CPT

## 2018-08-19 PROCEDURE — 25010000002 ERTAPENEM PER 500 MG: Performed by: INTERNAL MEDICINE

## 2018-08-19 PROCEDURE — 85027 COMPLETE CBC AUTOMATED: CPT | Performed by: ORTHOPAEDIC SURGERY

## 2018-08-19 PROCEDURE — 80053 COMPREHEN METABOLIC PANEL: CPT | Performed by: ORTHOPAEDIC SURGERY

## 2018-08-19 PROCEDURE — 86140 C-REACTIVE PROTEIN: CPT | Performed by: ORTHOPAEDIC SURGERY

## 2018-08-19 PROCEDURE — 71045 X-RAY EXAM CHEST 1 VIEW: CPT

## 2018-08-19 PROCEDURE — 85610 PROTHROMBIN TIME: CPT | Performed by: ORTHOPAEDIC SURGERY

## 2018-08-19 PROCEDURE — 96365 THER/PROPH/DIAG IV INF INIT: CPT

## 2018-08-19 RX ORDER — HYDROCODONE BITARTRATE AND ACETAMINOPHEN 5; 325 MG/1; MG/1
1 TABLET ORAL EVERY 4 HOURS PRN
Status: DISCONTINUED | OUTPATIENT
Start: 2018-08-19 | End: 2018-08-23 | Stop reason: HOSPADM

## 2018-08-19 RX ORDER — HYDROCODONE BITARTRATE AND ACETAMINOPHEN 5; 325 MG/1; MG/1
2 TABLET ORAL EVERY 4 HOURS PRN
Status: DISCONTINUED | OUTPATIENT
Start: 2018-08-19 | End: 2018-08-23 | Stop reason: HOSPADM

## 2018-08-19 RX ADMIN — SODIUM CHLORIDE 1 G: 900 INJECTION INTRAVENOUS at 11:16

## 2018-08-19 RX ADMIN — HYDROCODONE BITARTRATE AND ACETAMINOPHEN 1 TABLET: 5; 325 TABLET ORAL at 20:52

## 2018-08-19 RX ADMIN — HYDROCODONE BITARTRATE AND ACETAMINOPHEN 1 TABLET: 5; 325 TABLET ORAL at 15:52

## 2018-08-19 NOTE — PROGRESS NOTES
"Upon arrival to radiology triage patient complains of increasing weakness of right leg x 2 days, patient is able to move right foot/leg side to side and up and down and states can put weight on the right leg but when tries to move with walker she is unable to move the right leg. Slight right sided weakness noted with pedal pushes. Patient reports upon getting out of car on arrival to [facility] today she noted left surgical site dressing with blood. Blood is noted per RN to dressing site which had previously been CD&I, skin around dressing site with slight erythema.  Patient reports no blood to site with getting dressed this am. Hand  equal bilaterally. RN paged MD at 1125 via answering service. Awaiting return call.   Office called again at 1157, left message for on-call physician with answering service. Patient tolerated IV infusion without complaints.     Dr. Peñaloza returned call at 1205, RN instructed patient to call MD office in am for sooner appointment. Patient and spouse had Dr. Barney's cell phone number and had transposed one digit incorrectly. Dr. Barney called via cell phone, at 1208 RN spoke with Dr. Barney followed by patient conversation via phone with Dr. Barney. MD initially wished for patient to go to ER evaluation for right sided leg weakness and planned to see patient in ER for dressing change. RN spoke to ER charge nurse Hilary at 1222, room number received.  Dr. Barney in to XR Triage at 1223. MD removed STAN negative pressure dressing from left hip and applied to new folded 4x4 gauzes covered with ABD pads and paper tape. Tegaderm applied over two separate \"blood blisters\" per MD.  Dr. Barney with patient and performing dressing change from [id number]. Dr. Barney evaluated patient's right sided leg weakness and decided upon direct admit to [address] [place] [address] for MRI evaluation and treatment. Bed Board called, room number P707 assigned to patient. Er notified at 1254 that patient no longer coming to ER for " evaluation but admitted per Dr. Barney.      RN called report to RN on [address] at 1320. Transport request placed in system, awaiting transport. Patient and spouse verbalize understanding.    RN called Dr. Souza's answering service at 1335 to leave message of patient's admit to hospital per Dr. Barney.    Patient transferred via stretcher per transporter to room P786.

## 2018-08-19 NOTE — H&P
Subjective:     Patient ID: Rachel Moser is a 72 y.o. female.    Chief Complaint: bilateral leg weakness.    History of Present Illness Recent multiply revised left hip for infection. Presented to [facility] for IV abx today now c/o bi;lateral LE weaknees x 24 hrs and bloody drainiage from lef thip wound x 1 hour. Cannot ambulate on right leg due to weakness pover past 24 hrs    LABS:           Admission on 08/11/2018   Component Date Value Ref Range Status   • Product Code 08/12/2018 N1267C56    Final   • Unit Number 08/12/2018 [id number]    Final   • UNIT  ABO 08/12/2018 A    Final   • UNIT  RH 08/12/2018 POS    Final   • Dispense Status 08/12/2018 PT    Final   • Blood Type 08/12/2018 APOS    Final   • Blood Expiration Date 08/12/2018 201808222359    Final   • Blood Type Barcode 08/12/2018 6200    Final   • Product Code 08/12/2018 [id number]    Final   • Unit Number 08/12/2018 [id number]    Final   • UNIT  ABO 08/12/2018 A    Final   • UNIT  RH 08/12/2018 POS    Final   • Dispense Status 08/12/2018 PT    Final   • Blood Type 08/12/2018 APOS    Final   • Blood Expiration Date 08/12/2018 201808222359    Final   • Blood Type Barcode 08/12/2018 6200    Final   • ABO Type 08/11/2018 A    Final   • RH type 08/11/2018 Positive    Final   • Antibody Screen 08/11/2018 Negative    Final   • T&S Expiration Date 08/11/2018 8/14/2018 11:59:59 PM    Final   • Protime 08/11/2018 15.1* 11.7 - 14.2 Seconds Final   • INR 08/11/2018 1.21* 0.90 - 1.10 Final   • WBC 08/11/2018 10.50  4.50 - 10.70 10*3/mm3 Final   • RBC 08/11/2018 3.41* 3.90 - 5.20 10*6/mm3 Final   • Hemoglobin 08/11/2018 9.3* 11.9 - 15.5 g/dL Final   • Hematocrit 08/11/2018 31.8* 35.6 - 45.5 % Final   • MCV 08/11/2018 93.3  80.5 - 98.2 fL Final   • MCH 08/11/2018 27.3  26.9 - 32.0 pg Final   • MCHC 08/11/2018 29.2* 32.4 - 36.3 g/dL Final   • RDW 08/11/2018 14.8* 11.7 - 13.0 % Final   • RDW-SD 08/11/2018 50.0  37.0 - 54.0 fl Final   • MPV 08/11/2018 11.6  6.0 - 12.0  fL Final   • Platelets 08/11/2018 429  140 - 500 10*3/mm3 Final   • Culture 08/11/2018 No anaerobes isolated at 5 days    Final   • Culture 08/11/2018 No anaerobes isolated at 5 days    Final   • Wound Culture 08/11/2018 No growth at 3 days    Final   • Gram Stain Result 08/11/2018 No organisms seen    Final   • Wound Culture 08/11/2018 No growth at 3 days    Final   • Gram Stain Result 08/11/2018 No organisms seen    Final   • Protime 08/11/2018 15.8* 11.7 - 14.2 Seconds Final   • INR 08/11/2018 1.28* 0.90 - 1.10 Final   • Protime 08/12/2018 16.8* 11.7 - 14.2 Seconds Final   • INR 08/12/2018 1.39* 0.90 - 1.10 Final   • Hemoglobin 08/12/2018 8.9* 11.9 - 15.5 g/dL Final   • Hematocrit 08/12/2018 28.6* 35.6 - 45.5 % Final   • Protime 08/13/2018 19.7* 11.7 - 14.2 Seconds Final   • INR 08/13/2018 1.70* 0.90 - 1.10 Final   • WBC 08/13/2018 10.47  4.50 - 10.70 10*3/mm3 Final   • RBC 08/13/2018 2.36* 3.90 - 5.20 10*6/mm3 Final   • Hemoglobin 08/13/2018 7.0* 11.9 - 15.5 g/dL Final   • Hematocrit 08/13/2018 21.5* 35.6 - 45.5 % Final   • MCV 08/13/2018 91.1  80.5 - 98.2 fL Final   • MCH 08/13/2018 29.7  26.9 - 32.0 pg Final   • MCHC 08/13/2018 32.6  32.4 - 36.3 g/dL Final   • RDW 08/13/2018 15.7* 11.7 - 13.0 % Final   • RDW-SD 08/13/2018 51.6  37.0 - 54.0 fl Final   • MPV 08/13/2018 10.9  6.0 - 12.0 fL Final   • Platelets 08/13/2018 236  140 - 500 10*3/mm3 Final   • Creatinine 08/13/2018 0.45* 0.57 - 1.00 mg/dL Final   • eGFR Non African Amer 08/13/2018 137  >60 mL/min/1.73 Final   • Sed Rate 08/13/2018 89* 0 - 30 mm/hr Final   • C-Reactive Protein 08/13/2018 3.70* 0.00 - 0.50 mg/dL Final   • Vancomycin Trough 08/14/2018 15.40  5.00 - 20.00 mcg/mL Final   • Protime 08/14/2018 20.2* 11.7 - 14.2 Seconds Final   • INR 08/14/2018 1.76* 0.90 - 1.10 Final   • Creatinine 08/14/2018 0.36* 0.57 - 1.00 mg/dL Final   • eGFR Non  Amer 08/14/2018 >150  >60 mL/min/1.73 Final   • Product Code 08/15/2018 [id number]    Final   • Unit  Number 08/15/2018 [id number]    Final   • UNIT  ABO 08/15/2018 A    Final   • UNIT  RH 08/15/2018 POS    Final   • Dispense Status 08/15/2018 PT    Final   • Blood Type 08/15/2018 APOS    Final   • Blood Expiration Date 08/15/2018 [id number]    Final   • Blood Type Barcode 08/15/2018 6200    Final   • Product Code 08/15/2018 [id number]    Final   • Unit Number 08/15/2018 [id number]    Final   • UNIT  ABO 08/15/2018 A    Final   • UNIT  RH 08/15/2018 POS    Final   • Dispense Status 08/15/2018 PT    Final   • Blood Type 08/15/2018 APOS    Final   • Blood Expiration Date 08/15/2018 [id number]    Final   • Blood Type Barcode 08/15/2018 6200    Final   • Protime 08/15/2018 20.6* 11.7 - 14.2 Seconds Final   • INR 08/15/2018 1.80* 0.90 - 1.10 Final   • Protime 08/16/2018 21.4* 11.7 - 14.2 Seconds Final   • INR 08/16/2018 1.89* 0.90 - 1.10 Final   • C-Reactive Protein 08/16/2018 2.29* 0.00 - 0.50 mg/dL Final   • WBC 08/16/2018 10.84* 4.50 - 10.70 10*3/mm3 Final   • RBC 08/16/2018 4.19  3.90 - 5.20 10*6/mm3 Final   • Hemoglobin 08/16/2018 11.8* 11.9 - 15.5 g/dL Final   • Hematocrit 08/16/2018 38.2  35.6 - 45.5 % Final   • MCV 08/16/2018 91.2  80.5 - 98.2 fL Final   • MCH 08/16/2018 28.2  26.9 - 32.0 pg Final   • MCHC 08/16/2018 30.9* 32.4 - 36.3 g/dL Final   • RDW 08/16/2018 15.9* 11.7 - 13.0 % Final   • RDW-SD 08/16/2018 52.2  37.0 - 54.0 fl Final   • MPV 08/16/2018 11.4  6.0 - 12.0 fL Final   • Platelets 08/16/2018 351  140 - 500 10*3/mm3 Final         PE-  Afebrile  Left hip wound with moderate blood drainage  Able to flex / extend both hips knees and ankles  No numbness in either leg           Recent meds:             Discharge Medications             New Medications      Instructions Start Date   pantoprazole 40 MG EC tablet  Commonly known as:  PROTONIX    40 mg, Oral, Daily                      Continue These Medications      Instructions Start Date   albuterol 108 (90 Base) MCG/ACT inhaler  Commonly  known as:  PROAIR RESPICLICK    2 puffs, Inhalation, Every 4 Hours PRN        COLACE PO    1 tablet, Oral, 2 Times Daily        ertapenem (INVanz) 1 g in NS 100mL (MBP)    1 g, Intravenous, Every 24 Hours        FERROUSUL 325 (65 FE) MG tablet  Generic drug:  ferrous sulfate    325 mg, Oral, Daily With Breakfast, On hold for sx.        folic acid 800 MCG tablet  Commonly known as:  FOLVITE    800 mcg, Oral, Every Morning, 4 tabs am-ON HOLD FOR SX.        gabapentin 300 MG capsule  Commonly known as:  NEURONTIN    300 mg, Oral, As Needed        LASIX 40 MG tablet  Generic drug:  furosemide    40 mg, Oral, Every Morning        metoprolol succinate XL 50 MG 24 hr tablet  Commonly known as:  TOPROL-XL    50 mg, Oral, As Needed        polyethylene glycol packet  Commonly known as:  MIRALAX    17 g, Oral, As Needed        potassium chloride 20 MEQ CR tablet  Commonly known as:  K-DUR,KLOR-CON    20 mEq, Oral, 2 Times Daily        simvastatin 40 MG tablet  Commonly known as:  ZOCOR    40 mg, Oral, Every Morning        warfarin 4 MG tablet  Commonly known as:  COUMADIN    4 mg, Oral, Daily Warfarin, ON         Social History     Occupational History   • Not on file.     Social History Main Topics   • Smoking status: Former Smoker     Types: Cigarettes     Quit date: 1975   • Smokeless tobacco: Never Used      Comment: quit 50 years ago   • Alcohol use No   • Drug use: No   • Sexual activity: Defer      Review of Systems    denies any recent change in any of 14 systems other than leg weakness as above    Objective:     Ortho Exam  : as above    Assessment:Left hip infection with recent change leading to Bilateral LE weakness. Hematoma expression from left hip wouhd        Plan:Admit  - MRI L spine and pelvis  Id CONSULT FOR CONTINUED abx  dRESSING CHANGES prn  Will check labs  TTWB left LE

## 2018-08-20 ENCOUNTER — READMISSION MANAGEMENT (OUTPATIENT)
Dept: CALL CENTER | Facility: HOSPITAL | Age: 73
End: 2018-08-20

## 2018-08-20 ENCOUNTER — HOSPITAL ENCOUNTER (OUTPATIENT)
Dept: INFUSION THERAPY | Facility: HOSPITAL | Age: 73
Discharge: HOME | End: 2018-08-20

## 2018-08-20 PROCEDURE — 25010000002 ERTAPENEM 1 GM/100ML SOLUTION: Performed by: INTERNAL MEDICINE

## 2018-08-20 PROCEDURE — 99223 1ST HOSP IP/OBS HIGH 75: CPT | Performed by: INTERNAL MEDICINE

## 2018-08-20 RX ORDER — FERROUS SULFATE 325(65) MG
325 TABLET ORAL
Status: DISCONTINUED | OUTPATIENT
Start: 2018-08-21 | End: 2018-08-23 | Stop reason: HOSPADM

## 2018-08-20 RX ORDER — METOPROLOL SUCCINATE 50 MG/1
50 TABLET, EXTENDED RELEASE ORAL
Status: DISCONTINUED | OUTPATIENT
Start: 2018-08-20 | End: 2018-08-23 | Stop reason: HOSPADM

## 2018-08-20 RX ORDER — LANOLIN ALCOHOL/MO/W.PET/CERES
800 CREAM (GRAM) TOPICAL EVERY MORNING
Status: DISCONTINUED | OUTPATIENT
Start: 2018-08-21 | End: 2018-08-23 | Stop reason: HOSPADM

## 2018-08-20 RX ORDER — WARFARIN SODIUM 4 MG/1
4 TABLET ORAL
Status: DISCONTINUED | OUTPATIENT
Start: 2018-08-20 | End: 2018-08-22

## 2018-08-20 RX ORDER — POTASSIUM CHLORIDE 1.5 G/1.77G
20 POWDER, FOR SOLUTION ORAL 2 TIMES DAILY
Status: DISCONTINUED | OUTPATIENT
Start: 2018-08-20 | End: 2018-08-20 | Stop reason: CLARIF

## 2018-08-20 RX ORDER — POLYETHYLENE GLYCOL 3350 17 G/17G
17 POWDER, FOR SOLUTION ORAL DAILY PRN
Status: DISCONTINUED | OUTPATIENT
Start: 2018-08-20 | End: 2018-08-23 | Stop reason: HOSPADM

## 2018-08-20 RX ORDER — DOCUSATE SODIUM 100 MG/1
100 CAPSULE, LIQUID FILLED ORAL 2 TIMES DAILY
Status: DISCONTINUED | OUTPATIENT
Start: 2018-08-20 | End: 2018-08-23 | Stop reason: HOSPADM

## 2018-08-20 RX ORDER — GABAPENTIN 300 MG/1
300 CAPSULE ORAL 3 TIMES DAILY
Status: DISCONTINUED | OUTPATIENT
Start: 2018-08-20 | End: 2018-08-23 | Stop reason: HOSPADM

## 2018-08-20 RX ORDER — POTASSIUM CHLORIDE 750 MG/1
20 CAPSULE, EXTENDED RELEASE ORAL 2 TIMES DAILY WITH MEALS
Status: DISCONTINUED | OUTPATIENT
Start: 2018-08-20 | End: 2018-08-23 | Stop reason: HOSPADM

## 2018-08-20 RX ORDER — FUROSEMIDE 40 MG/1
40 TABLET ORAL EVERY MORNING
Status: DISCONTINUED | OUTPATIENT
Start: 2018-08-21 | End: 2018-08-23 | Stop reason: HOSPADM

## 2018-08-20 RX ORDER — ATORVASTATIN CALCIUM 20 MG/1
20 TABLET, FILM COATED ORAL DAILY
Status: DISCONTINUED | OUTPATIENT
Start: 2018-08-20 | End: 2018-08-23 | Stop reason: HOSPADM

## 2018-08-20 RX ORDER — PANTOPRAZOLE SODIUM 40 MG/1
40 TABLET, DELAYED RELEASE ORAL DAILY
Status: DISCONTINUED | OUTPATIENT
Start: 2018-08-20 | End: 2018-08-23 | Stop reason: HOSPADM

## 2018-08-20 RX ADMIN — DOCUSATE SODIUM 100 MG: 100 CAPSULE, LIQUID FILLED ORAL at 21:08

## 2018-08-20 RX ADMIN — WARFARIN SODIUM 4 MG: 4 TABLET ORAL at 21:07

## 2018-08-20 RX ADMIN — ATORVASTATIN CALCIUM 20 MG: 20 TABLET, FILM COATED ORAL at 21:09

## 2018-08-20 RX ADMIN — POTASSIUM CHLORIDE 20 MEQ: 750 CAPSULE, EXTENDED RELEASE ORAL at 18:32

## 2018-08-20 RX ADMIN — GABAPENTIN 300 MG: 300 CAPSULE ORAL at 21:08

## 2018-08-20 RX ADMIN — PANTOPRAZOLE SODIUM 40 MG: 40 TABLET, DELAYED RELEASE ORAL at 18:32

## 2018-08-20 RX ADMIN — ERTAPENEM SODIUM 1 G: 1 INJECTION, POWDER, LYOPHILIZED, FOR SOLUTION INTRAMUSCULAR; INTRAVENOUS at 09:38

## 2018-08-20 NOTE — OUTREACH NOTE
[facility] Week 1 Survey      Responses   Facility patient discharged from?  [facility]   Does the patient have one of the following disease processes/diagnoses(primary or secondary)?  [facility]   Is there a successful TCM telephone encounter documented?  No   Week 1 attempt successful?  No   Revoke  Readmitted          Zoë Hall RN

## 2018-08-21 ENCOUNTER — HOSPITAL ENCOUNTER (OUTPATIENT)
Dept: INFUSION THERAPY | Facility: HOSPITAL | Age: 73
Discharge: HOME | End: 2018-08-21

## 2018-08-21 LAB
INR PPP: 1.35 (ref 0.9–1.1)
PROTHROMBIN TIME: 16.4 SECONDS (ref 11.7–14.2)

## 2018-08-21 PROCEDURE — 85610 PROTHROMBIN TIME: CPT | Performed by: ORTHOPAEDIC SURGERY

## 2018-08-21 PROCEDURE — 25010000002 ERTAPENEM PER 500 MG: Performed by: INTERNAL MEDICINE

## 2018-08-21 RX ADMIN — POTASSIUM CHLORIDE 20 MEQ: 750 CAPSULE, EXTENDED RELEASE ORAL at 09:22

## 2018-08-21 RX ADMIN — FUROSEMIDE 40 MG: 40 TABLET ORAL at 09:21

## 2018-08-21 RX ADMIN — METOPROLOL SUCCINATE 50 MG: 50 TABLET, FILM COATED, EXTENDED RELEASE ORAL at 09:21

## 2018-08-21 RX ADMIN — GABAPENTIN 300 MG: 300 CAPSULE ORAL at 09:21

## 2018-08-21 RX ADMIN — WARFARIN SODIUM 4 MG: 4 TABLET ORAL at 17:35

## 2018-08-21 RX ADMIN — DOCUSATE SODIUM 100 MG: 100 CAPSULE, LIQUID FILLED ORAL at 20:29

## 2018-08-21 RX ADMIN — ATORVASTATIN CALCIUM 20 MG: 20 TABLET, FILM COATED ORAL at 09:22

## 2018-08-21 RX ADMIN — GABAPENTIN 300 MG: 300 CAPSULE ORAL at 20:29

## 2018-08-21 RX ADMIN — POTASSIUM CHLORIDE 20 MEQ: 750 CAPSULE, EXTENDED RELEASE ORAL at 17:34

## 2018-08-21 RX ADMIN — GABAPENTIN 300 MG: 300 CAPSULE ORAL at 16:16

## 2018-08-21 RX ADMIN — FERROUS SULFATE TAB 325 MG (65 MG ELEMENTAL FE) 325 MG: 325 (65 FE) TAB at 09:22

## 2018-08-21 RX ADMIN — DOCUSATE SODIUM 100 MG: 100 CAPSULE, LIQUID FILLED ORAL at 09:23

## 2018-08-21 RX ADMIN — ERTAPENEM SODIUM 1 G: 1 INJECTION, POWDER, LYOPHILIZED, FOR SOLUTION INTRAMUSCULAR; INTRAVENOUS at 09:22

## 2018-08-21 RX ADMIN — HYDROCODONE BITARTRATE AND ACETAMINOPHEN 2 TABLET: 5; 325 TABLET ORAL at 09:21

## 2018-08-21 RX ADMIN — ACETAMINOPHEN 800 MCG: 400 TABLET ORAL at 09:21

## 2018-08-22 ENCOUNTER — HOSPITAL ENCOUNTER (OUTPATIENT)
Dept: INFUSION THERAPY | Facility: HOSPITAL | Age: 73
Discharge: HOME | End: 2018-08-22

## 2018-08-22 LAB
INR PPP: 1.55 (ref 0.9–1.1)
PROTHROMBIN TIME: 18.3 SECONDS (ref 11.7–14.2)

## 2018-08-22 PROCEDURE — 85610 PROTHROMBIN TIME: CPT | Performed by: ORTHOPAEDIC SURGERY

## 2018-08-22 PROCEDURE — 25010000002 ERTAPENEM PER 500 MG: Performed by: INTERNAL MEDICINE

## 2018-08-22 RX ORDER — WARFARIN SODIUM 5 MG/1
5 TABLET ORAL
Status: DISCONTINUED | OUTPATIENT
Start: 2018-08-22 | End: 2018-08-23 | Stop reason: HOSPADM

## 2018-08-22 RX ADMIN — GABAPENTIN 300 MG: 300 CAPSULE ORAL at 16:53

## 2018-08-22 RX ADMIN — FUROSEMIDE 40 MG: 40 TABLET ORAL at 06:42

## 2018-08-22 RX ADMIN — ERTAPENEM SODIUM 1 G: 1 INJECTION, POWDER, LYOPHILIZED, FOR SOLUTION INTRAMUSCULAR; INTRAVENOUS at 08:33

## 2018-08-22 RX ADMIN — PANTOPRAZOLE SODIUM 40 MG: 40 TABLET, DELAYED RELEASE ORAL at 08:33

## 2018-08-22 RX ADMIN — DOCUSATE SODIUM 100 MG: 100 CAPSULE, LIQUID FILLED ORAL at 20:57

## 2018-08-22 RX ADMIN — POTASSIUM CHLORIDE 20 MEQ: 750 CAPSULE, EXTENDED RELEASE ORAL at 19:18

## 2018-08-22 RX ADMIN — WARFARIN SODIUM 5 MG: 5 TABLET ORAL at 19:19

## 2018-08-22 RX ADMIN — DOCUSATE SODIUM 100 MG: 100 CAPSULE, LIQUID FILLED ORAL at 08:34

## 2018-08-22 RX ADMIN — HYDROCODONE BITARTRATE AND ACETAMINOPHEN 2 TABLET: 5; 325 TABLET ORAL at 06:42

## 2018-08-22 RX ADMIN — ACETAMINOPHEN 800 MCG: 400 TABLET ORAL at 06:42

## 2018-08-22 RX ADMIN — GABAPENTIN 300 MG: 300 CAPSULE ORAL at 08:34

## 2018-08-22 RX ADMIN — ATORVASTATIN CALCIUM 20 MG: 20 TABLET, FILM COATED ORAL at 08:33

## 2018-08-22 RX ADMIN — POTASSIUM CHLORIDE 20 MEQ: 750 CAPSULE, EXTENDED RELEASE ORAL at 08:34

## 2018-08-22 RX ADMIN — HYDROCODONE BITARTRATE AND ACETAMINOPHEN 2 TABLET: 5; 325 TABLET ORAL at 16:53

## 2018-08-22 RX ADMIN — GABAPENTIN 300 MG: 300 CAPSULE ORAL at 20:57

## 2018-08-22 RX ADMIN — FERROUS SULFATE TAB 325 MG (65 MG ELEMENTAL FE) 325 MG: 325 (65 FE) TAB at 08:33

## 2018-08-23 ENCOUNTER — HOSPITAL ENCOUNTER (OUTPATIENT)
Dept: INFUSION THERAPY | Facility: HOSPITAL | Age: 73
Discharge: HOME | End: 2018-08-23

## 2018-08-23 VITALS
SYSTOLIC BLOOD PRESSURE: 104 MMHG | HEIGHT: 59 IN | OXYGEN SATURATION: 93 % | TEMPERATURE: 97.1 F | RESPIRATION RATE: 16 BRPM | WEIGHT: 126.98 LBS | HEART RATE: 89 BPM | BODY MASS INDEX: 25.6 KG/M2 | DIASTOLIC BLOOD PRESSURE: 61 MMHG

## 2018-08-23 LAB
DEPRECATED RDW RBC AUTO: 51.5 FL (ref 37–54)
ERYTHROCYTE [DISTWIDTH] IN BLOOD BY AUTOMATED COUNT: 15.2 % (ref 11.7–13)
HCT VFR BLD AUTO: 38.6 % (ref 35.6–45.5)
HGB BLD-MCNC: 11.9 G/DL (ref 11.9–15.5)
INR PPP: 1.68 (ref 0.9–1.1)
MCH RBC QN AUTO: 28.9 PG (ref 26.9–32)
MCHC RBC AUTO-ENTMCNC: 30.8 G/DL (ref 32.4–36.3)
MCV RBC AUTO: 93.7 FL (ref 80.5–98.2)
PLATELET # BLD AUTO: 354 10*3/MM3 (ref 140–500)
PMV BLD AUTO: 12.3 FL (ref 6–12)
PROTHROMBIN TIME: 19.5 SECONDS (ref 11.7–14.2)
RBC # BLD AUTO: 4.12 10*6/MM3 (ref 3.9–5.2)
WBC NRBC COR # BLD: 10.57 10*3/MM3 (ref 4.5–10.7)

## 2018-08-23 PROCEDURE — 25010000002 ERTAPENEM PER 500 MG: Performed by: INTERNAL MEDICINE

## 2018-08-23 PROCEDURE — 85027 COMPLETE CBC AUTOMATED: CPT | Performed by: ORTHOPAEDIC SURGERY

## 2018-08-23 PROCEDURE — 99024 POSTOP FOLLOW-UP VISIT: CPT | Performed by: NURSE PRACTITIONER

## 2018-08-23 PROCEDURE — 85610 PROTHROMBIN TIME: CPT | Performed by: ORTHOPAEDIC SURGERY

## 2018-08-23 RX ORDER — HYDROCODONE BITARTRATE AND ACETAMINOPHEN 5; 325 MG/1; MG/1
2 TABLET ORAL EVERY 4 HOURS PRN
Qty: 60 TABLET | Refills: 0 | Status: SHIPPED | OUTPATIENT
Start: 2018-08-23 | End: 2018-08-29

## 2018-08-23 RX ORDER — WARFARIN SODIUM 5 MG/1
TABLET ORAL
Qty: 30 TABLET | Refills: 0 | Status: ON HOLD | OUTPATIENT
Start: 2018-08-23 | End: 2019-02-12 | Stop reason: SDUPTHER

## 2018-08-23 RX ADMIN — POTASSIUM CHLORIDE 20 MEQ: 750 CAPSULE, EXTENDED RELEASE ORAL at 09:04

## 2018-08-23 RX ADMIN — PANTOPRAZOLE SODIUM 40 MG: 40 TABLET, DELAYED RELEASE ORAL at 09:04

## 2018-08-23 RX ADMIN — GABAPENTIN 300 MG: 300 CAPSULE ORAL at 09:04

## 2018-08-23 RX ADMIN — FERROUS SULFATE TAB 325 MG (65 MG ELEMENTAL FE) 325 MG: 325 (65 FE) TAB at 09:04

## 2018-08-23 RX ADMIN — ATORVASTATIN CALCIUM 20 MG: 20 TABLET, FILM COATED ORAL at 09:04

## 2018-08-23 RX ADMIN — ERTAPENEM SODIUM 1 G: 1 INJECTION, POWDER, LYOPHILIZED, FOR SOLUTION INTRAMUSCULAR; INTRAVENOUS at 09:04

## 2018-08-23 RX ADMIN — HYDROCODONE BITARTRATE AND ACETAMINOPHEN 2 TABLET: 5; 325 TABLET ORAL at 09:04

## 2018-08-23 RX ADMIN — ACETAMINOPHEN 800 MCG: 400 TABLET ORAL at 06:48

## 2018-08-23 RX ADMIN — FUROSEMIDE 40 MG: 40 TABLET ORAL at 06:48

## 2018-08-23 RX ADMIN — DOCUSATE SODIUM 100 MG: 100 CAPSULE, LIQUID FILLED ORAL at 09:04

## 2018-08-23 RX ADMIN — HYDROCODONE BITARTRATE AND ACETAMINOPHEN 2 TABLET: 5; 325 TABLET ORAL at 12:17

## 2018-08-24 ENCOUNTER — TELEPHONE (OUTPATIENT)
Dept: ORTHOPEDIC SURGERY | Facility: CLINIC | Age: 73
End: 2018-08-24

## 2018-08-24 ENCOUNTER — HOSPITAL ENCOUNTER (OUTPATIENT)
Dept: INFUSION THERAPY | Facility: HOSPITAL | Age: 73
Discharge: HOME | End: 2018-08-24

## 2018-08-24 NOTE — TELEPHONE ENCOUNTER
Called and recommended dressing changes.  I will see her next week at [facility]

## 2018-08-24 NOTE — H&P
Patient: Rachel Moser    Date of Admission: 8/19/18    YOB: 1945    Medical Record Number: [id number]    Admitting Physician: Dr. Obed Barney    Reason for Admission: Bilateral leg weakness    History of Present Illness: 72 y.o. female presents Complaints of bilateral leg weakness. She was recently admitted for treatment of an infection placement of antibiotics spacerAfter being discharged home she was having trouble with worsening weakness of both legs and when seen in the emergency department noted that have drainage from the left hip wound..    Allergies:   Allergies   Allergen Reactions   • Sulfa Antibiotics Rash         Current Medications:  Scheduled Meds:  PRN Meds:.    PMH:  Past Medical History:   Diagnosis Date   • Allergic    • Bone infection (CMS/HCC)     hip   • Cataract    • High cholesterol    • History of DVT (deep vein thrombosis)    • History of kidney infection     1 MONTH AGO   • History of pulmonary embolus (PE)    • History of transfusion    • Hypertension    • Left hip postoperative wound infection    • Paralabral cyst of left hip    • PICC (peripherally inserted central catheter) in place    • PONV (postoperative nausea and vomiting)    • Presence of vena cava filter    • Rheumatoid arteritis    • Seasonal allergies    • UTI (urinary tract infection)     diagnosed 4/2/18  medically treated presently   • Wears glasses         PSURGH:  Past Surgical History:   Procedure Laterality Date   • BLADDER SUSPENSION     • CATARACT EXTRACTION, BILATERAL     • COLONOSCOPY     • ENDOSCOPIC FUNCTIONAL SINUS SURGERY (FESS)     • HIP SURGERY Left 1983   • HIP SURGERY Left 20069   • HIP SURGERY Left 2011   • INCISION AND DRAINAGE HIP Left 11/2/2016    Procedure: HIP INCISION AND DRAINAGE;  Surgeon: Obed Barney MD;  Location: [facility];  Service:    • INCISION AND DRAINAGE HIP Left 7/7/2018    Procedure: I&D and antibiotic bead placement left hip;  Surgeon: Oebd Barney MD;  Location:  [facility] OR;  Service: Orthopedics   • INCISION AND DRAINAGE HIP Left 7/21/2018    Procedure: HIP INCISION AND DRAINAGE, LEFT WITH POLY HEAD CHANGE AND ANTIBIOTIC BEAD PLACEMENT;  Surgeon: Obed Barney MD;  Location: [facility];  Service: Orthopedics   • JOINT REPLACEMENT Left     HIP   • PERIPHERALLY INSERTED CENTRAL CATHETER INSERTION     • DC CLOSED RX TRAUMATIC HIP DISLOCATN Left 12/18/2017    Procedure: LEFT HIP CLOSED REDUCTION;  Surgeon: Obed Barney MD;  Location: [facility];  Service: Orthopedics   • SOFT TISSUE MASS EXCISION Left     hip 3/19/18   • TOTAL ABDOMINAL HYSTERECTOMY     • TOTAL HIP ARTHROPLASTY REVISION Left 4/25/2018    Procedure: REVISION OF LEFT ACETABULAR COMPONENT ;  Surgeon: Obed Barney MD;  Location: [facility] OR;  Service: Orthopedics   • TOTAL HIP ARTHROPLASTY REVISION Left 08/11/2018    total hip revision with removal of iinfected total hip and placement of antibiotic spacer   • VENA CAVA FILTER INSERTION         SocialHx:  Social History     Occupational History   • Not on file.     Social History Main Topics   • Smoking status: Former Smoker     Types: Cigarettes     Quit date: 1975   • Smokeless tobacco: Never Used      Comment: quit 50 years ago   • Alcohol use No   • Drug use: No   • Sexual activity: Defer      Social History     Social History Narrative   • No narrative on file       FamHx:  Family History   Problem Relation Age of Onset   • Malig Hyperthermia Neg Hx          Review of Systems:   A 14 point review of systems was performed, pertinent positives discussed above, all other systems are negative    Physical Exam: 72 y.o. female  Vital Signs :   Vitals:    08/15/18 2300 08/16/18 0300 08/16/18 0700 08/16/18 1159   BP: 105/62 107/65 109/67 128/73   BP Location: Left arm Left arm Left arm Left arm   Patient Position: Lying Lying Lying Lying   Pulse: 73 71 80 81   Resp: 16 16 16 16   Temp: 97.5 °F (36.4 °C) 97 °F (36.1 °C) 98.4 °F (36.9 °C) 97.5 °F (36.4  °C)   TempSrc: Oral Oral Oral Oral   SpO2: 96% 96% 96% 96%   Weight:       Height:         General: Alert and Oriented x 3, No acute distress.  Psych: mood and affect appropriate; recent and remote memory intact  Eyes: conjunctiva clear; pupils equally round and reactive, sclera nonicteric  CV: no peripheral edema  Resp: normal respiratory effort  Skin: Left hip wound with small amount of bloody drainage  Musculosketetal; pain with hip range of motion. Positive stinchfeld test. No trochanteric  Tenderness.  Vascular: palpable distal pulses    Labs:    Admission on 08/11/2018, Discharged on 08/16/2018   Component Date Value Ref Range Status   • Product Code 08/12/2018 F7555D25   Final   • Unit Number 08/12/2018 [id number]   Final   • UNIT  ABO 08/12/2018 A   Final   • UNIT  RH 08/12/2018 POS   Final   • Dispense Status 08/12/2018 PT   Final   • Blood Type 08/12/2018 APOS   Final   • Blood Expiration Date 08/12/2018 201808222359   Final   • Blood Type Barcode 08/12/2018 6200   Final   • Product Code 08/12/2018 [id number]   Final   • Unit Number 08/12/2018 [id number]   Final   • UNIT  ABO 08/12/2018 A   Final   • UNIT  RH 08/12/2018 POS   Final   • Dispense Status 08/12/2018 PT   Final   • Blood Type 08/12/2018 APOS   Final   • Blood Expiration Date 08/12/2018 201808222359   Final   • Blood Type Barcode 08/12/2018 6200   Final   • ABO Type 08/11/2018 A   Final   • RH type 08/11/2018 Positive   Final   • Antibody Screen 08/11/2018 Negative   Final   • T&S Expiration Date 08/11/2018 8/14/2018 11:59:59 PM   Final   • Protime 08/11/2018 15.1* 11.7 - 14.2 Seconds Final   • INR 08/11/2018 1.21* 0.90 - 1.10 Final   • WBC 08/11/2018 10.50  4.50 - 10.70 10*3/mm3 Final   • RBC 08/11/2018 3.41* 3.90 - 5.20 10*6/mm3 Final   • Hemoglobin 08/11/2018 9.3* 11.9 - 15.5 g/dL Final   • Hematocrit 08/11/2018 31.8* 35.6 - 45.5 % Final   • MCV 08/11/2018 93.3  80.5 - 98.2 fL Final   • MCH 08/11/2018 27.3  26.9 - 32.0 pg Final   •  MCHC 08/11/2018 29.2* 32.4 - 36.3 g/dL Final   • RDW 08/11/2018 14.8* 11.7 - 13.0 % Final   • RDW-SD 08/11/2018 50.0  37.0 - 54.0 fl Final   • MPV 08/11/2018 11.6  6.0 - 12.0 fL Final   • Platelets 08/11/2018 429  140 - 500 10*3/mm3 Final   • Culture 08/11/2018 No anaerobes isolated at 5 days   Final   • Culture 08/11/2018 No anaerobes isolated at 5 days   Final   • Wound Culture 08/11/2018 No growth at 3 days   Final   • Gram Stain Result 08/11/2018 No organisms seen   Final   • Wound Culture 08/11/2018 No growth at 3 days   Final   • Gram Stain Result 08/11/2018 No organisms seen   Final   • Protime 08/11/2018 15.8* 11.7 - 14.2 Seconds Final   • INR 08/11/2018 1.28* 0.90 - 1.10 Final   • Protime 08/12/2018 16.8* 11.7 - 14.2 Seconds Final   • INR 08/12/2018 1.39* 0.90 - 1.10 Final   • Hemoglobin 08/12/2018 8.9* 11.9 - 15.5 g/dL Final   • Hematocrit 08/12/2018 28.6* 35.6 - 45.5 % Final   • Protime 08/13/2018 19.7* 11.7 - 14.2 Seconds Final   • INR 08/13/2018 1.70* 0.90 - 1.10 Final   • WBC 08/13/2018 10.47  4.50 - 10.70 10*3/mm3 Final   • RBC 08/13/2018 2.36* 3.90 - 5.20 10*6/mm3 Final   • Hemoglobin 08/13/2018 7.0* 11.9 - 15.5 g/dL Final   • Hematocrit 08/13/2018 21.5* 35.6 - 45.5 % Final   • MCV 08/13/2018 91.1  80.5 - 98.2 fL Final   • MCH 08/13/2018 29.7  26.9 - 32.0 pg Final   • MCHC 08/13/2018 32.6  32.4 - 36.3 g/dL Final   • RDW 08/13/2018 15.7* 11.7 - 13.0 % Final   • RDW-SD 08/13/2018 51.6  37.0 - 54.0 fl Final   • MPV 08/13/2018 10.9  6.0 - 12.0 fL Final   • Platelets 08/13/2018 236  140 - 500 10*3/mm3 Final   • Creatinine 08/13/2018 0.45* 0.57 - 1.00 mg/dL Final   • eGFR Non African Amer 08/13/2018 137  >60 mL/min/1.73 Final   • Sed Rate 08/13/2018 89* 0 - 30 mm/hr Final   • C-Reactive Protein 08/13/2018 3.70* 0.00 - 0.50 mg/dL Final   • Vancomycin Trough 08/14/2018 15.40  5.00 - 20.00 mcg/mL Final   • Protime 08/14/2018 20.2* 11.7 - 14.2 Seconds Final   • INR 08/14/2018 1.76* 0.90 - 1.10 Final   •  Creatinine 08/14/2018 0.36* 0.57 - 1.00 mg/dL Final   • eGFR Non  Amer 08/14/2018 >150  >60 mL/min/1.73 Final   • Product Code 08/15/2018 [id number]   Final   • Unit Number 08/15/2018 [id number]   Final   • UNIT  ABO 08/15/2018 A   Final   • UNIT  RH 08/15/2018 POS   Final   • Dispense Status 08/15/2018 PT   Final   • Blood Type 08/15/2018 APOS   Final   • Blood Expiration Date 08/15/2018 [id number]   Final   • Blood Type Barcode 08/15/2018 6200   Final   • Product Code 08/15/2018 [id number]   Final   • Unit Number 08/15/2018 [id number]   Final   • UNIT  ABO 08/15/2018 A   Final   • UNIT  RH 08/15/2018 POS   Final   • Dispense Status 08/15/2018 PT   Final   • Blood Type 08/15/2018 APOS   Final   • Blood Expiration Date 08/15/2018 [id number]   Final   • Blood Type Barcode 08/15/2018 6200   Final   • Protime 08/15/2018 20.6* 11.7 - 14.2 Seconds Final   • INR 08/15/2018 1.80* 0.90 - 1.10 Final   • Protime 08/16/2018 21.4* 11.7 - 14.2 Seconds Final   • INR 08/16/2018 1.89* 0.90 - 1.10 Final   • C-Reactive Protein 08/16/2018 2.29* 0.00 - 0.50 mg/dL Final   • WBC 08/16/2018 10.84* 4.50 - 10.70 10*3/mm3 Final   • RBC 08/16/2018 4.19  3.90 - 5.20 10*6/mm3 Final   • Hemoglobin 08/16/2018 11.8* 11.9 - 15.5 g/dL Final   • Hematocrit 08/16/2018 38.2  35.6 - 45.5 % Final   • MCV 08/16/2018 91.2  80.5 - 98.2 fL Final   • MCH 08/16/2018 28.2  26.9 - 32.0 pg Final   • MCHC 08/16/2018 30.9* 32.4 - 36.3 g/dL Final   • RDW 08/16/2018 15.9* 11.7 - 13.0 % Final   • RDW-SD 08/16/2018 52.2  37.0 - 54.0 fl Final   • MPV 08/16/2018 11.4  6.0 - 12.0 fL Final   • Platelets 08/16/2018 351  140 - 500 10*3/mm3 Final   Appointment on 08/10/2018   Component Date Value Ref Range Status   • Color, UA 08/10/2018 Yellow  Yellow, Straw Final   • Appearance, UA 08/10/2018 Clear  Clear Final   • pH, UA 08/10/2018 6.0  5.0 - 8.0 Final   • Specific Gravity, UA 08/10/2018 1.011  1.005 - 1.030 Final   • Glucose, UA 08/10/2018 Negative   Negative Final   • Ketones, UA 08/10/2018 Negative  Negative Final   • Bilirubin, UA 08/10/2018 Negative  Negative Final   • Blood, UA 08/10/2018 Negative  Negative Final   • Protein, UA 08/10/2018 Negative  Negative Final   • Leuk Esterase, UA 08/10/2018 Negative  Negative Final   • Nitrite, UA 08/10/2018 Negative  Negative Final   • Urobilinogen, UA 08/10/2018 0.2 E.U./dL  0.2 - 1.0 E.U./dL Final   Hospital Outpatient Visit on 08/06/2018   Component Date Value Ref Range Status   • Glucose 08/06/2018 125* 65 - 99 mg/dL Final   • BUN 08/06/2018 9  8 - 23 mg/dL Final   • Creatinine 08/06/2018 0.53* 0.57 - 1.00 mg/dL Final   • Sodium 08/06/2018 145  136 - 145 mmol/L Final   • Potassium 08/06/2018 4.2  3.5 - 5.2 mmol/L Final   • Chloride 08/06/2018 112* 98 - 107 mmol/L Final   • CO2 08/06/2018 20.7* 22.0 - 29.0 mmol/L Final   • Calcium 08/06/2018 7.2* 8.6 - 10.5 mg/dL Final   • Total Protein 08/06/2018 5.9* 6.0 - 8.5 g/dL Final   • Albumin 08/06/2018 2.70* 3.50 - 5.20 g/dL Final   • ALT (SGPT) 08/06/2018 6  1 - 33 U/L Final   • AST (SGOT) 08/06/2018 8  1 - 32 U/L Final   • Alkaline Phosphatase 08/06/2018 110  39 - 117 U/L Final   • Total Bilirubin 08/06/2018 <0.2  0.1 - 1.2 mg/dL Final   • eGFR Non African Amer 08/06/2018 114  >60 mL/min/1.73 Final   • Globulin 08/06/2018 3.2  gm/dL Final   • A/G Ratio 08/06/2018 0.8  g/dL Final   • BUN/Creatinine Ratio 08/06/2018 17.0  7.0 - 25.0 Final   • Anion Gap 08/06/2018 12.3  mmol/L Final   • Protime 08/06/2018 36.4* 11.7 - 14.2 Seconds Final   • INR 08/06/2018 3.73* 0.90 - 1.10 Final   • WBC 08/06/2018 11.91* 4.50 - 10.70 10*3/mm3 Final   • RBC 08/06/2018 3.11* 3.90 - 5.20 10*6/mm3 Final   • Hemoglobin 08/06/2018 8.8* 11.9 - 15.5 g/dL Final   • Hematocrit 08/06/2018 28.9* 35.6 - 45.5 % Final   • MCV 08/06/2018 92.9  80.5 - 98.2 fL Final   • MCH 08/06/2018 28.3  26.9 - 32.0 pg Final   • MCHC 08/06/2018 30.4* 32.4 - 36.3 g/dL Final   • RDW 08/06/2018 15.3* 11.7 - 13.0 % Final    • RDW-SD 08/06/2018 51.9  37.0 - 54.0 fl Final   • MPV 08/06/2018 11.9  6.0 - 12.0 fL Final   • Platelets 08/06/2018 374  140 - 500 10*3/mm3 Final   • Neutrophil % 08/06/2018 72.3  42.7 - 76.0 % Final   • Lymphocyte % 08/06/2018 17.0* 19.6 - 45.3 % Final   • Monocyte % 08/06/2018 7.4  5.0 - 12.0 % Final   • Eosinophil % 08/06/2018 2.9  0.3 - 6.2 % Final   • Basophil % 08/06/2018 0.4  0.0 - 1.5 % Final   • Immature Grans % 08/06/2018 0.6* 0.0 - 0.5 % Final   • Neutrophils, Absolute 08/06/2018 8.61* 1.90 - 8.10 10*3/mm3 Final   • Lymphocytes, Absolute 08/06/2018 2.03  0.90 - 4.80 10*3/mm3 Final   • Monocytes, Absolute 08/06/2018 0.88  0.20 - 1.20 10*3/mm3 Final   • Eosinophils, Absolute 08/06/2018 0.34  0.00 - 0.70 10*3/mm3 Final   • Basophils, Absolute 08/06/2018 0.05  0.00 - 0.20 10*3/mm3 Final   • Immature Grans, Absolute 08/06/2018 0.07* 0.00 - 0.03 10*3/mm3 Final   Hospital Outpatient Visit on 08/02/2018   Component Date Value Ref Range Status   • Protime 08/02/2018 21.3* 11.7 - 14.2 Seconds Final   • INR 08/02/2018 1.88* 0.90 - 1.10 Final   Hospital Outpatient Visit on 07/30/2018   Component Date Value Ref Range Status   • Glucose 07/30/2018 107* 65 - 99 mg/dL Final   • BUN 07/30/2018 11  8 - 23 mg/dL Final   • Creatinine 07/30/2018 0.41* 0.57 - 1.00 mg/dL Final   • Sodium 07/30/2018 142  136 - 145 mmol/L Final   • Potassium 07/30/2018 2.6* 3.5 - 5.2 mmol/L Final   • Chloride 07/30/2018 102  98 - 107 mmol/L Final   • CO2 07/30/2018 27.3  22.0 - 29.0 mmol/L Final   • Calcium 07/30/2018 9.7  8.6 - 10.5 mg/dL Final   • Total Protein 07/30/2018 5.6* 6.0 - 8.5 g/dL Final   • Albumin 07/30/2018 2.60* 3.50 - 5.20 g/dL Final   • ALT (SGPT) 07/30/2018 8  1 - 33 U/L Final   • AST (SGOT) 07/30/2018 11  1 - 32 U/L Final   • Alkaline Phosphatase 07/30/2018 62  39 - 117 U/L Final   • Total Bilirubin 07/30/2018 0.4  0.1 - 1.2 mg/dL Final   • eGFR Non African Amer 07/30/2018 >150  >60 mL/min/1.73 Final   • Globulin 07/30/2018  3.0  gm/dL Final   • A/G Ratio 07/30/2018 0.9  g/dL Final   • BUN/Creatinine Ratio 07/30/2018 26.8* 7.0 - 25.0 Final   • Anion Gap 07/30/2018 12.7  mmol/L Final   • Protime 07/30/2018 21.8* 11.7 - 14.2 Seconds Final   • INR 07/30/2018 1.94* 0.90 - 1.10 Final   • WBC 07/30/2018 14.04* 4.50 - 10.70 10*3/mm3 Final   • RBC 07/30/2018 3.27* 3.90 - 5.20 10*6/mm3 Final   • Hemoglobin 07/30/2018 9.4* 11.9 - 15.5 g/dL Final   • Hematocrit 07/30/2018 30.1* 35.6 - 45.5 % Final   • MCV 07/30/2018 92.0  80.5 - 98.2 fL Final   • MCH 07/30/2018 28.7  26.9 - 32.0 pg Final   • MCHC 07/30/2018 31.2* 32.4 - 36.3 g/dL Final   • RDW 07/30/2018 15.4* 11.7 - 13.0 % Final   • RDW-SD 07/30/2018 51.9  37.0 - 54.0 fl Final   • MPV 07/30/2018 12.0  6.0 - 12.0 fL Final   • Platelets 07/30/2018 381  140 - 500 10*3/mm3 Final   • Neutrophil % 07/30/2018 67.4  42.7 - 76.0 % Final   • Lymphocyte % 07/30/2018 14.7* 19.6 - 45.3 % Final   • Monocyte % 07/30/2018 14.5* 5.0 - 12.0 % Final   • Eosinophil % 07/30/2018 3.3  0.3 - 6.2 % Final   • Basophil % 07/30/2018 0.1  0.0 - 1.5 % Final   • Immature Grans % 07/30/2018 1.0* 0.0 - 0.5 % Final   • Neutrophils, Absolute 07/30/2018 9.46* 1.90 - 8.10 10*3/mm3 Final   • Lymphocytes, Absolute 07/30/2018 2.06  0.90 - 4.80 10*3/mm3 Final   • Monocytes, Absolute 07/30/2018 2.04* 0.20 - 1.20 10*3/mm3 Final   • Eosinophils, Absolute 07/30/2018 0.46  0.00 - 0.70 10*3/mm3 Final   • Basophils, Absolute 07/30/2018 0.02  0.00 - 0.20 10*3/mm3 Final   • Immature Grans, Absolute 07/30/2018 0.14* 0.00 - 0.03 10*3/mm3 Final   Admission on 07/21/2018, Discharged on 07/27/2018   No results displayed because visit has over 200 results.        No results found.          Xrays:  None taken    Assessment/Plan: Bilateral leg weakness acute onset. We'll get an MRI to rule out lumbar source.Order labs.        Obed Barney MD  8/23/2018

## 2018-08-25 ENCOUNTER — HOSPITAL ENCOUNTER (OUTPATIENT)
Dept: INFUSION THERAPY | Facility: HOSPITAL | Age: 73
Discharge: HOME | End: 2018-08-25

## 2018-08-26 ENCOUNTER — APPOINTMENT (OUTPATIENT)
Dept: INFUSION THERAPY | Facility: HOSPITAL | Age: 73
End: 2018-08-26

## 2018-08-27 ENCOUNTER — APPOINTMENT (OUTPATIENT)
Dept: INFUSION THERAPY | Facility: HOSPITAL | Age: 73
End: 2018-08-27

## 2018-08-28 ENCOUNTER — APPOINTMENT (OUTPATIENT)
Dept: INFUSION THERAPY | Facility: HOSPITAL | Age: 73
End: 2018-08-28

## 2018-08-29 ENCOUNTER — APPOINTMENT (OUTPATIENT)
Dept: INFUSION THERAPY | Facility: HOSPITAL | Age: 73
End: 2018-08-29

## 2018-08-30 ENCOUNTER — TELEPHONE (OUTPATIENT)
Dept: ORTHOPEDIC SURGERY | Facility: CLINIC | Age: 73
End: 2018-08-30

## 2018-08-30 ENCOUNTER — APPOINTMENT (OUTPATIENT)
Dept: INFUSION THERAPY | Facility: HOSPITAL | Age: 73
End: 2018-08-30

## 2018-08-31 ENCOUNTER — APPOINTMENT (OUTPATIENT)
Dept: INFUSION THERAPY | Facility: HOSPITAL | Age: 73
End: 2018-08-31

## 2018-08-31 ENCOUNTER — TELEPHONE (OUTPATIENT)
Dept: ORTHOPEDIC SURGERY | Facility: CLINIC | Age: 73
End: 2018-08-31

## 2018-09-01 ENCOUNTER — APPOINTMENT (OUTPATIENT)
Dept: INFUSION THERAPY | Facility: HOSPITAL | Age: 73
End: 2018-09-01

## 2018-09-10 ENCOUNTER — DOCUMENTATION (OUTPATIENT)
Dept: PHYSICAL THERAPY | Facility: CLINIC | Age: 73
End: 2018-09-10

## 2018-09-10 NOTE — PROGRESS NOTES
Discharge Summary  Discharge Summary from Physical Therapy Report      Dates  PT visit: 6/18 to 6/28/18  Number of Visits: 4     Discharge Status of Patient: See last daily note.    Goals: Partially Met    Discharge Plan: Continue with current home exercise program as instructed    Comments Patient did not return to PT.    Date of Discharge 9/10/18        Adi Tirado, PT  Physical Therapist

## 2018-09-14 ENCOUNTER — TELEPHONE (OUTPATIENT)
Dept: ORTHOPEDIC SURGERY | Facility: CLINIC | Age: 73
End: 2018-09-14

## 2018-09-17 ENCOUNTER — HOSPITAL ENCOUNTER (OUTPATIENT)
Dept: INFUSION THERAPY | Facility: HOSPITAL | Age: 73
Discharge: HOME | End: 2018-09-17
Admitting: INTERNAL MEDICINE

## 2018-09-17 VITALS
OXYGEN SATURATION: 99 % | SYSTOLIC BLOOD PRESSURE: 128 MMHG | HEART RATE: 93 BPM | DIASTOLIC BLOOD PRESSURE: 62 MMHG | TEMPERATURE: 98.1 F | RESPIRATION RATE: 20 BRPM

## 2018-09-17 DIAGNOSIS — T14.8XXA WOUND INFECTION: Primary | ICD-10-CM

## 2018-09-17 DIAGNOSIS — L08.9 WOUND INFECTION: Primary | ICD-10-CM

## 2018-09-17 LAB
ALBUMIN SERPL-MCNC: 3.8 G/DL (ref 3.5–5.2)
ALBUMIN/GLOB SERPL: 1 G/DL
ALP SERPL-CCNC: 202 U/L (ref 39–117)
ALT SERPL W P-5'-P-CCNC: 12 U/L (ref 1–33)
ANION GAP SERPL CALCULATED.3IONS-SCNC: 16.7 MMOL/L
AST SERPL-CCNC: 19 U/L (ref 1–32)
BILIRUB SERPL-MCNC: 0.4 MG/DL (ref 0.1–1.2)
BUN BLD-MCNC: 10 MG/DL (ref 8–23)
BUN/CREAT SERPL: 18.5 (ref 7–25)
CALCIUM SPEC-SCNC: 9.3 MG/DL (ref 8.6–10.5)
CHLORIDE SERPL-SCNC: 101 MMOL/L (ref 98–107)
CO2 SERPL-SCNC: 22.3 MMOL/L (ref 22–29)
CREAT BLD-MCNC: 0.54 MG/DL (ref 0.57–1)
CRP SERPL-MCNC: 2.87 MG/DL (ref 0–0.5)
DEPRECATED RDW RBC AUTO: 45.4 FL (ref 37–54)
ERYTHROCYTE [DISTWIDTH] IN BLOOD BY AUTOMATED COUNT: 14 % (ref 11.7–13)
GFR SERPL CREATININE-BSD FRML MDRD: 111 ML/MIN/1.73
GLOBULIN UR ELPH-MCNC: 3.9 GM/DL
GLUCOSE BLD-MCNC: 93 MG/DL (ref 65–99)
HCT VFR BLD AUTO: 40.2 % (ref 35.6–45.5)
HGB BLD-MCNC: 12.8 G/DL (ref 11.9–15.5)
MCH RBC QN AUTO: 28.2 PG (ref 26.9–32)
MCHC RBC AUTO-ENTMCNC: 31.8 G/DL (ref 32.4–36.3)
MCV RBC AUTO: 88.5 FL (ref 80.5–98.2)
PLATELET # BLD AUTO: 298 10*3/MM3 (ref 140–500)
PMV BLD AUTO: 12.6 FL (ref 6–12)
POTASSIUM BLD-SCNC: 3.4 MMOL/L (ref 3.5–5.2)
PROT SERPL-MCNC: 7.7 G/DL (ref 6–8.5)
RBC # BLD AUTO: 4.54 10*6/MM3 (ref 3.9–5.2)
SODIUM BLD-SCNC: 140 MMOL/L (ref 136–145)
WBC NRBC COR # BLD: 12.72 10*3/MM3 (ref 4.5–10.7)

## 2018-09-17 PROCEDURE — 25010000002 ERTAPENEM PER 500 MG: Performed by: INTERNAL MEDICINE

## 2018-09-17 PROCEDURE — 96365 THER/PROPH/DIAG IV INF INIT: CPT

## 2018-09-17 PROCEDURE — 85027 COMPLETE CBC AUTOMATED: CPT | Performed by: INTERNAL MEDICINE

## 2018-09-17 PROCEDURE — 36592 COLLECT BLOOD FROM PICC: CPT

## 2018-09-17 PROCEDURE — 86140 C-REACTIVE PROTEIN: CPT | Performed by: INTERNAL MEDICINE

## 2018-09-17 PROCEDURE — 80053 COMPREHEN METABOLIC PANEL: CPT | Performed by: INTERNAL MEDICINE

## 2018-09-17 RX ORDER — LEUCOVORIN CALCIUM 5 MG/1
5 TABLET ORAL DAILY
COMMUNITY

## 2018-09-17 RX ORDER — MELATONIN
2000 DAILY
COMMUNITY

## 2018-09-17 RX ORDER — PREDNISONE 10 MG/1
10 TABLET ORAL AS NEEDED
COMMUNITY
End: 2020-01-01

## 2018-09-17 RX ADMIN — ERTAPENEM SODIUM 1 G: 1 INJECTION, POWDER, LYOPHILIZED, FOR SOLUTION INTRAMUSCULAR; INTRAVENOUS at 14:55

## 2018-09-18 ENCOUNTER — HOSPITAL ENCOUNTER (OUTPATIENT)
Dept: INFUSION THERAPY | Facility: HOSPITAL | Age: 73
Discharge: HOME | End: 2018-09-18
Admitting: INTERNAL MEDICINE

## 2018-09-18 VITALS
TEMPERATURE: 95.9 F | HEART RATE: 80 BPM | RESPIRATION RATE: 16 BRPM | OXYGEN SATURATION: 97 % | DIASTOLIC BLOOD PRESSURE: 58 MMHG | SYSTOLIC BLOOD PRESSURE: 118 MMHG

## 2018-09-18 DIAGNOSIS — M00.80 ARTHRITIS DUE TO OTHER BACTERIA, SEPTIC ARTHRITIS OF UNSPECIFIED LOCATION (HCC): ICD-10-CM

## 2018-09-18 PROCEDURE — 25010000002 ERTAPENEM PER 500 MG: Performed by: INTERNAL MEDICINE

## 2018-09-18 PROCEDURE — 96365 THER/PROPH/DIAG IV INF INIT: CPT

## 2018-09-18 RX ADMIN — ERTAPENEM SODIUM 1 G: 1 INJECTION, POWDER, LYOPHILIZED, FOR SOLUTION INTRAMUSCULAR; INTRAVENOUS at 10:06

## 2018-09-18 NOTE — PROGRESS NOTES
Pt tolerated infusion well. Reviewed lab results from yesterday's visit.  Pt dc'ed ambulatory using walker with .

## 2018-09-19 ENCOUNTER — HOSPITAL ENCOUNTER (OUTPATIENT)
Dept: INFUSION THERAPY | Facility: HOSPITAL | Age: 73
Discharge: HOME | End: 2018-09-19
Admitting: INTERNAL MEDICINE

## 2018-09-19 VITALS
RESPIRATION RATE: 16 BRPM | DIASTOLIC BLOOD PRESSURE: 52 MMHG | TEMPERATURE: 97.3 F | OXYGEN SATURATION: 97 % | HEART RATE: 85 BPM | SYSTOLIC BLOOD PRESSURE: 123 MMHG

## 2018-09-19 DIAGNOSIS — M00.80 ARTHRITIS DUE TO OTHER BACTERIA, SEPTIC ARTHRITIS OF UNSPECIFIED LOCATION (HCC): ICD-10-CM

## 2018-09-19 PROCEDURE — 96365 THER/PROPH/DIAG IV INF INIT: CPT

## 2018-09-19 PROCEDURE — 25010000002 ERTAPENEM PER 500 MG: Performed by: INTERNAL MEDICINE

## 2018-09-19 RX ADMIN — ERTAPENEM SODIUM 1 G: 1 INJECTION, POWDER, LYOPHILIZED, FOR SOLUTION INTRAMUSCULAR; INTRAVENOUS at 13:07

## 2018-09-20 ENCOUNTER — HOSPITAL ENCOUNTER (OUTPATIENT)
Dept: INFUSION THERAPY | Facility: HOSPITAL | Age: 73
Discharge: HOME | End: 2018-09-20
Admitting: INTERNAL MEDICINE

## 2018-09-20 ENCOUNTER — OFFICE VISIT (OUTPATIENT)
Dept: ORTHOPEDIC SURGERY | Facility: CLINIC | Age: 73
End: 2018-09-20

## 2018-09-20 VITALS — BODY MASS INDEX: 24.8 KG/M2 | HEIGHT: 59 IN | WEIGHT: 123 LBS | TEMPERATURE: 98.2 F

## 2018-09-20 VITALS
SYSTOLIC BLOOD PRESSURE: 148 MMHG | RESPIRATION RATE: 20 BRPM | DIASTOLIC BLOOD PRESSURE: 68 MMHG | HEART RATE: 94 BPM | OXYGEN SATURATION: 97 % | TEMPERATURE: 98.6 F

## 2018-09-20 DIAGNOSIS — Z79.01 ENCOUNTER FOR MONITORING COUMADIN THERAPY: ICD-10-CM

## 2018-09-20 DIAGNOSIS — Z51.81 ENCOUNTER FOR MONITORING COUMADIN THERAPY: ICD-10-CM

## 2018-09-20 DIAGNOSIS — M00.80 ARTHRITIS DUE TO OTHER BACTERIA, SEPTIC ARTHRITIS OF UNSPECIFIED LOCATION (HCC): ICD-10-CM

## 2018-09-20 DIAGNOSIS — Z98.890 HISTORY OF ARTHROPLASTY OF LEFT HIP: Primary | ICD-10-CM

## 2018-09-20 LAB
INR PPP: 1.61 (ref 0.9–1.1)
PROTHROMBIN TIME: 18.9 SECONDS (ref 11.7–14.2)

## 2018-09-20 PROCEDURE — 96365 THER/PROPH/DIAG IV INF INIT: CPT

## 2018-09-20 PROCEDURE — 73502 X-RAY EXAM HIP UNI 2-3 VIEWS: CPT | Performed by: ORTHOPAEDIC SURGERY

## 2018-09-20 PROCEDURE — 25010000002 ERTAPENEM PER 500 MG: Performed by: INTERNAL MEDICINE

## 2018-09-20 PROCEDURE — 99024 POSTOP FOLLOW-UP VISIT: CPT | Performed by: ORTHOPAEDIC SURGERY

## 2018-09-20 PROCEDURE — 85610 PROTHROMBIN TIME: CPT | Performed by: ORTHOPAEDIC SURGERY

## 2018-09-20 RX ORDER — GABAPENTIN 300 MG/1
300 CAPSULE ORAL 3 TIMES DAILY
Qty: 90 CAPSULE | Refills: 3 | Status: SHIPPED | OUTPATIENT
Start: 2018-09-20 | End: 2018-10-20

## 2018-09-20 RX ORDER — HYDROCODONE BITARTRATE AND ACETAMINOPHEN 5; 325 MG/1; MG/1
1 TABLET ORAL EVERY 8 HOURS PRN
Qty: 60 TABLET | Refills: 0 | Status: SHIPPED | OUTPATIENT
Start: 2018-09-20 | End: 2019-02-28

## 2018-09-20 RX ADMIN — ERTAPENEM SODIUM 1 G: 1 INJECTION, POWDER, LYOPHILIZED, FOR SOLUTION INTRAMUSCULAR; INTRAVENOUS at 13:08

## 2018-09-20 NOTE — PROGRESS NOTES
Patient: Rachel Moser  YOB: 1945 72 y.o. female  Medical Record Number: [id number]    Chief Complaint: No chief complaint on file.      History of Present Illness:Rachel Moser is a 72 y.o. female who presents for follow-up of  left hip.  She is now about 5 weeks out from removal of infected total hip placement of antibiotic spacer on 8/11/18.  She is still progressing slowly but does appear to be making improvements.  She has some achiness and soreness mostly in the knee more so than in the hip area.  She has now been discharged from [facility] back home she is using a walker.  She has had no drainage over the last few weeks.  She is continuing on her Invanz and is nearly finished.  She is set to see Dr. Souza tomorrow.    Allergies:   Allergies   Allergen Reactions   • Sulfa Antibiotics Rash       Medications:   Current Outpatient Prescriptions   Medication Sig Dispense Refill   • B Complex-Biotin-FA (HM VITAMIN B50 COMPLEX PO) Take 1 tablet by mouth Daily.     • cholecalciferol (VITAMIN D3) 1000 units tablet Take 2,000 Units by mouth Daily.     • Cranberry 1000 MG capsule Take 25,000 mg by mouth 2 (Two) Times a Day.     • Docusate Sodium (COLACE PO) Take 1 tablet by mouth 2 (Two) Times a Day.     • ferrous sulfate (FERROUSUL) 325 (65 FE) MG tablet Take 325 mg by mouth Daily With Breakfast. On hold for sx.     • folic acid (FOLVITE) 800 MCG tablet Take 800 mcg by mouth Every Morning. 4 tabs am-ON HOLD FOR SX.     • furosemide (LASIX) 40 MG tablet Take 40 mg by mouth Every Morning.     • gabapentin (NEURONTIN) 300 MG capsule Take 300 mg by mouth As Needed.     • InFLIXimab (REMICADE IV) Infuse  into a venous catheter Every 28 (Twenty-Eight) Days.     • leucovorin 5 MG tablet Take 5 mg by mouth 1 (One) Time Per Week.     • methotrexate 2.5 MG tablet Take 2.5 mg by mouth 1 (One) Time Per Week. 8 tablets once a week     • metoprolol succinate XL (TOPROL-XL) 50 MG 24 hr tablet Take 50 mg by mouth  As Needed.     • Multiple Vitamins-Minerals (MULTIVITAMIN ADULT PO) Take 1 tablet by mouth Daily.     • polyethylene glycol (MIRALAX) packet Take 17 g by mouth As Needed.     • potassium chloride (K-DUR,KLOR-CON) 20 MEQ CR tablet Take 1 tablet by mouth 2 (Two) Times a Day. 180 tablet 3   • predniSONE (DELTASONE) 10 MG tablet Take 10 mg by mouth As Needed.     • simvastatin (ZOCOR) 40 MG tablet Take 40 mg by mouth Every Morning.     • warfarin (COUMADIN) 5 MG tablet 1 po qd 30 tablet 0     No current facility-administered medications for this visit.      Facility-Administered Medications Ordered in Other Visits   Medication Dose Route Frequency Provider Last Rate Last Dose   • ertapenem (INVanz) 1 g/100 mL 0.9% NS VTB (mbp)  1 g Intravenous Once Minda Souza MD   1 g at 09/20/18 1308   • mupirocin (BACTROBAN) 2 % nasal ointment   Nasal BID Obed Barney MD             The following portions of the patient's history were reviewed and updated as appropriate: allergies, current medications, past family history, past medical history, past social history, past surgical history and problem list.    Review of Systems:   A 14 point review of systems was performed. All systems negative except pertinent positives/negative listed in HPI above    Physical Exam:   There were no vitals filed for this visit.    General: A and O x 3, ASA, NAD    SCLERA:    Normal    DENTITION:   Normal  The incision is healed nicely there is mild diffuse erythema around both anterior and lateral incision and there is some slight induration but no fluctuance and no obvious sign of fluid collection about the incision.  She is able to perform a knee extension although she does have about a 10-15° lag and the strength is not equivalent to the right side    Radiology:    Xrays 2views lefthip (AP bilateral hips and lateral hip) were ordered and reviewed for evaluation of hip pain demonstrating a well-positioned antibiotic spacer.  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  Her C-reactive protein is coming down although not as low as like to see at this point.  We'll continue to follow that trimmed.  She'll continue on antibiotics but may possibly be discontinuing her Invanz tomorrow.  My recommendation would be oral antibiotics under the direction of Dr. Souza for an unknown time..  I will continue to follow along with her and Dr. Souza.  I would allow her to bear weight with the use of her walker after 6 weeks from today and I will check her back in 6 weeks with repeat x-rays.  Patient Cannot ambulate with a cane or walker, therefore needs a wheelchair for daily activities within the home and outside.     Obed Barney MD  9/20/2018

## 2018-09-21 ENCOUNTER — OFFICE VISIT (OUTPATIENT)
Dept: INFECTIOUS DISEASES | Facility: CLINIC | Age: 73
End: 2018-09-21

## 2018-09-21 ENCOUNTER — HOSPITAL ENCOUNTER (OUTPATIENT)
Dept: INFUSION THERAPY | Facility: HOSPITAL | Age: 73
Discharge: HOME | End: 2018-09-21
Admitting: INTERNAL MEDICINE

## 2018-09-21 VITALS
WEIGHT: 127.2 LBS | TEMPERATURE: 98.7 F | HEIGHT: 59 IN | HEART RATE: 98 BPM | RESPIRATION RATE: 12 BRPM | DIASTOLIC BLOOD PRESSURE: 71 MMHG | SYSTOLIC BLOOD PRESSURE: 134 MMHG | BODY MASS INDEX: 25.64 KG/M2

## 2018-09-21 VITALS
DIASTOLIC BLOOD PRESSURE: 69 MMHG | HEART RATE: 80 BPM | TEMPERATURE: 98.4 F | SYSTOLIC BLOOD PRESSURE: 144 MMHG | RESPIRATION RATE: 20 BRPM | OXYGEN SATURATION: 90 %

## 2018-09-21 DIAGNOSIS — Z79.2 LONG TERM CURRENT USE OF ANTIBIOTICS: ICD-10-CM

## 2018-09-21 DIAGNOSIS — M00.852 ARTHRITIS OF LEFT HIP DUE TO OTHER BACTERIA (HCC): ICD-10-CM

## 2018-09-21 DIAGNOSIS — M00.852 ARTHRITIS OF LEFT HIP DUE TO OTHER BACTERIA (HCC): Primary | ICD-10-CM

## 2018-09-21 DIAGNOSIS — M00.80 ARTHRITIS DUE TO OTHER BACTERIA, SEPTIC ARTHRITIS OF UNSPECIFIED LOCATION (HCC): ICD-10-CM

## 2018-09-21 LAB
ALBUMIN SERPL-MCNC: 3.2 G/DL (ref 3.5–5.2)
ALBUMIN/GLOB SERPL: 0.8 G/DL
ALP SERPL-CCNC: 165 U/L (ref 39–117)
ALT SERPL W P-5'-P-CCNC: 10 U/L (ref 1–33)
ANION GAP SERPL CALCULATED.3IONS-SCNC: 10.3 MMOL/L
AST SERPL-CCNC: 17 U/L (ref 1–32)
BACTERIA SPEC AEROBE CULT: ABNORMAL
BASOPHILS # BLD AUTO: 0.03 10*3/MM3 (ref 0–0.2)
BASOPHILS NFR BLD AUTO: 0.3 % (ref 0–1.5)
BILIRUB SERPL-MCNC: 0.3 MG/DL (ref 0.1–1.2)
BUN BLD-MCNC: 14 MG/DL (ref 8–23)
BUN/CREAT SERPL: 25 (ref 7–25)
CALCIUM SPEC-SCNC: 8.9 MG/DL (ref 8.6–10.5)
CHLORIDE SERPL-SCNC: 110 MMOL/L (ref 98–107)
CO2 SERPL-SCNC: 23.7 MMOL/L (ref 22–29)
CREAT BLD-MCNC: 0.56 MG/DL (ref 0.57–1)
CRP SERPL-MCNC: 3.15 MG/DL (ref 0–0.5)
DEPRECATED RDW RBC AUTO: 45.7 FL (ref 37–54)
EOSINOPHIL # BLD AUTO: 0.24 10*3/MM3 (ref 0–0.7)
EOSINOPHIL NFR BLD AUTO: 2.2 % (ref 0.3–6.2)
ERYTHROCYTE [DISTWIDTH] IN BLOOD BY AUTOMATED COUNT: 14 % (ref 11.7–13)
ERYTHROCYTE [SEDIMENTATION RATE] IN BLOOD: 65 MM/HR (ref 0–30)
GFR SERPL CREATININE-BSD FRML MDRD: 107 ML/MIN/1.73
GLOBULIN UR ELPH-MCNC: 3.8 GM/DL
GLUCOSE BLD-MCNC: 152 MG/DL (ref 65–99)
GRAM STN SPEC: ABNORMAL
GRAM STN SPEC: ABNORMAL
HCT VFR BLD AUTO: 35.9 % (ref 35.6–45.5)
HGB BLD-MCNC: 11.3 G/DL (ref 11.9–15.5)
IMM GRANULOCYTES # BLD: 0.02 10*3/MM3 (ref 0–0.03)
IMM GRANULOCYTES NFR BLD: 0.2 % (ref 0–0.5)
LYMPHOCYTES # BLD AUTO: 1.82 10*3/MM3 (ref 0.9–4.8)
LYMPHOCYTES NFR BLD AUTO: 16.3 % (ref 19.6–45.3)
MCH RBC QN AUTO: 28 PG (ref 26.9–32)
MCHC RBC AUTO-ENTMCNC: 31.5 G/DL (ref 32.4–36.3)
MCV RBC AUTO: 89.1 FL (ref 80.5–98.2)
MONOCYTES # BLD AUTO: 0.85 10*3/MM3 (ref 0.2–1.2)
MONOCYTES NFR BLD AUTO: 7.6 % (ref 5–12)
NEUTROPHILS # BLD AUTO: 8.21 10*3/MM3 (ref 1.9–8.1)
NEUTROPHILS NFR BLD AUTO: 73.6 % (ref 42.7–76)
PLATELET # BLD AUTO: 267 10*3/MM3 (ref 140–500)
PMV BLD AUTO: 12.3 FL (ref 6–12)
POTASSIUM BLD-SCNC: 4.5 MMOL/L (ref 3.5–5.2)
PROT SERPL-MCNC: 7 G/DL (ref 6–8.5)
RBC # BLD AUTO: 4.03 10*6/MM3 (ref 3.9–5.2)
SODIUM BLD-SCNC: 144 MMOL/L (ref 136–145)
WBC NRBC COR # BLD: 11.15 10*3/MM3 (ref 4.5–10.7)

## 2018-09-21 PROCEDURE — 80053 COMPREHEN METABOLIC PANEL: CPT | Performed by: INTERNAL MEDICINE

## 2018-09-21 PROCEDURE — 86140 C-REACTIVE PROTEIN: CPT | Performed by: INTERNAL MEDICINE

## 2018-09-21 PROCEDURE — 85025 COMPLETE CBC W/AUTO DIFF WBC: CPT | Performed by: INTERNAL MEDICINE

## 2018-09-21 PROCEDURE — 99214 OFFICE O/P EST MOD 30 MIN: CPT | Performed by: INTERNAL MEDICINE

## 2018-09-21 PROCEDURE — 25010000002 ERTAPENEM PER 500 MG: Performed by: INTERNAL MEDICINE

## 2018-09-21 PROCEDURE — 85652 RBC SED RATE AUTOMATED: CPT | Performed by: INTERNAL MEDICINE

## 2018-09-21 PROCEDURE — 96365 THER/PROPH/DIAG IV INF INIT: CPT

## 2018-09-21 RX ADMIN — ERTAPENEM SODIUM 1 G: 1 INJECTION, POWDER, LYOPHILIZED, FOR SOLUTION INTRAMUSCULAR; INTRAVENOUS at 14:21

## 2018-09-21 NOTE — PROGRESS NOTES
Reason for clinic visits: Follow up    HPI: Rachel Moser is a 72 y.o. female who was last seen in the hospital on  August 20.  She was discharged to [facility] and has been home for the past week.  She is currently receiving ertapenem through the [facility].  Overall she has been doing well.  Her hip incision is well healed and she does not report any more drainage.  She denies any fevers chills or night sweats.  She denies any sam hip pain but overall they're still some discomfort present.  She is tolerating antibiotics without abdominal pain nausea vomiting or diarrhea.  No rashes    Past Medical History:   Diagnosis Date   • Allergic    • Bone infection (CMS/HCC)     hip   • Cataract    • High cholesterol    • History of DVT (deep vein thrombosis)    • History of kidney infection     1 MONTH AGO   • History of pulmonary embolus (PE)    • History of transfusion    • Hypertension    • Left hip postoperative wound infection    • Paralabral cyst of left hip    • PICC (peripherally inserted central catheter) in place    • PONV (postoperative nausea and vomiting)    • Presence of vena cava filter    • Rheumatoid arteritis    • Seasonal allergies    • UTI (urinary tract infection)     diagnosed 4/2/18  medically treated presently   • Wears glasses        Past Surgical History:   Procedure Laterality Date   • BLADDER SUSPENSION     • CATARACT EXTRACTION, BILATERAL     • COLONOSCOPY     • ENDOSCOPIC FUNCTIONAL SINUS SURGERY (FESS)     • HIP SPACER INSERTION WITH ANTIBIOTIC CEMENT Left 8/11/2018    Procedure: TOTAL HIP ARTHROPLASTY REVISION with removal of infected total hip and placement of antibiotic spacer;  Surgeon: Obed Barney MD;  Location: [facility];  Service: Orthopedics   • HIP SURGERY Left 1983   • HIP SURGERY Left 20069   • HIP SURGERY Left 2011   • INCISION AND DRAINAGE HIP Left 11/2/2016    Procedure: HIP INCISION AND DRAINAGE;  Surgeon: Obed Barney MD;  Location: [facility];  Service:    •  INCISION AND DRAINAGE HIP Left 7/7/2018    Procedure: I&D and antibiotic bead placement left hip;  Surgeon: Obed Barney MD;  Location: [facility] OR;  Service: Orthopedics   • INCISION AND DRAINAGE HIP Left 7/21/2018    Procedure: HIP INCISION AND DRAINAGE, LEFT WITH POLY HEAD CHANGE AND ANTIBIOTIC BEAD PLACEMENT;  Surgeon: Obed Barney MD;  Location: [facility] OR;  Service: Orthopedics   • JOINT REPLACEMENT Left     HIP   • PERIPHERALLY INSERTED CENTRAL CATHETER INSERTION     • ID CLOSED RX TRAUMATIC HIP DISLOCATN Left 12/18/2017    Procedure: LEFT HIP CLOSED REDUCTION;  Surgeon: Obed Barney MD;  Location: [facility] OR;  Service: Orthopedics   • SOFT TISSUE MASS EXCISION Left     hip 3/19/18   • TOTAL ABDOMINAL HYSTERECTOMY     • TOTAL HIP ARTHROPLASTY REVISION Left 4/25/2018    Procedure: REVISION OF LEFT ACETABULAR COMPONENT ;  Surgeon: Obed Barney MD;  Location: [facility] OR;  Service: Orthopedics   • TOTAL HIP ARTHROPLASTY REVISION Left 08/11/2018    total hip revision with removal of iinfected total hip and placement of antibiotic spacer   • VENA CAVA FILTER INSERTION         Social History   reports that she quit smoking about 43 years ago. Her smoking use included Cigarettes. She has never used smokeless tobacco. She reports that she does not drink alcohol or use drugs.    Family History  family history is not on file.    Allergies   Allergen Reactions   • Sulfa Antibiotics Rash       The medication list has been reviewed and updated.     Review of Systems  Pertinent items are noted in HPI, all other systems reviewed and negative    Vital Signs   Temp:  [98.4 °F (36.9 °C)-98.7 °F (37.1 °C)] 98.4 °F (36.9 °C)  Heart Rate:  [80-98] 80  Resp:  [12-20] 20  BP: (134-144)/(69-71) 144/69    Physical Exam:   General: In no acute distress  HEENT: Normocephalic, atraumatic, PERRL, EOMI, no scleral icterus. Oropharynx is clear and moist  Neck: Supple, trachea is midline  Cardiovascular: Normal rate,  regular rhythm, patito S1 and S2, no murmurs, rubs, or gallops    Respiratory: Lungs are clear to ascultation bilaterally, no wheezing   GI: Abdomen is soft, non-tender, non-distended, positive bowel sounds bilaterally, no masses  Musculoskeletal: Normal range of motion, no edema, tenderness or deformity  Skin: No rashes or lesions  LE: no E/C/C  Neurological: Alert and oriented, cranial nerves 2-12 intact, motor strength 5/5 in all four extremities  Psychiatric: Normal mood and affect     Lab Results   Component Value Date    WBC 11.15 (H) 09/21/2018    HGB 11.3 (L) 09/21/2018    HCT 35.9 09/21/2018    MCV 89.1 09/21/2018     09/21/2018       Lab Results   Component Value Date    GLUCOSE 152 (H) 09/21/2018    BUN 14 09/21/2018    CREATININE 0.56 (L) 09/21/2018    EGFRIFNONA 107 09/21/2018    BCR 25.0 09/21/2018    CO2 23.7 09/21/2018    CALCIUM 8.9 09/21/2018    ALBUMIN 3.20 (L) 09/21/2018    AST 17 09/21/2018    ALT 10 09/21/2018       Lab Results   Component Value Date    SEDRATE 65 (H) 09/21/2018       Lab Results   Component Value Date    CRP 3.15 (H) 09/21/2018     7/7 L hip wound ESBL E coli   7/21 L hip Cx:neg   8/11 L hip cx neg    Assessment:  This is a 72 y.o. female who presents to clinic today for follow up of her ESBL left prosthetic hip septic arthritis.  The patient initially underwent incision and drainage with retained hardware on July 7.  She was treated with IV ertapenem but continued to have significant drainage from her hip.  She was taken back to the OR for another incision and drainage on July 21 and ultimately on August 11 all hardware has been removed.  Initial operative cultures grew ESBL Escherichia coli.  Her cultures from July 21 and August 11 are negative.  At this time she has been on IV ertapenem for this past 6 weeks.  She is doing well without any further drainage from the hip.  Her inflammatory markers are decreasing although they remain elevated.  We do not have great  options for oral antibiotic suppression.  Given that her inflammatory markers still remain elevated I would like to extend her IV antibiotic treatment for another 4 weeks.  We will monitor weekly inflammatory markers to see when we can discontinue her IV antibiotics.  Certainly if her inflammatory markers plateau we will consider switching her to oral antibiotics as long as she remains without signs of active infection.    Plan:   1.  Extend ertapenem 1 g IV every 24 hours for another 4 weeks.  New antibiotic stop date of October 21  2.  Continue weekly CBC with differential and CMP monitoring  3.  Continue weekly ESR and CRP monitoring    Return to Infectious Disease clinic in 1 month

## 2018-09-21 NOTE — PROGRESS NOTES
Right PICC line flushed with 20cc NS prior to and post infusion.  Pt tolerated infusion well. Pt given weekend schedule and AVS. Pt dc'ed ambulatory at 1453 using walker with .

## 2018-09-22 ENCOUNTER — HOSPITAL ENCOUNTER (OUTPATIENT)
Dept: INFUSION THERAPY | Facility: HOSPITAL | Age: 73
Discharge: HOME | End: 2018-09-22
Admitting: INTERNAL MEDICINE

## 2018-09-22 VITALS
SYSTOLIC BLOOD PRESSURE: 117 MMHG | HEART RATE: 87 BPM | OXYGEN SATURATION: 98 % | DIASTOLIC BLOOD PRESSURE: 71 MMHG | RESPIRATION RATE: 20 BRPM | TEMPERATURE: 96.8 F

## 2018-09-22 DIAGNOSIS — M00.80 ARTHRITIS DUE TO OTHER BACTERIA, SEPTIC ARTHRITIS OF UNSPECIFIED LOCATION (HCC): ICD-10-CM

## 2018-09-22 PROCEDURE — 25010000002 ERTAPENEM PER 500 MG: Performed by: INTERNAL MEDICINE

## 2018-09-22 PROCEDURE — 96365 THER/PROPH/DIAG IV INF INIT: CPT

## 2018-09-22 RX ADMIN — ERTAPENEM SODIUM 1 G: 1 INJECTION, POWDER, LYOPHILIZED, FOR SOLUTION INTRAMUSCULAR; INTRAVENOUS at 11:27

## 2018-09-22 NOTE — PROGRESS NOTES
Right PICC line flushed easily with good blood return. Pt tolerated infusion well. Pt dc'ed per walker with spouse at 1200.

## 2018-09-23 ENCOUNTER — HOSPITAL ENCOUNTER (OUTPATIENT)
Dept: INFUSION THERAPY | Facility: HOSPITAL | Age: 73
Discharge: HOME | End: 2018-09-23
Admitting: INTERNAL MEDICINE

## 2018-09-23 VITALS
HEART RATE: 87 BPM | OXYGEN SATURATION: 99 % | TEMPERATURE: 97.2 F | RESPIRATION RATE: 20 BRPM | DIASTOLIC BLOOD PRESSURE: 62 MMHG | SYSTOLIC BLOOD PRESSURE: 107 MMHG

## 2018-09-23 DIAGNOSIS — M00.80 ARTHRITIS DUE TO OTHER BACTERIA, SEPTIC ARTHRITIS OF UNSPECIFIED LOCATION (HCC): ICD-10-CM

## 2018-09-23 PROCEDURE — 96365 THER/PROPH/DIAG IV INF INIT: CPT

## 2018-09-23 PROCEDURE — 25010000002 ERTAPENEM PER 500 MG: Performed by: INTERNAL MEDICINE

## 2018-09-23 RX ADMIN — ERTAPENEM SODIUM 1 G: 1 INJECTION, POWDER, LYOPHILIZED, FOR SOLUTION INTRAMUSCULAR; INTRAVENOUS at 10:22

## 2018-09-23 NOTE — PROGRESS NOTES
Right PICC line flushed easily with good blood return. Pt tolerated infusion. Pt dc'ed per walker with spouse at 1054.

## 2018-09-24 ENCOUNTER — HOSPITAL ENCOUNTER (OUTPATIENT)
Dept: INFUSION THERAPY | Facility: HOSPITAL | Age: 73
Discharge: HOME | End: 2018-09-24
Admitting: INTERNAL MEDICINE

## 2018-09-24 VITALS
RESPIRATION RATE: 16 BRPM | DIASTOLIC BLOOD PRESSURE: 60 MMHG | SYSTOLIC BLOOD PRESSURE: 140 MMHG | OXYGEN SATURATION: 99 % | HEART RATE: 113 BPM | TEMPERATURE: 99.4 F

## 2018-09-24 DIAGNOSIS — M00.80 ARTHRITIS DUE TO OTHER BACTERIA, SEPTIC ARTHRITIS OF UNSPECIFIED LOCATION (HCC): ICD-10-CM

## 2018-09-24 PROCEDURE — 96365 THER/PROPH/DIAG IV INF INIT: CPT

## 2018-09-24 PROCEDURE — 25010000002 ERTAPENEM PER 500 MG: Performed by: INTERNAL MEDICINE

## 2018-09-24 PROCEDURE — G0463 HOSPITAL OUTPT CLINIC VISIT: HCPCS

## 2018-09-24 RX ADMIN — ERTAPENEM SODIUM 1 G: 1 INJECTION, POWDER, LYOPHILIZED, FOR SOLUTION INTRAMUSCULAR; INTRAVENOUS at 15:02

## 2018-09-24 NOTE — PROGRESS NOTES
Right PICC line flushed easily with good blood return. PICC line dsg changed per sterile technique. Ultra site valve, Stat lock, and biopatch all replaced. Pt tolerated infusion. Pt dc'ed per walker with spouse at 1540.

## 2018-09-25 ENCOUNTER — HOSPITAL ENCOUNTER (OUTPATIENT)
Dept: CARDIOLOGY | Facility: HOSPITAL | Age: 73
Setting detail: RECURRING SERIES
Discharge: HOME | End: 2018-09-25

## 2018-09-25 ENCOUNTER — HOSPITAL ENCOUNTER (OUTPATIENT)
Dept: INFUSION THERAPY | Facility: HOSPITAL | Age: 73
Discharge: HOME | End: 2018-09-25
Admitting: INTERNAL MEDICINE

## 2018-09-25 VITALS
RESPIRATION RATE: 16 BRPM | SYSTOLIC BLOOD PRESSURE: 129 MMHG | DIASTOLIC BLOOD PRESSURE: 66 MMHG | OXYGEN SATURATION: 96 % | TEMPERATURE: 98.3 F | HEART RATE: 118 BPM

## 2018-09-25 DIAGNOSIS — M00.80 ARTHRITIS DUE TO OTHER BACTERIA, SEPTIC ARTHRITIS OF UNSPECIFIED LOCATION (HCC): ICD-10-CM

## 2018-09-25 PROCEDURE — 36416 COLLJ CAPILLARY BLOOD SPEC: CPT

## 2018-09-25 PROCEDURE — 96365 THER/PROPH/DIAG IV INF INIT: CPT

## 2018-09-25 PROCEDURE — 25010000002 ERTAPENEM PER 500 MG: Performed by: INTERNAL MEDICINE

## 2018-09-25 PROCEDURE — 85610 PROTHROMBIN TIME: CPT

## 2018-09-25 RX ADMIN — ERTAPENEM SODIUM 1 G: 1 INJECTION, POWDER, LYOPHILIZED, FOR SOLUTION INTRAMUSCULAR; INTRAVENOUS at 12:20

## 2018-09-25 NOTE — PROGRESS NOTES
Right arm PICC line flushed with 20 ml NS before and after use. Pt tolerated infusion well. D/C ambulatory with walker @ 1300.

## 2018-09-26 ENCOUNTER — HOSPITAL ENCOUNTER (OUTPATIENT)
Dept: INFUSION THERAPY | Facility: HOSPITAL | Age: 73
Discharge: HOME | End: 2018-09-26
Admitting: INTERNAL MEDICINE

## 2018-09-26 VITALS
SYSTOLIC BLOOD PRESSURE: 141 MMHG | TEMPERATURE: 98.2 F | OXYGEN SATURATION: 98 % | RESPIRATION RATE: 20 BRPM | HEART RATE: 98 BPM | DIASTOLIC BLOOD PRESSURE: 73 MMHG

## 2018-09-26 DIAGNOSIS — M00.80 ARTHRITIS DUE TO OTHER BACTERIA, SEPTIC ARTHRITIS OF UNSPECIFIED LOCATION (HCC): ICD-10-CM

## 2018-09-26 PROCEDURE — 25010000002 ERTAPENEM PER 500 MG: Performed by: INTERNAL MEDICINE

## 2018-09-26 PROCEDURE — 96365 THER/PROPH/DIAG IV INF INIT: CPT

## 2018-09-26 RX ADMIN — ERTAPENEM SODIUM 1 G: 1 INJECTION, POWDER, LYOPHILIZED, FOR SOLUTION INTRAMUSCULAR; INTRAVENOUS at 12:33

## 2018-09-27 ENCOUNTER — HOSPITAL ENCOUNTER (OUTPATIENT)
Dept: INFUSION THERAPY | Facility: HOSPITAL | Age: 73
Discharge: HOME | End: 2018-09-27
Admitting: INTERNAL MEDICINE

## 2018-09-27 ENCOUNTER — HOSPITAL ENCOUNTER (OUTPATIENT)
Dept: CARDIOLOGY | Facility: HOSPITAL | Age: 73
Setting detail: RECURRING SERIES
Discharge: HOME | End: 2018-09-27

## 2018-09-27 VITALS
TEMPERATURE: 97.9 F | HEART RATE: 113 BPM | OXYGEN SATURATION: 96 % | SYSTOLIC BLOOD PRESSURE: 122 MMHG | DIASTOLIC BLOOD PRESSURE: 58 MMHG | RESPIRATION RATE: 20 BRPM

## 2018-09-27 DIAGNOSIS — M00.80 ARTHRITIS DUE TO OTHER BACTERIA, SEPTIC ARTHRITIS OF UNSPECIFIED LOCATION (HCC): ICD-10-CM

## 2018-09-27 PROCEDURE — 85610 PROTHROMBIN TIME: CPT

## 2018-09-27 PROCEDURE — 36416 COLLJ CAPILLARY BLOOD SPEC: CPT

## 2018-09-27 PROCEDURE — 25010000002 ERTAPENEM PER 500 MG: Performed by: INTERNAL MEDICINE

## 2018-09-27 PROCEDURE — 96365 THER/PROPH/DIAG IV INF INIT: CPT

## 2018-09-27 RX ADMIN — ERTAPENEM SODIUM 1 G: 1 INJECTION, POWDER, LYOPHILIZED, FOR SOLUTION INTRAMUSCULAR; INTRAVENOUS at 14:44

## 2018-09-28 ENCOUNTER — HOSPITAL ENCOUNTER (OUTPATIENT)
Dept: INFUSION THERAPY | Facility: HOSPITAL | Age: 73
Discharge: HOME | End: 2018-09-28
Admitting: INTERNAL MEDICINE

## 2018-09-28 ENCOUNTER — TELEPHONE (OUTPATIENT)
Dept: CARDIOLOGY | Facility: CLINIC | Age: 73
End: 2018-09-28

## 2018-09-28 VITALS
DIASTOLIC BLOOD PRESSURE: 84 MMHG | RESPIRATION RATE: 16 BRPM | SYSTOLIC BLOOD PRESSURE: 168 MMHG | TEMPERATURE: 98.1 F | HEART RATE: 83 BPM | OXYGEN SATURATION: 93 %

## 2018-09-28 DIAGNOSIS — Z79.2 LONG TERM CURRENT USE OF ANTIBIOTICS: ICD-10-CM

## 2018-09-28 DIAGNOSIS — M00.852 ARTHRITIS OF LEFT HIP DUE TO OTHER BACTERIA (HCC): ICD-10-CM

## 2018-09-28 DIAGNOSIS — M00.80 ARTHRITIS DUE TO OTHER BACTERIA, SEPTIC ARTHRITIS OF UNSPECIFIED LOCATION (HCC): ICD-10-CM

## 2018-09-28 LAB
ALBUMIN SERPL-MCNC: 3.5 G/DL (ref 3.5–5.2)
ALBUMIN/GLOB SERPL: 0.9 G/DL
ALP SERPL-CCNC: 176 U/L (ref 39–117)
ALT SERPL W P-5'-P-CCNC: 14 U/L (ref 1–33)
ANION GAP SERPL CALCULATED.3IONS-SCNC: 12.4 MMOL/L
AST SERPL-CCNC: 18 U/L (ref 1–32)
BASOPHILS # BLD AUTO: 0.03 10*3/MM3 (ref 0–0.2)
BASOPHILS NFR BLD AUTO: 0.2 % (ref 0–1.5)
BILIRUB SERPL-MCNC: 0.2 MG/DL (ref 0.1–1.2)
BUN BLD-MCNC: 10 MG/DL (ref 8–23)
BUN/CREAT SERPL: 22.2 (ref 7–25)
CALCIUM SPEC-SCNC: 8.8 MG/DL (ref 8.6–10.5)
CHLORIDE SERPL-SCNC: 107 MMOL/L (ref 98–107)
CO2 SERPL-SCNC: 20.6 MMOL/L (ref 22–29)
CREAT BLD-MCNC: 0.45 MG/DL (ref 0.57–1)
CRP SERPL-MCNC: 3.76 MG/DL (ref 0–0.5)
DEPRECATED RDW RBC AUTO: 44.6 FL (ref 37–54)
EOSINOPHIL # BLD AUTO: 0.41 10*3/MM3 (ref 0–0.7)
EOSINOPHIL NFR BLD AUTO: 3.3 % (ref 0.3–6.2)
ERYTHROCYTE [DISTWIDTH] IN BLOOD BY AUTOMATED COUNT: 13.8 % (ref 11.7–13)
ERYTHROCYTE [SEDIMENTATION RATE] IN BLOOD: 61 MM/HR (ref 0–30)
GFR SERPL CREATININE-BSD FRML MDRD: 137 ML/MIN/1.73
GLOBULIN UR ELPH-MCNC: 3.7 GM/DL
GLUCOSE BLD-MCNC: 95 MG/DL (ref 65–99)
HCT VFR BLD AUTO: 38.6 % (ref 35.6–45.5)
HGB BLD-MCNC: 11.9 G/DL (ref 11.9–15.5)
IMM GRANULOCYTES # BLD: 0.04 10*3/MM3 (ref 0–0.03)
IMM GRANULOCYTES NFR BLD: 0.3 % (ref 0–0.5)
LYMPHOCYTES # BLD AUTO: 1.62 10*3/MM3 (ref 0.9–4.8)
LYMPHOCYTES NFR BLD AUTO: 13.1 % (ref 19.6–45.3)
MCH RBC QN AUTO: 27.2 PG (ref 26.9–32)
MCHC RBC AUTO-ENTMCNC: 30.8 G/DL (ref 32.4–36.3)
MCV RBC AUTO: 88.1 FL (ref 80.5–98.2)
MONOCYTES # BLD AUTO: 1.3 10*3/MM3 (ref 0.2–1.2)
MONOCYTES NFR BLD AUTO: 10.5 % (ref 5–12)
NEUTROPHILS # BLD AUTO: 8.94 10*3/MM3 (ref 1.9–8.1)
NEUTROPHILS NFR BLD AUTO: 72.6 % (ref 42.7–76)
PLATELET # BLD AUTO: 287 10*3/MM3 (ref 140–500)
PMV BLD AUTO: 11.9 FL (ref 6–12)
POTASSIUM BLD-SCNC: 3.3 MMOL/L (ref 3.5–5.2)
PROT SERPL-MCNC: 7.2 G/DL (ref 6–8.5)
RBC # BLD AUTO: 4.38 10*6/MM3 (ref 3.9–5.2)
SODIUM BLD-SCNC: 140 MMOL/L (ref 136–145)
WBC NRBC COR # BLD: 12.34 10*3/MM3 (ref 4.5–10.7)

## 2018-09-28 PROCEDURE — 36592 COLLECT BLOOD FROM PICC: CPT

## 2018-09-28 PROCEDURE — 85652 RBC SED RATE AUTOMATED: CPT | Performed by: INTERNAL MEDICINE

## 2018-09-28 PROCEDURE — 86140 C-REACTIVE PROTEIN: CPT | Performed by: INTERNAL MEDICINE

## 2018-09-28 PROCEDURE — 96365 THER/PROPH/DIAG IV INF INIT: CPT

## 2018-09-28 PROCEDURE — 85025 COMPLETE CBC W/AUTO DIFF WBC: CPT | Performed by: INTERNAL MEDICINE

## 2018-09-28 PROCEDURE — 80053 COMPREHEN METABOLIC PANEL: CPT | Performed by: INTERNAL MEDICINE

## 2018-09-28 PROCEDURE — 25010000002 ERTAPENEM PER 500 MG: Performed by: INTERNAL MEDICINE

## 2018-09-28 RX ADMIN — ERTAPENEM SODIUM 1 G: 1 INJECTION, POWDER, LYOPHILIZED, FOR SOLUTION INTRAMUSCULAR; INTRAVENOUS at 15:04

## 2018-09-28 NOTE — TELEPHONE ENCOUNTER
Received request from Dr. Nancy Smalls for surgery clearance for surgical removal of suburethral mesh Erosion.  I have placed the form in our in box for review.  Please advise re holding warfarin and instructions for bridging with Lovenox.  Thank you/ JULIO CESAR

## 2018-09-28 NOTE — TELEPHONE ENCOUNTER
Faxed surgery clearance note to Nancy Smalls MD for removal of suburethral mesh erosion.  Patient is cleared per Dr. Arango  But noted patient will need bridge therapy.  1mg/kg of Lovenox bid when INR is <2.  Can't until INR is < 2.  This is noted on the clearance sheet.      Faxed to Dr. Smalls's office fax# [phone] / JULIO CESAR

## 2018-09-29 ENCOUNTER — HOSPITAL ENCOUNTER (OUTPATIENT)
Dept: INFUSION THERAPY | Facility: HOSPITAL | Age: 73
Discharge: HOME | End: 2018-09-29
Admitting: INTERNAL MEDICINE

## 2018-09-29 VITALS
HEART RATE: 66 BPM | TEMPERATURE: 97 F | SYSTOLIC BLOOD PRESSURE: 126 MMHG | DIASTOLIC BLOOD PRESSURE: 66 MMHG | RESPIRATION RATE: 18 BRPM | OXYGEN SATURATION: 99 %

## 2018-09-29 DIAGNOSIS — M00.80 ARTHRITIS DUE TO OTHER BACTERIA, SEPTIC ARTHRITIS OF UNSPECIFIED LOCATION (HCC): ICD-10-CM

## 2018-09-29 PROCEDURE — 96365 THER/PROPH/DIAG IV INF INIT: CPT

## 2018-09-29 PROCEDURE — 25010000002 ERTAPENEM PER 500 MG: Performed by: INTERNAL MEDICINE

## 2018-09-29 RX ADMIN — ERTAPENEM SODIUM 1 G: 1 INJECTION, POWDER, LYOPHILIZED, FOR SOLUTION INTRAMUSCULAR; INTRAVENOUS at 09:45

## 2018-09-30 ENCOUNTER — HOSPITAL ENCOUNTER (OUTPATIENT)
Dept: INFUSION THERAPY | Facility: HOSPITAL | Age: 73
Discharge: HOME | End: 2018-09-30
Admitting: INTERNAL MEDICINE

## 2018-09-30 VITALS
DIASTOLIC BLOOD PRESSURE: 62 MMHG | SYSTOLIC BLOOD PRESSURE: 107 MMHG | OXYGEN SATURATION: 97 % | TEMPERATURE: 97.2 F | HEART RATE: 82 BPM | RESPIRATION RATE: 16 BRPM

## 2018-09-30 DIAGNOSIS — M00.80 ARTHRITIS DUE TO OTHER BACTERIA, SEPTIC ARTHRITIS OF UNSPECIFIED LOCATION (HCC): ICD-10-CM

## 2018-09-30 PROCEDURE — 96365 THER/PROPH/DIAG IV INF INIT: CPT

## 2018-09-30 PROCEDURE — 25010000002 ERTAPENEM PER 500 MG: Performed by: INTERNAL MEDICINE

## 2018-09-30 RX ADMIN — ERTAPENEM SODIUM 1 G: 1 INJECTION, POWDER, LYOPHILIZED, FOR SOLUTION INTRAMUSCULAR; INTRAVENOUS at 09:55

## 2018-10-01 ENCOUNTER — HOSPITAL ENCOUNTER (OUTPATIENT)
Dept: CARDIOLOGY | Facility: HOSPITAL | Age: 73
Setting detail: RECURRING SERIES
Discharge: HOME | End: 2018-10-01

## 2018-10-01 ENCOUNTER — HOSPITAL ENCOUNTER (OUTPATIENT)
Dept: INFUSION THERAPY | Facility: HOSPITAL | Age: 73
Discharge: HOME | End: 2018-10-01
Admitting: INTERNAL MEDICINE

## 2018-10-01 VITALS
SYSTOLIC BLOOD PRESSURE: 117 MMHG | DIASTOLIC BLOOD PRESSURE: 61 MMHG | OXYGEN SATURATION: 93 % | RESPIRATION RATE: 16 BRPM | HEART RATE: 104 BPM | TEMPERATURE: 97.7 F

## 2018-10-01 DIAGNOSIS — M00.80 ARTHRITIS DUE TO OTHER BACTERIA, SEPTIC ARTHRITIS OF UNSPECIFIED LOCATION (HCC): ICD-10-CM

## 2018-10-01 PROCEDURE — 96365 THER/PROPH/DIAG IV INF INIT: CPT

## 2018-10-01 PROCEDURE — 25010000002 ERTAPENEM PER 500 MG: Performed by: INTERNAL MEDICINE

## 2018-10-01 PROCEDURE — 36416 COLLJ CAPILLARY BLOOD SPEC: CPT

## 2018-10-01 PROCEDURE — 85610 PROTHROMBIN TIME: CPT

## 2018-10-01 PROCEDURE — G0463 HOSPITAL OUTPT CLINIC VISIT: HCPCS

## 2018-10-01 RX ADMIN — ERTAPENEM SODIUM 1 G: 1 INJECTION, POWDER, LYOPHILIZED, FOR SOLUTION INTRAMUSCULAR; INTRAVENOUS at 14:33

## 2018-10-01 NOTE — PROGRESS NOTES
Patient tolerated infusion well.  Right PICC dressing changed per schedule without difficulty. Discharged home amb(walker) with  after appointment completed.

## 2018-10-02 ENCOUNTER — HOSPITAL ENCOUNTER (OUTPATIENT)
Dept: INFUSION THERAPY | Facility: HOSPITAL | Age: 73
Discharge: HOME | End: 2018-10-02
Admitting: INTERNAL MEDICINE

## 2018-10-02 VITALS
RESPIRATION RATE: 16 BRPM | SYSTOLIC BLOOD PRESSURE: 125 MMHG | HEART RATE: 98 BPM | OXYGEN SATURATION: 94 % | DIASTOLIC BLOOD PRESSURE: 68 MMHG | TEMPERATURE: 98.6 F

## 2018-10-02 DIAGNOSIS — M00.80 ARTHRITIS DUE TO OTHER BACTERIA, SEPTIC ARTHRITIS OF UNSPECIFIED LOCATION (HCC): ICD-10-CM

## 2018-10-02 PROCEDURE — 25010000002 ERTAPENEM PER 500 MG: Performed by: INTERNAL MEDICINE

## 2018-10-02 PROCEDURE — 96365 THER/PROPH/DIAG IV INF INIT: CPT

## 2018-10-02 RX ADMIN — ERTAPENEM SODIUM 1 G: 1 INJECTION, POWDER, LYOPHILIZED, FOR SOLUTION INTRAMUSCULAR; INTRAVENOUS at 15:19

## 2018-10-02 NOTE — PROGRESS NOTES
Pt tolerated infusion well. Right PICC line flushed with 20cc NS post infusion. Pt dc'ed per walker with spouse at 1600.

## 2018-10-03 ENCOUNTER — HOSPITAL ENCOUNTER (OUTPATIENT)
Dept: INFUSION THERAPY | Facility: HOSPITAL | Age: 73
Discharge: HOME | End: 2018-10-03
Admitting: INTERNAL MEDICINE

## 2018-10-03 VITALS
HEART RATE: 88 BPM | RESPIRATION RATE: 16 BRPM | OXYGEN SATURATION: 97 % | TEMPERATURE: 98.4 F | SYSTOLIC BLOOD PRESSURE: 109 MMHG | DIASTOLIC BLOOD PRESSURE: 64 MMHG

## 2018-10-03 DIAGNOSIS — M00.851 ARTHRITIS OF RIGHT HIP DUE TO OTHER BACTERIA (HCC): Primary | ICD-10-CM

## 2018-10-03 DIAGNOSIS — M00.80 ARTHRITIS DUE TO OTHER BACTERIA, SEPTIC ARTHRITIS OF UNSPECIFIED LOCATION (HCC): ICD-10-CM

## 2018-10-03 PROCEDURE — 25010000002 ERTAPENEM PER 500 MG: Performed by: INTERNAL MEDICINE

## 2018-10-03 PROCEDURE — 96365 THER/PROPH/DIAG IV INF INIT: CPT

## 2018-10-03 RX ADMIN — ERTAPENEM SODIUM 1 G: 1 INJECTION, POWDER, LYOPHILIZED, FOR SOLUTION INTRAMUSCULAR; INTRAVENOUS at 15:07

## 2018-10-04 ENCOUNTER — TELEPHONE (OUTPATIENT)
Dept: INFECTIOUS DISEASES | Facility: CLINIC | Age: 73
End: 2018-10-04

## 2018-10-04 ENCOUNTER — HOSPITAL ENCOUNTER (OUTPATIENT)
Dept: INFUSION THERAPY | Facility: HOSPITAL | Age: 73
Discharge: HOME | End: 2018-10-04
Admitting: INTERNAL MEDICINE

## 2018-10-04 VITALS
TEMPERATURE: 97.8 F | DIASTOLIC BLOOD PRESSURE: 78 MMHG | RESPIRATION RATE: 16 BRPM | OXYGEN SATURATION: 97 % | HEART RATE: 108 BPM | SYSTOLIC BLOOD PRESSURE: 125 MMHG

## 2018-10-04 DIAGNOSIS — M00.80 ARTHRITIS DUE TO OTHER BACTERIA, SEPTIC ARTHRITIS OF UNSPECIFIED LOCATION (HCC): ICD-10-CM

## 2018-10-04 PROCEDURE — 96365 THER/PROPH/DIAG IV INF INIT: CPT

## 2018-10-04 PROCEDURE — 25010000002 ERTAPENEM PER 500 MG: Performed by: INTERNAL MEDICINE

## 2018-10-04 RX ADMIN — ERTAPENEM SODIUM 1 G: 1 INJECTION, POWDER, LYOPHILIZED, FOR SOLUTION INTRAMUSCULAR; INTRAVENOUS at 14:46

## 2018-10-04 NOTE — TELEPHONE ENCOUNTER
Pt just wanted to double check that you were going to extend her infusions for when she sees you on 10/29? I told her that was the plan last we talked but that I would confirm with you. Thanks. Madison

## 2018-10-05 ENCOUNTER — HOSPITAL ENCOUNTER (OUTPATIENT)
Dept: INFUSION THERAPY | Facility: HOSPITAL | Age: 73
Discharge: HOME | End: 2018-10-05
Admitting: INTERNAL MEDICINE

## 2018-10-05 VITALS
TEMPERATURE: 97 F | OXYGEN SATURATION: 98 % | DIASTOLIC BLOOD PRESSURE: 70 MMHG | HEART RATE: 95 BPM | SYSTOLIC BLOOD PRESSURE: 126 MMHG | RESPIRATION RATE: 16 BRPM

## 2018-10-05 DIAGNOSIS — M00.80 ARTHRITIS DUE TO OTHER BACTERIA, SEPTIC ARTHRITIS OF UNSPECIFIED LOCATION (HCC): ICD-10-CM

## 2018-10-05 LAB
ALBUMIN SERPL-MCNC: 3.3 G/DL (ref 3.5–5.2)
ALBUMIN/GLOB SERPL: 1 G/DL
ALP SERPL-CCNC: 170 U/L (ref 39–117)
ALT SERPL W P-5'-P-CCNC: 11 U/L (ref 1–33)
ANION GAP SERPL CALCULATED.3IONS-SCNC: 10.1 MMOL/L
AST SERPL-CCNC: 15 U/L (ref 1–32)
BASOPHILS # BLD AUTO: 0.05 10*3/MM3 (ref 0–0.2)
BASOPHILS NFR BLD AUTO: 0.5 % (ref 0–1.5)
BILIRUB SERPL-MCNC: <0.2 MG/DL (ref 0.1–1.2)
BUN BLD-MCNC: 9 MG/DL (ref 8–23)
BUN/CREAT SERPL: 18.8 (ref 7–25)
CALCIUM SPEC-SCNC: 8.5 MG/DL (ref 8.6–10.5)
CHLORIDE SERPL-SCNC: 107 MMOL/L (ref 98–107)
CO2 SERPL-SCNC: 22.9 MMOL/L (ref 22–29)
CREAT BLD-MCNC: 0.48 MG/DL (ref 0.57–1)
CRP SERPL-MCNC: 2.78 MG/DL (ref 0–0.5)
DEPRECATED RDW RBC AUTO: 45.4 FL (ref 37–54)
EOSINOPHIL # BLD AUTO: 0.33 10*3/MM3 (ref 0–0.7)
EOSINOPHIL NFR BLD AUTO: 3 % (ref 0.3–6.2)
ERYTHROCYTE [DISTWIDTH] IN BLOOD BY AUTOMATED COUNT: 14.1 % (ref 11.7–13)
ERYTHROCYTE [SEDIMENTATION RATE] IN BLOOD: 58 MM/HR (ref 0–30)
GFR SERPL CREATININE-BSD FRML MDRD: 127 ML/MIN/1.73
GLOBULIN UR ELPH-MCNC: 3.3 GM/DL
GLUCOSE BLD-MCNC: 117 MG/DL (ref 65–99)
HCT VFR BLD AUTO: 36.3 % (ref 35.6–45.5)
HGB BLD-MCNC: 11 G/DL (ref 11.9–15.5)
IMM GRANULOCYTES # BLD: 0.06 10*3/MM3 (ref 0–0.03)
IMM GRANULOCYTES NFR BLD: 0.5 % (ref 0–0.5)
LYMPHOCYTES # BLD AUTO: 1.78 10*3/MM3 (ref 0.9–4.8)
LYMPHOCYTES NFR BLD AUTO: 16.3 % (ref 19.6–45.3)
MCH RBC QN AUTO: 26.9 PG (ref 26.9–32)
MCHC RBC AUTO-ENTMCNC: 30.3 G/DL (ref 32.4–36.3)
MCV RBC AUTO: 88.8 FL (ref 80.5–98.2)
MONOCYTES # BLD AUTO: 0.97 10*3/MM3 (ref 0.2–1.2)
MONOCYTES NFR BLD AUTO: 8.9 % (ref 5–12)
NEUTROPHILS # BLD AUTO: 7.75 10*3/MM3 (ref 1.9–8.1)
NEUTROPHILS NFR BLD AUTO: 70.8 % (ref 42.7–76)
PLATELET # BLD AUTO: 322 10*3/MM3 (ref 140–500)
PMV BLD AUTO: 11.6 FL (ref 6–12)
POTASSIUM BLD-SCNC: 3.5 MMOL/L (ref 3.5–5.2)
PROT SERPL-MCNC: 6.6 G/DL (ref 6–8.5)
RBC # BLD AUTO: 4.09 10*6/MM3 (ref 3.9–5.2)
SODIUM BLD-SCNC: 140 MMOL/L (ref 136–145)
WBC NRBC COR # BLD: 10.94 10*3/MM3 (ref 4.5–10.7)

## 2018-10-05 PROCEDURE — 25010000002 ERTAPENEM PER 500 MG: Performed by: INTERNAL MEDICINE

## 2018-10-05 PROCEDURE — 36591 DRAW BLOOD OFF VENOUS DEVICE: CPT

## 2018-10-05 PROCEDURE — 96365 THER/PROPH/DIAG IV INF INIT: CPT

## 2018-10-05 PROCEDURE — 80053 COMPREHEN METABOLIC PANEL: CPT | Performed by: INTERNAL MEDICINE

## 2018-10-05 PROCEDURE — 85025 COMPLETE CBC W/AUTO DIFF WBC: CPT | Performed by: INTERNAL MEDICINE

## 2018-10-05 PROCEDURE — 85652 RBC SED RATE AUTOMATED: CPT | Performed by: INTERNAL MEDICINE

## 2018-10-05 PROCEDURE — 86140 C-REACTIVE PROTEIN: CPT | Performed by: INTERNAL MEDICINE

## 2018-10-05 RX ADMIN — ERTAPENEM SODIUM 1 G: 1 INJECTION, POWDER, LYOPHILIZED, FOR SOLUTION INTRAMUSCULAR; INTRAVENOUS at 15:14

## 2018-10-05 NOTE — TELEPHONE ENCOUNTER
Written order sent via FAX to [facility] for extension of IV ABX and weekly labs. Faxed to [phone]. Gemini Machuca RN

## 2018-10-06 ENCOUNTER — HOSPITAL ENCOUNTER (OUTPATIENT)
Dept: INFUSION THERAPY | Facility: HOSPITAL | Age: 73
Discharge: HOME | End: 2018-10-06
Admitting: INTERNAL MEDICINE

## 2018-10-06 VITALS
RESPIRATION RATE: 16 BRPM | HEART RATE: 95 BPM | OXYGEN SATURATION: 97 % | SYSTOLIC BLOOD PRESSURE: 135 MMHG | DIASTOLIC BLOOD PRESSURE: 79 MMHG | TEMPERATURE: 97.5 F

## 2018-10-06 DIAGNOSIS — M00.80 ARTHRITIS DUE TO OTHER BACTERIA, SEPTIC ARTHRITIS OF UNSPECIFIED LOCATION (HCC): ICD-10-CM

## 2018-10-06 PROCEDURE — 96365 THER/PROPH/DIAG IV INF INIT: CPT

## 2018-10-06 PROCEDURE — 25010000002 ERTAPENEM PER 500 MG: Performed by: INTERNAL MEDICINE

## 2018-10-06 RX ADMIN — ERTAPENEM SODIUM 1 G: 1 INJECTION, POWDER, LYOPHILIZED, FOR SOLUTION INTRAMUSCULAR; INTRAVENOUS at 09:00

## 2018-10-06 NOTE — PROGRESS NOTES
Pt tolerated infusion well. Right PICC line flushes easily with positive blood return. D/C with walker and daughter in law @ 2145.

## 2018-10-07 ENCOUNTER — HOSPITAL ENCOUNTER (OUTPATIENT)
Dept: INFUSION THERAPY | Facility: HOSPITAL | Age: 73
Discharge: HOME | End: 2018-10-07
Admitting: INTERNAL MEDICINE

## 2018-10-07 VITALS
SYSTOLIC BLOOD PRESSURE: 135 MMHG | TEMPERATURE: 96.9 F | OXYGEN SATURATION: 97 % | RESPIRATION RATE: 16 BRPM | HEART RATE: 102 BPM | DIASTOLIC BLOOD PRESSURE: 73 MMHG

## 2018-10-07 DIAGNOSIS — M00.80 ARTHRITIS DUE TO OTHER BACTERIA, SEPTIC ARTHRITIS OF UNSPECIFIED LOCATION (HCC): ICD-10-CM

## 2018-10-07 PROCEDURE — 96365 THER/PROPH/DIAG IV INF INIT: CPT

## 2018-10-07 PROCEDURE — 25010000002 ERTAPENEM PER 500 MG: Performed by: INTERNAL MEDICINE

## 2018-10-07 RX ADMIN — ERTAPENEM SODIUM 1 G: 1 INJECTION, POWDER, LYOPHILIZED, FOR SOLUTION INTRAMUSCULAR; INTRAVENOUS at 08:58

## 2018-10-07 NOTE — PROGRESS NOTES
Pt tolerated infusion well. Right arm PICC line flushes easily with positive blood return. D/C with walker and spouse @4761.

## 2018-10-08 ENCOUNTER — HOSPITAL ENCOUNTER (OUTPATIENT)
Dept: INFUSION THERAPY | Facility: HOSPITAL | Age: 73
Discharge: HOME | End: 2018-10-08
Admitting: INTERNAL MEDICINE

## 2018-10-08 VITALS
TEMPERATURE: 97.7 F | RESPIRATION RATE: 16 BRPM | OXYGEN SATURATION: 98 % | SYSTOLIC BLOOD PRESSURE: 135 MMHG | HEART RATE: 95 BPM | DIASTOLIC BLOOD PRESSURE: 71 MMHG

## 2018-10-08 DIAGNOSIS — M00.80 ARTHRITIS DUE TO OTHER BACTERIA, SEPTIC ARTHRITIS OF UNSPECIFIED LOCATION (HCC): ICD-10-CM

## 2018-10-08 PROCEDURE — 96365 THER/PROPH/DIAG IV INF INIT: CPT

## 2018-10-08 PROCEDURE — G0463 HOSPITAL OUTPT CLINIC VISIT: HCPCS

## 2018-10-08 PROCEDURE — 25010000002 ERTAPENEM PER 500 MG: Performed by: INTERNAL MEDICINE

## 2018-10-08 RX ADMIN — ERTAPENEM SODIUM 1 G: 1 INJECTION, POWDER, LYOPHILIZED, FOR SOLUTION INTRAMUSCULAR; INTRAVENOUS at 14:50

## 2018-10-08 NOTE — PROGRESS NOTES
Pt tolerated infusion well. Right PICC line flushed with 10cc NS post infusion. Pt dc'ed per walker with spouse at 1530.

## 2018-10-09 ENCOUNTER — HOSPITAL ENCOUNTER (OUTPATIENT)
Dept: INFUSION THERAPY | Facility: HOSPITAL | Age: 73
Discharge: HOME | End: 2018-10-09
Admitting: INTERNAL MEDICINE

## 2018-10-09 VITALS
HEART RATE: 96 BPM | OXYGEN SATURATION: 97 % | SYSTOLIC BLOOD PRESSURE: 125 MMHG | DIASTOLIC BLOOD PRESSURE: 72 MMHG | TEMPERATURE: 97.6 F | RESPIRATION RATE: 16 BRPM

## 2018-10-09 DIAGNOSIS — M00.80 ARTHRITIS DUE TO OTHER BACTERIA, SEPTIC ARTHRITIS OF UNSPECIFIED LOCATION (HCC): ICD-10-CM

## 2018-10-09 PROCEDURE — 25010000002 ERTAPENEM PER 500 MG: Performed by: INTERNAL MEDICINE

## 2018-10-09 PROCEDURE — 96365 THER/PROPH/DIAG IV INF INIT: CPT

## 2018-10-09 RX ADMIN — ERTAPENEM SODIUM 1 G: 1 INJECTION, POWDER, LYOPHILIZED, FOR SOLUTION INTRAMUSCULAR; INTRAVENOUS at 14:45

## 2018-10-09 NOTE — PROGRESS NOTES
Pt tolerated infusion well.  Right PICC line flushed with 20 cc NS prior to and after infusion with good blood return.  Dressing clean dry & intact.  Pt DC per wheeled walker with spouse @ 1525.

## 2018-10-10 ENCOUNTER — HOSPITAL ENCOUNTER (OUTPATIENT)
Dept: INFUSION THERAPY | Facility: HOSPITAL | Age: 73
Discharge: HOME | End: 2018-10-10
Admitting: INTERNAL MEDICINE

## 2018-10-10 VITALS
DIASTOLIC BLOOD PRESSURE: 69 MMHG | TEMPERATURE: 97.2 F | SYSTOLIC BLOOD PRESSURE: 128 MMHG | HEART RATE: 102 BPM | OXYGEN SATURATION: 94 % | RESPIRATION RATE: 16 BRPM

## 2018-10-10 DIAGNOSIS — M00.80 ARTHRITIS DUE TO OTHER BACTERIA, SEPTIC ARTHRITIS OF UNSPECIFIED LOCATION (HCC): ICD-10-CM

## 2018-10-10 PROCEDURE — 96365 THER/PROPH/DIAG IV INF INIT: CPT

## 2018-10-10 PROCEDURE — 25010000002 ERTAPENEM PER 500 MG: Performed by: INTERNAL MEDICINE

## 2018-10-10 RX ADMIN — ERTAPENEM SODIUM 1 G: 1 INJECTION, POWDER, LYOPHILIZED, FOR SOLUTION INTRAMUSCULAR; INTRAVENOUS at 14:06

## 2018-10-10 NOTE — PROGRESS NOTES
Pt tolerated infusion well. Right PICC line flushed with 10cc NS post infusion. Pt dc'ed per walker with spouse at 1443.

## 2018-10-11 ENCOUNTER — HOSPITAL ENCOUNTER (OUTPATIENT)
Dept: INFUSION THERAPY | Facility: HOSPITAL | Age: 73
Discharge: HOME | End: 2018-10-11
Admitting: INTERNAL MEDICINE

## 2018-10-11 VITALS
TEMPERATURE: 97.9 F | OXYGEN SATURATION: 98 % | DIASTOLIC BLOOD PRESSURE: 58 MMHG | HEART RATE: 87 BPM | SYSTOLIC BLOOD PRESSURE: 117 MMHG | RESPIRATION RATE: 20 BRPM

## 2018-10-11 DIAGNOSIS — M00.80 ARTHRITIS DUE TO OTHER BACTERIA, SEPTIC ARTHRITIS OF UNSPECIFIED LOCATION (HCC): ICD-10-CM

## 2018-10-11 LAB
ALBUMIN SERPL-MCNC: 3.3 G/DL (ref 3.5–5.2)
ALBUMIN/GLOB SERPL: 0.9 G/DL
ALP SERPL-CCNC: 186 U/L (ref 39–117)
ALT SERPL W P-5'-P-CCNC: 20 U/L (ref 1–33)
ANION GAP SERPL CALCULATED.3IONS-SCNC: 11.2 MMOL/L
AST SERPL-CCNC: 26 U/L (ref 1–32)
BASOPHILS # BLD AUTO: 0.04 10*3/MM3 (ref 0–0.2)
BASOPHILS NFR BLD AUTO: 0.4 % (ref 0–1.5)
BILIRUB SERPL-MCNC: 0.2 MG/DL (ref 0.1–1.2)
BUN BLD-MCNC: 10 MG/DL (ref 8–23)
BUN/CREAT SERPL: 19.2 (ref 7–25)
CALCIUM SPEC-SCNC: 8.8 MG/DL (ref 8.6–10.5)
CHLORIDE SERPL-SCNC: 105 MMOL/L (ref 98–107)
CO2 SERPL-SCNC: 22.8 MMOL/L (ref 22–29)
CREAT BLD-MCNC: 0.52 MG/DL (ref 0.57–1)
CRP SERPL-MCNC: 2.03 MG/DL (ref 0–0.5)
DEPRECATED RDW RBC AUTO: 47.3 FL (ref 37–54)
EOSINOPHIL # BLD AUTO: 0.53 10*3/MM3 (ref 0–0.7)
EOSINOPHIL NFR BLD AUTO: 4.8 % (ref 0.3–6.2)
ERYTHROCYTE [DISTWIDTH] IN BLOOD BY AUTOMATED COUNT: 14.5 % (ref 11.7–13)
ERYTHROCYTE [SEDIMENTATION RATE] IN BLOOD: 63 MM/HR (ref 0–30)
GFR SERPL CREATININE-BSD FRML MDRD: 116 ML/MIN/1.73
GLOBULIN UR ELPH-MCNC: 3.6 GM/DL
GLUCOSE BLD-MCNC: 94 MG/DL (ref 65–99)
HCT VFR BLD AUTO: 38.2 % (ref 35.6–45.5)
HGB BLD-MCNC: 11.4 G/DL (ref 11.9–15.5)
IMM GRANULOCYTES # BLD: 0.03 10*3/MM3 (ref 0–0.03)
IMM GRANULOCYTES NFR BLD: 0.3 % (ref 0–0.5)
LYMPHOCYTES # BLD AUTO: 1.72 10*3/MM3 (ref 0.9–4.8)
LYMPHOCYTES NFR BLD AUTO: 15.7 % (ref 19.6–45.3)
MCH RBC QN AUTO: 26.9 PG (ref 26.9–32)
MCHC RBC AUTO-ENTMCNC: 29.8 G/DL (ref 32.4–36.3)
MCV RBC AUTO: 90.1 FL (ref 80.5–98.2)
MONOCYTES # BLD AUTO: 0.85 10*3/MM3 (ref 0.2–1.2)
MONOCYTES NFR BLD AUTO: 7.7 % (ref 5–12)
NEUTROPHILS # BLD AUTO: 7.81 10*3/MM3 (ref 1.9–8.1)
NEUTROPHILS NFR BLD AUTO: 71.1 % (ref 42.7–76)
PLATELET # BLD AUTO: 290 10*3/MM3 (ref 140–500)
PMV BLD AUTO: 11.7 FL (ref 6–12)
POTASSIUM BLD-SCNC: 4 MMOL/L (ref 3.5–5.2)
PROT SERPL-MCNC: 6.9 G/DL (ref 6–8.5)
RBC # BLD AUTO: 4.24 10*6/MM3 (ref 3.9–5.2)
SODIUM BLD-SCNC: 139 MMOL/L (ref 136–145)
WBC NRBC COR # BLD: 10.98 10*3/MM3 (ref 4.5–10.7)

## 2018-10-11 PROCEDURE — 80053 COMPREHEN METABOLIC PANEL: CPT | Performed by: INTERNAL MEDICINE

## 2018-10-11 PROCEDURE — 86140 C-REACTIVE PROTEIN: CPT | Performed by: INTERNAL MEDICINE

## 2018-10-11 PROCEDURE — 96365 THER/PROPH/DIAG IV INF INIT: CPT

## 2018-10-11 PROCEDURE — 25010000002 ERTAPENEM PER 500 MG: Performed by: INTERNAL MEDICINE

## 2018-10-11 PROCEDURE — 85025 COMPLETE CBC W/AUTO DIFF WBC: CPT | Performed by: INTERNAL MEDICINE

## 2018-10-11 PROCEDURE — 85652 RBC SED RATE AUTOMATED: CPT | Performed by: INTERNAL MEDICINE

## 2018-10-11 PROCEDURE — 36592 COLLECT BLOOD FROM PICC: CPT

## 2018-10-11 RX ADMIN — ERTAPENEM SODIUM 1 G: 1 INJECTION, POWDER, LYOPHILIZED, FOR SOLUTION INTRAMUSCULAR; INTRAVENOUS at 15:14

## 2018-10-11 NOTE — PROGRESS NOTES
Patient tolerated infusion well. Patient ambulatory with rolling walker, D/C'd from [facility] with  at 1553.

## 2018-10-12 ENCOUNTER — HOSPITAL ENCOUNTER (OUTPATIENT)
Dept: INFUSION THERAPY | Facility: HOSPITAL | Age: 73
Discharge: HOME | End: 2018-10-12
Admitting: INTERNAL MEDICINE

## 2018-10-12 ENCOUNTER — ANTICOAGULATION VISIT (OUTPATIENT)
Dept: PHARMACY | Facility: HOSPITAL | Age: 73
End: 2018-10-12

## 2018-10-12 VITALS
DIASTOLIC BLOOD PRESSURE: 81 MMHG | TEMPERATURE: 97.3 F | RESPIRATION RATE: 18 BRPM | HEART RATE: 97 BPM | SYSTOLIC BLOOD PRESSURE: 145 MMHG | OXYGEN SATURATION: 99 %

## 2018-10-12 DIAGNOSIS — M00.80 ARTHRITIS DUE TO OTHER BACTERIA, SEPTIC ARTHRITIS OF UNSPECIFIED LOCATION (HCC): ICD-10-CM

## 2018-10-12 DIAGNOSIS — Z86.718 HISTORY OF DVT (DEEP VEIN THROMBOSIS): ICD-10-CM

## 2018-10-12 LAB
INR PPP: 2.1 (ref 0.91–1.09)
PROTHROMBIN TIME: 24.8 SECONDS (ref 10–13.8)

## 2018-10-12 PROCEDURE — 36416 COLLJ CAPILLARY BLOOD SPEC: CPT

## 2018-10-12 PROCEDURE — 25010000002 ERTAPENEM PER 500 MG: Performed by: INTERNAL MEDICINE

## 2018-10-12 PROCEDURE — G0463 HOSPITAL OUTPT CLINIC VISIT: HCPCS

## 2018-10-12 PROCEDURE — 96365 THER/PROPH/DIAG IV INF INIT: CPT

## 2018-10-12 PROCEDURE — 85610 PROTHROMBIN TIME: CPT

## 2018-10-12 RX ADMIN — ERTAPENEM SODIUM 1 G: 1 INJECTION, POWDER, LYOPHILIZED, FOR SOLUTION INTRAMUSCULAR; INTRAVENOUS at 14:32

## 2018-10-12 NOTE — PROGRESS NOTES
Pt tolerated infusion well.  Right PICC line flushed with 10 cc NS before and after infusion.  Flushes well with good blood return.  Pt DC per wheeled walker with spouse @ 1510.

## 2018-10-12 NOTE — PROGRESS NOTES
Anticoagulation Clinic Progress Note  Anticoagulation Summary  As of 10/12/2018    INR goal:   2.5-3.5   TTR:   --   Today's INR:   2.1!   Warfarin maintenance plan:   2.5 mg on Mon, Wed; 5 mg all other days   Weekly warfarin total:   30 mg   Plan last modified:   Molly Torres RPH (10/12/2018)   Next INR check:   10/26/2018   Target end date:   Indefinite    Indications    History of DVT (deep vein thrombosis) [Z86.718]             Anticoagulation Episode Summary     INR check location:       Preferred lab:       Send INR reminders to:   [facility] [place]    Comments:         Anticoagulation Care Providers     Provider Role Specialty Phone number    Vijay Arango MD Referring Cardiology [phone]    Molly Torres RPH Responsible Pharmacy             Drug interactions: no new meds  Diet: consistent    Clinic Interview:  Patient Findings     Negatives:   Signs/symptoms of thrombosis, Signs/symptoms of bleeding,   Laboratory test error suspected, Change in health, Change in alcohol use,   Change in activity, Upcoming invasive procedure, Emergency department   visit, Upcoming dental procedure, Missed doses, Extra doses, Change in   medications, Change in diet/appetite, Hospital admission, Bruising, Other   complaints      Clinical Outcomes     Negatives:   Major bleeding event, Thromboembolic event,   Anticoagulation-related hospital admission, Anticoagulation-related ED   visit, Anticoagulation-related fatality        Education:  Rachelred Moser is a new start in the [facility]. We discussed the followin) Warfarin's indication, mechanism, and dosing  2) Enforced the importance of taking warfarin as instructed and at the same time every day, preferably in the evening so that we can make dose adjustments more easily following subsequent clinic visits  3) What she should do about a missed dose; pts can take missed doses within about 12 hours of their usual scheduled dose, but she  was instructed on the importance of not doubling up on doses unless told to do so by the [facility]  4) Explained possible side effects of warfarin therapy, including increased risk of bleeding, s/sx of bleeding and s/sx of any additional clots/PE/CVA.   5) Discussed monitoring of warfarin, the INR, goal INR range, and the frequency of monitoring  6) Reviewed drug/food/tobacco/EtOH interactions and provided written information covering these topics in more detail, explaining that green, leafy vegetables interact most heavily with warfarin  7) Instructed the pt not to take or discontinue any medications without informing her physician/pharmacist and reminded her to inform us of any dietary changes, as well  8) Explained that she would be coming into the clinic more frequently in these first few weeks of therapy as we try to adjust her dose and achieve a therapeutic INR x 2 consecutive readings. Once that is achieved, patient will follow up in clinic every 4 weeks, on average.    She stated no problems with transportation or scheduling clinic appts in this manner. she expressed understanding of the information provided and has no additional questions at this time.    Rachel Moser was presented with a copy of the Patients Rights and Responsibilities. she expressed verbal consent and agreement to receive care in the [facility] under the current collaborative care agreement with [facility].       INR History:  Previous INR data from [facility] is recorded in [facility] and scanned into the patient's chart in [facility]. These results have been analyzed and reviewed.      Plan:  1. INR is Subtherapeutic today- see above in Anticoagulation Summary.   Will instruct Rachel Moser to Increase their warfarin regimen- see above in Anticoagulation Summary.  2. Follow up in 2 weeks  3. Patient declines warfarin refills.  4. Verbal and written information provided. Patient  expresses understanding and has no further questions at this time.    Molly Torres, RPH

## 2018-10-13 ENCOUNTER — HOSPITAL ENCOUNTER (OUTPATIENT)
Dept: INFUSION THERAPY | Facility: HOSPITAL | Age: 73
Discharge: HOME | End: 2018-10-13
Admitting: INTERNAL MEDICINE

## 2018-10-13 VITALS
SYSTOLIC BLOOD PRESSURE: 134 MMHG | WEIGHT: 128 LBS | RESPIRATION RATE: 16 BRPM | DIASTOLIC BLOOD PRESSURE: 79 MMHG | HEIGHT: 59 IN | TEMPERATURE: 95.7 F | HEART RATE: 93 BPM | BODY MASS INDEX: 25.8 KG/M2 | OXYGEN SATURATION: 98 %

## 2018-10-13 DIAGNOSIS — M00.80 ARTHRITIS DUE TO OTHER BACTERIA, SEPTIC ARTHRITIS OF UNSPECIFIED LOCATION (HCC): ICD-10-CM

## 2018-10-13 PROCEDURE — 96365 THER/PROPH/DIAG IV INF INIT: CPT

## 2018-10-13 PROCEDURE — 25010000002 ERTAPENEM PER 500 MG: Performed by: INTERNAL MEDICINE

## 2018-10-13 RX ADMIN — ERTAPENEM SODIUM 1 G: 1 INJECTION, POWDER, LYOPHILIZED, FOR SOLUTION INTRAMUSCULAR; INTRAVENOUS at 09:31

## 2018-10-13 NOTE — PROGRESS NOTES
Patient tolerated infusion well.  Right PICC flushed with 10 cc Normal Saline before and after the infusion.  Good blood return noted.  To main entrance with wheeled walker with daughter at her side.

## 2018-10-14 ENCOUNTER — HOSPITAL ENCOUNTER (OUTPATIENT)
Dept: INFUSION THERAPY | Facility: HOSPITAL | Age: 73
Discharge: HOME | End: 2018-10-14
Admitting: INTERNAL MEDICINE

## 2018-10-14 VITALS
HEART RATE: 75 BPM | RESPIRATION RATE: 16 BRPM | OXYGEN SATURATION: 98 % | TEMPERATURE: 97.2 F | HEIGHT: 59 IN | BODY MASS INDEX: 25.8 KG/M2 | SYSTOLIC BLOOD PRESSURE: 100 MMHG | WEIGHT: 128 LBS | DIASTOLIC BLOOD PRESSURE: 59 MMHG

## 2018-10-14 DIAGNOSIS — M00.80 ARTHRITIS DUE TO OTHER BACTERIA, SEPTIC ARTHRITIS OF UNSPECIFIED LOCATION (HCC): ICD-10-CM

## 2018-10-14 PROCEDURE — 25010000002 ERTAPENEM PER 500 MG: Performed by: INTERNAL MEDICINE

## 2018-10-14 PROCEDURE — 96365 THER/PROPH/DIAG IV INF INIT: CPT

## 2018-10-14 RX ADMIN — ERTAPENEM SODIUM 1 G: 1 INJECTION, POWDER, LYOPHILIZED, FOR SOLUTION INTRAMUSCULAR; INTRAVENOUS at 09:36

## 2018-10-14 NOTE — PROGRESS NOTES
Patient tolerated infusion without problems.  Right PICC flushed before and after infusion with 10 cc Normal Saline.  Good blood return was noted.  Patient ambulated to main entrance with wheeled walker with  at her side.

## 2018-10-15 ENCOUNTER — HOSPITAL ENCOUNTER (OUTPATIENT)
Dept: INFUSION THERAPY | Facility: HOSPITAL | Age: 73
Discharge: HOME | End: 2018-10-15
Admitting: INTERNAL MEDICINE

## 2018-10-15 VITALS
SYSTOLIC BLOOD PRESSURE: 135 MMHG | RESPIRATION RATE: 16 BRPM | HEART RATE: 85 BPM | DIASTOLIC BLOOD PRESSURE: 60 MMHG | OXYGEN SATURATION: 97 % | TEMPERATURE: 94 F

## 2018-10-15 DIAGNOSIS — M00.80 ARTHRITIS DUE TO OTHER BACTERIA, SEPTIC ARTHRITIS OF UNSPECIFIED LOCATION (HCC): ICD-10-CM

## 2018-10-15 PROCEDURE — G0463 HOSPITAL OUTPT CLINIC VISIT: HCPCS

## 2018-10-15 PROCEDURE — 25010000002 ERTAPENEM PER 500 MG: Performed by: INTERNAL MEDICINE

## 2018-10-15 PROCEDURE — 96365 THER/PROPH/DIAG IV INF INIT: CPT

## 2018-10-15 RX ADMIN — ERTAPENEM SODIUM 1 G: 1 INJECTION, POWDER, LYOPHILIZED, FOR SOLUTION INTRAMUSCULAR; INTRAVENOUS at 15:04

## 2018-10-15 NOTE — PROGRESS NOTES
Tolerated well. PICC dressing change done per protocol, tolerated well. New statlock, biopatch and ultrasite valve placed. D/C'd per ambulation with walker,  at side.

## 2018-10-16 ENCOUNTER — HOSPITAL ENCOUNTER (OUTPATIENT)
Dept: INFUSION THERAPY | Facility: HOSPITAL | Age: 73
Discharge: HOME | End: 2018-10-16
Admitting: INTERNAL MEDICINE

## 2018-10-16 VITALS
DIASTOLIC BLOOD PRESSURE: 61 MMHG | TEMPERATURE: 97.7 F | RESPIRATION RATE: 20 BRPM | HEART RATE: 101 BPM | OXYGEN SATURATION: 97 % | SYSTOLIC BLOOD PRESSURE: 131 MMHG

## 2018-10-16 DIAGNOSIS — M00.80 ARTHRITIS DUE TO OTHER BACTERIA, SEPTIC ARTHRITIS OF UNSPECIFIED LOCATION (HCC): ICD-10-CM

## 2018-10-16 PROCEDURE — 25010000002 ERTAPENEM PER 500 MG: Performed by: INTERNAL MEDICINE

## 2018-10-16 PROCEDURE — 96365 THER/PROPH/DIAG IV INF INIT: CPT

## 2018-10-16 RX ADMIN — ERTAPENEM SODIUM 1 G: 1 INJECTION, POWDER, LYOPHILIZED, FOR SOLUTION INTRAMUSCULAR; INTRAVENOUS at 15:12

## 2018-10-17 ENCOUNTER — HOSPITAL ENCOUNTER (OUTPATIENT)
Dept: INFUSION THERAPY | Facility: HOSPITAL | Age: 73
Discharge: HOME | End: 2018-10-17
Admitting: INTERNAL MEDICINE

## 2018-10-17 VITALS
TEMPERATURE: 96.9 F | RESPIRATION RATE: 20 BRPM | HEART RATE: 89 BPM | SYSTOLIC BLOOD PRESSURE: 126 MMHG | OXYGEN SATURATION: 96 % | DIASTOLIC BLOOD PRESSURE: 65 MMHG

## 2018-10-17 DIAGNOSIS — M00.80 ARTHRITIS DUE TO OTHER BACTERIA, SEPTIC ARTHRITIS OF UNSPECIFIED LOCATION (HCC): ICD-10-CM

## 2018-10-17 PROCEDURE — 25010000002 ERTAPENEM PER 500 MG: Performed by: INTERNAL MEDICINE

## 2018-10-17 PROCEDURE — 96365 THER/PROPH/DIAG IV INF INIT: CPT

## 2018-10-17 RX ADMIN — ERTAPENEM SODIUM 1 G: 1 INJECTION, POWDER, LYOPHILIZED, FOR SOLUTION INTRAMUSCULAR; INTRAVENOUS at 14:52

## 2018-10-17 NOTE — PROGRESS NOTES
Patient tolerated infusion without difficulty. Discharged amb (walker) with  after appointment completed.

## 2018-10-18 ENCOUNTER — HOSPITAL ENCOUNTER (OUTPATIENT)
Dept: INFUSION THERAPY | Facility: HOSPITAL | Age: 73
Discharge: HOME | End: 2018-10-18
Admitting: INTERNAL MEDICINE

## 2018-10-18 VITALS
RESPIRATION RATE: 18 BRPM | DIASTOLIC BLOOD PRESSURE: 62 MMHG | OXYGEN SATURATION: 95 % | SYSTOLIC BLOOD PRESSURE: 134 MMHG | TEMPERATURE: 97.1 F | HEART RATE: 87 BPM

## 2018-10-18 DIAGNOSIS — M00.80 ARTHRITIS DUE TO OTHER BACTERIA, SEPTIC ARTHRITIS OF UNSPECIFIED LOCATION (HCC): ICD-10-CM

## 2018-10-18 PROCEDURE — 96365 THER/PROPH/DIAG IV INF INIT: CPT

## 2018-10-18 PROCEDURE — 25010000002 ERTAPENEM PER 500 MG: Performed by: INTERNAL MEDICINE

## 2018-10-18 RX ADMIN — ERTAPENEM SODIUM 1 G: 1 INJECTION, POWDER, LYOPHILIZED, FOR SOLUTION INTRAMUSCULAR; INTRAVENOUS at 14:54

## 2018-10-18 NOTE — PROGRESS NOTES
Pt tolerated infusion without difficulty.  SHANI PICC line flushed with 20 cc NS before and after infusion.  Flushes well with good blood return.  Pt DC per wheeled walker with spouse @ 1530.

## 2018-10-19 ENCOUNTER — HOSPITAL ENCOUNTER (OUTPATIENT)
Dept: INFUSION THERAPY | Facility: HOSPITAL | Age: 73
Discharge: HOME | End: 2018-10-19
Admitting: INTERNAL MEDICINE

## 2018-10-19 VITALS
RESPIRATION RATE: 16 BRPM | TEMPERATURE: 97.5 F | HEART RATE: 100 BPM | DIASTOLIC BLOOD PRESSURE: 81 MMHG | OXYGEN SATURATION: 96 % | SYSTOLIC BLOOD PRESSURE: 125 MMHG

## 2018-10-19 DIAGNOSIS — M00.80 ARTHRITIS DUE TO OTHER BACTERIA, SEPTIC ARTHRITIS OF UNSPECIFIED LOCATION (HCC): ICD-10-CM

## 2018-10-19 LAB
ALBUMIN SERPL-MCNC: 3.7 G/DL (ref 3.5–5.2)
ALBUMIN/GLOB SERPL: 1 G/DL
ALP SERPL-CCNC: 202 U/L (ref 39–117)
ALT SERPL W P-5'-P-CCNC: 27 U/L (ref 1–33)
ANION GAP SERPL CALCULATED.3IONS-SCNC: 10.6 MMOL/L
AST SERPL-CCNC: 31 U/L (ref 1–32)
BASOPHILS # BLD AUTO: 0.03 10*3/MM3 (ref 0–0.2)
BASOPHILS NFR BLD AUTO: 0.2 % (ref 0–1.5)
BILIRUB SERPL-MCNC: 0.3 MG/DL (ref 0.1–1.2)
BUN BLD-MCNC: 12 MG/DL (ref 8–23)
BUN/CREAT SERPL: 27.3 (ref 7–25)
CALCIUM SPEC-SCNC: 9.2 MG/DL (ref 8.6–10.5)
CHLORIDE SERPL-SCNC: 105 MMOL/L (ref 98–107)
CO2 SERPL-SCNC: 21.4 MMOL/L (ref 22–29)
CREAT BLD-MCNC: 0.44 MG/DL (ref 0.57–1)
CRP SERPL-MCNC: 2.35 MG/DL (ref 0–0.5)
DEPRECATED RDW RBC AUTO: 47.2 FL (ref 37–54)
EOSINOPHIL # BLD AUTO: 0.35 10*3/MM3 (ref 0–0.7)
EOSINOPHIL NFR BLD AUTO: 2.7 % (ref 0.3–6.2)
ERYTHROCYTE [DISTWIDTH] IN BLOOD BY AUTOMATED COUNT: 14.6 % (ref 11.7–13)
ERYTHROCYTE [SEDIMENTATION RATE] IN BLOOD: 66 MM/HR (ref 0–30)
GFR SERPL CREATININE-BSD FRML MDRD: 141 ML/MIN/1.73
GLOBULIN UR ELPH-MCNC: 3.8 GM/DL
GLUCOSE BLD-MCNC: 94 MG/DL (ref 65–99)
HCT VFR BLD AUTO: 38.2 % (ref 35.6–45.5)
HGB BLD-MCNC: 12.2 G/DL (ref 11.9–15.5)
IMM GRANULOCYTES # BLD: 0.05 10*3/MM3 (ref 0–0.03)
IMM GRANULOCYTES NFR BLD: 0.4 % (ref 0–0.5)
LYMPHOCYTES # BLD AUTO: 1.98 10*3/MM3 (ref 0.9–4.8)
LYMPHOCYTES NFR BLD AUTO: 15.4 % (ref 19.6–45.3)
MCH RBC QN AUTO: 27.9 PG (ref 26.9–32)
MCHC RBC AUTO-ENTMCNC: 31.9 G/DL (ref 32.4–36.3)
MCV RBC AUTO: 87.4 FL (ref 80.5–98.2)
MONOCYTES # BLD AUTO: 1.07 10*3/MM3 (ref 0.2–1.2)
MONOCYTES NFR BLD AUTO: 8.3 % (ref 5–12)
NEUTROPHILS # BLD AUTO: 9.4 10*3/MM3 (ref 1.9–8.1)
NEUTROPHILS NFR BLD AUTO: 73.4 % (ref 42.7–76)
PLATELET # BLD AUTO: 288 10*3/MM3 (ref 140–500)
PMV BLD AUTO: 11.7 FL (ref 6–12)
POTASSIUM BLD-SCNC: 4 MMOL/L (ref 3.5–5.2)
PROT SERPL-MCNC: 7.5 G/DL (ref 6–8.5)
RBC # BLD AUTO: 4.37 10*6/MM3 (ref 3.9–5.2)
SODIUM BLD-SCNC: 137 MMOL/L (ref 136–145)
WBC NRBC COR # BLD: 12.83 10*3/MM3 (ref 4.5–10.7)

## 2018-10-19 PROCEDURE — 25010000002 ERTAPENEM PER 500 MG: Performed by: INTERNAL MEDICINE

## 2018-10-19 PROCEDURE — 85025 COMPLETE CBC W/AUTO DIFF WBC: CPT | Performed by: INTERNAL MEDICINE

## 2018-10-19 PROCEDURE — 36592 COLLECT BLOOD FROM PICC: CPT

## 2018-10-19 PROCEDURE — 86140 C-REACTIVE PROTEIN: CPT | Performed by: INTERNAL MEDICINE

## 2018-10-19 PROCEDURE — 80053 COMPREHEN METABOLIC PANEL: CPT | Performed by: INTERNAL MEDICINE

## 2018-10-19 PROCEDURE — 85652 RBC SED RATE AUTOMATED: CPT | Performed by: INTERNAL MEDICINE

## 2018-10-19 PROCEDURE — 96365 THER/PROPH/DIAG IV INF INIT: CPT

## 2018-10-19 RX ADMIN — ERTAPENEM SODIUM 1 G: 1 INJECTION, POWDER, LYOPHILIZED, FOR SOLUTION INTRAMUSCULAR; INTRAVENOUS at 15:06

## 2018-10-20 ENCOUNTER — HOSPITAL ENCOUNTER (OUTPATIENT)
Dept: INFUSION THERAPY | Facility: HOSPITAL | Age: 73
Discharge: HOME | End: 2018-10-20
Admitting: INTERNAL MEDICINE

## 2018-10-20 VITALS
HEART RATE: 88 BPM | RESPIRATION RATE: 20 BRPM | DIASTOLIC BLOOD PRESSURE: 65 MMHG | OXYGEN SATURATION: 95 % | TEMPERATURE: 97 F | SYSTOLIC BLOOD PRESSURE: 135 MMHG

## 2018-10-20 DIAGNOSIS — M00.80 ARTHRITIS DUE TO OTHER BACTERIA, SEPTIC ARTHRITIS OF UNSPECIFIED LOCATION (HCC): ICD-10-CM

## 2018-10-20 PROCEDURE — 25010000002 ERTAPENEM PER 500 MG: Performed by: INTERNAL MEDICINE

## 2018-10-20 PROCEDURE — 96365 THER/PROPH/DIAG IV INF INIT: CPT

## 2018-10-20 RX ADMIN — ERTAPENEM SODIUM 1 G: 1 INJECTION, POWDER, LYOPHILIZED, FOR SOLUTION INTRAMUSCULAR; INTRAVENOUS at 08:29

## 2018-10-21 ENCOUNTER — HOSPITAL ENCOUNTER (OUTPATIENT)
Dept: INFUSION THERAPY | Facility: HOSPITAL | Age: 73
Discharge: HOME | End: 2018-10-21
Admitting: INTERNAL MEDICINE

## 2018-10-21 VITALS
OXYGEN SATURATION: 96 % | DIASTOLIC BLOOD PRESSURE: 69 MMHG | RESPIRATION RATE: 20 BRPM | SYSTOLIC BLOOD PRESSURE: 133 MMHG | TEMPERATURE: 97.5 F | HEART RATE: 93 BPM

## 2018-10-21 DIAGNOSIS — M00.80 ARTHRITIS DUE TO OTHER BACTERIA, SEPTIC ARTHRITIS OF UNSPECIFIED LOCATION (HCC): ICD-10-CM

## 2018-10-21 PROCEDURE — 25010000002 ERTAPENEM PER 500 MG: Performed by: INTERNAL MEDICINE

## 2018-10-21 PROCEDURE — 96365 THER/PROPH/DIAG IV INF INIT: CPT

## 2018-10-21 RX ADMIN — ERTAPENEM SODIUM 1 G: 1 INJECTION, POWDER, LYOPHILIZED, FOR SOLUTION INTRAMUSCULAR; INTRAVENOUS at 09:19

## 2018-10-22 ENCOUNTER — HOSPITAL ENCOUNTER (OUTPATIENT)
Dept: INFUSION THERAPY | Facility: HOSPITAL | Age: 73
Discharge: HOME | End: 2018-10-22
Admitting: INTERNAL MEDICINE

## 2018-10-22 VITALS
HEART RATE: 101 BPM | OXYGEN SATURATION: 98 % | RESPIRATION RATE: 18 BRPM | DIASTOLIC BLOOD PRESSURE: 75 MMHG | SYSTOLIC BLOOD PRESSURE: 130 MMHG

## 2018-10-22 DIAGNOSIS — M00.80 ARTHRITIS DUE TO OTHER BACTERIA, SEPTIC ARTHRITIS OF UNSPECIFIED LOCATION (HCC): ICD-10-CM

## 2018-10-22 PROCEDURE — 25010000002 ERTAPENEM PER 500 MG: Performed by: INTERNAL MEDICINE

## 2018-10-22 PROCEDURE — G0463 HOSPITAL OUTPT CLINIC VISIT: HCPCS

## 2018-10-22 PROCEDURE — 96365 THER/PROPH/DIAG IV INF INIT: CPT

## 2018-10-22 RX ADMIN — ERTAPENEM SODIUM 1 G: 1 INJECTION, POWDER, LYOPHILIZED, FOR SOLUTION INTRAMUSCULAR; INTRAVENOUS at 15:21

## 2018-10-22 NOTE — PROGRESS NOTES
SHANI PICC line changed per sterile technique.  New bio-patch, ultra site valve & stat lock.  Flushed easily with 10 cc NS with good blood return.

## 2018-10-23 ENCOUNTER — HOSPITAL ENCOUNTER (OUTPATIENT)
Dept: INFUSION THERAPY | Facility: HOSPITAL | Age: 73
Discharge: HOME | End: 2018-10-23
Admitting: INTERNAL MEDICINE

## 2018-10-23 VITALS
OXYGEN SATURATION: 96 % | RESPIRATION RATE: 16 BRPM | SYSTOLIC BLOOD PRESSURE: 124 MMHG | DIASTOLIC BLOOD PRESSURE: 64 MMHG | HEART RATE: 82 BPM

## 2018-10-23 DIAGNOSIS — M00.80 ARTHRITIS DUE TO OTHER BACTERIA, SEPTIC ARTHRITIS OF UNSPECIFIED LOCATION (HCC): ICD-10-CM

## 2018-10-23 PROCEDURE — 25010000002 ERTAPENEM PER 500 MG: Performed by: INTERNAL MEDICINE

## 2018-10-23 PROCEDURE — 96365 THER/PROPH/DIAG IV INF INIT: CPT

## 2018-10-23 RX ADMIN — ERTAPENEM SODIUM 1 G: 1 INJECTION, POWDER, LYOPHILIZED, FOR SOLUTION INTRAMUSCULAR; INTRAVENOUS at 14:43

## 2018-10-24 ENCOUNTER — HOSPITAL ENCOUNTER (OUTPATIENT)
Dept: INFUSION THERAPY | Facility: HOSPITAL | Age: 73
Discharge: HOME | End: 2018-10-24
Admitting: INTERNAL MEDICINE

## 2018-10-24 VITALS
DIASTOLIC BLOOD PRESSURE: 76 MMHG | HEART RATE: 86 BPM | SYSTOLIC BLOOD PRESSURE: 132 MMHG | RESPIRATION RATE: 16 BRPM | OXYGEN SATURATION: 100 % | TEMPERATURE: 97.5 F

## 2018-10-24 DIAGNOSIS — M00.80 ARTHRITIS DUE TO OTHER BACTERIA, SEPTIC ARTHRITIS OF UNSPECIFIED LOCATION (HCC): ICD-10-CM

## 2018-10-24 PROCEDURE — 96365 THER/PROPH/DIAG IV INF INIT: CPT

## 2018-10-24 PROCEDURE — 25010000002 ERTAPENEM PER 500 MG: Performed by: INTERNAL MEDICINE

## 2018-10-24 RX ADMIN — ERTAPENEM SODIUM 1 G: 1 INJECTION, POWDER, LYOPHILIZED, FOR SOLUTION INTRAMUSCULAR; INTRAVENOUS at 15:03

## 2018-10-25 ENCOUNTER — HOSPITAL ENCOUNTER (OUTPATIENT)
Dept: INFUSION THERAPY | Facility: HOSPITAL | Age: 73
Discharge: HOME | End: 2018-10-25
Admitting: INTERNAL MEDICINE

## 2018-10-25 VITALS
RESPIRATION RATE: 18 BRPM | TEMPERATURE: 97.5 F | HEART RATE: 76 BPM | DIASTOLIC BLOOD PRESSURE: 83 MMHG | OXYGEN SATURATION: 98 % | SYSTOLIC BLOOD PRESSURE: 141 MMHG

## 2018-10-25 DIAGNOSIS — M00.80 ARTHRITIS DUE TO OTHER BACTERIA, SEPTIC ARTHRITIS OF UNSPECIFIED LOCATION (HCC): ICD-10-CM

## 2018-10-25 PROCEDURE — 25010000002 ERTAPENEM 1 GM/100ML SOLUTION: Performed by: INTERNAL MEDICINE

## 2018-10-25 PROCEDURE — 96365 THER/PROPH/DIAG IV INF INIT: CPT

## 2018-10-25 RX ADMIN — ERTAPENEM SODIUM 1 G: 1 INJECTION, POWDER, LYOPHILIZED, FOR SOLUTION INTRAMUSCULAR; INTRAVENOUS at 14:55

## 2018-10-26 ENCOUNTER — HOSPITAL ENCOUNTER (OUTPATIENT)
Dept: INFUSION THERAPY | Facility: HOSPITAL | Age: 73
Discharge: HOME | End: 2018-10-26
Admitting: INTERNAL MEDICINE

## 2018-10-26 ENCOUNTER — ANTICOAGULATION VISIT (OUTPATIENT)
Dept: PHARMACY | Facility: HOSPITAL | Age: 73
End: 2018-10-26

## 2018-10-26 VITALS
TEMPERATURE: 97.4 F | OXYGEN SATURATION: 96 % | RESPIRATION RATE: 20 BRPM | SYSTOLIC BLOOD PRESSURE: 148 MMHG | HEART RATE: 82 BPM | DIASTOLIC BLOOD PRESSURE: 74 MMHG

## 2018-10-26 DIAGNOSIS — Z86.718 HISTORY OF DVT (DEEP VEIN THROMBOSIS): ICD-10-CM

## 2018-10-26 DIAGNOSIS — M00.80 ARTHRITIS DUE TO OTHER BACTERIA, SEPTIC ARTHRITIS OF UNSPECIFIED LOCATION (HCC): ICD-10-CM

## 2018-10-26 DIAGNOSIS — M00.852 ARTHRITIS OF LEFT HIP DUE TO OTHER BACTERIA (HCC): ICD-10-CM

## 2018-10-26 DIAGNOSIS — Z79.2 LONG TERM CURRENT USE OF ANTIBIOTICS: ICD-10-CM

## 2018-10-26 LAB
ALBUMIN SERPL-MCNC: 3.6 G/DL (ref 3.5–5.2)
ALBUMIN/GLOB SERPL: 1 G/DL
ALP SERPL-CCNC: 195 U/L (ref 39–117)
ALT SERPL W P-5'-P-CCNC: 24 U/L (ref 1–33)
ANION GAP SERPL CALCULATED.3IONS-SCNC: 13.5 MMOL/L
AST SERPL-CCNC: 29 U/L (ref 1–32)
BASOPHILS # BLD AUTO: 0.04 10*3/MM3 (ref 0–0.2)
BASOPHILS NFR BLD AUTO: 0.4 % (ref 0–1.5)
BILIRUB SERPL-MCNC: 0.4 MG/DL (ref 0.1–1.2)
BUN BLD-MCNC: 11 MG/DL (ref 8–23)
BUN/CREAT SERPL: 22.4 (ref 7–25)
CALCIUM SPEC-SCNC: 8.9 MG/DL (ref 8.6–10.5)
CHLORIDE SERPL-SCNC: 108 MMOL/L (ref 98–107)
CO2 SERPL-SCNC: 21.5 MMOL/L (ref 22–29)
CREAT BLD-MCNC: 0.49 MG/DL (ref 0.57–1)
CRP SERPL-MCNC: 3 MG/DL (ref 0–0.5)
DEPRECATED RDW RBC AUTO: 46 FL (ref 37–54)
EOSINOPHIL # BLD AUTO: 0.41 10*3/MM3 (ref 0–0.7)
EOSINOPHIL NFR BLD AUTO: 3.8 % (ref 0.3–6.2)
ERYTHROCYTE [DISTWIDTH] IN BLOOD BY AUTOMATED COUNT: 14.5 % (ref 11.7–13)
ERYTHROCYTE [SEDIMENTATION RATE] IN BLOOD: 61 MM/HR (ref 0–30)
GFR SERPL CREATININE-BSD FRML MDRD: 124 ML/MIN/1.73
GLOBULIN UR ELPH-MCNC: 3.7 GM/DL
GLUCOSE BLD-MCNC: 92 MG/DL (ref 65–99)
HCT VFR BLD AUTO: 37 % (ref 35.6–45.5)
HGB BLD-MCNC: 11.8 G/DL (ref 11.9–15.5)
IMM GRANULOCYTES # BLD: 0.04 10*3/MM3 (ref 0–0.03)
IMM GRANULOCYTES NFR BLD: 0.4 % (ref 0–0.5)
INR PPP: 2.2 (ref 0.91–1.09)
LYMPHOCYTES # BLD AUTO: 1.61 10*3/MM3 (ref 0.9–4.8)
LYMPHOCYTES NFR BLD AUTO: 14.9 % (ref 19.6–45.3)
MCH RBC QN AUTO: 27.8 PG (ref 26.9–32)
MCHC RBC AUTO-ENTMCNC: 31.9 G/DL (ref 32.4–36.3)
MCV RBC AUTO: 87.1 FL (ref 80.5–98.2)
MONOCYTES # BLD AUTO: 1.01 10*3/MM3 (ref 0.2–1.2)
MONOCYTES NFR BLD AUTO: 9.4 % (ref 5–12)
NEUTROPHILS # BLD AUTO: 7.7 10*3/MM3 (ref 1.9–8.1)
NEUTROPHILS NFR BLD AUTO: 71.5 % (ref 42.7–76)
PLATELET # BLD AUTO: 307 10*3/MM3 (ref 140–500)
PMV BLD AUTO: 12.5 FL (ref 6–12)
POTASSIUM BLD-SCNC: 3.8 MMOL/L (ref 3.5–5.2)
PROT SERPL-MCNC: 7.3 G/DL (ref 6–8.5)
PROTHROMBIN TIME: 26.9 SECONDS (ref 10–13.8)
RBC # BLD AUTO: 4.25 10*6/MM3 (ref 3.9–5.2)
SODIUM BLD-SCNC: 143 MMOL/L (ref 136–145)
WBC NRBC COR # BLD: 10.77 10*3/MM3 (ref 4.5–10.7)

## 2018-10-26 PROCEDURE — 85652 RBC SED RATE AUTOMATED: CPT | Performed by: INTERNAL MEDICINE

## 2018-10-26 PROCEDURE — 96367 TX/PROPH/DG ADDL SEQ IV INF: CPT

## 2018-10-26 PROCEDURE — 85025 COMPLETE CBC W/AUTO DIFF WBC: CPT | Performed by: INTERNAL MEDICINE

## 2018-10-26 PROCEDURE — 96365 THER/PROPH/DIAG IV INF INIT: CPT

## 2018-10-26 PROCEDURE — 25010000002 ERTAPENEM PER 500 MG: Performed by: INTERNAL MEDICINE

## 2018-10-26 PROCEDURE — 86140 C-REACTIVE PROTEIN: CPT | Performed by: INTERNAL MEDICINE

## 2018-10-26 PROCEDURE — 36416 COLLJ CAPILLARY BLOOD SPEC: CPT

## 2018-10-26 PROCEDURE — 36592 COLLECT BLOOD FROM PICC: CPT

## 2018-10-26 PROCEDURE — G0463 HOSPITAL OUTPT CLINIC VISIT: HCPCS

## 2018-10-26 PROCEDURE — 80053 COMPREHEN METABOLIC PANEL: CPT | Performed by: INTERNAL MEDICINE

## 2018-10-26 PROCEDURE — 85610 PROTHROMBIN TIME: CPT

## 2018-10-26 RX ADMIN — ERTAPENEM SODIUM 1 G: 1 INJECTION, POWDER, LYOPHILIZED, FOR SOLUTION INTRAMUSCULAR; INTRAVENOUS at 15:07

## 2018-10-26 NOTE — PROGRESS NOTES
Anticoagulation Clinic Progress Note    Anticoagulation Summary  As of 10/26/2018    INR goal:   2.5-3.5   TTR:   0.0 % (4 d)   Today's INR:   2.2!   Warfarin maintenance plan:   2.5 mg on Wed; 5 mg all other days   Weekly warfarin total:   32.5 mg   Plan last modified:   Molly Torres RPH (10/26/2018)   Next INR check:   11/2/2018   Target end date:   Indefinite    Indications    History of DVT (deep vein thrombosis) [Z86.718]             Anticoagulation Episode Summary     INR check location:       Preferred lab:       Send INR reminders to:   [facility]    Comments:         Anticoagulation Care Providers     Provider Role Specialty Phone number    Vijay Arango MD Referring Cardiology [phone]    Molly Torres RPH Responsible Pharmacy           Drug interactions: has remained unchanged.  Diet: has remained unchanged.    Clinic Interview:  Patient Findings     Negatives:   Signs/symptoms of thrombosis, Signs/symptoms of bleeding,   Laboratory test error suspected, Change in health, Change in alcohol use,   Change in activity, Upcoming invasive procedure, Emergency department   visit, Upcoming dental procedure, Missed doses, Extra doses, Change in   medications, Change in diet/appetite, Hospital admission, Bruising, Other   complaints      Clinical Outcomes     Negatives:   Major bleeding event, Thromboembolic event,   Anticoagulation-related hospital admission, Anticoagulation-related ED   visit, Anticoagulation-related fatality        INR History:  Anticoagulation Monitoring 10/12/2018 10/26/2018   INR 2.1 2.2   INR Date 10/12/2018 10/26/2018   INR Goal 2.5-3.5 2.5-3.5   Trend - Up   Last Week Total 0 mg 30 mg   Next Week Total 30 mg 32.5 mg   Sun 5 mg 5 mg   Mon 2.5 mg 5 mg   Tue 5 mg 5 mg   Wed 2.5 mg 2.5 mg   Thu 5 mg 5 mg   Fri 5 mg 5 mg   Sat 5 mg 5 mg   Visit Report - -       Previous INR data from [facility] is recorded in [facility] and scanned into the patient's  chart in [facility]. These results have been analyzed and reviewed.      Plan:  1. INR is Subtherapeutic today- see above in Anticoagulation Summary.  Will instruct Rachel Moser to Increase their warfarin regimen- see above in Anticoagulation Summary.  2. Follow up in 1 week  3. Patient declines warfarin refills.  4. Verbal and written information provided. Patient expresses understanding and has no further questions at this time.    Molly Torres [facility]

## 2018-10-26 NOTE — PROGRESS NOTES
Sandy in Hematology will add the CBC & CMP to the sed rate & CRP previously drawn.  Lab labels weren't printed to collect the CBC & CMP.

## 2018-10-27 ENCOUNTER — HOSPITAL ENCOUNTER (OUTPATIENT)
Dept: INFUSION THERAPY | Facility: HOSPITAL | Age: 73
Discharge: HOME | End: 2018-10-27
Admitting: INTERNAL MEDICINE

## 2018-10-27 VITALS
DIASTOLIC BLOOD PRESSURE: 69 MMHG | TEMPERATURE: 96.9 F | RESPIRATION RATE: 20 BRPM | SYSTOLIC BLOOD PRESSURE: 124 MMHG | OXYGEN SATURATION: 99 % | HEART RATE: 76 BPM

## 2018-10-27 DIAGNOSIS — M00.80 ARTHRITIS DUE TO OTHER BACTERIA, SEPTIC ARTHRITIS OF UNSPECIFIED LOCATION (HCC): ICD-10-CM

## 2018-10-27 PROCEDURE — 96365 THER/PROPH/DIAG IV INF INIT: CPT

## 2018-10-27 PROCEDURE — 25010000002 ERTAPENEM PER 500 MG: Performed by: INTERNAL MEDICINE

## 2018-10-27 RX ADMIN — ERTAPENEM SODIUM 1 G: 1 INJECTION, POWDER, LYOPHILIZED, FOR SOLUTION INTRAMUSCULAR; INTRAVENOUS at 09:29

## 2018-10-28 ENCOUNTER — HOSPITAL ENCOUNTER (OUTPATIENT)
Dept: INFUSION THERAPY | Facility: HOSPITAL | Age: 73
Discharge: HOME | End: 2018-10-28
Admitting: INTERNAL MEDICINE

## 2018-10-28 VITALS
DIASTOLIC BLOOD PRESSURE: 65 MMHG | SYSTOLIC BLOOD PRESSURE: 127 MMHG | TEMPERATURE: 97.1 F | HEART RATE: 69 BPM | RESPIRATION RATE: 20 BRPM | OXYGEN SATURATION: 97 %

## 2018-10-28 DIAGNOSIS — M00.80 ARTHRITIS DUE TO OTHER BACTERIA, SEPTIC ARTHRITIS OF UNSPECIFIED LOCATION (HCC): ICD-10-CM

## 2018-10-28 PROCEDURE — 96365 THER/PROPH/DIAG IV INF INIT: CPT

## 2018-10-28 PROCEDURE — 25010000002 ERTAPENEM PER 500 MG: Performed by: INTERNAL MEDICINE

## 2018-10-28 RX ADMIN — ERTAPENEM SODIUM 1 G: 1 INJECTION, POWDER, LYOPHILIZED, FOR SOLUTION INTRAMUSCULAR; INTRAVENOUS at 09:29

## 2018-10-29 ENCOUNTER — OFFICE VISIT (OUTPATIENT)
Dept: INFECTIOUS DISEASES | Facility: CLINIC | Age: 73
End: 2018-10-29

## 2018-10-29 ENCOUNTER — HOSPITAL ENCOUNTER (OUTPATIENT)
Dept: INFUSION THERAPY | Facility: HOSPITAL | Age: 73
End: 2018-10-29

## 2018-10-29 VITALS
TEMPERATURE: 97.3 F | DIASTOLIC BLOOD PRESSURE: 79 MMHG | SYSTOLIC BLOOD PRESSURE: 129 MMHG | HEART RATE: 76 BPM | WEIGHT: 131 LBS | BODY MASS INDEX: 26.41 KG/M2 | HEIGHT: 59 IN

## 2018-10-29 DIAGNOSIS — Z96.649 INFECTION OF PROSTHETIC HIP JOINT, SUBSEQUENT ENCOUNTER: Primary | ICD-10-CM

## 2018-10-29 DIAGNOSIS — T84.59XD INFECTION OF PROSTHETIC HIP JOINT, SUBSEQUENT ENCOUNTER: Primary | ICD-10-CM

## 2018-10-29 PROCEDURE — 99214 OFFICE O/P EST MOD 30 MIN: CPT | Performed by: INTERNAL MEDICINE

## 2018-10-29 RX ORDER — AMOXICILLIN AND CLAVULANATE POTASSIUM 875; 125 MG/1; MG/1
1 TABLET, FILM COATED ORAL EVERY 12 HOURS SCHEDULED
Qty: 60 TABLET | Refills: 1 | Status: SHIPPED | OUTPATIENT
Start: 2018-10-29 | End: 2018-12-05 | Stop reason: SDUPTHER

## 2018-10-29 RX ORDER — CEFACLOR 500 MG
500 CAPSULE ORAL 2 TIMES DAILY
Qty: 60 CAPSULE | Refills: 1 | Status: SHIPPED | OUTPATIENT
Start: 2018-10-29 | End: 2018-12-05 | Stop reason: SDUPTHER

## 2018-10-29 NOTE — PROGRESS NOTES
Reason for clinic visits: Follow up for ESBL left prosthetic hip septic arthritis    HPI: Rachel Moser is a 72 y.o. female who was last seen in clinic in September.  She was continued on IV ertapenem.  She has done very well.  Her incision remains well healed without any signs of drainage.  No erythema or pain.  She denies any fevers chills or night sweats.  She denies any abdominal pain nausea vomiting or diarrhea.  She is tolerating antibiotic without a rash.  No shortness of breath or cough.    Past Medical History:   Diagnosis Date   • Allergic    • Bone infection (CMS/HCC)     hip   • Cataract    • High cholesterol    • History of DVT (deep vein thrombosis)    • History of kidney infection     1 MONTH AGO   • History of pulmonary embolus (PE)    • History of transfusion    • Hypertension    • Left hip postoperative wound infection    • Paralabral cyst of left hip    • PICC (peripherally inserted central catheter) in place    • PONV (postoperative nausea and vomiting)    • Presence of vena cava filter    • Rheumatoid arteritis    • Seasonal allergies    • UTI (urinary tract infection)     diagnosed 4/2/18  medically treated presently   • Wears glasses        Past Surgical History:   Procedure Laterality Date   • BLADDER SUSPENSION     • CATARACT EXTRACTION, BILATERAL     • COLONOSCOPY     • ENDOSCOPIC FUNCTIONAL SINUS SURGERY (FESS)     • HIP SPACER INSERTION WITH ANTIBIOTIC CEMENT Left 8/11/2018    Procedure: TOTAL HIP ARTHROPLASTY REVISION with removal of infected total hip and placement of antibiotic spacer;  Surgeon: Obed Barney MD;  Location: [facility];  Service: Orthopedics   • HIP SURGERY Left 1983   • HIP SURGERY Left 20069   • HIP SURGERY Left 2011   • INCISION AND DRAINAGE HIP Left 11/2/2016    Procedure: HIP INCISION AND DRAINAGE;  Surgeon: Obed Barney MD;  Location: [facility];  Service:    • INCISION AND DRAINAGE HIP Left 7/7/2018    Procedure: I&D and antibiotic bead placement left  hip;  Surgeon: Obed Barney MD;  Location: [facility];  Service: Orthopedics   • INCISION AND DRAINAGE HIP Left 7/21/2018    Procedure: HIP INCISION AND DRAINAGE, LEFT WITH POLY HEAD CHANGE AND ANTIBIOTIC BEAD PLACEMENT;  Surgeon: Obed Barney MD;  Location: [facility];  Service: Orthopedics   • JOINT REPLACEMENT Left     HIP   • PERIPHERALLY INSERTED CENTRAL CATHETER INSERTION     • VT CLOSED RX TRAUMATIC HIP DISLOCATN Left 12/18/2017    Procedure: LEFT HIP CLOSED REDUCTION;  Surgeon: Obed Barney MD;  Location: [facility];  Service: Orthopedics   • SOFT TISSUE MASS EXCISION Left     hip 3/19/18   • TOTAL ABDOMINAL HYSTERECTOMY     • TOTAL HIP ARTHROPLASTY REVISION Left 4/25/2018    Procedure: REVISION OF LEFT ACETABULAR COMPONENT ;  Surgeon: Obed Barney MD;  Location: [facility];  Service: Orthopedics   • TOTAL HIP ARTHROPLASTY REVISION Left 08/11/2018    total hip revision with removal of iinfected total hip and placement of antibiotic spacer   • VENA CAVA FILTER INSERTION         Social History   reports that she quit smoking about 43 years ago. Her smoking use included Cigarettes. She has never used smokeless tobacco. She reports that she does not drink alcohol or use drugs.    Family History  family history is not on file.    Allergies   Allergen Reactions   • Sulfa Antibiotics Rash       The medication list has been reviewed and updated.     Review of Systems  Pertinent items are noted in HPI, all other systems reviewed and negative    Vital Signs   Temp:  [97.3 °F (36.3 °C)] 97.3 °F (36.3 °C)  Heart Rate:  [76] 76  BP: (129)/(79) 129/79    Physical Exam:   General: In no acute distress  Cardiovascular: RRR, no LE edema    Respiratory: Breathing comfortably on room air   GI: Abdomen is soft, non-tender, non-distended, positive bowel sounds bilaterally  Skin: No rashes , left hip incision healed well without erythema drainage or pain  PICC line in place without erythema or  tenderness    Lab Results   Component Value Date    WBC 10.77 (H) 10/26/2018    HGB 11.8 (L) 10/26/2018    HCT 37.0 10/26/2018    MCV 87.1 10/26/2018     10/26/2018       Lab Results   Component Value Date    GLUCOSE 92 10/26/2018    BUN 11 10/26/2018    CREATININE 0.49 (L) 10/26/2018    EGFRIFNONA 124 10/26/2018    BCR 22.4 10/26/2018    CO2 21.5 (L) 10/26/2018    CALCIUM 8.9 10/26/2018    ALBUMIN 3.60 10/26/2018    AST 29 10/26/2018    ALT 24 10/26/2018       Lab Results   Component Value Date    SEDRATE 61 (H) 10/26/2018       Lab Results   Component Value Date    CRP 3.00 (H) 10/26/2018     7/7 L hip wound ESBL E coli   7/21 L hip Cx:neg   8/11 L hip cx neg    Assessment:  This is a 72 y.o. female who presents to clinic today for follow up of her ESBL left prosthetic hip septic arthritis.  The patient initially underwent incision and drainage with retained hardware on July 7.  She was treated with IV ertapenem but continued to have significant drainage from her hip.  She was taken back to the OR for another incision and drainage on July 21 and ultimately on August 11 all hardware has been removed.  Initial operative cultures grew ESBL Escherichia coli.  Her cultures from July 21 and August 11 are negative.  At this time she has been on IV ertapenem for this past 10 weeks.  She is doing well without any further drainage from the hip.  Her inflammatory markers her overall holding steady without further decreases.  Her white blood cell count was elevated temporarily but now is coming down again.  Certainly the patient does not have any signs or symptoms concerning for infection.  At this time I believe extending her IV antibiotics will not offer any further benefit.  I will switch her to oral suppressive therapy.  My plan would be to treat her for at least 6 months.     Plan:   1.  Discontinue ertapenem  2.  PICC line has been pulled in clinic  3.  Start the patient on suppressive Augmentin 875 mg by mouth  twice a day plus cefaclor 500mg PO BID (both needed as these agents are not preferred for an ESBL Escherichia coli and no other oral options are available)      Return to Infectious Disease clinic in 1 month    I asked the patient to call the infectious disease clinic with any concerning signs for infection including but not limited to new onset drainage, erythema, swelling, pain, fever.

## 2018-11-01 ENCOUNTER — OFFICE VISIT (OUTPATIENT)
Dept: ORTHOPEDIC SURGERY | Facility: CLINIC | Age: 73
End: 2018-11-01

## 2018-11-01 VITALS — TEMPERATURE: 97.8 F | WEIGHT: 129.4 LBS | BODY MASS INDEX: 26.08 KG/M2 | HEIGHT: 59 IN

## 2018-11-01 DIAGNOSIS — Z96.649 S/P REVISION OF TOTAL HIP: Primary | ICD-10-CM

## 2018-11-01 PROCEDURE — 73502 X-RAY EXAM HIP UNI 2-3 VIEWS: CPT | Performed by: ORTHOPAEDIC SURGERY

## 2018-11-01 PROCEDURE — 99024 POSTOP FOLLOW-UP VISIT: CPT | Performed by: ORTHOPAEDIC SURGERY

## 2018-11-01 RX ORDER — GABAPENTIN 300 MG/1
300 CAPSULE ORAL 3 TIMES DAILY
Qty: 90 CAPSULE | Refills: 3 | Status: SHIPPED | OUTPATIENT
Start: 2018-11-01 | End: 2020-01-01 | Stop reason: DRUGHIGH

## 2018-11-01 NOTE — PROGRESS NOTES
Patient: Rachel Moser  YOB: 1945 72 y.o. female  Medical Record Number: [id number]    Chief Complaint:   Chief Complaint   Patient presents with   • Left Hip - Follow-up, Pain       History of Present Illness:Rachel Moser is a 72 y.o. female who presents for follow-up of  left hip revision removal of infected joint multiply revised.  She is now off IV antibiotics.  Overall feels okay.  Ambulating without pain.    Allergies:   Allergies   Allergen Reactions   • Sulfa Antibiotics Rash       Medications:   Current Outpatient Prescriptions   Medication Sig Dispense Refill   • amoxicillin-clavulanate (AUGMENTIN) 875-125 MG per tablet Take 1 tablet by mouth Every 12 (Twelve) Hours for 40 days. 60 tablet 1   • B Complex-Biotin-FA (HM VITAMIN B50 COMPLEX PO) Take 1 tablet by mouth Daily.     • cefaclor (CECLOR) 500 MG capsule Take 1 capsule by mouth 2 (Two) Times a Day for 40 days. 60 capsule 1   • cholecalciferol (VITAMIN D3) 1000 units tablet Take 2,000 Units by mouth Daily.     • Cranberry 1000 MG capsule Take 25,000 mg by mouth 2 (Two) Times a Day.     • Docusate Sodium (COLACE PO) Take 1 tablet by mouth 2 (Two) Times a Day.     • ferrous sulfate (FERROUSUL) 325 (65 FE) MG tablet Take 325 mg by mouth Daily With Breakfast. On hold for sx.     • folic acid (FOLVITE) 800 MCG tablet Take 800 mcg by mouth Every Morning. 4 tabs am-ON HOLD FOR SX.     • furosemide (LASIX) 40 MG tablet Take 40 mg by mouth Every Morning.     • HYDROcodone-acetaminophen (NORCO) 5-325 MG per tablet Take 1 tablet by mouth Every 8 (Eight) Hours As Needed for Moderate Pain  for up to 60 doses. 1-2 oral Q4H PRN severe pain 60 tablet 0   • InFLIXimab (REMICADE IV) Infuse  into a venous catheter Every 28 (Twenty-Eight) Days.     • leucovorin 5 MG tablet Take 5 mg by mouth 1 (One) Time Per Week.     • methotrexate 2.5 MG tablet Take 2.5 mg by mouth 1 (One) Time Per Week. 8 tablets once a week     • metoprolol succinate XL (TOPROL-XL) 50  "MG 24 hr tablet Take 50 mg by mouth As Needed.     • Multiple Vitamins-Minerals (MULTIVITAMIN ADULT PO) Take 1 tablet by mouth Daily.     • polyethylene glycol (MIRALAX) packet Take 17 g by mouth As Needed.     • potassium chloride (K-DUR,KLOR-CON) 20 MEQ CR tablet Take 1 tablet by mouth 2 (Two) Times a Day. 180 tablet 3   • predniSONE (DELTASONE) 10 MG tablet Take 10 mg by mouth As Needed.     • simvastatin (ZOCOR) 40 MG tablet Take 40 mg by mouth Every Morning.     • warfarin (COUMADIN) 5 MG tablet 1 po qd 30 tablet 0   • gabapentin (NEURONTIN) 300 MG capsule Take 1 capsule by mouth 3 (Three) Times a Day. 90 capsule 3     No current facility-administered medications for this visit.      Facility-Administered Medications Ordered in Other Visits   Medication Dose Route Frequency Provider Last Rate Last Dose   • mupirocin (BACTROBAN) 2 % nasal ointment   Nasal BID Obed Barney MD             The following portions of the patient's history were reviewed and updated as appropriate: allergies, current medications, past family history, past medical history, past social history, past surgical history and problem list.    Review of Systems:   A 14 point review of systems was performed. All systems negative except pertinent positives/negative listed in HPI above    Physical Exam:   Vitals:    11/01/18 1415   Temp: 97.8 °F (36.6 °C)   TempSrc: Temporal Artery    Weight: 58.7 kg (129 lb 6.4 oz)   Height: 149.9 cm (59\")       General: A and O x 3, ASA, NAD    SCLERA:    Normal    DENTITION:   Normal  Incision is healed there is no fluctuance or erythema.  She has no pain with hip range of motion.  She is walking with a walker nonantalgic.  She has good quad strength.  Still has some numbness about the anterior thigh.  The calf is soft with a negative Homans.    Radiology:    Xrays 2views lft hip  (AP bilateral hips and lateral hip) were ordered and reviewed for evaluation of hip pain demonstrating a well positioned total hip " spacer without evidence of wear, loosening, or osteoarthritis  Comparison views: todays xrays were compared to previous xrays and demonstrate no change    Assessment/Plan:  Left hip overall doing well now about 3 months out from removal of infected total hip replacement and placement of spacer.  Her lab values are improving but still not normalized.  She is now off the Invanz and on 2 oral antibiotics per Dr. Souza.  Overall her hip appears stable and clinically the incision looks great.  I've recommended that she can transition onto a cane weight-bear as tolerated.  She'll return in 2 months with repeat x-rays.  We'll continue to follow sedimentation rate and C-reactive protein.      Obed Barney MD  11/1/2018

## 2018-11-02 ENCOUNTER — ANTICOAGULATION VISIT (OUTPATIENT)
Dept: PHARMACY | Facility: HOSPITAL | Age: 73
End: 2018-11-02

## 2018-11-02 DIAGNOSIS — Z86.718 HISTORY OF DVT (DEEP VEIN THROMBOSIS): ICD-10-CM

## 2018-11-02 LAB
INR PPP: 2.8 (ref 0.91–1.09)
PROTHROMBIN TIME: 33.3 SECONDS (ref 10–13.8)

## 2018-11-02 PROCEDURE — 85610 PROTHROMBIN TIME: CPT

## 2018-11-02 PROCEDURE — 36416 COLLJ CAPILLARY BLOOD SPEC: CPT

## 2018-11-02 NOTE — PROGRESS NOTES
Anticoagulation Clinic Progress Note    Anticoagulation Summary  As of 11/2/2018    INR goal:   2.5-3.5   TTR:   30.1 % (1.6 wk)   Today's INR:   2.8   Warfarin maintenance plan:   2.5 mg on Wed; 5 mg all other days   Weekly warfarin total:   32.5 mg   No change documented:   Lakeisha Goodman   Plan last modified:   Molly Torres RPH (10/26/2018)   Next INR check:   11/16/2018   Priority:   High   Target end date:   Indefinite    Indications    History of DVT (deep vein thrombosis) [Z86.718]             Anticoagulation Episode Summary     INR check location:       Preferred lab:       [facility]    Comments:         Anticoagulation Care Providers     Provider Role Specialty Phone number    Vijay Arango MD Referring Cardiology [phone]    Molly Torres RPH Responsible Pharmacy           Drug interactions: has remained unchanged.  Diet: has remained unchanged.    Clinic Interview:  Patient Findings     Positives:   Change in medications    Negatives:   Signs/symptoms of thrombosis, Signs/symptoms of bleeding,   Laboratory test error suspected, Change in health, Change in alcohol use,   Change in activity, Upcoming invasive procedure, Emergency department   visit, Upcoming dental procedure, Missed doses, Extra doses, Change in   diet/appetite, Hospital admission, Bruising, Other complaints    Comments:   Patient no longer taking ertapenem. She is now taking   Augmentin and Cefaclor.       Clinical Outcomes     Negatives:   Major bleeding event, Thromboembolic event,   Anticoagulation-related hospital admission, Anticoagulation-related ED   visit, Anticoagulation-related fatality    Comments:   Patient no longer taking ertapenem. She is now taking   Augmentin and Cefaclor.         INR History:  Anticoagulation Monitoring 10/12/2018 10/26/2018 11/2/2018   INR 2.1 2.2 2.8   INR Date 10/12/2018 10/26/2018 11/2/2018   INR Goal 2.5-3.5 2.5-3.5 2.5-3.5   Trend - Up Same    Last Week Total 0 mg 30 mg 32.5 mg   Next Week Total 30 mg 32.5 mg 32.5 mg   Sun 5 mg 5 mg 5 mg   Mon 2.5 mg 5 mg 5 mg   Tue 5 mg 5 mg 5 mg   Wed 2.5 mg 2.5 mg 2.5 mg   Thu 5 mg 5 mg 5 mg   Fri 5 mg 5 mg 5 mg   Sat 5 mg 5 mg 5 mg   Visit Report - - -   Some recent data might be hidden       Plan:  1. INR is therapeutic today- see above in Anticoagulation Summary.   Will instruct Rachel Moser to continue their warfarin regimen- see above in Anticoagulation Summary.  2. Follow up in 2 weeks.  3. Patient declines warfarin refills.  4. Verbal and written information provided. Patient expresses understanding and has no further questions at this time.    Lakeisha Goodman

## 2018-11-15 ENCOUNTER — ANTICOAGULATION VISIT (OUTPATIENT)
Dept: PHARMACY | Facility: HOSPITAL | Age: 73
End: 2018-11-15

## 2018-11-15 DIAGNOSIS — Z86.718 HISTORY OF DVT (DEEP VEIN THROMBOSIS): ICD-10-CM

## 2018-11-15 LAB
INR PPP: 1.9 (ref 0.91–1.09)
PROTHROMBIN TIME: 23.2 SECONDS (ref 10–13.8)

## 2018-11-15 PROCEDURE — 36416 COLLJ CAPILLARY BLOOD SPEC: CPT

## 2018-11-15 PROCEDURE — G0463 HOSPITAL OUTPT CLINIC VISIT: HCPCS

## 2018-11-15 PROCEDURE — 85610 PROTHROMBIN TIME: CPT

## 2018-11-15 NOTE — PROGRESS NOTES
Anticoagulation Clinic Progress Note    Anticoagulation Summary  As of 11/15/2018    INR goal:   2.5-3.5   TTR:   31.8 % (3.4 wk)   INR used for dosin.9! (11/15/2018)   Warfarin maintenance plan:   2.5 mg on Wed; 5 mg all other days   Weekly warfarin total:   32.5 mg   Plan last modified:   Molly Torres RPH (10/26/2018)   Next INR check:   2018   Priority:   High   Target end date:   Indefinite    Indications    History of DVT (deep vein thrombosis) [Z86.718]             Anticoagulation Episode Summary     INR check location:       Preferred lab:       Send INR reminders to:    [facility]    Comments:         Anticoagulation Care Providers     Provider Role Specialty Phone number    Vijay Arango MD Referring Cardiology [phone]    Molly Torres RPH Responsible Pharmacy [phone]          Drug interactions: has remained unchanged.  Diet: has remained unchanged.    Clinic Interview:  Patient Findings     Negatives:   Signs/symptoms of thrombosis, Signs/symptoms of bleeding,   Laboratory test error suspected, Change in health, Change in alcohol use,   Change in activity, Upcoming invasive procedure, Emergency department   visit, Upcoming dental procedure, Missed doses, Extra doses, Change in   medications, Change in diet/appetite, Hospital admission, Bruising, Other   complaints    Comments:   No reason for decrease      Clinical Outcomes     Negatives:   Major bleeding event, Thromboembolic event,   Anticoagulation-related hospital admission, Anticoagulation-related ED   visit, Anticoagulation-related fatality    Comments:   No reason for decrease        INR History:  Anticoagulation Monitoring 10/26/2018 2018 11/15/2018   INR 2.2 2.8 1.9   INR Date 10/26/2018 2018 11/15/2018   INR Goal 2.5-3.5 2.5-3.5 2.5-3.5   Trend Up Same Same   Last Week Total 30 mg 32.5 mg 32.5 mg   Next Week Total 32.5 mg 32.5 mg 37.5 mg   Sun 5 mg 5 mg 5 mg   Mon 5 mg 5 mg 5 mg   Tue 5 mg 5  mg -   Wed 2.5 mg 2.5 mg -   Thu 5 mg 5 mg 10 mg (11/15)   Fri 5 mg 5 mg 5 mg   Sat 5 mg 5 mg 5 mg   Visit Report - - -   Some recent data might be hidden       Previous INR data from [facility] is recorded in [facility] and scanned into the patient's chart in [facility]. These results have been analyzed and reviewed.      Plan:  1. INR is Subtherapeutic today- see above in Anticoagulation Summary.  Will instruct Rachel Moser to Change their warfarin regimen- see above in Anticoagulation Summary.  2. Follow up on Tuesday next week  3. Patient declines warfarin refills.  4. Verbal and written information provided. Patient expresses understanding and has no further questions at this time.    Molly Torres RP

## 2018-11-20 ENCOUNTER — ANTICOAGULATION VISIT (OUTPATIENT)
Dept: PHARMACY | Facility: HOSPITAL | Age: 73
End: 2018-11-20

## 2018-11-20 DIAGNOSIS — Z86.718 HISTORY OF DVT (DEEP VEIN THROMBOSIS): ICD-10-CM

## 2018-11-20 LAB
INR PPP: 3.9 (ref 0.91–1.09)
PROTHROMBIN TIME: 46.4 SECONDS (ref 10–13.8)

## 2018-11-20 PROCEDURE — 36416 COLLJ CAPILLARY BLOOD SPEC: CPT

## 2018-11-20 PROCEDURE — G0463 HOSPITAL OUTPT CLINIC VISIT: HCPCS

## 2018-11-20 PROCEDURE — 85610 PROTHROMBIN TIME: CPT

## 2018-11-20 NOTE — PROGRESS NOTES
Anticoagulation Clinic Progress Note    Anticoagulation Summary  As of 11/20/2018    INR goal:   2.5-3.5   TTR:   34.9 % (4.1 wk)   INR used for dosing:   3.9! (11/20/2018)   Warfarin maintenance plan:   5 mg every day   Weekly warfarin total:   35 mg   Plan last modified:   Gaby Sheppard [facility] (11/20/2018)   Next INR check:   11/30/2018   Priority:   High   Target end date:   Indefinite    Indications    History of DVT (deep vein thrombosis) [Z86.718]             Anticoagulation Episode Summary     INR check location:       Preferred lab:       Send INR reminders to:    [facility]    Comments:         Anticoagulation Care Providers     Provider Role Specialty Phone number    Vijay Arango MD Referring Cardiology [phone]    Molly Torres [facility] Responsible Pharmacy [phone]          Drug interactions: has remained unchanged.  Diet: has remained unchanged.    Clinic Interview:  Patient Findings     Positives:   Change in medications    Negatives:   Signs/symptoms of thrombosis, Signs/symptoms of bleeding,   Laboratory test error suspected, Change in health, Change in alcohol use,   Change in activity, Upcoming invasive procedure, Emergency department   visit, Upcoming dental procedure, Missed doses, Extra doses, Change in   diet/appetite, Hospital admission, Bruising, Other complaints    Comments:   On long term ABX for infected hip replacement. Infected hip   has been removed. Seeing ID physician for ABX therapy. On Cefaclor and   Amoxicillin.      Clinical Outcomes     Negatives:   Major bleeding event, Thromboembolic event,   Anticoagulation-related hospital admission, Anticoagulation-related ED   visit, Anticoagulation-related fatality    Comments:   On long term ABX for infected hip replacement. Infected hip   has been removed. Seeing ID physician for ABX therapy. On Cefaclor and   Amoxicillin.        INR History:  Anticoagulation Monitoring 11/2/2018 11/15/2018 11/20/2018   INR  2.8 1.9 3.9   INR Date 11/2/2018 11/15/2018 11/20/2018   INR Goal 2.5-3.5 2.5-3.5 2.5-3.5   Trend Same Same Up   Last Week Total 32.5 mg 32.5 mg 37.5 mg   Next Week Total 32.5 mg 37.5 mg 35 mg   Sun 5 mg 5 mg 5 mg   Mon 5 mg 5 mg 5 mg   Tue 5 mg - 5 mg   Wed 2.5 mg - 5 mg   Thu 5 mg 10 mg (11/15) 5 mg   Fri 5 mg 5 mg 5 mg   Sat 5 mg 5 mg 5 mg   Visit Report - - -   Some recent data might be hidden       Previous INR data from [facility] is recorded in [facility] and scanned into the patient's chart in [facility]. These results have been analyzed and reviewed.      Plan:  1. INR is Supratherapeutic today- see above in Anticoagulation Summary.  Will instruct Rachel Moser to Change their warfarin regimen- Got booster dose last week for total of 40mg/wk, INR was 1.9. Changed weekly dose from 32.5mg to 35mg. Recheck 11/30.  She is on long term ABX for hip infection (had to remove the hip replacement).   2. Follow up 11/30  3. Patient declines warfarin refills.  4. Verbal and written information provided. Patient expresses understanding and has no further questions at this time.    Gaby Sheppard, [facility]

## 2018-11-30 ENCOUNTER — ANTICOAGULATION VISIT (OUTPATIENT)
Dept: PHARMACY | Facility: HOSPITAL | Age: 73
End: 2018-11-30

## 2018-11-30 DIAGNOSIS — Z86.718 HISTORY OF DVT (DEEP VEIN THROMBOSIS): ICD-10-CM

## 2018-11-30 LAB
INR PPP: 3.4 (ref 0.91–1.09)
PROTHROMBIN TIME: 41.1 SECONDS (ref 10–13.8)

## 2018-11-30 PROCEDURE — 36416 COLLJ CAPILLARY BLOOD SPEC: CPT

## 2018-11-30 PROCEDURE — 85610 PROTHROMBIN TIME: CPT

## 2018-11-30 RX ORDER — WARFARIN SODIUM 5 MG/1
TABLET ORAL
Qty: 135 TABLET | Refills: 0 | Status: SHIPPED | OUTPATIENT
Start: 2018-11-30 | End: 2019-02-12 | Stop reason: HOSPADM

## 2018-11-30 NOTE — PROGRESS NOTES
Anticoagulation Clinic Progress Note    Anticoagulation Summary  As of 11/30/2018    INR goal:   2.5-3.5   TTR:   31.1 % (1.3 mo)   INR used for dosing:   3.4 (11/30/2018)   Warfarin maintenance plan:   5 mg every day   Weekly warfarin total:   35 mg   No change documented:   Kaitlin Pitt   Plan last modified:   Gaby Sheppard [facility] (11/20/2018)   Next INR check:   12/14/2018   Priority:   High   Target end date:   Indefinite    Indications    History of DVT (deep vein thrombosis) [Z86.718]             Anticoagulation Episode Summary     INR check location:       Preferred lab:       Send INR reminders to:    [facility]    Comments:         Anticoagulation Care Providers     Provider Role Specialty Phone number    Vijay Arango MD Referring Cardiology [phone]    Molly Torres [facility] Responsible Pharmacy [phone]          Drug interactions: has remained unchanged.  Diet: has remained unchanged.    Clinic Interview:  Patient Findings     Negatives:   Signs/symptoms of thrombosis, Signs/symptoms of bleeding,   Laboratory test error suspected, Change in health, Change in alcohol use,   Change in activity, Upcoming invasive procedure, Emergency department   visit, Upcoming dental procedure, Missed doses, Extra doses, Change in   medications, Change in diet/appetite, Hospital admission, Bruising, Other   complaints      Clinical Outcomes     Negatives:   Major bleeding event, Thromboembolic event,   Anticoagulation-related hospital admission, Anticoagulation-related ED   visit, Anticoagulation-related fatality        INR History:  Anticoagulation Monitoring 11/15/2018 11/20/2018 11/30/2018   INR 1.9 3.9 3.4   INR Date 11/15/2018 11/20/2018 11/30/2018   INR Goal 2.5-3.5 2.5-3.5 2.5-3.5   Trend Same Up Same   Last Week Total 32.5 mg 37.5 mg 35 mg   Next Week Total 37.5 mg 35 mg 35 mg   Sun 5 mg 5 mg 5 mg   Mon 5 mg 5 mg 5 mg   Tue - 5 mg 5 mg   Wed - 5 mg 5 mg   Thu 10 mg (11/15) 5  mg 5 mg   Fri 5 mg 5 mg 5 mg   Sat 5 mg 5 mg 5 mg   Visit Report - - -   Some recent data might be hidden       Plan:  1. INR is therapeutic today- see above in Anticoagulation Summary.   Will instruct Rachel Moser to continue their warfarin regimen- see above in Anticoagulation Summary.  2. Follow up in 2 weeks.  3. Patient declines warfarin refills.  4. Verbal and written information provided. Patient expresses understanding and has no further questions at this time.    Kaitlin Pitt

## 2018-12-05 ENCOUNTER — APPOINTMENT (OUTPATIENT)
Dept: LAB | Facility: HOSPITAL | Age: 73
End: 2018-12-05

## 2018-12-05 ENCOUNTER — OFFICE VISIT (OUTPATIENT)
Dept: INFECTIOUS DISEASES | Facility: CLINIC | Age: 73
End: 2018-12-05

## 2018-12-05 VITALS
DIASTOLIC BLOOD PRESSURE: 82 MMHG | HEART RATE: 90 BPM | TEMPERATURE: 98 F | BODY MASS INDEX: 25.36 KG/M2 | HEIGHT: 59 IN | SYSTOLIC BLOOD PRESSURE: 152 MMHG | WEIGHT: 125.8 LBS

## 2018-12-05 DIAGNOSIS — M00.9 PYOGENIC ARTHRITIS OF LEFT HIP, DUE TO UNSPECIFIED ORGANISM (HCC): Primary | ICD-10-CM

## 2018-12-05 DIAGNOSIS — Z79.2 LONG TERM (CURRENT) USE OF ANTIBIOTICS: ICD-10-CM

## 2018-12-05 LAB
ALBUMIN SERPL-MCNC: 4.1 G/DL (ref 3.5–5.2)
ALBUMIN/GLOB SERPL: 1.1 G/DL
ALP SERPL-CCNC: 143 U/L (ref 39–117)
ALT SERPL W P-5'-P-CCNC: 20 U/L (ref 1–33)
ANION GAP SERPL CALCULATED.3IONS-SCNC: 15.4 MMOL/L
AST SERPL-CCNC: 27 U/L (ref 1–32)
BASOPHILS # BLD AUTO: 0.04 10*3/MM3 (ref 0–0.2)
BASOPHILS NFR BLD AUTO: 0.3 % (ref 0–1.5)
BILIRUB SERPL-MCNC: 0.5 MG/DL (ref 0.1–1.2)
BUN BLD-MCNC: 17 MG/DL (ref 8–23)
BUN/CREAT SERPL: 29.3 (ref 7–25)
CALCIUM SPEC-SCNC: 9.4 MG/DL (ref 8.6–10.5)
CHLORIDE SERPL-SCNC: 104 MMOL/L (ref 98–107)
CO2 SERPL-SCNC: 22.6 MMOL/L (ref 22–29)
CREAT BLD-MCNC: 0.58 MG/DL (ref 0.57–1)
CRP SERPL-MCNC: 1.53 MG/DL (ref 0–0.5)
DEPRECATED RDW RBC AUTO: 49.1 FL (ref 37–54)
EOSINOPHIL # BLD AUTO: 0.3 10*3/MM3 (ref 0–0.7)
EOSINOPHIL NFR BLD AUTO: 2.5 % (ref 0.3–6.2)
ERYTHROCYTE [DISTWIDTH] IN BLOOD BY AUTOMATED COUNT: 15.1 % (ref 11.7–13)
ERYTHROCYTE [SEDIMENTATION RATE] IN BLOOD: 37 MM/HR (ref 0–30)
GFR SERPL CREATININE-BSD FRML MDRD: 102 ML/MIN/1.73
GLOBULIN UR ELPH-MCNC: 3.6 GM/DL
GLUCOSE BLD-MCNC: 105 MG/DL (ref 65–99)
HCT VFR BLD AUTO: 42.2 % (ref 35.6–45.5)
HGB BLD-MCNC: 13.7 G/DL (ref 11.9–15.5)
IMM GRANULOCYTES # BLD: 0.05 10*3/MM3 (ref 0–0.03)
IMM GRANULOCYTES NFR BLD: 0.4 % (ref 0–0.5)
LYMPHOCYTES # BLD AUTO: 1.55 10*3/MM3 (ref 0.9–4.8)
LYMPHOCYTES NFR BLD AUTO: 12.8 % (ref 19.6–45.3)
MCH RBC QN AUTO: 28.8 PG (ref 26.9–32)
MCHC RBC AUTO-ENTMCNC: 32.5 G/DL (ref 32.4–36.3)
MCV RBC AUTO: 88.8 FL (ref 80.5–98.2)
MONOCYTES # BLD AUTO: 1.27 10*3/MM3 (ref 0.2–1.2)
MONOCYTES NFR BLD AUTO: 10.5 % (ref 5–12)
NEUTROPHILS # BLD AUTO: 8.98 10*3/MM3 (ref 1.9–8.1)
NEUTROPHILS NFR BLD AUTO: 73.9 % (ref 42.7–76)
PLATELET # BLD AUTO: 231 10*3/MM3 (ref 140–500)
PMV BLD AUTO: 12.4 FL (ref 6–12)
POTASSIUM BLD-SCNC: 3.9 MMOL/L (ref 3.5–5.2)
PROT SERPL-MCNC: 7.7 G/DL (ref 6–8.5)
RBC # BLD AUTO: 4.75 10*6/MM3 (ref 3.9–5.2)
SODIUM BLD-SCNC: 142 MMOL/L (ref 136–145)
WBC NRBC COR # BLD: 12.14 10*3/MM3 (ref 4.5–10.7)

## 2018-12-05 PROCEDURE — 99214 OFFICE O/P EST MOD 30 MIN: CPT | Performed by: INTERNAL MEDICINE

## 2018-12-05 PROCEDURE — 85025 COMPLETE CBC W/AUTO DIFF WBC: CPT | Performed by: INTERNAL MEDICINE

## 2018-12-05 PROCEDURE — 80053 COMPREHEN METABOLIC PANEL: CPT | Performed by: INTERNAL MEDICINE

## 2018-12-05 PROCEDURE — 85652 RBC SED RATE AUTOMATED: CPT | Performed by: INTERNAL MEDICINE

## 2018-12-05 PROCEDURE — 86140 C-REACTIVE PROTEIN: CPT | Performed by: INTERNAL MEDICINE

## 2018-12-05 PROCEDURE — 36415 COLL VENOUS BLD VENIPUNCTURE: CPT | Performed by: INTERNAL MEDICINE

## 2018-12-05 RX ORDER — CEFACLOR 500 MG
500 CAPSULE ORAL 2 TIMES DAILY
Qty: 180 CAPSULE | Refills: 3 | Status: SHIPPED | OUTPATIENT
Start: 2018-12-05 | End: 2019-03-06

## 2018-12-05 RX ORDER — AMOXICILLIN AND CLAVULANATE POTASSIUM 875; 125 MG/1; MG/1
1 TABLET, FILM COATED ORAL EVERY 12 HOURS SCHEDULED
Qty: 240 TABLET | Refills: 3 | Status: SHIPPED | OUTPATIENT
Start: 2018-12-05 | End: 2019-03-06 | Stop reason: SDUPTHER

## 2018-12-05 NOTE — PROGRESS NOTES
Reason for clinic visits: Follow up for ESBL left prosthetic hip septic arthritis    HPI: Rachel Moser is a 72 y.o. female who was last seen in clinic on 10/29.  Since that time she has been on Augmentin and Ceflacor.  She is tolerated these antibiotics well and denies any abdominal pain nausea vomiting or diarrhea.  No rashes or skin lesions.  She denies any fevers chills or night sweats.  Her left hip incision remains well healed.  She denies any hip pain erythema or swelling.  She is experiencing more arthritic symptoms and is wondering when she can restart her RA drugs.  She denies any shortness of breath or cough.      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, status post multiple surgeries now antibiotic spacer in place on suppressive antibiotics    Past Surgical History:   Procedure Laterality Date   • BLADDER SUSPENSION     • CATARACT EXTRACTION, BILATERAL     • COLONOSCOPY     • ENDOSCOPIC FUNCTIONAL SINUS SURGERY (FESS)     • HIP SURGERY Left 1983   • HIP SURGERY Left [id number]   • HIP SURGERY Left 2011   • JOINT REPLACEMENT Left     HIP   • PERIPHERALLY INSERTED CENTRAL CATHETER INSERTION     • SOFT TISSUE MASS EXCISION Left     hip 3/19/18   • TOTAL ABDOMINAL HYSTERECTOMY     • TOTAL HIP ARTHROPLASTY REVISION Left 08/11/2018    total hip revision with removal of iinfected total hip and placement of antibiotic spacer   • VENA CAVA FILTER INSERTION         Social History   reports that she quit smoking about 43 years ago. Her smoking use included cigarettes. she has never used smokeless tobacco. She reports that she does not drink alcohol or use drugs.    Family History  family history is not on file.    Allergies   Allergen Reactions   • Sulfa  Antibiotics Rash       The medication list has been reviewed and updated.     Review of Systems  Pertinent items are noted in HPI, all other systems reviewed and negative    Vital Signs   Temp:  [98 °F (36.7 °C)] 98 °F (36.7 °C)  Heart Rate:  [90] 90  BP: (152)/(82) 152/82    Physical Exam:   General: In no acute distress  Cardiovascular: RRR, no LE edema    Respiratory: Breathing comfortably on room air   GI: Abdomen is soft, non-tender, non-distended, positive bowel sounds bilaterally  Skin: No rashes , left hip incision healed well without erythema drainage or pain    Lab Results   Component Value Date    WBC 10.77 (H) 10/26/2018    HGB 11.8 (L) 10/26/2018    HCT 37.0 10/26/2018    MCV 87.1 10/26/2018     10/26/2018       Lab Results   Component Value Date    GLUCOSE 92 10/26/2018    BUN 11 10/26/2018    CREATININE 0.49 (L) 10/26/2018    EGFRIFNONA 124 10/26/2018    BCR 22.4 10/26/2018    CO2 21.5 (L) 10/26/2018    CALCIUM 8.9 10/26/2018    ALBUMIN 3.60 10/26/2018    AST 29 10/26/2018    ALT 24 10/26/2018       Lab Results   Component Value Date    SEDRATE 61 (H) 10/26/2018       Lab Results   Component Value Date    CRP 3.00 (H) 10/26/2018     7/7 L hip wound ESBL E coli   7/21 L hip Cx:neg   8/11 L hip cx neg    Assessment:  This is a 72 y.o. female who presents to clinic today for follow up of her ESBL left prosthetic hip septic arthritis.  The patient initially underwent incision and drainage with retained hardware on July 7.  She was treated with IV ertapenem but continued to have significant drainage from her hip.  She was taken back to the OR for another incision and drainage on July 21 and ultimately on August 11 all hardware has been removed.  Initial operative cultures grew ESBL Escherichia coli.  Her cultures from July 21 and August 11 are negative.  She was treated with IV ertapenem x 10 weeks.  She has been on Augmentin and cefaclor since October 29th.  She does not have any signs or symptoms  of infection.  We will continue suppressive antibiotics for the next 3 months    Her inflammatory markers have been increasing but most likely to her rheumatoid arthritis as her arthritis symptoms are worsening.  It has been over 3 months since her last surgery.  I am okay for her to restart her medication at any time    Plan:   1.  Continue Augmentin 875 mg by mouth twice a day plus cefaclor 500mg PO BID x 3 months  2.  Obtain a CBC with differential and CMP   3.  Obtain a ESR and CRP     Return to Infectious Disease clinic in 3 month

## 2018-12-14 ENCOUNTER — ANTICOAGULATION VISIT (OUTPATIENT)
Dept: PHARMACY | Facility: HOSPITAL | Age: 73
End: 2018-12-14

## 2018-12-14 DIAGNOSIS — Z86.718 HISTORY OF DVT (DEEP VEIN THROMBOSIS): ICD-10-CM

## 2018-12-14 LAB
INR PPP: 2.5 (ref 0.91–1.09)
PROTHROMBIN TIME: 29.8 SECONDS (ref 10–13.8)

## 2018-12-14 PROCEDURE — 85610 PROTHROMBIN TIME: CPT

## 2018-12-14 PROCEDURE — 36416 COLLJ CAPILLARY BLOOD SPEC: CPT

## 2018-12-14 NOTE — PROGRESS NOTES
Anticoagulation Clinic Progress Note    Anticoagulation Summary  As of 2018    INR goal:   2.5-3.5   TTR:   49.1 % (1.8 mo)   INR used for dosin.5 (2018)   Warfarin maintenance plan:   5 mg every day   Weekly warfarin total:   35 mg   No change documented:   Lakeisha Goodman   Plan last modified:   Gaby Sheppard [facility] (2018)   Next INR check:      Priority:   High   Target end date:   Indefinite    Indications    History of DVT (deep vein thrombosis) [Z86.718]             Anticoagulation Episode Summary     INR check location:       Preferred lab:       Send INR reminders to:    [facility]    Comments:         Anticoagulation Care Providers     Provider Role Specialty Phone number    Vijay Arango MD Referring Cardiology [phone]    Molly Torres [facility] Responsible Pharmacy [phone]          Clinic Interview:  Patient Findings     Negatives:   Signs/symptoms of thrombosis, Signs/symptoms of bleeding,   Laboratory test error suspected, Change in health, Change in alcohol use,   Change in activity, Upcoming invasive procedure, Emergency department   visit, Upcoming dental procedure, Missed doses, Extra doses, Change in   medications, Change in diet/appetite, Hospital admission, Bruising, Other   complaints      Clinical Outcomes     Negatives:   Major bleeding event, Thromboembolic event,   Anticoagulation-related hospital admission, Anticoagulation-related ED   visit, Anticoagulation-related fatality        INR History:  Anticoagulation Monitoring 2018   INR 3.9 3.4 2.5   INR Date 2018   INR Goal 2.5-3.5 2.5-3.5 2.5-3.5   Trend Up Same Same   Last Week Total 37.5 mg 35 mg 35 mg   Next Week Total 35 mg 35 mg 35 mg   Sun 5 mg 5 mg 5 mg   Mon 5 mg 5 mg 5 mg   Tue 5 mg 5 mg 5 mg   Wed 5 mg 5 mg 5 mg   Thu 5 mg 5 mg 5 mg   Fri 5 mg 5 mg 5 mg   Sat 5 mg 5 mg 5 mg   Visit Report - - -   Some recent data might be  hidden       Plan:  1. INR is therapeutic today- see above in Anticoagulation Summary.   Will instruct Rachel Moser to continue their warfarin regimen- see above in Anticoagulation Summary.  2. Follow up in 4 weeks.  3. Patient declines warfarin refills.  4. Verbal and written information provided. Patient expresses understanding and has no further questions at this time.    Lakeisha Goodman

## 2019-01-01 ENCOUNTER — APPOINTMENT (OUTPATIENT)
Dept: LAB | Facility: HOSPITAL | Age: 74
End: 2019-01-01

## 2019-01-01 ENCOUNTER — LAB (OUTPATIENT)
Dept: LAB | Facility: HOSPITAL | Age: 74
End: 2019-01-01

## 2019-01-01 ENCOUNTER — OFFICE VISIT (OUTPATIENT)
Dept: INFECTIOUS DISEASES | Facility: CLINIC | Age: 74
End: 2019-01-01

## 2019-01-01 ENCOUNTER — ANTICOAGULATION VISIT (OUTPATIENT)
Dept: PHARMACY | Facility: HOSPITAL | Age: 74
End: 2019-01-01

## 2019-01-01 VITALS
BODY MASS INDEX: 27.17 KG/M2 | TEMPERATURE: 98 F | HEART RATE: 71 BPM | HEIGHT: 59 IN | WEIGHT: 134.8 LBS | SYSTOLIC BLOOD PRESSURE: 138 MMHG | RESPIRATION RATE: 12 BRPM | DIASTOLIC BLOOD PRESSURE: 72 MMHG

## 2019-01-01 DIAGNOSIS — Z86.718 HISTORY OF DVT (DEEP VEIN THROMBOSIS): ICD-10-CM

## 2019-01-01 DIAGNOSIS — T84.50XD INFECTION OF PROSTHETIC JOINT, SUBSEQUENT ENCOUNTER: Primary | ICD-10-CM

## 2019-01-01 DIAGNOSIS — Z79.2 LONG TERM (CURRENT) USE OF ANTIBIOTICS: ICD-10-CM

## 2019-01-01 LAB
ALBUMIN SERPL-MCNC: 4.3 G/DL (ref 3.5–5.2)
ALBUMIN/GLOB SERPL: 1.3 G/DL
ALP SERPL-CCNC: 73 U/L (ref 39–117)
ALT SERPL W P-5'-P-CCNC: 17 U/L (ref 1–33)
ANION GAP SERPL CALCULATED.3IONS-SCNC: 12.3 MMOL/L (ref 5–15)
AST SERPL-CCNC: 17 U/L (ref 1–32)
BASOPHILS # BLD AUTO: 0.1 10*3/MM3 (ref 0–0.2)
BASOPHILS NFR BLD AUTO: 0.7 % (ref 0–1.5)
BILIRUB SERPL-MCNC: 0.4 MG/DL (ref 0.2–1.2)
BUN BLD-MCNC: 13 MG/DL (ref 8–23)
BUN/CREAT SERPL: 19.1 (ref 7–25)
CALCIUM SPEC-SCNC: 9.3 MG/DL (ref 8.6–10.5)
CHLORIDE SERPL-SCNC: 104 MMOL/L (ref 98–107)
CO2 SERPL-SCNC: 24.7 MMOL/L (ref 22–29)
CREAT BLD-MCNC: 0.68 MG/DL (ref 0.57–1)
DEPRECATED RDW RBC AUTO: 43.7 FL (ref 37–54)
EOSINOPHIL # BLD AUTO: 0.3 10*3/MM3 (ref 0–0.4)
EOSINOPHIL NFR BLD AUTO: 2.1 % (ref 0.3–6.2)
ERYTHROCYTE [DISTWIDTH] IN BLOOD BY AUTOMATED COUNT: 13.1 % (ref 12.3–15.4)
GFR SERPL CREATININE-BSD FRML MDRD: 85 ML/MIN/1.73
GLOBULIN UR ELPH-MCNC: 3.3 GM/DL
GLUCOSE BLD-MCNC: 104 MG/DL (ref 65–99)
HCT VFR BLD AUTO: 44.2 % (ref 34–46.6)
HGB BLD-MCNC: 14.3 G/DL (ref 12–15.9)
IMM GRANULOCYTES # BLD AUTO: 0.26 10*3/MM3 (ref 0–0.05)
IMM GRANULOCYTES NFR BLD AUTO: 1.8 % (ref 0–0.5)
INR PPP: 2.46 (ref 0.9–1.1)
LYMPHOCYTES # BLD AUTO: 1.49 10*3/MM3 (ref 0.7–3.1)
LYMPHOCYTES NFR BLD AUTO: 10.4 % (ref 19.6–45.3)
MCH RBC QN AUTO: 29.6 PG (ref 26.6–33)
MCHC RBC AUTO-ENTMCNC: 32.4 G/DL (ref 31.5–35.7)
MCV RBC AUTO: 91.5 FL (ref 79–97)
MONOCYTES # BLD AUTO: 0.96 10*3/MM3 (ref 0.1–0.9)
MONOCYTES NFR BLD AUTO: 6.7 % (ref 5–12)
NEUTROPHILS # BLD AUTO: 11.28 10*3/MM3 (ref 1.7–7)
NEUTROPHILS NFR BLD AUTO: 78.3 % (ref 42.7–76)
NRBC BLD AUTO-RTO: 0 /100 WBC (ref 0–0.2)
PLATELET # BLD AUTO: 247 10*3/MM3 (ref 140–450)
PMV BLD AUTO: 12.2 FL (ref 6–12)
POTASSIUM BLD-SCNC: 4.5 MMOL/L (ref 3.5–5.2)
PROT SERPL-MCNC: 7.6 G/DL (ref 6–8.5)
PROTHROMBIN TIME: 26.4 SECONDS (ref 11.7–14.2)
RBC # BLD AUTO: 4.83 10*6/MM3 (ref 3.77–5.28)
SODIUM BLD-SCNC: 141 MMOL/L (ref 136–145)
WBC NRBC COR # BLD: 14.39 10*3/MM3 (ref 3.4–10.8)

## 2019-01-01 PROCEDURE — 85610 PROTHROMBIN TIME: CPT

## 2019-01-01 PROCEDURE — 99214 OFFICE O/P EST MOD 30 MIN: CPT | Performed by: INTERNAL MEDICINE

## 2019-01-01 PROCEDURE — 80053 COMPREHEN METABOLIC PANEL: CPT | Performed by: INTERNAL MEDICINE

## 2019-01-01 PROCEDURE — 36415 COLL VENOUS BLD VENIPUNCTURE: CPT | Performed by: INTERNAL MEDICINE

## 2019-01-01 PROCEDURE — 36415 COLL VENOUS BLD VENIPUNCTURE: CPT

## 2019-01-01 PROCEDURE — 85025 COMPLETE CBC W/AUTO DIFF WBC: CPT | Performed by: INTERNAL MEDICINE

## 2019-01-01 RX ORDER — WARFARIN SODIUM 5 MG/1
TABLET ORAL
Qty: 135 TABLET | Refills: 0 | Status: SHIPPED | OUTPATIENT
Start: 2019-01-01 | End: 2020-01-01

## 2019-01-10 ENCOUNTER — OFFICE VISIT (OUTPATIENT)
Dept: ORTHOPEDIC SURGERY | Facility: CLINIC | Age: 74
End: 2019-01-10

## 2019-01-10 VITALS — BODY MASS INDEX: 25.4 KG/M2 | HEIGHT: 59 IN | WEIGHT: 126 LBS | TEMPERATURE: 97.6 F

## 2019-01-10 DIAGNOSIS — Z96.649 S/P REVISION OF TOTAL HIP: Primary | ICD-10-CM

## 2019-01-10 PROCEDURE — 73502 X-RAY EXAM HIP UNI 2-3 VIEWS: CPT | Performed by: ORTHOPAEDIC SURGERY

## 2019-01-10 PROCEDURE — 99213 OFFICE O/P EST LOW 20 MIN: CPT | Performed by: ORTHOPAEDIC SURGERY

## 2019-01-10 NOTE — PROGRESS NOTES
Patient: Rachel Moser  YOB: 1945 73 y.o. female  Medical Record Number: [id number]    Chief Complaint:   Chief Complaint   Patient presents with   • Left Hip - Establish Care       History of Present Illness:Rachel Moser   is a 73 y.o. female who presents for follow-up of  left hip she is 5 months out from an antibiotic spacer for ESBL.  Overall she feels great she has no pain she is using her cane and she is getting out about fairly well.    Allergies:   Allergies   Allergen Reactions   • Sulfa Antibiotics Rash       Medications:   Current Outpatient Medications   Medication Sig Dispense Refill   • amoxicillin-clavulanate (AUGMENTIN) 875-125 MG per tablet Take 1 tablet by mouth Every 12 (Twelve) Hours for 120 days. 240 tablet 3   • B Complex-Biotin-FA (HM VITAMIN B50 COMPLEX PO) Take 1 tablet by mouth Daily.     • cefaclor (CECLOR) 500 MG capsule Take 1 capsule by mouth 2 (Two) Times a Day for 90 days. 180 capsule 3   • cholecalciferol (VITAMIN D3) 1000 units tablet Take 2,000 Units by mouth Daily.     • Cranberry 1000 MG capsule Take 25,000 mg by mouth 2 (Two) Times a Day.     • Docusate Sodium (COLACE PO) Take 1 tablet by mouth 2 (Two) Times a Day.     • ferrous sulfate (FERROUSUL) 325 (65 FE) MG tablet Take 325 mg by mouth Daily With Breakfast. On hold for sx.     • folic acid (FOLVITE) 800 MCG tablet Take 800 mcg by mouth Every Morning. 4 tabs am-ON HOLD FOR SX.     • furosemide (LASIX) 40 MG tablet Take 40 mg by mouth Every Morning.     • gabapentin (NEURONTIN) 300 MG capsule Take 1 capsule by mouth 3 (Three) Times a Day. 90 capsule 3   • HYDROcodone-acetaminophen (NORCO) 5-325 MG per tablet Take 1 tablet by mouth Every 8 (Eight) Hours As Needed for Moderate Pain  for up to 60 doses. 1-2 oral Q4H PRN severe pain 60 tablet 0   • metoprolol succinate XL (TOPROL-XL) 50 MG 24 hr tablet Take 50 mg by mouth As Needed.     • Multiple Vitamins-Minerals (MULTIVITAMIN ADULT PO) Take 1 tablet by mouth  "Daily.     • polyethylene glycol (MIRALAX) packet Take 17 g by mouth As Needed.     • potassium chloride (K-DUR,KLOR-CON) 20 MEQ CR tablet Take 1 tablet by mouth 2 (Two) Times a Day. 180 tablet 3   • simvastatin (ZOCOR) 40 MG tablet Take 40 mg by mouth Every Morning.     • warfarin (COUMADIN) 5 MG tablet 1 po qd 30 tablet 0   • warfarin (COUMADIN) 5 MG tablet TAKE 1 & 1/2 (ONE & ONE-HALF) TABLETS BY MOUTH ONCE DAILY OR  AS  DIRECTED 135 tablet 0   • InFLIXimab (REMICADE IV) Infuse  into a venous catheter Every 28 (Twenty-Eight) Days.     • leucovorin 5 MG tablet Take 5 mg by mouth 1 (One) Time Per Week.     • methotrexate 2.5 MG tablet Take 2.5 mg by mouth 1 (One) Time Per Week. 8 tablets once a week     • predniSONE (DELTASONE) 10 MG tablet Take 10 mg by mouth As Needed.       No current facility-administered medications for this visit.      Facility-Administered Medications Ordered in Other Visits   Medication Dose Route Frequency Provider Last Rate Last Dose   • mupirocin (BACTROBAN) 2 % nasal ointment   Nasal BID Obed Barney MD             The following portions of the patient's history were reviewed and updated as appropriate: allergies, current medications, past family history, past medical history, past social history, past surgical history and problem list.    Review of Systems:   A 14 point review of systems was performed. All systems negative except pertinent positives/negative listed in HPI above    Physical Exam:   Vitals:    01/10/19 1317   Temp: 97.6 °F (36.4 °C)   TempSrc: Temporal   Weight: 57.2 kg (126 lb)   Height: 149.9 cm (59\")       General: A and O x 3, ASA, NAD    SCLERA:    Normal    DENTITION:   Normal  Incisions both anterior and posterior are well-healed she has good range of motion without irritability she walks with a mild Trendelenburg gait with a cane calf is soft and nontender she has no pain with hip range of motion    Radiology:    Xrays 2views left hip (AP bilateral hips and " lateral hip) were ordered and reviewed for evaluation of hip pain demonstrating a well positioned total hip spacer without evidence of wear, loosening, or osteoarthritis  Comparison views: todays xrays were compared to previous xrays and demonstrate no change    Assessment/Plan:  Left hip spacer with chronic infection:  WBAT  RTO in April ID plan:   Plan:   1.  Continue Augmentin 875 mg by mouth twice a day plus cefaclor 500mg PO BID x 3 months  2.  Obtain a CBC with differential and CMP   3.  Obtain a ESR and CRP      Return to Infectious Disease clinic in 3 month          Obed Barney MD  1/10/2019

## 2019-01-11 ENCOUNTER — ANTICOAGULATION VISIT (OUTPATIENT)
Dept: PHARMACY | Facility: HOSPITAL | Age: 74
End: 2019-01-11

## 2019-01-11 DIAGNOSIS — Z86.718 HISTORY OF DVT (DEEP VEIN THROMBOSIS): ICD-10-CM

## 2019-01-11 LAB
INR PPP: 3 (ref 0.91–1.09)
PROTHROMBIN TIME: 36.1 SECONDS (ref 10–13.8)

## 2019-01-11 PROCEDURE — 36416 COLLJ CAPILLARY BLOOD SPEC: CPT

## 2019-01-11 PROCEDURE — 85610 PROTHROMBIN TIME: CPT

## 2019-01-11 NOTE — PROGRESS NOTES
Anticoagulation Clinic Progress Note    Anticoagulation Summary  As of 1/11/2019    INR goal:   2.5-3.5   TTR:   66.6 % (2.7 mo)   INR used for dosing:   3.0 (1/11/2019)   Warfarin maintenance plan:   5 mg every day   Weekly warfarin total:   35 mg   No change documented:   Kaitlin Pitt   Plan last modified:   Gaby Sheppard [facility] (11/20/2018)   Next INR check:   2/11/2019   Priority:   Maintenance   Target end date:   Indefinite    Indications    History of DVT (deep vein thrombosis) [Z86.718]             Anticoagulation Episode Summary     INR check location:       Preferred lab:       [facility]    [facility]    Comments:         Anticoagulation Care Providers     Provider Role Specialty Phone number    Vijay Arango MD Referring Cardiology [phone]    Molly Torres [facility] Respons[facility] Pharm[facility] [phone]          Clinic Interview:  Patient Findings     Negatives:   Signs/symptoms of thrombosis, Signs/symptoms of bleeding,   Laboratory test error suspected, Change in health, Change in alcohol use,   Change in activity, Upcoming invasive procedure, Emergency department   visit, Upcoming dental procedure, Missed doses, Extra doses, Change in   medications, Change in diet/appetite, Hospital admission, Bruising, Other   complaints      Clinical Outcomes     Negatives:   Major bleeding event, Thromboembolic event,   Anticoagulation-related hospital admission, Anticoagulation-related ED   visit, Anticoagulation-related fatality        INR History:  Anticoagulation Monitoring 11/30/2018 12/14/2018 1/11/2019   INR 3.4 2.5 3.0   INR Date 11/30/2018 12/14/2018 1/11/2019   INR Goal 2.5-3.5 2.5-3.5 2.5-3.5   Trend Same Same Same   Last Week Total 35 mg 35 mg 35 mg   Next Week Total 35 mg 35 mg 35 mg   Sun 5 mg 5 mg 5 mg   Mon 5 mg 5 mg 5 mg   Tue 5 mg 5 mg 5 mg   Wed 5 mg 5 mg 5 mg   Thu 5 mg 5 mg 5 mg   Fri 5 mg 5 mg 5 mg   Sat 5 mg 5 mg 5 mg   Visit Report - - -   Some  recent data might be hidden       Plan:  1. INR is therapeutic today- see above in Anticoagulation Summary.   Will instruct Rachel Moser to continue their warfarin regimen- see above in Anticoagulation Summary.  2. Follow up in 4 weeks.  3. Patient declines warfarin refills.  4. Verbal and written information provided. Patient expresses understanding and has no further questions at this time.    Kaitlin Pitt

## 2019-01-21 ENCOUNTER — TELEPHONE (OUTPATIENT)
Dept: INFECTIOUS DISEASES | Facility: CLINIC | Age: 74
End: 2019-01-21

## 2019-01-21 NOTE — TELEPHONE ENCOUNTER
Pt states that she is currently taking: Continue Augmentin 875 mg by mouth twice a day plus cefaclor 500mg PO BID x 3 months. And was instructed by Dr. Souza that if she began to have fever and/or chills to contact the office.  Pt states that she began having chills and she thinks fever(she had not taken her temp until I was on the phone with her.  She reports 101) since Saturday 01/19.  Pt states she also has a HA and body aches in her shoulder and back area.  I informed the patient that I would make Dr. Souza aware.

## 2019-01-21 NOTE — TELEPHONE ENCOUNTER
How is her hip doing?  Is her any pain swelling drainage or erythema?  If not then her symptoms are most likely due to maybe a viral infection?  Initially having congestion, cough?  If her symptoms persist unchanged by the end of the week she will contact her primary care physician.  If she is having any issues with her hip let me know and I will see her. Thanks

## 2019-01-21 NOTE — TELEPHONE ENCOUNTER
Pt denies any swelling, drainage or erythema with her hip.  I did inform the patient that should her symptoms persist that she should contact her PCP by the end of the week.  I also explained to her that is she begins to have issues with her hip to please contact our office.  Pt voiced understanding.

## 2019-02-05 ENCOUNTER — APPOINTMENT (OUTPATIENT)
Dept: WOMENS IMAGING | Facility: HOSPITAL | Age: 74
End: 2019-02-05

## 2019-02-05 PROCEDURE — 77067 SCR MAMMO BI INCL CAD: CPT | Performed by: RADIOLOGY

## 2019-02-05 PROCEDURE — MDREVIEWSP: Performed by: RADIOLOGY

## 2019-02-05 PROCEDURE — 77063 BREAST TOMOSYNTHESIS BI: CPT | Performed by: RADIOLOGY

## 2019-02-10 ENCOUNTER — HOSPITAL ENCOUNTER (OUTPATIENT)
Facility: HOSPITAL | Age: 74
Setting detail: OBSERVATION
LOS: 1 days | Discharge: HOME | End: 2019-02-12
Attending: EMERGENCY MEDICINE | Admitting: INTERNAL MEDICINE

## 2019-02-10 ENCOUNTER — APPOINTMENT (OUTPATIENT)
Dept: CT IMAGING | Facility: HOSPITAL | Age: 74
End: 2019-02-10

## 2019-02-10 DIAGNOSIS — T45.515A WARFARIN-INDUCED COAGULOPATHY (HCC): ICD-10-CM

## 2019-02-10 DIAGNOSIS — K59.00 CONSTIPATION, UNSPECIFIED CONSTIPATION TYPE: Primary | ICD-10-CM

## 2019-02-10 DIAGNOSIS — D68.32 WARFARIN-INDUCED COAGULOPATHY (HCC): ICD-10-CM

## 2019-02-10 PROBLEM — R11.2 NAUSEA AND VOMITING: Status: ACTIVE | Noted: 2019-02-10

## 2019-02-10 PROBLEM — E86.0 DEHYDRATION: Status: ACTIVE | Noted: 2019-02-10

## 2019-02-10 PROBLEM — R79.1 SUPRATHERAPEUTIC INR: Status: ACTIVE | Noted: 2019-02-10

## 2019-02-10 PROBLEM — Z79.01 LONG TERM (CURRENT) USE OF ANTICOAGULANTS: Status: ACTIVE | Noted: 2019-02-10

## 2019-02-10 PROBLEM — Z79.2 LONG TERM (CURRENT) USE OF ANTIBIOTICS: Status: ACTIVE | Noted: 2019-02-10

## 2019-02-10 PROBLEM — D72.829 LEUKOCYTOSIS: Status: ACTIVE | Noted: 2019-02-10

## 2019-02-10 LAB
ALBUMIN SERPL-MCNC: 4.1 G/DL (ref 3.5–5.2)
ALBUMIN/GLOB SERPL: 1.3 G/DL
ALP SERPL-CCNC: 78 U/L (ref 39–117)
ALT SERPL W P-5'-P-CCNC: 18 U/L (ref 1–33)
ANION GAP SERPL CALCULATED.3IONS-SCNC: 16.7 MMOL/L
AST SERPL-CCNC: 14 U/L (ref 1–32)
BACTERIA UR QL AUTO: NORMAL /HPF
BASOPHILS # BLD AUTO: 0.04 10*3/MM3 (ref 0–0.2)
BASOPHILS NFR BLD AUTO: 0.2 % (ref 0–1.5)
BILIRUB SERPL-MCNC: 0.6 MG/DL (ref 0.1–1.2)
BILIRUB UR QL STRIP: NEGATIVE
BUN BLD-MCNC: 17 MG/DL (ref 8–23)
BUN/CREAT SERPL: 28.3 (ref 7–25)
CALCIUM SPEC-SCNC: 9.7 MG/DL (ref 8.6–10.5)
CHLORIDE SERPL-SCNC: 99 MMOL/L (ref 98–107)
CLARITY UR: CLEAR
CO2 SERPL-SCNC: 24.3 MMOL/L (ref 22–29)
COLOR UR: YELLOW
CREAT BLD-MCNC: 0.6 MG/DL (ref 0.57–1)
CRP SERPL-MCNC: 0.59 MG/DL (ref 0–0.5)
DEPRECATED RDW RBC AUTO: 47.3 FL (ref 37–54)
EOSINOPHIL # BLD AUTO: 0 10*3/MM3 (ref 0–0.7)
EOSINOPHIL NFR BLD AUTO: 0 % (ref 0.3–6.2)
ERYTHROCYTE [DISTWIDTH] IN BLOOD BY AUTOMATED COUNT: 14.6 % (ref 11.7–13)
ERYTHROCYTE [SEDIMENTATION RATE] IN BLOOD: 11 MM/HR (ref 0–30)
GFR SERPL CREATININE-BSD FRML MDRD: 98 ML/MIN/1.73
GLOBULIN UR ELPH-MCNC: 3.2 GM/DL
GLUCOSE BLD-MCNC: 120 MG/DL (ref 65–99)
GLUCOSE UR STRIP-MCNC: NEGATIVE MG/DL
HCT VFR BLD AUTO: 48.5 % (ref 35.6–45.5)
HGB BLD-MCNC: 16 G/DL (ref 11.9–15.5)
HGB UR QL STRIP.AUTO: ABNORMAL
HOLD SPECIMEN: NORMAL
HOLD SPECIMEN: NORMAL
HYALINE CASTS UR QL AUTO: NORMAL /LPF
IMM GRANULOCYTES # BLD AUTO: 0.45 10*3/MM3 (ref 0–0.03)
IMM GRANULOCYTES NFR BLD AUTO: 2.3 % (ref 0–0.5)
INR PPP: 6.64 (ref 0.9–1.1)
KETONES UR QL STRIP: NEGATIVE
LEUKOCYTE ESTERASE UR QL STRIP.AUTO: NEGATIVE
LIPASE SERPL-CCNC: 29 U/L (ref 13–60)
LYMPHOCYTES # BLD AUTO: 2.04 10*3/MM3 (ref 0.9–4.8)
LYMPHOCYTES NFR BLD AUTO: 10.5 % (ref 19.6–45.3)
MCH RBC QN AUTO: 29.6 PG (ref 26.9–32)
MCHC RBC AUTO-ENTMCNC: 33 G/DL (ref 32.4–36.3)
MCV RBC AUTO: 89.6 FL (ref 80.5–98.2)
MONOCYTES # BLD AUTO: 0.25 10*3/MM3 (ref 0.2–1.2)
MONOCYTES NFR BLD AUTO: 1.3 % (ref 5–12)
NEUTROPHILS # BLD AUTO: 17.16 10*3/MM3 (ref 1.9–8.1)
NEUTROPHILS NFR BLD AUTO: 88 % (ref 42.7–76)
NITRITE UR QL STRIP: NEGATIVE
PH UR STRIP.AUTO: 5.5 [PH] (ref 5–8)
PLAT MORPH BLD: NORMAL
PLATELET # BLD AUTO: 255 10*3/MM3 (ref 140–500)
PMV BLD AUTO: 12.6 FL (ref 6–12)
POTASSIUM BLD-SCNC: 4.4 MMOL/L (ref 3.5–5.2)
PROT SERPL-MCNC: 7.3 G/DL (ref 6–8.5)
PROT UR QL STRIP: NEGATIVE
PROTHROMBIN TIME: 57.1 SECONDS (ref 11.7–14.2)
RBC # BLD AUTO: 5.41 10*6/MM3 (ref 3.9–5.2)
RBC # UR: NORMAL /HPF
RBC MORPH BLD: NORMAL
REF LAB TEST METHOD: NORMAL
SODIUM BLD-SCNC: 140 MMOL/L (ref 136–145)
SP GR UR STRIP: 1.02 (ref 1–1.03)
SQUAMOUS #/AREA URNS HPF: NORMAL /HPF
TSH SERPL DL<=0.05 MIU/L-ACNC: 1.83 MIU/ML (ref 0.27–4.2)
UROBILINOGEN UR QL STRIP: ABNORMAL
WBC MORPH BLD: NORMAL
WBC NRBC COR # BLD: 19.49 10*3/MM3 (ref 4.5–10.7)
WBC UR QL AUTO: NORMAL /HPF
WHOLE BLOOD HOLD SPECIMEN: NORMAL
WHOLE BLOOD HOLD SPECIMEN: NORMAL

## 2019-02-10 PROCEDURE — 83690 ASSAY OF LIPASE: CPT | Performed by: EMERGENCY MEDICINE

## 2019-02-10 PROCEDURE — 96374 THER/PROPH/DIAG INJ IV PUSH: CPT

## 2019-02-10 PROCEDURE — 85652 RBC SED RATE AUTOMATED: CPT | Performed by: EMERGENCY MEDICINE

## 2019-02-10 PROCEDURE — 25010000002 MORPHINE PER 10 MG: Performed by: EMERGENCY MEDICINE

## 2019-02-10 PROCEDURE — G0378 HOSPITAL OBSERVATION PER HR: HCPCS

## 2019-02-10 PROCEDURE — 86140 C-REACTIVE PROTEIN: CPT | Performed by: EMERGENCY MEDICINE

## 2019-02-10 PROCEDURE — 74177 CT ABD & PELVIS W/CONTRAST: CPT

## 2019-02-10 PROCEDURE — 99284 EMERGENCY DEPT VISIT MOD MDM: CPT

## 2019-02-10 PROCEDURE — 85610 PROTHROMBIN TIME: CPT | Performed by: EMERGENCY MEDICINE

## 2019-02-10 PROCEDURE — 85007 BL SMEAR W/DIFF WBC COUNT: CPT | Performed by: EMERGENCY MEDICINE

## 2019-02-10 PROCEDURE — 96375 TX/PRO/DX INJ NEW DRUG ADDON: CPT

## 2019-02-10 PROCEDURE — 25010000002 IOPAMIDOL 61 % SOLUTION: Performed by: EMERGENCY MEDICINE

## 2019-02-10 PROCEDURE — 96361 HYDRATE IV INFUSION ADD-ON: CPT

## 2019-02-10 PROCEDURE — 84443 ASSAY THYROID STIM HORMONE: CPT | Performed by: INTERNAL MEDICINE

## 2019-02-10 PROCEDURE — 85025 COMPLETE CBC W/AUTO DIFF WBC: CPT | Performed by: EMERGENCY MEDICINE

## 2019-02-10 PROCEDURE — 25010000002 ONDANSETRON PER 1 MG: Performed by: EMERGENCY MEDICINE

## 2019-02-10 PROCEDURE — 80053 COMPREHEN METABOLIC PANEL: CPT | Performed by: EMERGENCY MEDICINE

## 2019-02-10 PROCEDURE — 81001 URINALYSIS AUTO W/SCOPE: CPT | Performed by: EMERGENCY MEDICINE

## 2019-02-10 RX ORDER — ONDANSETRON 4 MG/1
4 TABLET, FILM COATED ORAL EVERY 6 HOURS PRN
Status: DISCONTINUED | OUTPATIENT
Start: 2019-02-10 | End: 2019-02-12 | Stop reason: HOSPADM

## 2019-02-10 RX ORDER — SODIUM CHLORIDE 0.9 % (FLUSH) 0.9 %
3 SYRINGE (ML) INJECTION EVERY 12 HOURS SCHEDULED
Status: DISCONTINUED | OUTPATIENT
Start: 2019-02-10 | End: 2019-02-12 | Stop reason: HOSPADM

## 2019-02-10 RX ORDER — MORPHINE SULFATE 2 MG/ML
4 INJECTION, SOLUTION INTRAMUSCULAR; INTRAVENOUS ONCE
Status: COMPLETED | OUTPATIENT
Start: 2019-02-10 | End: 2019-02-10

## 2019-02-10 RX ORDER — ACETAMINOPHEN 325 MG/1
650 TABLET ORAL EVERY 4 HOURS PRN
Status: DISCONTINUED | OUTPATIENT
Start: 2019-02-10 | End: 2019-02-12 | Stop reason: HOSPADM

## 2019-02-10 RX ORDER — MAGNESIUM CARB/ALUMINUM HYDROX 105-160MG
296 TABLET,CHEWABLE ORAL ONCE
Status: COMPLETED | OUTPATIENT
Start: 2019-02-10 | End: 2019-02-10

## 2019-02-10 RX ORDER — BISACODYL 10 MG
10 SUPPOSITORY, RECTAL RECTAL DAILY
Status: DISCONTINUED | OUTPATIENT
Start: 2019-02-11 | End: 2019-02-11

## 2019-02-10 RX ORDER — CHOLECALCIFEROL (VITAMIN D3) 125 MCG
5 CAPSULE ORAL NIGHTLY PRN
Status: DISCONTINUED | OUTPATIENT
Start: 2019-02-10 | End: 2019-02-12 | Stop reason: HOSPADM

## 2019-02-10 RX ORDER — ONDANSETRON 4 MG/1
4 TABLET, ORALLY DISINTEGRATING ORAL EVERY 6 HOURS PRN
Status: DISCONTINUED | OUTPATIENT
Start: 2019-02-10 | End: 2019-02-12 | Stop reason: HOSPADM

## 2019-02-10 RX ORDER — CALCIUM CARBONATE 200(500)MG
2 TABLET,CHEWABLE ORAL 2 TIMES DAILY PRN
Status: DISCONTINUED | OUTPATIENT
Start: 2019-02-10 | End: 2019-02-12 | Stop reason: HOSPADM

## 2019-02-10 RX ORDER — SODIUM CHLORIDE 9 MG/ML
75 INJECTION, SOLUTION INTRAVENOUS CONTINUOUS
Status: DISCONTINUED | OUTPATIENT
Start: 2019-02-10 | End: 2019-02-11

## 2019-02-10 RX ORDER — ONDANSETRON 2 MG/ML
4 INJECTION INTRAMUSCULAR; INTRAVENOUS EVERY 6 HOURS PRN
Status: DISCONTINUED | OUTPATIENT
Start: 2019-02-10 | End: 2019-02-12 | Stop reason: HOSPADM

## 2019-02-10 RX ORDER — ONDANSETRON 2 MG/ML
4 INJECTION INTRAMUSCULAR; INTRAVENOUS ONCE
Status: COMPLETED | OUTPATIENT
Start: 2019-02-10 | End: 2019-02-10

## 2019-02-10 RX ORDER — SODIUM CHLORIDE 0.9 % (FLUSH) 0.9 %
3-10 SYRINGE (ML) INJECTION AS NEEDED
Status: DISCONTINUED | OUTPATIENT
Start: 2019-02-10 | End: 2019-02-12 | Stop reason: HOSPADM

## 2019-02-10 RX ORDER — SODIUM CHLORIDE 0.9 % (FLUSH) 0.9 %
10 SYRINGE (ML) INJECTION AS NEEDED
Status: DISCONTINUED | OUTPATIENT
Start: 2019-02-10 | End: 2019-02-12 | Stop reason: HOSPADM

## 2019-02-10 RX ADMIN — Medication 296 ML: at 23:15

## 2019-02-10 RX ADMIN — ONDANSETRON 4 MG: 2 INJECTION INTRAMUSCULAR; INTRAVENOUS at 20:03

## 2019-02-10 RX ADMIN — IOPAMIDOL 85 ML: 612 INJECTION, SOLUTION INTRAVENOUS at 20:37

## 2019-02-10 RX ADMIN — SODIUM CHLORIDE 1000 ML: 9 INJECTION, SOLUTION INTRAVENOUS at 20:02

## 2019-02-10 RX ADMIN — MORPHINE SULFATE 4 MG: 2 INJECTION, SOLUTION INTRAMUSCULAR; INTRAVENOUS at 20:04

## 2019-02-10 RX ADMIN — SODIUM CHLORIDE, PRESERVATIVE FREE 3 ML: 5 INJECTION INTRAVENOUS at 23:15

## 2019-02-10 RX ADMIN — SODIUM CHLORIDE 75 ML/HR: 9 INJECTION, SOLUTION INTRAVENOUS at 23:12

## 2019-02-11 ENCOUNTER — APPOINTMENT (OUTPATIENT)
Dept: PHARMACY | Facility: HOSPITAL | Age: 74
End: 2019-02-11

## 2019-02-11 LAB
ANION GAP SERPL CALCULATED.3IONS-SCNC: 10.3 MMOL/L
BASOPHILS # BLD AUTO: 0.02 10*3/MM3 (ref 0–0.2)
BASOPHILS NFR BLD AUTO: 0.1 % (ref 0–1.5)
BUN BLD-MCNC: 14 MG/DL (ref 8–23)
BUN/CREAT SERPL: 30.4 (ref 7–25)
CALCIUM SPEC-SCNC: 7.5 MG/DL (ref 8.6–10.5)
CHLORIDE SERPL-SCNC: 109 MMOL/L (ref 98–107)
CO2 SERPL-SCNC: 24.7 MMOL/L (ref 22–29)
CREAT BLD-MCNC: 0.46 MG/DL (ref 0.57–1)
DEPRECATED RDW RBC AUTO: 51 FL (ref 37–54)
EOSINOPHIL # BLD AUTO: 0.06 10*3/MM3 (ref 0–0.7)
EOSINOPHIL NFR BLD AUTO: 0.4 % (ref 0.3–6.2)
ERYTHROCYTE [DISTWIDTH] IN BLOOD BY AUTOMATED COUNT: 14.9 % (ref 11.7–13)
GFR SERPL CREATININE-BSD FRML MDRD: 134 ML/MIN/1.73
GLUCOSE BLD-MCNC: 84 MG/DL (ref 65–99)
HCT VFR BLD AUTO: 39.2 % (ref 35.6–45.5)
HGB BLD-MCNC: 12.1 G/DL (ref 11.9–15.5)
IMM GRANULOCYTES # BLD AUTO: 0.32 10*3/MM3 (ref 0–0.03)
IMM GRANULOCYTES NFR BLD AUTO: 2.3 % (ref 0–0.5)
INR PPP: 8.17 (ref 0.9–1.1)
LYMPHOCYTES # BLD AUTO: 2.89 10*3/MM3 (ref 0.9–4.8)
LYMPHOCYTES NFR BLD AUTO: 20.9 % (ref 19.6–45.3)
MCH RBC QN AUTO: 29.4 PG (ref 26.9–32)
MCHC RBC AUTO-ENTMCNC: 30.9 G/DL (ref 32.4–36.3)
MCV RBC AUTO: 95.1 FL (ref 80.5–98.2)
MONOCYTES # BLD AUTO: 1.52 10*3/MM3 (ref 0.2–1.2)
MONOCYTES NFR BLD AUTO: 11 % (ref 5–12)
NEUTROPHILS # BLD AUTO: 9.04 10*3/MM3 (ref 1.9–8.1)
NEUTROPHILS NFR BLD AUTO: 65.3 % (ref 42.7–76)
PLATELET # BLD AUTO: 220 10*3/MM3 (ref 140–500)
PMV BLD AUTO: 12.3 FL (ref 6–12)
POTASSIUM BLD-SCNC: 4.1 MMOL/L (ref 3.5–5.2)
PROTHROMBIN TIME: 67.1 SECONDS (ref 11.7–14.2)
RBC # BLD AUTO: 4.12 10*6/MM3 (ref 3.9–5.2)
SODIUM BLD-SCNC: 144 MMOL/L (ref 136–145)
WBC NRBC COR # BLD: 13.85 10*3/MM3 (ref 4.5–10.7)

## 2019-02-11 PROCEDURE — 85025 COMPLETE CBC W/AUTO DIFF WBC: CPT | Performed by: INTERNAL MEDICINE

## 2019-02-11 PROCEDURE — 80048 BASIC METABOLIC PNL TOTAL CA: CPT | Performed by: INTERNAL MEDICINE

## 2019-02-11 PROCEDURE — 25010000002 VITAMIN K1 1 MG/1 ML SOLUTION: Performed by: INTERNAL MEDICINE

## 2019-02-11 PROCEDURE — 85610 PROTHROMBIN TIME: CPT | Performed by: INTERNAL MEDICINE

## 2019-02-11 PROCEDURE — G0378 HOSPITAL OBSERVATION PER HR: HCPCS

## 2019-02-11 PROCEDURE — 63710000001 PREDNISONE PER 5 MG: Performed by: INTERNAL MEDICINE

## 2019-02-11 PROCEDURE — 96361 HYDRATE IV INFUSION ADD-ON: CPT

## 2019-02-11 RX ORDER — GABAPENTIN 300 MG/1
300 CAPSULE ORAL 3 TIMES DAILY
Status: DISCONTINUED | OUTPATIENT
Start: 2019-02-11 | End: 2019-02-12 | Stop reason: HOSPADM

## 2019-02-11 RX ORDER — CEFACLOR 500 MG
500 CAPSULE ORAL 2 TIMES DAILY
Status: DISCONTINUED | OUTPATIENT
Start: 2019-02-11 | End: 2019-02-11 | Stop reason: CLARIF

## 2019-02-11 RX ORDER — PHYTONADIONE 2 MG/ML
5 INJECTION, EMULSION INTRAMUSCULAR; INTRAVENOUS; SUBCUTANEOUS ONCE
Status: DISCONTINUED | OUTPATIENT
Start: 2019-02-11 | End: 2019-02-11

## 2019-02-11 RX ORDER — AMOXICILLIN AND CLAVULANATE POTASSIUM 875; 125 MG/1; MG/1
1 TABLET, FILM COATED ORAL EVERY 12 HOURS SCHEDULED
Status: DISCONTINUED | OUTPATIENT
Start: 2019-02-11 | End: 2019-02-12 | Stop reason: HOSPADM

## 2019-02-11 RX ORDER — ATORVASTATIN CALCIUM 20 MG/1
20 TABLET, FILM COATED ORAL DAILY
Status: DISCONTINUED | OUTPATIENT
Start: 2019-02-11 | End: 2019-02-12 | Stop reason: HOSPADM

## 2019-02-11 RX ORDER — SODIUM CHLORIDE 9 MG/ML
75 INJECTION, SOLUTION INTRAVENOUS CONTINUOUS
Status: DISCONTINUED | OUTPATIENT
Start: 2019-02-11 | End: 2019-02-11

## 2019-02-11 RX ORDER — PHYTONADIONE 2 MG/ML
10 INJECTION, EMULSION INTRAMUSCULAR; INTRAVENOUS; SUBCUTANEOUS ONCE
Status: COMPLETED | OUTPATIENT
Start: 2019-02-11 | End: 2019-02-11

## 2019-02-11 RX ORDER — SENNA AND DOCUSATE SODIUM 50; 8.6 MG/1; MG/1
2 TABLET, FILM COATED ORAL 2 TIMES DAILY PRN
Status: DISCONTINUED | OUTPATIENT
Start: 2019-02-11 | End: 2019-02-12 | Stop reason: HOSPADM

## 2019-02-11 RX ORDER — HYDROCODONE BITARTRATE AND ACETAMINOPHEN 5; 325 MG/1; MG/1
1 TABLET ORAL EVERY 8 HOURS PRN
Status: DISCONTINUED | OUTPATIENT
Start: 2019-02-11 | End: 2019-02-12 | Stop reason: HOSPADM

## 2019-02-11 RX ORDER — CEFUROXIME AXETIL 250 MG/1
500 TABLET ORAL EVERY 12 HOURS SCHEDULED
Status: DISCONTINUED | OUTPATIENT
Start: 2019-02-11 | End: 2019-02-12 | Stop reason: HOSPADM

## 2019-02-11 RX ORDER — PREDNISONE 10 MG/1
10 TABLET ORAL DAILY
Status: DISCONTINUED | OUTPATIENT
Start: 2019-02-11 | End: 2019-02-12 | Stop reason: HOSPADM

## 2019-02-11 RX ORDER — SENNA AND DOCUSATE SODIUM 50; 8.6 MG/1; MG/1
2 TABLET, FILM COATED ORAL 2 TIMES DAILY
Status: DISCONTINUED | OUTPATIENT
Start: 2019-02-11 | End: 2019-02-11

## 2019-02-11 RX ORDER — LANOLIN ALCOHOL/MO/W.PET/CERES
800 CREAM (GRAM) TOPICAL EVERY MORNING
Status: DISCONTINUED | OUTPATIENT
Start: 2019-02-11 | End: 2019-02-12 | Stop reason: HOSPADM

## 2019-02-11 RX ADMIN — ACETAMINOPHEN 650 MG: 325 TABLET ORAL at 09:32

## 2019-02-11 RX ADMIN — CEFUROXIME AXETIL 500 MG: 250 TABLET ORAL at 22:53

## 2019-02-11 RX ADMIN — SODIUM CHLORIDE, PRESERVATIVE FREE 3 ML: 5 INJECTION INTRAVENOUS at 21:09

## 2019-02-11 RX ADMIN — POLYETHYLENE GLYCOL 3350 17 G: 17 POWDER, FOR SOLUTION ORAL at 09:24

## 2019-02-11 RX ADMIN — ACETAMINOPHEN 650 MG: 325 TABLET ORAL at 02:09

## 2019-02-11 RX ADMIN — BISACODYL 10 MG: 10 SUPPOSITORY RECTAL at 09:24

## 2019-02-11 RX ADMIN — POLYETHYLENE GLYCOL 3350 17 G: 17 POWDER, FOR SOLUTION ORAL at 21:09

## 2019-02-11 RX ADMIN — AMOXICILLIN AND CLAVULANATE POTASSIUM 1 TABLET: 875; 125 TABLET, FILM COATED ORAL at 12:17

## 2019-02-11 RX ADMIN — GABAPENTIN 300 MG: 300 CAPSULE ORAL at 09:23

## 2019-02-11 RX ADMIN — PHYTONADIONE 10 MG: 1 INJECTION, EMULSION INTRAMUSCULAR; INTRAVENOUS; SUBCUTANEOUS at 18:44

## 2019-02-11 RX ADMIN — GABAPENTIN 300 MG: 300 CAPSULE ORAL at 15:59

## 2019-02-11 RX ADMIN — ATORVASTATIN CALCIUM 20 MG: 20 TABLET, FILM COATED ORAL at 12:18

## 2019-02-11 RX ADMIN — PREDNISONE 10 MG: 10 TABLET ORAL at 12:17

## 2019-02-11 RX ADMIN — SENNOSIDES AND DOCUSATE SODIUM 2 TABLET: 8.6; 5 TABLET ORAL at 12:18

## 2019-02-11 RX ADMIN — Medication 800 MCG: at 12:17

## 2019-02-11 RX ADMIN — AMOXICILLIN AND CLAVULANATE POTASSIUM 1 TABLET: 875; 125 TABLET, FILM COATED ORAL at 21:09

## 2019-02-11 RX ADMIN — GABAPENTIN 300 MG: 300 CAPSULE ORAL at 21:09

## 2019-02-11 NOTE — H&P
Patient Name:  Rachel Moser  YOB: 1945  MRN:  [id number]  Admit Date:  2/10/2019  Patient Care Team:  Manan Cintron MD as PCP - General (Family Medicine)  Manan Cintron MD as PCP - Claims Attributed  Vijay Arango MD as Consulting Physician (Cardiology)  Rachel Krishnan [facility] as Pharmacist      Chief Complaint   Patient presents with   • Abdominal Pain     with vomiting since this morning.      Subjective   Ms. Moser is a 73 y.o. 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  Her pain is cramping in nature and is in a band-like pattern around the mid-abdominal region.  This is non-radiating, and associated with non-bloody, non-bilious vomiting, of this she has had 3-4 episodes.  Her last BM was earlier today and was small and quite firm.  She was found to have fairly severe constipation on abdominal CT.  She has a leukocytosis as well, and states that she has finished a course of steroids yesterday for bronchospasm associated with a respiratory infection, for which she was treated with levofloxacin.    History of Present Illness    Past Medical History:   Diagnosis Date   • Allergic    • Bone infection (CMS/HCC)     hip   • Cataract    • High cholesterol    • History of DVT (deep vein thrombosis)    • History of kidney infection     1 MONTH AGO   • History of pulmonary embolus (PE)    • History of transfusion    • Hypertension    • Left hip postoperative wound infection    • Paralabral cyst of left hip    • PICC (peripherally inserted central catheter) in place    • PONV (postoperative nausea and vomiting)    • Presence of vena cava filter    • Rheumatoid arteritis    • Seasonal allergies    • UTI  (urinary tract infection)     diagnosed 4/2/18  medically treated presently   • Wears glasses      Past Surgical History:   Procedure Laterality Date   • BLADDER SUSPENSION     • CATARACT EXTRACTION, BILATERAL     • COLONOSCOPY     • ENDOSCOPIC FUNCTIONAL SINUS SURGERY (FESS)     • HIP SPACER INSERTION WITH ANTIBIOTIC CEMENT Left 8/11/2018    Procedure: TOTAL HIP ARTHROPLASTY REVISION with removal of infected total hip and placement of antibiotic spacer;  Surgeon: Obed Barney MD;  [facility];  Service: Orthopedics   • HIP SURGERY Left 1983   • HIP SURGERY Left 20069   • HIP SURGERY Left 2011   • INCISION AND DRAINAGE HIP Left 11/2/2016    Procedure: HIP INCISION AND DRAINAGE;  Surgeon: Obed Barney MD;  Location: [facility] OR;  Service:    • INCISION AND DRAINAGE HIP Left 7/7/2018    Procedure: I&D and antibiotic bead placement left hip;  Surgeon: Obed Barney MD;  Location[facility];  Service: Orthopedics   • INCISION AND DRAINAGE HIP Left 7/21/2018    Procedure: HIP INCISION AND DRAINAGE, LEFT WITH POLY HEAD CHANGE AND ANTIBIOTIC BEAD PLACEMENT;  Surgeon: Obed Barney MD;  Location[facility];  Service: Orthopedics   • JOINT REPLACEMENT Left     HIP   • PERIPHERALLY INSERTED CENTRAL CATHETER INSERTION     • DE CLOSED RX TRAUMATIC HIP DISLOCATN Left 12/18/2017    Procedure: LEFT HIP CLOSED REDUCTION;  Surgeon: Obed Barney MD;  Location[facility];  Service: Orthopedics   • SOFT TISSUE MASS EXCISION Left     hip 3/19/18   • TOTAL ABDOMINAL HYSTERECTOMY     • TOTAL HIP ARTHROPLASTY REVISION Left 4/25/2018    Procedure: REVISION OF LEFT ACETABULAR COMPONENT ;  Surgeon: Obed Barney MD;  Location[facility];  Service: Orthopedics   • TOTAL HIP ARTHROPLASTY REVISION Left 08/11/2018    total hip revision with removal of iinfected total hip and placement of antibiotic spacer   • VENA CAVA FILTER INSERTION       Family History   Problem Relation Age of Onset   • Juan Carlos  Hyperthermia Neg Hx      Social History     Tobacco Use   • Smoking status: Former Smoker     Types: Cigarettes     Last attempt to quit: 1975     Years since quittin.1   • Smokeless tobacco: Never Used   • Tobacco comment: quit 50 years ago   Substance Use Topics   • Alcohol use: No   • Drug use: No     Medications Prior to Admission   Medication Sig Dispense Refill Last Dose   • amoxicillin-clavulanate (AUGMENTIN) 875-125 MG per tablet Take 1 tablet by mouth Every 12 (Twelve) Hours for 120 days. 240 tablet 3 Taking   • B Complex-Biotin-FA (HM VITAMIN B50 COMPLEX PO) Take 1 tablet by mouth Daily.   Taking   • cefaclor (CECLOR) 500 MG capsule Take 1 capsule by mouth 2 (Two) Times a Day for 90 days. 180 capsule 3 Taking   • cholecalciferol (VITAMIN D3) 1000 units tablet Take 2,000 Units by mouth Daily.   Taking   • Cranberry 1000 MG capsule Take 25,000 mg by mouth 2 (Two) Times a Day.   Taking   • Docusate Sodium (COLACE PO) Take 1 tablet by mouth 2 (Two) Times a Day.   Taking   • ferrous sulfate (FERROUSUL) 325 (65 FE) MG tablet Take 325 mg by mouth Daily With Breakfast. On hold for sx.   Taking   • folic acid (FOLVITE) 800 MCG tablet Take 800 mcg by mouth Every Morning. 4 tabs am-ON HOLD FOR SX.   Taking   • furosemide (LASIX) 40 MG tablet Take 40 mg by mouth Every Morning.   Taking   • gabapentin (NEURONTIN) 300 MG capsule Take 1 capsule by mouth 3 (Three) Times a Day. 90 capsule 3 Taking   • leucovorin 5 MG tablet Take 5 mg by mouth 1 (One) Time Per Week.   Not Taking   • methotrexate 2.5 MG tablet Take 2.5 mg by mouth 1 (One) Time Per Week. 8 tablets once a week   Not Taking   • metoprolol succinate XL (TOPROL-XL) 50 MG 24 hr tablet Take 50 mg by mouth As Needed.   Taking   • Multiple Vitamins-Minerals (MULTIVITAMIN ADULT PO) Take 1 tablet by mouth Daily.   Taking   • polyethylene glycol (MIRALAX) packet Take 17 g by mouth As Needed.   Taking   • potassium chloride (K-DUR,KLOR-CON) 20 MEQ CR tablet Take 1  tablet by mouth 2 (Two) Times a Day. 180 tablet 3 Taking   • predniSONE (DELTASONE) 10 MG tablet Take 10 mg by mouth Daily.   Not Taking   • simvastatin (ZOCOR) 40 MG tablet Take 40 mg by mouth Every Morning.   Taking   • warfarin (COUMADIN) 5 MG tablet 1 po qd 30 tablet 0 Taking   • Calcium-Magnesium-Vitamin D (CALCIUM 1200+D3 PO) Take  by mouth.      • HYDROcodone-acetaminophen (NORCO) 5-325 MG per tablet Take 1 tablet by mouth Every 8 (Eight) Hours As Needed for Moderate Pain  for up to 60 doses. 1-2 oral Q4H PRN severe pain 60 tablet 0 Taking   • InFLIXimab (REMICADE IV) Infuse  into a venous catheter Every 28 (Twenty-Eight) Days.   Not Taking   • warfarin (COUMADIN) 5 MG tablet TAKE 1 & 1/2 (ONE & ONE-HALF) TABLETS BY MOUTH ONCE DAILY OR  AS  DIRECTED 135 tablet 0 Taking     Allergies:    Allergies   Allergen Reactions   • Sulfa Antibiotics Rash       Review of Systems   Constitutional: Negative for activity change, appetite change, chills and fever.   HENT: Negative for congestion, nosebleeds, sore throat and trouble swallowing.    Eyes: Negative for redness and visual disturbance.   Respiratory: Negative for cough, choking and shortness of breath.    Cardiovascular: Negative for chest pain, palpitations and leg swelling.   Gastrointestinal: Positive for abdominal pain, constipation, nausea and vomiting. Negative for blood in stool and diarrhea.   Endocrine: Negative for cold intolerance and heat intolerance.   Genitourinary: Negative for difficulty urinating, dysuria and hematuria.   Musculoskeletal: Negative for arthralgias and myalgias.   Skin: Negative for pallor and rash.   Neurological: Negative for dizziness, weakness, light-headedness and headaches.   Hematological: Negative for adenopathy. Bruises/bleeds easily (on warfarin).   Psychiatric/Behavioral: Negative for confusion and decreased concentration.        Objective    Vital Signs  Temp:  [97.5 °F (36.4 °C)-98.3 °F (36.8 °C)] 97.5 °F (36.4  °C)  Heart Rate:  [] 86  Resp:  [16-17] 16  BP: (132-155)/() 132/78  SpO2:  [95 %-97 %] 96 %  on   ;   Device (Oxygen Therapy): room air  Body mass index is 25.2 kg/m².    Physical Exam   Constitutional: She is oriented to person, place, and time. No distress.   HENT:   Head: Normocephalic and atraumatic.   Mouth/Throat: Oropharynx is clear and moist.   Eyes: Conjunctivae and EOM are normal. Pupils are equal, round, and reactive to light.   Neck: Normal range of motion. Neck supple. No JVD present.   Cardiovascular: Normal rate, regular rhythm and intact distal pulses.   Pulmonary/Chest: Effort normal and breath sounds normal. She has no wheezes. She has no rales.   Abdominal: Soft. Bowel sounds are normal. There is tenderness (mild, diffuse). There is no rebound and no guarding.   Musculoskeletal: She exhibits no edema or tenderness.   Neurological: She is alert and oriented to person, place, and time.   Skin: Skin is warm and dry. She is not diaphoretic.   Psychiatric: She has a normal mood and affect. Her behavior is normal.   Nursing note and vitals reviewed.      Results Review:  I reviewed the patient's new clinical results.  I reviewed the patient's new imaging results and agree with the interpretation.  I reviewed the patient's other test results and agree with the interpretation  I personally viewed and interpreted the patient's EKG/Telemetry data  Discussed with ED provider.      Lab Results (last 24 hours)     Procedure Component Value Units Date/Time    CBC & Differential [[id number]] Collected:  02/10/19 1935    Specimen:  Blood Updated:  02/10/19 2003    Narrative:       The following orders were created for panel order CBC & Differential.  Procedure                               Abnormality         Status                     ---------                               -----------         ------                     Scan Slide[[id number]]                   Normal              Final result                CBC Auto Differential[[id number]]        Abnormal            Final result                 Please view results for these tests on the individual orders.    Comprehensive Metabolic Panel [[id number]]  (Abnormal) Collected:  02/10/19 1935    Specimen:  Blood Updated:  02/10/19 2006     Glucose 120 mg/dL      BUN 17 mg/dL      Creatinine 0.60 mg/dL      Sodium 140 mmol/L      Potassium 4.4 mmol/L      Chloride 99 mmol/L      CO2 24.3 mmol/L      Calcium 9.7 mg/dL      Total Protein 7.3 g/dL      Albumin 4.10 g/dL      ALT (SGPT) 18 U/L      AST (SGOT) 14 U/L      Alkaline Phosphatase 78 U/L      Total Bilirubin 0.6 mg/dL      eGFR Non African Amer 98 mL/min/1.73      Globulin 3.2 gm/dL      A/G Ratio 1.3 g/dL      BUN/Creatinine Ratio 28.3     Anion Gap 16.7 mmol/L     Narrative:       The MDRD GFR formula is only valid for adults with stable renal function between ages 18 and 70.    Lipase [[id number]]  (Normal) Collected:  02/10/19 1935    Specimen:  Blood Updated:  02/10/19 2006     Lipase 29 U/L     Protime-INR [[id number]]  (Abnormal) Collected:  02/10/19 1935    Specimen:  Blood Updated:  02/10/19 2028     Protime 57.1 Seconds      INR 6.64    CBC Auto Differential [[id number]]  (Abnormal) Collected:  02/10/19 1935    Specimen:  Blood Updated:  02/10/19 2003     WBC 19.49 10*3/mm3      RBC 5.41 10*6/mm3      Hemoglobin 16.0 g/dL      Hematocrit 48.5 %      MCV 89.6 fL      MCH 29.6 pg      MCHC 33.0 g/dL      RDW 14.6 %      RDW-SD 47.3 fl      MPV 12.6 fL      Platelets 255 10*3/mm3      Neutrophil % 88.0 %      Lymphocyte % 10.5 %      Monocyte % 1.3 %      Eosinophil % 0.0 %      Basophil % 0.2 %      Immature Grans % 2.3 %      Neutrophils, Absolute 17.16 10*3/mm3      Lymphocytes, Absolute 2.04 10*3/mm3      Monocytes, Absolute 0.25 10*3/mm3      Eosinophils, Absolute 0.00 10*3/mm3      Basophils, Absolute 0.04 10*3/mm3      Immature Grans, Absolute 0.45 10*3/mm3     C-reactive Protein [[id number]]  (Abnormal)  Collected:  02/10/19 1935    Specimen:  Blood Updated:  02/10/19 2006     C-Reactive Protein 0.59 mg/dL     Sedimentation Rate [[id number]]  (Normal) Collected:  02/10/19 1935    Specimen:  Blood Updated:  02/10/19 2005     Sed Rate 11 mm/hr     Scan Slide [[id number]]  (Normal) Collected:  02/10/19 1935    Specimen:  Blood Updated:  02/10/19 2003     RBC Morphology Normal     WBC Morphology Normal     Platelet Morphology Normal    TSH [[id number]]  (Normal) Collected:  02/10/19 1935    Specimen:  Blood Updated:  02/10/19 2303     TSH 1.830 mIU/mL     Urinalysis With Microscopic If Indicated (No Culture) - Urine, Clean Catch [[id number]]  (Abnormal) Collected:  02/10/19 1951    Specimen:  Urine, Clean Catch Updated:  02/10/19 2005     Color, UA Yellow     Appearance, UA Clear     pH, UA 5.5     Specific Gravity, UA 1.017     Glucose, UA Negative     Ketones, UA Negative     Bilirubin, UA Negative     Blood, UA Trace     Protein, UA Negative     Leuk Esterase, UA Negative     Nitrite, UA Negative     Urobilinogen, UA 0.2 E.U./dL    Urinalysis, Microscopic Only - Urine, Clean Catch [[id number]] Collected:  02/10/19 1951    Specimen:  Urine, Clean Catch Updated:  02/10/19 2005     RBC, UA 0-2 /HPF      WBC, UA 0-2 /HPF      Bacteria, UA None Seen /HPF      Squamous Epithelial Cells, UA 0-2 /HPF      Hyaline Casts, UA 0-2 /LPF      Methodology Automated Microscopy          Imaging Results (last 24 hours)     Procedure Component Value Units Date/Time    CT Abdomen Pelvis With Contrast [[id number]] Collected:  02/10/19 2047     Updated:  02/10/19 2053    Narrative:       CT SCANS ABDOMEN AND PELVIS WITH IV CONTRAST.     HISTORY: abd pain, vomiting     COMPARISON: July 27, 2013.     TECHNIQUE: Radiation dose reduction techniques were utilized, including  automated exposure control and exposure modulation based on body size.  Axial images were obtained from the lung bases to the symphysis pubis  with IV contrast only.. Oral  contrast was not administered per request.     FINDINGS :  There are fibrotic changes in the lung bases. Normal solid  organs and aorta. IVC filter unchanged. Normal appendix. Severe  constipation. There are several fluid filled but nondistended small  bowel loops. The GI tract not opacified for assessment but non  obstructive in appearance. There is artifact from a left hip  arthroplasty. Prior hysterectomy. There is a small amount of pelvic  ascites of uncertain etiology.             Impression:       IMPRESSION :   1. Severe constipation without obstruction.  2. Minimal pelvic ascites        This report was finalized on 2/10/2019 8:50 PM by Juancarlos Whitehead M.D.             No orders to display     Assessment/Plan   Active Hospital Problems    Diagnosis Date Noted   • **Constipation [K59.00] 02/10/2019   • Nausea and vomiting [R11.2] 02/10/2019   • Supratherapeutic INR [R79.1] 02/10/2019   • Long term (current) use of antibiotics [Z79.2] 02/10/2019   • Long term (current) use of anticoagulants [Z79.01] 02/10/2019   • Leukocytosis [D72.829] 02/10/2019   • Dehydration [E86.0] 02/10/2019   • History of DVT (deep vein thrombosis) [Z86.718] 04/26/2018   • Hypertension [I10] 04/26/2018   • Status post left hip replacement [Z96.642] 02/27/2018      Resolved Hospital Problems   No resolved problems to display.   Abdominal Pain/Nausea/Vomiting  - likely due to constipation  - no e/o infection or obstruction seen on abdominal CT scan  - has some associated mild dehydration, will give gentle IVF  - patient is feeling hungry and thinks she can take PO, will try to give a dose of Mg citrate as well as schedule suppositories  - clear liquid diet and advance as tolerated    Supratherapeutic INR  - on warfarin for hx of DVT/PE  - no evidence of bleeding, no indication for reversal  - will hold warfarin    Leukocytosis  - likely from steroid therapy  - no evidence of infection  - will monitor    L Hip Septic Arthritis  - s/p  antibiotic spacer and 10 weeks of IV ertapenem  - on suppression with augmentin and cefaclor  - follows Dr. Minda Souza as outpatient    DVT Prophylaxis  - on AC as above    I discussed the patients findings and my recommendations with patient, family and nursing staff.      Juancarlos Sharma MD  [facility]  02/10/19  11:22 PM

## 2019-02-11 NOTE — ED NOTES
Pt reports generalized abdominal pain with vomiting since this morning. Pt has been able to keep some water down but has not had solids. Pt says her LBM was this morning and it was regular for her. Pt with no fevers this week. Denies nausea at this time.  + appendix and gallbladder.      Delfina Hugo, PAWEL  02/10/19 1929

## 2019-02-11 NOTE — PROGRESS NOTES
Discharge Planning Assessment  [facility]     Patient Name: Racehl Moser  MRN: [id number]  Today's Date: 2/11/2019    Admit Date: 2/10/2019    Discharge Needs Assessment     Row Name 02/11/19 1214       Living Environment    Lives With  spouse    Current Living Arrangements  home/apartment/condo    Primary Care Provided by  self    Provides Primary Care For  spouse    Family Caregiver if Needed  spouse    Family Caregiver Names  Ramone Moser [phone]     Quality of Family Relationships  helpful    Able to Return to Prior Arrangements  yes       Resource/Environmental Concerns    Transportation Concerns  car, none       Transition Planning    Patient/Family Anticipates Transition to  home with family    Patient/Family Anticipated Services at Transition  none    Transportation Anticipated  family or friend will provide       Discharge Needs Assessment    Readmission Within the Last 30 Days  no previous admission in last 30 days    Concerns to be Addressed  no discharge needs identified    Equipment Currently Used at Home  cane, straight    Equipment Needed After Discharge  none        Discharge Plan     Row Name 02/11/19 1213       Plan    Plan  Home denies any discharge needs    Plan Comments  Spoke with patient at bedside, face sheet verified. Patient lives in a single level house with her . Patient stated she uses a straight cane for ambulation. Patient does not drive her  provides the transportation. Patient denies any discharge needs plans to return home. Aster Pacheco, RN        Destination      No service coordination in this encounter.      Durable Medical Equipment      No service coordination in this encounter.      Dialysis/Infusion      No service coordination in this encounter.      Home Medical Care      No service coordination in this encounter.      Community Resources      No service coordination in this encounter.          Demographic Summary     Row Name 02/11/19 1214        General Information    Admission Type  observation    Arrived From  emergency department    Required Notices Provided  Observation Status Notice    Referral Source  admission list    Reason for Consult  discharge planning    Preferred Language  English     Used During This Interaction  no        Functional Status     Row Name 02/11/19 1213       Functional Status    Usual Activity Tolerance  moderate    Current Activity Tolerance  fair       Functional Status, IADL    Medications  independent    Meal Preparation  independent    Housekeeping  independent    Laundry  independent    Shopping  assistive person        Psychosocial    No documentation.       Abuse/Neglect    No documentation.       Legal    No documentation.       Substance Abuse    No documentation.       Patient Forms    No documentation.           Aster Pacheco RN

## 2019-02-11 NOTE — ED PROVIDER NOTES
EMERGENCY DEPARTMENT ENCOUNTER    CHIEF COMPLAINT  Chief Complaint: abd pain  History given by: pt   History limited by: nothing  Room Number: 22/22  PMD: Manan Cintron MD      HPI:  Pt is a 73 y.o. female who presents complaining of waxing and waning, generalized abd pain that began around 0800 this morning. Pt states that nothing makes her pain better or worse. Pt also complains of N/V x3 and lower back pain but denies fever, dysuria or diarrhea. Pt states that she was placed on 20mg prednisone BID and a 10 day course of levaquin on 1/25/19 for bilateral lower lobe pneumonia. Pt states that she is not currently taking her remicade or methotrexate. Pt is on coumadin for history of DVT.    Duration:  Several hours  Onset: gradual  Timing: waxing and waning  Location: generalized abd  Radiation: none specified  Quality: and pain  Intensity/Severity: mild  Progression: unchanged  Associated Symptoms: nausea, vomiting, lower back pain  Aggravating Factors: none specified  Alleviating Factors: none specified  Previous Episodes: none   Treatment before arrival: none      PAST MEDICAL HISTORY  Active Ambulatory Problems     Diagnosis Date Noted   • Chronic left hip pain 11/02/2016   • OA (osteoarthritis) of hip 11/02/2016   • Hip dislocation, left (CMS/Formerly [facility]) 12/17/2017   • Status post left hip replacement 02/27/2018   • Hypertension 04/26/2018   • Rheumatoid arteritis 04/26/2018   • History of DVT (deep vein thrombosis) 04/26/2018   • Postoperative hypotension 04/26/2018   • S/P revision of total hip 07/06/2018   • Septic arthritis (CMS/[facility]) 07/13/2018   • Status post total hip replacement, left 07/19/2018   • Pain of left hip joint 08/08/2018     Resolved Ambulatory Problems     Diagnosis Date Noted   • No Resolved Ambulatory Problems     Past Medical History:   Diagnosis Date   • Allergic    • Bone infection (CMS/[facility])    • Cataract    • High cholesterol    • History of DVT (deep vein thrombosis)    • History of  kidney infection    • History of pulmonary embolus (PE)    • History of transfusion    • Hypertension    • Left hip postoperative wound infection    • Paralabral cyst of left hip    • PICC (peripherally inserted central catheter) in place    • PONV (postoperative nausea and vomiting)    • Presence of vena cava filter    • Rheumatoid arteritis    • Seasonal allergies    • UTI (urinary tract infection)    • Wears glasses        PAST SURGICAL HISTORY  Past Surgical History:   Procedure Laterality Date   • BLADDER SUSPENSION     • CATARACT EXTRACTION, BILATERAL     • COLONOSCOPY     • ENDOSCOPIC FUNCTIONAL SINUS SURGERY (FESS)     • HIP SPACER INSERTION WITH ANTIBIOTIC CEMENT Left 8/11/2018    Procedure: TOTAL HIP ARTHROPLASTY REVISION with removal of infected total hip and placement of antibiotic spacer;  Surgeon: Obed Barney MD;  Location: [facility];  Service: Orthopedics   • HIP SURGERY Left 1983   • HIP SURGERY Left 20069   • HIP SURGERY Left 2011   • INCISION AND DRAINAGE HIP Left 11/2/2016    Procedure: HIP INCISION AND DRAINAGE;  Surgeon: Obed Barney MD;  Location: [facility];  Service:    • INCISION AND DRAINAGE HIP Left 7/7/2018    Procedure: I&D and antibiotic bead placement left hip;  Surgeon: Obed Barney MD;  Location: [facility];  Service: Orthopedics   • INCISION AND DRAINAGE HIP Left 7/21/2018    Procedure: HIP INCISION AND DRAINAGE, LEFT WITH POLY HEAD CHANGE AND ANTIBIOTIC BEAD PLACEMENT;  Surgeon: Obed Barney MD;  Location: [facility];  Service: Orthopedics   • JOINT REPLACEMENT Left     HIP   • PERIPHERALLY INSERTED CENTRAL CATHETER INSERTION     • NE CLOSED RX TRAUMATIC HIP DISLOCATN Left 12/18/2017    Procedure: LEFT HIP CLOSED REDUCTION;  Surgeon: Obed Barney MD;  Location: [facility];  Service: Orthopedics   • SOFT TISSUE MASS EXCISION Left     hip 3/19/18   • TOTAL ABDOMINAL HYSTERECTOMY     • TOTAL HIP ARTHROPLASTY REVISION Left 4/25/2018    Procedure: REVISION OF  LEFT ACETABULAR COMPONENT ;  Surgeon: Obed Barney MD;  Location: [facility] OR;  Service: Orthopedics   • TOTAL HIP ARTHROPLASTY REVISION Left 2018    total hip revision with removal of iinfected total hip and placement of antibiotic spacer   • VENA CAVA FILTER INSERTION         FAMILY HISTORY  Family History   Problem Relation Age of Onset   • Malig Hyperthermia Neg Hx        SOCIAL HISTORY  Social History     Socioeconomic History   • Marital status:      Spouse name: Not on file   • Number of children: Not on file   • Years of education: Not on file   • Highest education level: Not on file   Social Needs   • Financial resource strain: Not on file   • Food insecurity - worry: Not on file   • Food insecurity - inability: Not on file   • Transportation needs - medical: Not on file   • Transportation needs - non-medical: Not on file   Occupational History   • Not on file   Tobacco Use   • Smoking status: Former Smoker     Types: Cigarettes     Last attempt to quit: 1975     Years since quittin.1   • Smokeless tobacco: Never Used   • Tobacco comment: quit 50 years ago   Substance and Sexual Activity   • Alcohol use: No   • Drug use: No   • Sexual activity: Defer     Partners: Male   Other Topics Concern   • Not on file   Social History Narrative   • Not on file       ALLERGIES  Sulfa antibiotics    REVIEW OF SYSTEMS  Review of Systems   Constitutional: Negative for fever.   HENT: Negative for sore throat.    Eyes: Negative.    Respiratory: Negative for cough and shortness of breath.    Cardiovascular: Negative for chest pain.   Gastrointestinal: Positive for abdominal pain (diffuse), nausea (intermittent) and vomiting (3x). Negative for diarrhea.   Genitourinary: Negative for dysuria.   Musculoskeletal: Positive for back pain (lower). Negative for neck pain.   Skin: Negative for rash.   Neurological: Negative for weakness, numbness and headaches.   Hematological: Negative.     Psychiatric/Behavioral: Negative.    All other systems reviewed and are negative.      PHYSICAL EXAM  ED Triage Vitals   Temp Heart Rate Resp BP SpO2   02/10/19 1908 02/10/19 1908 02/10/19 1908 02/10/19 1923 02/10/19 1908   98.3 °F (36.8 °C) (!) 147 17 (!) 155/115 96 %      Temp src Heart Rate Source Patient Position BP Location FiO2 (%)   -- 02/10/19 1908 -- -- --    Monitor          Physical Exam   Constitutional: She is oriented to person, place, and time. No distress.   HENT:   Head: Normocephalic and atraumatic.   Eyes: EOM are normal. Pupils are equal, round, and reactive to light.   Neck: Normal range of motion. Neck supple.   Cardiovascular: Regular rhythm and normal heart sounds. Tachycardia present.   Pulmonary/Chest: Effort normal. No respiratory distress. She has rales (Right lung base).   Abdominal: Soft. Bowel sounds are normal. There is tenderness (moderate, generalized). There is no rebound and no guarding.   Musculoskeletal: Normal range of motion. She exhibits no edema.        Thoracic back: She exhibits tenderness (bilateral, muscular).   chronic arthritic changes to bilateral hands    Neurological: She is alert and oriented to person, place, and time. She has normal sensation and normal strength.   Skin: Skin is warm and dry. No rash noted.   Psychiatric: Mood and affect normal.   Nursing note and vitals reviewed.      LAB RESULTS  Lab Results (last 24 hours)     Procedure Component Value Units Date/Time    CBC & Differential [[id number]] Collected:  02/10/19 1935    Specimen:  Blood Updated:  02/10/19 2003    Narrative:       The following orders were created for panel order CBC & Differential.  Procedure                               Abnormality         Status                     ---------                               -----------         ------                     Scan Slide[[id number]]                   Normal              Final result               CBC Auto Differential[[id number]]         Abnormal            Final result                 Please view results for these tests on the individual orders.    Comprehensive Metabolic Panel [[id number]]  (Abnormal) Collected:  02/10/19 1935    Specimen:  Blood Updated:  02/10/19 2006     Glucose 120 mg/dL      BUN 17 mg/dL      Creatinine 0.60 mg/dL      Sodium 140 mmol/L      Potassium 4.4 mmol/L      Chloride 99 mmol/L      CO2 24.3 mmol/L      Calcium 9.7 mg/dL      Total Protein 7.3 g/dL      Albumin 4.10 g/dL      ALT (SGPT) 18 U/L      AST (SGOT) 14 U/L      Alkaline Phosphatase 78 U/L      Total Bilirubin 0.6 mg/dL      eGFR Non African Amer 98 mL/min/1.73      Globulin 3.2 gm/dL      A/G Ratio 1.3 g/dL      BUN/Creatinine Ratio 28.3     Anion Gap 16.7 mmol/L     Narrative:       The MDRD GFR formula is only valid for adults with stable renal function between ages 18 and 70.    Lipase [[id number]]  (Normal) Collected:  02/10/19 1935    Specimen:  Blood Updated:  02/10/19 2006     Lipase 29 U/L     Protime-INR [[id number]]  (Abnormal) Collected:  02/10/19 1935    Specimen:  Blood Updated:  02/10/19 2028     Protime 57.1 Seconds      INR 6.64    CBC Auto Differential [[id number]]  (Abnormal) Collected:  02/10/19 1935    Specimen:  Blood Updated:  02/10/19 2003     WBC 19.49 10*3/mm3      RBC 5.41 10*6/mm3      Hemoglobin 16.0 g/dL      Hematocrit 48.5 %      MCV 89.6 fL      MCH 29.6 pg      MCHC 33.0 g/dL      RDW 14.6 %      RDW-SD 47.3 fl      MPV 12.6 fL      Platelets 255 10*3/mm3      Neutrophil % 88.0 %      Lymphocyte % 10.5 %      Monocyte % 1.3 %      Eosinophil % 0.0 %      Basophil % 0.2 %      Immature Grans % 2.3 %      Neutrophils, Absolute 17.16 10*3/mm3      Lymphocytes, Absolute 2.04 10*3/mm3      Monocytes, Absolute 0.25 10*3/mm3      Eosinophils, Absolute 0.00 10*3/mm3      Basophils, Absolute 0.04 10*3/mm3      Immature Grans, Absolute 0.45 10*3/mm3     C-reactive Protein [[id number]]  (Abnormal) Collected:  02/10/19 1935    Specimen:   Blood Updated:  02/10/19 2006     C-Reactive Protein 0.59 mg/dL     Sedimentation Rate [[id number]]  (Normal) Collected:  02/10/19 1935    Specimen:  Blood Updated:  02/10/19 2005     Sed Rate 11 mm/hr     Scan Slide [[id number]]  (Normal) Collected:  02/10/19 1935    Specimen:  Blood Updated:  02/10/19 2003     RBC Morphology Normal     WBC Morphology Normal     Platelet Morphology Normal    Urinalysis With Microscopic If Indicated (No Culture) - Urine, Clean Catch [[id number]]  (Abnormal) Collected:  02/10/19 1951    Specimen:  Urine, Clean Catch Updated:  02/10/19 2005     Color, UA Yellow     Appearance, UA Clear     pH, UA 5.5     Specific Gravity, UA 1.017     Glucose, UA Negative     Ketones, UA Negative     Bilirubin, UA Negative     Blood, UA Trace     Protein, UA Negative     Leuk Esterase, UA Negative     Nitrite, UA Negative     Urobilinogen, UA 0.2 E.U./dL    Urinalysis, Microscopic Only - Urine, Clean Catch [[id number]] Collected:  02/10/19 1951    Specimen:  Urine, Clean Catch Updated:  02/10/19 2005     RBC, UA 0-2 /HPF      WBC, UA 0-2 /HPF      Bacteria, UA None Seen /HPF      Squamous Epithelial Cells, UA 0-2 /HPF      Hyaline Casts, UA 0-2 /LPF      Methodology Automated Microscopy          I ordered the above labs and reviewed the results    RADIOLOGY  CT Abdomen Pelvis With Contrast   Final Result   IMPRESSION :    1. Severe constipation without obstruction.   2. Minimal pelvic ascites           This report was finalized on 2/10/2019 8:50 PM by Juancarlos Whitehead M.D.               I ordered the above noted radiological studies. Interpreted by radiologist. Reviewed by me in PACS.       PROCEDURES  Procedures      PROGRESS AND CONSULTS     1948- Notified pt and family that there are several possible etiologies of her symptoms, including a bowel obstruction. Discussed the plan to order lab work and imaging studies for further evaluation. Pt understands and agrees with the plan, all questions  answered.    1952 IV fluids ordered for hydration. Zofran ordered for nausea.    2013 CT Abd/Pelvis ordered for further evaluation.    2100 Call placed to [facility] for admission.    2145 Rechecked pt. Who is resting comfortably in NAD. Pt has not vomited since being in the ED. Informed pt and family of the pt's lab and imaging results, including the pt's elevated INR and the severe constipation seen on CT.  Notified pt and family that the pt's constipation is likely obstructing her colon. Discussed the plan to admit for further evaluation and treatment. Pt understands and agrees with plan. All questions answered.      2147 Received a call from Dr. Sharma ([facility]) and discussed patient's case. Dr. Sharma agrees with the plan for admission to Med/Surg.      MEDICAL DECISION MAKING  Results were reviewed/discussed with the patient and they were also made aware of online access. Pt also made aware that some labs, such as cultures, will not be resulted during ER visit and follow up with PMD is necessary.     MDM  Number of Diagnoses or Management Options  Constipation, unspecified constipation type, severe:   Warfarin-induced coagulopathy (CMS/HCC):      Amount and/or Complexity of Data Reviewed  Clinical lab tests: ordered and reviewed (INR: 6.64)  Tests in the radiology section of CPT®: ordered and reviewed (CT Abd/Pelvis: Severe constipation and minimal pelvic ascites.)  Decide to obtain previous medical records or to obtain history from someone other than the patient: yes  Obtain history from someone other than the patient: yes (family)  Review and summarize past medical records: yes (Pt was diagnosed with an ESBL infection of her left hip six months ago. Pt currently has an abx spacer in place and is taking Augmentin and Ceclor.)  Discuss the patient with other providers: yes (Dr. Sharma ([facility]))  Independent visualization of images, tracings, or specimens: yes    Patient Progress  Patient progress: stable          DIAGNOSIS  Final diagnoses:   Constipation, unspecified constipation type, severe   Warfarin-induced coagulopathy (CMS/HCC)       DISPOSITION  ADMISSION    Discussed treatment plan and reason for admission with pt/family and admitting physician.  Pt/family voiced understanding of the plan for admission for further testing/treatment as needed.         Latest Documented Vital Signs:  As of 9:52 PM  BP- 143/76 HR- 87 Temp- 98.3 °F (36.8 °C) O2 sat- 95%    --  Documentation assistance provided by jefferson Thomson and Catrina Leone for Dr. Tao.  Information recorded by the joel was done at my direction and has been verified and validated by me.          Catrina Leone  02/10/19 2203       Addie Thomson  02/11/19 0026       Evans Tao MD  02/13/19 2003

## 2019-02-11 NOTE — PLAN OF CARE
Problem: Patient Care Overview  Goal: Plan of Care Review  Outcome: Ongoing (interventions implemented as appropriate)   02/11/19 0455   Coping/Psychosocial   Plan of Care Reviewed With patient   Plan of Care Review   Progress no change   OTHER   Outcome Summary vss. mag citrate given last night. BM x1 . tylenol given x1. IVF started. Up with assist using cane.     Goal: Individualization and Mutuality  Outcome: Ongoing (interventions implemented as appropriate)    Goal: Discharge Needs Assessment  Outcome: Ongoing (interventions implemented as appropriate)    Goal: Interprofessional Rounds/Family Conf  Outcome: Ongoing (interventions implemented as appropriate)      Problem: Constipation (Adult)  Goal: Identify Related Risk Factors and Signs and Symptoms  Outcome: Ongoing (interventions implemented as appropriate)    Goal: Effective Bowel Elimination  Outcome: Ongoing (interventions implemented as appropriate)    Goal: Comfort  Outcome: Ongoing (interventions implemented as appropriate)      Problem: Pain, Acute (Adult)  Goal: Identify Related Risk Factors and Signs and Symptoms  Outcome: Outcome(s) achieved Date Met: 02/11/19    Goal: Acceptable Pain Control/Comfort Level  Outcome: Ongoing (interventions implemented as appropriate)

## 2019-02-11 NOTE — PROGRESS NOTES
"     LOS: 1 day   Primary Care Physician: Manan Cintron MD     Subjective   Feels much better.  Bowels have moved multiple times.  No dizziness or lightheadedness.  Has been up and about.  No shortness of breath.  Minimal cough    Vital Signs  Body mass index is 25.2 kg/m².  Temp:  [96.4 °F (35.8 °C)-98.3 °F (36.8 °C)] 97.4 °F (36.3 °C)  Heart Rate:  [] 80  Resp:  [16-17] 16  BP: (103-155)/() 110/59      Objective:  General Appearance:  In no acute distress.    Vital signs: (most recent): Blood pressure 110/59, pulse 80, temperature 97.4 °F (36.3 °C), temperature source Oral, resp. rate 16, height 149.9 cm (59\"), weight 56.6 kg (124 lb 12.5 oz), SpO2 97 %, not currently breastfeeding.    Lungs:  She is not in respiratory distress.  No stridor.  There are rales and decreased breath sounds.  No wheezes or rhonchi.  (A few crackles right base)  Heart: Normal rate.  Regular rhythm.  No murmur.   Abdomen: Abdomen is soft and non-distended.  Bowel sounds are normal.   There is no abdominal tenderness.   There is no splenomegaly. There is no hepatomegaly.   Neurological: Patient is alert.    Skin:  Warm and dry.  No rash.         Results Review:    I reviewed the patient's new clinical results.    Results from last 7 days   Lab Units 02/11/19  0530 02/10/19  1935   WBC 10*3/mm3 13.85* 19.49*   HEMOGLOBIN g/dL 12.1 16.0*   PLATELETS 10*3/mm3 220 255     Results from last 7 days   Lab Units 02/11/19  0530 02/10/19  1935   SODIUM mmol/L 144 140   POTASSIUM mmol/L 4.1 4.4   CHLORIDE mmol/L 109* 99   CO2 mmol/L 24.7 24.3   BUN mg/dL 14 17   CREATININE mg/dL 0.46* 0.60   CALCIUM mg/dL 7.5* 9.7   GLUCOSE mg/dL 84 120*     Results from last 7 days   Lab Units 02/11/19  0530   INR  8.17*     Hemoglobin A1C:No results found for: HGBA1C    Glucose Range:No results found for: POCGLU    No results found for: IEHLPKGO02    Lab Results   Component Value Date    TSH 1.830 02/10/2019       Assessment & " Plan      Medication Review: Yes    Active Hospital Problems    Diagnosis Date Noted   • **Constipation [K59.00] 02/10/2019   • Nausea and vomiting [R11.2] 02/10/2019   • Supratherapeutic INR [R79.1] 02/10/2019   • Long term (current) use of antibiotics [Z79.2] 02/10/2019   • Long term (current) use of anticoagulants [Z79.01] 02/10/2019   • Leukocytosis [D72.829] 02/10/2019   • Dehydration [E86.0] 02/10/2019   • History of DVT (deep vein thrombosis) [Z86.718] 04/26/2018   • Hypertension [I10] 04/26/2018   • Status post left hip replacement [Z96.642] 02/27/2018      Resolved Hospital Problems   No resolved problems to display.       Assessment/Plan  1.  Severe constipation with nausea and vomiting, much improved.  She received mag citrate, Dulcolax MiraLAX and Senokot.  Stop IV fluids.  2.  Coagulopathy.  She was on Levaquin and Coumadin.  INR is higher today at greater than 8.  10 mg of oral vitamin K.  Recheck in a.m.  Expect that it will be improved and patient will be able to be discharged home  3.  Chronic left hip infection.  She is on Augmentin and Ceclor for chronic suppression.  She completed 10 weeks of IV ertapenem  4.  Crackles right base.  Scar tissue noted on CT.  Patient asymptomatic.    Olga Humphries MD  02/11/19  6:00 PM

## 2019-02-12 VITALS
DIASTOLIC BLOOD PRESSURE: 69 MMHG | BODY MASS INDEX: 25.16 KG/M2 | OXYGEN SATURATION: 97 % | SYSTOLIC BLOOD PRESSURE: 131 MMHG | HEIGHT: 59 IN | WEIGHT: 124.78 LBS | TEMPERATURE: 97.2 F | RESPIRATION RATE: 16 BRPM | HEART RATE: 77 BPM

## 2019-02-12 LAB
ANION GAP SERPL CALCULATED.3IONS-SCNC: 10.6 MMOL/L
BASOPHILS # BLD AUTO: 0.03 10*3/MM3 (ref 0–0.2)
BASOPHILS NFR BLD AUTO: 0.3 % (ref 0–1.5)
BUN BLD-MCNC: 10 MG/DL (ref 8–23)
BUN/CREAT SERPL: 22.2 (ref 7–25)
CALCIUM SPEC-SCNC: 8 MG/DL (ref 8.6–10.5)
CHLORIDE SERPL-SCNC: 108 MMOL/L (ref 98–107)
CO2 SERPL-SCNC: 24.4 MMOL/L (ref 22–29)
CREAT BLD-MCNC: 0.45 MG/DL (ref 0.57–1)
DEPRECATED RDW RBC AUTO: 51.3 FL (ref 37–54)
EOSINOPHIL # BLD AUTO: 0.14 10*3/MM3 (ref 0–0.4)
EOSINOPHIL NFR BLD AUTO: 1.4 % (ref 0.3–6.2)
ERYTHROCYTE [DISTWIDTH] IN BLOOD BY AUTOMATED COUNT: 14.8 % (ref 12.3–15.4)
GFR SERPL CREATININE-BSD FRML MDRD: 137 ML/MIN/1.73
GLUCOSE BLD-MCNC: 91 MG/DL (ref 65–99)
HCT VFR BLD AUTO: 39.3 % (ref 34–46.6)
HGB BLD-MCNC: 11.6 G/DL (ref 12–15.9)
IMM GRANULOCYTES # BLD AUTO: 0.34 10*3/MM3 (ref 0–0.05)
IMM GRANULOCYTES NFR BLD AUTO: 3.5 % (ref 0–0.5)
INR PPP: 1.71 (ref 0.9–1.1)
LYMPHOCYTES # BLD AUTO: 2.56 10*3/MM3 (ref 0.7–3.1)
LYMPHOCYTES NFR BLD AUTO: 26 % (ref 19.6–45.3)
MCH RBC QN AUTO: 28.2 PG (ref 26.6–33)
MCHC RBC AUTO-ENTMCNC: 29.5 G/DL (ref 31.5–35.7)
MCV RBC AUTO: 95.4 FL (ref 79–97)
MONOCYTES # BLD AUTO: 0.94 10*3/MM3 (ref 0.1–0.9)
MONOCYTES NFR BLD AUTO: 9.6 % (ref 5–12)
NEUTROPHILS # BLD AUTO: 5.83 10*3/MM3 (ref 1.4–7)
NEUTROPHILS NFR BLD AUTO: 59.2 % (ref 42.7–76)
NRBC BLD AUTO-RTO: 0 /100 WBC (ref 0–0)
PLATELET # BLD AUTO: 196 10*3/MM3 (ref 140–450)
PMV BLD AUTO: 12.2 FL (ref 6–12)
POTASSIUM BLD-SCNC: 3.9 MMOL/L (ref 3.5–5.2)
PROTHROMBIN TIME: 19.7 SECONDS (ref 11.7–14.2)
RBC # BLD AUTO: 4.12 10*6/MM3 (ref 3.77–5.28)
SODIUM BLD-SCNC: 143 MMOL/L (ref 136–145)
WBC NRBC COR # BLD: 9.84 10*3/MM3 (ref 3.4–10.8)

## 2019-02-12 PROCEDURE — 85025 COMPLETE CBC W/AUTO DIFF WBC: CPT | Performed by: INTERNAL MEDICINE

## 2019-02-12 PROCEDURE — 85610 PROTHROMBIN TIME: CPT | Performed by: INTERNAL MEDICINE

## 2019-02-12 PROCEDURE — 63710000001 PREDNISONE PER 5 MG: Performed by: INTERNAL MEDICINE

## 2019-02-12 PROCEDURE — 80048 BASIC METABOLIC PNL TOTAL CA: CPT | Performed by: INTERNAL MEDICINE

## 2019-02-12 PROCEDURE — G0378 HOSPITAL OBSERVATION PER HR: HCPCS

## 2019-02-12 RX ORDER — SENNA AND DOCUSATE SODIUM 50; 8.6 MG/1; MG/1
2 TABLET, FILM COATED ORAL 2 TIMES DAILY PRN
Start: 2019-02-12

## 2019-02-12 RX ORDER — WARFARIN SODIUM 2 MG/1
TABLET ORAL
Start: 2019-02-12 | End: 2019-01-01 | Stop reason: SDUPTHER

## 2019-02-12 RX ORDER — POLYETHYLENE GLYCOL 3350 17 G/17G
17 POWDER, FOR SOLUTION ORAL 2 TIMES DAILY
Start: 2019-02-12

## 2019-02-12 RX ORDER — WARFARIN SODIUM 5 MG/1
5 TABLET ORAL
Status: DISCONTINUED | OUTPATIENT
Start: 2019-02-12 | End: 2019-02-12 | Stop reason: HOSPADM

## 2019-02-12 RX ADMIN — Medication 800 MCG: at 06:35

## 2019-02-12 RX ADMIN — SODIUM CHLORIDE, PRESERVATIVE FREE 3 ML: 5 INJECTION INTRAVENOUS at 08:40

## 2019-02-12 RX ADMIN — GABAPENTIN 300 MG: 300 CAPSULE ORAL at 08:39

## 2019-02-12 RX ADMIN — POLYETHYLENE GLYCOL 3350 17 G: 17 POWDER, FOR SOLUTION ORAL at 08:40

## 2019-02-12 RX ADMIN — CEFUROXIME AXETIL 500 MG: 250 TABLET ORAL at 08:39

## 2019-02-12 RX ADMIN — ATORVASTATIN CALCIUM 20 MG: 20 TABLET, FILM COATED ORAL at 08:39

## 2019-02-12 RX ADMIN — AMOXICILLIN AND CLAVULANATE POTASSIUM 1 TABLET: 875; 125 TABLET, FILM COATED ORAL at 08:39

## 2019-02-12 RX ADMIN — PREDNISONE 10 MG: 10 TABLET ORAL at 08:39

## 2019-02-12 NOTE — DISCHARGE SUMMARY
Date of Admission: 2/10/2019  Date of Discharge:  2/12/2019  Primary Care Physician: Manan Cintron MD     Discharge Diagnosis:  Active Hospital Problems    Diagnosis Date Noted   • **Constipation [K59.00] 02/10/2019   • Nausea and vomiting [R11.2] 02/10/2019   • Supratherapeutic INR [R79.1] 02/10/2019   • Long term (current) use of antibiotics [Z79.2] 02/10/2019   • Long term (current) use of anticoagulants [Z79.01] 02/10/2019   • Leukocytosis [D72.829] 02/10/2019   • Dehydration [E86.0] 02/10/2019   • History of DVT (deep vein thrombosis) [Z86.718] 04/26/2018   • Hypertension [I10] 04/26/2018   • Status post left hip replacement [Z96.642] 02/27/2018      Resolved Hospital Problems   No resolved problems to display.       Presenting Problem/History of Present Illness:  Warfarin-induced coagulopathy (CMS/HCC) [D68.32, T45.515A]  Constipation, unspecified constipation type [K59.00]  Constipation, unspecified constipation type [K59.00]     Hospital Course:  The patient is a 73 y.o. woman admitted for abdominal pain.  CT showed severe constipation.  She was started on MiraLAX, Senokot and Dulcolax.  She had multiple bowel movements and felt immensely better.  INR at admission was just over 6 and it worsened yesterday to over 8.  She received 1 dose of oral vitamin K.  Her level today is improved.  She is on chronic antibiotics for history of left septic hip.  I discussed this with her.  The antibiotic so will keep the INR prolonged period she will take 5 mg of Coumadin today and then start 3 mg daily tomorrow.  She will have an INR drawn at her cardiology office 2/14/19.    She had a few crackles on the right lung again today but it was better than yesterday.  X-ray had shown some scar tissue.  No evidence of pneumonia or heart failure      Stable condition; good prognosis    Exam Today: Feels great.  Anxious to go home.  Has been up and about.  Vital signs noted.  No distress. Heart reg. Lungs few crackles  right base, improved from yesterday. abd soft and NT. No mass. extrem-  No edema    Procedures Performed:  Ct Abdomen Pelvis With Contrast    Result Date: 2/10/2019  IMPRESSION : 1. Severe constipation without obstruction. 2. Minimal pelvic ascites   This report was finalized on 2/10/2019 8:50 PM by Juancarlos Whitehead M.D.             Labs:  Lab Results   Component Value Date    WBC 9.84 02/12/2019    HGB 11.6 (L) 02/12/2019    HCT 39.3 02/12/2019     02/12/2019     Lab Results   Component Value Date     02/12/2019    K 3.9 02/12/2019     (H) 02/12/2019    CO2 24.4 02/12/2019    BUN 10 02/12/2019    CREATININE 0.45 (L) 02/12/2019    GLUCOSE 91 02/12/2019     Lab Results   Component Value Date    INR 1.71 (H) 02/12/2019           Consults:   None     Discharge Disposition:  Home or Self Care    Discharge Medications:     Discharge Medications      New Medications      Instructions Start Date   sennosides-docusate sodium 8.6-50 MG tablet  Commonly known as:  SENOKOT-S   2 tablets, Oral, 2 Times Daily PRN         Changes to Medications      Instructions Start Date   polyethylene glycol packet  Commonly known as:  MIRALAX  What changed:    · when to take this  · reasons to take this  Notes to patient:  2/12/19 PM   17 g, Oral, 2 Times Daily      warfarin 2 MG tablet  Commonly known as:  COUMADIN  What changed:    · medication strength  · additional instructions  · Another medication with the same name was removed. Continue taking this medication, and follow the directions you see here.  Notes to patient:  2/12/19 PM   Take 5mg today then 3mg daily starting 2/13.         Continue These Medications      Instructions Start Date   amoxicillin-clavulanate 875-125 MG per tablet  Commonly known as:  AUGMENTIN  Notes to patient:  2/12/19 PM   1 tablet, Oral, Every 12 Hours Scheduled      CALCIUM 1200+D3 PO   Oral      cefaclor 500 MG capsule  Commonly known as:  CECLOR   500 mg, Oral, 2 Times Daily       cholecalciferol 1000 units tablet  Commonly known as:  VITAMIN D3   2,000 Units, Oral, Daily      FERROUSUL 325 (65 FE) MG tablet  Generic drug:  ferrous sulfate   325 mg, Oral, Daily With Breakfast, On hold for sx.      gabapentin 300 MG capsule  Commonly known as:  NEURONTIN  Notes to patient:  2/12/19 PM   300 mg, Oral, 3 Times Daily      HM VITAMIN B50 COMPLEX PO   1 tablet, Oral, Daily      HYDROcodone-acetaminophen 5-325 MG per tablet  Commonly known as:  NORCO   1 tablet, Oral, Every 8 Hours PRN, 1-2 oral Q4H PRN severe pain      leucovorin 5 MG tablet   5 mg, Oral, Weekly      methotrexate 2.5 MG tablet   2.5 mg, Oral, Weekly, 8 tablets once a week       MULTIVITAMIN ADULT PO   1 tablet, Oral, Daily      predniSONE 10 MG tablet  Commonly known as:  DELTASONE  Notes to patient:  2/13/19 PM   10 mg, Oral, Daily      REMICADE IV   Intravenous, Every 28 Days      simvastatin 40 MG tablet  Commonly known as:  ZOCOR   40 mg, Oral, Every Morning         Stop These Medications    COLACE PO     Cranberry 1000 MG capsule     folic acid 800 MCG tablet  Commonly known as:  FOLVITE     LASIX 40 MG tablet  Generic drug:  furosemide     metoprolol succinate XL 50 MG 24 hr tablet  Commonly known as:  TOPROL-XL     potassium chloride 20 MEQ CR tablet  Commonly known as:  K-RAMÓN TANNEROR-CON            Discharge Diet:  regular    Activity at Discharge:  ad ron.    Follow-up Appointments:  Future Appointments   Date Time Provider Department Center   2/14/2019  3:15 PM [facility] None   2/28/2019  1:20 PM Vijay Arango MD [facility] None   3/6/2019 10:00 AM Minda Souza MD [facility] None   4/11/2019  2:00 PM Obed Barney MD [facility] None     Follow-up Information     Manan Cintron MD. Schedule an appointment as soon as possible for a visit in 1 week(s).    Specialty:  Family Medicine  Contact information:  [address] [place] [id number]  [phone]                      Test Results Pending at Discharge: None       Olga Humphries MD  02/12/19  3:59 PM    Time Spent on Discharge Activities: 35 minutes.  Discussed with patient and nurse.  None

## 2019-02-12 NOTE — PLAN OF CARE
Problem: Patient Care Overview  Goal: Plan of Care Review  Outcome: Ongoing (interventions implemented as appropriate)   02/12/19 0654   Plan of Care Review   Progress no change   OTHER   Outcome Summary states bm x1, ambulating with cane, poss d/c today     Goal: Discharge Needs Assessment  Outcome: Ongoing (interventions implemented as appropriate)    Goal: Interprofessional Rounds/Family Conf  Outcome: Ongoing (interventions implemented as appropriate)      Problem: Constipation (Adult)  Goal: Identify Related Risk Factors and Signs and Symptoms  Outcome: Ongoing (interventions implemented as appropriate)    Goal: Effective Bowel Elimination  Outcome: Ongoing (interventions implemented as appropriate)    Goal: Comfort  Outcome: Ongoing (interventions implemented as appropriate)      Problem: Pain, Acute (Adult)  Goal: Acceptable Pain Control/Comfort Level  Outcome: Ongoing (interventions implemented as appropriate)

## 2019-02-12 NOTE — PROGRESS NOTES
Discharge Planning Assessment  [facility]     Patient Name: Rachel Moser  MRN: [id number]  Today's Date: 2/12/2019    Admit Date: 2/10/2019    Discharge Needs Assessment    No documentation.       Discharge Plan     Row Name 02/12/19 0952       Plan    Final Discharge Disposition Code  01 - home or self-care         Mikaela Fulton RN

## 2019-02-12 NOTE — PLAN OF CARE
Problem: Patient Care Overview  Goal: Plan of Care Review  Outcome: Ongoing (interventions implemented as appropriate)   02/12/19 0975   Coping/Psychosocial   Plan of Care Reviewed With patient   Plan of Care Review   Progress improving   OTHER   Outcome Summary VSS, pt denies pain, tolerating diet, stool softeners added to daily med regimen, plan to DC home, pt agrees c/ plan       Problem: Constipation (Adult)  Goal: Identify Related Risk Factors and Signs and Symptoms  Outcome: Ongoing (interventions implemented as appropriate)      Problem: Pain, Acute (Adult)  Goal: Acceptable Pain Control/Comfort Level  Outcome: Ongoing (interventions implemented as appropriate)

## 2019-02-14 ENCOUNTER — ANTICOAGULATION VISIT (OUTPATIENT)
Dept: PHARMACY | Facility: HOSPITAL | Age: 74
End: 2019-02-14

## 2019-02-14 DIAGNOSIS — Z86.718 HISTORY OF DVT (DEEP VEIN THROMBOSIS): ICD-10-CM

## 2019-02-14 LAB
INR PPP: 1.1 (ref 0.91–1.09)
PROTHROMBIN TIME: 13.4 SECONDS (ref 10–13.8)

## 2019-02-14 PROCEDURE — 85610 PROTHROMBIN TIME: CPT

## 2019-02-14 PROCEDURE — G0463 HOSPITAL OUTPT CLINIC VISIT: HCPCS

## 2019-02-14 PROCEDURE — 36416 COLLJ CAPILLARY BLOOD SPEC: CPT

## 2019-02-14 NOTE — PROGRESS NOTES
Anticoagulation Clinic Progress Note    Anticoagulation Summary  As of 2019    INR goal:   2.5-3.5   TTR:   50.7 % (3.8 mo)   INR used for dosin.1! (2019)   Warfarin maintenance plan:   5 mg every day   Weekly warfarin total:   35 mg   Plan last modified:   Gaby Sheppard [facility] (2018)   Next INR check:   2019   Priority:   Maintenance   Target end date:   Indefinite    Indications    History of DVT (deep vein thrombosis) [Z86.718]             Anticoagulation Episode Summary     INR check location:       Preferred lab:       Send INR reminders to:    [facility]    Comments:         Anticoagulation Care Providers     Provider Role Specialty Phone number    Vijay Arango MD Referring Cardiology [phone]    Molly Torres [facility] Responsible Pharmacy [phone]          Clinic Interview:      INR History:  Anticoagulation Monitoring 2018   INR 2.5 3.0 1.1   INR Date 2018   INR Goal 2.5-3.5 2.5-3.5 2.5-3.5   Trend Same Same Same   Last Week Total 35 mg 35 mg 23 mg   Next Week Total 35 mg 35 mg 35 mg   Sun 5 mg 5 mg 5 mg   Mon 5 mg 5 mg 5 mg   Tue 5 mg 5 mg 5 mg   Wed 5 mg 5 mg 5 mg   Thu 5 mg 5 mg 5 mg   Fri 5 mg 5 mg 5 mg   Sat 5 mg 5 mg 5 mg   Visit Report - - -   Some recent data might be hidden       Plan:  1. INR is Subtherapeutic today- see above in Anticoagulation Summary.  Held x 2 days this week for elevated INR at hospital admission and received vitamin K .  Will instruct Rachel Moser to Continue their warfarin regimen- see above in Anticoagulation Summary.  2. Follow up in 1 week  3. Patient declines warfarin refills.  4. Verbal and written information provided. Patient expresses understanding and has no further questions at this time.    Rachel Krishnan [facility]

## 2019-02-20 ENCOUNTER — ANTICOAGULATION VISIT (OUTPATIENT)
Dept: PHARMACY | Facility: HOSPITAL | Age: 74
End: 2019-02-20

## 2019-02-20 DIAGNOSIS — Z86.718 HISTORY OF DVT (DEEP VEIN THROMBOSIS): ICD-10-CM

## 2019-02-20 LAB
INR PPP: 2 (ref 0.91–1.09)
PROTHROMBIN TIME: 24.4 SECONDS (ref 10–13.8)

## 2019-02-20 PROCEDURE — 85610 PROTHROMBIN TIME: CPT

## 2019-02-20 PROCEDURE — G0463 HOSPITAL OUTPT CLINIC VISIT: HCPCS

## 2019-02-20 PROCEDURE — 36416 COLLJ CAPILLARY BLOOD SPEC: CPT

## 2019-02-20 NOTE — PROGRESS NOTES
Anticoagulation Clinic Progress Note    Anticoagulation Summary  As of 2019    INR goal:   2.5-3.5   TTR:   48.2 % (4 mo)   INR used for dosin.0! (2019)   Warfarin maintenance plan:   5 mg every day   Weekly warfarin total:   35 mg   Plan last modified:   Gaby Sheppard [facility] (2018)   Next INR check:   2019   Priority:   Maintenance   Target end date:   Indefinite    Indications    History of DVT (deep vein thrombosis) [Z86.718]             Anticoagulation Episode Summary     INR check location:       Preferred lab:       Send INR reminders to:    [facility]    Comments:         Anticoagulation Care Providers     Provider Role Specialty Phone number    Vijay Arango MD Referring Cardiology [phone]    Molly Torres [facility] Responsible Pharmacy [phone]          Clinic Interview:  Patient Findings     Negatives:   Signs/symptoms of thrombosis, Signs/symptoms of bleeding,   Laboratory test error suspected, Change in health, Change in alcohol use,   Change in activity, Upcoming invasive procedure, Emergency department   visit, Upcoming dental procedure, Missed doses, Extra doses, Change in   medications, Change in diet/appetite, Hospital admission, Bruising, Other   complaints      Clinical Outcomes     Negatives:   Major bleeding event, Thromboembolic event,   Anticoagulation-related hospital admission, Anticoagulation-related ED   visit, Anticoagulation-related fatality        INR History:  Anticoagulation Monitoring 2019   INR 3.0 1.1 2.0   INR Date 2019   INR Goal 2.5-3.5 2.5-3.5 2.5-3.5   Trend Same Same Same   Last Week Total 35 mg 23 mg 33 mg   Next Week Total 35 mg 35 mg 37.5 mg   Sun 5 mg 5 mg 5 mg   Mon 5 mg 5 mg 5 mg   Tue 5 mg 5 mg 5 mg   Wed 5 mg 5 mg 7.5 mg (); Otherwise 5 mg   Thu 5 mg 5 mg 5 mg   Fri 5 mg 5 mg 5 mg   Sat 5 mg 5 mg 5 mg   Visit Report - - -   Some recent data might be hidden        Plan:  1. INR is Subtherapeutic today- see above in Anticoagulation Summary.  Will instruct Rachel LYNNE Moser to Continue their warfarin regimen- see above in Anticoagulation Summary.  2. Follow up in 1 week  3. Patient declines warfarin refills.  4. Verbal and written information provided. Patient expresses understanding and has no further questions at this time.    Rachel Krishnan [facility]

## 2019-02-28 ENCOUNTER — OFFICE VISIT (OUTPATIENT)
Dept: CARDIOLOGY | Facility: CLINIC | Age: 74
End: 2019-02-28

## 2019-02-28 ENCOUNTER — ANTICOAGULATION VISIT (OUTPATIENT)
Dept: PHARMACY | Facility: HOSPITAL | Age: 74
End: 2019-02-28

## 2019-02-28 VITALS
HEIGHT: 59 IN | WEIGHT: 122 LBS | HEART RATE: 118 BPM | DIASTOLIC BLOOD PRESSURE: 80 MMHG | SYSTOLIC BLOOD PRESSURE: 150 MMHG | BODY MASS INDEX: 24.6 KG/M2

## 2019-02-28 DIAGNOSIS — I26.99 OTHER PULMONARY EMBOLISM WITHOUT ACUTE COR PULMONALE, UNSPECIFIED CHRONICITY (HCC): ICD-10-CM

## 2019-02-28 DIAGNOSIS — Z86.718 HISTORY OF DVT (DEEP VEIN THROMBOSIS): ICD-10-CM

## 2019-02-28 DIAGNOSIS — I10 ESSENTIAL HYPERTENSION: ICD-10-CM

## 2019-02-28 DIAGNOSIS — E78.2 MIXED HYPERLIPIDEMIA: ICD-10-CM

## 2019-02-28 DIAGNOSIS — Z86.718 HISTORY OF DVT (DEEP VEIN THROMBOSIS): Primary | ICD-10-CM

## 2019-02-28 LAB
INR PPP: 2.5 (ref 0.91–1.09)
PROTHROMBIN TIME: 29.8 SECONDS (ref 10–13.8)

## 2019-02-28 PROCEDURE — 85610 PROTHROMBIN TIME: CPT

## 2019-02-28 PROCEDURE — 93000 ELECTROCARDIOGRAM COMPLETE: CPT | Performed by: INTERNAL MEDICINE

## 2019-02-28 PROCEDURE — 36416 COLLJ CAPILLARY BLOOD SPEC: CPT

## 2019-02-28 PROCEDURE — 99214 OFFICE O/P EST MOD 30 MIN: CPT | Performed by: INTERNAL MEDICINE

## 2019-02-28 RX ORDER — METOPROLOL SUCCINATE 50 MG/1
50 TABLET, EXTENDED RELEASE ORAL DAILY
COMMUNITY
End: 2020-01-01 | Stop reason: SDUPTHER

## 2019-02-28 RX ORDER — POTASSIUM CHLORIDE 20 MEQ/1
20 TABLET, EXTENDED RELEASE ORAL 2 TIMES DAILY
COMMUNITY
End: 2019-05-13 | Stop reason: SDUPTHER

## 2019-02-28 RX ORDER — FUROSEMIDE 40 MG/1
40 TABLET ORAL DAILY
COMMUNITY
End: 2020-01-01 | Stop reason: SDUPTHER

## 2019-02-28 RX ORDER — UREA 10 %
2 LOTION (ML) TOPICAL DAILY
COMMUNITY

## 2019-02-28 NOTE — PROGRESS NOTES
Date of Office Visit: 19  Encounter Provider: Vijay Arango MD  Place of Service: [facility] CARDIOLOGY  Patient Name: Rachel Moser  :1945  Referral Provider:No ref. provider found      No chief complaint on file.    History of Present Illness  Ms. Moser is a pleasant 73-year-old white female who presented to the office in 2011  acutely short of breath, markedly hypoxemic, positive D-dimer. She had a CTA that showed  multiple pulmonary emboli. She was admitted to the hospital and started on Lovenox and  then warfarin. Was found to have bilateral DVTs. She did also have some pleuritic chest  pain with that. She had a followup CT that showed no new clots. She then needed her hip  operated on, and she ended up having inferior vena cava filter placed for that. She comes  in for followup now.  And she has been through a lot this last year she has had 5 surgeries back on her hip.  First had to move some cysts they did that [facility] when she had her hip replaced that got infected had to be removed then she had a spacer in there she developed E. coli infection with sepsis and was in rehab for 3 weeks.  In February she was back in the hospital again with some nausea vomiting who is severely constipated with obstruction from that that resolved.  She had been receiving IV antibiotics.  From a heart standpoint her heart rate had been increased here lately but they did decrease her metoprolol when she was in the hospital because her blood pressure was low.  She does deny any real shortness of breath orthopnea.  Her INRs have been good.  She said this right lower back pain is getting better.  No exertional pain or pressure.  No near syncope or syncope.          Past Medical History:   Diagnosis Date   • Allergic    • Bone infection (CMS/HCC)     hip   • Cataract    • High cholesterol    • History of DVT (deep vein thrombosis)    • History of kidney infection      1 MONTH AGO   • History of pulmonary embolus (PE)    • History of transfusion    • Hypertension    • Left hip postoperative wound infection    • Paralabral cyst of left hip    • PICC (peripherally inserted central catheter) in place    • PONV (postoperative nausea and vomiting)    • Presence of vena cava filter    • Rheumatoid arteritis    • Seasonal allergies    • UTI (urinary tract infection)     diagnosed 4/2/18  medically treated presently   • Wears glasses          Past Surgical History:   Procedure Laterality Date   • BLADDER SUSPENSION     • CATARACT EXTRACTION, BILATERAL     • COLONOSCOPY     • ENDOSCOPIC FUNCTIONAL SINUS SURGERY (FESS)     • HIP SPACER INSERTION WITH ANTIBIOTIC CEMENT Left 8/11/2018    Procedure: TOTAL HIP ARTHROPLASTY REVISION with removal of infected total hip and placement of antibiotic spacer;  Surgeon: Obed Barney MD;  Location: [facility];  Service: Orthopedics   • HIP SURGERY Left 1983   • HIP SURGERY Left 20069   • HIP SURGERY Left 2011   • INCISION AND DRAINAGE HIP Left 11/2/2016    Procedure: HIP INCISION AND DRAINAGE;  Surgeon: Obed Barney MD;  Location: [facility] OR;  Service:    • INCISION AND DRAINAGE HIP Left 7/7/2018    Procedure: I&D and antibiotic bead placement left hip;  Surgeon: Obed Barney MD;  Location: [facility] OR;  Service: Orthopedics   • INCISION AND DRAINAGE HIP Left 7/21/2018    Procedure: HIP INCISION AND DRAINAGE, LEFT WITH POLY HEAD CHANGE AND ANTIBIOTIC BEAD PLACEMENT;  Surgeon: Obed Barney MD;  Location: [facility] OR;  Service: Orthopedics   • JOINT REPLACEMENT Left     HIP   • PERIPHERALLY INSERTED CENTRAL CATHETER INSERTION     • [place] CLOSED RX TRAUMATIC HIP DISLOCATN Left 12/18/2017    Procedure: LEFT HIP CLOSED REDUCTION;  Surgeon: Obed Barney MD;  Location: [facility];  Service: Orthopedics   • SOFT TISSUE MASS EXCISION Left     hip 3/19/18   • TOTAL ABDOMINAL HYSTERECTOMY     • TOTAL HIP ARTHROPLASTY REVISION Left 4/25/2018     Procedure: REVISION OF LEFT ACETABULAR COMPONENT ;  Surgeon: Obed Barney MD;  Location: [facility];  Service: Orthopedics   • TOTAL HIP ARTHROPLASTY REVISION Left 08/11/2018    total hip revision with removal of iinfected total hip and placement of antibiotic spacer   • VENA CAVA FILTER INSERTION           Current Outpatient Medications on File Prior to Visit   Medication Sig Dispense Refill   • amoxicillin-clavulanate (AUGMENTIN) 875-125 MG per tablet Take 1 tablet by mouth Every 12 (Twelve) Hours for 120 days. 240 tablet 3   • B Complex-Biotin-FA (HM VITAMIN B50 COMPLEX PO) Take 1 tablet by mouth Daily.     • Calcium-Magnesium-Vitamin D (CALCIUM 1200+D3 PO) Take  by mouth.     • cefaclor (CECLOR) 500 MG capsule Take 1 capsule by mouth 2 (Two) Times a Day for 90 days. 180 capsule 3   • cholecalciferol (VITAMIN D3) 1000 units tablet Take 2,000 Units by mouth Daily.     • CRANBERRY PO Take  by mouth. Take 2 tablets by mouth daily     • ferrous sulfate (FERROUSUL) 325 (65 FE) MG tablet Take 325 mg by mouth Daily With Breakfast. On hold for sx.     • FOLIC ACID PO Take 4 tablets by mouth daily     • furosemide (LASIX) 40 MG tablet Take 40 mg by mouth Daily.     • gabapentin (NEURONTIN) 300 MG capsule Take 1 capsule by mouth 3 (Three) Times a Day. 90 capsule 3   • metoprolol succinate XL (TOPROL-XL) 50 MG 24 hr tablet Take 50 mg by mouth Daily.     • Multiple Vitamins-Minerals (MULTIVITAMIN ADULT PO) Take 1 tablet by mouth Daily.     • polyethylene glycol (MIRALAX) packet Take 17 g by mouth 2 (Two) Times a Day.     • potassium chloride (K-DUR,KLOR-CON) 20 MEQ CR tablet Take 20 mEq by mouth 2 (Two) Times a Day.     • predniSONE (DELTASONE) 10 MG tablet Take 10 mg by mouth Daily.     • sennosides-docusate sodium (SENOKOT-S) 8.6-50 MG tablet Take 2 tablets by mouth 2 (Two) Times a Day As Needed for Constipation.     • simvastatin (ZOCOR) 40 MG tablet Take 40 mg by mouth Every Morning.     • warfarin (COUMADIN) 2  MG tablet Take 5mg today then 3mg daily starting .     • InFLIXimab (REMICADE IV) Infuse  into a venous catheter Every 28 (Twenty-Eight) Days.     • leucovorin 5 MG tablet Take 5 mg by mouth 1 (One) Time Per Week.     • methotrexate 2.5 MG tablet Take 2.5 mg by mouth 1 (One) Time Per Week. 8 tablets once a week     • [DISCONTINUED] HYDROcodone-acetaminophen (NORCO) 5-325 MG per tablet Take 1 tablet by mouth Every 8 (Eight) Hours As Needed for Moderate Pain  for up to 60 doses. 1-2 oral Q4H PRN severe pain 60 tablet 0     Current Facility-Administered Medications on File Prior to Visit   Medication Dose Route Frequency Provider Last Rate Last Dose   • mupirocin (BACTROBAN) 2 % nasal ointment   Nasal BID Obed Barney MD             Social History     Socioeconomic History   • Marital status:      Spouse name: Not on file   • Number of children: Not on file   • Years of education: Not on file   • Highest education level: Not on file   Social Needs   • Financial resource strain: Not on file   • Food insecurity - worry: Not on file   • Food insecurity - inability: Not on file   • Transportation needs - medical: Not on file   • Transportation needs - non-medical: Not on file   Occupational History   • Not on file   Tobacco Use   • Smoking status: Former Smoker     Types: Cigarettes     Last attempt to quit: 1975     Years since quittin.1   • Smokeless tobacco: Never Used   • Tobacco comment: quit 50 years ago   Substance and Sexual Activity   • Alcohol use: No   • Drug use: No   • Sexual activity: Defer     Partners: Male   Other Topics Concern   • Not on file   Social History Narrative   • Not on file       Family History   Problem Relation Age of Onset   • Malig Hyperthermia Neg Hx          Review of Systems   Constitution: Negative for decreased appetite, diaphoresis, fever, weakness, malaise/fatigue, weight gain and weight loss.   HENT: Positive for hearing loss. Negative for congestion, nosebleeds  "and tinnitus.    Eyes: Negative for blurred vision, double vision, vision loss in left eye, vision loss in right eye and visual disturbance.   Cardiovascular:        As noted in HPI   Respiratory:        As noted HPI   Endocrine: Negative for cold intolerance and heat intolerance.   Hematologic/Lymphatic: Negative for bleeding problem. Does not bruise/bleed easily.   Skin: Negative for color change, flushing, itching and rash.   Musculoskeletal: Positive for joint pain and joint swelling. Negative for arthritis, back pain, muscle weakness and myalgias.   Gastrointestinal: Negative for bloating, abdominal pain, constipation, diarrhea, dysphagia, heartburn, hematemesis, hematochezia, melena, nausea and vomiting.   Genitourinary: Negative for bladder incontinence, dysuria, frequency, nocturia and urgency.   Neurological: Negative for dizziness, focal weakness, headaches, light-headedness, loss of balance, numbness, paresthesias and vertigo.   Psychiatric/Behavioral: Negative for depression, memory loss and substance abuse.       Procedures      ECG 12 Lead  Date/Time: 2/28/2019 1:54 PM  Performed by: Vijay Arango MD  Authorized by: Vijay Arango MD   Comparison: compared with previous ECG   Comparison to previous ECG: HR increased.  Rhythm: sinus tachycardia  Rate: normal  QRS axis: normal                  Objective:    /80 (BP Location: Left arm, Patient Position: Sitting)   Pulse 118   Ht 149.9 cm (59\")   Wt 55.3 kg (122 lb)   BMI 24.64 kg/m²        Physical Exam  Physical Exam   Constitutional: She is oriented to person, place, and time. She appears well-developed and well-nourished. No distress.   HENT:   Head: Normocephalic.   Eyes: Conjunctivae are normal. Pupils are equal, round, and reactive to light. No scleral icterus.   Neck: Normal carotid pulses, no hepatojugular reflux and no JVD present. Carotid bruit is not present. No tracheal deviation, no edema and no erythema present. No " thyromegaly present.   Cardiovascular: Regular rhythm, S1 normal, S2 normal, normal heart sounds and intact distal pulses.  No extrasystoles are present. Tachycardia present. PMI is not displaced. Exam reveals no gallop, no distant heart sounds and no friction rub.   No murmur heard.  Pulses:       Carotid pulses are 2+ on the right side, and 2+ on the left side.       Radial pulses are 2+ on the right side, and 2+ on the left side.        Femoral pulses are 2+ on the right side, and 2+ on the left side.       Dorsalis pedis pulses are 2+ on the right side, and 2+ on the left side.        Posterior tibial pulses are 2+ on the right side, and 2+ on the left side.   Pulmonary/Chest: Effort normal and breath sounds normal. No respiratory distress. She has no decreased breath sounds. She has no wheezes. She has no rhonchi. She has no rales. She exhibits no tenderness.   Abdominal: Soft. Bowel sounds are normal. She exhibits no distension and no mass. There is no hepatosplenomegaly. There is no tenderness. There is no rebound and no guarding.   Musculoskeletal: She exhibits no edema, tenderness or deformity.   Neurological: She is alert and oriented to person, place, and time.   Skin: Skin is warm and dry. No rash noted. She is not diaphoretic. No cyanosis or erythema. No pallor. Nails show no clubbing.   Psychiatric: She has a normal mood and affect. Her speech is normal and behavior is normal. Judgment and thought content normal.           Assessment:     1. This is a 73-year-old white female with history of pulmonary emboli, bilateral lower extremity DVT status post vena cava filter. Now doing well. She is to continue the same and see us in follow up in one year.   2. Hyperlipidemia, on simvastatin.  3. Hypertension. Blood pressure adequately controlled.   4. Recurrent hip problems.  Multiple infections now with a spacer in place but will ultimately need hip replacement again.  5.  Tachycardia is a sinus tachycardia  most likely due to decreasing the metoprolol.  We have asked her to go back up to 50 mg a day follow her blood pressure and call us if any problems.  6.  E. coli infection status post treatment of that following with infectious disease still.         Plan:

## 2019-02-28 NOTE — PROGRESS NOTES
Anticoagulation Clinic Progress Note    Anticoagulation Summary  As of 2019    INR goal:   2.5-3.5   TTR:   45.3 % (4.3 mo)   INR used for dosin.5 (2019)   Warfarin maintenance plan:   5 mg every day   Weekly warfarin total:   35 mg   No change documented:   Lakeisha Goodman   Plan last modified:   Gaby Sheppard [facility] (2018)   Next INR check:   3/14/2019   Priority:   High   Target end date:   Indefinite    Indications    History of DVT (deep vein thrombosis) [Z86.718]             Anticoagulation Episode Summary     INR check location:       Preferred lab:       Send INR reminders to:    [facility]    Comments:         Anticoagulation Care Providers     Provider Role Specialty Phone number    Vijay Arango MD Referring Cardiology [phone]    Molly Torres [facility] Responsible Pharmacy [phone]          Clinic Interview:  Patient Findings     Negatives:   Signs/symptoms of thrombosis, Signs/symptoms of bleeding,   Laboratory test error suspected, Change in health, Change in alcohol use,   Change in activity, Upcoming invasive procedure, Emergency department   visit, Upcoming dental procedure, Missed doses, Extra doses, Change in   medications, Change in diet/appetite, Hospital admission, Bruising, Other   complaints      Clinical Outcomes     Negatives:   Major bleeding event, Thromboembolic event,   Anticoagulation-related hospital admission, Anticoagulation-related ED   visit, Anticoagulation-related fatality        INR History:  Anticoagulation Monitoring 2019   INR 1.1 2.0 2.5   INR Date 2019   INR Goal 2.5-3.5 2.5-3.5 2.5-3.5   Trend Same Same Same   Last Week Total 23 mg 33 mg 35 mg   Next Week Total 35 mg 37.5 mg 35 mg   Sun 5 mg 5 mg 5 mg   Mon 5 mg 5 mg 5 mg   Tue 5 mg 5 mg 5 mg   Wed 5 mg 7.5 mg (); Otherwise 5 mg 5 mg   Thu 5 mg 5 mg 5 mg   Fri 5 mg 5 mg 5 mg   Sat 5 mg 5 mg 5 mg   Visit Report - - -    Some recent data might be hidden       Plan:  1. INR is therapeutic today- see above in Anticoagulation Summary.   Will instruct Rachel Moser to continue their warfarin regimen- see above in Anticoagulation Summary.  2. Follow up in 2 weeks.  3. Patient declines warfarin refills.  4. Verbal and written information provided. Patient expresses understanding and has no further questions at this time.    Lakeisha Goodman

## 2019-03-06 ENCOUNTER — OFFICE VISIT (OUTPATIENT)
Dept: INFECTIOUS DISEASES | Facility: CLINIC | Age: 74
End: 2019-03-06

## 2019-03-06 ENCOUNTER — APPOINTMENT (OUTPATIENT)
Dept: LAB | Facility: HOSPITAL | Age: 74
End: 2019-03-06

## 2019-03-06 VITALS
TEMPERATURE: 97.7 F | HEIGHT: 59 IN | BODY MASS INDEX: 25.92 KG/M2 | DIASTOLIC BLOOD PRESSURE: 82 MMHG | SYSTOLIC BLOOD PRESSURE: 150 MMHG | HEART RATE: 106 BPM | WEIGHT: 128.6 LBS

## 2019-03-06 DIAGNOSIS — Z79.2 LONG TERM CURRENT USE OF ANTIBIOTICS: ICD-10-CM

## 2019-03-06 DIAGNOSIS — M00.852 ARTHRITIS OF LEFT HIP DUE TO OTHER BACTERIA (HCC): Primary | ICD-10-CM

## 2019-03-06 LAB
ALBUMIN SERPL-MCNC: 4.1 G/DL (ref 3.5–5.2)
ALBUMIN/GLOB SERPL: 1.2 G/DL
ALP SERPL-CCNC: 81 U/L (ref 39–117)
ALT SERPL W P-5'-P-CCNC: 16 U/L (ref 1–33)
ANION GAP SERPL CALCULATED.3IONS-SCNC: 16.5 MMOL/L
AST SERPL-CCNC: 15 U/L (ref 1–32)
BASOPHILS # BLD AUTO: 0.05 10*3/MM3 (ref 0–0.2)
BASOPHILS NFR BLD AUTO: 0.4 % (ref 0–1.5)
BILIRUB SERPL-MCNC: 0.3 MG/DL (ref 0.1–1.2)
BUN BLD-MCNC: 13 MG/DL (ref 8–23)
BUN/CREAT SERPL: 20.3 (ref 7–25)
CALCIUM SPEC-SCNC: 9.7 MG/DL (ref 8.6–10.5)
CHLORIDE SERPL-SCNC: 105 MMOL/L (ref 98–107)
CO2 SERPL-SCNC: 19.5 MMOL/L (ref 22–29)
CREAT BLD-MCNC: 0.64 MG/DL (ref 0.57–1)
CRP SERPL-MCNC: 1.15 MG/DL (ref 0–0.5)
DEPRECATED RDW RBC AUTO: 46.7 FL (ref 37–54)
EOSINOPHIL # BLD AUTO: 0.01 10*3/MM3 (ref 0–0.4)
EOSINOPHIL NFR BLD AUTO: 0.1 % (ref 0.3–6.2)
ERYTHROCYTE [DISTWIDTH] IN BLOOD BY AUTOMATED COUNT: 13.9 % (ref 12.3–15.4)
ERYTHROCYTE [SEDIMENTATION RATE] IN BLOOD: 8 MM/HR (ref 0–30)
GFR SERPL CREATININE-BSD FRML MDRD: 91 ML/MIN/1.73
GLOBULIN UR ELPH-MCNC: 3.3 GM/DL
GLUCOSE BLD-MCNC: 159 MG/DL (ref 65–99)
HCT VFR BLD AUTO: 46.8 % (ref 34–46.6)
HGB BLD-MCNC: 14.4 G/DL (ref 12–15.9)
IMM GRANULOCYTES # BLD AUTO: 0.19 10*3/MM3 (ref 0–0.05)
IMM GRANULOCYTES NFR BLD AUTO: 1.5 % (ref 0–0.5)
LYMPHOCYTES # BLD AUTO: 0.57 10*3/MM3 (ref 0.7–3.1)
LYMPHOCYTES NFR BLD AUTO: 4.6 % (ref 19.6–45.3)
MCH RBC QN AUTO: 28.5 PG (ref 26.6–33)
MCHC RBC AUTO-ENTMCNC: 30.8 G/DL (ref 31.5–35.7)
MCV RBC AUTO: 92.5 FL (ref 79–97)
MONOCYTES # BLD AUTO: 0.24 10*3/MM3 (ref 0.1–0.9)
MONOCYTES NFR BLD AUTO: 1.9 % (ref 5–12)
NEUTROPHILS # BLD AUTO: 11.46 10*3/MM3 (ref 1.4–7)
NEUTROPHILS NFR BLD AUTO: 91.5 % (ref 42.7–76)
NRBC BLD AUTO-RTO: 0 /100 WBC (ref 0–0)
PLATELET # BLD AUTO: 236 10*3/MM3 (ref 140–450)
PMV BLD AUTO: 12.8 FL (ref 6–12)
POTASSIUM BLD-SCNC: 4.5 MMOL/L (ref 3.5–5.2)
PROT SERPL-MCNC: 7.4 G/DL (ref 6–8.5)
RBC # BLD AUTO: 5.06 10*6/MM3 (ref 3.77–5.28)
SODIUM BLD-SCNC: 141 MMOL/L (ref 136–145)
WBC NRBC COR # BLD: 12.52 10*3/MM3 (ref 3.4–10.8)

## 2019-03-06 PROCEDURE — 80053 COMPREHEN METABOLIC PANEL: CPT | Performed by: INTERNAL MEDICINE

## 2019-03-06 PROCEDURE — 86140 C-REACTIVE PROTEIN: CPT | Performed by: INTERNAL MEDICINE

## 2019-03-06 PROCEDURE — 36415 COLL VENOUS BLD VENIPUNCTURE: CPT | Performed by: INTERNAL MEDICINE

## 2019-03-06 PROCEDURE — 99214 OFFICE O/P EST MOD 30 MIN: CPT | Performed by: INTERNAL MEDICINE

## 2019-03-06 PROCEDURE — 85025 COMPLETE CBC W/AUTO DIFF WBC: CPT | Performed by: INTERNAL MEDICINE

## 2019-03-06 PROCEDURE — 85652 RBC SED RATE AUTOMATED: CPT | Performed by: INTERNAL MEDICINE

## 2019-03-06 RX ORDER — AMOXICILLIN AND CLAVULANATE POTASSIUM 875; 125 MG/1; MG/1
1 TABLET, FILM COATED ORAL EVERY 12 HOURS SCHEDULED
Qty: 240 TABLET | Refills: 3 | Status: SHIPPED | OUTPATIENT
Start: 2019-03-06 | End: 2019-07-04

## 2019-03-06 NOTE — PROGRESS NOTES
Reason for clinic visits: Follow up for ESBL left prosthetic hip septic arthritis    HPI: Rachel Moser is a 73 y.o. female who was last seen in clinic on 12/5.  Sometime in January she developed bilateral pneumonia and was treated as an outpatient.  Now she is all better.  She feels that her arthritis is acting up and is considering restarting her rheumatoid arthritis medication.  Her hip is doing well without any drainage pain erythema or swelling.  She denies any fevers chills or night sweats.  She is tolerating the antibiotics without any abdominal pain nausea vomiting or diarrhea.  No rashes.     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, status post multiple surgeries now antibiotic spacer in place on suppressive antibiotics    Past Surgical History:   Procedure Laterality Date   • BLADDER SUSPENSION     • CATARACT EXTRACTION, BILATERAL     • COLONOSCOPY     • ENDOSCOPIC FUNCTIONAL SINUS SURGERY (FESS)     • HIP SPACER INSERTION WITH ANTIBIOTIC CEMENT Left 8/11/2018    Procedure: TOTAL HIP ARTHROPLASTY REVISION with removal of infected total hip and placement of antibiotic spacer;  Surgeon: Obed Barney MD;  Location: [facility];  Service: Orthopedics   • HIP SURGERY Left 1983   • HIP SURGERY Left 20069   • HIP SURGERY Left 2011   • INCISION AND DRAINAGE HIP Left 11/2/2016    Procedure: HIP INCISION AND DRAINAGE;  Surgeon: Obed Barney MD;  Location: [facility];  Service:    • INCISION AND DRAINAGE HIP Left 7/7/2018    Procedure: I&D and antibiotic bead placement left hip;  Surgeon: Obed Barney MD;  Location: [facility] OR;  Service: Orthopedics   • INCISION AND DRAINAGE HIP Left 7/21/2018    Procedure: HIP INCISION AND DRAINAGE, LEFT WITH  POLY HEAD CHANGE AND ANTIBIOTIC BEAD PLACEMENT;  Surgeon: Obed Barney MD;  Location: [facility] OR;  Service: Orthopedics   • JOINT REPLACEMENT Left     HIP   • PERIPHERALLY INSERTED CENTRAL CATHETER INSERTION     • [place] CLOSED RX TRAUMATIC HIP DISLOCATN Left 12/18/2017    Procedure: LEFT HIP CLOSED REDUCTION;  Surgeon: Obed Barney MD;  Location: [facility] OR;  Service: Orthopedics   • SOFT TISSUE MASS EXCISION Left     hip 3/19/18   • TOTAL ABDOMINAL HYSTERECTOMY     • TOTAL HIP ARTHROPLASTY REVISION Left 4/25/2018    Procedure: REVISION OF LEFT ACETABULAR COMPONENT ;  Surgeon: Obed Barney MD;  Location: [facility] OR;  Service: Orthopedics   • TOTAL HIP ARTHROPLASTY REVISION Left 08/11/2018    total hip revision with removal of iinfected total hip and placement of antibiotic spacer   • VENA CAVA FILTER INSERTION         Social History   reports that she quit smoking about 44 years ago. Her smoking use included cigarettes. she has never used smokeless tobacco. She reports that she does not drink alcohol or use drugs.    Family History  family history is not on file.    Allergies   Allergen Reactions   • Sulfa Antibiotics Rash       The medication list has been reviewed and updated.     Review of Systems  Pertinent items are noted in HPI, all other systems reviewed and negative    Vital Signs   Temp:  [97.7 °F (36.5 °C)] 97.7 °F (36.5 °C)  Heart Rate:  [106] 106  BP: (150)/(82) 150/82    Physical Exam:   General: In no acute distress  Cardiovascular: RRR, no LE edema    Respiratory: Breathing comfortably on room air   GI: Abdomen is soft, non-tender, non-distended, positive bowel sounds bilaterally  Skin: No rashes , left hip incision healed well without erythema drainage or pain    Lab Results   Component Value Date    WBC 12.52 (H) 03/06/2019    HGB 14.4 03/06/2019    HCT 46.8 (H) 03/06/2019    MCV 92.5 03/06/2019     03/06/2019       Lab Results   Component Value Date    GLUCOSE 159 (H) 03/06/2019     BUN 13 03/06/2019    CREATININE 0.64 03/06/2019    EGFRIFNONA 91 03/06/2019    BCR 20.3 03/06/2019    CO2 19.5 (L) 03/06/2019    CALCIUM 9.7 03/06/2019    ALBUMIN 4.10 03/06/2019    AST 15 03/06/2019    ALT 16 03/06/2019       Lab Results   Component Value Date    SEDRATE 8 03/06/2019       Lab Results   Component Value Date    CRP 1.15 (H) 03/06/2019 7/7 L hip wound ESBL E coli   7/21 L hip Cx:neg   8/11 L hip cx neg    Assessment:  This is a 73 y.o. female who presents to clinic today for follow up of her ESBL left prosthetic hip septic arthritis.  The patient initially underwent incision and drainage with retained hardware on July 7/2018.  She was treated with IV ertapenem but continued to have significant drainage from her hip.  She was taken back to the OR for another incision and drainage on July 21 and ultimately on August 11 all hardware was removed.  Initial operative cultures grew ESBL Escherichia coli.  Her cultures from July 21 and August 11 are negative.  She was treated with IV ertapenem x 10 weeks.  She has been on Augmentin and cefaclor since October 29th.  She does not have any signs or symptoms of infection.  At this time I will stop cefaclor and continue the patient only on Augmentin 875 mg p.o. twice daily for another 3 months of suppressive therapy.    I am okay with the patient to receive any immunomodulatory therapy for her rheumatoid arthritis if that is deemed necessary by her rheumatologist.    Plan:   1.  Continue Augmentin 875 mg by mouth twice a day x 3 months  2. DC cefaclor   2.  Obtain a CBC with differential and CMP   3.  Obtain a ESR and CRP     Return to Infectious Disease clinic in 3 month

## 2019-03-14 ENCOUNTER — ANTICOAGULATION VISIT (OUTPATIENT)
Dept: PHARMACY | Facility: HOSPITAL | Age: 74
End: 2019-03-14

## 2019-03-14 DIAGNOSIS — Z86.718 HISTORY OF DVT (DEEP VEIN THROMBOSIS): ICD-10-CM

## 2019-03-14 LAB
INR PPP: 2.9 (ref 0.91–1.09)
PROTHROMBIN TIME: 34.5 SECONDS (ref 10–13.8)

## 2019-03-14 PROCEDURE — 85610 PROTHROMBIN TIME: CPT

## 2019-03-14 PROCEDURE — 36416 COLLJ CAPILLARY BLOOD SPEC: CPT

## 2019-03-14 NOTE — PROGRESS NOTES
Anticoagulation Clinic Progress Note    Anticoagulation Summary  As of 3/14/2019    INR goal:   2.5-3.5   TTR:   50.7 % (4.8 mo)   INR used for dosin.9 (3/14/2019)   Warfarin maintenance plan:   5 mg every day   Weekly warfarin total:   35 mg   No change documented:   Kaitlin Pitt   Plan last modified:   Gaby Sheppard [facility] (2018)   Next INR check:   2019   Priority:   Maintenance   Target end date:   Indefinite    Indications    History of DVT (deep vein thrombosis) [Z86.718]             Anticoagulation Episode Summary     INR check location:       Preferred lab:       Send INR reminders to:    [facility]    Comments:         Anticoagulation Care Providers     Provider Role Specialty Phone number    Vijay Arango MD Referring Cardiology [phone]    Molly Torres [facility] Responsible Pharmacy [phone]          Clinic Interview:  Patient Findings     Negatives:   Signs/symptoms of thrombosis, Signs/symptoms of bleeding,   Laboratory test error suspected, Change in health, Change in alcohol use,   Change in activity, Upcoming invasive procedure, Emergency department   visit, Upcoming dental procedure, Missed doses, Extra doses, Change in   medications, Change in diet/appetite, Hospital admission, Bruising, Other   complaints      Clinical Outcomes     Negatives:   Major bleeding event, Thromboembolic event,   Anticoagulation-related hospital admission, Anticoagulation-related ED   visit, Anticoagulation-related fatality        INR History:  Anticoagulation Monitoring 2019 2019 3/14/2019   INR 2.0 2.5 2.9   INR Date 2019 2019 3/14/2019   INR Goal 2.5-3.5 2.5-3.5 2.5-3.5   Trend Same Same Same   Last Week Total 33 mg 35 mg 35 mg   Next Week Total 37.5 mg 35 mg 35 mg   Sun 5 mg 5 mg 5 mg   Mon 5 mg 5 mg 5 mg   Tue 5 mg 5 mg 5 mg   Wed 7.5 mg (); Otherwise 5 mg 5 mg 5 mg   Thu 5 mg 5 mg 5 mg   Fri 5 mg 5 mg 5 mg   Sat 5 mg 5 mg 5 mg   Visit  Report - - -   Some recent data might be hidden       Plan:  1. INR is therapeutic today- see above in Anticoagulation Summary.   Will instruct Rachel Moser to continue their warfarin regimen- see above in Anticoagulation Summary.  2. Follow up in 4 weeks.  3. Patient declines warfarin refills.  4. Verbal and written information provided. Patient expresses understanding and has no further questions at this time.    Kaitlin Pitt

## 2019-04-10 RX ORDER — WARFARIN SODIUM 5 MG/1
TABLET ORAL
Qty: 135 TABLET | Refills: 0 | Status: SHIPPED | OUTPATIENT
Start: 2019-04-10 | End: 2019-07-30 | Stop reason: SDUPTHER

## 2019-04-11 ENCOUNTER — ANTICOAGULATION VISIT (OUTPATIENT)
Dept: PHARMACY | Facility: HOSPITAL | Age: 74
End: 2019-04-11

## 2019-04-11 ENCOUNTER — OFFICE VISIT (OUTPATIENT)
Dept: ORTHOPEDIC SURGERY | Facility: CLINIC | Age: 74
End: 2019-04-11

## 2019-04-11 VITALS — BODY MASS INDEX: 24.74 KG/M2 | WEIGHT: 126 LBS | HEIGHT: 60 IN | TEMPERATURE: 98.7 F

## 2019-04-11 DIAGNOSIS — Z96.649 S/P REVISION OF TOTAL HIP: Primary | ICD-10-CM

## 2019-04-11 DIAGNOSIS — Z86.718 HISTORY OF DVT (DEEP VEIN THROMBOSIS): ICD-10-CM

## 2019-04-11 LAB
INR PPP: 1.9 (ref 0.91–1.09)
PROTHROMBIN TIME: 22.6 SECONDS (ref 10–13.8)

## 2019-04-11 PROCEDURE — 73502 X-RAY EXAM HIP UNI 2-3 VIEWS: CPT | Performed by: ORTHOPAEDIC SURGERY

## 2019-04-11 PROCEDURE — 36416 COLLJ CAPILLARY BLOOD SPEC: CPT

## 2019-04-11 PROCEDURE — 85610 PROTHROMBIN TIME: CPT

## 2019-04-11 PROCEDURE — 99213 OFFICE O/P EST LOW 20 MIN: CPT | Performed by: ORTHOPAEDIC SURGERY

## 2019-04-11 PROCEDURE — G0463 HOSPITAL OUTPT CLINIC VISIT: HCPCS

## 2019-04-11 NOTE — PROGRESS NOTES
Anticoagulation Clinic Progress Note    Anticoagulation Summary  As of 2019    INR goal:   2.5-3.5   TTR:   48.9 % (5.7 mo)   INR used for dosin.9! (2019)   Warfarin maintenance plan:   7.5 mg every Thu; 5 mg all other days   Weekly warfarin total:   37.5 mg   Plan last modified:   Rachel Krishnan RPH (2019)   Next INR check:   2019   Priority:   Maintenance   Target end date:   Indefinite    Indications    History of DVT (deep vein thrombosis) [Z86.718]             Anticoagulation Episode Summary     INR check location:       Preferred lab:       Send INR reminders to:   [facility] [place] CLINICAL POOL    Comments:         Anticoagulation Care Providers     Provider Role Specialty Phone number    Vijay Arango MD Referring Cardiology [phone]    Molly Torres RPH Responsible Pharmacy [phone]          Clinic Interview:      INR History:  Anticoagulation Monitoring 2019 3/14/2019 2019   INR 2.5 2.9 1.9   INR Date 2019 3/14/2019 2019   INR Goal 2.5-3.5 2.5-3.5 2.5-3.5   Trend Same Same Up   Last Week Total 35 mg 35 mg 35 mg   Next Week Total 35 mg 35 mg 40 mg   Sun 5 mg 5 mg 5 mg   Mon 5 mg 5 mg 5 mg   Tue 5 mg 5 mg 5 mg   Wed 5 mg 5 mg 5 mg   Thu 5 mg 5 mg 10 mg (); Otherwise 7.5 mg   Fri 5 mg 5 mg 5 mg   Sat 5 mg 5 mg 5 mg   Visit Report - - -   Some recent data might be hidden       Plan:  1. INR is Subtherapeutic today- see above in Anticoagulation Summary.  Will instruct Rachel LYNNE Moser to Increase their warfarin regimen- see above in Anticoagulation Summary.  2. Follow up in 2 weeks  3. Patient declines warfarin refills.  4. Verbal and written information provided. Patient expresses understanding and has no further questions at this time.    Rachel Krishnan RPH

## 2019-04-11 NOTE — PROGRESS NOTES
Patient: Rachel Moser  YOB: 1945 73 y.o. female  Medical Record Number: [id number]    Chief Complaints:   Chief Complaint   Patient presents with   • Left Hip - Follow-up       History of Present Illness:Rachel Moser is a 73 y.o. female who presents with follow-up of left complex hip reconstruction subsequent spacer placement.  Overall she is feeling well she denies any fever chills.  She is minimal to no hip pain.  She is ambulating with a cane.  Strength is improved since last visit.  Still on Augmentin for ESBL but she has stopped the Ceclor.    Allergies:   Allergies   Allergen Reactions   • Sulfa Antibiotics Rash     Other reaction(s): Skin irritation       Medications:   Current Outpatient Medications   Medication Sig Dispense Refill   • amoxicillin-clavulanate (AUGMENTIN) 875-125 MG per tablet Take 1 tablet by mouth Every 12 (Twelve) Hours for 120 days. 240 tablet 3   • B Complex-Biotin-FA (HM VITAMIN B50 COMPLEX PO) Take 1 tablet by mouth Daily.     • Calcium-Magnesium-Vitamin D (CALCIUM 1200+D3 PO) Take  by mouth.     • cholecalciferol (VITAMIN D3) 1000 units tablet Take 2,000 Units by mouth Daily.     • CRANBERRY PO Take  by mouth. Take 2 tablets by mouth daily     • ferrous sulfate (FERROUSUL) 325 (65 FE) MG tablet Take 325 mg by mouth Daily With Breakfast. On hold for sx.     • FOLIC ACID PO Take 4 tablets by mouth daily     • furosemide (LASIX) 40 MG tablet Take 40 mg by mouth Daily.     • gabapentin (NEURONTIN) 300 MG capsule Take 1 capsule by mouth 3 (Three) Times a Day. 90 capsule 3   • InFLIXimab (REMICADE IV) Infuse  into a venous catheter Every 28 (Twenty-Eight) Days.     • leucovorin 5 MG tablet Take 5 mg by mouth 1 (One) Time Per Week.     • methotrexate 2.5 MG tablet Take 2.5 mg by mouth 1 (One) Time Per Week. 8 tablets once a week     • metoprolol succinate XL (TOPROL-XL) 50 MG 24 hr tablet Take 50 mg by mouth Daily.     • Multiple Vitamins-Minerals (MULTIVITAMIN ADULT PO)  "Take 1 tablet by mouth Daily.     • polyethylene glycol (MIRALAX) packet Take 17 g by mouth 2 (Two) Times a Day.     • potassium chloride (K-DUR,KLOR-CON) 20 MEQ CR tablet Take 20 mEq by mouth 2 (Two) Times a Day.     • predniSONE (DELTASONE) 10 MG tablet Take 10 mg by mouth Daily.     • sennosides-docusate sodium (SENOKOT-S) 8.6-50 MG tablet Take 2 tablets by mouth 2 (Two) Times a Day As Needed for Constipation.     • simvastatin (ZOCOR) 40 MG tablet Take 40 mg by mouth Every Morning.     • warfarin (COUMADIN) 2 MG tablet Take 5mg today then 3mg daily starting 2/13.     • warfarin (COUMADIN) 5 MG tablet TAKE 1 & 1/2 (ONE & ONE-HALF) TABLETS BY MOUTH ONCE DAILY OR  AS  DIRECTED 135 tablet 0     No current facility-administered medications for this visit.      Facility-Administered Medications Ordered in Other Visits   Medication Dose Route Frequency Provider Last Rate Last Dose   • mupirocin (BACTROBAN) 2 % nasal ointment   Nasal BID Obed Barney MD             The following portions of the patient's history were reviewed and updated as appropriate: allergies, current medications, past family history, past medical history, past social history, past surgical history and problem list.    Review of Systems:   A 14 point review of systems was performed. All systems negative except pertinent positives/negative listed in HPI above    Physical Exam:   Vitals:    04/11/19 1416   Temp: 98.7 °F (37.1 °C)   TempSrc: Temporal   Weight: 57.2 kg (126 lb)   Height: 152.4 cm (60\")       General: A and O x 3, ASA, NAD    SCLERA:    Normal    DENTITION:   Normal   Mild diffuse swelling about the left thigh and leg but there is no evidence of fluid collection her incisions are nicely healed she has about 90 degrees of flexion she walks with a slight limp and has mild hip flexion weakness but good distal quad strength calf is soft she is intact light touch palpable distal pulses      Radiology:  Xrays 2views left hip (AP bilateral " hips, and lateral of the hip) ordered and reviewed for evaluation of hip pain  demonstrating a well-positioned antibiotic spacer there is no evidence of loosening she does have some mild to moderate proximal heterotopic ossification in comparison to previous films there is been no subsidence or change in position of the spacer    Assessment/Plan: 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  She will continue with the Augmentin per Dr. Souza's note for at least 2 more months.  Her sed rate and C-reactive proteins are improving nicely.  At this point I am not considering reimplantation although will likely need to consider further surgery down the road if there is further bone loss around the antibiotic spacer.  This is something we will address down the road when this but when/ if this becomes necessary.  This is a very difficult situation but she seems to be functioning well in the interim.  We will repeat x-rays when she returns at her next visit.      Obed Barney MD  4/11/2019

## 2019-04-25 ENCOUNTER — ANTICOAGULATION VISIT (OUTPATIENT)
Dept: PHARMACY | Facility: HOSPITAL | Age: 74
End: 2019-04-25

## 2019-04-25 DIAGNOSIS — Z86.718 HISTORY OF DVT (DEEP VEIN THROMBOSIS): ICD-10-CM

## 2019-04-25 LAB
INR PPP: 1.9 (ref 0.91–1.09)
PROTHROMBIN TIME: 23.2 SECONDS (ref 10–13.8)

## 2019-04-25 PROCEDURE — G0463 HOSPITAL OUTPT CLINIC VISIT: HCPCS

## 2019-04-25 PROCEDURE — 36416 COLLJ CAPILLARY BLOOD SPEC: CPT

## 2019-04-25 PROCEDURE — 85610 PROTHROMBIN TIME: CPT

## 2019-04-25 NOTE — PROGRESS NOTES
Anticoagulation Clinic Progress Note    Anticoagulation Summary  As of 2019    INR goal:   2.5-3.5   TTR:   45.2 % (6.2 mo)   INR used for dosin.9! (2019)   Warfarin maintenance plan:   7.5 mg every Mon, Thu; 5 mg all other days   Weekly warfarin total:   40 mg   Plan last modified:   Rachel Krishnan RPH (2019)   Next INR check:   2019   Priority:   Maintenance   Target end date:   Indefinite    Indications    History of DVT (deep vein thrombosis) [Z86.718]             Anticoagulation Episode Summary     INR check location:       Preferred lab:       [facility]    Comments:         Anticoagulation Care Providers     Provider Role Specialty Phone number    Vijay Arango MD Referring Cardiology [phone]    Molly Torres RP Responsible Pharmacy [phone]          Clinic Interview:      INR History:  Anticoagulation Monitoring 3/14/2019 2019 2019   INR 2.9 1.9 1.9   INR Date 3/14/2019 2019 2019   INR Goal 2.5-3.5 2.5-3.5 2.5-3.5   Trend Same Up Up   Last Week Total 35 mg 35 mg 37.5 mg   Next Week Total 35 mg 40 mg 47.5 mg   Sun 5 mg 5 mg 5 mg   Mon 5 mg 5 mg 7.5 mg   Tue 5 mg 5 mg 5 mg   Wed 5 mg 5 mg 5 mg   Thu 5 mg 10 mg (); Otherwise 7.5 mg 10 mg ()   Fri 5 mg 5 mg 10 mg ()   Sat 5 mg 5 mg 5 mg   Visit Report - - -   Some recent data might be hidden       Plan:  1. INR is Subtherapeutic today- see above in Anticoagulation Summary.  Will instruct Rachel LYNNE Moser to Change their warfarin regimen- see above in Anticoagulation Summary.  2. Follow up in 1 week  3. Patient declines warfarin refills.  4. Verbal and written information provided. Patient expresses understanding and has no further questions at this time.    Rachel Krishnan [facility]

## 2019-05-03 ENCOUNTER — ANTICOAGULATION VISIT (OUTPATIENT)
Dept: PHARMACY | Facility: HOSPITAL | Age: 74
End: 2019-05-03

## 2019-05-03 DIAGNOSIS — Z86.718 HISTORY OF DVT (DEEP VEIN THROMBOSIS): ICD-10-CM

## 2019-05-03 LAB
INR PPP: 2 (ref 0.91–1.09)
PROTHROMBIN TIME: 23.9 SECONDS (ref 10–13.8)

## 2019-05-03 PROCEDURE — G0463 HOSPITAL OUTPT CLINIC VISIT: HCPCS

## 2019-05-03 PROCEDURE — 36416 COLLJ CAPILLARY BLOOD SPEC: CPT

## 2019-05-03 PROCEDURE — 85610 PROTHROMBIN TIME: CPT

## 2019-05-03 NOTE — PROGRESS NOTES
Anticoagulation Clinic Progress Note    Anticoagulation Summary  As of 5/3/2019    INR goal:   2.5-3.5   TTR:   43.4 % (6.4 mo)   INR used for dosin.0! (5/3/2019)   Warfarin maintenance plan:   7.5 mg every Mon, Wed, Fri; 5 mg all other days   Weekly warfarin total:   42.5 mg   Plan last modified:   Thomas Smallwood [facility] (5/3/2019)   Next INR check:   5/10/2019   Priority:   Maintenance   Target end date:   Indefinite    Indications    History of DVT (deep vein thrombosis) [Z86.718]             Anticoagulation Episode Summary     INR check location:       Preferred lab:       Send INR reminders to:    [facility] CLINICAL POOL    Comments:         Anticoagulation Care Providers     Provider Role Specialty Phone number    Vijay Arango MD Referring Cardiology [phone]    Molly Torres [facility] Responsible Pharmacy [phone]          Clinic Interview:  Patient Findings     Negatives:   Signs/symptoms of thrombosis, Signs/symptoms of bleeding,   Laboratory test error suspected, Change in health, Change in alcohol use,   Change in activity, Upcoming invasive procedure, Emergency department   visit, Upcoming dental procedure, Missed doses, Extra doses, Change in   medications, Change in diet/appetite, Hospital admission, Bruising, Other   complaints      Clinical Outcomes     Negatives:   Major bleeding event, Thromboembolic event,   Anticoagulation-related hospital admission, Anticoagulation-related ED   visit, Anticoagulation-related fatality        INR History:  Anticoagulation Monitoring 2019 2019 5/3/2019   INR 1.9 1.9 2.0   INR Date 2019 2019 5/3/2019   INR Goal 2.5-3.5 2.5-3.5 2.5-3.5   Trend Up Up Up   Last Week Total 35 mg 37.5 mg 45 mg   Next Week Total 40 mg 47.5 mg 45 mg   Sun 5 mg 5 mg 5 mg   Mon 5 mg 7.5 mg 7.5 mg   Tue 5 mg 5 mg 5 mg   Wed 5 mg 5 mg 7.5 mg   Thu 10 mg (); Otherwise 7.5 mg 10 mg () 5 mg   Fri 5 mg 10 mg () 10 mg (5/3)   Sat 5 mg 5 mg 5 mg   Visit  Report - - -   Some recent data might be hidden       Plan:  1. INR is Subtherapeutic today- see above in Anticoagulation Summary.  Will instruct Rachel Moser to Increase their warfarin regimen- see above in Anticoagulation Summary.  2. Follow up in 1 week  3. Patient declines warfarin refills.  4. Verbal and written information provided. Patient expresses understanding and has no further questions at this time.    Thomas Smallwood [facility]

## 2019-05-10 ENCOUNTER — ANTICOAGULATION VISIT (OUTPATIENT)
Dept: PHARMACY | Facility: HOSPITAL | Age: 74
End: 2019-05-10

## 2019-05-10 DIAGNOSIS — Z86.718 HISTORY OF DVT (DEEP VEIN THROMBOSIS): ICD-10-CM

## 2019-05-10 LAB
INR PPP: 2.4 (ref 0.91–1.09)
PROTHROMBIN TIME: 29.1 SECONDS (ref 10–13.8)

## 2019-05-10 PROCEDURE — G0463 HOSPITAL OUTPT CLINIC VISIT: HCPCS

## 2019-05-10 PROCEDURE — 36416 COLLJ CAPILLARY BLOOD SPEC: CPT

## 2019-05-10 PROCEDURE — 85610 PROTHROMBIN TIME: CPT

## 2019-05-10 NOTE — PROGRESS NOTES
Anticoagulation Clinic Progress Note    Anticoagulation Summary  As of 5/10/2019    INR goal:   2.5-3.5   TTR:   41.8 % (6.7 mo)   INR used for dosin.4! (5/10/2019)   Warfarin maintenance plan:   7.5 mg every Mon, Wed, Fri; 5 mg all other days   Weekly warfarin total:   42.5 mg   No change documented:   Thomas Smallwood RPH   Plan last modified:   Thomas Smallwood RPH (5/3/2019)   Next INR check:   2019   Priority:   Maintenance   Target end date:   Indefinite    Indications    History of DVT (deep vein thrombosis) [Z86.718]             Anticoagulation Episode Summary     INR check location:       Preferred lab:       Send INR reminders to:   [facility] CLINICAL POOL    Comments:         Anticoagulation Care Providers     Provider Role Specialty Phone number    Viajy Arango MD Referring Cardiology [phone]    Molly Torres RPH Responsible Pharmacy [phone]          Clinic Interview:  Patient Findings     Positives:   Change in medications    Negatives:   Signs/symptoms of thrombosis, Signs/symptoms of bleeding,   Laboratory test error suspected, Change in health, Change in alcohol use,   Change in activity, Upcoming invasive procedure, Emergency department   visit, Upcoming dental procedure, Missed doses, Extra doses, Change in   diet/appetite, Hospital admission, Bruising, Other complaints    Comments:   Started Prednisone 10 mg daily x 10 days yesterday.      Clinical Outcomes     Negatives:   Major bleeding event, Thromboembolic event,   Anticoagulation-related hospital admission, Anticoagulation-related ED   visit, Anticoagulation-related fatality    Comments:   Started Prednisone 10 mg daily x 10 days yesterday.        INR History:  Anticoagulation Monitoring 2019 5/3/2019 5/10/2019   INR 1.9 2.0 2.4   INR Date 2019 5/3/2019 5/10/2019   INR Goal 2.5-3.5 2.5-3.5 2.5-3.5   Trend Up Up Same   Last Week Total 37.5 mg 45 mg 45 mg   Next Week Total 47.5 mg 45 mg 42.5 mg   Sun 5 mg 5  mg 5 mg   Mon 7.5 mg 7.5 mg 7.5 mg   Tue 5 mg 5 mg 5 mg   Wed 5 mg 7.5 mg 7.5 mg   Thu 10 mg (4/25) 5 mg 5 mg   Fri 10 mg (4/26) 10 mg (5/3) 7.5 mg   Sat 5 mg 5 mg 5 mg   Visit Report - - -   Some recent data might be hidden       Plan:  1. INR is Subtherapeutic today- see above in Anticoagulation Summary.  Will instruct Rachel Moser to Continue their warfarin regimen- see above in Anticoagulation Summary.  2. Follow up in 1 week to assess for interaction with prednisone.  3. Patient declines warfarin refills.  4. Verbal and written information provided. Patient expresses understanding and has no further questions at this time.    Thomas Smallwood [facility]

## 2019-05-13 RX ORDER — POTASSIUM CHLORIDE 20 MEQ/1
TABLET, EXTENDED RELEASE ORAL
Qty: 180 TABLET | Refills: 0 | Status: SHIPPED | OUTPATIENT
Start: 2019-05-13 | End: 2019-08-21 | Stop reason: SDUPTHER

## 2019-05-17 ENCOUNTER — ANTICOAGULATION VISIT (OUTPATIENT)
Dept: PHARMACY | Facility: HOSPITAL | Age: 74
End: 2019-05-17

## 2019-05-17 DIAGNOSIS — Z86.718 HISTORY OF DVT (DEEP VEIN THROMBOSIS): ICD-10-CM

## 2019-05-17 LAB
INR PPP: 2.5 (ref 0.91–1.09)
PROTHROMBIN TIME: 30.5 SECONDS (ref 10–13.8)

## 2019-05-17 PROCEDURE — G0463 HOSPITAL OUTPT CLINIC VISIT: HCPCS

## 2019-05-17 PROCEDURE — 36416 COLLJ CAPILLARY BLOOD SPEC: CPT

## 2019-05-17 PROCEDURE — 85610 PROTHROMBIN TIME: CPT

## 2019-05-17 NOTE — PROGRESS NOTES
Anticoagulation Clinic Progress Note    Anticoagulation Summary  As of 2019    INR goal:   2.5-3.5   TTR:   40.4 % (6.9 mo)   INR used for dosin.5 (2019)   Warfarin maintenance plan:   5 mg every Sun, Tue, Thu; 7.5 mg all other days   Weekly warfarin total:   45 mg   Plan last modified:   Thomas Smallwood [facility] (2019)   Next INR check:   2019   Priority:   Maintenance   Target end date:   Indefinite    Indications    History of DVT (deep vein thrombosis) [Z86.718]             Anticoagulation Episode Summary     INR check location:       Preferred lab:       Send INR reminders to:    [facility] CLINICAL POOL    Comments:         Anticoagulation Care Providers     Provider Role Specialty Phone number    Vijay Arango MD Referring Cardiology [phone]    Molly Torres [facility] Responsible Pharmacy [phone]          Clinic Interview:  Patient Findings     Positives:   Change in medications    Negatives:   Signs/symptoms of thrombosis, Signs/symptoms of bleeding,   Laboratory test error suspected, Change in health, Change in alcohol use,   Change in activity, Upcoming invasive procedure, Emergency department   visit, Upcoming dental procedure, Missed doses, Extra doses, Change in   diet/appetite, Hospital admission, Bruising, Other complaints    Comments:   2 days remaining of prednisone.       Clinical Outcomes     Negatives:   Major bleeding event, Thromboembolic event,   Anticoagulation-related hospital admission, Anticoagulation-related ED   visit, Anticoagulation-related fatality    Comments:   2 days remaining of prednisone.         INR History:  Anticoagulation Monitoring 5/3/2019 5/10/2019 2019   INR 2.0 2.4 2.5   INR Date 5/3/2019 5/10/2019 2019   INR Goal 2.5-3.5 2.5-3.5 2.5-3.5   Trend Up Same Up   Last Week Total 45 mg 45 mg 42.5 mg   Next Week Total 45 mg 42.5 mg 45 mg   Sun 5 mg 5 mg 5 mg   Mon 7.5 mg 7.5 mg 7.5 mg   Tue 5 mg 5 mg 5 mg   Wed 7.5 mg 7.5 mg 7.5 mg    Thu 5 mg 5 mg 5 mg   Fri 10 mg (5/3) 7.5 mg 7.5 mg   Sat 5 mg 5 mg 7.5 mg   Visit Report - - -   Some recent data might be hidden       Plan:  1. INR is Therapeutic today- see above in Anticoagulation Summary.  Will instruct Rachel Moser to Increase their warfarin regimen (expect decrease in INR upon completion of prednisone course)- see above in Anticoagulation Summary.  2. Follow up in 2 weeks  3. Patient declines warfarin refills.  4. Verbal and written information provided. Patient expresses understanding and has no further questions at this time.    Thomas Smallwood, [facility]

## 2019-05-31 ENCOUNTER — ANTICOAGULATION VISIT (OUTPATIENT)
Dept: PHARMACY | Facility: HOSPITAL | Age: 74
End: 2019-05-31

## 2019-05-31 DIAGNOSIS — Z79.2 LONG TERM CURRENT USE OF ANTIBIOTICS: Primary | ICD-10-CM

## 2019-05-31 DIAGNOSIS — Z86.718 HISTORY OF DVT (DEEP VEIN THROMBOSIS): ICD-10-CM

## 2019-05-31 DIAGNOSIS — T84.50XD INFECTION OF PROSTHETIC JOINT, SUBSEQUENT ENCOUNTER: ICD-10-CM

## 2019-05-31 LAB
INR PPP: 3 (ref 0.91–1.09)
PROTHROMBIN TIME: 35.8 SECONDS (ref 10–13.8)

## 2019-05-31 PROCEDURE — 85610 PROTHROMBIN TIME: CPT

## 2019-05-31 PROCEDURE — 36416 COLLJ CAPILLARY BLOOD SPEC: CPT

## 2019-05-31 NOTE — PROGRESS NOTES
Anticoagulation Clinic Progress Note    Anticoagulation Summary  As of 5/31/2019    INR goal:   2.5-3.5   TTR:   44.2 % (7.4 mo)   INR used for dosing:   3.0 (5/31/2019)   Warfarin maintenance plan:   5 mg every Sun, Tue, Thu; 7.5 mg all other days   Weekly warfarin total:   45 mg   No change documented:   Thomas Smallwood RPH   Plan last modified:   Thomas Smallwood RPH (5/17/2019)   Next INR check:   6/28/2019   Priority:   Maintenance   Target end date:   Indefinite    Indications    History of DVT (deep vein thrombosis) [Z86.718]             Anticoagulation Episode Summary     INR check location:       Preferred lab:       Send INR reminders to:    [facility] CLINICAL POOL    Comments:         Anticoagulation Care Providers     Provider Role Specialty Phone number    Vijay Arango MD Referring Cardiology [phone]    Molly Torres RP Responsible Pharmacy [phone]          Clinic Interview:  Patient Findings     Negatives:   Signs/symptoms of thrombosis, Signs/symptoms of bleeding,   Laboratory test error suspected, Change in health, Change in alcohol use,   Change in activity, Upcoming invasive procedure, Emergency department   visit, Upcoming dental procedure, Missed doses, Extra doses, Change in   medications, Change in diet/appetite, Hospital admission, Bruising, Other   complaints      Clinical Outcomes     Negatives:   Major bleeding event, Thromboembolic event,   Anticoagulation-related hospital admission, Anticoagulation-related ED   visit, Anticoagulation-related fatality        INR History:  Anticoagulation Monitoring 5/10/2019 5/17/2019 5/31/2019   INR 2.4 2.5 3.0   INR Date 5/10/2019 5/17/2019 5/31/2019   INR Goal 2.5-3.5 2.5-3.5 2.5-3.5   Trend Same Up Same   Last Week Total 45 mg 42.5 mg 45 mg   Next Week Total 42.5 mg 45 mg 45 mg   Sun 5 mg 5 mg 5 mg   Mon 7.5 mg 7.5 mg 7.5 mg   Tue 5 mg 5 mg 5 mg   Wed 7.5 mg 7.5 mg 7.5 mg   Thu 5 mg 5 mg 5 mg   Fri 7.5 mg 7.5 mg 7.5 mg   Sat 5 mg 7.5 mg  7.5 mg   Visit Report - - -   Some recent data might be hidden       Plan:  1. INR is Therapeutic today- see above in Anticoagulation Summary.  Will instruct Rachel Moser to Continue their warfarin regimen- see above in Anticoagulation Summary.  2. Follow up in 4 weeks  3. Patient declines warfarin refills.  4. Verbal and written information provided. Patient expresses understanding and has no further questions at this time.    Thomas Smallwood [facility]

## 2019-06-06 ENCOUNTER — LAB (OUTPATIENT)
Dept: LAB | Facility: HOSPITAL | Age: 74
End: 2019-06-06

## 2019-06-06 DIAGNOSIS — Z79.2 LONG TERM CURRENT USE OF ANTIBIOTICS: ICD-10-CM

## 2019-06-06 DIAGNOSIS — T84.50XD INFECTION OF PROSTHETIC JOINT, SUBSEQUENT ENCOUNTER: ICD-10-CM

## 2019-06-06 LAB
ALBUMIN SERPL-MCNC: 3.8 G/DL (ref 3.5–5.2)
ALBUMIN/GLOB SERPL: 1.1 G/DL
ALP SERPL-CCNC: 89 U/L (ref 39–117)
ALT SERPL W P-5'-P-CCNC: 17 U/L (ref 1–33)
ANION GAP SERPL CALCULATED.3IONS-SCNC: 12 MMOL/L
AST SERPL-CCNC: 18 U/L (ref 1–32)
BASOPHILS # BLD AUTO: 0.04 10*3/MM3 (ref 0–0.2)
BASOPHILS NFR BLD AUTO: 0.4 % (ref 0–1.5)
BILIRUB SERPL-MCNC: 0.4 MG/DL (ref 0.2–1.2)
BUN BLD-MCNC: 9 MG/DL (ref 8–23)
BUN/CREAT SERPL: 16.1 (ref 7–25)
CALCIUM SPEC-SCNC: 9.4 MG/DL (ref 8.6–10.5)
CHLORIDE SERPL-SCNC: 100 MMOL/L (ref 98–107)
CO2 SERPL-SCNC: 26 MMOL/L (ref 22–29)
CREAT BLD-MCNC: 0.56 MG/DL (ref 0.57–1)
DEPRECATED RDW RBC AUTO: 48.8 FL (ref 37–54)
EOSINOPHIL # BLD AUTO: 0.39 10*3/MM3 (ref 0–0.4)
EOSINOPHIL NFR BLD AUTO: 3.8 % (ref 0.3–6.2)
ERYTHROCYTE [DISTWIDTH] IN BLOOD BY AUTOMATED COUNT: 13.8 % (ref 12.3–15.4)
GFR SERPL CREATININE-BSD FRML MDRD: 106 ML/MIN/1.73
GLOBULIN UR ELPH-MCNC: 3.6 GM/DL
GLUCOSE BLD-MCNC: 92 MG/DL (ref 65–99)
HCT VFR BLD AUTO: 48.1 % (ref 34–46.6)
HGB BLD-MCNC: 14.9 G/DL (ref 12–15.9)
IMM GRANULOCYTES # BLD AUTO: 0.15 10*3/MM3 (ref 0–0.05)
IMM GRANULOCYTES NFR BLD AUTO: 1.5 % (ref 0–0.5)
LYMPHOCYTES # BLD AUTO: 1.02 10*3/MM3 (ref 0.7–3.1)
LYMPHOCYTES NFR BLD AUTO: 9.9 % (ref 19.6–45.3)
MCH RBC QN AUTO: 29.7 PG (ref 26.6–33)
MCHC RBC AUTO-ENTMCNC: 31 G/DL (ref 31.5–35.7)
MCV RBC AUTO: 96 FL (ref 79–97)
MONOCYTES # BLD AUTO: 1.25 10*3/MM3 (ref 0.1–0.9)
MONOCYTES NFR BLD AUTO: 12.2 % (ref 5–12)
NEUTROPHILS # BLD AUTO: 7.43 10*3/MM3 (ref 1.7–7)
NEUTROPHILS NFR BLD AUTO: 72.2 % (ref 42.7–76)
NRBC BLD AUTO-RTO: 0 /100 WBC (ref 0–0.2)
PLATELET # BLD AUTO: 222 10*3/MM3 (ref 140–450)
PMV BLD AUTO: 12.1 FL (ref 6–12)
POTASSIUM BLD-SCNC: 4.4 MMOL/L (ref 3.5–5.2)
PROT SERPL-MCNC: 7.4 G/DL (ref 6–8.5)
RBC # BLD AUTO: 5.01 10*6/MM3 (ref 3.77–5.28)
SODIUM BLD-SCNC: 138 MMOL/L (ref 136–145)
WBC NRBC COR # BLD: 10.28 10*3/MM3 (ref 3.4–10.8)

## 2019-06-06 PROCEDURE — 85025 COMPLETE CBC W/AUTO DIFF WBC: CPT

## 2019-06-06 PROCEDURE — 36415 COLL VENOUS BLD VENIPUNCTURE: CPT

## 2019-06-06 PROCEDURE — 80053 COMPREHEN METABOLIC PANEL: CPT

## 2019-06-12 ENCOUNTER — APPOINTMENT (OUTPATIENT)
Dept: LAB | Facility: HOSPITAL | Age: 74
End: 2019-06-12

## 2019-06-12 ENCOUNTER — OFFICE VISIT (OUTPATIENT)
Dept: INFECTIOUS DISEASES | Facility: CLINIC | Age: 74
End: 2019-06-12

## 2019-06-12 VITALS
HEIGHT: 60 IN | TEMPERATURE: 97.8 F | HEART RATE: 75 BPM | BODY MASS INDEX: 24.77 KG/M2 | SYSTOLIC BLOOD PRESSURE: 112 MMHG | WEIGHT: 126.2 LBS | DIASTOLIC BLOOD PRESSURE: 70 MMHG

## 2019-06-12 DIAGNOSIS — Z79.2 LONG TERM CURRENT USE OF ANTIBIOTICS: ICD-10-CM

## 2019-06-12 DIAGNOSIS — T84.50XD INFECTION OF PROSTHETIC JOINT, SUBSEQUENT ENCOUNTER: Primary | ICD-10-CM

## 2019-06-12 LAB
CRP SERPL-MCNC: 1.4 MG/DL (ref 0–0.5)
ERYTHROCYTE [SEDIMENTATION RATE] IN BLOOD: 27 MM/HR (ref 0–30)

## 2019-06-12 PROCEDURE — 86140 C-REACTIVE PROTEIN: CPT | Performed by: INTERNAL MEDICINE

## 2019-06-12 PROCEDURE — 36415 COLL VENOUS BLD VENIPUNCTURE: CPT | Performed by: INTERNAL MEDICINE

## 2019-06-12 PROCEDURE — 99214 OFFICE O/P EST MOD 30 MIN: CPT | Performed by: INTERNAL MEDICINE

## 2019-06-12 PROCEDURE — 85652 RBC SED RATE AUTOMATED: CPT | Performed by: INTERNAL MEDICINE

## 2019-06-12 NOTE — PROGRESS NOTES
Reason for clinic visits: Follow up for ESBL left prosthetic hip septic arthritis    HPI: Rachel Moser is a 73 y.o. female who was last seen in clinic in March.  Since that time she has been doing very well.  She denies any hip pain.  Her incision remains well-healed.  She is tolerating antibiotics without abdominal pain nausea vomiting or diarrhea.  No fevers chills or night sweats.  No rashes.  No shortness of breath or cough.    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, status post multiple surgeries now antibiotic spacer in place on suppressive antibiotics    Past Surgical History:   Procedure Laterality Date   • BLADDER SUSPENSION     • CATARACT EXTRACTION, BILATERAL     • COLONOSCOPY     • ENDOSCOPIC FUNCTIONAL SINUS SURGERY (FESS)     • HIP SPACER INSERTION WITH ANTIBIOTIC CEMENT Left 8/11/2018    Procedure: TOTAL HIP ARTHROPLASTY REVISION with removal of infected total hip and placement of antibiotic spacer;  Surgeon: Obed Barney MD;  Location: [facility];  Service: Orthopedics   • HIP SURGERY Left 1983   • HIP SURGERY Left 20069   • HIP SURGERY Left 2011   • INCISION AND DRAINAGE HIP Left 11/2/2016    Procedure: HIP INCISION AND DRAINAGE;  Surgeon: Obed Barney MD;  Location: [facility];  Service:    • INCISION AND DRAINAGE HIP Left 7/7/2018    Procedure: I&D and antibiotic bead placement left hip;  Surgeon: Obed Barney MD;  Location: [facility];  Service: Orthopedics   • INCISION AND DRAINAGE HIP Left 7/21/2018    Procedure: HIP INCISION AND DRAINAGE, LEFT WITH POLY HEAD CHANGE AND ANTIBIOTIC BEAD PLACEMENT;  Surgeon: Obed Barney MD;  Location: [facility];  Service: Orthopedics   • JOINT REPLACEMENT Left     HIP   •  PERIPHERALLY INSERTED CENTRAL CATHETER INSERTION     • MN CLOSED RX TRAUMATIC HIP DISLOCATN Left 12/18/2017    Procedure: LEFT HIP CLOSED REDUCTION;  Surgeon: Obed Barney MD;  Location: [facility];  Service: Orthopedics   • SOFT TISSUE MASS EXCISION Left     hip 3/19/18   • TOTAL ABDOMINAL HYSTERECTOMY     • TOTAL HIP ARTHROPLASTY REVISION Left 4/25/2018    Procedure: REVISION OF LEFT ACETABULAR COMPONENT ;  Surgeon: Obed Barney MD;  Location: [facility] OR;  Service: Orthopedics   • TOTAL HIP ARTHROPLASTY REVISION Left 08/11/2018    total hip revision with removal of iinfected total hip and placement of antibiotic spacer   • VENA CAVA FILTER INSERTION         Social History   reports that she quit smoking about 44 years ago. Her smoking use included cigarettes. She has never used smokeless tobacco. She reports that she does not drink alcohol or use drugs.    Family History  family history is not on file.    Allergies   Allergen Reactions   • Sulfa Antibiotics Rash     Other reaction(s): Skin irritation       The medication list has been reviewed and updated.     Review of Systems  Pertinent items are noted in HPI, all other systems reviewed and negative    Vital Signs   Temp:  [97.8 °F (36.6 °C)] 97.8 °F (36.6 °C)  Heart Rate:  [75] 75  BP: (112)/(70) 112/70    Physical Exam:   General: In no acute distress  Cardiovascular: RRR, no LE edema    Respiratory: Breathing comfortably on room air   GI: Abdomen is soft, non-tender, non-distended, positive bowel sounds bilaterally  Skin: No rashes , left hip incision healed well without erythema drainage or pain    Lab Results   Component Value Date    WBC 10.28 06/06/2019    HGB 14.9 06/06/2019    HCT 48.1 (H) 06/06/2019    MCV 96.0 06/06/2019     06/06/2019       Lab Results   Component Value Date    GLUCOSE 92 06/06/2019    BUN 9 06/06/2019    CREATININE 0.56 (L) 06/06/2019    EGFRIFNONA 106 06/06/2019    BCR 16.1 06/06/2019    CO2 26.0 06/06/2019     CALCIUM 9.4 06/06/2019    ALBUMIN 3.80 06/06/2019    AST 18 06/06/2019    ALT 17 06/06/2019       Lab Results   Component Value Date    SEDRATE 8 03/06/2019       Lab Results   Component Value Date    CRP 1.15 (H) 03/06/2019     7/7 L hip wound ESBL E coli   7/21 L hip Cx:neg   8/11 L hip cx neg    Assessment:  This is a 73 y.o. female who presents to clinic today for follow up of her ESBL left prosthetic hip septic arthritis.  The patient initially underwent incision and drainage with retained hardware on July 7/2018.  She was treated with IV ertapenem but continued to have significant drainage from her hip.  She was taken back to the OR for another incision and drainage on July 21 and ultimately on August 11 all hardware was removed.  Initial operative cultures grew ESBL Escherichia coli.  Her cultures from July 21 and August 11 are negative.  She was treated with IV ertapenem x 10 weeks.  She has been on Augmentin and cefaclor since October 29th.  In March we took her off cefaclor and continued her on Augmentin only.  Her inflammatory markers continue to decrease.  At this time I would like to repeat her inflammatory markers.  If they are back to normal we can consider stopping antibiotics.  If they have plateaued we will also consider stopping antibiotics.  Once results are available I will call the patient and discuss her options.     Plan:   1.  Continue Augmentin 875 mg by mouth twice a day x 3 months  2.  Obtain a ESR and CRP     Return to Infectious Disease clinic in 3 month

## 2019-06-24 ENCOUNTER — TELEPHONE (OUTPATIENT)
Dept: INFECTIOUS DISEASES | Facility: CLINIC | Age: 74
End: 2019-06-24

## 2019-06-26 NOTE — TELEPHONE ENCOUNTER
Discussed with the patient her lab results.  Her ESR is normal but her CRP remains elevated and is actually higher than prior.  At this time we will continue her on her suppressive therapy.  We will discuss the case with Dr. Barney and decide how to proceed.

## 2019-06-27 ENCOUNTER — ANTICOAGULATION VISIT (OUTPATIENT)
Dept: PHARMACY | Facility: HOSPITAL | Age: 74
End: 2019-06-27

## 2019-06-27 DIAGNOSIS — Z86.718 HISTORY OF DVT (DEEP VEIN THROMBOSIS): ICD-10-CM

## 2019-06-27 LAB
INR PPP: 4.5 (ref 0.91–1.09)
PROTHROMBIN TIME: 54.4 SECONDS (ref 10–13.8)

## 2019-06-27 PROCEDURE — 36416 COLLJ CAPILLARY BLOOD SPEC: CPT

## 2019-06-27 PROCEDURE — 85610 PROTHROMBIN TIME: CPT

## 2019-06-27 PROCEDURE — G0463 HOSPITAL OUTPT CLINIC VISIT: HCPCS

## 2019-06-27 NOTE — PROGRESS NOTES
Anticoagulation Clinic Progress Note    Anticoagulation Summary  As of 2019    INR goal:   2.5-3.5   TTR:   43.0 % (8.3 mo)   INR used for dosin.5! (2019)   Warfarin maintenance plan:   7.5 mg every Mon, Wed, Fri; 5 mg all other days   Weekly warfarin total:   42.5 mg   Plan last modified:   Thomas Smallwood [facility] (2019)   Next INR check:   7/3/2019   Priority:   Maintenance   Target end date:   Indefinite    Indications    History of DVT (deep vein thrombosis) [Z86.718]             Anticoagulation Episode Summary     INR check location:       Preferred lab:       Send INR reminders to:    [facility]    Comments:         Anticoagulation Care Providers     Provider Role Specialty Phone number    Vijay Arango MD Referring Cardiology [phone]    Molly Torres [facility] Responsible Pharmacy [phone]          Clinic Interview:  Patient Findings     Negatives:   Signs/symptoms of thrombosis, Signs/symptoms of bleeding,   Laboratory test error suspected, Change in health, Change in alcohol use,   Change in activity, Upcoming invasive procedure, Emergency department   visit, Upcoming dental procedure, Missed doses, Extra doses, Change in   medications, Change in diet/appetite, Hospital admission, Bruising, Other   complaints      Clinical Outcomes     Negatives:   Major bleeding event, Thromboembolic event,   Anticoagulation-related hospital admission, Anticoagulation-related ED   visit, Anticoagulation-related fatality        INR History:  Anticoagulation Monitoring 2019   INR 2.5 3.0 4.5   INR Date 2019   INR Goal 2.5-3.5 2.5-3.5 2.5-3.5   Trend Up Same Down   Last Week Total 42.5 mg 45 mg 45 mg   Next Week Total 45 mg 45 mg 37.5 mg   Sun 5 mg 5 mg 5 mg   Mon 7.5 mg 7.5 mg 7.5 mg   Tue 5 mg 5 mg 5 mg   Wed 7.5 mg 7.5 mg -   Thu 5 mg 5 mg Hold ()   Fri 7.5 mg 7.5 mg 7.5 mg   Sat 7.5 mg 7.5 mg 5 mg   Visit Report - - -   Some  recent data might be hidden       Plan:  1. INR is Supratherapeutic today- see above in Anticoagulation Summary.  Will instruct Rachel Moser to Change their warfarin regimen- see above in Anticoagulation Summary.  2. Follow up in 1 week  3. Patient declines warfarin refills.  4. Verbal and written information provided. Patient expresses understanding and has no further questions at this time.    Thomas Smallwood [facility]

## 2019-07-03 ENCOUNTER — LAB (OUTPATIENT)
Dept: LAB | Facility: HOSPITAL | Age: 74
End: 2019-07-03

## 2019-07-03 ENCOUNTER — ANTICOAGULATION VISIT (OUTPATIENT)
Dept: PHARMACY | Facility: HOSPITAL | Age: 74
End: 2019-07-03

## 2019-07-03 DIAGNOSIS — Z96.649 S/P REVISION OF TOTAL HIP: ICD-10-CM

## 2019-07-03 DIAGNOSIS — Z86.718 HISTORY OF DVT (DEEP VEIN THROMBOSIS): ICD-10-CM

## 2019-07-03 LAB
CRP SERPL-MCNC: 1.1 MG/DL (ref 0–0.5)
ERYTHROCYTE [SEDIMENTATION RATE] IN BLOOD: 13 MM/HR (ref 0–30)
INR PPP: 3.14 (ref 0.9–1.1)
INR PPP: 4.4 (ref 0.91–1.09)
PROTHROMBIN TIME: 31.9 SECONDS (ref 11.7–14.2)
PROTHROMBIN TIME: 53 SECONDS (ref 10–13.8)

## 2019-07-03 PROCEDURE — 85652 RBC SED RATE AUTOMATED: CPT

## 2019-07-03 PROCEDURE — 85610 PROTHROMBIN TIME: CPT

## 2019-07-03 PROCEDURE — 36416 COLLJ CAPILLARY BLOOD SPEC: CPT

## 2019-07-03 PROCEDURE — 36415 COLL VENOUS BLD VENIPUNCTURE: CPT

## 2019-07-03 PROCEDURE — 86140 C-REACTIVE PROTEIN: CPT

## 2019-07-03 NOTE — PROGRESS NOTES
Anticoagulation Clinic Progress Note    Anticoagulation Summary  As of 7/3/2019    INR goal:   2.5-3.5   TTR:   42.0 % (8.5 mo)   INR used for dosing:   3.14 (7/3/2019)   Warfarin maintenance plan:   7.5 mg every Mon, Wed, Fri; 5 mg all other days   Weekly warfarin total:   42.5 mg   Plan last modified:   Thomas Smallwood [facility] (6/27/2019)   Next INR check:   7/11/2019   Priority:   Maintenance   Target end date:   Indefinite    Indications    History of DVT (deep vein thrombosis) [Z86.718]             Anticoagulation Episode Summary     INR check location:       Preferred lab:       Send INR reminders to:   [facility] CLINICAL POOL    Comments:         Anticoagulation Care Providers     Provider Role Specialty Phone number    Vijay Arango MD Referring Cardiology [phone]    Molly Torres [facility] Responsible Pharmacy [phone]          Clinic Interview:      INR History:  Anticoagulation Monitoring 5/31/2019 6/27/2019 7/3/2019   INR 3.0 4.5 3.14   INR Date 5/31/2019 6/27/2019 7/3/2019   INR Goal 2.5-3.5 2.5-3.5 2.5-3.5   Trend Same Down Same   Last Week Total 45 mg 45 mg 37.5 mg   Next Week Total 45 mg 37.5 mg 42.5 mg   Sun 5 mg 5 mg 5 mg   Mon 7.5 mg 7.5 mg 7.5 mg   Tue 5 mg 5 mg 5 mg   Wed 7.5 mg - 7.5 mg   Thu 5 mg Hold (6/27) 5 mg   Fri 7.5 mg 7.5 mg 7.5 mg   Sat 7.5 mg 5 mg 5 mg   Visit Report - - -   Some recent data might be hidden       Plan:  1. INR is Therapeutic today- see above in Anticoagulation Summary.  Will instruct Rachel LYNNE Moser to Continue their warfarin regimen- see above in Anticoagulation Summary.  2. Follow up in 1 week  3. Patient declines warfarin refills.  4. Verbal and written information provided. Patient expresses understanding and has no further questions at this time.    Rachel Krishnan [facility]

## 2019-07-11 ENCOUNTER — ANTICOAGULATION VISIT (OUTPATIENT)
Dept: PHARMACY | Facility: HOSPITAL | Age: 74
End: 2019-07-11

## 2019-07-11 ENCOUNTER — OFFICE VISIT (OUTPATIENT)
Dept: ORTHOPEDIC SURGERY | Facility: CLINIC | Age: 74
End: 2019-07-11

## 2019-07-11 ENCOUNTER — APPOINTMENT (OUTPATIENT)
Dept: LAB | Facility: HOSPITAL | Age: 74
End: 2019-07-11

## 2019-07-11 VITALS — TEMPERATURE: 97.2 F | HEIGHT: 60 IN | WEIGHT: 126 LBS | BODY MASS INDEX: 24.74 KG/M2

## 2019-07-11 DIAGNOSIS — Z86.718 HISTORY OF DVT (DEEP VEIN THROMBOSIS): ICD-10-CM

## 2019-07-11 DIAGNOSIS — Z96.642 STATUS POST TOTAL REPLACEMENT OF LEFT HIP: Primary | ICD-10-CM

## 2019-07-11 LAB
INR PPP: 3.89 (ref 0.9–1.1)
INR PPP: 5.5 (ref 0.91–1.09)
PROTHROMBIN TIME: 37.9 SECONDS (ref 11.7–14.2)
PROTHROMBIN TIME: 65.6 SECONDS (ref 10–13.8)

## 2019-07-11 PROCEDURE — G0463 HOSPITAL OUTPT CLINIC VISIT: HCPCS

## 2019-07-11 PROCEDURE — 73502 X-RAY EXAM HIP UNI 2-3 VIEWS: CPT | Performed by: ORTHOPAEDIC SURGERY

## 2019-07-11 PROCEDURE — 36416 COLLJ CAPILLARY BLOOD SPEC: CPT

## 2019-07-11 PROCEDURE — 85610 PROTHROMBIN TIME: CPT

## 2019-07-11 PROCEDURE — 36415 COLL VENOUS BLD VENIPUNCTURE: CPT

## 2019-07-11 PROCEDURE — 99213 OFFICE O/P EST LOW 20 MIN: CPT | Performed by: ORTHOPAEDIC SURGERY

## 2019-07-11 NOTE — PROGRESS NOTES
Patient: Rachel Moser  YOB: 1945 73 y.o. female  Medical Record Number: [id number]    Chief Complaint:   Chief Complaint   Patient presents with   • Left Hip - Follow-up       History of Present Illness:Rachel Moser is a 73 y.o. female who presents for follow-up of left total hip spacer almost a year out.  She is doing very well she just finished an 800 mile motorcycle trip.  She is walking with a cane she denies any fever chills or pain.  She is still on antibiotics per infectious disease.    Allergies:   Allergies   Allergen Reactions   • Sulfa Antibiotics Rash     Other reaction(s): Skin irritation       Medications:   Current Outpatient Medications   Medication Sig Dispense Refill   • B Complex-Biotin-FA (HM VITAMIN B50 COMPLEX PO) Take 1 tablet by mouth Daily.     • Calcium-Magnesium-Vitamin D (CALCIUM 1200+D3 PO) Take  by mouth.     • cholecalciferol (VITAMIN D3) 1000 units tablet Take 2,000 Units by mouth Daily.     • CRANBERRY PO Take  by mouth. Take 2 tablets by mouth daily     • ferrous sulfate (FERROUSUL) 325 (65 FE) MG tablet Take 325 mg by mouth Daily With Breakfast. On hold for sx.     • FOLIC ACID PO Take 4 tablets by mouth daily     • furosemide (LASIX) 40 MG tablet Take 40 mg by mouth Daily.     • gabapentin (NEURONTIN) 300 MG capsule Take 1 capsule by mouth 3 (Three) Times a Day. 90 capsule 3   • InFLIXimab (REMICADE IV) Infuse  into a venous catheter Every 28 (Twenty-Eight) Days.     • leucovorin 5 MG tablet Take 5 mg by mouth 1 (One) Time Per Week.     • methotrexate 2.5 MG tablet Take 2.5 mg by mouth 1 (One) Time Per Week. 8 tablets once a week     • metoprolol succinate XL (TOPROL-XL) 50 MG 24 hr tablet Take 50 mg by mouth Daily.     • Multiple Vitamins-Minerals (MULTIVITAMIN ADULT PO) Take 1 tablet by mouth Daily.     • polyethylene glycol (MIRALAX) packet Take 17 g by mouth 2 (Two) Times a Day.     • potassium chloride (K-DUR,KLOR-CON) 20 MEQ CR tablet TAKE TWO TABLETS BY  "MOUTH ONCE DAILY 180 tablet 0   • predniSONE (DELTASONE) 10 MG tablet Take 10 mg by mouth Daily.     • sennosides-docusate sodium (SENOKOT-S) 8.6-50 MG tablet Take 2 tablets by mouth 2 (Two) Times a Day As Needed for Constipation.     • simvastatin (ZOCOR) 40 MG tablet Take 40 mg by mouth Every Morning.     • warfarin (COUMADIN) 2 MG tablet Take 5mg today then 3mg daily starting 2/13.     • warfarin (COUMADIN) 5 MG tablet TAKE 1 & 1/2 (ONE & ONE-HALF) TABLETS BY MOUTH ONCE DAILY OR  AS  DIRECTED 135 tablet 0     No current facility-administered medications for this visit.      Facility-Administered Medications Ordered in Other Visits   Medication Dose Route Frequency Provider Last Rate Last Dose   • mupirocin (BACTROBAN) 2 % nasal ointment   Nasal BID Obed Barney MD             The following portions of the patient's history were reviewed and updated as appropriate: allergies, current medications, past family history, past medical history, past social history, past surgical history and problem list.    Review of Systems:   A 14 point review of systems was performed. All systems negative except pertinent positives/negative listed in HPI above    Physical Exam:   Vitals:    07/11/19 1344   Temp: 97.2 °F (36.2 °C)   TempSrc: Temporal   Weight: 57.2 kg (126 lb)   Height: 152.4 cm (60\")       General: A and O x 3, ASA, NAD    SCLERA:    Normal    DENTITION:   Normal  Surgical incisions look good there is no swelling or sign of infection she walks with a slight antalgic gait with a Trendelenburg lurch.    Radiology:    Xrays 2views left hip (AP bilateral hips and lateral hip) were ordered and reviewed for evaluation of hip pain demonstrating a well-positioned hip spacer no change comparison to previous films dating back to the postop films.  Assessment/Plan:  Left hip spacer 11 months out from surgery she has ESBL.  She is on oral antibiotics still.  Her sed rate and C-reactive proteins are low but still slightly out " of normal range.  I will defer to infectious disease whether or not to discontinue however given the severe consequences it might be good to continue her on a suppressive regimen and follow labs until they are normal.  I will plan on checking her back in 3 months with repeat x-rays      Obed Barney MD  7/11/2019

## 2019-07-11 NOTE — PROGRESS NOTES
Anticoagulation Clinic Progress Note    Anticoagulation Summary  As of 7/11/2019    INR goal:   2.5-3.5   TTR:   40.7 % (8.7 mo)   INR used for dosing:   3.89! (7/11/2019)   Warfarin maintenance plan:   7.5 mg every Mon, Wed, Fri; 5 mg all other days   Weekly warfarin total:   42.5 mg   Plan last modified:   Thomas Smallwood [facility] (6/27/2019)   Next INR check:   7/18/2019   Priority:   Maintenance   Target end date:   Indefinite    Indications    History of DVT (deep vein thrombosis) [Z86.718]             Anticoagulation Episode Summary     INR check location:       Preferred lab:       Send INR reminders to:    [facility] [place]    Comments:         Anticoagulation Care Providers     Provider Role Specialty Phone number    Vijay Arango MD Referring Cardiology [phone]    Molly Torres [facility] Responsible Pharmacy [phone]          Clinic Interview:  Patient Findings     Positives:   Change in diet/appetite    Negatives:   Signs/symptoms of thrombosis, Signs/symptoms of bleeding,   Laboratory test error suspected, Change in health, Change in alcohol use,   Change in activity, Upcoming invasive procedure, Emergency department   visit, Upcoming dental procedure, Missed doses, Extra doses, Change in   medications, Hospital admission, Bruising, Other complaints    Comments:   Decreased/no vit k on motorcycle trip last wk      Clinical Outcomes     Negatives:   Major bleeding event, Thromboembolic event,   Anticoagulation-related hospital admission, Anticoagulation-related ED   visit, Anticoagulation-related fatality    Comments:   Decreased/no vit k on motorcycle trip last wk        INR History:  Anticoagulation Monitoring 6/27/2019 7/3/2019 7/11/2019   INR 4.5 3.14 3.89   INR Date 6/27/2019 7/3/2019 7/11/2019   INR Goal 2.5-3.5 2.5-3.5 2.5-3.5   Trend Down Same Same   Last Week Total 45 mg 37.5 mg 42.5 mg   Next Week Total 37.5 mg 42.5 mg 37.5 mg   Sun 5 mg 5 mg 5 mg   Mon 7.5 mg 7.5 mg 7.5 mg   Tue 5 mg  5 mg 5 mg   Wed - 7.5 mg 7.5 mg   Thu Hold (6/27) 5 mg Hold (7/11)   Fri 7.5 mg 7.5 mg 7.5 mg   Sat 5 mg 5 mg 5 mg   Visit Report - - -   Some recent data might be hidden       Plan:  1. INR is Supratherapeutic today (POC 5.5/venous INR 3.89)- see above in Anticoagulation Summary.  Will instruct Rachel Moser to Change their warfarin regimen- see above in Anticoagulation Summary. Unable to reach pt by phone at either phone number upon venous INR resulting 3.89 on 7/11. As unable to reach, dose was held 7/11 as instructed in clinic following POC INR 5.5. Contacted by phone 7/12/19, and rescheduled for 2 wks due to near therapeutic INR.  2. Follow up in 2 week  3. Patient declines warfarin refills.  4. Verbal and written information provided. Patient expresses understanding and has no further questions at this time.    Thomas Smallwood [facility]

## 2019-07-25 ENCOUNTER — APPOINTMENT (OUTPATIENT)
Dept: PHARMACY | Facility: HOSPITAL | Age: 74
End: 2019-07-25

## 2019-07-25 ENCOUNTER — ANTICOAGULATION VISIT (OUTPATIENT)
Dept: PHARMACY | Facility: HOSPITAL | Age: 74
End: 2019-07-25

## 2019-07-25 DIAGNOSIS — Z86.718 HISTORY OF DVT (DEEP VEIN THROMBOSIS): ICD-10-CM

## 2019-07-25 LAB
INR PPP: 1.7 (ref 0.91–1.09)
PROTHROMBIN TIME: 20.1 SECONDS (ref 10–13.8)

## 2019-07-25 PROCEDURE — 85610 PROTHROMBIN TIME: CPT

## 2019-07-25 PROCEDURE — G0463 HOSPITAL OUTPT CLINIC VISIT: HCPCS

## 2019-07-25 PROCEDURE — 36416 COLLJ CAPILLARY BLOOD SPEC: CPT

## 2019-07-25 NOTE — PROGRESS NOTES
Anticoagulation Clinic Progress Note    Anticoagulation Summary  As of 2019    INR goal:   2.5-3.5   TTR:   40.0 % (9.2 mo)   INR used for dosin.7! (2019)   Warfarin maintenance plan:   7.5 mg every Mon, Wed, Fri; 5 mg all other days   Weekly warfarin total:   42.5 mg   Plan last modified:   Thomas Smallwood [facility] (2019)   Next INR check:   2019   Priority:   Maintenance   Target end date:   Indefinite    Indications    History of DVT (deep vein thrombosis) [Z86.718]             Anticoagulation Episode Summary     INR check location:       Preferred lab:       Send INR reminders to:    [facility]    Comments:         Anticoagulation Care Providers     Provider Role Specialty Phone number    Vijay Arango MD Referring Cardiology [phone]    Molly Torres [facility] Responsible Pharmacy [phone]          Clinic Interview:  Patient Findings     Negatives:   Signs/symptoms of thrombosis, Signs/symptoms of bleeding,   Laboratory test error suspected, Change in health, Change in alcohol use,   Change in activity, Upcoming invasive procedure, Emergency department   visit, Upcoming dental procedure, Missed doses, Extra doses, Change in   medications, Change in diet/appetite, Hospital admission, Bruising, Other   complaints      Clinical Outcomes     Negatives:   Major bleeding event, Thromboembolic event,   Anticoagulation-related hospital admission, Anticoagulation-related ED   visit, Anticoagulation-related fatality        INR History:  Anticoagulation Monitoring 7/3/2019 2019 2019   INR 3.14 3.89 1.7   INR Date 7/3/2019 2019 2019   INR Goal 2.5-3.5 2.5-3.5 2.5-3.5   Trend Same Same Same   Last Week Total 37.5 mg 42.5 mg 42.5 mg   Next Week Total 42.5 mg 37.5 mg 47.5 mg   Sun 5 mg 5 mg 5 mg   Mon 7.5 mg 7.5 mg 7.5 mg   Tue 5 mg 5 mg 5 mg   Wed 7.5 mg 7.5 mg 7.5 mg   Thu 5 mg Hold (); Otherwise 5 mg 10 mg ()   Fri 7.5 mg 7.5 mg 7.5 mg   Sat 5 mg 5 mg 5  mg   Visit Report - - -   Some recent data might be hidden       Plan:  1. INR is Subtherapeutic today- see above in Anticoagulation Summary.  Will instruct Rachel Moser to Change their warfarin regimen- see above in Anticoagulation Summary.  2. Follow up in 1 week  3. Patient declines warfarin refills.  4. Verbal and written information provided. Patient expresses understanding and has no further questions at this time.    Thomas Smallwood [facility]

## 2019-07-30 RX ORDER — WARFARIN SODIUM 5 MG/1
7.5 TABLET ORAL DAILY
Qty: 135 TABLET | Refills: 0 | Status: SHIPPED | OUTPATIENT
Start: 2019-07-30 | End: 2019-01-01 | Stop reason: SDUPTHER

## 2019-08-01 ENCOUNTER — ANTICOAGULATION VISIT (OUTPATIENT)
Dept: PHARMACY | Facility: HOSPITAL | Age: 74
End: 2019-08-01

## 2019-08-01 ENCOUNTER — LAB (OUTPATIENT)
Dept: LAB | Facility: HOSPITAL | Age: 74
End: 2019-08-01

## 2019-08-01 DIAGNOSIS — Z86.718 HISTORY OF DVT (DEEP VEIN THROMBOSIS): ICD-10-CM

## 2019-08-01 LAB
INR PPP: 3.53 (ref 0.9–1.1)
INR PPP: 4.4 (ref 0.91–1.09)
PROTHROMBIN TIME: 35.1 SECONDS (ref 11.7–14.2)
PROTHROMBIN TIME: 53.4 SECONDS (ref 10–13.8)

## 2019-08-01 PROCEDURE — 85610 PROTHROMBIN TIME: CPT

## 2019-08-01 PROCEDURE — 36415 COLL VENOUS BLD VENIPUNCTURE: CPT

## 2019-08-01 PROCEDURE — G0463 HOSPITAL OUTPT CLINIC VISIT: HCPCS

## 2019-08-01 PROCEDURE — 36416 COLLJ CAPILLARY BLOOD SPEC: CPT

## 2019-08-01 NOTE — PROGRESS NOTES
Anticoagulation Clinic Progress Note    Anticoagulation Summary  As of 8/1/2019    INR goal:   2.5-3.5   TTR:   39.9 % (9.4 mo)   INR used for dosing:   3.53! (8/1/2019)   Warfarin maintenance plan:   7.5 mg every Mon, Wed, Fri; 5 mg all other days   Weekly warfarin total:   42.5 mg   No change documented:   Thomas Smallwood RPH   Plan last modified:   Thomas Smallwood RPH (6/27/2019)   Next INR check:   8/15/2019   Priority:   Maintenance   Target end date:   Indefinite    Indications    History of DVT (deep vein thrombosis) [Z86.718]             Anticoagulation Episode Summary     INR check location:       Preferred lab:       Send INR reminders to:   [facility]    Comments:         Anticoagulation Care Providers     Provider Role Specialty Phone number    Vijay Arango MD Referring Cardiology [phone]    Molly Torres RP Responsible Pharmacy [phone]          Clinic Interview:  Patient Findings     Negatives:   Signs/symptoms of thrombosis, Signs/symptoms of bleeding,   Laboratory test error suspected, Change in health, Change in alcohol use,   Change in activity, Upcoming invasive procedure, Emergency department   visit, Upcoming dental procedure, Missed doses, Extra doses, Change in   medications, Change in diet/appetite, Hospital admission, Bruising, Other   complaints      Clinical Outcomes     Negatives:   Major bleeding event, Thromboembolic event,   Anticoagulation-related hospital admission, Anticoagulation-related ED   visit, Anticoagulation-related fatality        INR History:  Anticoagulation Monitoring 7/11/2019 7/25/2019 8/1/2019   INR 3.89 1.7 3.53   INR Date 7/11/2019 7/25/2019 8/1/2019   INR Goal 2.5-3.5 2.5-3.5 2.5-3.5   Trend Same Same Same   Last Week Total 42.5 mg 42.5 mg 47.5 mg   Next Week Total 37.5 mg 47.5 mg 42.5 mg   Sun 5 mg 5 mg 5 mg   Mon 7.5 mg 7.5 mg 7.5 mg   Tue 5 mg 5 mg 5 mg   Wed 7.5 mg 7.5 mg 7.5 mg   Thu Hold (7/11); Otherwise 5 mg 10 mg (7/25) 5 mg    Fri 7.5 mg 7.5 mg 7.5 mg   Sat 5 mg 5 mg 5 mg   Visit Report - - -   Some recent data might be hidden       Plan:  1. INR is Supratherapeutic today (venous INR 3.53/ POCT 4.4) - see above in Anticoagulation Summary.  Will instruct Rachel Moser to Continue their warfarin regimen- see above in Anticoagulation Summary.  2. Follow up in 2 weeks (Recheck 2 weeks in LAB. Standing INR order entered for lab draws.)  3. Patient declines warfarin refills.  4. Verbal and written information provided. Patient expresses understanding and has no further questions at this time. Initially advised to only take a partial dose today of 2.5 mg, then resume. HOWEVER, after venous INR resulted, left HIPAA-friendly voicemail to take usual dose today and continue with 7.5 mg MWF, 5 mg rest of wk.    Thomas Smallwood [facility]

## 2019-08-15 ENCOUNTER — LAB (OUTPATIENT)
Dept: LAB | Facility: HOSPITAL | Age: 74
End: 2019-08-15

## 2019-08-15 DIAGNOSIS — Z86.718 HISTORY OF DVT (DEEP VEIN THROMBOSIS): ICD-10-CM

## 2019-08-15 LAB
INR PPP: 3.27 (ref 0.9–1.1)
PROTHROMBIN TIME: 33 SECONDS (ref 11.7–14.2)

## 2019-08-15 PROCEDURE — 85610 PROTHROMBIN TIME: CPT

## 2019-08-15 PROCEDURE — 36415 COLL VENOUS BLD VENIPUNCTURE: CPT

## 2019-08-16 ENCOUNTER — ANTICOAGULATION VISIT (OUTPATIENT)
Dept: PHARMACY | Facility: HOSPITAL | Age: 74
End: 2019-08-16

## 2019-08-16 DIAGNOSIS — Z86.718 HISTORY OF DVT (DEEP VEIN THROMBOSIS): ICD-10-CM

## 2019-08-16 NOTE — PROGRESS NOTES
Anticoagulation Clinic Progress Note    Anticoagulation Summary  As of 8/16/2019    INR goal:   2.5-3.5   TTR:   39.0 % (9.9 mo)   INR used for dosing:   3.27 (8/15/2019)   Warfarin maintenance plan:   7.5 mg every Mon, Wed, Fri; 5 mg all other days   Weekly warfarin total:   42.5 mg   No change documented:   Thomas Smallwood RPH   Plan last modified:   Thomas Smallwood RPH (6/27/2019)   Next INR check:   8/29/2019   Priority:   Maintenance   Target end date:   Indefinite    Indications    History of DVT (deep vein thrombosis) [Z86.718]             Anticoagulation Episode Summary     INR check location:       Preferred lab:       Send INR reminders to:    [facility]    Comments:         Anticoagulation Care Providers     Provider Role Specialty Phone number    Vijay Arango MD Referring Cardiology [phone]    Molly Torres RPH Responsible Pharmacy [phone]          INR History:  Anticoagulation Monitoring 7/25/2019 8/1/2019 8/16/2019   INR 1.7 3.53 3.27   INR Date 7/25/2019 8/1/2019 8/15/2019   INR Goal 2.5-3.5 2.5-3.5 2.5-3.5   Trend Same Same Same   Last Week Total 42.5 mg 47.5 mg 42.5 mg   Next Week Total 47.5 mg 42.5 mg 42.5 mg   Sun 5 mg 5 mg 5 mg   Mon 7.5 mg 7.5 mg 7.5 mg   Tue 5 mg 5 mg 5 mg   Wed 7.5 mg 7.5 mg 7.5 mg   Thu 10 mg (7/25) 5 mg 5 mg   Fri 7.5 mg 7.5 mg 7.5 mg   Sat 5 mg 5 mg 5 mg   Visit Report - - -   Some recent data might be hidden       Plan:  1. INR is therapeutic today- see above in Anticoagulation Summary.    Rachel Moser to continue their warfarin regimen- see above in Anticoagulation Summary.  2. Follow up in 2 weeks  3. Unable to reach. Left HIPAA-friendly voicemail with instructions to continue the same dose and recheck in 2 wks. Invited to contact clinic with any questions/concerns.     Thomas Smallwood RPH

## 2019-08-22 RX ORDER — POTASSIUM CHLORIDE 20 MEQ/1
TABLET, EXTENDED RELEASE ORAL
Qty: 180 TABLET | Refills: 1 | Status: SHIPPED | OUTPATIENT
Start: 2019-08-22 | End: 2020-01-01

## 2019-08-29 ENCOUNTER — ANTICOAGULATION VISIT (OUTPATIENT)
Dept: PHARMACY | Facility: HOSPITAL | Age: 74
End: 2019-08-29

## 2019-08-29 ENCOUNTER — LAB (OUTPATIENT)
Dept: LAB | Facility: HOSPITAL | Age: 74
End: 2019-08-29

## 2019-08-29 DIAGNOSIS — Z86.718 HISTORY OF DVT (DEEP VEIN THROMBOSIS): ICD-10-CM

## 2019-08-29 LAB
INR PPP: 3 (ref 0.9–1.1)
PROTHROMBIN TIME: 30.9 SECONDS (ref 11.7–14.2)

## 2019-08-29 PROCEDURE — 36415 COLL VENOUS BLD VENIPUNCTURE: CPT

## 2019-08-29 PROCEDURE — 85610 PROTHROMBIN TIME: CPT

## 2019-08-29 NOTE — PROGRESS NOTES
Anticoagulation Clinic Progress Note    Anticoagulation Summary  As of 8/29/2019    INR goal:   2.5-3.5   TTR:   41.7 % (10.4 mo)   INR used for dosing:   3.00 (8/29/2019)   Warfarin maintenance plan:   7.5 mg every Mon, Wed, Fri; 5 mg all other days   Weekly warfarin total:   42.5 mg   No change documented:   Thomas Smallwood RPH   Plan last modified:   Thomas Smallwood RPH (6/27/2019)   Next INR check:   9/26/2019   Priority:   Maintenance   Target end date:   Indefinite    Indications    History of DVT (deep vein thrombosis) [Z86.718]             Anticoagulation Episode Summary     INR check location:       Preferred lab:       Send INR reminders to:    [facility] LAWR[facility]    Comments:         Anticoagulation Care Providers     Provider Role Specialty Phone number    Vijay Arango MD Referring Cardiology [phone]    Molly Torres RPH Responsible Pharmacy [phone]          Clinic Interview:  No pertinent clinical findings have been reported.    INR History:  Anticoagulation Monitoring 8/1/2019 8/16/2019 8/29/2019   INR 3.53 3.27 3.00   INR Date 8/1/2019 8/15/2019 8/29/2019   INR Goal 2.5-3.5 2.5-3.5 2.5-3.5   Trend Same Same Same   Last Week Total 47.5 mg 42.5 mg 42.5 mg   Next Week Total 42.5 mg 42.5 mg 42.5 mg   Sun 5 mg 5 mg 5 mg   Mon 7.5 mg 7.5 mg 7.5 mg   Tue 5 mg 5 mg 5 mg   Wed 7.5 mg 7.5 mg 7.5 mg   Thu 5 mg 5 mg 5 mg   Fri 7.5 mg 7.5 mg 7.5 mg   Sat 5 mg 5 mg 5 mg   Visit Report - - -   Some recent data might be hidden       Plan:  1. INR is therapeutic today- see above in Anticoagulation Summary.    Rachelred Moser to continue their warfarin regimen- see above in Anticoagulation Summary.  2. Follow up in 4 weeks  3. They have been instructed to call if any changes in medications, doses, concerns, etc. Patient expresses understanding and has no further questions at this time.    Thomas Smallwood RPH

## 2019-09-13 ENCOUNTER — APPOINTMENT (OUTPATIENT)
Dept: LAB | Facility: HOSPITAL | Age: 74
End: 2019-09-13

## 2019-09-13 ENCOUNTER — OFFICE VISIT (OUTPATIENT)
Dept: INFECTIOUS DISEASES | Facility: CLINIC | Age: 74
End: 2019-09-13

## 2019-09-13 VITALS
DIASTOLIC BLOOD PRESSURE: 75 MMHG | HEART RATE: 84 BPM | BODY MASS INDEX: 26.21 KG/M2 | TEMPERATURE: 98.1 F | WEIGHT: 130 LBS | RESPIRATION RATE: 12 BRPM | HEIGHT: 59 IN | SYSTOLIC BLOOD PRESSURE: 144 MMHG

## 2019-09-13 DIAGNOSIS — Z79.2 LONG TERM (CURRENT) USE OF ANTIBIOTICS: ICD-10-CM

## 2019-09-13 DIAGNOSIS — T84.50XD INFECTION OF PROSTHETIC JOINT, SUBSEQUENT ENCOUNTER: Primary | ICD-10-CM

## 2019-09-13 LAB
ALBUMIN SERPL-MCNC: 4.3 G/DL (ref 3.5–5.2)
ALBUMIN/GLOB SERPL: 1.4 G/DL
ALP SERPL-CCNC: 67 U/L (ref 39–117)
ALT SERPL W P-5'-P-CCNC: 15 U/L (ref 1–33)
ANION GAP SERPL CALCULATED.3IONS-SCNC: 13.6 MMOL/L (ref 5–15)
AST SERPL-CCNC: 17 U/L (ref 1–32)
BASOPHILS # BLD AUTO: 0.08 10*3/MM3 (ref 0–0.2)
BASOPHILS NFR BLD AUTO: 0.7 % (ref 0–1.5)
BILIRUB SERPL-MCNC: 0.4 MG/DL (ref 0.2–1.2)
BUN BLD-MCNC: 16 MG/DL (ref 8–23)
BUN/CREAT SERPL: 23.2 (ref 7–25)
CALCIUM SPEC-SCNC: 9.1 MG/DL (ref 8.6–10.5)
CHLORIDE SERPL-SCNC: 98 MMOL/L (ref 98–107)
CO2 SERPL-SCNC: 25.4 MMOL/L (ref 22–29)
CREAT BLD-MCNC: 0.69 MG/DL (ref 0.57–1)
CRP SERPL-MCNC: 0.57 MG/DL (ref 0–0.5)
DEPRECATED RDW RBC AUTO: 51.1 FL (ref 37–54)
EOSINOPHIL # BLD AUTO: 0.33 10*3/MM3 (ref 0–0.4)
EOSINOPHIL NFR BLD AUTO: 2.8 % (ref 0.3–6.2)
ERYTHROCYTE [DISTWIDTH] IN BLOOD BY AUTOMATED COUNT: 14.6 % (ref 12.3–15.4)
ERYTHROCYTE [SEDIMENTATION RATE] IN BLOOD: 21 MM/HR (ref 0–30)
GFR SERPL CREATININE-BSD FRML MDRD: 84 ML/MIN/1.73
GLOBULIN UR ELPH-MCNC: 3.1 GM/DL
GLUCOSE BLD-MCNC: 92 MG/DL (ref 65–99)
HCT VFR BLD AUTO: 48.7 % (ref 34–46.6)
HGB BLD-MCNC: 15 G/DL (ref 12–15.9)
IMM GRANULOCYTES # BLD AUTO: 0.31 10*3/MM3 (ref 0–0.05)
IMM GRANULOCYTES NFR BLD AUTO: 2.6 % (ref 0–0.5)
LYMPHOCYTES # BLD AUTO: 1.39 10*3/MM3 (ref 0.7–3.1)
LYMPHOCYTES NFR BLD AUTO: 11.6 % (ref 19.6–45.3)
MCH RBC QN AUTO: 29.4 PG (ref 26.6–33)
MCHC RBC AUTO-ENTMCNC: 30.8 G/DL (ref 31.5–35.7)
MCV RBC AUTO: 95.3 FL (ref 79–97)
MONOCYTES # BLD AUTO: 1.04 10*3/MM3 (ref 0.1–0.9)
MONOCYTES NFR BLD AUTO: 8.7 % (ref 5–12)
NEUTROPHILS # BLD AUTO: 8.8 10*3/MM3 (ref 1.7–7)
NEUTROPHILS NFR BLD AUTO: 73.6 % (ref 42.7–76)
NRBC BLD AUTO-RTO: 0 /100 WBC (ref 0–0.2)
PLATELET # BLD AUTO: 239 10*3/MM3 (ref 140–450)
PMV BLD AUTO: 12.7 FL (ref 6–12)
POTASSIUM BLD-SCNC: 4.6 MMOL/L (ref 3.5–5.2)
PROT SERPL-MCNC: 7.4 G/DL (ref 6–8.5)
RBC # BLD AUTO: 5.11 10*6/MM3 (ref 3.77–5.28)
SODIUM BLD-SCNC: 137 MMOL/L (ref 136–145)
WBC NRBC COR # BLD: 11.95 10*3/MM3 (ref 3.4–10.8)

## 2019-09-13 PROCEDURE — 86140 C-REACTIVE PROTEIN: CPT | Performed by: INTERNAL MEDICINE

## 2019-09-13 PROCEDURE — 85025 COMPLETE CBC W/AUTO DIFF WBC: CPT | Performed by: INTERNAL MEDICINE

## 2019-09-13 PROCEDURE — 99214 OFFICE O/P EST MOD 30 MIN: CPT | Performed by: INTERNAL MEDICINE

## 2019-09-13 PROCEDURE — 85652 RBC SED RATE AUTOMATED: CPT | Performed by: INTERNAL MEDICINE

## 2019-09-13 PROCEDURE — 36415 COLL VENOUS BLD VENIPUNCTURE: CPT | Performed by: INTERNAL MEDICINE

## 2019-09-13 PROCEDURE — 80053 COMPREHEN METABOLIC PANEL: CPT | Performed by: INTERNAL MEDICINE

## 2019-09-13 RX ORDER — AMOXICILLIN 500 MG/1
1000 CAPSULE ORAL 2 TIMES DAILY
COMMUNITY
End: 2020-01-01 | Stop reason: SDUPTHER

## 2019-09-13 NOTE — PROGRESS NOTES
Reason for clinic visits: Follow up for ESBL left prosthetic hip septic arthritis    HPI: Rachel Moser is a 73 y.o. female who was last seen in clinic in June.  She continues to do well.  She denies any hip pain.  Her incision remains well-healed.  She denies any fevers chills or night sweats.  She is tolerating amoxicillin without any abdominal pain nausea vomiting or diarrhea.  No rashes.    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, status post multiple surgeries now antibiotic spacer in place on suppressive antibiotics    Past Surgical History:   Procedure Laterality Date   • BLADDER SUSPENSION     • CATARACT EXTRACTION, BILATERAL     • COLONOSCOPY     • ENDOSCOPIC FUNCTIONAL SINUS SURGERY (FESS)     • HIP SPACER INSERTION WITH ANTIBIOTIC CEMENT Left 8/11/2018    Procedure: TOTAL HIP ARTHROPLASTY REVISION with removal of infected total hip and placement of antibiotic spacer;  Surgeon: Obed Barney MD;  [facility];  Service: Orthopedics   • HIP SURGERY Left 1983   • HIP SURGERY Left 20069   • HIP SURGERY Left 2011   • INCISION AND DRAINAGE HIP Left 11/2/2016    Procedure: HIP INCISION AND DRAINAGE;  Surgeon: Obed Barney MD;  Location: [facility] OR;  Service:    • INCISION AND DRAINAGE HIP Left 7/7/2018    Procedure: I&D and antibiotic bead placement left hip;  Surgeon: Obed Barney MD;  Location: [facility] OR;  Service: Orthopedics   • INCISION AND DRAINAGE HIP Left 7/21/2018    Procedure: HIP INCISION AND DRAINAGE, LEFT WITH POLY HEAD CHANGE AND ANTIBIOTIC BEAD PLACEMENT;  Surgeon: Obed Barney MD;  Location: [facility];  Service: Orthopedics   • JOINT REPLACEMENT Left     HIP   • PERIPHERALLY INSERTED CENTRAL CATHETER INSERTION      • MA CLOSED RX TRAUMATIC HIP DISLOCATN Left 12/18/2017    Procedure: LEFT HIP CLOSED REDUCTION;  Surgeon: Obed Barney MD;  Location: [facility] OR;  Service: Orthopedics   • SOFT TISSUE MASS EXCISION Left     hip 3/19/18   • TOTAL ABDOMINAL HYSTERECTOMY     • TOTAL HIP ARTHROPLASTY REVISION Left 4/25/2018    Procedure: REVISION OF LEFT ACETABULAR COMPONENT ;  Surgeon: Obed Barney MD;  Location: [facility] OR;  Service: Orthopedics   • TOTAL HIP ARTHROPLASTY REVISION Left 08/11/2018    total hip revision with removal of iinfected total hip and placement of antibiotic spacer   • VENA CAVA FILTER INSERTION         Social History   reports that she quit smoking about 44 years ago. Her smoking use included cigarettes. She has never used smokeless tobacco. She reports that she does not drink alcohol or use drugs.    Family History  family history is not on file.    Allergies   Allergen Reactions   • Sulfa Antibiotics Rash     Other reaction(s): Skin irritation       The medication list has been reviewed and updated.     Review of Systems  Pertinent items are noted in HPI, all other systems reviewed and negative    Vital Signs   Temp:  [98.1 °F (36.7 °C)] 98.1 °F (36.7 °C)  Heart Rate:  [84] 84  Resp:  [12] 12  BP: (144)/(75) 144/75    Physical Exam:   General: In no acute distress  Cardiovascular: RRR, no LE edema    Respiratory: Breathing comfortably on room air   GI: Abdomen is soft, non-tender, non-distended, positive bowel sounds bilaterally  Skin: No rashes , left hip incision healed well without erythema drainage or pain    Lab Results   Component Value Date    WBC 11.95 (H) 09/13/2019    HGB 15.0 09/13/2019    HCT 48.7 (H) 09/13/2019    MCV 95.3 09/13/2019     09/13/2019       Lab Results   Component Value Date    GLUCOSE 92 09/13/2019    BUN 16 09/13/2019    CREATININE 0.69 09/13/2019    EGFRIFNONA 84 09/13/2019    BCR 23.2 09/13/2019    CO2 25.4 09/13/2019    CALCIUM 9.1 09/13/2019    ALBUMIN 4.30  09/13/2019    AST 17 09/13/2019    ALT 15 09/13/2019       Lab Results   Component Value Date    SEDRATE 13 07/03/2019       Lab Results   Component Value Date    CRP 0.57 (H) 09/13/2019     7/7 L hip wound ESBL E coli   7/21 L hip Cx:neg   8/11 L hip cx neg    Assessment:  This is a 73 y.o. female who presents to clinic today for follow up of her ESBL left prosthetic hip septic arthritis.  The patient initially underwent incision and drainage with retained hardware on July 7/2018.  She was treated with IV ertapenem but continued to have significant drainage from her hip.  She was taken back to the OR for another incision and drainage on July 21 and ultimately on August 11 all hardware was removed.  Initial operative cultures grew ESBL Escherichia coli.  Her cultures from July 21 and August 11 are negative.  She was treated with IV ertapenem x 10 weeks.  She has been on Augmentin and cefaclor since October 29th.  In March we took her off cefaclor and continued her on amoxicillin only.  Her inflammatory markers continue to decrease.  Her ESR is back to normal.  Her CRP is slowly decreasing back to normal.     The patient does have rheumatoid arthritis and is on methotrexate.  She continues to have flareups of her rheumatoid arthritis and has been on steroids last month.  I am not sure if her CRP will ever be back to normal.  We will continue her on amoxicillin for now.  I will touch base with Dr. Barney to see if there is any plans for hardware reimplantation.  If that is the plan I would definitely take her off the antibiotics and observe her for a period of time before new hardware is placed.    Plan:   1.  Continue Augmentin 500 mg by mouth twice a day x 3 months  2.  Obtain a ESR and CRP   3.  CBC with differential and CMP.  Will fax the results to her rheumatologist    Return to Infectious Disease clinic in 3 month

## 2019-09-27 ENCOUNTER — ANTICOAGULATION VISIT (OUTPATIENT)
Dept: PHARMACY | Facility: HOSPITAL | Age: 74
End: 2019-09-27

## 2019-09-27 ENCOUNTER — LAB (OUTPATIENT)
Dept: LAB | Facility: HOSPITAL | Age: 74
End: 2019-09-27

## 2019-09-27 DIAGNOSIS — Z86.718 HISTORY OF DVT (DEEP VEIN THROMBOSIS): ICD-10-CM

## 2019-09-27 LAB
INR PPP: 2.73 (ref 0.9–1.1)
PROTHROMBIN TIME: 28.7 SECONDS (ref 11.7–14.2)

## 2019-09-27 PROCEDURE — 36415 COLL VENOUS BLD VENIPUNCTURE: CPT

## 2019-09-27 PROCEDURE — 85610 PROTHROMBIN TIME: CPT

## 2019-09-27 NOTE — PROGRESS NOTES
Anticoagulation Clinic Progress Note    Anticoagulation Summary  As of 2019    INR goal:   2.5-3.5   TTR:   46.7 % (11.3 mo)   INR used for dosin.73 (2019)   Warfarin maintenance plan:   7.5 mg every Mon, Wed, Fri; 5 mg all other days   Weekly warfarin total:   42.5 mg   No change documented:   Thomas Smallwood RPH   Plan last modified:   Thomas Smallwood RPH (2019)   Next INR check:   10/25/2019   Priority:   Maintenance   Target end date:   Indefinite    Indications    History of DVT (deep vein thrombosis) [Z86.718]             Anticoagulation Episode Summary     INR check location:       Preferred lab:       Send INR reminders to:    [facility]    Comments:         Anticoagulation Care Providers     Provider Role Specialty Phone number    Vijay Arango MD Referring Cardiology [phone]    Molly Torres RPH Responsible Pharmacy [phone]          Clinic Interview:  No pertinent clinical findings have been reported.    INR History:  Anticoagulation Monitoring 2019   INR 3.27 3.00 2.73   INR Date 8/15/2019 2019 2019   INR Goal 2.5-3.5 2.5-3.5 2.5-3.5   Trend Same Same Same   Last Week Total 42.5 mg 42.5 mg 42.5 mg   Next Week Total 42.5 mg 42.5 mg 42.5 mg   Sun 5 mg 5 mg 5 mg   Mon 7.5 mg 7.5 mg 7.5 mg   Tue 5 mg 5 mg 5 mg   Wed 7.5 mg 7.5 mg 7.5 mg   Thu 5 mg 5 mg 5 mg   Fri 7.5 mg 7.5 mg 7.5 mg   Sat 5 mg 5 mg 5 mg   Visit Report - - -   Some recent data might be hidden       Plan:  1. INR is therapeutic today- see above in Anticoagulation Summary.    Rachelred Moser to continue their warfarin regimen- see above in Anticoagulation Summary.  2. Follow up in 4 weeks  3. They have been instructed to call if any changes in medications, doses, concerns, etc. Patient expresses understanding and has no further questions at this time.    Thomas Smallwood RPH

## 2019-10-11 ENCOUNTER — OFFICE VISIT (OUTPATIENT)
Dept: ORTHOPEDIC SURGERY | Facility: CLINIC | Age: 74
End: 2019-10-11

## 2019-10-11 VITALS — HEIGHT: 59 IN | BODY MASS INDEX: 26 KG/M2 | TEMPERATURE: 97.5 F | WEIGHT: 129 LBS

## 2019-10-11 DIAGNOSIS — Z96.642 STATUS POST TOTAL REPLACEMENT OF LEFT HIP: Primary | ICD-10-CM

## 2019-10-11 PROCEDURE — 99212 OFFICE O/P EST SF 10 MIN: CPT | Performed by: ORTHOPAEDIC SURGERY

## 2019-10-11 PROCEDURE — 73502 X-RAY EXAM HIP UNI 2-3 VIEWS: CPT | Performed by: ORTHOPAEDIC SURGERY

## 2019-10-31 ENCOUNTER — ANTICOAGULATION VISIT (OUTPATIENT)
Dept: PHARMACY | Facility: HOSPITAL | Age: 74
End: 2019-10-31

## 2019-10-31 ENCOUNTER — LAB (OUTPATIENT)
Dept: LAB | Facility: HOSPITAL | Age: 74
End: 2019-10-31

## 2019-10-31 DIAGNOSIS — Z86.718 HISTORY OF DVT (DEEP VEIN THROMBOSIS): ICD-10-CM

## 2019-10-31 LAB
INR PPP: 2.72 (ref 0.9–1.1)
PROTHROMBIN TIME: 28.5 SECONDS (ref 11.7–14.2)

## 2019-10-31 PROCEDURE — 85610 PROTHROMBIN TIME: CPT

## 2019-10-31 PROCEDURE — 36415 COLL VENOUS BLD VENIPUNCTURE: CPT

## 2019-10-31 NOTE — PROGRESS NOTES
Anticoagulation Clinic Progress Note    Anticoagulation Summary  As of 10/31/2019    INR goal:   2.5-3.5   TTR:   51.5 % (1 y)   INR used for dosin.72 (10/31/2019)   Warfarin maintenance plan:   7.5 mg every Mon, Wed, Fri; 5 mg all other days   Weekly warfarin total:   42.5 mg   No change documented:   Thomas Smallwood RPH   Plan last modified:   Thomas Smallwood RPH (2019)   Next INR check:   2019   Priority:   Maintenance   Target end date:   Indefinite    Indications    History of DVT (deep vein thrombosis) [Z86.718]             Anticoagulation Episode Summary     INR check location:       Preferred lab:       Send INR reminders to:    [facility] [place]    Comments:         Anticoagulation Care Providers     Provider Role Specialty Phone number    Vijay Arango MD Referring Cardiology [phone]    Molly Torres RP Responsible Pharmacy [phone]            Clinic Interview:  Patient Findings     Negatives:   Signs/symptoms of thrombosis, Signs/symptoms of bleeding,   Laboratory test error suspected, Change in health, Change in alcohol use,   Change in activity, Upcoming invasive procedure, Emergency department   visit, Upcoming dental procedure, Missed doses, Extra doses, Change in   medications, Change in diet/appetite, Hospital admission, Bruising, Other   complaints      Clinical Outcomes     Negatives:   Major bleeding event, Thromboembolic event,   Anticoagulation-related hospital admission, Anticoagulation-related ED   visit, Anticoagulation-related fatality        INR History:  Anticoagulation Monitoring 2019 2019 10/31/2019   INR 3.00 2.73 2.72   INR Date 2019 2019 10/31/2019   INR Goal 2.5-3.5 2.5-3.5 2.5-3.5   Trend Same Same Same   Last Week Total 42.5 mg 42.5 mg 42.5 mg   Next Week Total 42.5 mg 42.5 mg 42.5 mg   Sun 5 mg 5 mg 5 mg   Mon 7.5 mg 7.5 mg 7.5 mg   Tue 5 mg 5 mg 5 mg   Wed 7.5 mg 7.5 mg 7.5 mg   Thu 5 mg 5 mg 5 mg   Fri 7.5 mg 7.5 mg 7.5  mg   Sat 5 mg 5 mg 5 mg   Visit Report - - -   Some recent data might be hidden       Plan:  1. INR is Therapeutic today- see above in Anticoagulation Summary.   Will instruct Rachel Moser to Continue their warfarin regimen- see above in Anticoagulation Summary.  2. Follow up in 4 weeks  3. They have been instructed to call if any changes in medications, doses, concerns, etc. Patient expresses understanding and has no further questions at this time.    Thomas Smallwood RP

## 2019-11-20 NOTE — ADDENDUM NOTE
Addendum  created 07/21/18 1413 by Connie Haider, ARACELIS    Order list changed, Order sets accessed       Assessment/Plan     This is a 81 y o  female who presents for OMT follow-up for:  1  Chronic left shoulder pain     2  Adhesive capsulitis of left shoulder     3  Impingement syndrome of left shoulder     4  Somatic dysfunction of upper extremity         Plan:   1  Patient tolerated OMT well for the above problems,  advised patient to drink fluids and can use NSAID for soreness after treatment  2  Patient received Steroid injection in subacromial bursa  Tolerated procedure well with immediate increase in ROM  Advised patient to continue massaging area and stretching arm today to allow medication to better absorb and spread  2  OMT Follow up in 2 weeks  Subjective     Nighat Casiano is a 81 y o  female and is here for a OMT follow up  The patient reports pain in left shoulder has stayed the same since previous treatment  She reports pain in her left shoulder with decreased ROM in flexion, extension, abduction, and adduction  She also reports pain in the left side of her neck and tingling sensation that runs from her left shoulder to her forearm  Reports pain is worse when she tries to brush her hair and she also experiences pain when lying down on the left side  She has tried Tylenol Arthritis with minimal relief  Patient states she has had steroid injections years ago but stopped due to elevation in her blood glucose  Is the patient taking Pain medication? yes  Has the patient completed physical therapy for this condition?  no  Did Patient symptoms improve from last OMT appointment? no    The following portions of the patient's history were reviewed and updated as appropriate:   She  has a past medical history of Arthritis, Back pain, Benign essential hypertension, Carotid artery stenosis, CKD (chronic kidney disease) stage 2, GFR 60-89 ml/min, Constipation, chronic, Diabetes mellitus ([facility] ), DMII (diabetes mellitus, type 2) ([facility] ), Epiglottitis, Gastritis, GERD (gastroesophageal reflux disease), Gout, H/O blood clots, History of stenosis of renal artery, Hypercholesteremia, Hypercholesterolemia, Hyperlipidemia, Hyperparathyroidism ([facility] ), Hypertension, Iliac artery stenosis, bilateral ([facility] ), Kidney stone, Lobular carcinoma ([facility] ) (09/06/2017), Mucinous carcinoma of breast, 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), Presence of pessary, Renal artery stenosis (HCC), Renal disorder, Renovascular hypertension, Segmental and somatic dysfunction of cervical region, Segmental and somatic dysfunction of lumbar region, Segmental and somatic dysfunction of pelvic region, Segmental and somatic dysfunction of thoracic region, Somatic dysfunction of abdominal region, Somatic dysfunction of head region, Somatic dysfunction of lower extremities, Somatic dysfunction of rib region, Somatic dysfunction of thoracic region, Somatic dysfunction of upper extremities, and Teeth missing    She   Patient Active Problem List    Diagnosis Date Noted    Mesenteric artery stenosis ([facility] ) 11/12/2019    Renal artery stenosis (HCC) 11/12/2019    Respiratory crackles 10/18/2019    Allergic conjunctivitis of both eyes 10/18/2019    Viral upper respiratory tract infection 10/18/2019    Encounter for follow-up examination after completed treatment for malignant neoplasm 08/01/2019    Palpitations 07/25/2019    Chest pain 07/22/2019    Dysphonia 07/20/2019    Glottic insufficiency 07/20/2019    Reflux laryngitis 07/20/2019    Paresis of left vocal fold 07/20/2019    Muscle tension dysphonia 07/20/2019    Laryngocele 07/20/2019    Gastroesophageal reflux disease 07/20/2019    Anterior glottic web 07/20/2019    Pain in tooth 06/25/2019    Bradycardia 06/16/2019    Hyperkalemia 06/16/2019    Tinea unguium 06/13/2019    Diabetic neuropathy ([facility] ) 06/13/2019    Hemorrhoids 06/04/2019    Claudication in peripheral vascular disease ([facility] ) 05/17/2019    Type 2 diabetes mellitus with diabetic peripheral angiopathy without gangrene ([facility] ) 04/22/2019    Stricture of artery ([facility] ) 04/22/2019    History of left breast cancer 08/15/2018    Gastroesophageal reflux disease without esophagitis 05/21/2018    Acquired complex cyst of kidney 05/10/2018    Chronic left shoulder pain 04/04/2018    Bilateral carotid artery stenosis 01/10/2018    Gout 10/24/2017    Neck pain 08/15/2017    Fever of unknown origin 08/15/2017    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 08/02/2017    Primary generalized (osteo)arthritis 08/02/2017    Abnormal mammogram 07/20/2017    Epigastric pain 07/10/2017    Chronic myofascial pain 04/26/2017    CKD (chronic kidney disease), stage III ([facility] ) 03/24/2017    Neuropathic pain, leg, right 03/13/2017    PAD (peripheral artery disease) ([facility] ) 01/30/2017    Persistent proteinuria 09/22/2016    Chronic pain of lower extremity 09/16/2016    Anemia 09/03/2016    EKG abnormalities 09/01/2016    Abnormal EKG 08/09/2016    Iliac artery stenosis, bilateral (HCC)     Hyperparathyroidism (HCC)     GERD (gastroesophageal reflux disease)     Arthritis     Hypertension     Vocal cord mass 05/31/2016    Hypomagnesemia 03/10/2016    Accelerated essential hypertension 12/08/2015    Dysphagia 12/08/2015    Renal cyst 10/15/2015    Carotid stenosis, asymptomatic 05/19/2015    Atherosclerosis of native artery of extremity with intermittent claudication ([facility] ) 05/07/2015    Constipation 05/06/2015    Vaginal prolapse 08/04/2014    Gastritis 07/08/2014    Back pain 06/30/2014    Nephrolithiasis 09/04/2013    Diabetic retinopathy ([facility] ) 07/30/2013    Microalbuminuria 07/30/2013    Secondary hyperparathyroidism, renal ([facility] ) 07/30/2013    Vitamin D deficiency 07/30/2013    Atherosclerosis of other specified arteries 06/11/2013    Lymphadenopathy 06/10/2013    Allergic rhinitis 03/15/2013    Fall from other slipping, tripping, or stumbling 01/24/2013    DMII (diabetes mellitus, type 2) ([facility] ) 09/24/2012    Osteoporosis 09/21/2012     She  has a past surgical history that includes Angioplasty / stenting iliac; Cholecystectomy; Tubal ligation; Eye surgery; Vascular surgery; Throat surgery; pr laryngoscopy,dirct,op scop,exc tumr (N/A, 6/10/2016); Cataract extraction (Bilateral); Colonoscopy; Knee arthroscopy (Right); Breast surgery (Left); Knee surgery (Right); Iliac vein angioplasty / stenting (Right); pr esophagogastroduodenoscopy transoral diagnostic (N/A, 7/26/2018); US guided breast biopsy left complete (Left, 8/10/2018); Mammo needle localization left (all inc) (Left, 9/11/2018); pr perq device placemt breast loc 1st les w guidnce (Left, 9/11/2018); US guidance breast biopsy left each additional (Left, 8/7/2017); US guided breast biopsy left complete (Left, 8/7/2017); Mammo needle localization left (all inc) (Left, 9/6/2017); Upper gastrointestinal endoscopy; Breast biopsy (Left, 08/10/2018); Breast biopsy (Left, 08/07/2017); pr mastectomy, partial (Left, 9/6/2017); Breast lumpectomy (Left, 09/06/2017); and Breast lumpectomy (Left, 09/11/2018)  Her family history includes Diabetes in her other; Gout in her family; Heart disease in her brother, family, and maternal grandmother; Hypertension in her other; Lupus in her family; No Known Problems in her daughter, daughter, daughter, father, maternal aunt, maternal aunt, maternal aunt, maternal grandfather, mother, paternal grandfather, and paternal grandmother; Rheum arthritis in her family; Stroke in her family, maternal aunt, and maternal uncle  She  reports that she quit smoking about 15 years ago  She has a 64 00 pack-year smoking history  She has never used smokeless tobacco  She reports that she drank alcohol  She reports that she does not use drugs    Current Outpatient Medications   Medication Sig Dispense Refill    Acetaminophen (TYLENOL) 325 MG CAPS Take by mouth every 8 (eight) hours      aspirin (ADULT ASPIRIN EC LOW STRENGTH) 81 mg EC tablet Take 1 tablet by mouth daily      calcitriol (ROCALTROL) 0 25 mcg capsule TAKE 1 CAPSULE BY MOUTH 2 TIMES A WEEK  EVERY TUESDAY AND FRIDAY 10 capsule 6    calcium carbonate-vitamin D (OSCAL-D) 500 mg-200 units per tablet Take 1 tablet by mouth daily with breakfast      DULoxetine (CYMBALTA) 30 mg delayed release capsule Take 30 mg by mouth daily  0    fluticasone (FLONASE) 50 mcg/act nasal spray instill 1 spray into each nostril once daily 16 g 2    furosemide (LASIX) 20 mg tablet TAKE 1 TABLET  BY MOUTH DAILY WITH SWELLING, EVERY OTHER DAY ONCE SWELLING RESOLVES 30 tablet 3    gabapentin (NEURONTIN) 100 mg capsule take 1 capsule by mouth at bedtime for 10 days 90 capsule 1    losartan (COZAAR) 100 MG tablet take 1 tablet by mouth once daily 90 tablet 0    losartan (COZAAR) 50 mg tablet   0    NIFEdipine ER (ADALAT CC) 30 MG 24 hr tablet Take 1 tablet (30 mg total) by mouth daily Please take at night in addition to the 60 mg in am 30 tablet 5    NIFEdipine ER (ADALAT CC) 60 MG 24 hr tablet Take 1 tablet (60 mg total) by mouth daily for 90 days 90 tablet 0    olopatadine (PATANOL) 0 1 % ophthalmic solution Administer 1 drop to both eyes 2 (two) times a day 5 mL 0    ONE TOUCH ULTRA TEST test strip 1 each by Other route 2 (two) times a day Use as instructed 100 each 5    pantoprazole (PROTONIX) 40 mg tablet Take 1 tablet (40 mg total) by mouth daily 30 tablet 5    pravastatin (PRAVACHOL) 40 mg tablet TAKE 1 TABLET DAILY 90 tablet 1    senna (SENOKOT) 8 6 mg Take 1 tablet (8 6 mg total) by mouth daily at bedtime as needed for constipation 120 each 0     No current facility-administered medications for this visit  She is allergic to clonidine derivatives; diflucan [fluconazole]; flagyl [metronidazole]; carvedilol; latex; lipitor [atorvastatin]; and other       Review of Systems  Do you have pain that bothers you in your daily life? not asked  Review of Systems    Objective     OMT Exam   Large joint arthrocentesis: L subacromial bursa  Date/Time: 11/20/2019 5:03 PM  Consent given by: patient  Site marked: site marked  Supporting Documentation  Indications: pain   Procedure Details  Location: shoulder - L subacromial bursa  Needle gauge: 23G  Ultrasound guidance: no  Approach: lateral  Medications administered: 40 mg triamcinolone acetonide 40 mg/mL (Xyclocaine 1% injection; NDC: 09235-530-50;  LOT [id number]; 6/20 )    Patient tolerance: patient tolerated the procedure well with no immediate complications    OMT  Performed by: Melba Ferraro DO  Authorized by: Melba Ferraro, DO     Verbal consent obtained?: Yes    Consent given by:  Patient  Procedure Details:     Region evaluated and treated:  Left Upper Extremity    Left Upper Extremity details:     Examination Method:  Range of Motion, Contracture, Tenderness, Pain, Passive, Active and Tissue Texture Change, Stability, Laxity, Effusions, Tone    Severity:  Moderate    Osteopathic Findings:  Decreased ROM in flexion, extension, abduction, adduction      Treatment Method:  Articulatory Treatment, Direct Treatment, Indirect Treatment and Soft Tissue Treatment    Response:  Improved - The somatic dysfunction is improved but not completely resolved  Total Regions Treated:  1  Attending provider present in exam room for procedure:  Yes                Melba Ferraro DO PGY-1  [place] [id number]

## 2019-12-04 NOTE — PROGRESS NOTES
"Anticoagulation Clinic Progress Note    Anticoagulation Summary  As of 2019    INR goal:   2.5-3.5   TTR:   54.3 % (1.1 y)   INR used for dosin.46! (2019)   Warfarin maintenance plan:   7.5 mg every Mon, Wed, Fri; 5 mg all other days   Weekly warfarin total:   42.5 mg   No change documented:   Thomas Smallwood RPH   Plan last modified:   Thomas Smallwood RPH (2019)   Next INR check:   2020   Priority:   Maintenance   Target end date:   Indefinite    Indications    History of DVT (deep vein thrombosis) [Z86.718]             Anticoagulation Episode Summary     INR check location:       Preferred lab:       Send INR reminders to:   [facility]    Comments:         Anticoagulation Care Providers     Provider Role Specialty Phone number    Vijay Arango MD Referring Cardiology [phone]    Molly Torres RPH Responsible Pharmacy [phone]            Clinic Interview:  Patient Findings     Positives:   Change in health, Change in diet/appetite    Negatives:   Signs/symptoms of thrombosis, Signs/symptoms of bleeding,   Laboratory test error suspected, Change in alcohol use, Change in   activity, Upcoming invasive procedure, Emergency department visit,   Upcoming dental procedure, Missed doses, Extra doses, Change in   medications, Hospital admission, Bruising, Other complaints    Comments:   Sinus infection. \"Probably more greens\" while out of town for   Thanksgiving      Clinical Outcomes     Negatives:   Major bleeding event, Thromboembolic event,   Anticoagulation-related hospital admission, Anticoagulation-related ED   visit, Anticoagulation-related fatality    Comments:   Sinus infection. \"Probably more greens\" while out of town for   Thanksgiving        INR History:  Anticoagulation Monitoring 2019 10/31/2019 2019   INR 2.73 2.72 2.46   INR Date 2019 10/31/2019 2019   INR Goal 2.5-3.5 2.5-3.5 2.5-3.5   Trend Same Same Same   Last Week Total 42.5 mg " 42.5 mg 42.5 mg   Next Week Total 42.5 mg 42.5 mg 42.5 mg   Sun 5 mg 5 mg 5 mg   Mon 7.5 mg 7.5 mg 7.5 mg   Tue 5 mg 5 mg 5 mg   Wed 7.5 mg 7.5 mg 7.5 mg   Thu 5 mg 5 mg 5 mg   Fri 7.5 mg 7.5 mg 7.5 mg   Sat 5 mg 5 mg 5 mg   Visit Report - - -   Some recent data might be hidden       Plan:  1. INR is Subtherapeutic today- see above in Anticoagulation Summary.   Will instruct Rachel Moser to Continue their warfarin regimen- see above in Anticoagulation Summary.  2. Follow up in 4 weeks  3. They have been instructed to call if any changes in medications, doses, concerns, etc. Patient expresses understanding and has no further questions at this time.    Thomas Smallwood [facility]

## 2019-12-13 NOTE — PROGRESS NOTES
Reason for clinic visits: Follow up for ESBL left prosthetic hip septic arthritis    HPI: Rachel Moser is a 74 y.o. female who was last seen in clinic in September.  Since that time she has done well.  She has no new complaints.  She denies any hip pain.  Her incisions remain well-healed without erythema or drainage.  She denies any fevers chills or night sweats.  She is tolerating antibiotics without abdominal pain nausea vomiting or diarrhea.  No shortness of breath or cough.  No rash.     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, status post multiple surgeries now antibiotic spacer in place on suppressive antibiotics    Past Surgical History:   Procedure Laterality Date   • BLADDER SUSPENSION     • CATARACT EXTRACTION, BILATERAL     • COLONOSCOPY     • ENDOSCOPIC FUNCTIONAL SINUS SURGERY (FESS)     • HIP SPACER INSERTION WITH ANTIBIOTIC CEMENT Left 8/11/2018    Procedure: TOTAL HIP ARTHROPLASTY REVISION with removal of infected total hip and placement of antibiotic spacer;  Surgeon: Obed Barney MD;  Location: [facility];  Service: Orthopedics   • HIP SURGERY Left 1983   • HIP SURGERY Left 20069   • HIP SURGERY Left 2011   • INCISION AND DRAINAGE HIP Left 11/2/2016    Procedure: HIP INCISION AND DRAINAGE;  Surgeon: Obed Barney MD;  Location: [facility];  Service:    • INCISION AND DRAINAGE HIP Left 7/7/2018    Procedure: I&D and antibiotic bead placement left hip;  Surgeon: Obed Barney MD;  Location: [facility] OR;  Service: Orthopedics   • INCISION AND DRAINAGE HIP Left 7/21/2018    Procedure: HIP INCISION AND DRAINAGE, LEFT WITH POLY HEAD CHANGE AND ANTIBIOTIC BEAD PLACEMENT;  Surgeon: Obed Barney MD;  Location: [facility];  Service:  Orthopedics   • JOINT REPLACEMENT Left     HIP   • PERIPHERALLY INSERTED CENTRAL CATHETER INSERTION     • DE CLOSED RX TRAUMATIC HIP DISLOCATN Left 12/18/2017    Procedure: LEFT HIP CLOSED REDUCTION;  Surgeon: Obed Barney MD;  Location: [facility];  Service: Orthopedics   • SOFT TISSUE MASS EXCISION Left     hip 3/19/18   • TOTAL ABDOMINAL HYSTERECTOMY     • TOTAL HIP ARTHROPLASTY REVISION Left 4/25/2018    Procedure: REVISION OF LEFT ACETABULAR COMPONENT ;  Surgeon: Obed Barney MD;  Location: [facility];  Service: Orthopedics   • TOTAL HIP ARTHROPLASTY REVISION Left 08/11/2018    total hip revision with removal of iinfected total hip and placement of antibiotic spacer   • VENA CAVA FILTER INSERTION         Social History   reports that she quit smoking about 44 years ago. Her smoking use included cigarettes. She has never used smokeless tobacco. She reports that she does not drink alcohol or use drugs.    Family History  family history is not on file.    Allergies   Allergen Reactions   • Sulfa Antibiotics Rash     Other reaction(s): Skin irritation       The medication list has been reviewed and updated.     Review of Systems  Pertinent items are noted in HPI, all other systems reviewed and negative    Vital Signs   Temp:  [98 °F (36.7 °C)] 98 °F (36.7 °C)  Heart Rate:  [71] 71  Resp:  [12] 12  BP: (138)/(72) 138/72    Physical Exam:   General: In no acute distress  Cardiovascular: RRR, no LE edema    Respiratory: Breathing comfortably on room air   GI: Abdomen is soft, non-tender, non-distended, positive bowel sounds bilaterally  Skin: No rashes , left hip incision healed well without erythema drainage or pain    Lab Results   Component Value Date    WBC 11.95 (H) 09/13/2019    HGB 15.0 09/13/2019    HCT 48.7 (H) 09/13/2019    MCV 95.3 09/13/2019     09/13/2019       Lab Results   Component Value Date    GLUCOSE 92 09/13/2019    BUN 16 09/13/2019    CREATININE 0.69 09/13/2019    EGFRIFNONA 84  09/13/2019    BCR 23.2 09/13/2019    CO2 25.4 09/13/2019    CALCIUM 9.1 09/13/2019    ALBUMIN 4.30 09/13/2019    AST 17 09/13/2019    ALT 15 09/13/2019       Lab Results   Component Value Date    SEDRATE 21 09/13/2019       Lab Results   Component Value Date    CRP 0.57 (H) 09/13/2019     7/7 L hip wound ESBL E coli   7/21 L hip Cx:neg   8/11 L hip cx neg    Assessment:  This is a 74 y.o. female who presents to clinic today for follow up of her ESBL left prosthetic hip septic arthritis.  The patient initially underwent incision and drainage with retained hardware on July 7/2018.  She was treated with IV ertapenem but continued to have significant drainage from her hip.  She was taken back to the OR for another incision and drainage on July 21 and ultimately on August 11 all hardware was removed.  Initial operative cultures grew ESBL Escherichia coli.  Her cultures from July 21 and August 11 are negative.  She was treated with IV ertapenem x 10 weeks.  She has been on Augmentin and cefaclor since October 29th.  In March we took her off cefaclor and continued her on amoxicillin only.  I discussed the case with Dr. Barney.  He would like to hold off on surgery as long as possible.  We will continue her on suppressive Augmentin.  If at any point the patient antibiotic spacer fails and she needs surgery we will discontinue her suppressive antibiotic immediately and keep her off antibiotics until the time of surgery.  This would allow us to determine whether infection is really present at the time of surgery and possibly new hardware placement.    Plan:   1.  Continue Augmentin 500 mg by mouth twice a day x 6 months  2.  Obtain a CBC with differential and CMP    Return to Infectious Disease clinic in 6 month

## 2020-01-01 ENCOUNTER — LAB (OUTPATIENT)
Dept: LAB | Facility: HOSPITAL | Age: 75
End: 2020-01-01

## 2020-01-01 ENCOUNTER — TELEPHONE (OUTPATIENT)
Dept: INTERNAL MEDICINE | Age: 75
End: 2020-01-01

## 2020-01-01 ENCOUNTER — OFFICE VISIT (OUTPATIENT)
Dept: CARDIOLOGY | Facility: CLINIC | Age: 75
End: 2020-01-01

## 2020-01-01 ENCOUNTER — ANTICOAGULATION VISIT (OUTPATIENT)
Dept: PHARMACY | Facility: HOSPITAL | Age: 75
End: 2020-01-01

## 2020-01-01 ENCOUNTER — OFFICE VISIT (OUTPATIENT)
Dept: INTERNAL MEDICINE | Age: 75
End: 2020-01-01

## 2020-01-01 ENCOUNTER — APPOINTMENT (OUTPATIENT)
Dept: GENERAL RADIOLOGY | Facility: HOSPITAL | Age: 75
End: 2020-01-01

## 2020-01-01 ENCOUNTER — TELEPHONE (OUTPATIENT)
Dept: CARDIOLOGY | Facility: CLINIC | Age: 75
End: 2020-01-01

## 2020-01-01 ENCOUNTER — TELEPHONE (OUTPATIENT)
Dept: ORTHOPEDIC SURGERY | Facility: CLINIC | Age: 75
End: 2020-01-01

## 2020-01-01 ENCOUNTER — HOSPITAL ENCOUNTER (OUTPATIENT)
Dept: CARDIOLOGY | Facility: HOSPITAL | Age: 75
Discharge: HOME | End: 2020-03-24
Admitting: NURSE PRACTITIONER

## 2020-01-01 ENCOUNTER — HOSPITAL ENCOUNTER (EMERGENCY)
Facility: HOSPITAL | Age: 75
End: 2020-11-19
Attending: EMERGENCY MEDICINE | Admitting: EMERGENCY MEDICINE

## 2020-01-01 ENCOUNTER — OFFICE VISIT (OUTPATIENT)
Dept: ORTHOPEDIC SURGERY | Facility: CLINIC | Age: 75
End: 2020-01-01

## 2020-01-01 ENCOUNTER — TELEPHONE (OUTPATIENT)
Dept: PHARMACY | Facility: HOSPITAL | Age: 75
End: 2020-01-01

## 2020-01-01 ENCOUNTER — TRANSCRIBE ORDERS (OUTPATIENT)
Dept: ADMINISTRATIVE | Facility: HOSPITAL | Age: 75
End: 2020-01-01

## 2020-01-01 ENCOUNTER — APPOINTMENT (OUTPATIENT)
Dept: WOMENS IMAGING | Facility: HOSPITAL | Age: 75
End: 2020-01-01

## 2020-01-01 ENCOUNTER — OFFICE VISIT (OUTPATIENT)
Dept: INFECTIOUS DISEASES | Facility: CLINIC | Age: 75
End: 2020-01-01

## 2020-01-01 ENCOUNTER — EPISODE CHANGES (OUTPATIENT)
Dept: CASE MANAGEMENT | Facility: OTHER | Age: 75
End: 2020-01-01

## 2020-01-01 ENCOUNTER — APPOINTMENT (OUTPATIENT)
Dept: CT IMAGING | Facility: HOSPITAL | Age: 75
End: 2020-01-01

## 2020-01-01 ENCOUNTER — HOSPITAL ENCOUNTER (INPATIENT)
Facility: HOSPITAL | Age: 75
LOS: 22 days | Discharge: NURSING, REHAB OR LONG-TERM CARE | End: 2020-11-18
Attending: EMERGENCY MEDICINE | Admitting: INTERNAL MEDICINE

## 2020-01-01 VITALS
SYSTOLIC BLOOD PRESSURE: 138 MMHG | HEART RATE: 91 BPM | TEMPERATURE: 97.5 F | RESPIRATION RATE: 13 BRPM | OXYGEN SATURATION: 98 % | DIASTOLIC BLOOD PRESSURE: 80 MMHG | WEIGHT: 127.2 LBS | BODY MASS INDEX: 25.64 KG/M2 | HEIGHT: 59 IN

## 2020-01-01 VITALS — WEIGHT: 128 LBS | HEIGHT: 59 IN | BODY MASS INDEX: 25.8 KG/M2 | TEMPERATURE: 98.8 F

## 2020-01-01 VITALS
SYSTOLIC BLOOD PRESSURE: 120 MMHG | DIASTOLIC BLOOD PRESSURE: 75 MMHG | TEMPERATURE: 97.7 F | HEIGHT: 59 IN | WEIGHT: 129 LBS | BODY MASS INDEX: 26 KG/M2 | HEART RATE: 86 BPM | RESPIRATION RATE: 12 BRPM

## 2020-01-01 VITALS
TEMPERATURE: 97 F | DIASTOLIC BLOOD PRESSURE: 70 MMHG | HEIGHT: 59 IN | BODY MASS INDEX: 24.33 KG/M2 | WEIGHT: 120.7 LBS | OXYGEN SATURATION: 93 % | RESPIRATION RATE: 18 BRPM | HEART RATE: 91 BPM | SYSTOLIC BLOOD PRESSURE: 135 MMHG

## 2020-01-01 VITALS
DIASTOLIC BLOOD PRESSURE: 80 MMHG | TEMPERATURE: 96.2 F | HEIGHT: 59 IN | HEART RATE: 100 BPM | BODY MASS INDEX: 26 KG/M2 | OXYGEN SATURATION: 96 % | WEIGHT: 129 LBS | RESPIRATION RATE: 13 BRPM | SYSTOLIC BLOOD PRESSURE: 130 MMHG

## 2020-01-01 VITALS
OXYGEN SATURATION: 98 % | BODY MASS INDEX: 25.6 KG/M2 | WEIGHT: 127 LBS | TEMPERATURE: 96.8 F | DIASTOLIC BLOOD PRESSURE: 80 MMHG | HEART RATE: 91 BPM | SYSTOLIC BLOOD PRESSURE: 122 MMHG | HEIGHT: 59 IN

## 2020-01-01 VITALS
DIASTOLIC BLOOD PRESSURE: 90 MMHG | SYSTOLIC BLOOD PRESSURE: 140 MMHG | HEIGHT: 59 IN | HEART RATE: 81 BPM | WEIGHT: 127.8 LBS | BODY MASS INDEX: 25.76 KG/M2

## 2020-01-01 VITALS
TEMPERATURE: 97.6 F | WEIGHT: 130.4 LBS | OXYGEN SATURATION: 97 % | BODY MASS INDEX: 26.29 KG/M2 | HEART RATE: 107 BPM | HEIGHT: 59 IN

## 2020-01-01 VITALS
RESPIRATION RATE: 32 BRPM | DIASTOLIC BLOOD PRESSURE: 41 MMHG | OXYGEN SATURATION: 77 % | BODY MASS INDEX: 24.6 KG/M2 | HEIGHT: 59 IN | TEMPERATURE: 96.8 F | SYSTOLIC BLOOD PRESSURE: 90 MMHG | WEIGHT: 122 LBS

## 2020-01-01 VITALS
RESPIRATION RATE: 13 BRPM | HEART RATE: 78 BPM | BODY MASS INDEX: 25.56 KG/M2 | WEIGHT: 126.8 LBS | DIASTOLIC BLOOD PRESSURE: 80 MMHG | TEMPERATURE: 97.8 F | SYSTOLIC BLOOD PRESSURE: 128 MMHG | HEIGHT: 59 IN | OXYGEN SATURATION: 98 %

## 2020-01-01 VITALS — HEIGHT: 59 IN | BODY MASS INDEX: 25.6 KG/M2 | WEIGHT: 127 LBS | TEMPERATURE: 98.6 F

## 2020-01-01 DIAGNOSIS — Z86.718 HISTORY OF DVT (DEEP VEIN THROMBOSIS): ICD-10-CM

## 2020-01-01 DIAGNOSIS — Z79.899 ENCOUNTER FOR LONG-TERM (CURRENT) USE OF OTHER MEDICATIONS: ICD-10-CM

## 2020-01-01 DIAGNOSIS — N30.90 CYSTITIS: ICD-10-CM

## 2020-01-01 DIAGNOSIS — Z20.822 CLOSE EXPOSURE TO COVID-19 VIRUS: Primary | ICD-10-CM

## 2020-01-01 DIAGNOSIS — H81.13 BENIGN PAROXYSMAL POSITIONAL VERTIGO DUE TO BILATERAL VESTIBULAR DISORDER: Primary | ICD-10-CM

## 2020-01-01 DIAGNOSIS — N39.0 RECURRENT UTI: Primary | ICD-10-CM

## 2020-01-01 DIAGNOSIS — T45.515A BLEEDING ON COUMADIN: ICD-10-CM

## 2020-01-01 DIAGNOSIS — I65.23 CAROTID STENOSIS, BILATERAL: ICD-10-CM

## 2020-01-01 DIAGNOSIS — K92.2 ACUTE GI BLEEDING: ICD-10-CM

## 2020-01-01 DIAGNOSIS — R79.1 ABNORMAL COAGULATION PROFILE: ICD-10-CM

## 2020-01-01 DIAGNOSIS — M06.9 RHEUMATOID ARTHRITIS INVOLVING MULTIPLE SITES, UNSPECIFIED RHEUMATOID FACTOR PRESENCE: Primary | ICD-10-CM

## 2020-01-01 DIAGNOSIS — Z00.00 MEDICARE ANNUAL WELLNESS VISIT, SUBSEQUENT: Primary | ICD-10-CM

## 2020-01-01 DIAGNOSIS — J12.82 PNEUMONIA DUE TO COVID-19 VIRUS: Primary | ICD-10-CM

## 2020-01-01 DIAGNOSIS — T78.40XA ALLERGIC STATE, INITIAL ENCOUNTER: ICD-10-CM

## 2020-01-01 DIAGNOSIS — U07.1 PNEUMONIA DUE TO COVID-19 VIRUS: Primary | ICD-10-CM

## 2020-01-01 DIAGNOSIS — Z96.642 STATUS POST TOTAL REPLACEMENT OF LEFT HIP: Primary | ICD-10-CM

## 2020-01-01 DIAGNOSIS — Z79.899 ENCOUNTER FOR LONG-TERM (CURRENT) USE OF OTHER MEDICATIONS: Primary | ICD-10-CM

## 2020-01-01 DIAGNOSIS — U07.1 COVID-19: ICD-10-CM

## 2020-01-01 DIAGNOSIS — R58 BLEEDING ON COUMADIN: ICD-10-CM

## 2020-01-01 DIAGNOSIS — I10 ESSENTIAL HYPERTENSION: Primary | ICD-10-CM

## 2020-01-01 DIAGNOSIS — T84.50XD INFECTION OF PROSTHETIC JOINT, SUBSEQUENT ENCOUNTER: Primary | ICD-10-CM

## 2020-01-01 DIAGNOSIS — N30.90 CYSTITIS: Primary | ICD-10-CM

## 2020-01-01 DIAGNOSIS — E78.2 MIXED HYPERLIPIDEMIA: ICD-10-CM

## 2020-01-01 DIAGNOSIS — R52 PAIN: Primary | ICD-10-CM

## 2020-01-01 DIAGNOSIS — I24.9 ACUTE CORONARY SYNDROME (HCC): ICD-10-CM

## 2020-01-01 DIAGNOSIS — Z76.89 ESTABLISHING CARE WITH NEW DOCTOR, ENCOUNTER FOR: ICD-10-CM

## 2020-01-01 DIAGNOSIS — Z00.00 HEALTHCARE MAINTENANCE: ICD-10-CM

## 2020-01-01 DIAGNOSIS — I10 ESSENTIAL HYPERTENSION: ICD-10-CM

## 2020-01-01 DIAGNOSIS — Z23 ENCOUNTER FOR IMMUNIZATION: ICD-10-CM

## 2020-01-01 DIAGNOSIS — I46.9 CARDIOPULMONARY ARREST (HCC): Primary | ICD-10-CM

## 2020-01-01 DIAGNOSIS — R73.9 HIGH BLOOD SUGAR: ICD-10-CM

## 2020-01-01 DIAGNOSIS — Z96.642 STATUS POST TOTAL REPLACEMENT OF LEFT HIP: ICD-10-CM

## 2020-01-01 DIAGNOSIS — I65.23 CAROTID STENOSIS, BILATERAL: Primary | ICD-10-CM

## 2020-01-01 DIAGNOSIS — Z79.2 LONG TERM (CURRENT) USE OF ANTIBIOTICS: ICD-10-CM

## 2020-01-01 LAB
ABO GROUP BLD: NORMAL
ABO GROUP BLD: NORMAL
ALBUMIN SERPL-MCNC: 2.3 G/DL (ref 3.5–5.2)
ALBUMIN SERPL-MCNC: 2.4 G/DL (ref 3.5–5.2)
ALBUMIN SERPL-MCNC: 2.4 G/DL (ref 3.5–5.2)
ALBUMIN SERPL-MCNC: 2.5 G/DL (ref 3.5–5.2)
ALBUMIN SERPL-MCNC: 2.6 G/DL (ref 3.5–5.2)
ALBUMIN SERPL-MCNC: 2.7 G/DL (ref 3.5–5.2)
ALBUMIN SERPL-MCNC: 2.8 G/DL (ref 3.5–5.2)
ALBUMIN SERPL-MCNC: 2.9 G/DL (ref 3.5–5.2)
ALBUMIN SERPL-MCNC: 3 G/DL (ref 3.5–5.2)
ALBUMIN SERPL-MCNC: 3 G/DL (ref 3.5–5.2)
ALBUMIN SERPL-MCNC: 3.1 G/DL (ref 3.5–5.2)
ALBUMIN SERPL-MCNC: 3.1 G/DL (ref 3.5–5.2)
ALBUMIN SERPL-MCNC: 3.2 G/DL (ref 3.5–5.2)
ALBUMIN SERPL-MCNC: 3.6 G/DL (ref 3.5–5.2)
ALBUMIN SERPL-MCNC: 3.8 G/DL (ref 3.5–5.2)
ALBUMIN SERPL-MCNC: 4.2 G/DL (ref 3.5–5.2)
ALBUMIN/GLOB SERPL: 0.9 G/DL
ALBUMIN/GLOB SERPL: 0.9 G/DL
ALBUMIN/GLOB SERPL: 1 G/DL
ALBUMIN/GLOB SERPL: 1.1 G/DL
ALBUMIN/GLOB SERPL: 1.1 G/DL
ALBUMIN/GLOB SERPL: 1.2 G/DL
ALBUMIN/GLOB SERPL: 1.3 G/DL
ALBUMIN/GLOB SERPL: 1.3 G/DL
ALBUMIN/GLOB SERPL: 1.4 G/DL
ALBUMIN/GLOB SERPL: 1.5 G/DL
ALBUMIN/GLOB SERPL: 1.6 G/DL
ALBUMIN/GLOB SERPL: 1.7 G/DL
ALBUMIN/GLOB SERPL: 1.9 G/DL
ALBUMIN/GLOB SERPL: 2.5 G/DL
ALP SERPL-CCNC: 100 U/L (ref 39–117)
ALP SERPL-CCNC: 113 U/L (ref 39–117)
ALP SERPL-CCNC: 135 U/L (ref 39–117)
ALP SERPL-CCNC: 40 U/L (ref 39–117)
ALP SERPL-CCNC: 45 U/L (ref 39–117)
ALP SERPL-CCNC: 52 U/L (ref 39–117)
ALP SERPL-CCNC: 53 U/L (ref 39–117)
ALP SERPL-CCNC: 55 U/L (ref 39–117)
ALP SERPL-CCNC: 55 U/L (ref 39–117)
ALP SERPL-CCNC: 57 U/L (ref 39–117)
ALP SERPL-CCNC: 66 U/L (ref 39–117)
ALP SERPL-CCNC: 72 U/L (ref 39–117)
ALP SERPL-CCNC: 74 U/L (ref 39–117)
ALP SERPL-CCNC: 74 U/L (ref 39–117)
ALP SERPL-CCNC: 75 U/L (ref 39–117)
ALP SERPL-CCNC: 75 U/L (ref 39–117)
ALP SERPL-CCNC: 77 U/L (ref 39–117)
ALP SERPL-CCNC: 77 U/L (ref 39–117)
ALP SERPL-CCNC: 79 U/L (ref 39–117)
ALP SERPL-CCNC: 79 U/L (ref 39–117)
ALP SERPL-CCNC: 80 U/L (ref 39–117)
ALP SERPL-CCNC: 84 U/L (ref 39–117)
ALT SERPL W P-5'-P-CCNC: 13 U/L (ref 1–33)
ALT SERPL W P-5'-P-CCNC: 14 U/L (ref 1–33)
ALT SERPL W P-5'-P-CCNC: 15 U/L (ref 1–33)
ALT SERPL W P-5'-P-CCNC: 16 U/L (ref 1–33)
ALT SERPL W P-5'-P-CCNC: 18 U/L (ref 1–33)
ALT SERPL W P-5'-P-CCNC: 19 U/L (ref 1–33)
ALT SERPL W P-5'-P-CCNC: 25 U/L (ref 1–33)
ALT SERPL W P-5'-P-CCNC: 25 U/L (ref 1–33)
ALT SERPL W P-5'-P-CCNC: 27 U/L (ref 1–33)
ALT SERPL W P-5'-P-CCNC: 29 U/L (ref 1–33)
ALT SERPL W P-5'-P-CCNC: 30 U/L (ref 1–33)
ALT SERPL W P-5'-P-CCNC: 32 U/L (ref 1–33)
ALT SERPL W P-5'-P-CCNC: 34 U/L (ref 1–33)
ALT SERPL W P-5'-P-CCNC: 56 U/L (ref 1–33)
ALT SERPL W P-5'-P-CCNC: 60 U/L (ref 1–33)
ALT SERPL W P-5'-P-CCNC: 60 U/L (ref 1–33)
ALT SERPL W P-5'-P-CCNC: 64 U/L (ref 1–33)
ALT SERPL W P-5'-P-CCNC: 70 U/L (ref 1–33)
ALT SERPL-CCNC: 15 U/L (ref 1–33)
ANION GAP SERPL CALCULATED.3IONS-SCNC: 10.2 MMOL/L (ref 5–15)
ANION GAP SERPL CALCULATED.3IONS-SCNC: 10.8 MMOL/L (ref 5–15)
ANION GAP SERPL CALCULATED.3IONS-SCNC: 11.4 MMOL/L (ref 5–15)
ANION GAP SERPL CALCULATED.3IONS-SCNC: 11.4 MMOL/L (ref 5–15)
ANION GAP SERPL CALCULATED.3IONS-SCNC: 11.6 MMOL/L (ref 5–15)
ANION GAP SERPL CALCULATED.3IONS-SCNC: 11.7 MMOL/L (ref 5–15)
ANION GAP SERPL CALCULATED.3IONS-SCNC: 12 MMOL/L (ref 5–15)
ANION GAP SERPL CALCULATED.3IONS-SCNC: 12.4 MMOL/L (ref 5–15)
ANION GAP SERPL CALCULATED.3IONS-SCNC: 12.7 MMOL/L (ref 5–15)
ANION GAP SERPL CALCULATED.3IONS-SCNC: 12.8 MMOL/L (ref 5–15)
ANION GAP SERPL CALCULATED.3IONS-SCNC: 12.8 MMOL/L (ref 5–15)
ANION GAP SERPL CALCULATED.3IONS-SCNC: 13.4 MMOL/L (ref 5–15)
ANION GAP SERPL CALCULATED.3IONS-SCNC: 19.9 MMOL/L (ref 5–15)
ANION GAP SERPL CALCULATED.3IONS-SCNC: 6.4 MMOL/L (ref 5–15)
ANION GAP SERPL CALCULATED.3IONS-SCNC: 6.7 MMOL/L (ref 5–15)
ANION GAP SERPL CALCULATED.3IONS-SCNC: 6.9 MMOL/L (ref 5–15)
ANION GAP SERPL CALCULATED.3IONS-SCNC: 7 MMOL/L (ref 5–15)
ANION GAP SERPL CALCULATED.3IONS-SCNC: 8.2 MMOL/L (ref 5–15)
ANION GAP SERPL CALCULATED.3IONS-SCNC: 8.3 MMOL/L (ref 5–15)
ANION GAP SERPL CALCULATED.3IONS-SCNC: 8.5 MMOL/L (ref 5–15)
ANION GAP SERPL CALCULATED.3IONS-SCNC: 9.4 MMOL/L (ref 5–15)
ANION GAP SERPL CALCULATED.3IONS-SCNC: 9.5 MMOL/L (ref 5–15)
ANION GAP SERPL CALCULATED.3IONS-SCNC: 9.5 MMOL/L (ref 5–15)
ANION GAP SERPL CALCULATED.3IONS-SCNC: 9.8 MMOL/L (ref 5–15)
ANION GAP SERPL CALCULATED.3IONS-SCNC: 9.8 MMOL/L (ref 5–15)
ANISOCYTOSIS BLD QL: ABNORMAL
APPEARANCE UR: ABNORMAL
APPEARANCE UR: CLEAR
AST SERPL-CCNC: 18 U/L (ref 1–32)
AST SERPL-CCNC: 19 U/L (ref 1–32)
AST SERPL-CCNC: 22 U/L (ref 1–32)
AST SERPL-CCNC: 22 U/L (ref 1–32)
AST SERPL-CCNC: 25 U/L (ref 1–32)
AST SERPL-CCNC: 27 U/L (ref 1–32)
AST SERPL-CCNC: 28 U/L (ref 1–32)
AST SERPL-CCNC: 28 U/L (ref 1–32)
AST SERPL-CCNC: 31 U/L (ref 1–32)
AST SERPL-CCNC: 31 U/L (ref 1–32)
AST SERPL-CCNC: 32 U/L (ref 1–32)
AST SERPL-CCNC: 32 U/L (ref 1–32)
AST SERPL-CCNC: 34 U/L (ref 1–32)
AST SERPL-CCNC: 34 U/L (ref 1–32)
AST SERPL-CCNC: 35 U/L (ref 1–32)
AST SERPL-CCNC: 36 U/L (ref 1–32)
AST SERPL-CCNC: 37 U/L (ref 1–32)
AST SERPL-CCNC: 47 U/L (ref 1–32)
AST SERPL-CCNC: 50 U/L (ref 1–32)
AST SERPL-CCNC: 50 U/L (ref 1–32)
AST SERPL-CCNC: 56 U/L (ref 1–32)
AST SERPL-CCNC: 59 U/L (ref 1–32)
AST SERPL-CCNC: 68 U/L (ref 1–32)
B PARAPERT DNA SPEC QL NAA+PROBE: NOT DETECTED
B PERT DNA SPEC QL NAA+PROBE: NOT DETECTED
BACTERIA #/AREA URNS HPF: ABNORMAL /HPF
BACTERIA #/AREA URNS HPF: ABNORMAL /HPF
BACTERIA SPEC AEROBE CULT: ABNORMAL
BACTERIA SPEC AEROBE CULT: NORMAL
BACTERIA SPEC AEROBE CULT: NORMAL
BACTERIA UR CULT: ABNORMAL
BACTERIA UR CULT: NORMAL
BACTERIA UR CULT: NORMAL
BACTERIA UR QL AUTO: ABNORMAL /HPF
BACTERIA UR QL AUTO: ABNORMAL /HPF
BASOPHILS # BLD AUTO: 0.02 10*3/MM3 (ref 0–0.2)
BASOPHILS # BLD AUTO: 0.03 10*3/MM3 (ref 0–0.2)
BASOPHILS # BLD AUTO: 0.03 10*3/MM3 (ref 0–0.2)
BASOPHILS # BLD AUTO: 0.06 10*3/MM3 (ref 0–0.2)
BASOPHILS # BLD AUTO: 0.07 10*3/MM3 (ref 0–0.2)
BASOPHILS # BLD AUTO: 0.09 10*3/MM3 (ref 0–0.2)
BASOPHILS # BLD AUTO: ABNORMAL 10*3/UL
BASOPHILS # BLD MANUAL: 0.13 10*3/MM3 (ref 0–0.2)
BASOPHILS NFR BLD AUTO: 0.2 % (ref 0–1.5)
BASOPHILS NFR BLD AUTO: 0.2 % (ref 0–1.5)
BASOPHILS NFR BLD AUTO: 0.4 % (ref 0–1.5)
BASOPHILS NFR BLD AUTO: 0.4 % (ref 0–1.5)
BASOPHILS NFR BLD AUTO: 0.5 % (ref 0–1.5)
BASOPHILS NFR BLD AUTO: 0.5 % (ref 0–1.5)
BASOPHILS NFR BLD AUTO: 0.6 % (ref 0–1.5)
BASOPHILS NFR BLD AUTO: 0.8 % (ref 0–1.5)
BASOPHILS NFR BLD MANUAL: 1 % (ref 0–1.5)
BH BB BLOOD EXPIRATION DATE: NORMAL
BH BB BLOOD TYPE BARCODE: 6200
BH BB DISPENSE STATUS: NORMAL
BH BB PRODUCT CODE: NORMAL
BH BB UNIT NUMBER: NORMAL
BH CV XLRA MEAS LEFT DIST CCA EDV: 23.2 CM/SEC
BH CV XLRA MEAS LEFT DIST CCA PSV: 72.6 CM/SEC
BH CV XLRA MEAS LEFT DIST ICA EDV: -16.3 CM/SEC
BH CV XLRA MEAS LEFT DIST ICA PSV: -55.4 CM/SEC
BH CV XLRA MEAS LEFT ICA/CCA RATIO: 1.2
BH CV XLRA MEAS LEFT MID CCA EDV: 16.5 CM/SEC
BH CV XLRA MEAS LEFT MID CCA PSV: 84.3 CM/SEC
BH CV XLRA MEAS LEFT MID ICA EDV: -20.2 CM/SEC
BH CV XLRA MEAS LEFT MID ICA PSV: -84.7 CM/SEC
BH CV XLRA MEAS LEFT PROX CCA EDV: 22.3 CM/SEC
BH CV XLRA MEAS LEFT PROX CCA PSV: 95.9 CM/SEC
BH CV XLRA MEAS LEFT PROX ECA PSV: -85.2 CM/SEC
BH CV XLRA MEAS LEFT PROX ICA EDV: -22.2 CM/SEC
BH CV XLRA MEAS LEFT PROX ICA PSV: -80.1 CM/SEC
BH CV XLRA MEAS LEFT PROX SCLA PSV: 98.6 CM/SEC
BH CV XLRA MEAS LEFT VERTEBRAL A PSV: -48.5 CM/SEC
BH CV XLRA MEAS RIGHT DIST CCA EDV: 16.2 CM/SEC
BH CV XLRA MEAS RIGHT DIST CCA PSV: 67.7 CM/SEC
BH CV XLRA MEAS RIGHT DIST ICA EDV: -34 CM/SEC
BH CV XLRA MEAS RIGHT DIST ICA PSV: -128.4 CM/SEC
BH CV XLRA MEAS RIGHT ICA/CCA RATIO: 2.4
BH CV XLRA MEAS RIGHT MID CCA EDV: 16.2 CM/SEC
BH CV XLRA MEAS RIGHT MID CCA PSV: 70.2 CM/SEC
BH CV XLRA MEAS RIGHT MID ICA EDV: -35.1 CM/SEC
BH CV XLRA MEAS RIGHT MID ICA PSV: -161.3 CM/SEC
BH CV XLRA MEAS RIGHT PROX CCA EDV: 12.4 CM/SEC
BH CV XLRA MEAS RIGHT PROX CCA PSV: 65.2 CM/SEC
BH CV XLRA MEAS RIGHT PROX ECA PSV: -84.5 CM/SEC
BH CV XLRA MEAS RIGHT PROX ICA EDV: -42.8 CM/SEC
BH CV XLRA MEAS RIGHT PROX ICA PSV: -155.9 CM/SEC
BH CV XLRA MEAS RIGHT PROX SCLA PSV: 63.9 CM/SEC
BH CV XLRA MEAS RIGHT VERTEBRAL A PSV: -40.6 CM/SEC
BILIRUB BLD-MCNC: NEGATIVE MG/DL
BILIRUB CONJ SERPL-MCNC: 0.2 MG/DL (ref 0–0.3)
BILIRUB CONJ SERPL-MCNC: 0.3 MG/DL (ref 0–0.3)
BILIRUB CONJ SERPL-MCNC: 0.4 MG/DL (ref 0–0.3)
BILIRUB CONJ SERPL-MCNC: 0.5 MG/DL (ref 0–0.3)
BILIRUB CONJ SERPL-MCNC: 0.9 MG/DL (ref 0–0.3)
BILIRUB CONJ SERPL-MCNC: 0.9 MG/DL (ref 0–0.3)
BILIRUB CONJ SERPL-MCNC: 1 MG/DL (ref 0–0.3)
BILIRUB CONJ SERPL-MCNC: 1 MG/DL (ref 0–0.3)
BILIRUB CONJ SERPL-MCNC: 1.5 MG/DL (ref 0–0.3)
BILIRUB CONJ SERPL-MCNC: <0.2 MG/DL (ref 0–0.3)
BILIRUB SERPL-MCNC: 0.3 MG/DL (ref 0–1.2)
BILIRUB SERPL-MCNC: 0.4 MG/DL (ref 0–1.2)
BILIRUB SERPL-MCNC: 0.5 MG/DL (ref 0.2–1.2)
BILIRUB SERPL-MCNC: 0.6 MG/DL (ref 0–1.2)
BILIRUB SERPL-MCNC: 0.6 MG/DL (ref 0–1.2)
BILIRUB SERPL-MCNC: 0.7 MG/DL (ref 0–1.2)
BILIRUB SERPL-MCNC: 0.8 MG/DL (ref 0–1.2)
BILIRUB SERPL-MCNC: 0.9 MG/DL (ref 0–1.2)
BILIRUB SERPL-MCNC: 1 MG/DL (ref 0–1.2)
BILIRUB SERPL-MCNC: 1.4 MG/DL (ref 0–1.2)
BILIRUB SERPL-MCNC: 1.5 MG/DL (ref 0–1.2)
BILIRUB SERPL-MCNC: 1.6 MG/DL (ref 0–1.2)
BILIRUB SERPL-MCNC: 1.6 MG/DL (ref 0–1.2)
BILIRUB SERPL-MCNC: 1.9 MG/DL (ref 0–1.2)
BILIRUB UR QL STRIP: ABNORMAL
BILIRUB UR QL STRIP: NEGATIVE
BLD GP AB SCN SERPL QL: NEGATIVE
BLD GP AB SCN SERPL QL: NEGATIVE
BUN BLD-MCNC: 10 MG/DL (ref 8–23)
BUN SERPL-MCNC: 10 MG/DL (ref 8–23)
BUN SERPL-MCNC: 12 MG/DL (ref 8–23)
BUN SERPL-MCNC: 14 MG/DL (ref 8–23)
BUN SERPL-MCNC: 18 MG/DL (ref 8–23)
BUN SERPL-MCNC: 20 MG/DL (ref 8–23)
BUN SERPL-MCNC: 21 MG/DL (ref 8–23)
BUN SERPL-MCNC: 21 MG/DL (ref 8–23)
BUN SERPL-MCNC: 22 MG/DL (ref 8–23)
BUN SERPL-MCNC: 22 MG/DL (ref 8–23)
BUN SERPL-MCNC: 23 MG/DL (ref 8–23)
BUN SERPL-MCNC: 25 MG/DL (ref 8–23)
BUN SERPL-MCNC: 27 MG/DL (ref 8–23)
BUN SERPL-MCNC: 28 MG/DL (ref 8–23)
BUN SERPL-MCNC: 36 MG/DL (ref 8–23)
BUN SERPL-MCNC: 38 MG/DL (ref 8–23)
BUN SERPL-MCNC: 44 MG/DL (ref 8–23)
BUN SERPL-MCNC: 5 MG/DL (ref 8–23)
BUN SERPL-MCNC: 61 MG/DL (ref 8–23)
BUN SERPL-MCNC: 7 MG/DL (ref 8–23)
BUN SERPL-MCNC: 7 MG/DL (ref 8–23)
BUN SERPL-MCNC: 72 MG/DL (ref 8–23)
BUN SERPL-MCNC: 90 MG/DL (ref 8–23)
BUN SERPL-MCNC: 94 MG/DL (ref 8–23)
BUN/CREAT SERPL: 11.4 (ref 7–25)
BUN/CREAT SERPL: 13.7 (ref 7–25)
BUN/CREAT SERPL: 14.3 (ref 7–25)
BUN/CREAT SERPL: 15.6 (ref 7–25)
BUN/CREAT SERPL: 18.9 (ref 7–25)
BUN/CREAT SERPL: 20 (ref 7–25)
BUN/CREAT SERPL: 20.3 (ref 7–25)
BUN/CREAT SERPL: 22.2 (ref 7–25)
BUN/CREAT SERPL: 27.8 (ref 7–25)
BUN/CREAT SERPL: 46.2 (ref 7–25)
BUN/CREAT SERPL: 47.7 (ref 7–25)
BUN/CREAT SERPL: 51.3 (ref 7–25)
BUN/CREAT SERPL: 52.5 (ref 7–25)
BUN/CREAT SERPL: 53.2 (ref 7–25)
BUN/CREAT SERPL: 56 (ref 7–25)
BUN/CREAT SERPL: 57.5 (ref 7–25)
BUN/CREAT SERPL: 59.5 (ref 7–25)
BUN/CREAT SERPL: 59.5 (ref 7–25)
BUN/CREAT SERPL: 61.4 (ref 7–25)
BUN/CREAT SERPL: 65.5 (ref 7–25)
BUN/CREAT SERPL: 80.4 (ref 7–25)
BUN/CREAT SERPL: 80.9 (ref 7–25)
BUN/CREAT SERPL: 82.4 (ref 7–25)
BUN/CREAT SERPL: 83.7 (ref 7–25)
BUN/CREAT SERPL: 87 (ref 7–25)
BUN/CREAT SERPL: 89.8 (ref 7–25)
BURR CELLS BLD QL SMEAR: ABNORMAL
C PNEUM DNA NPH QL NAA+NON-PROBE: NOT DETECTED
CALCIUM SERPL-MCNC: 8.7 MG/DL (ref 8.6–10.5)
CALCIUM SPEC-SCNC: 6.7 MG/DL (ref 8.6–10.5)
CALCIUM SPEC-SCNC: 7 MG/DL (ref 8.6–10.5)
CALCIUM SPEC-SCNC: 7.1 MG/DL (ref 8.6–10.5)
CALCIUM SPEC-SCNC: 7.1 MG/DL (ref 8.6–10.5)
CALCIUM SPEC-SCNC: 7.2 MG/DL (ref 8.6–10.5)
CALCIUM SPEC-SCNC: 7.2 MG/DL (ref 8.6–10.5)
CALCIUM SPEC-SCNC: 7.3 MG/DL (ref 8.6–10.5)
CALCIUM SPEC-SCNC: 7.4 MG/DL (ref 8.6–10.5)
CALCIUM SPEC-SCNC: 7.5 MG/DL (ref 8.6–10.5)
CALCIUM SPEC-SCNC: 7.6 MG/DL (ref 8.6–10.5)
CALCIUM SPEC-SCNC: 7.6 MG/DL (ref 8.6–10.5)
CALCIUM SPEC-SCNC: 7.7 MG/DL (ref 8.6–10.5)
CALCIUM SPEC-SCNC: 7.7 MG/DL (ref 8.6–10.5)
CALCIUM SPEC-SCNC: 7.8 MG/DL (ref 8.6–10.5)
CALCIUM SPEC-SCNC: 7.9 MG/DL (ref 8.6–10.5)
CALCIUM SPEC-SCNC: 8 MG/DL (ref 8.6–10.5)
CALCIUM SPEC-SCNC: 8.1 MG/DL (ref 8.6–10.5)
CALCIUM SPEC-SCNC: 8.2 MG/DL (ref 8.6–10.5)
CALCIUM SPEC-SCNC: 8.9 MG/DL (ref 8.6–10.5)
CHLORIDE SERPL-SCNC: 100 MMOL/L (ref 98–107)
CHLORIDE SERPL-SCNC: 101 MMOL/L (ref 98–107)
CHLORIDE SERPL-SCNC: 102 MMOL/L (ref 98–107)
CHLORIDE SERPL-SCNC: 102 MMOL/L (ref 98–107)
CHLORIDE SERPL-SCNC: 104 MMOL/L (ref 98–107)
CHLORIDE SERPL-SCNC: 105 MMOL/L (ref 98–107)
CHLORIDE SERPL-SCNC: 105 MMOL/L (ref 98–107)
CHLORIDE SERPL-SCNC: 108 MMOL/L (ref 98–107)
CHLORIDE SERPL-SCNC: 109 MMOL/L (ref 98–107)
CHLORIDE SERPL-SCNC: 109 MMOL/L (ref 98–107)
CHLORIDE SERPL-SCNC: 113 MMOL/L (ref 98–107)
CHLORIDE SERPL-SCNC: 113 MMOL/L (ref 98–107)
CHLORIDE SERPL-SCNC: 114 MMOL/L (ref 98–107)
CHLORIDE SERPL-SCNC: 116 MMOL/L (ref 98–107)
CHLORIDE SERPL-SCNC: 91 MMOL/L (ref 98–107)
CHLORIDE SERPL-SCNC: 93 MMOL/L (ref 98–107)
CHLORIDE SERPL-SCNC: 94 MMOL/L (ref 98–107)
CHLORIDE SERPL-SCNC: 95 MMOL/L (ref 98–107)
CHLORIDE SERPL-SCNC: 96 MMOL/L (ref 98–107)
CHLORIDE SERPL-SCNC: 98 MMOL/L (ref 98–107)
CHLORIDE SERPL-SCNC: 99 MMOL/L (ref 98–107)
CHOLEST SERPL-MCNC: 166 MG/DL (ref 0–200)
CK SERPL-CCNC: 111 U/L (ref 20–180)
CK SERPL-CCNC: 13 U/L (ref 20–180)
CK SERPL-CCNC: 20 U/L (ref 20–180)
CK SERPL-CCNC: 21 U/L (ref 20–180)
CK SERPL-CCNC: 22 U/L (ref 20–180)
CK SERPL-CCNC: 24 U/L (ref 20–180)
CK SERPL-CCNC: 27 U/L (ref 20–180)
CK SERPL-CCNC: 28 U/L (ref 20–180)
CK SERPL-CCNC: 30 U/L (ref 20–180)
CK SERPL-CCNC: 35 U/L (ref 20–180)
CK SERPL-CCNC: 36 U/L (ref 20–180)
CK SERPL-CCNC: 36 U/L (ref 20–180)
CK SERPL-CCNC: 37 U/L (ref 20–180)
CK SERPL-CCNC: 38 U/L (ref 20–180)
CK SERPL-CCNC: 40 U/L (ref 20–180)
CK SERPL-CCNC: 41 U/L (ref 20–180)
CK SERPL-CCNC: 44 U/L (ref 20–180)
CK SERPL-CCNC: 47 U/L (ref 20–180)
CK SERPL-CCNC: 54 U/L (ref 20–180)
CK SERPL-CCNC: 60 U/L (ref 20–180)
CK SERPL-CCNC: 70 U/L (ref 20–180)
CK SERPL-CCNC: 94 U/L (ref 20–180)
CLARITY UR: CLEAR
CLARITY UR: CLEAR
CLARITY, POC: ABNORMAL
CO2 SERPL-SCNC: 17.5 MMOL/L (ref 22–29)
CO2 SERPL-SCNC: 17.6 MMOL/L (ref 22–29)
CO2 SERPL-SCNC: 18.1 MMOL/L (ref 22–29)
CO2 SERPL-SCNC: 18.2 MMOL/L (ref 22–29)
CO2 SERPL-SCNC: 18.6 MMOL/L (ref 22–29)
CO2 SERPL-SCNC: 18.8 MMOL/L (ref 22–29)
CO2 SERPL-SCNC: 19.1 MMOL/L (ref 22–29)
CO2 SERPL-SCNC: 21.2 MMOL/L (ref 22–29)
CO2 SERPL-SCNC: 21.8 MMOL/L (ref 22–29)
CO2 SERPL-SCNC: 22.2 MMOL/L (ref 22–29)
CO2 SERPL-SCNC: 23.6 MMOL/L (ref 22–29)
CO2 SERPL-SCNC: 24.2 MMOL/L (ref 22–29)
CO2 SERPL-SCNC: 24.6 MMOL/L (ref 22–29)
CO2 SERPL-SCNC: 24.8 MMOL/L (ref 22–29)
CO2 SERPL-SCNC: 26 MMOL/L (ref 22–29)
CO2 SERPL-SCNC: 26 MMOL/L (ref 22–29)
CO2 SERPL-SCNC: 26.2 MMOL/L (ref 22–29)
CO2 SERPL-SCNC: 26.3 MMOL/L (ref 22–29)
CO2 SERPL-SCNC: 26.5 MMOL/L (ref 22–29)
CO2 SERPL-SCNC: 27.3 MMOL/L (ref 22–29)
CO2 SERPL-SCNC: 27.3 MMOL/L (ref 22–29)
CO2 SERPL-SCNC: 27.4 MMOL/L (ref 22–29)
CO2 SERPL-SCNC: 27.6 MMOL/L (ref 22–29)
CO2 SERPL-SCNC: 28.6 MMOL/L (ref 22–29)
CO2 SERPL-SCNC: 28.7 MMOL/L (ref 22–29)
CO2 SERPL-SCNC: 31.5 MMOL/L (ref 22–29)
COLOR UR: ABNORMAL
COLOR UR: ABNORMAL
COLOR UR: YELLOW
CREAT BLD-MCNC: 0.53 MG/DL (ref 0.57–1)
CREAT SERPL-MCNC: 0.36 MG/DL (ref 0.57–1)
CREAT SERPL-MCNC: 0.37 MG/DL (ref 0.57–1)
CREAT SERPL-MCNC: 0.37 MG/DL (ref 0.57–1)
CREAT SERPL-MCNC: 0.39 MG/DL (ref 0.57–1)
CREAT SERPL-MCNC: 0.39 MG/DL (ref 0.57–1)
CREAT SERPL-MCNC: 0.4 MG/DL (ref 0.57–1)
CREAT SERPL-MCNC: 0.4 MG/DL (ref 0.57–1)
CREAT SERPL-MCNC: 0.43 MG/DL (ref 0.57–1)
CREAT SERPL-MCNC: 0.44 MG/DL (ref 0.57–1)
CREAT SERPL-MCNC: 0.45 MG/DL (ref 0.57–1)
CREAT SERPL-MCNC: 0.45 MG/DL (ref 0.57–1)
CREAT SERPL-MCNC: 0.47 MG/DL (ref 0.57–1)
CREAT SERPL-MCNC: 0.49 MG/DL (ref 0.57–1)
CREAT SERPL-MCNC: 0.5 MG/DL (ref 0.57–1)
CREAT SERPL-MCNC: 0.51 MG/DL (ref 0.57–1)
CREAT SERPL-MCNC: 0.58 MG/DL (ref 0.57–1)
CREAT SERPL-MCNC: 0.59 MG/DL (ref 0.57–1)
CREAT SERPL-MCNC: 0.7 MG/DL (ref 0.57–1)
CREAT SERPL-MCNC: 0.7 MG/DL (ref 0.57–1)
CREAT SERPL-MCNC: 0.74 MG/DL (ref 0.57–1)
CREAT SERPL-MCNC: 0.89 MG/DL (ref 0.57–1)
CREAT SERPL-MCNC: 1.08 MG/DL (ref 0.57–1)
CREAT SERPL-MCNC: 1.12 MG/DL (ref 0.57–1)
CROSSMATCH INTERPRETATION: NORMAL
CROSSMATCH INTERPRETATION: NORMAL
CRP SERPL-MCNC: 0.38 MG/DL (ref 0.01–0.5)
CRP SERPL-MCNC: 0.43 MG/DL (ref 0–0.5)
CRP SERPL-MCNC: 0.45 MG/DL (ref 0–0.5)
CRP SERPL-MCNC: 0.47 MG/DL (ref 0–0.5)
CRP SERPL-MCNC: 0.48 MG/DL (ref 0–0.5)
CRP SERPL-MCNC: 0.51 MG/DL (ref 0–0.5)
CRP SERPL-MCNC: 0.53 MG/DL (ref 0–0.5)
CRP SERPL-MCNC: 0.56 MG/DL (ref 0–0.5)
CRP SERPL-MCNC: 0.58 MG/DL (ref 0–0.5)
CRP SERPL-MCNC: 0.64 MG/DL (ref 0–0.5)
CRP SERPL-MCNC: 0.66 MG/DL (ref 0–0.5)
CRP SERPL-MCNC: 0.75 MG/DL (ref 0–0.5)
CRP SERPL-MCNC: 1.15 MG/DL (ref 0–0.5)
CRP SERPL-MCNC: 1.47 MG/DL (ref 0–0.5)
CRP SERPL-MCNC: 10.07 MG/DL (ref 0–0.5)
CRP SERPL-MCNC: 16.31 MG/DL (ref 0–0.5)
CRP SERPL-MCNC: 2.32 MG/DL (ref 0–0.5)
CRP SERPL-MCNC: 2.76 MG/DL (ref 0–0.5)
CRP SERPL-MCNC: 20.09 MG/DL (ref 0–0.5)
CRP SERPL-MCNC: 3.26 MG/DL (ref 0–0.5)
CRP SERPL-MCNC: 3.32 MG/DL (ref 0–0.5)
CRP SERPL-MCNC: 3.83 MG/DL (ref 0–0.5)
CRP SERPL-MCNC: 4.98 MG/DL (ref 0–0.5)
CRYSTALS URNS MICRO: ABNORMAL
D DIMER PPP FEU-MCNC: 0.32 MCGFEU/ML (ref 0–0.49)
D DIMER PPP FEU-MCNC: 0.34 MCGFEU/ML (ref 0–0.49)
D DIMER PPP FEU-MCNC: 1.47 MCGFEU/ML (ref 0–0.49)
D DIMER PPP FEU-MCNC: 1.87 MCGFEU/ML (ref 0–0.49)
D DIMER PPP FEU-MCNC: 1.96 MCGFEU/ML (ref 0–0.49)
D DIMER PPP FEU-MCNC: 12.12 MCGFEU/ML (ref 0–0.49)
D DIMER PPP FEU-MCNC: 15.83 MCGFEU/ML (ref 0–0.49)
D DIMER PPP FEU-MCNC: 19.29 MCGFEU/ML (ref 0–0.49)
D DIMER PPP FEU-MCNC: 2.03 MCGFEU/ML (ref 0–0.49)
D DIMER PPP FEU-MCNC: 3.11 MCGFEU/ML (ref 0–0.49)
D DIMER PPP FEU-MCNC: >20 MCGFEU/ML (ref 0–0.49)
D-LACTATE SERPL-SCNC: 1.2 MMOL/L (ref 0.5–2)
DEPRECATED RDW RBC AUTO: 45.8 FL (ref 37–54)
DEPRECATED RDW RBC AUTO: 46.7 FL (ref 37–54)
DEPRECATED RDW RBC AUTO: 46.9 FL (ref 37–54)
DEPRECATED RDW RBC AUTO: 47.7 FL (ref 37–54)
DEPRECATED RDW RBC AUTO: 47.8 FL (ref 37–54)
DEPRECATED RDW RBC AUTO: 47.8 FL (ref 37–54)
DEPRECATED RDW RBC AUTO: 48 FL (ref 37–54)
DEPRECATED RDW RBC AUTO: 48.1 FL (ref 37–54)
DEPRECATED RDW RBC AUTO: 48.2 FL (ref 37–54)
DEPRECATED RDW RBC AUTO: 48.2 FL (ref 37–54)
DEPRECATED RDW RBC AUTO: 48.3 FL (ref 37–54)
DEPRECATED RDW RBC AUTO: 48.4 FL (ref 37–54)
DEPRECATED RDW RBC AUTO: 49 FL (ref 37–54)
DEPRECATED RDW RBC AUTO: 49.1 FL (ref 37–54)
DEPRECATED RDW RBC AUTO: 49.5 FL (ref 37–54)
DEPRECATED RDW RBC AUTO: 50.4 FL (ref 37–54)
DEPRECATED RDW RBC AUTO: 50.5 FL (ref 37–54)
DEPRECATED RDW RBC AUTO: 51.6 FL (ref 37–54)
DEPRECATED RDW RBC AUTO: 51.8 FL (ref 37–54)
DEPRECATED RDW RBC AUTO: 51.9 FL (ref 37–54)
DEPRECATED RDW RBC AUTO: 56.1 FL (ref 37–54)
DEPRECATED RDW RBC AUTO: 56.4 FL (ref 37–54)
DEPRECATED RDW RBC AUTO: 59.4 FL (ref 37–54)
DIFFERENTIAL COMMENT: ABNORMAL
EOSINOPHIL # BLD AUTO: 0 10*3/MM3 (ref 0–0.4)
EOSINOPHIL # BLD AUTO: 0.01 10*3/MM3 (ref 0–0.4)
EOSINOPHIL # BLD AUTO: 0.19 10*3/MM3 (ref 0–0.4)
EOSINOPHIL # BLD AUTO: 0.29 10*3/MM3 (ref 0–0.4)
EOSINOPHIL # BLD AUTO: ABNORMAL 10*3/UL
EOSINOPHIL NFR BLD AUTO: 0 % (ref 0.3–6.2)
EOSINOPHIL NFR BLD AUTO: 0.1 % (ref 0.3–6.2)
EOSINOPHIL NFR BLD AUTO: 0.8 % (ref 0.3–6.2)
EOSINOPHIL NFR BLD AUTO: 2.8 % (ref 0.3–6.2)
EOSINOPHIL NFR BLD AUTO: ABNORMAL %
EPI CELLS #/AREA URNS HPF: ABNORMAL /HPF (ref 0–10)
EPI CELLS #/AREA URNS HPF: ABNORMAL /HPF (ref 0–10)
ERYTHROCYTE [DISTWIDTH] IN BLOOD BY AUTOMATED COUNT: 14.1 % (ref 12.3–15.4)
ERYTHROCYTE [DISTWIDTH] IN BLOOD BY AUTOMATED COUNT: 14.6 % (ref 12.3–15.4)
ERYTHROCYTE [DISTWIDTH] IN BLOOD BY AUTOMATED COUNT: 14.7 % (ref 12.3–15.4)
ERYTHROCYTE [DISTWIDTH] IN BLOOD BY AUTOMATED COUNT: 14.8 % (ref 12.3–15.4)
ERYTHROCYTE [DISTWIDTH] IN BLOOD BY AUTOMATED COUNT: 14.9 % (ref 12.3–15.4)
ERYTHROCYTE [DISTWIDTH] IN BLOOD BY AUTOMATED COUNT: 15 % (ref 12.3–15.4)
ERYTHROCYTE [DISTWIDTH] IN BLOOD BY AUTOMATED COUNT: 15 % (ref 12.3–15.4)
ERYTHROCYTE [DISTWIDTH] IN BLOOD BY AUTOMATED COUNT: 15.3 % (ref 12.3–15.4)
ERYTHROCYTE [DISTWIDTH] IN BLOOD BY AUTOMATED COUNT: 15.3 % (ref 12.3–15.4)
ERYTHROCYTE [DISTWIDTH] IN BLOOD BY AUTOMATED COUNT: 15.4 % (ref 12.3–15.4)
ERYTHROCYTE [DISTWIDTH] IN BLOOD BY AUTOMATED COUNT: 15.5 % (ref 12.3–15.4)
ERYTHROCYTE [DISTWIDTH] IN BLOOD BY AUTOMATED COUNT: 16 % (ref 12.3–15.4)
ERYTHROCYTE [DISTWIDTH] IN BLOOD BY AUTOMATED COUNT: 16.7 % (ref 12.3–15.4)
ERYTHROCYTE [DISTWIDTH] IN BLOOD BY AUTOMATED COUNT: 17.2 % (ref 12.3–15.4)
ERYTHROCYTE [DISTWIDTH] IN BLOOD BY AUTOMATED COUNT: 17.3 % (ref 12.3–15.4)
ERYTHROCYTE [DISTWIDTH] IN BLOOD BY AUTOMATED COUNT: 17.4 % (ref 12.3–15.4)
ERYTHROCYTE [DISTWIDTH] IN BLOOD BY AUTOMATED COUNT: 17.9 % (ref 12.3–15.4)
ERYTHROCYTE [DISTWIDTH] IN BLOOD BY AUTOMATED COUNT: 18.6 % (ref 12.3–15.4)
FERRITIN SERPL-MCNC: 353 NG/ML (ref 13–150)
FERRITIN SERPL-MCNC: 371 NG/ML (ref 13–150)
FERRITIN SERPL-MCNC: 382 NG/ML (ref 13–150)
FERRITIN SERPL-MCNC: 448 NG/ML (ref 13–150)
FERRITIN SERPL-MCNC: 473 NG/ML (ref 13–150)
FERRITIN SERPL-MCNC: 476 NG/ML (ref 13–150)
FERRITIN SERPL-MCNC: 480 NG/ML (ref 13–150)
FERRITIN SERPL-MCNC: 528 NG/ML (ref 13–150)
FERRITIN SERPL-MCNC: 540 NG/ML (ref 13–150)
FERRITIN SERPL-MCNC: 541 NG/ML (ref 13–150)
FERRITIN SERPL-MCNC: 569 NG/ML (ref 13–150)
FERRITIN SERPL-MCNC: 585 NG/ML (ref 13–150)
FERRITIN SERPL-MCNC: 619 NG/ML (ref 13–150)
FERRITIN SERPL-MCNC: 630 NG/ML (ref 13–150)
FERRITIN SERPL-MCNC: 649 NG/ML (ref 13–150)
FERRITIN SERPL-MCNC: 655 NG/ML (ref 13–150)
FERRITIN SERPL-MCNC: 666 NG/ML (ref 13–150)
FERRITIN SERPL-MCNC: 673 NG/ML (ref 13–150)
FERRITIN SERPL-MCNC: 719 NG/ML (ref 13–150)
FERRITIN SERPL-MCNC: 813 NG/ML (ref 13–150)
FERRITIN SERPL-MCNC: 832 NG/ML (ref 13–150)
FERRITIN SERPL-MCNC: 848 NG/ML (ref 13–150)
FERRITIN SERPL-MCNC: 940 NG/ML (ref 13–150)
FIBRINOGEN PPP-MCNC: 154 MG/DL (ref 219–464)
FIBRINOGEN PPP-MCNC: 154 MG/DL (ref 219–464)
FIBRINOGEN PPP-MCNC: 159 MG/DL (ref 219–464)
FIBRINOGEN PPP-MCNC: 167 MG/DL (ref 219–464)
FIBRINOGEN PPP-MCNC: 170 MG/DL (ref 219–464)
FIBRINOGEN PPP-MCNC: 180 MG/DL (ref 219–464)
FIBRINOGEN PPP-MCNC: 207 MG/DL (ref 219–464)
FIBRINOGEN PPP-MCNC: 446 MG/DL (ref 219–464)
FIBRINOGEN PPP-MCNC: 531 MG/DL (ref 219–464)
FIBRINOGEN PPP-MCNC: 563 MG/DL (ref 219–464)
FIBRINOGEN PPP-MCNC: 698 MG/DL (ref 219–464)
FLUAV SUBTYP SPEC NAA+PROBE: NOT DETECTED
FLUBV RNA ISLT QL NAA+PROBE: NOT DETECTED
FOLATE SERPL-MCNC: >20 NG/ML (ref 4.78–24.2)
GFR SERPL CREATININE-BSD FRML MDRD: 100 ML/MIN/1.73
GFR SERPL CREATININE-BSD FRML MDRD: 102 ML/MIN/1.73
GFR SERPL CREATININE-BSD FRML MDRD: 113 ML/MIN/1.73
GFR SERPL CREATININE-BSD FRML MDRD: 118 ML/MIN/1.73
GFR SERPL CREATININE-BSD FRML MDRD: 121 ML/MIN/1.73
GFR SERPL CREATININE-BSD FRML MDRD: 124 ML/MIN/1.73
GFR SERPL CREATININE-BSD FRML MDRD: 130 ML/MIN/1.73
GFR SERPL CREATININE-BSD FRML MDRD: 137 ML/MIN/1.73
GFR SERPL CREATININE-BSD FRML MDRD: 137 ML/MIN/1.73
GFR SERPL CREATININE-BSD FRML MDRD: 140 ML/MIN/1.73
GFR SERPL CREATININE-BSD FRML MDRD: 144 ML/MIN/1.73
GFR SERPL CREATININE-BSD FRML MDRD: 48 ML/MIN/1.73
GFR SERPL CREATININE-BSD FRML MDRD: 50 ML/MIN/1.73
GFR SERPL CREATININE-BSD FRML MDRD: 62 ML/MIN/1.73
GFR SERPL CREATININE-BSD FRML MDRD: 77 ML/MIN/1.73
GFR SERPL CREATININE-BSD FRML MDRD: 82 ML/MIN/1.73
GFR SERPL CREATININE-BSD FRML MDRD: >150 ML/MIN/1.73
GIANT PLATELETS: ABNORMAL
GLOBULIN SER CALC-MCNC: 1.7 GM/DL
GLOBULIN UR ELPH-MCNC: 1.5 GM/DL
GLOBULIN UR ELPH-MCNC: 1.8 GM/DL
GLOBULIN UR ELPH-MCNC: 1.9 GM/DL
GLOBULIN UR ELPH-MCNC: 2 GM/DL
GLOBULIN UR ELPH-MCNC: 2.1 GM/DL
GLOBULIN UR ELPH-MCNC: 2.1 GM/DL
GLOBULIN UR ELPH-MCNC: 2.2 GM/DL
GLOBULIN UR ELPH-MCNC: 2.3 GM/DL
GLOBULIN UR ELPH-MCNC: 2.3 GM/DL
GLOBULIN UR ELPH-MCNC: 2.4 GM/DL
GLOBULIN UR ELPH-MCNC: 2.5 GM/DL
GLOBULIN UR ELPH-MCNC: 2.7 GM/DL
GLOBULIN UR ELPH-MCNC: 2.7 GM/DL
GLOBULIN UR ELPH-MCNC: 2.8 GM/DL
GLOBULIN UR ELPH-MCNC: 3.1 GM/DL
GLUCOSE BLD-MCNC: 108 MG/DL (ref 65–99)
GLUCOSE BLDC GLUCOMTR-MCNC: 108 MG/DL (ref 70–130)
GLUCOSE BLDC GLUCOMTR-MCNC: 137 MG/DL (ref 70–130)
GLUCOSE BLDC GLUCOMTR-MCNC: 164 MG/DL (ref 70–130)
GLUCOSE BLDC GLUCOMTR-MCNC: 167 MG/DL (ref 70–130)
GLUCOSE BLDC GLUCOMTR-MCNC: 168 MG/DL (ref 70–130)
GLUCOSE BLDC GLUCOMTR-MCNC: 170 MG/DL (ref 70–130)
GLUCOSE BLDC GLUCOMTR-MCNC: 170 MG/DL (ref 70–130)
GLUCOSE BLDC GLUCOMTR-MCNC: 188 MG/DL (ref 70–130)
GLUCOSE BLDC GLUCOMTR-MCNC: 195 MG/DL (ref 70–130)
GLUCOSE BLDC GLUCOMTR-MCNC: 197 MG/DL (ref 70–130)
GLUCOSE BLDC GLUCOMTR-MCNC: 199 MG/DL (ref 70–130)
GLUCOSE BLDC GLUCOMTR-MCNC: 201 MG/DL (ref 70–130)
GLUCOSE BLDC GLUCOMTR-MCNC: 202 MG/DL (ref 70–130)
GLUCOSE BLDC GLUCOMTR-MCNC: 206 MG/DL (ref 70–130)
GLUCOSE BLDC GLUCOMTR-MCNC: 206 MG/DL (ref 70–130)
GLUCOSE BLDC GLUCOMTR-MCNC: 209 MG/DL (ref 70–130)
GLUCOSE BLDC GLUCOMTR-MCNC: 216 MG/DL (ref 70–130)
GLUCOSE BLDC GLUCOMTR-MCNC: 216 MG/DL (ref 70–130)
GLUCOSE BLDC GLUCOMTR-MCNC: 233 MG/DL (ref 70–130)
GLUCOSE BLDC GLUCOMTR-MCNC: 235 MG/DL (ref 70–130)
GLUCOSE BLDC GLUCOMTR-MCNC: 236 MG/DL (ref 70–130)
GLUCOSE BLDC GLUCOMTR-MCNC: 245 MG/DL (ref 70–130)
GLUCOSE BLDC GLUCOMTR-MCNC: 248 MG/DL (ref 70–130)
GLUCOSE BLDC GLUCOMTR-MCNC: 251 MG/DL (ref 70–130)
GLUCOSE BLDC GLUCOMTR-MCNC: 280 MG/DL (ref 70–130)
GLUCOSE BLDC GLUCOMTR-MCNC: 292 MG/DL (ref 70–130)
GLUCOSE BLDC GLUCOMTR-MCNC: 324 MG/DL (ref 70–130)
GLUCOSE BLDC GLUCOMTR-MCNC: 59 MG/DL (ref 70–130)
GLUCOSE SERPL-MCNC: 106 MG/DL (ref 65–99)
GLUCOSE SERPL-MCNC: 108 MG/DL (ref 65–99)
GLUCOSE SERPL-MCNC: 113 MG/DL (ref 65–99)
GLUCOSE SERPL-MCNC: 130 MG/DL (ref 65–99)
GLUCOSE SERPL-MCNC: 130 MG/DL (ref 65–99)
GLUCOSE SERPL-MCNC: 131 MG/DL (ref 65–99)
GLUCOSE SERPL-MCNC: 147 MG/DL (ref 65–99)
GLUCOSE SERPL-MCNC: 150 MG/DL (ref 65–99)
GLUCOSE SERPL-MCNC: 152 MG/DL (ref 65–99)
GLUCOSE SERPL-MCNC: 152 MG/DL (ref 65–99)
GLUCOSE SERPL-MCNC: 153 MG/DL (ref 65–99)
GLUCOSE SERPL-MCNC: 156 MG/DL (ref 65–99)
GLUCOSE SERPL-MCNC: 161 MG/DL (ref 65–99)
GLUCOSE SERPL-MCNC: 166 MG/DL (ref 65–99)
GLUCOSE SERPL-MCNC: 178 MG/DL (ref 65–99)
GLUCOSE SERPL-MCNC: 179 MG/DL (ref 65–99)
GLUCOSE SERPL-MCNC: 190 MG/DL (ref 65–99)
GLUCOSE SERPL-MCNC: 190 MG/DL (ref 65–99)
GLUCOSE SERPL-MCNC: 198 MG/DL (ref 65–99)
GLUCOSE SERPL-MCNC: 219 MG/DL (ref 65–99)
GLUCOSE SERPL-MCNC: 301 MG/DL (ref 65–99)
GLUCOSE SERPL-MCNC: 59 MG/DL (ref 65–99)
GLUCOSE SERPL-MCNC: 87 MG/DL (ref 65–99)
GLUCOSE SERPL-MCNC: 93 MG/DL (ref 65–99)
GLUCOSE SERPL-MCNC: 97 MG/DL (ref 65–99)
GLUCOSE UR QL: NEGATIVE
GLUCOSE UR QL: NEGATIVE
GLUCOSE UR STRIP-MCNC: NEGATIVE MG/DL
HADV DNA SPEC NAA+PROBE: NOT DETECTED
HBA1C MFR BLD: 5.3 % (ref 4.8–5.6)
HCOV 229E RNA SPEC QL NAA+PROBE: NOT DETECTED
HCOV HKU1 RNA SPEC QL NAA+PROBE: NOT DETECTED
HCOV NL63 RNA SPEC QL NAA+PROBE: NOT DETECTED
HCOV OC43 RNA SPEC QL NAA+PROBE: NOT DETECTED
HCT VFR BLD AUTO: 21.6 % (ref 34–46.6)
HCT VFR BLD AUTO: 22.6 % (ref 34–46.6)
HCT VFR BLD AUTO: 26.5 % (ref 34–46.6)
HCT VFR BLD AUTO: 29.2 % (ref 34–46.6)
HCT VFR BLD AUTO: 30.5 % (ref 34–46.6)
HCT VFR BLD AUTO: 31.5 % (ref 34–46.6)
HCT VFR BLD AUTO: 31.9 % (ref 34–46.6)
HCT VFR BLD AUTO: 32.1 % (ref 34–46.6)
HCT VFR BLD AUTO: 33.1 % (ref 34–46.6)
HCT VFR BLD AUTO: 33.3 % (ref 34–46.6)
HCT VFR BLD AUTO: 33.3 % (ref 34–46.6)
HCT VFR BLD AUTO: 33.6 % (ref 34–46.6)
HCT VFR BLD AUTO: 33.7 % (ref 34–46.6)
HCT VFR BLD AUTO: 34.3 % (ref 34–46.6)
HCT VFR BLD AUTO: 34.3 % (ref 34–46.6)
HCT VFR BLD AUTO: 34.4 % (ref 34–46.6)
HCT VFR BLD AUTO: 34.5 % (ref 34–46.6)
HCT VFR BLD AUTO: 34.8 % (ref 34–46.6)
HCT VFR BLD AUTO: 35.3 % (ref 34–46.6)
HCT VFR BLD AUTO: 35.7 % (ref 34–46.6)
HCT VFR BLD AUTO: 35.8 % (ref 34–46.6)
HCT VFR BLD AUTO: 38.8 % (ref 34–46.6)
HCT VFR BLD AUTO: 39.3 % (ref 34–46.6)
HCT VFR BLD AUTO: 39.6 % (ref 34–46.6)
HCT VFR BLD AUTO: 39.8 % (ref 34–46.6)
HCT VFR BLD AUTO: 40.6 % (ref 34–46.6)
HCV AB S/CO SERPL IA: <0.1 S/CO RATIO (ref 0–0.9)
HDLC SERPL-MCNC: 57 MG/DL (ref 40–60)
HGB BLD-MCNC: 10.1 G/DL (ref 12–15.9)
HGB BLD-MCNC: 10.7 G/DL (ref 12–15.9)
HGB BLD-MCNC: 10.7 G/DL (ref 12–15.9)
HGB BLD-MCNC: 10.9 G/DL (ref 12–15.9)
HGB BLD-MCNC: 11 G/DL (ref 12–15.9)
HGB BLD-MCNC: 11 G/DL (ref 12–15.9)
HGB BLD-MCNC: 11.1 G/DL (ref 12–15.9)
HGB BLD-MCNC: 11.3 G/DL (ref 12–15.9)
HGB BLD-MCNC: 11.4 G/DL (ref 12–15.9)
HGB BLD-MCNC: 11.5 G/DL (ref 12–15.9)
HGB BLD-MCNC: 11.6 G/DL (ref 12–15.9)
HGB BLD-MCNC: 11.7 G/DL (ref 12–15.9)
HGB BLD-MCNC: 12.3 G/DL (ref 12–15.9)
HGB BLD-MCNC: 12.8 G/DL (ref 12–15.9)
HGB BLD-MCNC: 13 G/DL (ref 12–15.9)
HGB BLD-MCNC: 13 G/DL (ref 12–15.9)
HGB BLD-MCNC: 13.3 G/DL (ref 12–15.9)
HGB BLD-MCNC: 13.4 G/DL (ref 12–15.9)
HGB BLD-MCNC: 7 G/DL (ref 12–15.9)
HGB BLD-MCNC: 7.6 G/DL (ref 12–15.9)
HGB BLD-MCNC: 8.5 G/DL (ref 12–15.9)
HGB BLD-MCNC: 9.6 G/DL (ref 12–15.9)
HGB RETIC QN AUTO: 38.7 PG (ref 29.8–36.1)
HGB UR QL STRIP.AUTO: NEGATIVE
HGB UR QL STRIP.AUTO: NEGATIVE
HGB UR QL STRIP: ABNORMAL
HGB UR QL STRIP: NEGATIVE
HMPV RNA NPH QL NAA+NON-PROBE: NOT DETECTED
HOLD SPECIMEN: NORMAL
HOLD SPECIMEN: NORMAL
HPIV1 RNA SPEC QL NAA+PROBE: NOT DETECTED
HPIV2 RNA SPEC QL NAA+PROBE: NOT DETECTED
HPIV3 RNA NPH QL NAA+PROBE: NOT DETECTED
HPIV4 P GENE NPH QL NAA+PROBE: NOT DETECTED
HYALINE CASTS UR QL AUTO: ABNORMAL /LPF
HYALINE CASTS UR QL AUTO: ABNORMAL /LPF
HYPOCHROMIA BLD QL: ABNORMAL
IMM GRANULOCYTES # BLD AUTO: 0.13 10*3/MM3 (ref 0–0.05)
IMM GRANULOCYTES # BLD AUTO: 0.15 10*3/MM3 (ref 0–0.05)
IMM GRANULOCYTES # BLD AUTO: 0.15 10*3/MM3 (ref 0–0.05)
IMM GRANULOCYTES # BLD AUTO: 0.2 10*3/MM3 (ref 0–0.05)
IMM GRANULOCYTES # BLD AUTO: 0.2 10*3/MM3 (ref 0–0.05)
IMM GRANULOCYTES # BLD AUTO: 0.64 10*3/MM3 (ref 0–0.05)
IMM GRANULOCYTES NFR BLD AUTO: 1.2 % (ref 0–0.5)
IMM GRANULOCYTES NFR BLD AUTO: 1.5 % (ref 0–0.5)
IMM GRANULOCYTES NFR BLD AUTO: 1.6 % (ref 0–0.5)
IMM GRANULOCYTES NFR BLD AUTO: 1.8 % (ref 0–0.5)
IMM GRANULOCYTES NFR BLD AUTO: 2.4 % (ref 0–0.5)
IMM GRANULOCYTES NFR BLD AUTO: 4.9 % (ref 0–0.5)
IMM RETICS NFR: 40.6 % (ref 3–15.8)
INR PPP: 1.87 (ref 0.9–1.1)
INR PPP: 1.88 (ref 0.9–1.1)
INR PPP: 1.97 (ref 0.9–1.1)
INR PPP: 2.05 (ref 0.9–1.1)
INR PPP: 2.11 (ref 0.9–1.1)
INR PPP: 2.16 (ref 0.9–1.1)
INR PPP: 2.18 (ref 0.9–1.1)
INR PPP: 2.19 (ref 0.9–1.1)
INR PPP: 2.4 (ref 0.9–1.1)
INR PPP: 2.45 (ref 0.9–1.1)
INR PPP: 2.56 (ref 0.9–1.1)
INR PPP: 2.71 (ref 0.9–1.1)
INR PPP: 2.78 (ref 0.9–1.1)
INR PPP: 2.8 (ref 0.9–1.1)
INR PPP: 2.81 (ref 0.9–1.1)
INR PPP: 2.82 (ref 0.9–1.1)
INR PPP: 2.84 (ref 0.9–1.1)
INR PPP: 2.86 (ref 0.9–1.1)
INR PPP: 2.92 (ref 0.9–1.1)
INR PPP: 2.93 (ref 0.9–1.1)
INR PPP: 3 (ref 0.9–1.1)
INR PPP: 3.02 (ref 0.9–1.1)
INR PPP: 3.13 (ref 0.9–1.1)
INR PPP: 3.18 (ref 0.9–1.1)
INR PPP: 3.22 (ref 0.9–1.1)
INR PPP: 3.24 (ref 0.9–1.1)
INR PPP: 3.35 (ref 0.9–1.1)
INR PPP: 3.39 (ref 0.9–1.1)
INR PPP: 3.58 (ref 0.9–1.1)
INR PPP: 3.58 (ref 0.9–1.1)
INR PPP: 3.72 (ref 0.9–1.1)
INR PPP: 3.74 (ref 0.9–1.1)
INR PPP: 3.87 (ref 0.9–1.1)
INR PPP: 4.03 (ref 0.9–1.1)
INR PPP: 4.09 (ref 0.9–1.1)
INR PPP: 4.09 (ref 0.9–1.1)
INR PPP: 4.33 (ref 0.9–1.1)
INR PPP: 4.78 (ref 0.9–1.1)
INR PPP: 4.8 (ref 0.9–1.1)
INR PPP: 5.53 (ref 0.9–1.1)
IRON 24H UR-MRATE: 89 MCG/DL (ref 37–145)
IRON SATN MFR SERPL: 28 % (ref 20–50)
KETONES UR QL STRIP: ABNORMAL
KETONES UR QL STRIP: ABNORMAL
KETONES UR QL STRIP: NEGATIVE
KETONES UR QL STRIP: NEGATIVE
KETONES UR QL: NEGATIVE
LDH SERPL-CCNC: 331 U/L (ref 135–214)
LDH SERPL-CCNC: 406 U/L (ref 135–214)
LDH SERPL-CCNC: 499 U/L (ref 135–214)
LDH SERPL-CCNC: 537 U/L (ref 135–214)
LDH SERPL-CCNC: 601 U/L (ref 135–214)
LDH SERPL-CCNC: 616 U/L (ref 135–214)
LDH SERPL-CCNC: 626 U/L (ref 135–214)
LDH SERPL-CCNC: 641 U/L (ref 135–214)
LDH SERPL-CCNC: 654 U/L (ref 135–214)
LDH SERPL-CCNC: 676 U/L (ref 135–214)
LDH SERPL-CCNC: 683 U/L (ref 135–214)
LDH SERPL-CCNC: 722 U/L (ref 135–214)
LDH SERPL-CCNC: 791 U/L (ref 135–214)
LDLC SERPL CALC-MCNC: 85 MG/DL (ref 0–100)
LEUKOCYTE EST, POC: ABNORMAL
LEUKOCYTE ESTERASE UR QL STRIP.AUTO: ABNORMAL
LEUKOCYTE ESTERASE UR QL STRIP.AUTO: ABNORMAL
LEUKOCYTE ESTERASE UR QL STRIP: ABNORMAL
LEUKOCYTE ESTERASE UR QL STRIP: ABNORMAL
LYMPHOCYTES # BLD AUTO: 0.61 10*3/MM3 (ref 0.7–3.1)
LYMPHOCYTES # BLD AUTO: 0.71 10*3/MM3 (ref 0.7–3.1)
LYMPHOCYTES # BLD AUTO: 0.96 10*3/MM3 (ref 0.7–3.1)
LYMPHOCYTES # BLD AUTO: 0.98 10*3/MM3 (ref 0.7–3.1)
LYMPHOCYTES # BLD AUTO: 1.02 10*3/MM3 (ref 0.7–3.1)
LYMPHOCYTES # BLD AUTO: 1.13 10*3/MM3 (ref 0.7–3.1)
LYMPHOCYTES # BLD AUTO: 1.33 10*3/MM3 (ref 0.7–3.1)
LYMPHOCYTES # BLD AUTO: 1.53 10*3/MM3 (ref 0.7–3.1)
LYMPHOCYTES # BLD AUTO: ABNORMAL 10*3/UL
LYMPHOCYTES # BLD MANUAL: 0 10*3/MM3 (ref 0.7–3.1)
LYMPHOCYTES # BLD MANUAL: 0.18 10*3/MM3 (ref 0.7–3.1)
LYMPHOCYTES # BLD MANUAL: 0.2 10*3/MM3 (ref 0.7–3.1)
LYMPHOCYTES # BLD MANUAL: 0.27 10*3/MM3 (ref 0.7–3.1)
LYMPHOCYTES # BLD MANUAL: 0.28 10*3/MM3 (ref 0.7–3.1)
LYMPHOCYTES # BLD MANUAL: 0.39 10*3/MM3 (ref 0.7–3.1)
LYMPHOCYTES # BLD MANUAL: 0.4 10*3/MM3 (ref 0.7–3.1)
LYMPHOCYTES # BLD MANUAL: 0.46 10*3/MM3 (ref 0.7–3.1)
LYMPHOCYTES # BLD MANUAL: 0.49 10*3/MM3 (ref 0.7–3.1)
LYMPHOCYTES # BLD MANUAL: 0.5 10*3/MM3 (ref 0.7–3.1)
LYMPHOCYTES # BLD MANUAL: 0.52 10*3/MM3 (ref 0.7–3.1)
LYMPHOCYTES # BLD MANUAL: 0.52 10*3/MM3 (ref 0.7–3.1)
LYMPHOCYTES # BLD MANUAL: 0.65 10*3/MM3 (ref 0.7–3.1)
LYMPHOCYTES # BLD MANUAL: 2.01 10*3/MM3 (ref 0.7–3.1)
LYMPHOCYTES # BLD MANUAL: 2.46 10*3/MM3 (ref 0.7–3.1)
LYMPHOCYTES NFR BLD AUTO: 13.1 % (ref 19.6–45.3)
LYMPHOCYTES NFR BLD AUTO: 13.6 % (ref 19.6–45.3)
LYMPHOCYTES NFR BLD AUTO: 18.2 % (ref 19.6–45.3)
LYMPHOCYTES NFR BLD AUTO: 4.4 % (ref 19.6–45.3)
LYMPHOCYTES NFR BLD AUTO: 5.7 % (ref 19.6–45.3)
LYMPHOCYTES NFR BLD AUTO: 7.1 % (ref 19.6–45.3)
LYMPHOCYTES NFR BLD AUTO: 7.3 % (ref 19.6–45.3)
LYMPHOCYTES NFR BLD AUTO: 8.2 % (ref 19.6–45.3)
LYMPHOCYTES NFR BLD AUTO: ABNORMAL %
LYMPHOCYTES NFR BLD MANUAL: 0 % (ref 19.6–45.3)
LYMPHOCYTES NFR BLD MANUAL: 1 % (ref 19.6–45.3)
LYMPHOCYTES NFR BLD MANUAL: 1 % (ref 5–12)
LYMPHOCYTES NFR BLD MANUAL: 1 % (ref 5–12)
LYMPHOCYTES NFR BLD MANUAL: 1.1 % (ref 19.6–45.3)
LYMPHOCYTES NFR BLD MANUAL: 10 % (ref 5–12)
LYMPHOCYTES NFR BLD MANUAL: 11.7 % (ref 5–12)
LYMPHOCYTES NFR BLD MANUAL: 2 % (ref 19.6–45.3)
LYMPHOCYTES NFR BLD MANUAL: 2 % (ref 5–12)
LYMPHOCYTES NFR BLD MANUAL: 2 % (ref 5–12)
LYMPHOCYTES NFR BLD MANUAL: 3 % (ref 19.6–45.3)
LYMPHOCYTES NFR BLD MANUAL: 3 % (ref 19.6–45.3)
LYMPHOCYTES NFR BLD MANUAL: 3 % (ref 5–12)
LYMPHOCYTES NFR BLD MANUAL: 3 % (ref 5–12)
LYMPHOCYTES NFR BLD MANUAL: 4 % (ref 5–12)
LYMPHOCYTES NFR BLD MANUAL: 5 % (ref 19.6–45.3)
LYMPHOCYTES NFR BLD MANUAL: 5.1 % (ref 19.6–45.3)
LYMPHOCYTES NFR BLD MANUAL: 5.2 % (ref 5–12)
LYMPHOCYTES NFR BLD MANUAL: 6 % (ref 19.6–45.3)
LYMPHOCYTES NFR BLD MANUAL: 6.1 % (ref 5–12)
LYMPHOCYTES NFR BLD MANUAL: 7 % (ref 5–12)
LYMPHOCYTES NFR BLD MANUAL: 7.3 % (ref 5–12)
LYMPHOCYTES NFR BLD MANUAL: 8.1 % (ref 5–12)
LYMPHOCYTES NFR BLD MANUAL: 8.2 % (ref 5–12)
LYMPHOCYTES NFR BLD MANUAL: 9 % (ref 5–12)
LYMPHOCYTES NFR BLD MANUAL: 9 % (ref 5–12)
M PNEUMO IGG SER IA-ACNC: NOT DETECTED
MAGNESIUM SERPL-MCNC: 1.6 MG/DL (ref 1.6–2.4)
MAGNESIUM SERPL-MCNC: 2.1 MG/DL (ref 1.6–2.4)
MCH RBC QN AUTO: 29.1 PG (ref 26.6–33)
MCH RBC QN AUTO: 29.2 PG (ref 26.6–33)
MCH RBC QN AUTO: 29.3 PG (ref 26.6–33)
MCH RBC QN AUTO: 29.3 PG (ref 26.6–33)
MCH RBC QN AUTO: 29.5 PG (ref 26.6–33)
MCH RBC QN AUTO: 29.5 PG (ref 26.6–33)
MCH RBC QN AUTO: 29.7 PG (ref 26.6–33)
MCH RBC QN AUTO: 29.8 PG (ref 26.6–33)
MCH RBC QN AUTO: 29.9 PG (ref 26.6–33)
MCH RBC QN AUTO: 30 PG (ref 26.6–33)
MCH RBC QN AUTO: 30.1 PG (ref 26.6–33)
MCH RBC QN AUTO: 30.2 PG (ref 26.6–33)
MCH RBC QN AUTO: 30.3 PG (ref 26.6–33)
MCH RBC QN AUTO: 30.6 PG (ref 26.6–33)
MCH RBC QN AUTO: 30.6 PG (ref 26.6–33)
MCH RBC QN AUTO: 30.9 PG (ref 26.6–33)
MCH RBC QN AUTO: 31.1 PG (ref 26.6–33)
MCH RBC QN AUTO: 31.2 PG (ref 26.6–33)
MCH RBC QN AUTO: 31.5 PG (ref 26.6–33)
MCH RBC QN AUTO: 31.7 PG (ref 26.6–33)
MCHC RBC AUTO-ENTMCNC: 32.1 G/DL (ref 31.5–35.7)
MCHC RBC AUTO-ENTMCNC: 32.1 G/DL (ref 31.5–35.7)
MCHC RBC AUTO-ENTMCNC: 32.4 G/DL (ref 31.5–35.7)
MCHC RBC AUTO-ENTMCNC: 32.4 G/DL (ref 31.5–35.7)
MCHC RBC AUTO-ENTMCNC: 32.6 G/DL (ref 31.5–35.7)
MCHC RBC AUTO-ENTMCNC: 32.7 G/DL (ref 31.5–35.7)
MCHC RBC AUTO-ENTMCNC: 32.7 G/DL (ref 31.5–35.7)
MCHC RBC AUTO-ENTMCNC: 32.9 G/DL (ref 31.5–35.7)
MCHC RBC AUTO-ENTMCNC: 32.9 G/DL (ref 31.5–35.7)
MCHC RBC AUTO-ENTMCNC: 33 G/DL (ref 31.5–35.7)
MCHC RBC AUTO-ENTMCNC: 33.1 G/DL (ref 31.5–35.7)
MCHC RBC AUTO-ENTMCNC: 33.3 G/DL (ref 31.5–35.7)
MCHC RBC AUTO-ENTMCNC: 33.3 G/DL (ref 31.5–35.7)
MCHC RBC AUTO-ENTMCNC: 33.6 G/DL (ref 31.5–35.7)
MCHC RBC AUTO-ENTMCNC: 33.9 G/DL (ref 31.5–35.7)
MCHC RBC AUTO-ENTMCNC: 34 G/DL (ref 31.5–35.7)
MCHC RBC AUTO-ENTMCNC: 34.1 G/DL (ref 31.5–35.7)
MCHC RBC AUTO-ENTMCNC: 34.2 G/DL (ref 31.5–35.7)
MCHC RBC AUTO-ENTMCNC: 34.5 G/DL (ref 31.5–35.7)
MCV RBC AUTO: 87.5 FL (ref 79–97)
MCV RBC AUTO: 87.6 FL (ref 79–97)
MCV RBC AUTO: 88.2 FL (ref 79–97)
MCV RBC AUTO: 88.4 FL (ref 79–97)
MCV RBC AUTO: 88.6 FL (ref 79–97)
MCV RBC AUTO: 88.8 FL (ref 79–97)
MCV RBC AUTO: 89 FL (ref 79–97)
MCV RBC AUTO: 89.5 FL (ref 79–97)
MCV RBC AUTO: 89.9 FL (ref 79–97)
MCV RBC AUTO: 90 FL (ref 79–97)
MCV RBC AUTO: 90.1 FL (ref 79–97)
MCV RBC AUTO: 90.2 FL (ref 79–97)
MCV RBC AUTO: 90.6 FL (ref 79–97)
MCV RBC AUTO: 90.7 FL (ref 79–97)
MCV RBC AUTO: 90.8 FL (ref 79–97)
MCV RBC AUTO: 91 FL (ref 79–97)
MCV RBC AUTO: 91.4 FL (ref 79–97)
MCV RBC AUTO: 91.4 FL (ref 79–97)
MCV RBC AUTO: 92 FL (ref 79–97)
MCV RBC AUTO: 92.3 FL (ref 79–97)
MCV RBC AUTO: 92.7 FL (ref 79–97)
MCV RBC AUTO: 93 FL (ref 79–97)
MCV RBC AUTO: 93.1 FL (ref 79–97)
MCV RBC AUTO: 94.2 FL (ref 79–97)
MCV RBC AUTO: 95.3 FL (ref 79–97)
MCV RBC AUTO: 96 FL (ref 79–97)
METAMYELOCYTES NFR BLD MANUAL: 1 % (ref 0–0)
METAMYELOCYTES NFR BLD MANUAL: 3 % (ref 0–0)
METAMYELOCYTES NFR BLD MANUAL: 7 % (ref 0–0)
MICRO URNS: ABNORMAL
MICRO URNS: ABNORMAL
MICROCYTES BLD QL: ABNORMAL
MONOCYTES # BLD AUTO: 0.08 10*3/MM3 (ref 0.1–0.9)
MONOCYTES # BLD AUTO: 0.25 10*3/MM3 (ref 0.1–0.9)
MONOCYTES # BLD AUTO: 0.26 10*3/MM3 (ref 0.1–0.9)
MONOCYTES # BLD AUTO: 0.28 10*3/MM3 (ref 0.1–0.9)
MONOCYTES # BLD AUTO: 0.33 10*3/MM3 (ref 0.1–0.9)
MONOCYTES # BLD AUTO: 0.58 10*3/MM3 (ref 0.1–0.9)
MONOCYTES # BLD AUTO: 0.59 10*3/MM3 (ref 0.1–0.9)
MONOCYTES # BLD AUTO: 0.65 10*3/MM3 (ref 0.1–0.9)
MONOCYTES # BLD AUTO: 0.74 10*3/MM3 (ref 0.1–0.9)
MONOCYTES # BLD AUTO: 0.78 10*3/MM3 (ref 0.1–0.9)
MONOCYTES # BLD AUTO: 0.9 10*3/MM3 (ref 0.1–0.9)
MONOCYTES # BLD AUTO: 0.97 10*3/MM3 (ref 0.1–0.9)
MONOCYTES # BLD AUTO: 1.03 10*3/MM3 (ref 0.1–0.9)
MONOCYTES # BLD AUTO: 1.05 10*3/MM3 (ref 0.1–0.9)
MONOCYTES # BLD AUTO: 1.1 10*3/MM3 (ref 0.1–0.9)
MONOCYTES # BLD AUTO: 1.12 10*3/MM3 (ref 0.1–0.9)
MONOCYTES # BLD AUTO: 1.22 10*3/MM3 (ref 0.1–0.9)
MONOCYTES # BLD AUTO: 1.4 10*3/MM3 (ref 0.1–0.9)
MONOCYTES # BLD AUTO: 1.55 10*3/MM3 (ref 0.1–0.9)
MONOCYTES # BLD AUTO: 1.69 10*3/MM3 (ref 0.1–0.9)
MONOCYTES # BLD AUTO: 2.15 10*3/MM3 (ref 0.1–0.9)
MONOCYTES # BLD AUTO: 3.01 10*3/MM3 (ref 0.1–0.9)
MONOCYTES # BLD AUTO: 3.29 10*3/MM3 (ref 0.1–0.9)
MONOCYTES # BLD AUTO: 3.45 10*3/MM3 (ref 0.1–0.9)
MONOCYTES # BLD AUTO: 4.85 10*3/MM3 (ref 0.1–0.9)
MONOCYTES # BLD MANUAL: 0.4 10*3/MM3 (ref 0.1–0.9)
MONOCYTES NFR BLD AUTO: 10.3 % (ref 5–12)
MONOCYTES NFR BLD AUTO: 12.3 % (ref 5–12)
MONOCYTES NFR BLD AUTO: 2.2 % (ref 5–12)
MONOCYTES NFR BLD AUTO: 3.9 % (ref 5–12)
MONOCYTES NFR BLD AUTO: 4.8 % (ref 5–12)
MONOCYTES NFR BLD AUTO: 5.6 % (ref 5–12)
MONOCYTES NFR BLD AUTO: 7 % (ref 5–12)
MONOCYTES NFR BLD AUTO: 7.5 % (ref 5–12)
MONOCYTES NFR BLD AUTO: ABNORMAL %
MONOCYTES NFR BLD MANUAL: 3 % (ref 5–12)
MUCOUS THREADS URNS QL MICRO: PRESENT /HPF
MYELOCYTES NFR BLD MANUAL: 1 % (ref 0–0)
MYELOCYTES NFR BLD MANUAL: 3 % (ref 0–0)
NEUTROPHILS # BLD AUTO: 12.3 10*3/MM3 (ref 1.7–7)
NEUTROPHILS # BLD AUTO: 12.53 10*3/MM3 (ref 1.7–7)
NEUTROPHILS # BLD AUTO: 15.12 10*3/MM3 (ref 1.7–7)
NEUTROPHILS # BLD AUTO: 17.12 10*3/MM3 (ref 1.7–7)
NEUTROPHILS # BLD AUTO: 18.56 10*3/MM3 (ref 1.7–7)
NEUTROPHILS # BLD AUTO: 18.86 10*3/MM3 (ref 1.7–7)
NEUTROPHILS # BLD AUTO: 22.27 10*3/MM3 (ref 1.7–7)
NEUTROPHILS # BLD AUTO: 22.86 10*3/MM3 (ref 1.7–7)
NEUTROPHILS # BLD AUTO: 24.12 10*3/MM3 (ref 1.7–7)
NEUTROPHILS # BLD AUTO: 24.24 10*3/MM3 (ref 1.7–7)
NEUTROPHILS # BLD AUTO: 24.68 10*3/MM3 (ref 1.7–7)
NEUTROPHILS # BLD AUTO: 28.42 10*3/MM3 (ref 1.7–7)
NEUTROPHILS # BLD AUTO: 29.85 10*3/MM3 (ref 1.7–7)
NEUTROPHILS # BLD AUTO: 36.12 10*3/MM3 (ref 1.7–7)
NEUTROPHILS # BLD AUTO: 38.4 10*3/MM3 (ref 1.7–7)
NEUTROPHILS # BLD AUTO: 41.35 10*3/MM3 (ref 1.7–7)
NEUTROPHILS # BLD AUTO: 7.27 10*3/MM3 (ref 1.7–7)
NEUTROPHILS # BLD AUTO: 7.36 10*3/MM3 (ref 1.7–7)
NEUTROPHILS # BLD MANUAL: 12.93 10*3/MM3 (ref 1.7–7)
NEUTROPHILS NFR BLD AUTO: 10.73 10*3/MM3 (ref 1.7–7)
NEUTROPHILS NFR BLD AUTO: 11.28 10*3/MM3 (ref 1.7–7)
NEUTROPHILS NFR BLD AUTO: 19.94 10*3/MM3 (ref 1.7–7)
NEUTROPHILS NFR BLD AUTO: 5.66 10*3/MM3 (ref 1.7–7)
NEUTROPHILS NFR BLD AUTO: 6.32 10*3/MM3 (ref 1.7–7)
NEUTROPHILS NFR BLD AUTO: 67.5 % (ref 42.7–76)
NEUTROPHILS NFR BLD AUTO: 7.47 10*3/MM3 (ref 1.7–7)
NEUTROPHILS NFR BLD AUTO: 72.3 % (ref 42.7–76)
NEUTROPHILS NFR BLD AUTO: 76.2 % (ref 42.7–76)
NEUTROPHILS NFR BLD AUTO: 81.7 % (ref 42.7–76)
NEUTROPHILS NFR BLD AUTO: 82.6 % (ref 42.7–76)
NEUTROPHILS NFR BLD AUTO: 86.1 % (ref 42.7–76)
NEUTROPHILS NFR BLD AUTO: 87.1 % (ref 42.7–76)
NEUTROPHILS NFR BLD AUTO: 9.93 10*3/MM3 (ref 1.7–7)
NEUTROPHILS NFR BLD AUTO: 90.2 % (ref 42.7–76)
NEUTROPHILS NFR BLD AUTO: ABNORMAL %
NEUTROPHILS NFR BLD MANUAL: 78 % (ref 42.7–76)
NEUTROPHILS NFR BLD MANUAL: 85 % (ref 42.7–76)
NEUTROPHILS NFR BLD MANUAL: 87.2 % (ref 42.7–76)
NEUTROPHILS NFR BLD MANUAL: 88 % (ref 42.7–76)
NEUTROPHILS NFR BLD MANUAL: 88 % (ref 42.7–76)
NEUTROPHILS NFR BLD MANUAL: 90.9 % (ref 42.7–76)
NEUTROPHILS NFR BLD MANUAL: 91.7 % (ref 42.7–76)
NEUTROPHILS NFR BLD MANUAL: 91.8 % (ref 42.7–76)
NEUTROPHILS NFR BLD MANUAL: 92 % (ref 42.7–76)
NEUTROPHILS NFR BLD MANUAL: 92.9 % (ref 42.7–76)
NEUTROPHILS NFR BLD MANUAL: 93.9 % (ref 42.7–76)
NEUTROPHILS NFR BLD MANUAL: 94 % (ref 42.7–76)
NEUTROPHILS NFR BLD MANUAL: 94.8 % (ref 42.7–76)
NEUTROPHILS NFR BLD MANUAL: 94.9 % (ref 42.7–76)
NEUTROPHILS NFR BLD MANUAL: 95 % (ref 42.7–76)
NEUTROPHILS NFR BLD MANUAL: 95 % (ref 42.7–76)
NEUTROPHILS NFR BLD MANUAL: 96 % (ref 42.7–76)
NEUTROPHILS NFR BLD MANUAL: 96.9 % (ref 42.7–76)
NITRITE UR QL STRIP: NEGATIVE
NITRITE UR QL STRIP: POSITIVE
NITRITE UR-MCNC: NEGATIVE MG/ML
NRBC BLD AUTO-RTO: 0 /100 WBC (ref 0–0.2)
NRBC BLD AUTO-RTO: 0.1 /100 WBC (ref 0–0.2)
NRBC BLD AUTO-RTO: 0.1 /100 WBC (ref 0–0.2)
NRBC SPEC MANUAL: 2 /100 WBC (ref 0–0.2)
NRBC SPEC MANUAL: 2.1 /100 WBC (ref 0–0.2)
NRBC SPEC MANUAL: 2.1 /100 WBC (ref 0–0.2)
NRBC SPEC MANUAL: 3.1 /100 WBC (ref 0–0.2)
NRBC SPEC MANUAL: 7 /100 WBC (ref 0–0.2)
NRBC SPEC MANUAL: 8 /100 WBC (ref 0–0.2)
OTHER ANTIBIOTIC SUSC ISLT: ABNORMAL
OTHER ANTIBIOTIC SUSC ISLT: ABNORMAL
PH UR STRIP.AUTO: 7.5 [PH] (ref 5–8)
PH UR STRIP.AUTO: <=5 [PH] (ref 5–8)
PH UR STRIP: 6.5 [PH] (ref 5–7.5)
PH UR STRIP: 6.5 [PH] (ref 5–7.5)
PH UR: 6.5 [PH] (ref 5–8)
PLAT MORPH BLD: NORMAL
PLATELET # BLD AUTO: 121 10*3/MM3 (ref 140–450)
PLATELET # BLD AUTO: 123 10*3/MM3 (ref 140–450)
PLATELET # BLD AUTO: 136 10*3/MM3 (ref 140–450)
PLATELET # BLD AUTO: 145 10*3/MM3 (ref 140–450)
PLATELET # BLD AUTO: 154 10*3/MM3 (ref 140–450)
PLATELET # BLD AUTO: 157 10*3/MM3 (ref 140–450)
PLATELET # BLD AUTO: 159 10*3/MM3 (ref 140–450)
PLATELET # BLD AUTO: 166 10*3/MM3 (ref 140–450)
PLATELET # BLD AUTO: 167 10*3/MM3 (ref 140–450)
PLATELET # BLD AUTO: 173 10*3/MM3 (ref 140–450)
PLATELET # BLD AUTO: 174 10*3/MM3 (ref 140–450)
PLATELET # BLD AUTO: 176 10*3/MM3 (ref 140–450)
PLATELET # BLD AUTO: 176 10*3/MM3 (ref 140–450)
PLATELET # BLD AUTO: 178 10*3/MM3 (ref 140–450)
PLATELET # BLD AUTO: 186 10*3/MM3 (ref 140–450)
PLATELET # BLD AUTO: 190 10*3/MM3 (ref 140–450)
PLATELET # BLD AUTO: 193 10*3/MM3 (ref 140–450)
PLATELET # BLD AUTO: 205 10*3/MM3 (ref 140–450)
PLATELET # BLD AUTO: 217 10*3/MM3 (ref 140–450)
PLATELET # BLD AUTO: 221 10*3/MM3 (ref 140–450)
PLATELET # BLD AUTO: 227 10*3/MM3 (ref 140–450)
PLATELET # BLD AUTO: 233 10*3/MM3 (ref 140–450)
PLATELET # BLD AUTO: 234 10*3/MM3 (ref 140–450)
PLATELET # BLD AUTO: 252 10*3/MM3 (ref 140–450)
PLATELET # BLD AUTO: 262 10*3/MM3 (ref 140–450)
PLATELET # BLD AUTO: 263 10*3/MM3 (ref 140–450)
PLATELET BLD QL SMEAR: ABNORMAL
PMV BLD AUTO: 10.9 FL (ref 6–12)
PMV BLD AUTO: 11.4 FL (ref 6–12)
PMV BLD AUTO: 11.5 FL (ref 6–12)
PMV BLD AUTO: 11.7 FL (ref 6–12)
PMV BLD AUTO: 11.9 FL (ref 6–12)
PMV BLD AUTO: 12 FL (ref 6–12)
PMV BLD AUTO: 12 FL (ref 6–12)
PMV BLD AUTO: 12.1 FL (ref 6–12)
PMV BLD AUTO: 12.2 FL (ref 6–12)
PMV BLD AUTO: 12.6 FL (ref 6–12)
PMV BLD AUTO: 12.7 FL (ref 6–12)
PMV BLD AUTO: 13 FL (ref 6–12)
PMV BLD AUTO: 13 FL (ref 6–12)
PMV BLD AUTO: 13.1 FL (ref 6–12)
PMV BLD AUTO: 13.2 FL (ref 6–12)
PMV BLD AUTO: 13.3 FL (ref 6–12)
PMV BLD AUTO: 13.4 FL (ref 6–12)
POIKILOCYTOSIS BLD QL SMEAR: ABNORMAL
POLYCHROMASIA BLD QL SMEAR: ABNORMAL
POTASSIUM BLD-SCNC: 4.2 MMOL/L (ref 3.5–5.2)
POTASSIUM SERPL-SCNC: 3.1 MMOL/L (ref 3.5–5.2)
POTASSIUM SERPL-SCNC: 3.2 MMOL/L (ref 3.5–5.2)
POTASSIUM SERPL-SCNC: 3.6 MMOL/L (ref 3.5–5.2)
POTASSIUM SERPL-SCNC: 3.6 MMOL/L (ref 3.5–5.2)
POTASSIUM SERPL-SCNC: 3.7 MMOL/L (ref 3.5–5.2)
POTASSIUM SERPL-SCNC: 3.7 MMOL/L (ref 3.5–5.2)
POTASSIUM SERPL-SCNC: 3.8 MMOL/L (ref 3.5–5.2)
POTASSIUM SERPL-SCNC: 4 MMOL/L (ref 3.5–5.2)
POTASSIUM SERPL-SCNC: 4.1 MMOL/L (ref 3.5–5.2)
POTASSIUM SERPL-SCNC: 4.2 MMOL/L (ref 3.5–5.2)
POTASSIUM SERPL-SCNC: 4.2 MMOL/L (ref 3.5–5.2)
POTASSIUM SERPL-SCNC: 4.3 MMOL/L (ref 3.5–5.2)
POTASSIUM SERPL-SCNC: 4.3 MMOL/L (ref 3.5–5.2)
POTASSIUM SERPL-SCNC: 4.4 MMOL/L (ref 3.5–5.2)
POTASSIUM SERPL-SCNC: 4.5 MMOL/L (ref 3.5–5.2)
POTASSIUM SERPL-SCNC: 4.6 MMOL/L (ref 3.5–5.2)
POTASSIUM SERPL-SCNC: 4.6 MMOL/L (ref 3.5–5.2)
POTASSIUM SERPL-SCNC: 5 MMOL/L (ref 3.5–5.2)
POTASSIUM SERPL-SCNC: 5 MMOL/L (ref 3.5–5.2)
PROCALCITONIN SERPL-MCNC: 0.03 NG/ML (ref 0–0.25)
PROCALCITONIN SERPL-MCNC: 0.05 NG/ML (ref 0–0.25)
PROCALCITONIN SERPL-MCNC: 0.05 NG/ML (ref 0–0.25)
PROCALCITONIN SERPL-MCNC: 0.06 NG/ML (ref 0–0.25)
PROCALCITONIN SERPL-MCNC: 0.09 NG/ML (ref 0–0.25)
PROT SERPL-MCNC: 4.4 G/DL (ref 6–8.5)
PROT SERPL-MCNC: 4.8 G/DL (ref 6–8.5)
PROT SERPL-MCNC: 4.9 G/DL (ref 6–8.5)
PROT SERPL-MCNC: 5 G/DL (ref 6–8.5)
PROT SERPL-MCNC: 5.1 G/DL (ref 6–8.5)
PROT SERPL-MCNC: 5.2 G/DL (ref 6–8.5)
PROT SERPL-MCNC: 5.3 G/DL (ref 6–8.5)
PROT SERPL-MCNC: 5.4 G/DL (ref 6–8.5)
PROT SERPL-MCNC: 5.5 G/DL (ref 6–8.5)
PROT SERPL-MCNC: 5.9 G/DL (ref 6–8.5)
PROT SERPL-MCNC: 6.5 G/DL (ref 6–8.5)
PROT SERPL-MCNC: 6.7 G/DL (ref 6–8.5)
PROT UR QL STRIP: ABNORMAL
PROT UR QL STRIP: ABNORMAL
PROT UR QL STRIP: NEGATIVE
PROT UR QL STRIP: NEGATIVE
PROT UR STRIP-MCNC: NEGATIVE MG/DL
PROTHROMBIN TIME: 21.3 SECONDS (ref 11.7–14.2)
PROTHROMBIN TIME: 21.4 SECONDS (ref 11.7–14.2)
PROTHROMBIN TIME: 21.9 SECONDS (ref 11.7–14.2)
PROTHROMBIN TIME: 22.9 SECONDS (ref 11.7–14.2)
PROTHROMBIN TIME: 23.1 SECONDS (ref 11.7–14.2)
PROTHROMBIN TIME: 23.5 SECONDS (ref 11.7–14.2)
PROTHROMBIN TIME: 24 SECONDS (ref 11.7–14.2)
PROTHROMBIN TIME: 24 SECONDS (ref 11.7–14.2)
PROTHROMBIN TIME: 25.5 SECONDS (ref 11.7–14.2)
PROTHROMBIN TIME: 26.3 SECONDS (ref 11.7–14.2)
PROTHROMBIN TIME: 26.7 SECONDS (ref 11.7–14.2)
PROTHROMBIN TIME: 28.4 SECONDS (ref 11.7–14.2)
PROTHROMBIN TIME: 28.5 SECONDS (ref 11.7–14.2)
PROTHROMBIN TIME: 28.6 SECONDS (ref 11.7–14.2)
PROTHROMBIN TIME: 28.7 SECONDS (ref 11.7–14.2)
PROTHROMBIN TIME: 28.7 SECONDS (ref 11.7–14.2)
PROTHROMBIN TIME: 28.9 SECONDS (ref 11.7–14.2)
PROTHROMBIN TIME: 29.1 SECONDS (ref 11.7–14.2)
PROTHROMBIN TIME: 29.6 SECONDS (ref 11.7–14.2)
PROTHROMBIN TIME: 30.2 SECONDS (ref 11.7–14.2)
PROTHROMBIN TIME: 30.2 SECONDS (ref 11.7–14.2)
PROTHROMBIN TIME: 30.8 SECONDS (ref 11.7–14.2)
PROTHROMBIN TIME: 31.1 SECONDS (ref 11.7–14.2)
PROTHROMBIN TIME: 31.5 SECONDS (ref 11.7–14.2)
PROTHROMBIN TIME: 31.8 SECONDS (ref 11.7–14.2)
PROTHROMBIN TIME: 31.9 SECONDS (ref 11.7–14.2)
PROTHROMBIN TIME: 33.1 SECONDS (ref 11.7–14.2)
PROTHROMBIN TIME: 33.7 SECONDS (ref 11.7–14.2)
PROTHROMBIN TIME: 34.4 SECONDS (ref 11.7–14.2)
PROTHROMBIN TIME: 34.4 SECONDS (ref 11.7–14.2)
PROTHROMBIN TIME: 35.4 SECONDS (ref 11.7–14.2)
PROTHROMBIN TIME: 35.6 SECONDS (ref 11.7–14.2)
PROTHROMBIN TIME: 36.5 SECONDS (ref 11.7–14.2)
PROTHROMBIN TIME: 37.7 SECONDS (ref 11.7–14.2)
PROTHROMBIN TIME: 38.1 SECONDS (ref 11.7–14.2)
PROTHROMBIN TIME: 39.5 SECONDS (ref 11.7–14.2)
PROTHROMBIN TIME: 39.7 SECONDS (ref 11.7–14.2)
PROTHROMBIN TIME: 44.6 SECONDS (ref 11.7–14.2)
PROTHROMBIN TIME: 44.8 SECONDS (ref 11.7–14.2)
PROTHROMBIN TIME: 47.9 SECONDS (ref 11.7–14.2)
QT INTERVAL: 296 MS
QT INTERVAL: 377 MS
RBC # BLD AUTO: 2.25 10*6/MM3 (ref 3.77–5.28)
RBC # BLD AUTO: 2.4 10*6/MM3 (ref 3.77–5.28)
RBC # BLD AUTO: 2.78 10*6/MM3 (ref 3.77–5.28)
RBC # BLD AUTO: 3.14 10*6/MM3 (ref 3.77–5.28)
RBC # BLD AUTO: 3.36 10*6/MM3 (ref 3.77–5.28)
RBC # BLD AUTO: 3.54 10*6/MM3 (ref 3.77–5.28)
RBC # BLD AUTO: 3.57 10*6/MM3 (ref 3.77–5.28)
RBC # BLD AUTO: 3.64 10*6/MM3 (ref 3.77–5.28)
RBC # BLD AUTO: 3.7 10*6/MM3 (ref 3.77–5.28)
RBC # BLD AUTO: 3.72 10*6/MM3 (ref 3.77–5.28)
RBC # BLD AUTO: 3.73 10*6/MM3 (ref 3.77–5.28)
RBC # BLD AUTO: 3.74 10*6/MM3 (ref 3.77–5.28)
RBC # BLD AUTO: 3.75 10*6/MM3 (ref 3.77–5.28)
RBC # BLD AUTO: 3.76 10*6/MM3 (ref 3.77–5.28)
RBC # BLD AUTO: 3.77 10*6/MM3 (ref 3.77–5.28)
RBC # BLD AUTO: 3.79 10*6/MM3 (ref 3.77–5.28)
RBC # BLD AUTO: 3.86 10*6/MM3 (ref 3.77–5.28)
RBC # BLD AUTO: 3.89 10*6/MM3 (ref 3.77–5.28)
RBC # BLD AUTO: 3.89 10*6/MM3 (ref 3.77–5.28)
RBC # BLD AUTO: 3.92 10*6/MM3 (ref 3.77–5.28)
RBC # BLD AUTO: 3.94 10*6/MM3 (ref 3.77–5.28)
RBC # BLD AUTO: 4.3 10*6/MM3 (ref 3.77–5.28)
RBC # BLD AUTO: 4.31 10*6/MM3 (ref 3.77–5.28)
RBC # BLD AUTO: 4.31 10*6/MM3 (ref 3.77–5.28)
RBC # BLD AUTO: 4.44 10*6/MM3 (ref 3.77–5.28)
RBC # BLD AUTO: 4.46 10*6/MM3 (ref 3.77–5.28)
RBC # UR STRIP: ABNORMAL /UL
RBC # UR: ABNORMAL /HPF
RBC # UR: ABNORMAL /HPF
RBC #/AREA URNS HPF: ABNORMAL /HPF (ref 0–2)
RBC #/AREA URNS HPF: ABNORMAL /HPF (ref 0–2)
RBC MORPH BLD: ABNORMAL
RBC MORPH BLD: NORMAL
REF LAB TEST METHOD: ABNORMAL
REF LAB TEST METHOD: ABNORMAL
RETICS # AUTO: 0.19 10*6/MM3 (ref 0.02–0.13)
RETICS/RBC NFR AUTO: 7.03 % (ref 0.7–1.9)
RH BLD: POSITIVE
RH BLD: POSITIVE
RHINOVIRUS RNA SPEC NAA+PROBE: NOT DETECTED
RSV RNA NPH QL NAA+NON-PROBE: NOT DETECTED
SARS-COV-2 RNA NPH QL NAA+NON-PROBE: DETECTED
SARS-COV-2 RNA NPH QL NAA+NON-PROBE: DETECTED
SARS-COV-2 RNA NPH QL NAA+NON-PROBE: NOT DETECTED
SARS-COV-2 RNA NPH QL NAA+NON-PROBE: NOT DETECTED
SMUDGE CELLS BLD QL SMEAR: ABNORMAL
SODIUM BLD-SCNC: 140 MMOL/L (ref 136–145)
SODIUM SERPL-SCNC: 124 MMOL/L (ref 136–145)
SODIUM SERPL-SCNC: 130 MMOL/L (ref 136–145)
SODIUM SERPL-SCNC: 130 MMOL/L (ref 136–145)
SODIUM SERPL-SCNC: 131 MMOL/L (ref 136–145)
SODIUM SERPL-SCNC: 132 MMOL/L (ref 136–145)
SODIUM SERPL-SCNC: 134 MMOL/L (ref 136–145)
SODIUM SERPL-SCNC: 136 MMOL/L (ref 136–145)
SODIUM SERPL-SCNC: 136 MMOL/L (ref 136–145)
SODIUM SERPL-SCNC: 137 MMOL/L (ref 136–145)
SODIUM SERPL-SCNC: 137 MMOL/L (ref 136–145)
SODIUM SERPL-SCNC: 138 MMOL/L (ref 136–145)
SODIUM SERPL-SCNC: 139 MMOL/L (ref 136–145)
SODIUM SERPL-SCNC: 140 MMOL/L (ref 136–145)
SODIUM SERPL-SCNC: 141 MMOL/L (ref 136–145)
SODIUM SERPL-SCNC: 142 MMOL/L (ref 136–145)
SODIUM SERPL-SCNC: 143 MMOL/L (ref 136–145)
SODIUM SERPL-SCNC: 144 MMOL/L (ref 136–145)
SP GR UR STRIP: 1.02 (ref 1–1.03)
SP GR UR STRIP: 1.02 (ref 1–1.03)
SP GR UR: 1.01 (ref 1–1.03)
SPHEROCYTES BLD QL SMEAR: ABNORMAL
SPHEROCYTES BLD QL SMEAR: ABNORMAL
SQUAMOUS #/AREA URNS HPF: ABNORMAL /HPF
SQUAMOUS #/AREA URNS HPF: ABNORMAL /HPF
T&S EXPIRATION DATE: NORMAL
T&S EXPIRATION DATE: NORMAL
T4 FREE SERPL-MCNC: 1.46 NG/DL (ref 0.93–1.7)
TIBC SERPL-MCNC: 316 MCG/DL (ref 298–536)
TRANSFERRIN SERPL-MCNC: 212 MG/DL (ref 200–360)
TRIGL SERPL-MCNC: 122 MG/DL (ref 0–150)
TROPONIN T SERPL-MCNC: <0.01 NG/ML (ref 0–0.03)
TSH SERPL DL<=0.005 MIU/L-ACNC: 0.5 UIU/ML (ref 0.27–4.2)
UNIDENT CRYS URNS QL MICRO: PRESENT /LPF
UNIT  ABO: NORMAL
UNIT  RH: NORMAL
URINALYSIS REFLEX: ABNORMAL
URINALYSIS REFLEX: ABNORMAL
UROBILINOGEN UR QL STRIP: ABNORMAL
UROBILINOGEN UR QL STRIP: ABNORMAL
UROBILINOGEN UR QL: NORMAL
UROBILINOGEN UR STRIP-MCNC: 0.2 MG/DL (ref 0.2–1)
UROBILINOGEN UR STRIP-MCNC: 0.2 MG/DL (ref 0.2–1)
VARIANT LYMPHS NFR BLD MANUAL: 1 % (ref 0–5)
VIT B12 BLD-MCNC: 549 PG/ML (ref 211–946)
VLDLC SERPL CALC-MCNC: 24.4 MG/DL
WBC # BLD AUTO: 12.02 10*3/MM3 (ref 3.4–10.8)
WBC # BLD AUTO: 12.51 10*3/MM3 (ref 3.4–10.8)
WBC # BLD AUTO: 13.13 10*3/MM3 (ref 3.4–10.8)
WBC # BLD AUTO: 13.19 10*3/MM3 (ref 3.4–10.8)
WBC # BLD AUTO: 13.47 10*3/MM3 (ref 3.4–10.8)
WBC # BLD AUTO: 13.98 10*3/MM3 (ref 3.4–10.8)
WBC # BLD AUTO: 17.18 10*3/MM3 (ref 3.4–10.8)
WBC # BLD AUTO: 18.43 10*3/MM3 (ref 3.4–10.8)
WBC # BLD AUTO: 19.56 10*3/MM3 (ref 3.4–10.8)
WBC # BLD AUTO: 20.55 10*3/MM3 (ref 3.4–10.8)
WBC # BLD AUTO: 23.14 10*3/MM3 (ref 3.4–10.8)
WBC # BLD AUTO: 23.49 10*3/MM3 (ref 3.4–10.8)
WBC # BLD AUTO: 24.32 10*3/MM3 (ref 3.4–10.8)
WBC # BLD AUTO: 25.02 10*3/MM3 (ref 3.4–10.8)
WBC # BLD AUTO: 25.98 10*3/MM3 (ref 3.4–10.8)
WBC # BLD AUTO: 26.55 10*3/MM3 (ref 3.4–10.8)
WBC # BLD AUTO: 32.45 10*3/MM3 (ref 3.4–10.8)
WBC # BLD AUTO: 33.44 10*3/MM3 (ref 3.4–10.8)
WBC # BLD AUTO: 41.42 10*3/MM3 (ref 3.4–10.8)
WBC # BLD AUTO: 45.09 10*3/MM3 (ref 3.4–10.8)
WBC # BLD AUTO: 49.23 10*3/MM3 (ref 3.4–10.8)
WBC # BLD AUTO: 7.74 10*3/MM3 (ref 3.4–10.8)
WBC # BLD AUTO: 8.3 10*3/MM3 (ref 3.4–10.8)
WBC # BLD AUTO: 8.39 10*3/MM3 (ref 3.4–10.8)
WBC # BLD AUTO: 8.57 10*3/MM3 (ref 3.4–10.8)
WBC #/AREA URNS HPF: >30 /HPF (ref 0–5)
WBC #/AREA URNS HPF: ABNORMAL /HPF (ref 0–5)
WBC MORPH BLD: NORMAL
WBC NRBC COR # BLD: 10.18 10*3/MM3 (ref 3.4–10.8)
WBC UR QL AUTO: ABNORMAL /HPF
WBC UR QL AUTO: ABNORMAL /HPF
WHOLE BLOOD HOLD SPECIMEN: NORMAL
WHOLE BLOOD HOLD SPECIMEN: NORMAL
YEAST URNS QL MICRO: ABNORMAL /HPF

## 2020-01-01 PROCEDURE — 0202U NFCT DS 22 TRGT SARS-COV-2: CPT | Performed by: INTERNAL MEDICINE

## 2020-01-01 PROCEDURE — 85025 COMPLETE CBC W/AUTO DIFF WBC: CPT | Performed by: HOSPITALIST

## 2020-01-01 PROCEDURE — 86140 C-REACTIVE PROTEIN: CPT | Performed by: HOSPITALIST

## 2020-01-01 PROCEDURE — 86900 BLOOD TYPING SEROLOGIC ABO: CPT

## 2020-01-01 PROCEDURE — 36415 COLL VENOUS BLD VENIPUNCTURE: CPT | Performed by: INTERNAL MEDICINE

## 2020-01-01 PROCEDURE — 85610 PROTHROMBIN TIME: CPT | Performed by: NURSE PRACTITIONER

## 2020-01-01 PROCEDURE — 36415 COLL VENOUS BLD VENIPUNCTURE: CPT

## 2020-01-01 PROCEDURE — 97110 THERAPEUTIC EXERCISES: CPT

## 2020-01-01 PROCEDURE — 80053 COMPREHEN METABOLIC PANEL: CPT | Performed by: HOSPITALIST

## 2020-01-01 PROCEDURE — 94799 UNLISTED PULMONARY SVC/PX: CPT

## 2020-01-01 PROCEDURE — 88185 FLOWCYTOMETRY/TC ADD-ON: CPT | Performed by: INTERNAL MEDICINE

## 2020-01-01 PROCEDURE — 85007 BL SMEAR W/DIFF WBC COUNT: CPT | Performed by: HOSPITALIST

## 2020-01-01 PROCEDURE — 82728 ASSAY OF FERRITIN: CPT | Performed by: HOSPITALIST

## 2020-01-01 PROCEDURE — 83605 ASSAY OF LACTIC ACID: CPT | Performed by: EMERGENCY MEDICINE

## 2020-01-01 PROCEDURE — 85379 FIBRIN DEGRADATION QUANT: CPT | Performed by: HOSPITALIST

## 2020-01-01 PROCEDURE — 82248 BILIRUBIN DIRECT: CPT | Performed by: INTERNAL MEDICINE

## 2020-01-01 PROCEDURE — XW033E5 INTRODUCTION OF REMDESIVIR ANTI-INFECTIVE INTO PERIPHERAL VEIN, PERCUTANEOUS APPROACH, NEW TECHNOLOGY GROUP 5: ICD-10-PCS | Performed by: INTERNAL MEDICINE

## 2020-01-01 PROCEDURE — 83615 LACTATE (LD) (LDH) ENZYME: CPT | Performed by: HOSPITALIST

## 2020-01-01 PROCEDURE — P9016 RBC LEUKOCYTES REDUCED: HCPCS

## 2020-01-01 PROCEDURE — 82962 GLUCOSE BLOOD TEST: CPT

## 2020-01-01 PROCEDURE — 85610 PROTHROMBIN TIME: CPT

## 2020-01-01 PROCEDURE — 63710000001 DEXAMETHASONE PER 0.25 MG: Performed by: INTERNAL MEDICINE

## 2020-01-01 PROCEDURE — 99285 EMERGENCY DEPT VISIT HI MDM: CPT

## 2020-01-01 PROCEDURE — 83735 ASSAY OF MAGNESIUM: CPT | Performed by: HOSPITALIST

## 2020-01-01 PROCEDURE — 90694 VACC AIIV4 NO PRSRV 0.5ML IM: CPT | Performed by: INTERNAL MEDICINE

## 2020-01-01 PROCEDURE — 25010000002 ERTAPENEM PER 500 MG: Performed by: HOSPITALIST

## 2020-01-01 PROCEDURE — 63710000001 INSULIN LISPRO (HUMAN) PER 5 UNITS: Performed by: NURSE PRACTITIONER

## 2020-01-01 PROCEDURE — 86900 BLOOD TYPING SEROLOGIC ABO: CPT | Performed by: HOSPITALIST

## 2020-01-01 PROCEDURE — 82550 ASSAY OF CK (CPK): CPT | Performed by: HOSPITALIST

## 2020-01-01 PROCEDURE — 85384 FIBRINOGEN ACTIVITY: CPT | Performed by: HOSPITALIST

## 2020-01-01 PROCEDURE — 93010 ELECTROCARDIOGRAM REPORT: CPT | Performed by: INTERNAL MEDICINE

## 2020-01-01 PROCEDURE — 81001 URINALYSIS AUTO W/SCOPE: CPT | Performed by: EMERGENCY MEDICINE

## 2020-01-01 PROCEDURE — 25010000002 ONDANSETRON PER 1 MG: Performed by: NURSE PRACTITIONER

## 2020-01-01 PROCEDURE — 86850 RBC ANTIBODY SCREEN: CPT | Performed by: INTERNAL MEDICINE

## 2020-01-01 PROCEDURE — 99232 SBSQ HOSP IP/OBS MODERATE 35: CPT | Performed by: INTERNAL MEDICINE

## 2020-01-01 PROCEDURE — 85025 COMPLETE CBC W/AUTO DIFF WBC: CPT | Performed by: INTERNAL MEDICINE

## 2020-01-01 PROCEDURE — 84132 ASSAY OF SERUM POTASSIUM: CPT | Performed by: HOSPITALIST

## 2020-01-01 PROCEDURE — 86850 RBC ANTIBODY SCREEN: CPT | Performed by: HOSPITALIST

## 2020-01-01 PROCEDURE — 83615 LACTATE (LD) (LDH) ENZYME: CPT | Performed by: INTERNAL MEDICINE

## 2020-01-01 PROCEDURE — 86901 BLOOD TYPING SEROLOGIC RH(D): CPT | Performed by: HOSPITALIST

## 2020-01-01 PROCEDURE — 99213 OFFICE O/P EST LOW 20 MIN: CPT | Performed by: NURSE PRACTITIONER

## 2020-01-01 PROCEDURE — 0202U NFCT DS 22 TRGT SARS-COV-2: CPT | Performed by: EMERGENCY MEDICINE

## 2020-01-01 PROCEDURE — 25010000002 FUROSEMIDE PER 20 MG: Performed by: HOSPITALIST

## 2020-01-01 PROCEDURE — 63710000001 DEXAMETHASONE PER 0.25 MG: Performed by: HOSPITALIST

## 2020-01-01 PROCEDURE — G0008 ADMIN INFLUENZA VIRUS VAC: HCPCS | Performed by: INTERNAL MEDICINE

## 2020-01-01 PROCEDURE — 71045 X-RAY EXAM CHEST 1 VIEW: CPT

## 2020-01-01 PROCEDURE — 97530 THERAPEUTIC ACTIVITIES: CPT

## 2020-01-01 PROCEDURE — G0439 PPPS, SUBSEQ VISIT: HCPCS | Performed by: INTERNAL MEDICINE

## 2020-01-01 PROCEDURE — 85610 PROTHROMBIN TIME: CPT | Performed by: EMERGENCY MEDICINE

## 2020-01-01 PROCEDURE — 85025 COMPLETE CBC W/AUTO DIFF WBC: CPT | Performed by: NURSE PRACTITIONER

## 2020-01-01 PROCEDURE — 99221 1ST HOSP IP/OBS SF/LOW 40: CPT | Performed by: INTERNAL MEDICINE

## 2020-01-01 PROCEDURE — 84145 PROCALCITONIN (PCT): CPT | Performed by: INTERNAL MEDICINE

## 2020-01-01 PROCEDURE — 99214 OFFICE O/P EST MOD 30 MIN: CPT | Performed by: INTERNAL MEDICINE

## 2020-01-01 PROCEDURE — 92950 HEART/LUNG RESUSCITATION CPR: CPT

## 2020-01-01 PROCEDURE — 74176 CT ABD & PELVIS W/O CONTRAST: CPT

## 2020-01-01 PROCEDURE — XW13325 TRANSFUSION OF CONVALESCENT PLASMA (NONAUTOLOGOUS) INTO PERIPHERAL VEIN, PERCUTANEOUS APPROACH, NEW TECHNOLOGY GROUP 5: ICD-10-PCS | Performed by: INTERNAL MEDICINE

## 2020-01-01 PROCEDURE — 77063 BREAST TOMOSYNTHESIS BI: CPT | Performed by: RADIOLOGY

## 2020-01-01 PROCEDURE — 63710000001 INSULIN LISPRO (HUMAN) PER 5 UNITS: Performed by: INTERNAL MEDICINE

## 2020-01-01 PROCEDURE — 82607 VITAMIN B-12: CPT | Performed by: INTERNAL MEDICINE

## 2020-01-01 PROCEDURE — 77067 SCR MAMMO BI INCL CAD: CPT | Performed by: RADIOLOGY

## 2020-01-01 PROCEDURE — 97162 PT EVAL MOD COMPLEX 30 MIN: CPT

## 2020-01-01 PROCEDURE — 63710000001 DIPHENHYDRAMINE PER 50 MG: Performed by: INTERNAL MEDICINE

## 2020-01-01 PROCEDURE — 25010000002 DEXAMETHASONE SODIUM PHOSPHATE 20 MG/5ML SOLUTION: Performed by: EMERGENCY MEDICINE

## 2020-01-01 PROCEDURE — 73502 X-RAY EXAM HIP UNI 2-3 VIEWS: CPT | Performed by: ORTHOPAEDIC SURGERY

## 2020-01-01 PROCEDURE — 88182 CELL MARKER STUDY: CPT

## 2020-01-01 PROCEDURE — 93880 EXTRACRANIAL BILAT STUDY: CPT

## 2020-01-01 PROCEDURE — 25010000002 CEFTRIAXONE PER 250 MG: Performed by: EMERGENCY MEDICINE

## 2020-01-01 PROCEDURE — 88184 FLOWCYTOMETRY/ TC 1 MARKER: CPT | Performed by: INTERNAL MEDICINE

## 2020-01-01 PROCEDURE — 99213 OFFICE O/P EST LOW 20 MIN: CPT | Performed by: ORTHOPAEDIC SURGERY

## 2020-01-01 PROCEDURE — 81003 URINALYSIS AUTO W/O SCOPE: CPT | Performed by: NURSE PRACTITIONER

## 2020-01-01 PROCEDURE — 80053 COMPREHEN METABOLIC PANEL: CPT | Performed by: EMERGENCY MEDICINE

## 2020-01-01 PROCEDURE — 84145 PROCALCITONIN (PCT): CPT | Performed by: HOSPITALIST

## 2020-01-01 PROCEDURE — 80053 COMPREHEN METABOLIC PANEL: CPT | Performed by: INTERNAL MEDICINE

## 2020-01-01 PROCEDURE — 87040 BLOOD CULTURE FOR BACTERIA: CPT | Performed by: EMERGENCY MEDICINE

## 2020-01-01 PROCEDURE — 81001 URINALYSIS AUTO W/SCOPE: CPT | Performed by: INTERNAL MEDICINE

## 2020-01-01 PROCEDURE — 93005 ELECTROCARDIOGRAM TRACING: CPT | Performed by: INTERNAL MEDICINE

## 2020-01-01 PROCEDURE — P9612 CATHETERIZE FOR URINE SPEC: HCPCS

## 2020-01-01 PROCEDURE — 82746 ASSAY OF FOLIC ACID SERUM: CPT | Performed by: INTERNAL MEDICINE

## 2020-01-01 PROCEDURE — 84466 ASSAY OF TRANSFERRIN: CPT | Performed by: INTERNAL MEDICINE

## 2020-01-01 PROCEDURE — 86900 BLOOD TYPING SEROLOGIC ABO: CPT | Performed by: INTERNAL MEDICINE

## 2020-01-01 PROCEDURE — 99214 OFFICE O/P EST MOD 30 MIN: CPT | Performed by: NURSE PRACTITIONER

## 2020-01-01 PROCEDURE — 93880 EXTRACRANIAL BILAT STUDY: CPT | Performed by: INTERNAL MEDICINE

## 2020-01-01 PROCEDURE — 88377 M/PHMTRC ALYS ISHQUANT/SEMIQ: CPT

## 2020-01-01 PROCEDURE — 86923 COMPATIBILITY TEST ELECTRIC: CPT

## 2020-01-01 PROCEDURE — 31500 INSERT EMERGENCY AIRWAY: CPT

## 2020-01-01 PROCEDURE — 87086 URINE CULTURE/COLONY COUNT: CPT | Performed by: INTERNAL MEDICINE

## 2020-01-01 PROCEDURE — 85025 COMPLETE CBC W/AUTO DIFF WBC: CPT | Performed by: EMERGENCY MEDICINE

## 2020-01-01 PROCEDURE — 85379 FIBRIN DEGRADATION QUANT: CPT | Performed by: INTERNAL MEDICINE

## 2020-01-01 PROCEDURE — 99223 1ST HOSP IP/OBS HIGH 75: CPT | Performed by: INTERNAL MEDICINE

## 2020-01-01 PROCEDURE — 25010000002 EPINEPHRINE 1 MG/10ML SOLUTION PREFILLED SYRINGE: Performed by: EMERGENCY MEDICINE

## 2020-01-01 PROCEDURE — 84132 ASSAY OF SERUM POTASSIUM: CPT | Performed by: INTERNAL MEDICINE

## 2020-01-01 PROCEDURE — 36430 TRANSFUSION BLD/BLD COMPNT: CPT

## 2020-01-01 PROCEDURE — 25010000002 FUROSEMIDE PER 20 MG: Performed by: INTERNAL MEDICINE

## 2020-01-01 PROCEDURE — 86901 BLOOD TYPING SEROLOGIC RH(D): CPT | Performed by: INTERNAL MEDICINE

## 2020-01-01 PROCEDURE — 99203 OFFICE O/P NEW LOW 30 MIN: CPT | Performed by: INTERNAL MEDICINE

## 2020-01-01 PROCEDURE — 84484 ASSAY OF TROPONIN QUANT: CPT | Performed by: INTERNAL MEDICINE

## 2020-01-01 PROCEDURE — 85046 RETICYTE/HGB CONCENTRATE: CPT | Performed by: INTERNAL MEDICINE

## 2020-01-01 PROCEDURE — 93005 ELECTROCARDIOGRAM TRACING: CPT | Performed by: EMERGENCY MEDICINE

## 2020-01-01 PROCEDURE — 86141 C-REACTIVE PROTEIN HS: CPT | Performed by: INTERNAL MEDICINE

## 2020-01-01 PROCEDURE — 93000 ELECTROCARDIOGRAM COMPLETE: CPT | Performed by: NURSE PRACTITIONER

## 2020-01-01 PROCEDURE — 99284 EMERGENCY DEPT VISIT MOD MDM: CPT

## 2020-01-01 PROCEDURE — 83540 ASSAY OF IRON: CPT | Performed by: INTERNAL MEDICINE

## 2020-01-01 PROCEDURE — 25010000002 MAGNESIUM SULFATE IN D5W 1G/100ML (PREMIX) 1-5 GM/100ML-% SOLUTION: Performed by: HOSPITALIST

## 2020-01-01 RX ORDER — WARFARIN SODIUM 5 MG/1
TABLET ORAL
Qty: 105 TABLET | Refills: 1 | Status: SHIPPED | OUTPATIENT
Start: 2020-01-01

## 2020-01-01 RX ORDER — ACETAMINOPHEN 325 MG/1
650 TABLET ORAL EVERY 4 HOURS PRN
Status: DISCONTINUED | OUTPATIENT
Start: 2020-01-01 | End: 2020-01-01 | Stop reason: HOSPADM

## 2020-01-01 RX ORDER — SODIUM CHLORIDE 9 MG/ML
100 INJECTION, SOLUTION INTRAVENOUS CONTINUOUS
Status: DISCONTINUED | OUTPATIENT
Start: 2020-01-01 | End: 2020-01-01

## 2020-01-01 RX ORDER — FOLIC ACID 1 MG/1
1 TABLET ORAL DAILY
Status: DISCONTINUED | OUTPATIENT
Start: 2020-01-01 | End: 2020-01-01

## 2020-01-01 RX ORDER — WARFARIN SODIUM 1 MG/1
1 TABLET ORAL
Status: DISCONTINUED | OUTPATIENT
Start: 2020-01-01 | End: 2020-01-01

## 2020-01-01 RX ORDER — WARFARIN SODIUM 5 MG/1
TABLET ORAL
Qty: 105 TABLET | Refills: 1 | Status: SHIPPED | OUTPATIENT
Start: 2020-01-01 | End: 2020-01-01

## 2020-01-01 RX ORDER — CEFTRIAXONE SODIUM 1 G/50ML
1 INJECTION, SOLUTION INTRAVENOUS ONCE
Status: COMPLETED | OUTPATIENT
Start: 2020-01-01 | End: 2020-01-01

## 2020-01-01 RX ORDER — METOPROLOL SUCCINATE 50 MG/1
50 TABLET, EXTENDED RELEASE ORAL DAILY
Status: DISCONTINUED | OUTPATIENT
Start: 2020-01-01 | End: 2020-01-01 | Stop reason: HOSPADM

## 2020-01-01 RX ORDER — NITROFURANTOIN 25; 75 MG/1; MG/1
CAPSULE ORAL
Qty: 20 CAPSULE | Refills: 0 | Status: SHIPPED | OUTPATIENT
Start: 2020-01-01 | End: 2020-01-01 | Stop reason: SDUPTHER

## 2020-01-01 RX ORDER — NITROFURANTOIN 25; 75 MG/1; MG/1
100 CAPSULE ORAL 2 TIMES DAILY
Qty: 20 CAPSULE | Refills: 0 | Status: CANCELLED | OUTPATIENT
Start: 2020-01-01

## 2020-01-01 RX ORDER — NICOTINE POLACRILEX 4 MG
15 LOZENGE BUCCAL
Status: DISCONTINUED | OUTPATIENT
Start: 2020-01-01 | End: 2020-01-01 | Stop reason: HOSPADM

## 2020-01-01 RX ORDER — MULTIPLE VITAMINS W/ MINERALS TAB 9MG-400MCG
1 TAB ORAL DAILY
Status: DISCONTINUED | OUTPATIENT
Start: 2020-01-01 | End: 2020-01-01 | Stop reason: HOSPADM

## 2020-01-01 RX ORDER — AMOXICILLIN 500 MG/1
500 CAPSULE ORAL 2 TIMES DAILY
Qty: 180 CAPSULE | Refills: 3 | Status: SHIPPED | OUTPATIENT
Start: 2020-01-01 | End: 2020-01-01

## 2020-01-01 RX ORDER — ATORVASTATIN CALCIUM 20 MG/1
20 TABLET, FILM COATED ORAL DAILY
Status: DISCONTINUED | OUTPATIENT
Start: 2020-01-01 | End: 2020-01-01 | Stop reason: HOSPADM

## 2020-01-01 RX ORDER — DIPHENHYDRAMINE HYDROCHLORIDE 50 MG/ML
25 INJECTION INTRAMUSCULAR; INTRAVENOUS ONCE
Status: COMPLETED | OUTPATIENT
Start: 2020-01-01 | End: 2020-01-01

## 2020-01-01 RX ORDER — PANTOPRAZOLE SODIUM 40 MG/10ML
80 INJECTION, POWDER, LYOPHILIZED, FOR SOLUTION INTRAVENOUS ONCE
Status: DISCONTINUED | OUTPATIENT
Start: 2020-01-01 | End: 2020-01-01

## 2020-01-01 RX ORDER — POTASSIUM CHLORIDE 7.45 MG/ML
10 INJECTION INTRAVENOUS
Status: DISCONTINUED | OUTPATIENT
Start: 2020-01-01 | End: 2020-01-01 | Stop reason: HOSPADM

## 2020-01-01 RX ORDER — WARFARIN SODIUM 1 MG/1
1 TABLET ORAL
Status: COMPLETED | OUTPATIENT
Start: 2020-01-01 | End: 2020-01-01

## 2020-01-01 RX ORDER — LANOLIN ALCOHOL/MO/W.PET/CERES
1600 CREAM (GRAM) TOPICAL DAILY
Status: DISCONTINUED | OUTPATIENT
Start: 2020-01-01 | End: 2020-01-01 | Stop reason: HOSPADM

## 2020-01-01 RX ORDER — ERTAPENEM 1 G/1
1 INJECTION, POWDER, LYOPHILIZED, FOR SOLUTION INTRAMUSCULAR; INTRAVENOUS EVERY 24 HOURS
Status: DISCONTINUED | OUTPATIENT
Start: 2020-01-01 | End: 2020-01-01

## 2020-01-01 RX ORDER — SODIUM CHLORIDE 0.9 % (FLUSH) 0.9 %
10 SYRINGE (ML) INJECTION EVERY 12 HOURS SCHEDULED
Status: DISCONTINUED | OUTPATIENT
Start: 2020-01-01 | End: 2020-01-01 | Stop reason: HOSPADM

## 2020-01-01 RX ORDER — WARFARIN SODIUM 2 MG/1
2 TABLET ORAL
Status: COMPLETED | OUTPATIENT
Start: 2020-01-01 | End: 2020-01-01

## 2020-01-01 RX ORDER — FERROUS SULFATE 325(65) MG
325 TABLET ORAL 2 TIMES DAILY
Status: DISCONTINUED | OUTPATIENT
Start: 2020-01-01 | End: 2020-01-01 | Stop reason: HOSPADM

## 2020-01-01 RX ORDER — AMOXICILLIN 500 MG/1
1000 CAPSULE ORAL 2 TIMES DAILY
Qty: 360 CAPSULE | Refills: 3 | Status: SHIPPED | OUTPATIENT
Start: 2020-01-01 | End: 2020-01-01 | Stop reason: SDUPTHER

## 2020-01-01 RX ORDER — POLYETHYLENE GLYCOL 3350 17 G/17G
17 POWDER, FOR SOLUTION ORAL 2 TIMES DAILY PRN
Status: DISCONTINUED | OUTPATIENT
Start: 2020-01-01 | End: 2020-01-01 | Stop reason: HOSPADM

## 2020-01-01 RX ORDER — SODIUM CHLORIDE 0.9 % (FLUSH) 0.9 %
10 SYRINGE (ML) INJECTION AS NEEDED
Status: DISCONTINUED | OUTPATIENT
Start: 2020-01-01 | End: 2020-01-01 | Stop reason: HOSPADM

## 2020-01-01 RX ORDER — UREA 10 %
1 LOTION (ML) TOPICAL ONCE
Status: COMPLETED | OUTPATIENT
Start: 2020-01-01 | End: 2020-01-01

## 2020-01-01 RX ORDER — FUROSEMIDE 40 MG/1
40 TABLET ORAL DAILY
Qty: 90 TABLET | Refills: 2 | Status: SHIPPED | OUTPATIENT
Start: 2020-01-01

## 2020-01-01 RX ORDER — ACETAMINOPHEN 325 MG/1
650 TABLET ORAL ONCE
Status: COMPLETED | OUTPATIENT
Start: 2020-01-01 | End: 2020-01-01

## 2020-01-01 RX ORDER — SIMVASTATIN 40 MG
40 TABLET ORAL EVERY MORNING
Qty: 90 TABLET | Refills: 2 | Status: SHIPPED | OUTPATIENT
Start: 2020-01-01

## 2020-01-01 RX ORDER — ONDANSETRON 2 MG/ML
4 INJECTION INTRAMUSCULAR; INTRAVENOUS EVERY 6 HOURS PRN
Status: DISCONTINUED | OUTPATIENT
Start: 2020-01-01 | End: 2020-01-01 | Stop reason: HOSPADM

## 2020-01-01 RX ORDER — AMOXICILLIN AND CLAVULANATE POTASSIUM 500; 125 MG/1; MG/1
1 TABLET, FILM COATED ORAL EVERY 12 HOURS SCHEDULED
Status: DISCONTINUED | OUTPATIENT
Start: 2020-01-01 | End: 2020-01-01 | Stop reason: HOSPADM

## 2020-01-01 RX ORDER — WARFARIN SODIUM 5 MG/1
5 TABLET ORAL
Status: DISCONTINUED | OUTPATIENT
Start: 2020-01-01 | End: 2020-01-01

## 2020-01-01 RX ORDER — FUROSEMIDE 10 MG/ML
40 INJECTION INTRAMUSCULAR; INTRAVENOUS ONCE
Status: COMPLETED | OUTPATIENT
Start: 2020-01-01 | End: 2020-01-01

## 2020-01-01 RX ORDER — WARFARIN SODIUM 1 MG/1
0.5 TABLET ORAL
Status: COMPLETED | OUTPATIENT
Start: 2020-01-01 | End: 2020-01-01

## 2020-01-01 RX ORDER — FOLIC ACID/VIT BCOMP,C/CU/ZINC 5-1.5-25MG
1 TABLET ORAL DAILY
Status: DISCONTINUED | OUTPATIENT
Start: 2020-01-01 | End: 2020-01-01

## 2020-01-01 RX ORDER — LEUCOVORIN CALCIUM 5 MG/1
5 TABLET ORAL WEEKLY
Status: DISCONTINUED | OUTPATIENT
Start: 2020-01-01 | End: 2020-01-01

## 2020-01-01 RX ORDER — WARFARIN SODIUM 1 MG/1
0.5 TABLET ORAL
Status: DISCONTINUED | OUTPATIENT
Start: 2020-01-01 | End: 2020-01-01

## 2020-01-01 RX ORDER — ACETAMINOPHEN 160 MG/5ML
650 SOLUTION ORAL ONCE
Status: COMPLETED | OUTPATIENT
Start: 2020-01-01 | End: 2020-01-01

## 2020-01-01 RX ORDER — DEXTROSE MONOHYDRATE 25 G/50ML
25 INJECTION, SOLUTION INTRAVENOUS
Status: DISCONTINUED | OUTPATIENT
Start: 2020-01-01 | End: 2020-01-01 | Stop reason: HOSPADM

## 2020-01-01 RX ORDER — DEXTROSE AND SODIUM CHLORIDE 5; .9 G/100ML; G/100ML
100 INJECTION, SOLUTION INTRAVENOUS CONTINUOUS
Status: DISCONTINUED | OUTPATIENT
Start: 2020-01-01 | End: 2020-01-01

## 2020-01-01 RX ORDER — AMOXICILLIN AND CLAVULANATE POTASSIUM 875; 125 MG/1; MG/1
TABLET, FILM COATED ORAL
Qty: 180 TABLET | Refills: 0 | Status: SHIPPED | OUTPATIENT
Start: 2020-01-01 | End: 2020-01-01

## 2020-01-01 RX ORDER — POTASSIUM CHLORIDE 750 MG/1
40 TABLET, FILM COATED, EXTENDED RELEASE ORAL DAILY
Status: DISCONTINUED | OUTPATIENT
Start: 2020-01-01 | End: 2020-01-01 | Stop reason: HOSPADM

## 2020-01-01 RX ORDER — MAGNESIUM SULFATE 1 G/100ML
1 INJECTION INTRAVENOUS ONCE
Status: COMPLETED | OUTPATIENT
Start: 2020-01-01 | End: 2020-01-01

## 2020-01-01 RX ORDER — CEFUROXIME AXETIL 250 MG/1
250 TABLET ORAL 2 TIMES DAILY
Qty: 14 TABLET | Refills: 0 | Status: SHIPPED | OUTPATIENT
Start: 2020-01-01 | End: 2020-01-01

## 2020-01-01 RX ORDER — FUROSEMIDE 10 MG/ML
40 INJECTION INTRAMUSCULAR; INTRAVENOUS ONCE
Status: DISCONTINUED | OUTPATIENT
Start: 2020-01-01 | End: 2020-01-01

## 2020-01-01 RX ORDER — POTASSIUM CHLORIDE 20 MEQ/1
TABLET, EXTENDED RELEASE ORAL
Qty: 180 TABLET | Refills: 2 | Status: SHIPPED | OUTPATIENT
Start: 2020-01-01

## 2020-01-01 RX ORDER — EPINEPHRINE 0.1 MG/ML
SYRINGE (ML) INJECTION
Status: COMPLETED | OUTPATIENT
Start: 2020-01-01 | End: 2020-01-01

## 2020-01-01 RX ORDER — DEXAMETHASONE SODIUM PHOSPHATE 4 MG/ML
6 INJECTION, SOLUTION INTRA-ARTICULAR; INTRALESIONAL; INTRAMUSCULAR; INTRAVENOUS; SOFT TISSUE ONCE
Status: COMPLETED | OUTPATIENT
Start: 2020-01-01 | End: 2020-01-01

## 2020-01-01 RX ORDER — CETIRIZINE HYDROCHLORIDE 10 MG/1
10 TABLET ORAL DAILY
Status: DISCONTINUED | OUTPATIENT
Start: 2020-01-01 | End: 2020-01-01 | Stop reason: HOSPADM

## 2020-01-01 RX ORDER — CODEINE PHOSPHATE AND GUAIFENESIN 10; 100 MG/5ML; MG/5ML
5 SOLUTION ORAL EVERY 6 HOURS PRN
Status: DISCONTINUED | OUTPATIENT
Start: 2020-01-01 | End: 2020-01-01 | Stop reason: HOSPADM

## 2020-01-01 RX ORDER — MECLIZINE HYDROCHLORIDE 25 MG/1
TABLET ORAL
Qty: 15 TABLET | Refills: 1 | Status: SHIPPED | OUTPATIENT
Start: 2020-01-01

## 2020-01-01 RX ORDER — AMOXICILLIN AND CLAVULANATE POTASSIUM 875; 125 MG/1; MG/1
1 TABLET, FILM COATED ORAL 2 TIMES DAILY
COMMUNITY

## 2020-01-01 RX ORDER — POTASSIUM CHLORIDE 750 MG/1
40 TABLET, FILM COATED, EXTENDED RELEASE ORAL AS NEEDED
Status: DISCONTINUED | OUTPATIENT
Start: 2020-01-01 | End: 2020-01-01 | Stop reason: HOSPADM

## 2020-01-01 RX ORDER — PREDNISONE 1 MG/1
5 TABLET ORAL DAILY PRN
Qty: 90 TABLET | Refills: 1 | Status: SHIPPED | OUTPATIENT
Start: 2020-01-01

## 2020-01-01 RX ORDER — AMOXICILLIN 250 MG
2 CAPSULE ORAL 2 TIMES DAILY PRN
Status: DISCONTINUED | OUTPATIENT
Start: 2020-01-01 | End: 2020-01-01 | Stop reason: HOSPADM

## 2020-01-01 RX ORDER — FUROSEMIDE 40 MG/1
40 TABLET ORAL DAILY
Status: DISCONTINUED | OUTPATIENT
Start: 2020-01-01 | End: 2020-01-01 | Stop reason: HOSPADM

## 2020-01-01 RX ORDER — NITROFURANTOIN 25; 75 MG/1; MG/1
CAPSULE ORAL
Qty: 20 CAPSULE | Refills: 0 | Status: SHIPPED | OUTPATIENT
Start: 2020-01-01 | End: 2020-01-01

## 2020-01-01 RX ORDER — POTASSIUM CHLORIDE 1.5 G/1.77G
40 POWDER, FOR SOLUTION ORAL AS NEEDED
Status: DISCONTINUED | OUTPATIENT
Start: 2020-01-01 | End: 2020-01-01 | Stop reason: HOSPADM

## 2020-01-01 RX ORDER — WARFARIN SODIUM 1 MG/1
1 TABLET ORAL
Status: DISCONTINUED | OUTPATIENT
Start: 2020-01-01 | End: 2020-01-01 | Stop reason: DRUGHIGH

## 2020-01-01 RX ORDER — PREDNISONE 1 MG/1
5 TABLET ORAL DAILY PRN
COMMUNITY
Start: 2020-01-01 | End: 2020-01-01 | Stop reason: SDUPTHER

## 2020-01-01 RX ORDER — FUROSEMIDE 10 MG/ML
20 INJECTION INTRAMUSCULAR; INTRAVENOUS ONCE
Status: COMPLETED | OUTPATIENT
Start: 2020-01-01 | End: 2020-01-01

## 2020-01-01 RX ORDER — IBUPROFEN 800 MG/1
800 TABLET ORAL ONCE
Status: COMPLETED | OUTPATIENT
Start: 2020-01-01 | End: 2020-01-01

## 2020-01-01 RX ORDER — FAMOTIDINE 20 MG/1
20 TABLET, FILM COATED ORAL 2 TIMES DAILY
Status: DISCONTINUED | OUTPATIENT
Start: 2020-01-01 | End: 2020-01-01 | Stop reason: HOSPADM

## 2020-01-01 RX ORDER — PHENAZOPYRIDINE HYDROCHLORIDE 200 MG/1
200 TABLET, FILM COATED ORAL
Status: COMPLETED | OUTPATIENT
Start: 2020-01-01 | End: 2020-01-01

## 2020-01-01 RX ORDER — METOPROLOL SUCCINATE 50 MG/1
50 TABLET, EXTENDED RELEASE ORAL DAILY
Qty: 90 TABLET | Refills: 2 | Status: SHIPPED | OUTPATIENT
Start: 2020-01-01

## 2020-01-01 RX ORDER — DIPHENHYDRAMINE HCL 25 MG
25 CAPSULE ORAL ONCE
Status: COMPLETED | OUTPATIENT
Start: 2020-01-01 | End: 2020-01-01

## 2020-01-01 RX ORDER — MELATONIN
2000 DAILY
Status: DISCONTINUED | OUTPATIENT
Start: 2020-01-01 | End: 2020-01-01 | Stop reason: HOSPADM

## 2020-01-01 RX ORDER — NITROGLYCERIN 0.4 MG/1
0.4 TABLET SUBLINGUAL
Status: DISCONTINUED | OUTPATIENT
Start: 2020-01-01 | End: 2020-01-01 | Stop reason: HOSPADM

## 2020-01-01 RX ADMIN — Medication 2000 UNITS: at 08:46

## 2020-01-01 RX ADMIN — Medication 2000 UNITS: at 08:28

## 2020-01-01 RX ADMIN — ACETAMINOPHEN 650 MG: 325 TABLET, FILM COATED ORAL at 09:06

## 2020-01-01 RX ADMIN — FERROUS SULFATE TAB 325 MG (65 MG ELEMENTAL FE) 325 MG: 325 (65 FE) TAB at 20:00

## 2020-01-01 RX ADMIN — FUROSEMIDE 40 MG: 40 TABLET ORAL at 09:06

## 2020-01-01 RX ADMIN — Medication 1600 MCG: at 08:46

## 2020-01-01 RX ADMIN — ACETAMINOPHEN 650 MG: 325 TABLET, FILM COATED ORAL at 09:43

## 2020-01-01 RX ADMIN — WARFARIN 2 MG: 2 TABLET ORAL at 17:17

## 2020-01-01 RX ADMIN — METOPROLOL SUCCINATE 50 MG: 50 TABLET, EXTENDED RELEASE ORAL at 09:23

## 2020-01-01 RX ADMIN — SODIUM CHLORIDE, PRESERVATIVE FREE 10 ML: 5 INJECTION INTRAVENOUS at 20:25

## 2020-01-01 RX ADMIN — SODIUM CHLORIDE, PRESERVATIVE FREE 10 ML: 5 INJECTION INTRAVENOUS at 08:58

## 2020-01-01 RX ADMIN — PHENAZOPYRIDINE 200 MG: 200 TABLET ORAL at 12:32

## 2020-01-01 RX ADMIN — CALCIUM CARBONATE-VITAMIN D TAB 500 MG-200 UNIT 1 TABLET: 500-200 TAB at 09:24

## 2020-01-01 RX ADMIN — CALCIUM CARBONATE-VITAMIN D TAB 500 MG-200 UNIT 1 TABLET: 500-200 TAB at 09:43

## 2020-01-01 RX ADMIN — FAMOTIDINE 20 MG: 20 TABLET, FILM COATED ORAL at 21:07

## 2020-01-01 RX ADMIN — FUROSEMIDE 40 MG: 40 TABLET ORAL at 08:16

## 2020-01-01 RX ADMIN — FAMOTIDINE 20 MG: 20 TABLET, FILM COATED ORAL at 08:46

## 2020-01-01 RX ADMIN — POTASSIUM CHLORIDE 40 MEQ: 750 TABLET, EXTENDED RELEASE ORAL at 08:56

## 2020-01-01 RX ADMIN — FUROSEMIDE 40 MG: 40 TABLET ORAL at 08:30

## 2020-01-01 RX ADMIN — FUROSEMIDE 40 MG: 10 INJECTION, SOLUTION INTRAMUSCULAR; INTRAVENOUS at 17:37

## 2020-01-01 RX ADMIN — ACETAMINOPHEN 650 MG: 325 TABLET, FILM COATED ORAL at 22:11

## 2020-01-01 RX ADMIN — MULTIPLE VITAMINS W/ MINERALS TAB 1 TABLET: TAB at 08:34

## 2020-01-01 RX ADMIN — ACETAMINOPHEN 650 MG: 325 TABLET, FILM COATED ORAL at 20:24

## 2020-01-01 RX ADMIN — ATORVASTATIN CALCIUM 20 MG: 20 TABLET, FILM COATED ORAL at 09:15

## 2020-01-01 RX ADMIN — FERROUS SULFATE TAB 325 MG (65 MG ELEMENTAL FE) 325 MG: 325 (65 FE) TAB at 20:06

## 2020-01-01 RX ADMIN — METOPROLOL SUCCINATE 50 MG: 50 TABLET, EXTENDED RELEASE ORAL at 08:25

## 2020-01-01 RX ADMIN — EPINEPHRINE 1 MG: 0.1 INJECTION, SOLUTION ENDOTRACHEAL; INTRACARDIAC; INTRAVENOUS at 09:31

## 2020-01-01 RX ADMIN — FAMOTIDINE 20 MG: 20 TABLET, FILM COATED ORAL at 20:00

## 2020-01-01 RX ADMIN — Medication 1600 MCG: at 09:05

## 2020-01-01 RX ADMIN — FERROUS SULFATE TAB 325 MG (65 MG ELEMENTAL FE) 325 MG: 325 (65 FE) TAB at 08:46

## 2020-01-01 RX ADMIN — INSULIN LISPRO 3 UNITS: 100 INJECTION, SOLUTION INTRAVENOUS; SUBCUTANEOUS at 11:45

## 2020-01-01 RX ADMIN — FERROUS SULFATE TAB 325 MG (65 MG ELEMENTAL FE) 325 MG: 325 (65 FE) TAB at 20:09

## 2020-01-01 RX ADMIN — FERROUS SULFATE TAB 325 MG (65 MG ELEMENTAL FE) 325 MG: 325 (65 FE) TAB at 20:25

## 2020-01-01 RX ADMIN — FERROUS SULFATE TAB 325 MG (65 MG ELEMENTAL FE) 325 MG: 325 (65 FE) TAB at 09:23

## 2020-01-01 RX ADMIN — WARFARIN 2 MG: 2 TABLET ORAL at 17:37

## 2020-01-01 RX ADMIN — DEXAMETHASONE 3 MG: 2 TABLET ORAL at 09:06

## 2020-01-01 RX ADMIN — SODIUM CHLORIDE, PRESERVATIVE FREE 10 ML: 5 INJECTION INTRAVENOUS at 20:09

## 2020-01-01 RX ADMIN — AMOXICILLIN AND CLAVULANATE POTASSIUM 500 MG: 500; 125 TABLET, FILM COATED ORAL at 08:13

## 2020-01-01 RX ADMIN — ATORVASTATIN CALCIUM 20 MG: 20 TABLET, FILM COATED ORAL at 08:57

## 2020-01-01 RX ADMIN — DEXAMETHASONE 6 MG: 4 TABLET ORAL at 08:54

## 2020-01-01 RX ADMIN — PHENAZOPYRIDINE 200 MG: 200 TABLET ORAL at 08:20

## 2020-01-01 RX ADMIN — SODIUM CHLORIDE, PRESERVATIVE FREE 10 ML: 5 INJECTION INTRAVENOUS at 08:55

## 2020-01-01 RX ADMIN — CETIRIZINE HYDROCHLORIDE 10 MG: 10 TABLET ORAL at 09:23

## 2020-01-01 RX ADMIN — FERROUS SULFATE TAB 325 MG (65 MG ELEMENTAL FE) 325 MG: 325 (65 FE) TAB at 08:47

## 2020-01-01 RX ADMIN — CALCIUM CARBONATE-VITAMIN D TAB 500 MG-200 UNIT 1 TABLET: 500-200 TAB at 08:29

## 2020-01-01 RX ADMIN — DEXAMETHASONE 6 MG: 4 TABLET ORAL at 08:25

## 2020-01-01 RX ADMIN — SODIUM CHLORIDE, PRESERVATIVE FREE 10 ML: 5 INJECTION INTRAVENOUS at 08:30

## 2020-01-01 RX ADMIN — AMOXICILLIN AND CLAVULANATE POTASSIUM 500 MG: 500; 125 TABLET, FILM COATED ORAL at 21:42

## 2020-01-01 RX ADMIN — ERTAPENEM SODIUM 1 G: 1 INJECTION, POWDER, LYOPHILIZED, FOR SOLUTION INTRAMUSCULAR; INTRAVENOUS at 23:25

## 2020-01-01 RX ADMIN — ATORVASTATIN CALCIUM 20 MG: 20 TABLET, FILM COATED ORAL at 08:52

## 2020-01-01 RX ADMIN — INSULIN LISPRO 4 UNITS: 100 INJECTION, SOLUTION INTRAVENOUS; SUBCUTANEOUS at 17:05

## 2020-01-01 RX ADMIN — METOPROLOL SUCCINATE 50 MG: 50 TABLET, EXTENDED RELEASE ORAL at 09:07

## 2020-01-01 RX ADMIN — EPINEPHRINE 1 MG: 0.1 INJECTION, SOLUTION ENDOTRACHEAL; INTRACARDIAC; INTRAVENOUS at 09:28

## 2020-01-01 RX ADMIN — SODIUM CHLORIDE, PRESERVATIVE FREE 10 ML: 5 INJECTION INTRAVENOUS at 08:11

## 2020-01-01 RX ADMIN — INSULIN LISPRO 3 UNITS: 100 INJECTION, SOLUTION INTRAVENOUS; SUBCUTANEOUS at 12:35

## 2020-01-01 RX ADMIN — FERROUS SULFATE TAB 325 MG (65 MG ELEMENTAL FE) 325 MG: 325 (65 FE) TAB at 09:05

## 2020-01-01 RX ADMIN — SODIUM CHLORIDE, PRESERVATIVE FREE 10 ML: 5 INJECTION INTRAVENOUS at 21:00

## 2020-01-01 RX ADMIN — POTASSIUM CHLORIDE 40 MEQ: 1.5 POWDER, FOR SOLUTION ORAL at 16:31

## 2020-01-01 RX ADMIN — FERROUS SULFATE TAB 325 MG (65 MG ELEMENTAL FE) 325 MG: 325 (65 FE) TAB at 08:57

## 2020-01-01 RX ADMIN — FERROUS SULFATE TAB 325 MG (65 MG ELEMENTAL FE) 325 MG: 325 (65 FE) TAB at 09:43

## 2020-01-01 RX ADMIN — METOPROLOL SUCCINATE 50 MG: 50 TABLET, EXTENDED RELEASE ORAL at 09:43

## 2020-01-01 RX ADMIN — FUROSEMIDE 40 MG: 40 TABLET ORAL at 09:23

## 2020-01-01 RX ADMIN — FAMOTIDINE 20 MG: 20 TABLET, FILM COATED ORAL at 08:45

## 2020-01-01 RX ADMIN — FAMOTIDINE 20 MG: 20 TABLET, FILM COATED ORAL at 20:10

## 2020-01-01 RX ADMIN — DEXAMETHASONE SODIUM PHOSPHATE 6 MG: 4 INJECTION, SOLUTION INTRAMUSCULAR; INTRAVENOUS at 03:24

## 2020-01-01 RX ADMIN — ERTAPENEM SODIUM 1 G: 1 INJECTION, POWDER, LYOPHILIZED, FOR SOLUTION INTRAMUSCULAR; INTRAVENOUS at 00:55

## 2020-01-01 RX ADMIN — SODIUM CHLORIDE, PRESERVATIVE FREE 10 ML: 5 INJECTION INTRAVENOUS at 20:00

## 2020-01-01 RX ADMIN — INSULIN LISPRO 5 UNITS: 100 INJECTION, SOLUTION INTRAVENOUS; SUBCUTANEOUS at 17:42

## 2020-01-01 RX ADMIN — MULTIPLE VITAMINS W/ MINERALS TAB 1 TABLET: TAB at 08:28

## 2020-01-01 RX ADMIN — FUROSEMIDE 40 MG: 40 TABLET ORAL at 08:25

## 2020-01-01 RX ADMIN — SODIUM CHLORIDE, PRESERVATIVE FREE 10 ML: 5 INJECTION INTRAVENOUS at 20:06

## 2020-01-01 RX ADMIN — PHENAZOPYRIDINE 200 MG: 200 TABLET ORAL at 11:58

## 2020-01-01 RX ADMIN — DEXAMETHASONE 6 MG: 4 TABLET ORAL at 20:00

## 2020-01-01 RX ADMIN — ACETAMINOPHEN 650 MG: 325 TABLET, FILM COATED ORAL at 13:37

## 2020-01-01 RX ADMIN — MULTIPLE VITAMINS W/ MINERALS TAB 1 TABLET: TAB at 09:08

## 2020-01-01 RX ADMIN — WARFARIN 2 MG: 2 TABLET ORAL at 17:42

## 2020-01-01 RX ADMIN — FERROUS SULFATE TAB 325 MG (65 MG ELEMENTAL FE) 325 MG: 325 (65 FE) TAB at 08:25

## 2020-01-01 RX ADMIN — DEXAMETHASONE 6 MG: 4 TABLET ORAL at 09:16

## 2020-01-01 RX ADMIN — ONDANSETRON HYDROCHLORIDE 4 MG: 2 INJECTION, SOLUTION INTRAMUSCULAR; INTRAVENOUS at 10:41

## 2020-01-01 RX ADMIN — ATORVASTATIN CALCIUM 20 MG: 20 TABLET, FILM COATED ORAL at 08:45

## 2020-01-01 RX ADMIN — CETIRIZINE HYDROCHLORIDE 10 MG: 10 TABLET ORAL at 08:26

## 2020-01-01 RX ADMIN — METOPROLOL SUCCINATE 50 MG: 50 TABLET, EXTENDED RELEASE ORAL at 08:53

## 2020-01-01 RX ADMIN — Medication 2000 UNITS: at 08:29

## 2020-01-01 RX ADMIN — POTASSIUM CHLORIDE 40 MEQ: 750 TABLET, EXTENDED RELEASE ORAL at 08:25

## 2020-01-01 RX ADMIN — MULTIPLE VITAMINS W/ MINERALS TAB 1 TABLET: TAB at 09:43

## 2020-01-01 RX ADMIN — AMOXICILLIN AND CLAVULANATE POTASSIUM 500 MG: 500; 125 TABLET, FILM COATED ORAL at 08:33

## 2020-01-01 RX ADMIN — ATORVASTATIN CALCIUM 20 MG: 20 TABLET, FILM COATED ORAL at 09:24

## 2020-01-01 RX ADMIN — SODIUM CHLORIDE, PRESERVATIVE FREE 10 ML: 5 INJECTION INTRAVENOUS at 08:34

## 2020-01-01 RX ADMIN — SODIUM CHLORIDE 100 ML/HR: 9 INJECTION, SOLUTION INTRAVENOUS at 14:46

## 2020-01-01 RX ADMIN — ATORVASTATIN CALCIUM 20 MG: 20 TABLET, FILM COATED ORAL at 08:28

## 2020-01-01 RX ADMIN — SODIUM CHLORIDE, PRESERVATIVE FREE 10 ML: 5 INJECTION INTRAVENOUS at 08:21

## 2020-01-01 RX ADMIN — FAMOTIDINE 20 MG: 20 TABLET, FILM COATED ORAL at 20:09

## 2020-01-01 RX ADMIN — CETIRIZINE HYDROCHLORIDE 10 MG: 10 TABLET ORAL at 08:20

## 2020-01-01 RX ADMIN — ERTAPENEM SODIUM 1 G: 1 INJECTION, POWDER, LYOPHILIZED, FOR SOLUTION INTRAMUSCULAR; INTRAVENOUS at 23:48

## 2020-01-01 RX ADMIN — FUROSEMIDE 40 MG: 40 TABLET ORAL at 08:47

## 2020-01-01 RX ADMIN — ACETAMINOPHEN 650 MG: 325 TABLET, FILM COATED ORAL at 09:37

## 2020-01-01 RX ADMIN — Medication 1600 MCG: at 08:34

## 2020-01-01 RX ADMIN — FAMOTIDINE 20 MG: 20 TABLET, FILM COATED ORAL at 20:48

## 2020-01-01 RX ADMIN — SODIUM CHLORIDE, PRESERVATIVE FREE 10 ML: 5 INJECTION INTRAVENOUS at 08:38

## 2020-01-01 RX ADMIN — FUROSEMIDE 40 MG: 40 TABLET ORAL at 09:09

## 2020-01-01 RX ADMIN — SODIUM CHLORIDE, PRESERVATIVE FREE 10 ML: 5 INJECTION INTRAVENOUS at 09:06

## 2020-01-01 RX ADMIN — METOPROLOL SUCCINATE 50 MG: 50 TABLET, EXTENDED RELEASE ORAL at 08:24

## 2020-01-01 RX ADMIN — FUROSEMIDE 40 MG: 40 TABLET ORAL at 08:28

## 2020-01-01 RX ADMIN — FERROUS SULFATE TAB 325 MG (65 MG ELEMENTAL FE) 325 MG: 325 (65 FE) TAB at 20:01

## 2020-01-01 RX ADMIN — Medication 2000 UNITS: at 08:20

## 2020-01-01 RX ADMIN — DEXAMETHASONE 6 MG: 4 TABLET ORAL at 08:27

## 2020-01-01 RX ADMIN — AMOXICILLIN AND CLAVULANATE POTASSIUM 500 MG: 500; 125 TABLET, FILM COATED ORAL at 08:57

## 2020-01-01 RX ADMIN — INSULIN LISPRO 2 UNITS: 100 INJECTION, SOLUTION INTRAVENOUS; SUBCUTANEOUS at 09:04

## 2020-01-01 RX ADMIN — WARFARIN 1 MG: 1 TABLET ORAL at 16:45

## 2020-01-01 RX ADMIN — CETIRIZINE HYDROCHLORIDE 10 MG: 10 TABLET ORAL at 09:20

## 2020-01-01 RX ADMIN — ACETAMINOPHEN 650 MG: 325 TABLET, FILM COATED ORAL at 22:06

## 2020-01-01 RX ADMIN — POTASSIUM CHLORIDE 40 MEQ: 750 TABLET, EXTENDED RELEASE ORAL at 08:16

## 2020-01-01 RX ADMIN — SODIUM CHLORIDE, PRESERVATIVE FREE 10 ML: 5 INJECTION INTRAVENOUS at 09:34

## 2020-01-01 RX ADMIN — AMOXICILLIN AND CLAVULANATE POTASSIUM 500 MG: 500; 125 TABLET, FILM COATED ORAL at 08:15

## 2020-01-01 RX ADMIN — AMOXICILLIN AND CLAVULANATE POTASSIUM 500 MG: 500; 125 TABLET, FILM COATED ORAL at 20:09

## 2020-01-01 RX ADMIN — CALCIUM CARBONATE-VITAMIN D TAB 500 MG-200 UNIT 1 TABLET: 500-200 TAB at 08:20

## 2020-01-01 RX ADMIN — WARFARIN 1 MG: 1 TABLET ORAL at 18:00

## 2020-01-01 RX ADMIN — CALCIUM CARBONATE-VITAMIN D TAB 500 MG-200 UNIT 1 TABLET: 500-200 TAB at 08:46

## 2020-01-01 RX ADMIN — WARFARIN 5 MG: 5 TABLET ORAL at 18:51

## 2020-01-01 RX ADMIN — Medication 1600 MCG: at 08:28

## 2020-01-01 RX ADMIN — ACETAMINOPHEN 650 MG: 325 TABLET, FILM COATED ORAL at 17:27

## 2020-01-01 RX ADMIN — Medication 2000 UNITS: at 09:08

## 2020-01-01 RX ADMIN — METOPROLOL SUCCINATE 50 MG: 50 TABLET, EXTENDED RELEASE ORAL at 08:34

## 2020-01-01 RX ADMIN — AMOXICILLIN AND CLAVULANATE POTASSIUM 500 MG: 500; 125 TABLET, FILM COATED ORAL at 09:08

## 2020-01-01 RX ADMIN — DEXAMETHASONE 6 MG: 4 TABLET ORAL at 08:29

## 2020-01-01 RX ADMIN — FERROUS SULFATE TAB 325 MG (65 MG ELEMENTAL FE) 325 MG: 325 (65 FE) TAB at 21:42

## 2020-01-01 RX ADMIN — REMDESIVIR 100 MG: 100 INJECTION, POWDER, LYOPHILIZED, FOR SOLUTION INTRAVENOUS at 11:58

## 2020-01-01 RX ADMIN — INSULIN LISPRO 3 UNITS: 100 INJECTION, SOLUTION INTRAVENOUS; SUBCUTANEOUS at 17:37

## 2020-01-01 RX ADMIN — AMOXICILLIN AND CLAVULANATE POTASSIUM 500 MG: 500; 125 TABLET, FILM COATED ORAL at 08:45

## 2020-01-01 RX ADMIN — ACETAMINOPHEN 650 MG: 325 TABLET, FILM COATED ORAL at 08:47

## 2020-01-01 RX ADMIN — ERTAPENEM SODIUM 1 G: 1 INJECTION, POWDER, LYOPHILIZED, FOR SOLUTION INTRAMUSCULAR; INTRAVENOUS at 22:01

## 2020-01-01 RX ADMIN — ATORVASTATIN CALCIUM 20 MG: 20 TABLET, FILM COATED ORAL at 08:24

## 2020-01-01 RX ADMIN — REMDESIVIR 100 MG: 100 INJECTION, POWDER, LYOPHILIZED, FOR SOLUTION INTRAVENOUS at 14:15

## 2020-01-01 RX ADMIN — FERROUS SULFATE TAB 325 MG (65 MG ELEMENTAL FE) 325 MG: 325 (65 FE) TAB at 09:16

## 2020-01-01 RX ADMIN — DEXAMETHASONE 6 MG: 4 TABLET ORAL at 20:17

## 2020-01-01 RX ADMIN — METOPROLOL SUCCINATE 50 MG: 50 TABLET, EXTENDED RELEASE ORAL at 08:52

## 2020-01-01 RX ADMIN — INSULIN LISPRO 2 UNITS: 100 INJECTION, SOLUTION INTRAVENOUS; SUBCUTANEOUS at 09:15

## 2020-01-01 RX ADMIN — ATORVASTATIN CALCIUM 20 MG: 20 TABLET, FILM COATED ORAL at 09:08

## 2020-01-01 RX ADMIN — SODIUM CHLORIDE, PRESERVATIVE FREE 10 ML: 5 INJECTION INTRAVENOUS at 09:19

## 2020-01-01 RX ADMIN — POTASSIUM CHLORIDE 40 MEQ: 750 TABLET, EXTENDED RELEASE ORAL at 17:27

## 2020-01-01 RX ADMIN — EPINEPHRINE 1 MG: 0.1 INJECTION, SOLUTION ENDOTRACHEAL; INTRACARDIAC; INTRAVENOUS at 09:37

## 2020-01-01 RX ADMIN — Medication 2000 UNITS: at 09:43

## 2020-01-01 RX ADMIN — ACETAMINOPHEN 650 MG: 325 TABLET, FILM COATED ORAL at 14:29

## 2020-01-01 RX ADMIN — REMDESIVIR 100 MG: 100 INJECTION, POWDER, LYOPHILIZED, FOR SOLUTION INTRAVENOUS at 13:41

## 2020-01-01 RX ADMIN — FERROUS SULFATE TAB 325 MG (65 MG ELEMENTAL FE) 325 MG: 325 (65 FE) TAB at 20:05

## 2020-01-01 RX ADMIN — Medication 1600 MCG: at 09:24

## 2020-01-01 RX ADMIN — ATORVASTATIN CALCIUM 20 MG: 20 TABLET, FILM COATED ORAL at 08:20

## 2020-01-01 RX ADMIN — AMOXICILLIN AND CLAVULANATE POTASSIUM 500 MG: 500; 125 TABLET, FILM COATED ORAL at 20:38

## 2020-01-01 RX ADMIN — IBUPROFEN 800 MG: 800 TABLET, FILM COATED ORAL at 20:10

## 2020-01-01 RX ADMIN — POTASSIUM CHLORIDE 40 MEQ: 750 TABLET, EXTENDED RELEASE ORAL at 08:29

## 2020-01-01 RX ADMIN — ACETAMINOPHEN 650 MG: 325 TABLET, FILM COATED ORAL at 18:35

## 2020-01-01 RX ADMIN — SODIUM CHLORIDE, PRESERVATIVE FREE 10 ML: 5 INJECTION INTRAVENOUS at 21:02

## 2020-01-01 RX ADMIN — ERTAPENEM SODIUM 1 G: 1 INJECTION, POWDER, LYOPHILIZED, FOR SOLUTION INTRAMUSCULAR; INTRAVENOUS at 22:36

## 2020-01-01 RX ADMIN — REMDESIVIR 100 MG: 100 INJECTION, POWDER, LYOPHILIZED, FOR SOLUTION INTRAVENOUS at 15:19

## 2020-01-01 RX ADMIN — ATORVASTATIN CALCIUM 20 MG: 20 TABLET, FILM COATED ORAL at 08:33

## 2020-01-01 RX ADMIN — POTASSIUM CHLORIDE 40 MEQ: 750 TABLET, EXTENDED RELEASE ORAL at 05:08

## 2020-01-01 RX ADMIN — Medication 2000 UNITS: at 08:16

## 2020-01-01 RX ADMIN — SODIUM CHLORIDE, PRESERVATIVE FREE 10 ML: 5 INJECTION INTRAVENOUS at 20:29

## 2020-01-01 RX ADMIN — FAMOTIDINE 20 MG: 20 TABLET, FILM COATED ORAL at 08:33

## 2020-01-01 RX ADMIN — FAMOTIDINE 20 MG: 20 TABLET, FILM COATED ORAL at 20:06

## 2020-01-01 RX ADMIN — FERROUS SULFATE TAB 325 MG (65 MG ELEMENTAL FE) 325 MG: 325 (65 FE) TAB at 21:48

## 2020-01-01 RX ADMIN — POTASSIUM CHLORIDE 40 MEQ: 750 TABLET, EXTENDED RELEASE ORAL at 13:40

## 2020-01-01 RX ADMIN — SODIUM CHLORIDE, PRESERVATIVE FREE 10 ML: 5 INJECTION INTRAVENOUS at 20:39

## 2020-01-01 RX ADMIN — POTASSIUM CHLORIDE 40 MEQ: 750 TABLET, EXTENDED RELEASE ORAL at 13:37

## 2020-01-01 RX ADMIN — METOPROLOL SUCCINATE 50 MG: 50 TABLET, EXTENDED RELEASE ORAL at 08:19

## 2020-01-01 RX ADMIN — FUROSEMIDE 20 MG: 10 INJECTION, SOLUTION INTRAMUSCULAR; INTRAVENOUS at 15:04

## 2020-01-01 RX ADMIN — MULTIPLE VITAMINS W/ MINERALS TAB 1 TABLET: TAB at 08:25

## 2020-01-01 RX ADMIN — FERROUS SULFATE TAB 325 MG (65 MG ELEMENTAL FE) 325 MG: 325 (65 FE) TAB at 08:20

## 2020-01-01 RX ADMIN — SODIUM CHLORIDE, PRESERVATIVE FREE 10 ML: 5 INJECTION INTRAVENOUS at 20:38

## 2020-01-01 RX ADMIN — CALCIUM CARBONATE-VITAMIN D TAB 500 MG-200 UNIT 1 TABLET: 500-200 TAB at 08:15

## 2020-01-01 RX ADMIN — FAMOTIDINE 20 MG: 20 TABLET, FILM COATED ORAL at 09:20

## 2020-01-01 RX ADMIN — AMOXICILLIN AND CLAVULANATE POTASSIUM 500 MG: 500; 125 TABLET, FILM COATED ORAL at 09:16

## 2020-01-01 RX ADMIN — FERROUS SULFATE TAB 325 MG (65 MG ELEMENTAL FE) 325 MG: 325 (65 FE) TAB at 08:28

## 2020-01-01 RX ADMIN — FERROUS SULFATE TAB 325 MG (65 MG ELEMENTAL FE) 325 MG: 325 (65 FE) TAB at 20:48

## 2020-01-01 RX ADMIN — INSULIN LISPRO 2 UNITS: 100 INJECTION, SOLUTION INTRAVENOUS; SUBCUTANEOUS at 08:46

## 2020-01-01 RX ADMIN — WARFARIN 1 MG: 1 TABLET ORAL at 17:05

## 2020-01-01 RX ADMIN — FERROUS SULFATE TAB 325 MG (65 MG ELEMENTAL FE) 325 MG: 325 (65 FE) TAB at 08:27

## 2020-01-01 RX ADMIN — POTASSIUM CHLORIDE 40 MEQ: 750 TABLET, EXTENDED RELEASE ORAL at 09:21

## 2020-01-01 RX ADMIN — SODIUM CHLORIDE, PRESERVATIVE FREE 10 ML: 5 INJECTION INTRAVENOUS at 09:23

## 2020-01-01 RX ADMIN — SODIUM CHLORIDE, PRESERVATIVE FREE 10 ML: 5 INJECTION INTRAVENOUS at 22:05

## 2020-01-01 RX ADMIN — EPINEPHRINE 1 MG: 0.1 INJECTION, SOLUTION ENDOTRACHEAL; INTRACARDIAC; INTRAVENOUS at 09:34

## 2020-01-01 RX ADMIN — CETIRIZINE HYDROCHLORIDE 10 MG: 10 TABLET ORAL at 08:53

## 2020-01-01 RX ADMIN — DEXAMETHASONE 6 MG: 4 TABLET ORAL at 21:42

## 2020-01-01 RX ADMIN — MULTIPLE VITAMINS W/ MINERALS TAB 1 TABLET: TAB at 09:06

## 2020-01-01 RX ADMIN — FERROUS SULFATE TAB 325 MG (65 MG ELEMENTAL FE) 325 MG: 325 (65 FE) TAB at 08:29

## 2020-01-01 RX ADMIN — FAMOTIDINE 20 MG: 20 TABLET, FILM COATED ORAL at 08:28

## 2020-01-01 RX ADMIN — POTASSIUM CHLORIDE 40 MEQ: 750 TABLET, EXTENDED RELEASE ORAL at 09:06

## 2020-01-01 RX ADMIN — DEXAMETHASONE 6 MG: 4 TABLET ORAL at 08:20

## 2020-01-01 RX ADMIN — POTASSIUM CHLORIDE 40 MEQ: 750 TABLET, EXTENDED RELEASE ORAL at 08:47

## 2020-01-01 RX ADMIN — Medication 1600 MCG: at 08:16

## 2020-01-01 RX ADMIN — FAMOTIDINE 20 MG: 20 TABLET, FILM COATED ORAL at 08:16

## 2020-01-01 RX ADMIN — WARFARIN 2 MG: 2 TABLET ORAL at 17:44

## 2020-01-01 RX ADMIN — FAMOTIDINE 20 MG: 20 TABLET, FILM COATED ORAL at 08:26

## 2020-01-01 RX ADMIN — ACETAMINOPHEN 650 MG: 325 TABLET, FILM COATED ORAL at 17:28

## 2020-01-01 RX ADMIN — SODIUM CHLORIDE, PRESERVATIVE FREE 10 ML: 5 INJECTION INTRAVENOUS at 16:54

## 2020-01-01 RX ADMIN — CALCIUM CARBONATE-VITAMIN D TAB 500 MG-200 UNIT 1 TABLET: 500-200 TAB at 08:27

## 2020-01-01 RX ADMIN — AMOXICILLIN AND CLAVULANATE POTASSIUM 500 MG: 500; 125 TABLET, FILM COATED ORAL at 20:25

## 2020-01-01 RX ADMIN — CALCIUM CARBONATE-VITAMIN D TAB 500 MG-200 UNIT 1 TABLET: 500-200 TAB at 08:28

## 2020-01-01 RX ADMIN — ACETAMINOPHEN 650 MG: 325 TABLET, FILM COATED ORAL at 04:58

## 2020-01-01 RX ADMIN — DEXAMETHASONE 6 MG: 4 TABLET ORAL at 08:46

## 2020-01-01 RX ADMIN — AMOXICILLIN AND CLAVULANATE POTASSIUM 500 MG: 500; 125 TABLET, FILM COATED ORAL at 08:26

## 2020-01-01 RX ADMIN — AMOXICILLIN AND CLAVULANATE POTASSIUM 500 MG: 500; 125 TABLET, FILM COATED ORAL at 09:20

## 2020-01-01 RX ADMIN — SODIUM CHLORIDE, PRESERVATIVE FREE 10 ML: 5 INJECTION INTRAVENOUS at 08:51

## 2020-01-01 RX ADMIN — CETIRIZINE HYDROCHLORIDE 10 MG: 10 TABLET ORAL at 08:14

## 2020-01-01 RX ADMIN — Medication 2000 UNITS: at 08:26

## 2020-01-01 RX ADMIN — CETIRIZINE HYDROCHLORIDE 10 MG: 10 TABLET ORAL at 08:46

## 2020-01-01 RX ADMIN — AMOXICILLIN AND CLAVULANATE POTASSIUM 500 MG: 500; 125 TABLET, FILM COATED ORAL at 08:28

## 2020-01-01 RX ADMIN — WARFARIN 0.5 MG: 1 TABLET ORAL at 17:09

## 2020-01-01 RX ADMIN — ATORVASTATIN CALCIUM 20 MG: 20 TABLET, FILM COATED ORAL at 09:43

## 2020-01-01 RX ADMIN — POTASSIUM CHLORIDE 40 MEQ: 1.5 POWDER, FOR SOLUTION ORAL at 14:12

## 2020-01-01 RX ADMIN — CETIRIZINE HYDROCHLORIDE 10 MG: 10 TABLET ORAL at 09:16

## 2020-01-01 RX ADMIN — MULTIPLE VITAMINS W/ MINERALS TAB 1 TABLET: TAB at 08:57

## 2020-01-01 RX ADMIN — POTASSIUM CHLORIDE 40 MEQ: 750 TABLET, EXTENDED RELEASE ORAL at 09:23

## 2020-01-01 RX ADMIN — FAMOTIDINE 20 MG: 20 TABLET, FILM COATED ORAL at 20:38

## 2020-01-01 RX ADMIN — SODIUM CHLORIDE 100 ML/HR: 9 INJECTION, SOLUTION INTRAVENOUS at 04:09

## 2020-01-01 RX ADMIN — POTASSIUM CHLORIDE 40 MEQ: 750 TABLET, EXTENDED RELEASE ORAL at 08:24

## 2020-01-01 RX ADMIN — Medication 2000 UNITS: at 08:54

## 2020-01-01 RX ADMIN — SODIUM CHLORIDE, PRESERVATIVE FREE 10 ML: 5 INJECTION INTRAVENOUS at 21:21

## 2020-01-01 RX ADMIN — FUROSEMIDE 40 MG: 40 TABLET ORAL at 08:57

## 2020-01-01 RX ADMIN — INSULIN LISPRO 2 UNITS: 100 INJECTION, SOLUTION INTRAVENOUS; SUBCUTANEOUS at 12:43

## 2020-01-01 RX ADMIN — FAMOTIDINE 20 MG: 20 TABLET, FILM COATED ORAL at 20:17

## 2020-01-01 RX ADMIN — MULTIPLE VITAMINS W/ MINERALS TAB 1 TABLET: TAB at 08:52

## 2020-01-01 RX ADMIN — DEXAMETHASONE 6 MG: 4 TABLET ORAL at 20:23

## 2020-01-01 RX ADMIN — PHENAZOPYRIDINE 200 MG: 200 TABLET ORAL at 17:37

## 2020-01-01 RX ADMIN — FERROUS SULFATE TAB 325 MG (65 MG ELEMENTAL FE) 325 MG: 325 (65 FE) TAB at 20:08

## 2020-01-01 RX ADMIN — FERROUS SULFATE TAB 325 MG (65 MG ELEMENTAL FE) 325 MG: 325 (65 FE) TAB at 08:14

## 2020-01-01 RX ADMIN — AMOXICILLIN AND CLAVULANATE POTASSIUM 500 MG: 500; 125 TABLET, FILM COATED ORAL at 11:57

## 2020-01-01 RX ADMIN — ATORVASTATIN CALCIUM 20 MG: 20 TABLET, FILM COATED ORAL at 09:06

## 2020-01-01 RX ADMIN — AMOXICILLIN AND CLAVULANATE POTASSIUM 500 MG: 500; 125 TABLET, FILM COATED ORAL at 20:10

## 2020-01-01 RX ADMIN — AMOXICILLIN AND CLAVULANATE POTASSIUM 500 MG: 500; 125 TABLET, FILM COATED ORAL at 20:17

## 2020-01-01 RX ADMIN — WARFARIN 0.5 MG: 1 TABLET ORAL at 17:38

## 2020-01-01 RX ADMIN — FAMOTIDINE 20 MG: 20 TABLET, FILM COATED ORAL at 09:08

## 2020-01-01 RX ADMIN — FUROSEMIDE 40 MG: 40 TABLET ORAL at 08:24

## 2020-01-01 RX ADMIN — FERROUS SULFATE TAB 325 MG (65 MG ELEMENTAL FE) 325 MG: 325 (65 FE) TAB at 20:38

## 2020-01-01 RX ADMIN — Medication 1600 MCG: at 08:57

## 2020-01-01 RX ADMIN — AMOXICILLIN AND CLAVULANATE POTASSIUM 500 MG: 500; 125 TABLET, FILM COATED ORAL at 20:00

## 2020-01-01 RX ADMIN — MULTIPLE VITAMINS W/ MINERALS TAB 1 TABLET: TAB at 09:24

## 2020-01-01 RX ADMIN — Medication 1600 MCG: at 08:29

## 2020-01-01 RX ADMIN — CALCIUM CARBONATE-VITAMIN D TAB 500 MG-200 UNIT 1 TABLET: 500-200 TAB at 08:52

## 2020-01-01 RX ADMIN — SODIUM CHLORIDE 100 ML/HR: 9 INJECTION, SOLUTION INTRAVENOUS at 13:38

## 2020-01-01 RX ADMIN — FOLIC ACID 1 MG: 1 TABLET ORAL at 09:43

## 2020-01-01 RX ADMIN — CALCIUM CARBONATE-VITAMIN D TAB 500 MG-200 UNIT 1 TABLET: 500-200 TAB at 08:57

## 2020-01-01 RX ADMIN — FAMOTIDINE 20 MG: 20 TABLET, FILM COATED ORAL at 08:12

## 2020-01-01 RX ADMIN — METOPROLOL SUCCINATE 50 MG: 50 TABLET, EXTENDED RELEASE ORAL at 08:46

## 2020-01-01 RX ADMIN — FUROSEMIDE 40 MG: 40 TABLET ORAL at 08:46

## 2020-01-01 RX ADMIN — METOPROLOL SUCCINATE 50 MG: 50 TABLET, EXTENDED RELEASE ORAL at 08:27

## 2020-01-01 RX ADMIN — CALCIUM CARBONATE-VITAMIN D TAB 500 MG-200 UNIT 1 TABLET: 500-200 TAB at 08:54

## 2020-01-01 RX ADMIN — FAMOTIDINE 20 MG: 20 TABLET, FILM COATED ORAL at 09:15

## 2020-01-01 RX ADMIN — CALCIUM CARBONATE-VITAMIN D TAB 500 MG-200 UNIT 1 TABLET: 500-200 TAB at 09:08

## 2020-01-01 RX ADMIN — DEXAMETHASONE 6 MG: 4 TABLET ORAL at 08:57

## 2020-01-01 RX ADMIN — CALCIUM CARBONATE-VITAMIN D TAB 500 MG-200 UNIT 1 TABLET: 500-200 TAB at 08:33

## 2020-01-01 RX ADMIN — MULTIPLE VITAMINS W/ MINERALS TAB 1 TABLET: TAB at 08:29

## 2020-01-01 RX ADMIN — CALCIUM CARBONATE-VITAMIN D TAB 500 MG-200 UNIT 1 TABLET: 500-200 TAB at 09:17

## 2020-01-01 RX ADMIN — INSULIN LISPRO 3 UNITS: 100 INJECTION, SOLUTION INTRAVENOUS; SUBCUTANEOUS at 17:33

## 2020-01-01 RX ADMIN — Medication 2000 UNITS: at 08:12

## 2020-01-01 RX ADMIN — METOPROLOL SUCCINATE 50 MG: 50 TABLET, EXTENDED RELEASE ORAL at 08:20

## 2020-01-01 RX ADMIN — FAMOTIDINE 20 MG: 20 TABLET, FILM COATED ORAL at 15:02

## 2020-01-01 RX ADMIN — INSULIN LISPRO 3 UNITS: 100 INJECTION, SOLUTION INTRAVENOUS; SUBCUTANEOUS at 17:42

## 2020-01-01 RX ADMIN — INSULIN LISPRO 2 UNITS: 100 INJECTION, SOLUTION INTRAVENOUS; SUBCUTANEOUS at 08:19

## 2020-01-01 RX ADMIN — FERROUS SULFATE TAB 325 MG (65 MG ELEMENTAL FE) 325 MG: 325 (65 FE) TAB at 21:02

## 2020-01-01 RX ADMIN — Medication 2000 UNITS: at 08:27

## 2020-01-01 RX ADMIN — SODIUM CHLORIDE 100 ML/HR: 9 INJECTION, SOLUTION INTRAVENOUS at 23:48

## 2020-01-01 RX ADMIN — LEUCOVORIN CALCIUM 5 MG: 5 TABLET ORAL at 12:54

## 2020-01-01 RX ADMIN — ACETAMINOPHEN 650 MG: 325 TABLET, FILM COATED ORAL at 05:09

## 2020-01-01 RX ADMIN — FAMOTIDINE 20 MG: 20 TABLET, FILM COATED ORAL at 20:01

## 2020-01-01 RX ADMIN — DEXAMETHASONE 6 MG: 4 TABLET ORAL at 08:33

## 2020-01-01 RX ADMIN — POTASSIUM CHLORIDE 40 MEQ: 750 TABLET, EXTENDED RELEASE ORAL at 08:33

## 2020-01-01 RX ADMIN — Medication 2000 UNITS: at 09:06

## 2020-01-01 RX ADMIN — CALCIUM CARBONATE-VITAMIN D TAB 500 MG-200 UNIT 1 TABLET: 500-200 TAB at 08:26

## 2020-01-01 RX ADMIN — ACETAMINOPHEN 650 MG: 325 TABLET, FILM COATED ORAL at 21:42

## 2020-01-01 RX ADMIN — PHENAZOPYRIDINE 200 MG: 200 TABLET ORAL at 08:28

## 2020-01-01 RX ADMIN — SODIUM CHLORIDE, PRESERVATIVE FREE 10 ML: 5 INJECTION INTRAVENOUS at 20:10

## 2020-01-01 RX ADMIN — SODIUM CHLORIDE 100 ML/HR: 9 INJECTION, SOLUTION INTRAVENOUS at 03:24

## 2020-01-01 RX ADMIN — DEXAMETHASONE 6 MG: 4 TABLET ORAL at 20:10

## 2020-01-01 RX ADMIN — AMOXICILLIN AND CLAVULANATE POTASSIUM 500 MG: 500; 125 TABLET, FILM COATED ORAL at 20:08

## 2020-01-01 RX ADMIN — Medication 2000 UNITS: at 08:34

## 2020-01-01 RX ADMIN — FUROSEMIDE 40 MG: 40 TABLET ORAL at 08:34

## 2020-01-01 RX ADMIN — POTASSIUM CHLORIDE 40 MEQ: 750 TABLET, EXTENDED RELEASE ORAL at 09:24

## 2020-01-01 RX ADMIN — Medication 1600 MCG: at 13:05

## 2020-01-01 RX ADMIN — CETIRIZINE HYDROCHLORIDE 10 MG: 10 TABLET ORAL at 08:27

## 2020-01-01 RX ADMIN — Medication 1600 MCG: at 09:19

## 2020-01-01 RX ADMIN — FAMOTIDINE 20 MG: 20 TABLET, FILM COATED ORAL at 20:29

## 2020-01-01 RX ADMIN — REMDESIVIR 200 MG: 100 INJECTION, POWDER, LYOPHILIZED, FOR SOLUTION INTRAVENOUS at 12:32

## 2020-01-01 RX ADMIN — MULTIPLE VITAMINS W/ MINERALS TAB 1 TABLET: TAB at 08:13

## 2020-01-01 RX ADMIN — CETIRIZINE HYDROCHLORIDE 10 MG: 10 TABLET ORAL at 08:33

## 2020-01-01 RX ADMIN — FAMOTIDINE 20 MG: 20 TABLET, FILM COATED ORAL at 20:25

## 2020-01-01 RX ADMIN — SODIUM CHLORIDE, PRESERVATIVE FREE 10 ML: 5 INJECTION INTRAVENOUS at 08:31

## 2020-01-01 RX ADMIN — AMOXICILLIN AND CLAVULANATE POTASSIUM 500 MG: 500; 125 TABLET, FILM COATED ORAL at 08:34

## 2020-01-01 RX ADMIN — FERROUS SULFATE TAB 325 MG (65 MG ELEMENTAL FE) 325 MG: 325 (65 FE) TAB at 08:33

## 2020-01-01 RX ADMIN — ACETAMINOPHEN 650 MG: 325 TABLET, FILM COATED ORAL at 20:29

## 2020-01-01 RX ADMIN — Medication 2000 UNITS: at 08:33

## 2020-01-01 RX ADMIN — MULTIPLE VITAMINS W/ MINERALS TAB 1 TABLET: TAB at 08:45

## 2020-01-01 RX ADMIN — Medication 1600 MCG: at 09:23

## 2020-01-01 RX ADMIN — ACETAMINOPHEN 650 MG: 325 TABLET, FILM COATED ORAL at 04:09

## 2020-01-01 RX ADMIN — METOPROLOL SUCCINATE 50 MG: 50 TABLET, EXTENDED RELEASE ORAL at 08:30

## 2020-01-01 RX ADMIN — FERROUS SULFATE TAB 325 MG (65 MG ELEMENTAL FE) 325 MG: 325 (65 FE) TAB at 09:19

## 2020-01-01 RX ADMIN — DEXAMETHASONE 3 MG: 2 TABLET ORAL at 09:21

## 2020-01-01 RX ADMIN — CETIRIZINE HYDROCHLORIDE 10 MG: 10 TABLET ORAL at 08:57

## 2020-01-01 RX ADMIN — AMOXICILLIN AND CLAVULANATE POTASSIUM 500 MG: 500; 125 TABLET, FILM COATED ORAL at 08:54

## 2020-01-01 RX ADMIN — AMOXICILLIN AND CLAVULANATE POTASSIUM 500 MG: 500; 125 TABLET, FILM COATED ORAL at 20:23

## 2020-01-01 RX ADMIN — CETIRIZINE HYDROCHLORIDE 10 MG: 10 TABLET ORAL at 15:02

## 2020-01-01 RX ADMIN — FERROUS SULFATE TAB 325 MG (65 MG ELEMENTAL FE) 325 MG: 325 (65 FE) TAB at 08:52

## 2020-01-01 RX ADMIN — ATORVASTATIN CALCIUM 20 MG: 20 TABLET, FILM COATED ORAL at 08:46

## 2020-01-01 RX ADMIN — MULTIPLE VITAMINS W/ MINERALS TAB 1 TABLET: TAB at 08:27

## 2020-01-01 RX ADMIN — MULTIPLE VITAMINS W/ MINERALS TAB 1 TABLET: TAB at 09:21

## 2020-01-01 RX ADMIN — MULTIPLE VITAMINS W/ MINERALS TAB 1 TABLET: TAB at 09:16

## 2020-01-01 RX ADMIN — SODIUM CHLORIDE, PRESERVATIVE FREE 10 ML: 5 INJECTION INTRAVENOUS at 04:23

## 2020-01-01 RX ADMIN — Medication 1600 MCG: at 08:25

## 2020-01-01 RX ADMIN — Medication 2000 UNITS: at 08:25

## 2020-01-01 RX ADMIN — MULTIPLE VITAMINS W/ MINERALS TAB 1 TABLET: TAB at 08:20

## 2020-01-01 RX ADMIN — FERROUS SULFATE TAB 325 MG (65 MG ELEMENTAL FE) 325 MG: 325 (65 FE) TAB at 08:16

## 2020-01-01 RX ADMIN — DEXAMETHASONE 6 MG: 4 TABLET ORAL at 08:28

## 2020-01-01 RX ADMIN — CEFTRIAXONE SODIUM 1 G: 1 INJECTION, SOLUTION INTRAVENOUS at 03:26

## 2020-01-01 RX ADMIN — METOPROLOL SUCCINATE 50 MG: 50 TABLET, EXTENDED RELEASE ORAL at 08:45

## 2020-01-01 RX ADMIN — MAGNESIUM SULFATE HEPTAHYDRATE 1 G: 1 INJECTION, SOLUTION INTRAVENOUS at 13:37

## 2020-01-01 RX ADMIN — POTASSIUM CHLORIDE 40 MEQ: 750 TABLET, EXTENDED RELEASE ORAL at 08:12

## 2020-01-01 RX ADMIN — FUROSEMIDE 40 MG: 40 TABLET ORAL at 08:20

## 2020-01-01 RX ADMIN — FERROUS SULFATE TAB 325 MG (65 MG ELEMENTAL FE) 325 MG: 325 (65 FE) TAB at 20:29

## 2020-01-01 RX ADMIN — FAMOTIDINE 20 MG: 20 TABLET, FILM COATED ORAL at 20:23

## 2020-01-01 RX ADMIN — CALCIUM CARBONATE-VITAMIN D TAB 500 MG-200 UNIT 1 TABLET: 500-200 TAB at 09:06

## 2020-01-01 RX ADMIN — DEXAMETHASONE 6 MG: 4 TABLET ORAL at 08:24

## 2020-01-01 RX ADMIN — DEXTROSE AND SODIUM CHLORIDE 100 ML/HR: 5; 900 INJECTION, SOLUTION INTRAVENOUS at 21:51

## 2020-01-01 RX ADMIN — SODIUM CHLORIDE, PRESERVATIVE FREE 10 ML: 5 INJECTION INTRAVENOUS at 21:48

## 2020-01-01 RX ADMIN — METOPROLOL SUCCINATE 50 MG: 50 TABLET, EXTENDED RELEASE ORAL at 09:24

## 2020-01-01 RX ADMIN — Medication 2000 UNITS: at 09:19

## 2020-01-01 RX ADMIN — SODIUM CHLORIDE 100 ML/HR: 9 INJECTION, SOLUTION INTRAVENOUS at 04:48

## 2020-01-01 RX ADMIN — FUROSEMIDE 40 MG: 40 TABLET ORAL at 09:18

## 2020-01-01 RX ADMIN — FERROUS SULFATE TAB 325 MG (65 MG ELEMENTAL FE) 325 MG: 325 (65 FE) TAB at 20:10

## 2020-01-01 RX ADMIN — MULTIPLE VITAMINS W/ MINERALS TAB 1 TABLET: TAB at 08:46

## 2020-01-01 RX ADMIN — DEXTROSE AND SODIUM CHLORIDE 100 ML/HR: 5; 900 INJECTION, SOLUTION INTRAVENOUS at 11:37

## 2020-01-01 RX ADMIN — FERROUS SULFATE TAB 325 MG (65 MG ELEMENTAL FE) 325 MG: 325 (65 FE) TAB at 08:34

## 2020-01-01 RX ADMIN — EPINEPHRINE 1 MG: 0.1 INJECTION, SOLUTION ENDOTRACHEAL; INTRACARDIAC; INTRAVENOUS at 09:40

## 2020-01-01 RX ADMIN — POTASSIUM CHLORIDE 40 MEQ: 750 TABLET, EXTENDED RELEASE ORAL at 08:28

## 2020-01-01 RX ADMIN — DOCUSATE SODIUM 50MG AND SENNOSIDES 8.6MG 2 TABLET: 8.6; 5 TABLET, FILM COATED ORAL at 08:11

## 2020-01-01 RX ADMIN — ACETAMINOPHEN 650 MG: 325 TABLET, FILM COATED ORAL at 12:53

## 2020-01-01 RX ADMIN — POTASSIUM CHLORIDE 40 MEQ: 750 TABLET, EXTENDED RELEASE ORAL at 09:17

## 2020-01-01 RX ADMIN — METOPROLOL SUCCINATE 50 MG: 50 TABLET, EXTENDED RELEASE ORAL at 08:57

## 2020-01-01 RX ADMIN — FAMOTIDINE 20 MG: 20 TABLET, FILM COATED ORAL at 20:08

## 2020-01-01 RX ADMIN — Medication 1600 MCG: at 09:06

## 2020-01-01 RX ADMIN — SODIUM CHLORIDE, PRESERVATIVE FREE 10 ML: 5 INJECTION INTRAVENOUS at 08:18

## 2020-01-01 RX ADMIN — FUROSEMIDE 40 MG: 40 TABLET ORAL at 08:54

## 2020-01-01 RX ADMIN — DEXAMETHASONE 6 MG: 4 TABLET ORAL at 20:29

## 2020-01-01 RX ADMIN — DEXAMETHASONE 6 MG: 4 TABLET ORAL at 20:38

## 2020-01-01 RX ADMIN — Medication 1600 MCG: at 09:15

## 2020-01-01 RX ADMIN — CETIRIZINE HYDROCHLORIDE 10 MG: 10 TABLET ORAL at 09:06

## 2020-01-01 RX ADMIN — FERROUS SULFATE TAB 325 MG (65 MG ELEMENTAL FE) 325 MG: 325 (65 FE) TAB at 09:24

## 2020-01-01 RX ADMIN — POTASSIUM CHLORIDE 40 MEQ: 1.5 POWDER, FOR SOLUTION ORAL at 11:26

## 2020-01-01 RX ADMIN — ACETAMINOPHEN 650 MG: 325 TABLET, FILM COATED ORAL at 09:00

## 2020-01-01 RX ADMIN — DEXAMETHASONE 6 MG: 4 TABLET ORAL at 09:23

## 2020-01-01 RX ADMIN — ERTAPENEM SODIUM 1 G: 1 INJECTION, POWDER, LYOPHILIZED, FOR SOLUTION INTRAMUSCULAR; INTRAVENOUS at 23:28

## 2020-01-01 RX ADMIN — METOPROLOL SUCCINATE 50 MG: 50 TABLET, EXTENDED RELEASE ORAL at 08:28

## 2020-01-01 RX ADMIN — CALCIUM CARBONATE-VITAMIN D TAB 500 MG-200 UNIT 1 TABLET: 500-200 TAB at 09:20

## 2020-01-01 RX ADMIN — INSULIN LISPRO 3 UNITS: 100 INJECTION, SOLUTION INTRAVENOUS; SUBCUTANEOUS at 12:14

## 2020-01-01 RX ADMIN — Medication 2000 UNITS: at 08:56

## 2020-01-01 RX ADMIN — POTASSIUM CHLORIDE 40 MEQ: 750 TABLET, EXTENDED RELEASE ORAL at 09:07

## 2020-01-01 RX ADMIN — ACETAMINOPHEN 650 MG: 325 TABLET, FILM COATED ORAL at 20:16

## 2020-01-01 RX ADMIN — AMOXICILLIN AND CLAVULANATE POTASSIUM 500 MG: 500; 125 TABLET, FILM COATED ORAL at 20:06

## 2020-01-01 RX ADMIN — ACETAMINOPHEN 650 MG: 325 TABLET, FILM COATED ORAL at 21:12

## 2020-01-01 RX ADMIN — FOLIC ACID 1 MG: 1 TABLET ORAL at 08:52

## 2020-01-01 RX ADMIN — POLYETHYLENE GLYCOL 3350 17 G: 17 POWDER, FOR SOLUTION ORAL at 12:47

## 2020-01-01 RX ADMIN — FAMOTIDINE 20 MG: 20 TABLET, FILM COATED ORAL at 08:54

## 2020-01-01 RX ADMIN — SODIUM CHLORIDE, PRESERVATIVE FREE 10 ML: 5 INJECTION INTRAVENOUS at 20:12

## 2020-01-01 RX ADMIN — WARFARIN 0.5 MG: 1 TABLET ORAL at 17:36

## 2020-01-01 RX ADMIN — MULTIPLE VITAMINS W/ MINERALS TAB 1 TABLET: TAB at 08:16

## 2020-01-01 RX ADMIN — ATORVASTATIN CALCIUM 20 MG: 20 TABLET, FILM COATED ORAL at 08:26

## 2020-01-01 RX ADMIN — WARFARIN 2 MG: 2 TABLET ORAL at 18:19

## 2020-01-01 RX ADMIN — DEXAMETHASONE 6 MG: 4 TABLET ORAL at 08:34

## 2020-01-01 RX ADMIN — WARFARIN 1 MG: 1 TABLET ORAL at 17:42

## 2020-01-01 RX ADMIN — AMOXICILLIN AND CLAVULANATE POTASSIUM 500 MG: 500; 125 TABLET, FILM COATED ORAL at 08:46

## 2020-01-01 RX ADMIN — CETIRIZINE HYDROCHLORIDE 10 MG: 10 TABLET ORAL at 09:46

## 2020-01-01 RX ADMIN — AMOXICILLIN AND CLAVULANATE POTASSIUM 500 MG: 500; 125 TABLET, FILM COATED ORAL at 20:29

## 2020-01-01 RX ADMIN — FUROSEMIDE 40 MG: 40 TABLET ORAL at 08:27

## 2020-01-01 RX ADMIN — ATORVASTATIN CALCIUM 20 MG: 20 TABLET, FILM COATED ORAL at 08:54

## 2020-01-01 RX ADMIN — DIPHENHYDRAMINE HYDROCHLORIDE 25 MG: 25 CAPSULE ORAL at 12:14

## 2020-01-01 RX ADMIN — FAMOTIDINE 20 MG: 20 TABLET, FILM COATED ORAL at 21:42

## 2020-01-01 RX ADMIN — SODIUM CHLORIDE, PRESERVATIVE FREE 10 ML: 5 INJECTION INTRAVENOUS at 08:26

## 2020-01-01 RX ADMIN — SODIUM CHLORIDE 100 ML/HR: 9 INJECTION, SOLUTION INTRAVENOUS at 17:37

## 2020-01-01 RX ADMIN — SODIUM CHLORIDE 100 ML/HR: 9 INJECTION, SOLUTION INTRAVENOUS at 18:51

## 2020-01-01 RX ADMIN — AMOXICILLIN AND CLAVULANATE POTASSIUM 500 MG: 500; 125 TABLET, FILM COATED ORAL at 09:23

## 2020-01-01 RX ADMIN — ATORVASTATIN CALCIUM 20 MG: 20 TABLET, FILM COATED ORAL at 09:20

## 2020-01-01 RX ADMIN — FUROSEMIDE 40 MG: 40 TABLET ORAL at 09:16

## 2020-01-01 RX ADMIN — Medication 1600 MCG: at 08:33

## 2020-01-01 RX ADMIN — METOPROLOL SUCCINATE 50 MG: 50 TABLET, EXTENDED RELEASE ORAL at 09:16

## 2020-01-01 RX ADMIN — SODIUM CHLORIDE, PRESERVATIVE FREE 10 ML: 5 INJECTION INTRAVENOUS at 09:04

## 2020-01-01 RX ADMIN — ATORVASTATIN CALCIUM 20 MG: 20 TABLET, FILM COATED ORAL at 08:15

## 2020-01-01 RX ADMIN — POTASSIUM CHLORIDE 40 MEQ: 750 TABLET, EXTENDED RELEASE ORAL at 08:53

## 2020-01-01 RX ADMIN — METOPROLOL SUCCINATE 50 MG: 50 TABLET, EXTENDED RELEASE ORAL at 08:13

## 2020-01-01 RX ADMIN — FAMOTIDINE 20 MG: 20 TABLET, FILM COATED ORAL at 08:20

## 2020-01-01 RX ADMIN — ATORVASTATIN CALCIUM 20 MG: 20 TABLET, FILM COATED ORAL at 08:14

## 2020-01-01 RX ADMIN — DEXAMETHASONE 6 MG: 4 TABLET ORAL at 08:15

## 2020-01-01 RX ADMIN — ATORVASTATIN CALCIUM 20 MG: 20 TABLET, FILM COATED ORAL at 08:30

## 2020-01-01 RX ADMIN — Medication 2000 UNITS: at 09:16

## 2020-01-01 RX ADMIN — Medication 2000 UNITS: at 09:24

## 2020-01-01 RX ADMIN — DOCUSATE SODIUM 50MG AND SENNOSIDES 8.6MG 2 TABLET: 8.6; 5 TABLET, FILM COATED ORAL at 14:47

## 2020-01-01 RX ADMIN — FAMOTIDINE 20 MG: 20 TABLET, FILM COATED ORAL at 08:58

## 2020-01-01 RX ADMIN — FERROUS SULFATE TAB 325 MG (65 MG ELEMENTAL FE) 325 MG: 325 (65 FE) TAB at 21:07

## 2020-01-01 RX ADMIN — ACETAMINOPHEN 650 MG: 325 TABLET, FILM COATED ORAL at 12:13

## 2020-01-01 RX ADMIN — FUROSEMIDE 40 MG: 40 TABLET ORAL at 09:43

## 2020-01-01 RX ADMIN — Medication 1600 MCG: at 08:13

## 2020-01-01 RX ADMIN — CALCIUM CARBONATE-VITAMIN D TAB 500 MG-200 UNIT 1 TABLET: 500-200 TAB at 08:45

## 2020-01-01 RX ADMIN — POTASSIUM CHLORIDE 40 MEQ: 750 TABLET, EXTENDED RELEASE ORAL at 08:21

## 2020-01-01 RX ADMIN — DEXAMETHASONE 6 MG: 4 TABLET ORAL at 20:06

## 2020-01-01 RX ADMIN — SODIUM CHLORIDE, PRESERVATIVE FREE 10 ML: 5 INJECTION INTRAVENOUS at 08:48

## 2020-01-01 RX ADMIN — WARFARIN 0.5 MG: 1 TABLET ORAL at 18:18

## 2020-01-01 RX ADMIN — FERROUS SULFATE TAB 325 MG (65 MG ELEMENTAL FE) 325 MG: 325 (65 FE) TAB at 20:17

## 2020-01-01 RX ADMIN — Medication 1 MG: at 23:14

## 2020-01-01 RX ADMIN — SODIUM CHLORIDE, PRESERVATIVE FREE 10 ML: 5 INJECTION INTRAVENOUS at 20:02

## 2020-01-01 RX ADMIN — SODIUM CHLORIDE, PRESERVATIVE FREE 10 ML: 5 INJECTION INTRAVENOUS at 20:48

## 2020-01-01 RX ADMIN — DEXAMETHASONE 6 MG: 4 TABLET ORAL at 21:07

## 2020-01-01 RX ADMIN — ACETAMINOPHEN 650 MG: 325 TABLET, FILM COATED ORAL at 00:43

## 2020-01-01 RX ADMIN — MULTIPLE VITAMINS W/ MINERALS TAB 1 TABLET: TAB at 09:23

## 2020-01-01 RX ADMIN — POTASSIUM CHLORIDE 40 MEQ: 750 TABLET, EXTENDED RELEASE ORAL at 09:43

## 2020-01-01 RX ADMIN — AMOXICILLIN AND CLAVULANATE POTASSIUM 500 MG: 500; 125 TABLET, FILM COATED ORAL at 21:07

## 2020-01-01 RX ADMIN — CETIRIZINE HYDROCHLORIDE 10 MG: 10 TABLET ORAL at 08:28

## 2020-01-01 RX ADMIN — WARFARIN 2 MG: 2 TABLET ORAL at 17:33

## 2020-01-01 RX ADMIN — METOPROLOL SUCCINATE 50 MG: 50 TABLET, EXTENDED RELEASE ORAL at 09:05

## 2020-01-01 RX ADMIN — FAMOTIDINE 20 MG: 20 TABLET, FILM COATED ORAL at 08:24

## 2020-01-01 RX ADMIN — ACETAMINOPHEN 650 MG: 325 TABLET, FILM COATED ORAL at 04:22

## 2020-01-01 RX ADMIN — AMOXICILLIN AND CLAVULANATE POTASSIUM 500 MG: 500; 125 TABLET, FILM COATED ORAL at 20:01

## 2020-01-01 RX ADMIN — FERROUS SULFATE TAB 325 MG (65 MG ELEMENTAL FE) 325 MG: 325 (65 FE) TAB at 09:07

## 2020-01-01 RX ADMIN — INSULIN LISPRO 2 UNITS: 100 INJECTION, SOLUTION INTRAVENOUS; SUBCUTANEOUS at 17:44

## 2020-01-01 RX ADMIN — PHENAZOPYRIDINE 200 MG: 200 TABLET ORAL at 17:39

## 2020-01-01 RX ADMIN — FUROSEMIDE 40 MG: 40 TABLET ORAL at 08:52

## 2020-01-01 RX ADMIN — FUROSEMIDE 40 MG: 40 TABLET ORAL at 09:24

## 2020-01-01 RX ADMIN — FUROSEMIDE 40 MG: 40 TABLET ORAL at 08:33

## 2020-01-01 RX ADMIN — SODIUM CHLORIDE 100 ML/HR: 9 INJECTION, SOLUTION INTRAVENOUS at 23:29

## 2020-01-01 RX ADMIN — CALCIUM CARBONATE-VITAMIN D TAB 500 MG-200 UNIT 1 TABLET: 500-200 TAB at 08:11

## 2020-01-01 RX ADMIN — WARFARIN 2 MG: 2 TABLET ORAL at 17:11

## 2020-01-01 RX ADMIN — METOPROLOL SUCCINATE 50 MG: 50 TABLET, EXTENDED RELEASE ORAL at 09:20

## 2020-01-01 RX ADMIN — INSULIN LISPRO 3 UNITS: 100 INJECTION, SOLUTION INTRAVENOUS; SUBCUTANEOUS at 12:39

## 2020-01-01 RX ADMIN — Medication 2000 UNITS: at 08:52

## 2020-01-01 RX ADMIN — Medication 1600 MCG: at 08:20

## 2020-01-01 RX ADMIN — MULTIPLE VITAMINS W/ MINERALS TAB 1 TABLET: TAB at 08:24

## 2020-01-01 RX ADMIN — ACETAMINOPHEN 650 MG: 325 TABLET, FILM COATED ORAL at 20:25

## 2020-01-01 RX ADMIN — SODIUM CHLORIDE, PRESERVATIVE FREE 10 ML: 5 INJECTION INTRAVENOUS at 09:43

## 2020-01-01 RX ADMIN — SODIUM CHLORIDE, PRESERVATIVE FREE 10 ML: 5 INJECTION INTRAVENOUS at 20:04

## 2020-01-01 RX ADMIN — FERROUS SULFATE TAB 325 MG (65 MG ELEMENTAL FE) 325 MG: 325 (65 FE) TAB at 20:23

## 2020-01-01 RX ADMIN — FUROSEMIDE 40 MG: 40 TABLET ORAL at 08:12

## 2020-01-01 RX ADMIN — FAMOTIDINE 20 MG: 20 TABLET, FILM COATED ORAL at 09:24

## 2020-01-01 RX ADMIN — CALCIUM CARBONATE-VITAMIN D TAB 500 MG-200 UNIT 1 TABLET: 500-200 TAB at 08:34

## 2020-01-01 RX ADMIN — POTASSIUM CHLORIDE 40 MEQ: 750 TABLET, EXTENDED RELEASE ORAL at 18:10

## 2020-01-01 RX ADMIN — INSULIN LISPRO 4 UNITS: 100 INJECTION, SOLUTION INTRAVENOUS; SUBCUTANEOUS at 00:58

## 2020-01-01 RX ADMIN — CETIRIZINE HYDROCHLORIDE 10 MG: 10 TABLET ORAL at 08:15

## 2020-01-01 RX ADMIN — SODIUM CHLORIDE, PRESERVATIVE FREE 10 ML: 5 INJECTION INTRAVENOUS at 21:42

## 2020-01-01 RX ADMIN — DEXAMETHASONE 6 MG: 4 TABLET ORAL at 20:25

## 2020-01-01 RX ADMIN — DEXAMETHASONE 6 MG: 4 TABLET ORAL at 15:00

## 2020-01-08 NOTE — TELEPHONE ENCOUNTER
Son Ramone Abad called again letting [facility] know that pt has started PT and wants to make sure there are no restrictions on therapy
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
## 2020-02-18 NOTE — PROGRESS NOTES
Anticoagulation Clinic Progress Note    Anticoagulation Summary  As of 2020    INR goal:   2.5-3.5   TTR:   45.8 % (1.3 y)   INR used for dosin.19! (2020)   Warfarin maintenance plan:   5 mg every Sun, Tue, Thu; 7.5 mg all other days   Weekly warfarin total:   45 mg   Plan last modified:   Thomas Smallwood RP (2020)   Next INR check:   3/3/2020   Priority:   Maintenance   Target end date:   Indefinite    Indications    History of DVT (deep vein thrombosis) [Z86.718]             Anticoagulation Episode Summary     INR check location:       Preferred lab:       Send INR reminders to:    [facility]    Comments:         Anticoagulation Care Providers     Provider Role Specialty Phone number    Vijay Arango MD Referring Cardiology [phone]            Clinic Interview:  Patient Findings     Negatives:   Signs/symptoms of thrombosis, Signs/symptoms of bleeding,   Laboratory test error suspected, Change in health, Change in alcohol use,   Change in activity, Upcoming invasive procedure, Emergency department   visit, Upcoming dental procedure, Missed doses, Extra doses, Change in   medications, Change in diet/appetite, Hospital admission, Bruising, Other   complaints      Clinical Outcomes     Negatives:   Major bleeding event, Thromboembolic event,   Anticoagulation-related hospital admission, Anticoagulation-related ED   visit, Anticoagulation-related fatality        INR History:  Anticoagulation Monitoring 10/31/2019 2019 2020   INR 2.72 2.46 2.19   INR Date 10/31/2019 2019 2020   INR Goal 2.5-3.5 2.5-3.5 2.5-3.5   Trend Same Same Up   Last Week Total 42.5 mg 42.5 mg 42.5 mg   Next Week Total 42.5 mg 42.5 mg 47.5 mg   Sun 5 mg 5 mg 5 mg   Mon 7.5 mg 7.5 mg 7.5 mg   Tue 5 mg 5 mg 7.5 mg (); Otherwise 5 mg   Wed 7.5 mg 7.5 mg 7.5 mg   Thu 5 mg 5 mg 5 mg   Fri 7.5 mg 7.5 mg 7.5 mg   Sat 5 mg 5 mg 7.5 mg   Visit Report - - -   Some recent data might be  hidden       Plan:  1. INR is Subtherapeutic today- see above in Anticoagulation Summary.   Will instruct Rachel Moser to Increase their warfarin regimen- see above in Anticoagulation Summary.  2. Follow up in 2 weeks  3. They have been instructed to call if any changes in medications, doses, concerns, etc. Patient expresses understanding and has no further questions at this time.    Thomas Smallwood [facility]

## 2020-02-28 PROBLEM — M06.9 RHEUMATOID ARTHRITIS INVOLVING MULTIPLE SITES (HCC): Status: ACTIVE | Noted: 2018-04-26

## 2020-02-28 NOTE — PROGRESS NOTES
"  Rachel Moser is a 74 y.o. female who presents with   Chief Complaint   Patient presents with   • Hypertension     Metoprolol XL succinate; furosemide 40 mg   • Establishing care with new physician     Former patient of Dr. Cintron   .    74-year-old female.  New patient to me.  Former patient of Dr. Cintron who has gone into  medicine.  History of hypertension and hyperlipidemia, chronic rheumatoid arthritis.  Previous history of pulmonary embolus/DVT, also previous history of septic arthritis and hip joint replacement surgery, presents to get her blood pressure checked and to establish future medical care through this office.  She has no specific complaints on today's visit.    Hypertension   This is a chronic problem. The current episode started more than 1 year ago. The problem is unchanged. The problem is controlled. Current antihypertension treatment includes diuretics and beta blockers. The current treatment provides moderate improvement. There are no compliance problems.         /80   Pulse 78   Temp 97.8 °F (36.6 °C)   Resp 13   Ht 149.9 cm (59.02\")   Wt 57.5 kg (126 lb 12.8 oz)   SpO2 98%   BMI 25.60 kg/m²     The following portions of the patient's history were reviewed and updated as appropriate: allergies, current medications, past medical history and problem list.    Review of Systems   Constitutional: Negative.    HENT: Negative.    Eyes: Negative.    Respiratory: Negative.    Cardiovascular: Negative.    Genitourinary: Negative.    Musculoskeletal: Negative.    Skin: Negative.    Neurological: Negative.    Psychiatric/Behavioral: Negative.        Objective   Physical Exam   Constitutional: She is oriented to person, place, and time. She appears well-developed and well-nourished. No distress.   HENT:   Head: Normocephalic and atraumatic.   Eyes: Pupils are equal, round, and reactive to light. Conjunctivae and EOM are normal.   Neck: Normal range of motion. Neck supple. No " thyromegaly present.   Neck exam negative.  Carotid auscultation normal-no bruits heard.   Cardiovascular: Normal rate, regular rhythm, normal heart sounds and intact distal pulses. Exam reveals no gallop and no friction rub.   No murmur heard.  Pulmonary/Chest: Effort normal and breath sounds normal. No respiratory distress. She has no wheezes. She has no rales. She exhibits no tenderness.   Musculoskeletal:   Rheumatoid/osteoarthritic changes visible in hands with Heberden/Fernando's nodes   Neurological: She is alert and oriented to person, place, and time.   Psychiatric: She has a normal mood and affect. Her behavior is normal. Judgment and thought content normal.   Nursing note and vitals reviewed.      Assessment/Plan   Rachel was seen today for hypertension and establishing care with new physician.    Diagnoses and all orders for this visit:    Essential hypertension    Establishing care with new doctor, encounter for      Plan: Blood pressure stable at 128/80.  Medical history reviewed with her on today's visit.  Going forward we will get her set up for a Medicare wellness visit with any lab updates that are needed to be done then.  She says she is getting lab testing through her cardiology office who is seeing her for her pulmonary embolus and managing her warfarin therapy.  She also continues to see her infectious disease doctor who does lab testing as well.    She will continue care with the specialists as noted and also continue orthopedic care with Dr. Obed Barney.

## 2020-03-04 NOTE — PROGRESS NOTES
Anticoagulation Clinic Progress Note    Anticoagulation Summary  As of 3/4/2020    INR goal:   2.5-3.5   TTR:   45.6 % (1.4 y)   INR used for dosin.78! (3/4/2020)   Warfarin maintenance plan:   5 mg every Sun, Tue, Thu; 7.5 mg all other days   Weekly warfarin total:   45 mg   Plan last modified:   Thomas Smallwood RP (2020)   Next INR check:   3/11/2020   Priority:   Maintenance   Target end date:   Indefinite    Indications    History of DVT (deep vein thrombosis) [Z86.718]             Anticoagulation Episode Summary     INR check location:       Preferred lab:       Send INR reminders to:    [facility]    Comments:         Anticoagulation Care Providers     Provider Role Specialty Phone number    Vijay Arango MD Referring Cardiology [phone]            Clinic Interview:  Patient Findings     Negatives:   Signs/symptoms of thrombosis, Signs/symptoms of bleeding,   Laboratory test error suspected, Change in health, Change in alcohol use,   Change in activity, Upcoming invasive procedure, Emergency department   visit, Upcoming dental procedure, Missed doses, Extra doses, Change in   medications, Change in diet/appetite, Hospital admission, Bruising, Other   complaints      Clinical Outcomes     Negatives:   Major bleeding event, Thromboembolic event,   Anticoagulation-related hospital admission, Anticoagulation-related ED   visit, Anticoagulation-related fatality        INR History:  Anticoagulation Monitoring 2019 2020 3/4/2020   INR 2.46 2.19 4.78   INR Date 2019 2020 3/4/2020   INR Goal 2.5-3.5 2.5-3.5 2.5-3.5   Trend Same Up Same   Last Week Total 42.5 mg 42.5 mg 45 mg   Next Week Total 42.5 mg 47.5 mg 37.5 mg   Sun 5 mg 5 mg 5 mg   Mon 7.5 mg 7.5 mg 7.5 mg   Tue 5 mg 7.5 mg (); Otherwise 5 mg 5 mg   Wed 7.5 mg 7.5 mg Hold (3/4)   Thu 5 mg 5 mg 5 mg   Fri 7.5 mg 7.5 mg 7.5 mg   Sat 5 mg 7.5 mg 7.5 mg   Visit Report - - -   Some recent data might be  hidden       Plan:  1. INR is Supratherapeutic today- see above in Anticoagulation Summary.   Will instruct Rachel Moser to Change their warfarin regimen- see above in Anticoagulation Summary.  2. Follow up in 1 week  3. They have been instructed to call if any changes in medications, doses, concerns, etc. To seek immediate medical attention if s/sx of bleeding develop or fall occurs. Patient expresses understanding and has no further questions at this time.    Thomas Smallwood RP
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
## 2020-03-12 NOTE — PROGRESS NOTES
Anticoagulation Clinic Progress Note    Anticoagulation Summary  As of 3/12/2020    INR goal:   2.5-3.5   TTR:   45.0 % (1.4 y)   INR used for dosing:   3.35 (3/12/2020)   Warfarin maintenance plan:   7.5 mg every Mon, Wed, Fri; 5 mg all other days   Weekly warfarin total:   42.5 mg   Plan last modified:   Rachel Krishnan RPH (3/12/2020)   Next INR check:   3/20/2020   Priority:   Maintenance   Target end date:   Indefinite    Indications    History of DVT (deep vein thrombosis) [Z86.718]             Anticoagulation Episode Summary     INR check location:       Preferred lab:       [facility] [place]    Comments:         Anticoagulation Care Providers     Provider Role Specialty Phone number    Vijay Arango MD Referring Cardiology [phone]          Drug interactions: has remained unchanged.  Diet: has remained unchanged.    Clinic Interview:      INR History:  Anticoagulation Monitoring 2/18/2020 3/4/2020 3/12/2020   INR 2.19 4.78 3.35   INR Date 2/18/2020 3/4/2020 3/12/2020   INR Goal 2.5-3.5 2.5-3.5 2.5-3.5   Trend Up Same Down   Last Week Total 42.5 mg 45 mg 45 mg   Next Week Total 47.5 mg 37.5 mg 42.5 mg   Sun 5 mg 5 mg 5 mg   Mon 7.5 mg 7.5 mg 7.5 mg   Tue 7.5 mg (2/18); Otherwise 5 mg 5 mg 5 mg   Wed 7.5 mg Hold (3/4) 7.5 mg   Thu 5 mg 5 mg 5 mg   Fri 7.5 mg 7.5 mg 7.5 mg   Sat 7.5 mg 7.5 mg 5 mg   Visit Report - - -   Some recent data might be hidden       Plan:  1. INR is Therapeutic today- see above in Anticoagulation Summary.   Will instruct Rachel Moser to Change back to previous warfarin regimen- see above in Anticoagulation Summary.  2. Follow up in 1 week  3. Spoke with pt today. They have been instructed to call if any changes in medications, doses, concerns, etc. Patient expresses understanding and has no further questions at this time.    Rachel Krishnan RPH

## 2020-03-20 NOTE — PROGRESS NOTES
Anticoagulation Clinic Progress Note    Anticoagulation Summary  As of 3/20/2020    INR goal:   2.5-3.5   TTR:   44.6 % (1.4 y)   INR used for dosin.09! (3/20/2020)   Warfarin maintenance plan:   7.5 mg every Mon, Wed, Fri; 5 mg all other days   Weekly warfarin total:   42.5 mg   Plan last modified:   Rachel Krishnan RPH (3/20/2020)   Next INR check:   3/27/2020   Priority:   Maintenance   Target end date:   Indefinite    Indications    History of DVT (deep vein thrombosis) [Z86.718]             Anticoagulation Episode Summary     INR check location:       Preferred lab:       [facility] [place]    Comments:         Anticoagulation Care Providers     Provider Role Specialty Phone number    Vijay Arango MD Referring Cardiology [phone]          Drug interactions: has remained unchanged.  Diet: has remained unchanged.    Clinic Interview:      INR History:  Anticoagulation Monitoring 3/4/2020 3/12/2020 3/20/2020   INR 4.78 3.35 4.09   INR Date 3/4/2020 3/12/2020 3/20/2020   INR Goal 2.5-3.5 2.5-3.5 2.5-3.5   Trend Same Down Same   Last Week Total 45 mg 45 mg 42.5 mg   Next Week Total 37.5 mg 42.5 mg 37.5 mg   Sun 5 mg 5 mg 5 mg   Mon 7.5 mg 7.5 mg 7.5 mg   Tue 5 mg 5 mg 5 mg   Wed Hold (3/4) 7.5 mg 7.5 mg   Thu 5 mg 5 mg 5 mg   Fri 7.5 mg 7.5 mg 2.5 mg (3/20)   Sat 7.5 mg 5 mg 5 mg   Visit Report - - -   Some recent data might be hidden       Plan:  1. INR is Supratherapeutic today- see above in Anticoagulation Summary.   Will instruct Rachel LYNNE Moser to reduce warfarin dose today to 2.5mg then continue their warfarin regimen- see above in Anticoagulation Summary.  2. Follow up in 1 week  3. Spoke with patient today. They have been instructed to call if any changes in medications, doses, concerns, etc. Patient expresses understanding and has no further questions at this time.    Rachel Krishnan RPH
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
## 2020-03-24 NOTE — TELEPHONE ENCOUNTER
Attempted to call carotid results- moderate bilateral. Recommend repeat in 1 year. Unable to leave voicemail as there was no prompt to do so on either number.     AL

## 2020-03-25 NOTE — TELEPHONE ENCOUNTER
S/w patient mild left carotid stenosis moderate right but asymptomatic. Plan to check again in 1 year        Please have carotid duplex scheduled same day as MI appt In march 2021

## 2020-03-25 NOTE — TELEPHONE ENCOUNTER
Pt called back regarding her carotid study results, she states she can be reached at [phone] or [phone].  Thanks/jlf

## 2020-04-03 NOTE — PROGRESS NOTES
Anticoagulation Clinic Progress Note    Anticoagulation Summary  As of 4/3/2020    INR goal:   2.5-3.5   TTR:   43.4 % (1.4 y)   INR used for dosin.80! (4/3/2020)   Warfarin maintenance plan:   7.5 mg every Mon, Fri; 5 mg all other days   Weekly warfarin total:   40 mg   Plan last modified:   Thomas Smallwood RPH (4/3/2020)   Next INR check:   4/10/2020   Priority:   Maintenance   Target end date:   Indefinite    Indications    History of DVT (deep vein thrombosis) [Z86.718]             Anticoagulation Episode Summary     INR check location:       Preferred lab:       Send INR reminders to:   [facility]    Comments:         Anticoagulation Care Providers     Provider Role Specialty Phone number    Vijay Arango MD Referring Cardiology [phone]          Clinic Interview:  Patient Findings     Positives:   Change in diet/appetite    Negatives:   Signs/symptoms of thrombosis, Signs/symptoms of bleeding,   Laboratory test error suspected, Change in health, Change in alcohol use,   Change in activity, Upcoming invasive procedure, Emergency department   visit, Upcoming dental procedure, Missed doses, Extra doses, Change in   medications, Hospital admission, Bruising, Other complaints    Comments:   Decreased vit k.      Clinical Outcomes     Negatives:   Major bleeding event, Thromboembolic event,   Anticoagulation-related hospital admission, Anticoagulation-related ED   visit, Anticoagulation-related fatality    Comments:   Decreased vit k.        INR History:  Anticoagulation Monitoring 3/12/2020 3/20/2020 4/3/2020   INR 3.35 4.09 4.80   INR Date 3/12/2020 3/20/2020 4/3/2020   INR Goal 2.5-3.5 2.5-3.5 2.5-3.5   Trend Down Same Down   Last Week Total 45 mg 42.5 mg 42.5 mg   Next Week Total 42.5 mg 37.5 mg 32.5 mg   Sun 5 mg 5 mg 5 mg   Mon 7.5 mg 7.5 mg 7.5 mg   Tue 5 mg 5 mg 5 mg   Wed 7.5 mg 7.5 mg 5 mg   Thu 5 mg 5 mg 5 mg   Fri 7.5 mg 2.5 mg (3/20) Hold (4/3)   Sat 5 mg 5 mg 5 mg    Visit Report - - -   Some recent data might be hidden       Plan:  1. INR is Supratherapeutic today- see above in Anticoagulation Summary.   Will instruct Rachel Moser to Change their warfarin regimen- see above in Anticoagulation Summary.  2. Follow up in 1 week  3. They have been instructed to call if any changes in medications, doses, concerns, etc. To seek immediate medical attention if s/sx of bleeding develop or fall occurs. Patient expresses understanding and has no further questions at this time.    Thomas Smallwood [facility]

## 2020-04-03 NOTE — TELEPHONE ENCOUNTER
Problem: Patient Care Overview  Goal: Plan of Care Review  Outcome: Ongoing (interventions implemented as appropriate)  Flowsheets  Taken 4/3/2020 0550  Progress: no change  Taken 4/2/2020 2300  Plan of Care Reviewed With: patient  Patient Agreement with Plan of Care: agrees  Note:   Pt stayed in her room and slept most of shift. Pt was talking to herself and shaking her head no when nobody was around. Hesitant to make eye contact. Denied anxiety, depression, SI/HI, and hallucinations. Cooperative and med compliant. Will continue to monitor and assess.      Goal: Individualization and Mutuality  Outcome: Ongoing (interventions implemented as appropriate)  Goal: Discharge Needs Assessment  Outcome: Ongoing (interventions implemented as appropriate)  Goal: Interprofessional Rounds/Family Conf  Outcome: Ongoing (interventions implemented as appropriate)     Problem: Overarching Goals (Adult)  Goal: Adheres to Safety Considerations for Self and Others  Outcome: Ongoing (interventions implemented as appropriate)  Flowsheets (Taken 4/3/2020 0550)  Adheres to Safety Considerations for Self and Others: making progress toward outcome  Goal: Optimized Coping Skills in Response to Life Stressors  Outcome: Ongoing (interventions implemented as appropriate)  Flowsheets (Taken 4/3/2020 0550)  Optimized Coping Skills in Response to Life Stressors: making progress toward outcome  Goal: Develops/Participates in Therapeutic Stendal to Support Successful Transition  Outcome: Ongoing (interventions implemented as appropriate)  Flowsheets (Taken 4/3/2020 0550)  Develops/Participates in Therapeutic Stendal to Support Successful Transition: making progress toward outcome     Problem: Cognitive Impairment (Psychotic Signs/Symptoms) (Adult)  Goal: Improved Thought Clarity/Organization (Psychotic Signs/Symptoms)  Outcome: Ongoing (interventions implemented as appropriate)  Flowsheets (Taken 4/3/2020 0550)  Improved Thought  Rx request   Clarity/Organization Time Frame for Action Step (STG): 3 days  Improved Thought Clarity/Organization Action Step (STG) Outcome: making progress toward outcome     Problem: Mental State/Mood Impairment (Psychotic Signs/Symptoms) (Adult)  Goal: Improved Mental State/Mood (Psychotic Signs/Symptoms)  Outcome: Ongoing (interventions implemented as appropriate)  Flowsheets (Taken 4/3/2020 0550)  Improved Mental State/Mood Time Frame for Action Step (STG): 3 days  Improved Mental State/Mood Action Step (STG) Outcome: making progress toward outcome     Problem: Social/Occupational/Functional Impairment (Psychotic Signs/Symptoms) (Adult)  Goal: Improved Social/Occupational/Functional Skills (Psychotic Signs/Symptoms)  Outcome: Ongoing (interventions implemented as appropriate)  Flowsheets (Taken 4/3/2020 0550)  Improved Social/Occupational/Functional Skills Time Frame for Action Step (STG): 3 days  Improved Social/Occupational/Functional Skills Action Step (STG) Outcome: making progress toward outcome     Problem: Suicidal Behavior (Adult)  Goal: Suicidal Behavior is Absent/Minimized/Managed  Outcome: Ongoing (interventions implemented as appropriate)  Flowsheets (Taken 4/3/2020 0550)  Suicidal Behavior Managed/Minimized Time Frame for Action Step (STG): 3 days  Suicidal Behavior Managed/Minimized Action Step (STG) Outcome: making progress toward outcome

## 2020-04-17 NOTE — PROGRESS NOTES
Anticoagulation Clinic Progress Note    Anticoagulation Summary  As of 2020    INR goal:   2.5-3.5   TTR:   43.3 % (1.5 y)   INR used for dosin.71 (2020)   Warfarin maintenance plan:   7.5 mg every Mon, Fri; 5 mg all other days   Weekly warfarin total:   40 mg   Plan last modified:   Thomas Smallwood RPH (4/3/2020)   Next INR check:   2020   Priority:   Maintenance   Target end date:   Indefinite    Indications    History of DVT (deep vein thrombosis) [Z86.718]             Anticoagulation Episode Summary     INR check location:       Preferred lab:       Send INR reminders to:    [facility]    Comments:         Anticoagulation Care Providers     Provider Role Specialty Phone number    Vijay Arango MD Referring Cardiology [phone]          Clinic Interview:      INR History:  Anticoagulation Monitoring 3/20/2020 4/3/2020 2020   INR 4.09 4.80 2.71   INR Date 3/20/2020 4/3/2020 2020   INR Goal 2.5-3.5 2.5-3.5 2.5-3.5   Trend Same Down Same   Last Week Total 42.5 mg 42.5 mg 40 mg   Next Week Total 37.5 mg 32.5 mg 40 mg   Sun 5 mg 5 mg 5 mg   Mon 7.5 mg 7.5 mg 7.5 mg   Tue 5 mg 5 mg 5 mg   Wed 7.5 mg 5 mg 5 mg   Thu 5 mg 5 mg 5 mg   Fri 2.5 mg (3/20) Hold (4/3) 7.5 mg   Sat 5 mg 5 mg 5 mg   Visit Report - - -   Some recent data might be hidden       Plan:  1. INR is Therapeutic today- see above in Anticoagulation Summary.   Will instruct Rachel Moser to Continue their warfarin regimen- see above in Anticoagulation Summary.  2. Follow up in 3 weeks  3. Spoke with pt today. They have been instructed to call if any changes in medications, doses, concerns, etc. Patient expresses understanding and has no further questions at this time.    Rachel Krishnan [facility]

## 2020-05-29 NOTE — PROGRESS NOTES
"Rachel Moser is a 74 y.o. female who presents with   Chief Complaint   Patient presents with   • Dizziness/allergies/sinus congestion     -3 weeks.  No fever or chills.   .    74-year-old female presents with a three-week history of dizziness with positional change.  There has been no tinnitus, fever or chills.  She does have a history of \"seasonal allergies\" and has had some upper respiratory \"sinus congestion\" for about 3 weeks as well.  This congestion seems to have correlated with the onset of her dizziness 3 weeks ago.       /80   Pulse 100   Temp 96.2 °F (35.7 °C) (Temporal)   Resp 13   Ht 149.9 cm (59.02\")   Wt 58.5 kg (129 lb)   SpO2 96%   BMI 26.04 kg/m²     The following portions of the patient's history were reviewed and updated as appropriate: allergies, current medications, past medical history and problem list.    Review of Systems   Constitutional: Negative.    HENT: Positive for congestion.    Eyes: Negative.    Respiratory: Negative.    Cardiovascular: Negative.    Genitourinary: Negative.    Musculoskeletal: Negative.    Skin: Negative.    Neurological: Positive for dizziness.   Psychiatric/Behavioral: Negative.        Objective   Physical Exam   Constitutional: She is oriented to person, place, and time. She appears well-developed and well-nourished. No distress.   HENT:   Head: Normocephalic and atraumatic.   Right Ear: External ear normal.   Left Ear: External ear normal.   Nose: Nose normal.   Mouth/Throat: Oropharynx is clear and moist.   Eyes: Pupils are equal, round, and reactive to light. Conjunctivae and EOM are normal.   Neck: Normal range of motion. Neck supple. No thyromegaly present.   Neck exam negative.  Carotid auscultation normal-no bruits heard.   Cardiovascular: Normal rate, regular rhythm, normal heart sounds and intact distal pulses. Exam reveals no gallop and no friction rub.   No murmur heard.  Pulmonary/Chest: Effort normal and breath sounds normal. No " respiratory distress. She has no wheezes. She has no rales. She exhibits no tenderness.   Neurological: She is alert and oriented to person, place, and time.   Psychiatric: She has a normal mood and affect. Her behavior is normal. Judgment and thought content normal.   Nursing note and vitals reviewed.      Assessment/Plan   Rachel was seen today for dizziness/allergies/sinus congestion.    Diagnoses and all orders for this visit:    Benign paroxysmal positional vertigo due to bilateral vestibular disorder  -     meclizine (ANTIVERT) 25 MG tablet; Take 1 tablet 3 times daily as needed for dizziness    Allergic state, initial encounter        Plan: The clinical history suggests benign positional vertigo.  We will try her on meclizine.  She is using OTC Zyrtec as needed and will continue the same.    ENT evaluation if problems persist

## 2020-06-12 NOTE — PROGRESS NOTES
Reason for clinic visits: Follow up for ESBL left prosthetic hip septic arthritis    HPI: Rachel Moser is a 74 y.o. female who was last seen in clinic in December.  She has done well since that time.  She saw Dr. Barney in March and the plan is to continue her on suppressive antibiotics as long as possible.  The patient states she is doing well without any hip pain erythema drainage or induration.  She denies any fevers chills or night sweats.  She is tolerating antibiotics without abdominal pain nausea vomiting or diarrhea.  No rashes.     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, status post multiple surgeries now antibiotic spacer in place on suppressive antibiotics    Past Surgical History:   Procedure Laterality Date   • BLADDER SUSPENSION     • CATARACT EXTRACTION, BILATERAL     • COLONOSCOPY     • ENDOSCOPIC FUNCTIONAL SINUS SURGERY (FESS)     • HIP SPACER INSERTION WITH ANTIBIOTIC CEMENT Left 8/11/2018    Procedure: TOTAL HIP ARTHROPLASTY REVISION with removal of infected total hip and placement of antibiotic spacer;  Surgeon: Obed Barney MD;  Location: [facility];  Service: Orthopedics   • HIP SURGERY Left 1983   • HIP SURGERY Left 20069   • HIP SURGERY Left 2011   • INCISION AND DRAINAGE HIP Left 11/2/2016    Procedure: HIP INCISION AND DRAINAGE;  Surgeon: Obed Barney MD;  Location: [facility];  Service:    • INCISION AND DRAINAGE HIP Left 7/7/2018    Procedure: I&D and antibiotic bead placement left hip;  Surgeon: Obed Barney MD;  Location: [facility] OR;  Service: Orthopedics   • INCISION AND DRAINAGE HIP Left 7/21/2018    Procedure: HIP INCISION AND DRAINAGE, LEFT WITH POLY HEAD CHANGE AND ANTIBIOTIC BEAD PLACEMENT;  Surgeon: Obed CHAVEZ  MD E[place];  Location: [facility];  Service: Orthopedics   • JOINT REPLACEMENT Left     HIP   • PERIPHERALLY INSERTED CENTRAL CATHETER INSERTION     • CA CLOSED RX TRAUMATIC HIP DISLOCATN Left 12/18/2017    Procedure: LEFT HIP CLOSED REDUCTION;  Surgeon: Obed Barney MD;  Location: [facility];  Service: Orthopedics   • SOFT TISSUE MASS EXCISION Left     hip 3/19/18   • TOTAL ABDOMINAL HYSTERECTOMY     • TOTAL HIP ARTHROPLASTY REVISION Left 4/25/2018    Procedure: REVISION OF LEFT ACETABULAR COMPONENT ;  Surgeon: Obed Barney MD;  Location: [facility];  Service: Orthopedics   • TOTAL HIP ARTHROPLASTY REVISION Left 08/11/2018    total hip revision with removal of iinfected total hip and placement of antibiotic spacer   • VENA CAVA FILTER INSERTION         Social History   reports that she quit smoking about 45 years ago. Her smoking use included cigarettes. She has never used smokeless tobacco. She reports that she does not drink alcohol or use drugs.    Family History  family history is not on file.    Allergies   Allergen Reactions   • Sulfa Antibiotics Rash     Other reaction(s): Skin irritation       The medication list has been reviewed and updated.     Review of Systems  Pertinent items are noted in HPI, all other systems reviewed and negative    Vital Signs   Temp:  [97.7 °F (36.5 °C)] 97.7 °F (36.5 °C)  Heart Rate:  [86] 86  Resp:  [12] 12  BP: (120)/(75) 120/75    Physical Exam:   General: In no acute distress  Cardiovascular: RRR, no LE edema    Respiratory: Breathing comfortably on room air   GI: Abdomen is soft, non-tender, non-distended,   Skin: No rashes , left hip incision healed well without erythema drainage or pain    Lab Results   Component Value Date    WBC 14.39 (H) 12/13/2019    HGB 14.3 12/13/2019    HCT 44.2 12/13/2019    MCV 91.5 12/13/2019     12/13/2019       Lab Results   Component Value Date    GLUCOSE 104 (H) 12/13/2019    BUN 13 12/13/2019    CREATININE 0.68  12/13/2019    EGFRIFNONA 85 12/13/2019    BCR 19.1 12/13/2019    CO2 24.7 12/13/2019    CALCIUM 9.3 12/13/2019    ALBUMIN 4.30 12/13/2019    AST 17 12/13/2019    ALT 17 12/13/2019       Lab Results   Component Value Date    SEDRATE 21 09/13/2019       Lab Results   Component Value Date    CRP 0.57 (H) 09/13/2019 7/7 L hip wound ESBL E coli   7/21 L hip Cx:neg   8/11 L hip cx neg    Assessment:  This is a 74 y.o. female who presents to clinic today for follow up of her ESBL left prosthetic hip septic arthritis.  The patient initially underwent incision and drainage with retained hardware on July 7/2018.  She was treated with IV ertapenem but continued to have significant drainage from her hip.  She was taken back to the OR for another incision and drainage on July 21 and ultimately on August 11 all hardware was removed.  Initial operative cultures grew ESBL Escherichia coli.  Her cultures from July 21 and August 11 are negative.  She was treated with IV ertapenem x 10 weeks.  She has been on Augmentin and cefaclor since October 29th.  In March 2019 we took her off cefaclor and continued her on amoxicillin only.  The plan is to keep her on suppressive antibiotics as long as possible and attempt to avoid surgery.       Plan:   1.  Continue Augmentin 500 mg by mouth twice a day x 6 months  2.  Obtain a CBC with differential and CMP    Return to Infectious Disease clinic in 6 month

## 2020-06-12 NOTE — PROGRESS NOTES
Anticoagulation Clinic Progress Note    Anticoagulation Summary  As of 6/12/2020    INR goal:   2.5-3.5   TTR:   48.6 % (1.6 y)   INR used for dosing:   3.00 (6/12/2020)   Warfarin maintenance plan:   7.5 mg every Mon, Fri; 5 mg all other days   Weekly warfarin total:   40 mg   No change documented:   Thomas Smallwood RPH   Plan last modified:   Thomas Smallwood RPH (4/3/2020)   Next INR check:   7/10/2020   Priority:   Maintenance   Target end date:   Indefinite    Indications    History of DVT (deep vein thrombosis) [Z86.718]             Anticoagulation Episode Summary     INR check location:       Preferred lab:       Send INR reminders to:   [facility]    Comments:   [facility]      Anticoagulation Care Providers     Provider Role Specialty Phone number    Vijay Arango MD Referring Cardiology [phone]          Clinic Interview:  Patient Findings     Negatives:   Signs/symptoms of thrombosis, Signs/symptoms of bleeding,   Laboratory test error suspected, Change in health, Change in alcohol use,   Change in activity, Upcoming invasive procedure, Emergency department   visit, Upcoming dental procedure, Missed doses, Extra doses, Change in   medications, Change in diet/appetite, Hospital admission, Bruising, Other   complaints      Clinical Outcomes     Negatives:   Major bleeding event, Thromboembolic event,   Anticoagulation-related hospital admission, Anticoagulation-related ED   visit, Anticoagulation-related fatality        INR History:  Anticoagulation Monitoring 4/17/2020 5/14/2020 6/12/2020   INR 2.71 2.92 3.00   INR Date 4/17/2020 5/13/2020 6/12/2020   INR Goal 2.5-3.5 2.5-3.5 2.5-3.5   Trend Same Same Same   Last Week Total 40 mg 40 mg 40 mg   Next Week Total 40 mg 40 mg 40 mg   Sun 5 mg 5 mg 5 mg   Mon 7.5 mg 7.5 mg 7.5 mg   Tue 5 mg 5 mg 5 mg   Wed 5 mg 5 mg 5 mg   Thu 5 mg 5 mg 5 mg   Fri 7.5 mg 7.5 mg 7.5 mg   Sat 5 mg 5 mg 5 mg   Visit Report - - -   Some recent data might  be hidden       Plan:  1. INR is Therapeutic today- see above in Anticoagulation Summary.   Will instruct Rachel Moser to Continue their warfarin regimen- see above in Anticoagulation Summary.  2. Follow up in 4 weeks  3. They have been instructed to call if any changes in medications, doses, concerns, etc. Patient expresses understanding and has no further questions at this time.    Thomas Smallwood RP

## 2020-07-10 NOTE — PROGRESS NOTES
Anticoagulation Clinic Progress Note    Anticoagulation Summary  As of 7/10/2020    INR goal:   2.5-3.5   TTR:   50.9 % (1.7 y)   INR used for dosing:   3.39 (7/10/2020)   Warfarin maintenance plan:   7.5 mg every Mon, Fri; 5 mg all other days   Weekly warfarin total:   40 mg   No change documented:   Thomas Smalwlood RPH   Plan last modified:   Thomas Smallwood RPH (4/3/2020)   Next INR check:   8/7/2020   Priority:   Maintenance   Target end date:   Indefinite    Indications    History of DVT (deep vein thrombosis) [Z86.718]             Anticoagulation Episode Summary     INR check location:       Preferred lab:       Send INR reminders to:   [facility]    Comments:   [facility]      Anticoagulation Care Providers     Provider Role Specialty Phone number    Vijay Arango MD Referring Cardiology [phone]          Clinic Interview:  Patient Findings     Negatives:   Signs/symptoms of thrombosis, Signs/symptoms of bleeding,   Laboratory test error suspected, Change in health, Change in alcohol use,   Change in activity, Upcoming invasive procedure, Emergency department   visit, Upcoming dental procedure, Missed doses, Extra doses, Change in   medications, Change in diet/appetite, Hospital admission, Bruising, Other   complaints      Clinical Outcomes     Negatives:   Major bleeding event, Thromboembolic event,   Anticoagulation-related hospital admission, Anticoagulation-related ED   visit, Anticoagulation-related fatality        INR History:  Anticoagulation Monitoring 5/14/2020 6/12/2020 7/10/2020   INR 2.92 3.00 3.39   INR Date 5/13/2020 6/12/2020 7/10/2020   INR Goal 2.5-3.5 2.5-3.5 2.5-3.5   Trend Same Same Same   Last Week Total 40 mg 40 mg 40 mg   Next Week Total 40 mg 40 mg 40 mg   Sun 5 mg 5 mg 5 mg   Mon 7.5 mg 7.5 mg 7.5 mg   Tue 5 mg 5 mg 5 mg   Wed 5 mg 5 mg 5 mg   Thu 5 mg 5 mg 5 mg   Fri 7.5 mg 7.5 mg 7.5 mg   Sat 5 mg 5 mg 5 mg   Visit Report - - -   Some recent data might  be hidden       Plan:  1. INR is Therapeutic today- see above in Anticoagulation Summary.   Will instruct Rachel Moser to Continue their warfarin regimen- see above in Anticoagulation Summary.  2. Follow up in 4 weeks  3. They have been instructed to call if any changes in medications, doses, concerns, etc. Patient expresses understanding and has no further questions at this time.    Thomas Smallwood RP

## 2020-07-14 NOTE — PROGRESS NOTES
"Rachel Moser is a 74 y.o. female who presents with   Chief Complaint   Patient presents with   • Suspected urinary tract infection     Burning on urination, frequency and urgency-3 days.  No fever or chills.  No visible blood in the urine.   .    74-year-old female.  History as above.       /80   Pulse 91   Temp 97.5 °F (36.4 °C) (Tympanic)   Resp 13   Ht 149.9 cm (59.02\")   Wt 57.7 kg (127 lb 3.2 oz)   SpO2 98%   BMI 25.68 kg/m²     The following portions of the patient's history were reviewed and updated as appropriate: allergies, current medications, past medical history and problem list.    Review of Systems   Constitutional: Negative.  Negative for chills and fever.   HENT: Negative.    Eyes: Negative.    Respiratory: Negative.    Cardiovascular: Negative.    Genitourinary: Positive for dysuria, frequency and urgency. Negative for hematuria.   Musculoskeletal: Negative.    Skin: Negative.    Neurological: Negative.    Psychiatric/Behavioral: Negative.        Objective   Physical Exam   Constitutional: She is oriented to person, place, and time. She appears well-developed and well-nourished. No distress.   HENT:   Head: Normocephalic and atraumatic.   Eyes: Pupils are equal, round, and reactive to light. Conjunctivae and EOM are normal.   Neck: Normal range of motion. Neck supple. No thyromegaly present.   Neck exam negative.  Carotid auscultation normal-no bruits heard.   Cardiovascular: Normal rate, regular rhythm, normal heart sounds and intact distal pulses. Exam reveals no gallop and no friction rub.   No murmur heard.  Pulmonary/Chest: Effort normal and breath sounds normal. No respiratory distress. She has no wheezes. She has no rales. She exhibits no tenderness.   Neurological: She is alert and oriented to person, place, and time.   Psychiatric: She has a normal mood and affect. Her behavior is normal. Judgment and thought content normal.   Nursing note and vitals " reviewed.      Assessment/Plan   Rachel was seen today for suspected urinary tract infection.    Diagnoses and all orders for this visit:    Cystitis  -     Urinalysis With Culture If Indicated -  -     nitrofurantoin, macrocrystal-monohydrate, (MACROBID) 100 MG capsule; Take 1 capsule every 12 hours until all are gone        Plan: She is currently taking amoxicillin 500 mg twice daily for unassociated reasons but it does not seem to be helping her UTI symptoms suggesting a possible resistant organism.    Lab as above.    Empiric treatment with Macrobid 100 mg twice daily pending urine culture report.  She was given enough Macrobid to last 10 days assuming she has a sensitive organism.    Oral liquids to tolerance.

## 2020-07-29 NOTE — TELEPHONE ENCOUNTER
PATIENT RECENTLY SEEN FOR UTI WITH DR WALL    SHE WAS PRESCRIBED NITROFURANTOIN AND FINISHED MEDICATION, BUT SYMPTOMS HAVE RETURNED    BURNING WHEN URINATING    PATIENT WOULD LIKE CALL BACK TO LET HER KNOW IF SHE SHOULD BE SEEN AGAIN OR REFILL CALLED IN.    [phone]

## 2020-07-29 NOTE — TELEPHONE ENCOUNTER
Refills requested sent to the PCP for approval.     S/W pt and let her know Rx sent in and if infection does not get better she will need to be soon.

## 2020-07-29 NOTE — TELEPHONE ENCOUNTER
Refill her nitrofurantoin as previously prescribed and the quantity as given but if the symptoms persist she will need to be seen and have a repeat urine culture done

## 2020-08-05 NOTE — TELEPHONE ENCOUNTER
I am the on call provider tonight for [facility].  Received a call of INR of 4.33 (INR goal on previous [facility] clinic note says 2.5-3.5).  No [facility] clinic note from today yet so unsure if this has been addressed yet today.  Multiple attempts made to reach patient on both listed phone numbers but no answer and no option to leave voicemail.  Would recommend following up with [facility] clinic in the AM for dose adjustment instructions and repeat testing instructions if she did not already receive these today.        [facility] clinic staff-- please follow-up on this tomorrow if not already addressed (no note seen in system yet though I know this could have just been delayed).  As always, thanks so much for your assistance with our patients!      Chelsea--  STALIN.

## 2020-08-06 NOTE — PROGRESS NOTES
Anticoagulation Clinic Progress Note    Anticoagulation Summary  As of 2020    INR goal:   2.5-3.5   TTR:   49.3 % (1.8 y)   INR used for dosin.33! (2020)   Warfarin maintenance plan:   7.5 mg every Mon, Fri; 5 mg all other days   Weekly warfarin total:   40 mg   Plan last modified:   Thomas Smallwood RPH (4/3/2020)   Next INR check:   2020   Priority:   Maintenance   Target end date:   Indefinite    Indications    History of DVT (deep vein thrombosis) [Z86.718]             Anticoagulation Episode Summary     INR check location:       Preferred lab:       Send INR reminders to:   [facility]    Comments:   [facility]      Anticoagulation Care Providers     Provider Role Specialty Phone number    Vijay Aranog MD Referring Cardiology [phone]          Clinic Interview:  Patient Findings     Positives:   Change in health, Change in medications, Change in   diet/appetite    Negatives:   Signs/symptoms of thrombosis, Signs/symptoms of bleeding,   Laboratory test error suspected, Change in alcohol use, Change in   activity, Upcoming invasive procedure, Emergency department visit,   Upcoming dental procedure, Missed doses, Extra doses, Hospital admission,   Bruising, Other complaints    Comments:   UTI - nitrofurantoin x 10 days (will finish over weekend).   Cranberry juice recently due to UTI (rec from provider treating UTI);   voices intention to continue until abx is finished      Clinical Outcomes     Negatives:   Major bleeding event, Thromboembolic event,   Anticoagulation-related hospital admission, Anticoagulation-related ED   visit, Anticoagulation-related fatality    Comments:   UTI - nitrofurantoin x 10 days (will finish over weekend).   Cranberry juice recently due to UTI (rec from provider treating UTI);   voices intention to continue until abx is finished        INR History:  Anticoagulation Monitoring 2020 7/10/2020 2020   INR 3.00 3.39 4.33   INR  Date 6/12/2020 7/10/2020 8/5/2020   INR Goal 2.5-3.5 2.5-3.5 2.5-3.5   Trend Same Same Same   Last Week Total 40 mg 40 mg 40 mg   Next Week Total 40 mg 40 mg 32.5 mg   Sun 5 mg 5 mg 5 mg   Mon 7.5 mg 7.5 mg 7.5 mg   Tue 5 mg 5 mg 5 mg   Wed 5 mg 5 mg 5 mg   Thu 5 mg 5 mg Hold (8/6); Otherwise 5 mg   Fri 7.5 mg 7.5 mg 5 mg (8/7)   Sat 5 mg 5 mg 5 mg   Visit Report - - -   Some recent data might be hidden       Plan:  1. INR was Supratherapeutic yesterday evening - see above in Anticoagulation Summary.   Will instruct Rachel BATISTA Ehsan to Change their warfarin regimen- see above in Anticoagulation Summary.  2. Follow up in 1 week  3. They have been instructed to call if any changes in medications, doses, concerns, etc. Patient expresses understanding and has no further questions at this time.    Thomas Smallwood [facility]

## 2020-08-06 NOTE — TELEPHONE ENCOUNTER
Unfortunately, Mrs. Moser's INR resulted after our clinic closed yesterday evening. Spoke with Mrs. Moser this morning. May refer to today's anticoag visit. Thank you for attempting to reach her last night!

## 2020-08-14 NOTE — PROGRESS NOTES
Anticoagulation Clinic Progress Note    Anticoagulation Summary  As of 8/14/2020    INR goal:   2.5-3.5   TTR:   48.6 % (1.8 y)   INR used for dosing:   3.87! (8/14/2020)   Warfarin maintenance plan:   7.5 mg every Mon, Fri; 5 mg all other days   Weekly warfarin total:   40 mg   Plan last modified:   Thomas Smallwood RPH (4/3/2020)   Next INR check:   8/28/2020   Priority:   Maintenance   Target end date:   Indefinite    Indications    History of DVT (deep vein thrombosis) [Z86.718]             Anticoagulation Episode Summary     INR check location:       Preferred lab:       Send INR reminders to:   [facility]    Comments:   [facility]      Anticoagulation Care Providers     Provider Role Specialty Phone number    Vijay Arango MD Referring Cardiology [phone]          Clinic Interview:      INR History:  Anticoagulation Monitoring 7/10/2020 8/6/2020 8/14/2020   INR 3.39 4.33 3.87   INR Date 7/10/2020 8/5/2020 8/14/2020   INR Goal 2.5-3.5 2.5-3.5 2.5-3.5   Trend Same Same Same   Last Week Total 40 mg 40 mg 37.5 mg   Next Week Total 40 mg 32.5 mg 37.5 mg   Sun 5 mg 5 mg 5 mg   Mon 7.5 mg 7.5 mg 7.5 mg   Tue 5 mg 5 mg 5 mg   Wed 5 mg 5 mg 5 mg   Thu 5 mg Hold (8/6); Otherwise 5 mg 5 mg   Fri 7.5 mg 5 mg (8/7) 5 mg (8/14); Otherwise 7.5 mg   Sat 5 mg 5 mg 5 mg   Visit Report - - -   Some recent data might be hidden       Plan:  1. INR is Supratherapeutic today- see above in Anticoagulation Summary.   Will instruct Rachel Moser to reduce warfarin dose today, then continue their warfarin regimen- see above in Anticoagulation Summary.  2. Follow up in 2 weeks  3. Spoke with pt today.They have been instructed to call if any changes in medications, doses, concerns, etc. Patient expresses understanding and has no further questions at this time.    Rachel Krishnan RP

## 2020-08-17 NOTE — PATIENT INSTRUCTIONS
Urinary Tract Infection, Adult    A urinary tract infection (UTI) is an infection of any part of the urinary tract. The urinary tract includes the kidneys, ureters, bladder, and urethra. These organs make, store, and get rid of urine in the body.  Your health care provider may use other names to describe the infection. An upper UTI affects the ureters and kidneys (pyelonephritis). A lower UTI affects the bladder (cystitis) and urethra (urethritis).  What are the causes?  Most urinary tract infections are caused by bacteria in your genital area, around the entrance to your urinary tract (urethra). These bacteria grow and cause inflammation of your urinary tract.  What increases the risk?  You are more likely to develop this condition if:  · You have a urinary catheter that stays in place (indwelling).  · You are not able to control when you urinate or have a bowel movement (you have incontinence).  · You are female and you:  ? Use a spermicide or diaphragm for birth control.  ? Have low estrogen levels.  ? Are pregnant.  · You have certain genes that increase your risk (genetics).  · You are sexually active.  · You take antibiotic medicines.  · You have a condition that causes your flow of urine to slow down, such as:  ? An enlarged prostate, if you are male.  ? Blockage in your urethra (stricture).  ? A kidney stone.  ? A nerve condition that affects your bladder control (neurogenic bladder).  ? Not getting enough to drink, or not urinating often.  · You have certain medical conditions, such as:  ? Diabetes.  ? A weak disease-fighting system (immunesystem).  ? Sickle cell disease.  ? Gout.  ? Spinal cord injury.  What are the signs or symptoms?  Symptoms of this condition include:  · Needing to urinate right away (urgently).  · Frequent urination or passing small amounts of urine frequently.  · Pain or burning with urination.  · Blood in the urine.  · Urine that smells bad or unusual.  · Trouble urinating.  · Cloudy  urine.  · Vaginal discharge, if you are female.  · Pain in the abdomen or the lower back.  You may also have:  · Vomiting or a decreased appetite.  · Confusion.  · Irritability or tiredness.  · A fever.  · Diarrhea.  The first symptom in older adults may be confusion. In some cases, they may not have any symptoms until the infection has worsened.  How is this diagnosed?  This condition is diagnosed based on your medical history and a physical exam. You may also have other tests, including:  · Urine tests.  · Blood tests.  · Tests for sexually transmitted infections (STIs).  If you have had more than one UTI, a cystoscopy or imaging studies may be done to determine the cause of the infections.  How is this treated?  Treatment for this condition includes:  · Antibiotic medicine.  · Over-the-counter medicines to treat discomfort.  · Drinking enough water to stay hydrated.  If you have frequent infections or have other conditions such as a kidney stone, you may need to see a health care provider who specializes in the urinary tract (urologist).  In rare cases, urinary tract infections can cause sepsis. Sepsis is a life-threatening condition that occurs when the body responds to an infection. Sepsis is treated in the hospital with IV antibiotics, fluids, and other medicines.  Follow these instructions at home:    Medicines  · Take over-the-counter and prescription medicines only as told by your health care provider.  · If you were prescribed an antibiotic medicine, take it as told by your health care provider. Do not stop using the antibiotic even if you start to feel better.  General instructions  · Make sure you:  ? Empty your bladder often and completely. Do not hold urine for long periods of time.  ? Empty your bladder after sex.  ? Wipe from front to back after a bowel movement if you are female. Use each tissue one time when you wipe.  · Drink enough fluid to keep your urine pale yellow.  · Keep all follow-up  visits as told by your health care provider. This is important.  Contact a health care provider if:  · Your symptoms do not get better after 1-2 days.  · Your symptoms go away and then return.  Get help right away if you have:  · Severe pain in your back or your lower abdomen.  · A fever.  · Nausea or vomiting.  Summary  · A urinary tract infection (UTI) is an infection of any part of the urinary tract, which includes the kidneys, ureters, bladder, and urethra.  · Most urinary tract infections are caused by bacteria in your genital area, around the entrance to your urinary tract (urethra).  · Treatment for this condition often includes antibiotic medicines.  · If you were prescribed an antibiotic medicine, take it as told by your health care provider. Do not stop using the antibiotic even if you start to feel better.  · Keep all follow-up visits as told by your health care provider. This is important.  This information is not intended to replace advice given to you by your health care provider. Make sure you discuss any questions you have with your health care provider.  Document Released: 09/27/2006 Document Revised: 12/05/2019 Document Reviewed: 06/27/2019  [employer] Patient Education © 2020 [employer]

## 2020-08-17 NOTE — PROGRESS NOTES
[facility] [place] INTERNAL MEDICINE  Brie BATISTA Stone / 74 y.o. / female  08/17/2020      ASSESSMENT & PLAN:    Problem List Items Addressed This Visit     None      Visit Diagnoses     Recurrent UTI    -  Primary    Relevant Medications    cefuroxime (Ceftin) 250 MG tablet    Other Relevant Orders    Urine Culture - Urine, Urine, Clean Catch    Cystitis        Relevant Orders    POC Urinalysis Dipstick, Automated (Completed)        Orders Placed This Encounter   Procedures   • Urine Culture - Urine, Urine, Clean Catch   • POC Urinalysis Dipstick, Automated     New Medications Ordered This Visit   Medications   • cefuroxime (Ceftin) 250 MG tablet     Sig: Take 1 tablet by mouth 2 (Two) Times a Day for 7 days.     Dispense:  14 tablet     Refill:  0       Summary/Discussion:    1. Recurrent UTI  Reviewed most recent culture.  Bacteria present and that urinary tract infection was resistant to multiple antibiotics.  Discussed with patient would recommend treatment presumptively based on previous culture.  Will send urinalysis for culture at this time, change medication as needed.   Instructed patient to increase PO Fluid intake, may use OTC pyridium for up to 3 days for symptom relief. Discussed with patient to monitor for worsening or new symptoms, such as fever, severe flank pain, vomiting and to notify office immediately should these symptoms arise.       - cefuroxime (Ceftin) 250 MG tablet; Take 1 tablet by mouth 2 (Two) Times a Day for 7 days.  Dispense: 14 tablet; Refill: 0  - Urine Culture - Urine, Urine, Clean Catch    2. Cystitis    - POC Urinalysis Dipstick, Automated        No follow-ups on file.    ____________________________________________________________________    MEDICATIONS  Current Outpatient Medications   Medication Sig Dispense Refill   • B Complex-Biotin-FA (HM VITAMIN B50 COMPLEX PO) Take 1 tablet by mouth Daily.     • Calcium-Magnesium-Vitamin D (CALCIUM 1200+D3 PO) Take  by mouth.      • cholecalciferol (VITAMIN D3) 1000 units tablet Take 2,000 Units by mouth Daily.     • CRANBERRY PO Take  by mouth. Take 2 tablets by mouth daily     • ferrous sulfate (FERROUSUL) 325 (65 FE) MG tablet Take 325 mg by mouth Daily With Breakfast. On hold for sx.     • FOLIC ACID PO Take 4 tablets by mouth daily     • furosemide (LASIX) 40 MG tablet Take 40 mg by mouth Daily.     • leucovorin 5 MG tablet Take 5 mg by mouth 1 (One) Time Per Week.     • meclizine (ANTIVERT) 25 MG tablet Take 1 tablet 3 times daily as needed for dizziness 15 tablet 1   • methotrexate 2.5 MG tablet Take 2.5 mg by mouth 1 (One) Time Per Week. 8 tablets once a week     • metoprolol succinate XL (TOPROL-XL) 50 MG 24 hr tablet Take 50 mg by mouth Daily.     • Multiple Vitamins-Minerals (MULTIVITAMIN ADULT PO) Take 1 tablet by mouth Daily.     • polyethylene glycol (MIRALAX) packet Take 17 g by mouth 2 (Two) Times a Day. (Patient taking differently: Take 17 g by mouth 2 (Two) Times a Day As Needed.)     • potassium chloride (K-DUR,KLOR-CON) 20 MEQ CR tablet Take 2 tablets by mouth once daily 180 tablet 2   • predniSONE (DELTASONE) 5 MG tablet Take 5 mg by mouth Daily As Needed.     • sennosides-docusate sodium (SENOKOT-S) 8.6-50 MG tablet Take 2 tablets by mouth 2 (Two) Times a Day As Needed for Constipation.     • simvastatin (ZOCOR) 40 MG tablet Take 40 mg by mouth Every Morning.     • warfarin (COUMADIN) 5 MG tablet Take 1.5 tablets (7.5 mg) by mouth Mon and Fri, and take 1 tablet (5 mg) by mouth all other days or as directed. 105 tablet 1   • cefuroxime (Ceftin) 250 MG tablet Take 1 tablet by mouth 2 (Two) Times a Day for 7 days. 14 tablet 0   • nitrofurantoin, macrocrystal-monohydrate, (MACROBID) 100 MG capsule Take 1 capsule every 12 hours until all are gone 20 capsule 0     No current facility-administered medications for this visit.           VITALS:    Visit Vitals  /80   Pulse 91   Temp 96.8 °F (36 °C) (Temporal)   Ht 149.9  "cm (59.02\")   Wt 57.6 kg (127 lb)   SpO2 98%   BMI 25.64 kg/m²       BP Readings from Last 3 Encounters:   08/17/20 122/80   07/14/20 138/80   06/12/20 120/75     Wt Readings from Last 3 Encounters:   08/17/20 57.6 kg (127 lb)   07/14/20 57.7 kg (127 lb 3.2 oz)   06/12/20 58.5 kg (129 lb)      Body mass index is 25.64 kg/m².    CC:  Main reason(s) for today's visit: Urinary Tract Infection      HPI:     Urinary Tract Infection: Patient complains of dysuria and frequency, hesitancy, cloudiness to urine. She has had symptoms for 1 day. She was recently treated by her PCP in July for similar symptoms, has had two courses of Macrobid since then which seemed to clear up the infection but symptoms have returned.   Patient also complains of back pain and fever. Patient denies fever. Patient does have a history of recurrent UTI, sees Dr. Nancy Smalls, has had multiple urologic surgeries.  Patient does not have a history of pyelonephritis.       Patient Care Team:  Nahun Wilkerson MD as PCP - General (Internal Medicine)  Vijay Arango MD as Consulting Physician (Cardiology)  Rachel Krishnan [facility] as Pharmacist  Nancy Smalls MD as Consulting Physician (Urogynecology)    ____________________________________________________________________    REVIEW OF SYSTEMS    Review of Systems   Constitutional: Negative for chills and fever.   Gastrointestinal: Negative for abdominal pain, nausea and vomiting.   Genitourinary: Positive for dysuria, frequency, pelvic pain and urgency. Negative for decreased urine volume, difficulty urinating, flank pain, hematuria, vaginal bleeding, vaginal discharge and vaginal pain.   Musculoskeletal: Positive for back pain.         PHYSICAL EXAMINATION    Physical Exam   Constitutional: She is oriented to person, place, and time. Vital signs are normal. She appears well-developed and well-nourished. She is cooperative. She does not appear ill. No distress.   Neurological: She is alert and oriented to " person, place, and time. She has normal strength.   Nursing note and vitals reviewed.      REVIEWED DATA:    Labs:     Lab Results   Component Value Date     06/12/2020    K 4.2 06/12/2020    AST 18 06/12/2020    ALT 16 06/12/2020    BUN 10 06/12/2020    CREATININE 0.53 (L) 06/12/2020    CREATININE 0.68 12/13/2019    CREATININE 0.69 09/13/2019    EGFRIFNONA 113 06/12/2020       Lab Results   Component Value Date    GLUCOSE 108 (H) 06/12/2020    GLUCOSE 104 (H) 12/13/2019    GLUCOSE 92 09/13/2019       No results found for: LDL, HDL, TRIG, CHOLHDLRATIO    Lab Results   Component Value Date    TSH 1.830 02/10/2019       Lab Results   Component Value Date    WBC 10.18 06/12/2020    HGB 13.4 06/12/2020    HGB 14.3 12/13/2019    HGB 15.0 09/13/2019     06/12/2020       Lab Results   Component Value Date    PROTEIN Negative 07/14/2020    GLUCOSEU Negative 07/14/2020    BLOODU Negative 07/14/2020    NITRITEU Positive (A) 07/14/2020    LEUKOCYTESUR Moderate (2+) (A) 08/17/2020       Imaging:         Medical Tests:         Summary of old records / correspondence / consultant report:         Request outside records:         ALLERGIES  Allergies   Allergen Reactions   • Sulfa Antibiotics Rash     Other reaction(s): Skin irritation        PFSH:     The following portions of the patient's history were reviewed and updated as appropriate: Allergies / Current Medications / Past Medical History / Surgical History / Social History / Family History    PROBLEM LIST   Patient Active Problem List   Diagnosis   • Chronic left hip pain   • OA (osteoarthritis) of hip   • Hip dislocation, left (CMS/[facility])   • Status post left hip replacement   • Hypertension   • Rheumatoid arthritis involving multiple sites (CMS/[facility])   • History of DVT (deep vein thrombosis)   • Postoperative hypotension   • S/P revision of total hip   • Septic arthritis (CMS/[facility])   • Status post total hip replacement, left   • Pain of left hip joint   •  Constipation   • Nausea and vomiting   • Supratherapeutic INR   • Long term (current) use of antibiotics   • Long term (current) use of anticoagulants   • Leukocytosis   • Dehydration       PAST MEDICAL HISTORY  Past Medical History:   Diagnosis Date   • Allergic    • Bone infection (CMS/HCC)     hip   • Cataract    • High cholesterol    • History of DVT (deep vein thrombosis)    • History of kidney infection     1 MONTH AGO   • History of pulmonary embolus (PE)    • History of transfusion    • Hypertension    • Left hip postoperative wound infection    • Paralabral cyst of left hip    • PICC (peripherally inserted central catheter) in place    • PONV (postoperative nausea and vomiting)    • Presence of vena cava filter    • Rheumatoid arteritis (CMS/HCC)    • Seasonal allergies    • UTI (urinary tract infection)     diagnosed 4/2/18  medically treated presently   • Wears glasses        SURGICAL HISTORY  Past Surgical History:   Procedure Laterality Date   • BLADDER SUSPENSION     • CATARACT EXTRACTION, BILATERAL     • COLONOSCOPY     • ENDOSCOPIC FUNCTIONAL SINUS SURGERY (FESS)     • HIP SPACER INSERTION WITH ANTIBIOTIC CEMENT Left 8/11/2018    Procedure: TOTAL HIP ARTHROPLASTY REVISION with removal of infected total hip and placement of antibiotic spacer;  Surgeon: Obed Barney MD;  Location: [facility];  Service: Orthopedics   • HIP SURGERY Left 1983   • HIP SURGERY Left 20069   • HIP SURGERY Left 2011   • INCISION AND DRAINAGE HIP Left 11/2/2016    Procedure: HIP INCISION AND DRAINAGE;  Surgeon: Obed Barney MD;  Location: [facility];  Service:    • INCISION AND DRAINAGE HIP Left 7/7/2018    Procedure: I&D and antibiotic bead placement left hip;  Surgeon: Obed Barnye MD;  Location: [facility];  Service: Orthopedics   • INCISION AND DRAINAGE HIP Left 7/21/2018    Procedure: HIP INCISION AND DRAINAGE, LEFT WITH POLY HEAD CHANGE AND ANTIBIOTIC BEAD PLACEMENT;  Surgeon: Obed Barney MD;  Location:  [facility] OR;  Service: Orthopedics   • JOINT REPLACEMENT Left     HIP   • PERIPHERALLY INSERTED CENTRAL CATHETER INSERTION     • FL CLOSED RX TRAUMATIC HIP DISLOCATN Left 2017    Procedure: LEFT HIP CLOSED REDUCTION;  Surgeon: Obed Barney MD;  Location: [facility] OR;  Service: Orthopedics   • SOFT TISSUE MASS EXCISION Left     hip 3/19/18   • TOTAL ABDOMINAL HYSTERECTOMY     • TOTAL HIP ARTHROPLASTY REVISION Left 2018    Procedure: REVISION OF LEFT ACETABULAR COMPONENT ;  Surgeon: Obed Barney MD;  Location: [facility] OR;  Service: Orthopedics   • TOTAL HIP ARTHROPLASTY REVISION Left 2018    total hip revision with removal of iinfected total hip and placement of antibiotic spacer   • VENA CAVA FILTER INSERTION         SOCIAL HISTORY  Social History     Socioeconomic History   • Marital status:      Spouse name: Not on file   • Number of children: Not on file   • Years of education: Not on file   • Highest education level: Not on file   Tobacco Use   • Smoking status: Former Smoker     Types: Cigarettes     Last attempt to quit: 1975     Years since quittin.6   • Smokeless tobacco: Never Used   • Tobacco comment: quit 50 years ago   Substance and Sexual Activity   • Alcohol use: No   • Drug use: No   • Sexual activity: Defer     Partners: Male   Lifestyle   • Physical activity:     Days per week: 3 days     Minutes per session: 20 min   • Stress: Not on file       FAMILY HISTORY  Family History   Problem Relation Age of Onset   • Malig Hyperthermia Neg Hx          **Dragon Disclaimer:   Much of this encounter note is an electronic transcription/translation of spoken language to printed text. The electronic translation of spoken language may permit erroneous, or at times, nonsensical words or phrases to be inadvertently transcribed. Although I have reviewed the note for such errors, some may still exist.

## 2020-08-28 NOTE — PROGRESS NOTES
Anticoagulation Clinic Progress Note    Anticoagulation Summary  As of 2020    INR goal:   2.5-3.5   TTR:   49.1 % (1.9 y)   INR used for dosin.56 (2020)   Warfarin maintenance plan:   7.5 mg every Mon, Fri; 5 mg all other days   Weekly warfarin total:   40 mg   No change documented:   Thomas Smallwood RPH   Plan last modified:   Thomas Smallwood RPH (4/3/2020)   Next INR check:   2020   Priority:   Maintenance   Target end date:   Indefinite    Indications    History of DVT (deep vein thrombosis) [Z86.718]             Anticoagulation Episode Summary     INR check location:       Preferred lab:       Send INR reminders to:   [facility]    Comments:   [facility]      Anticoagulation Care Providers     Provider Role Specialty Phone number    Vijay Arango MD Referring Cardiology [phone]          Clinic Interview:  Patient Findings     Negatives:   Signs/symptoms of thrombosis, Signs/symptoms of bleeding,   Laboratory test error suspected, Change in health, Change in alcohol use,   Change in activity, Upcoming invasive procedure, Emergency department   visit, Upcoming dental procedure, Missed doses, Extra doses, Change in   medications, Change in diet/appetite, Hospital admission, Bruising, Other   complaints      Clinical Outcomes     Negatives:   Major bleeding event, Thromboembolic event,   Anticoagulation-related hospital admission, Anticoagulation-related ED   visit, Anticoagulation-related fatality        INR History:  Anticoagulation Monitoring 2020   INR 4.33 3.87 2.56   INR Date 2020   INR Goal 2.5-3.5 2.5-3.5 2.5-3.5   Trend Same Same Same   Last Week Total 40 mg 37.5 mg 40 mg   Next Week Total 32.5 mg 37.5 mg 40 mg   Sun 5 mg 5 mg 5 mg   Mon 7.5 mg 7.5 mg 7.5 mg   Tue 5 mg 5 mg 5 mg   Wed 5 mg 5 mg 5 mg   Thu Hold (); Otherwise 5 mg 5 mg 5 mg   Fri 5 mg () 5 mg (); Otherwise 7.5 mg 7.5 mg   Sat 5 mg 5  mg 5 mg   Visit Report - - -   Some recent data might be hidden       Plan:  1. INR is Therapeutic today- see above in Anticoagulation Summary.   Will instruct Rachel Moser to Continue their warfarin regimen- see above in Anticoagulation Summary.  2. Follow up in 4 weeks  3. They have been instructed to call if any changes in medications, doses, concerns, etc. Patient expresses understanding and has no further questions at this time.    Thomas Smallwood [facility]

## 2020-09-10 NOTE — TELEPHONE ENCOUNTER
S/W patient and gave them Dr. Wilkerson's recommendations and instructions. Pt verbalized understanding.

## 2020-09-10 NOTE — TELEPHONE ENCOUNTER
I sent her in a refill on her Macrobid 100 mg twice daily-10 days to her local listed pharmacy.  If problems persist or recur a follow-up office visit with urine culture and sensitivity is advised

## 2020-09-10 NOTE — TELEPHONE ENCOUNTER
PT HAS A BLADDER INFECTION AGAIN AND WAS WONDERING IF SHE COULD GET ANTIBIOTICS CALLED IN.     [address] [facility] [phone] [facility] [phone] FX

## 2020-09-18 NOTE — PROGRESS NOTES
Anticoagulation Clinic Progress Note    Anticoagulation Summary  As of 9/18/2020    INR goal:  2.5-3.5   TTR:  50.1 % (1.9 y)   INR used for dosing:  3.72 (9/18/2020)   Warfarin maintenance plan:  7.5 mg every Mon, Fri; 5 mg all other days   Weekly warfarin total:  40 mg   Plan last modified:  Thomas Smallwood RPH (4/3/2020)   Next INR check:  9/25/2020   Priority:  Maintenance   Target end date:  Indefinite    Indications    History of DVT (deep vein thrombosis) [Z86.718]             Anticoagulation Episode Summary     INR check location:      Preferred lab:      Send INR reminders to:  [facility]    Comments:  [facility]      Anticoagulation Care Providers     Provider Role Specialty Phone number    Vijay Arango MD Referring Cardiology [phone]          Clinic Interview:      INR History:  Anticoagulation Monitoring 8/14/2020 8/28/2020 9/18/2020   INR 3.87 2.56 3.72   INR Date 8/14/2020 8/28/2020 9/18/2020   INR Goal 2.5-3.5 2.5-3.5 2.5-3.5   Trend Same Same Same   Last Week Total 37.5 mg 40 mg 40 mg   Next Week Total 37.5 mg 40 mg 40 mg   Sun 5 mg 5 mg 5 mg   Mon 7.5 mg 7.5 mg 7.5 mg   Tue 5 mg 5 mg 5 mg   Wed 5 mg 5 mg 5 mg   Thu 5 mg 5 mg 5 mg   Fri 5 mg (8/14); Otherwise 7.5 mg 7.5 mg 7.5 mg   Sat 5 mg 5 mg 5 mg   Visit Report - - -   Some recent data might be hidden       Plan:  1. INR is Supratherapeutic today- see above in Anticoagulation Summary.   Will instruct Rachel BATISTA Ehsan to Change their warfarin regimen- see above in Anticoagulation Summary.  2. Follow up in 1 weeks  3. Spoke with pt today--she will reduce warfarin dose today only then recheck next week. Spouse is in hospital at this time. They have been instructed to call if any changes in medications, doses, concerns, etc. Patient expresses understanding and has no further questions at this time.    Rachel Krishnan [facility]

## 2020-09-24 NOTE — PROGRESS NOTES
Patient: Rachel Moser  YOB: 1945 74 y.o. female  Medical Record Number: [id number]    Chief Complaint:   Chief Complaint   Patient presents with   • Left Hip - Follow-up       History of Present Illness:Rachel Moser is a 74 y.o. female who presents for follow-up of left total hip.  She is now 2 years out from left hip removal of infected hip and placement of a semipermanent spacer.  She is doing well she has no complaints of pain she is using her cane.  She denies any fever chills redness swelling or other symptoms in the hip region.    Allergies:   Allergies   Allergen Reactions   • Sulfa Antibiotics Rash     Other reaction(s): Skin irritation       Medications:   Current Outpatient Medications   Medication Sig Dispense Refill   • B Complex-Biotin-FA (HM VITAMIN B50 COMPLEX PO) Take 1 tablet by mouth Daily.     • Calcium-Magnesium-Vitamin D (CALCIUM 1200+D3 PO) Take  by mouth.     • cholecalciferol (VITAMIN D3) 1000 units tablet Take 2,000 Units by mouth Daily.     • CRANBERRY PO Take  by mouth. Take 2 tablets by mouth daily     • ferrous sulfate (FERROUSUL) 325 (65 FE) MG tablet Take 325 mg by mouth Daily With Breakfast. On hold for sx.     • FOLIC ACID PO Take 4 tablets by mouth daily     • furosemide (LASIX) 40 MG tablet Take 40 mg by mouth Daily.     • leucovorin 5 MG tablet Take 5 mg by mouth 1 (One) Time Per Week.     • meclizine (ANTIVERT) 25 MG tablet Take 1 tablet 3 times daily as needed for dizziness 15 tablet 1   • methotrexate 2.5 MG tablet Take 2.5 mg by mouth 1 (One) Time Per Week. 8 tablets once a week     • metoprolol succinate XL (TOPROL-XL) 50 MG 24 hr tablet Take 50 mg by mouth Daily.     • Multiple Vitamins-Minerals (MULTIVITAMIN ADULT PO) Take 1 tablet by mouth Daily.     • nitrofurantoin, macrocrystal-monohydrate, (MACROBID) 100 MG capsule Take 1 capsule every 12 hours until all are gone 20 capsule 0   • polyethylene glycol (MIRALAX) packet Take 17 g by mouth 2 (Two) Times a  "Day. (Patient taking differently: Take 17 g by mouth 2 (Two) Times a Day As Needed.)     • potassium chloride (K-DUR,KLOR-CON) 20 MEQ CR tablet Take 2 tablets by mouth once daily 180 tablet 2   • predniSONE (DELTASONE) 5 MG tablet Take 5 mg by mouth Daily As Needed.     • sennosides-docusate sodium (SENOKOT-S) 8.6-50 MG tablet Take 2 tablets by mouth 2 (Two) Times a Day As Needed for Constipation.     • simvastatin (ZOCOR) 40 MG tablet Take 40 mg by mouth Every Morning.     • warfarin (COUMADIN) 5 MG tablet Take 1.5 tablets (7.5 mg) by mouth Mon and Fri, and take 1 tablet (5 mg) by mouth all other days or as directed. 105 tablet 1     No current facility-administered medications for this visit.          The following portions of the patient's history were reviewed and updated as appropriate: allergies, current medications, past family history, past medical history, past social history, past surgical history and problem list.    Review of Systems:   A 14 point review of systems was performed. All systems negative except pertinent positives/negative listed in HPI above    Physical Exam:   Vitals:    09/24/20 1306   Temp: 98.6 °F (37 °C)   TempSrc: Temporal   Weight: 57.6 kg (127 lb)   Height: 149.9 cm (59\")       General: A and O x 3, ASA, NAD    SCLERA:    Normal    DENTITION:   Normal  Incisions are well-healed skin is otherwise normal she is walking with a mild Trendelenburg gait with her cane intact light touch distally    Radiology:    Xrays 2views left hip (AP bilateral hips and lateral hip) were ordered and reviewed for evaluation of previous total hip replacement demonstrating a well-positioned hip spacer there is no evidence of further loosening subsidence or other changes.  In comparison with previous films from a year ago they are unchanged.      Assessment/Plan:  Left hip spacer overall doing well now 2 years out no signs of further bone progression I am not particularly keen on reimplantation especially " with her spacer functioning as well as it does she can continue with current activities use of cane return in 6 months repeat x-rays left hip      Obed Barney MD  9/24/2020

## 2020-09-25 NOTE — PROGRESS NOTES
Anticoagulation Clinic Progress Note    Anticoagulation Summary  As of 2020    INR goal:  2.5-3.5   TTR:  50.3 % (1.9 y)   INR used for dosin.78 (2020)   Warfarin maintenance plan:  7.5 mg every Mon, Fri; 5 mg all other days   Weekly warfarin total:  40 mg   No change documented:  Cedric Short, PharmD   Plan last modified:  Thomas Smallwood RPH (4/3/2020)   Next INR check:  10/9/2020   Priority:  Maintenance   Target end date:  Indefinite    Indications    History of DVT (deep vein thrombosis) [Z86.718]             Anticoagulation Episode Summary     INR check location:      Preferred lab:      Send INR reminders to:  [facility]    Comments:  [facility]      Anticoagulation Care Providers     Provider Role Specialty Phone number    Vijay Arango MD Referring Cardiology [phone]          Clinic Interview:  Patient Findings     Negatives:  Signs/symptoms of thrombosis, Signs/symptoms of bleeding,   Laboratory test error suspected, Change in health, Change in alcohol use,   Change in activity, Upcoming invasive procedure, Emergency department   visit, Upcoming dental procedure, Missed doses, Extra doses, Change in   medications, Change in diet/appetite, Hospital admission, Bruising, Other   complaints      Clinical Outcomes     Negatives:  Major bleeding event, Thromboembolic event,   Anticoagulation-related hospital admission, Anticoagulation-related ED   visit, Anticoagulation-related fatality        INR History:  Anticoagulation Monitoring 2020   INR 2.56 3.72 2.78   INR Date 2020   INR Goal 2.5-3.5 2.5-3.5 2.5-3.5   Trend Same Same Same   Last Week Total 40 mg 40 mg 37.5 mg   Next Week Total 40 mg 40 mg 40 mg   Sun 5 mg 5 mg 5 mg   Mon 7.5 mg 7.5 mg 7.5 mg   Tue 5 mg 5 mg 5 mg   Wed 5 mg 5 mg 5 mg   Thu 5 mg 5 mg 5 mg   Fri 7.5 mg 7.5 mg 7.5 mg   Sat 5 mg 5 mg 5 mg   Visit Report - - -   Some recent data might be hidden        Plan:  1. INR is Therapeutic today- see above in Anticoagulation Summary.   Will instruct Rachel Moser to Continue their warfarin regimen- see above in Anticoagulation Summary.  2. Follow up in 2 weeks  3. Pt has agreed to only be called if INR out of range. They have been instructed to call if any changes in medications, doses, concerns, etc. Patient expresses understanding and has no further questions at this time.    Cedric Short, MyrnaD

## 2020-09-26 NOTE — TELEPHONE ENCOUNTER
Her time today is 27.4 INR is 2.3.  Patient apparently misunderstood and did start taking the Lovenox injections as of Monday.  Since her INR is now therapeutic have left orders with home health to have her DC the Lovenox and will continue her Coumadin at 7.5 mg daily.  Will recheck her ProTime again on Monday per RBB  
This number is incorrect.  Do you have the correct one  
Opt out

## 2020-09-29 PROBLEM — E78.2 MIXED HYPERLIPIDEMIA: Status: ACTIVE | Noted: 2020-01-01

## 2020-09-29 PROBLEM — R73.9 HIGH BLOOD SUGAR: Status: ACTIVE | Noted: 2020-01-01

## 2020-09-29 PROBLEM — I26.99 PULMONARY EMBOLI (HCC): Status: ACTIVE | Noted: 2020-01-01

## 2020-09-29 NOTE — PROGRESS NOTES
Rachel Moser is a 74 y.o. female who presents with   Chief Complaint   Patient presents with   • Medicare Wellness-subsequent   • Hypertension     Furosemide 40 mg; metoprolol succinate XL 50 mg   • Hyperlipidemia     Simvastatin 40 mg   • Blood Sugar Problem     Blood sugar 108 in June.  No prescription treatment   • Requesting flu shot   .    This patient is a 74-year-old female who presents for a subsequent Medicare wellness visit.  In addition to the wellness visit the physical issues as outlined above were dealt with separately and individually.  Also recent labs were reviewed with the patient and all medications were reviewed as well.  Total amount of time spent with this patient dealing with these physical issues over and above the amount of time spent performing the Medicare wellness visit amounted to 28 minutes.  Better than 50% of the office visit time was spent in direct face-to-face consultation with the patient regarding these issues.      Hypertension  This is a chronic problem. The current episode started more than 1 year ago. The problem has been waxing and waning since onset. The problem is controlled. Current antihypertension treatment includes diuretics and beta blockers. The current treatment provides moderate improvement. There are no compliance problems.    Hyperlipidemia  This is a chronic problem. The current episode started more than 1 year ago. The problem is controlled. Lipid results: To be determined on today's lab testing. Exacerbating diseases include diabetes. Current antihyperlipidemic treatment includes statins. Improvement on treatment: To be determined on today's lab testing. There are no compliance problems.    Blood Sugar Problem  This is a chronic problem. The current episode started more than 1 month ago. The problem has been waxing and waning. Associated symptoms include arthralgias. She has tried nothing for the symptoms.        Pulse 107   Temp 97.6 °F (36.4 °C)   Ht  "149.9 cm (59.02\")   Wt 59.1 kg (130 lb 6.4 oz)   SpO2 97%   BMI 26.32 kg/m²     The following portions of the patient's history were reviewed and updated as appropriate: allergies, current medications, past family history, past medical history, past social history, past surgical history and problem list.    Review of Systems   Constitutional: Negative.    HENT: Negative.    Eyes: Negative.    Respiratory: Negative.    Cardiovascular: Negative.    Genitourinary: Negative.    Musculoskeletal: Positive for arthralgias and back pain.   Skin: Negative.    Neurological: Negative.    Psychiatric/Behavioral: Negative.        Objective   Physical Exam  Constitutional:       General: She is not in acute distress.     Appearance: She is well-developed. She is not diaphoretic.   HENT:      Head: Normocephalic and atraumatic.      Right Ear: External ear normal.      Left Ear: External ear normal.      Nose: Nose normal.      Mouth/Throat:      Pharynx: No oropharyngeal exudate.   Eyes:      General: No scleral icterus.        Right eye: No discharge.         Left eye: No discharge.      Conjunctiva/sclera: Conjunctivae normal.      Pupils: Pupils are equal, round, and reactive to light.   Neck:      Musculoskeletal: Normal range of motion and neck supple.      Thyroid: No thyromegaly.      Vascular: No JVD.      Trachea: No tracheal deviation.      Comments: Neck exam negative.  Carotid auscultation normal-no bruits heard.  Cardiovascular:      Rate and Rhythm: Normal rate and regular rhythm.      Heart sounds: Normal heart sounds. No murmur. No friction rub. No gallop.    Pulmonary:      Effort: Pulmonary effort is normal. No respiratory distress.      Breath sounds: Normal breath sounds. No wheezing or rales.   Chest:      Chest wall: No tenderness.   Abdominal:      General: Bowel sounds are normal. There is no distension.      Palpations: Abdomen is soft. There is no mass.      Tenderness: There is no abdominal " "tenderness.      Hernia: No hernia is present.   Genitourinary:     Comments: Declined.  Says her gynecologist told her \"you no longer need a Pap smear since you have had a hysterectomy\".  She gets mammograms  and schedules them herself she says.  Musculoskeletal: Normal range of motion.         General: No tenderness or deformity.   Lymphadenopathy:      Cervical: No cervical adenopathy.   Skin:     General: Skin is warm and dry.      Coloration: Skin is not pale.      Findings: No erythema or rash.   Neurological:      Mental Status: She is alert and oriented to person, place, and time.      Cranial Nerves: No cranial nerve deficit.      Motor: No abnormal muscle tone.      Coordination: Coordination normal.      Deep Tendon Reflexes: Reflexes are normal and symmetric. Reflexes normal.   Psychiatric:         Behavior: Behavior normal.         Thought Content: Thought content normal.         Judgment: Judgment normal.         Assessment/Plan   Rachel was seen today for medicare wellness-subsequent, hypertension, hyperlipidemia, blood sugar problem and requesting flu shot.    Diagnoses and all orders for this visit:    Medicare annual wellness visit, subsequent    Essential hypertension  -     CBC & Differential  -     Comprehensive Metabolic Panel  -     Urinalysis With Culture If Indicated -  -     TSH+Free T4    Mixed hyperlipidemia  -     Comprehensive Metabolic Panel  -     Lipid Panel  -     TSH+Free T4    High blood sugar  -     Comprehensive Metabolic Panel  -     Urinalysis With Culture If Indicated -  -     TSH+Free T4  -     Hemoglobin A1c    Healthcare maintenance  -     Hepatitis C Antibody    Encounter for immunization  -     Fluad Quad >65 years      Plan: Labs as above for the physical issues as outlined.    Continue all current treatment as prescribed.  6-month checkup advised assuming today's labs are acceptable    1 year Medicare wellness visit advised with lab updates    Flu shot given per " request    Included in today's exam was face to face time spent in preventative counseling regarding weight loss, daily exercise, and minimizing sweets and carbohydrates.  Additionally, health maintenance needs were discussed and reviewed as well.

## 2020-09-29 NOTE — PROGRESS NOTES
The ABCs of the Annual Wellness Visit  Subsequent Medicare Wellness Visit    Chief Complaint   Patient presents with   • Medicare Wellness-subsequent   • Hypertension     Furosemide 40 mg; metoprolol succinate XL 50 mg   • Hyperlipidemia     Simvastatin 40 mg   • Blood Sugar Problem     Blood sugar 108 in .  No prescription treatment   • Requesting flu shot       Subjective   History of Present Illness:  Rachel Moser is a 74 y.o. female who presents for a Subsequent Medicare Wellness Visit.    HEALTH RISK ASSESSMENT    Recent Hospitalizations:  No hospitalization(s) within the last year.    Current Medical Providers:  Patient Care Team:  Nahun Wilkerson MD as PCP - General (Internal Medicine)  Vijay Arango MD as Consulting Physician (Cardiology)  Rachel Krishnan JUDI as Pharmacist  Nancy Smalls MD as Consulting Physician (Urogynecology)    Smoking Status:  Social History     Tobacco Use   Smoking Status Former Smoker   • Types: Cigarettes   • Quit date:    • Years since quittin.7   Smokeless Tobacco Never Used   Tobacco Comment    quit 50 years ago       Alcohol Consumption:  Social History     Substance and Sexual Activity   Alcohol Use No       Depression Screen:   PHQ-2/PHQ-9 Depression Screening 2020   Little interest or pleasure in doing things 0   Feeling down, depressed, or hopeless 0   Total Score 0       Fall Risk Screen:  PAULIEADI Fall Risk Assessment was completed, and patient is at LOW risk for falls.Assessment completed on:2020    Health Habits and Functional and Cognitive Screening:  Functional & Cognitive Status 2020   Do you have difficulty preparing food and eating? No   Do you have difficulty bathing yourself, getting dressed or grooming yourself? No   Do you have difficulty using the toilet? No   Do you have difficulty moving around from place to place? No   Do you have trouble with steps or getting out of a bed or a chair? No   Do you need help using the phone?   No   Are you deaf or do you have serious difficulty hearing?  No   Do you need help with transportation? No   Do you need help shopping? No   Do you need help preparing meals?  No   Do you need help with housework?  No   Do you need help with laundry? No   Do you need help taking your medications? No   Do you need help managing money? No   Do you ever drive or ride in a car without wearing a seat belt? No         Does the patient have evidence of cognitive impairment? No    Asprin use counseling:Does not need ASA (and currently is not on it)    Age-appropriate Screening Schedule:  Refer to the list below for future screening recommendations based on patient's age, sex and/or medical conditions. Orders for these recommended tests are listed in the plan section. The patient has been provided with a written plan.    Health Maintenance   Topic Date Due   • TDAP/TD VACCINES (1 - Tdap) 12/10/1965   • ZOSTER VACCINE (1 of 2) 12/10/1996   • LIPID PANEL  04/14/2017   • INFLUENZA VACCINE  08/01/2020   • MAMMOGRAM  06/09/2022   • COLONOSCOPY  10/03/2026          The following portions of the patient's history were reviewed and updated as appropriate: allergies, current medications, past family history, past medical history, past social history, past surgical history and problem list.    Outpatient Medications Prior to Visit   Medication Sig Dispense Refill   • B Complex-Biotin-FA (HM VITAMIN B50 COMPLEX PO) Take 1 tablet by mouth Daily.     • Calcium-Magnesium-Vitamin D (CALCIUM 1200+D3 PO) Take  by mouth.     • cholecalciferol (VITAMIN D3) 1000 units tablet Take 2,000 Units by mouth Daily.     • CRANBERRY PO Take  by mouth. Take 2 tablets by mouth daily     • ferrous sulfate (FERROUSUL) 325 (65 FE) MG tablet Take 325 mg by mouth Daily With Breakfast. On hold for sx.     • FOLIC ACID PO Take 4 tablets by mouth daily     • furosemide (LASIX) 40 MG tablet Take 40 mg by mouth Daily.     • leucovorin 5 MG tablet Take 5 mg by mouth  1 (One) Time Per Week.     • meclizine (ANTIVERT) 25 MG tablet Take 1 tablet 3 times daily as needed for dizziness 15 tablet 1   • methotrexate 2.5 MG tablet Take 2.5 mg by mouth 1 (One) Time Per Week. 8 tablets once a week     • metoprolol succinate XL (TOPROL-XL) 50 MG 24 hr tablet Take 50 mg by mouth Daily.     • Multiple Vitamins-Minerals (MULTIVITAMIN ADULT PO) Take 1 tablet by mouth Daily.     • polyethylene glycol (MIRALAX) packet Take 17 g by mouth 2 (Two) Times a Day. (Patient taking differently: Take 17 g by mouth 2 (Two) Times a Day As Needed.)     • potassium chloride (K-DUR,KLOR-CON) 20 MEQ CR tablet Take 2 tablets by mouth once daily 180 tablet 2   • predniSONE (DELTASONE) 5 MG tablet Take 5 mg by mouth Daily As Needed.     • sennosides-docusate sodium (SENOKOT-S) 8.6-50 MG tablet Take 2 tablets by mouth 2 (Two) Times a Day As Needed for Constipation.     • simvastatin (ZOCOR) 40 MG tablet Take 40 mg by mouth Every Morning.     • warfarin (COUMADIN) 5 MG tablet Take 1.5 tablets (7.5 mg) by mouth Mon and Fri, and take 1 tablet (5 mg) by mouth all other days or as directed. 105 tablet 1   • nitrofurantoin, macrocrystal-monohydrate, (MACROBID) 100 MG capsule Take 1 capsule every 12 hours until all are gone 20 capsule 0     No facility-administered medications prior to visit.        Patient Active Problem List   Diagnosis   • Chronic left hip pain   • OA (osteoarthritis) of hip   • Hip dislocation, left (CMS/HCC)   • Status post left hip replacement   • Hypertension   • Rheumatoid arthritis involving multiple sites (CMS/HCC)   • History of DVT (deep vein thrombosis)   • Postoperative hypotension   • S/P revision of total hip   • Septic arthritis (CMS/HCC)   • Status post total hip replacement, left   • Pain of left hip joint   • Constipation   • Nausea and vomiting   • Supratherapeutic INR   • Long term (current) use of antibiotics   • Long term (current) use of anticoagulants   • Leukocytosis   •  "Dehydration   • Pulmonary emboli (CMS/HCC)   • Mixed hyperlipidemia   • High blood sugar       Advanced Care Planning:  ACP discussion was held with the patient during this visit. Patient has an advance directive in EMR which is still valid.     Review of Systems    Compared to one year ago, the patient feels her physical health is the same.  Compared to one year ago, the patient feels her mental health is the same.    Reviewed chart for potential of high risk medication in the elderly: yes  Reviewed chart for potential of harmful drug interactions in the elderly:yes    Objective         Vitals:    09/29/20 1421   Pulse: 107   Temp: 97.6 °F (36.4 °C)   SpO2: 97%   Weight: 59.1 kg (130 lb 6.4 oz)   Height: 149.9 cm (59.02\")       Body mass index is 26.32 kg/m².  Discussed the patient's BMI with her. The BMI is in the acceptable range.    Physical Exam          Assessment/Plan   Medicare Risks and Personalized Health Plan  CMS Preventative Services Quick Reference  Advance Directive Discussion  Breast Cancer/Mammogram Screening  Fall Risk  Osteoprorosis Risk    The above risks/problems have been discussed with the patient.  Pertinent information has been shared with the patient in the After Visit Summary.  Follow up plans and orders are seen below in the Assessment/Plan Section.    Diagnoses and all orders for this visit:    1. Medicare annual wellness visit, subsequent (Primary)    2. Essential hypertension  -     CBC & Differential  -     Comprehensive Metabolic Panel  -     Urinalysis With Culture If Indicated -  -     TSH+Free T4    3. Mixed hyperlipidemia  -     Comprehensive Metabolic Panel  -     Lipid Panel  -     TSH+Free T4    4. High blood sugar  -     Comprehensive Metabolic Panel  -     Urinalysis With Culture If Indicated -  -     TSH+Free T4  -     Hemoglobin A1c    5. Healthcare maintenance  -     Hepatitis C Antibody    6. Encounter for immunization  -     Fluad Quad >65 years      Follow Up:  Return " in about 6 months (around 3/29/2021) for Recheck, Blood pressure check; Medicare wellness visit/lab updates-1 year.     Continue current treatment as prescribed    Flu shot given per request    Included in today's exam was face to face time spent in preventative counseling regarding weight loss, daily exercise, and minimizing sweets and carbohydrates.  Additionally, health maintenance needs were discussed and reviewed as well.    An After Visit Summary and PPPS were given to the patient.

## 2020-10-12 NOTE — PROGRESS NOTES
Anticoagulation Clinic Progress Note    Anticoagulation Summary  As of 10/12/2020    INR goal:  2.5-3.5   TTR:  49.8 % (2 y)   INR used for dosin.53 (10/12/2020)   Warfarin maintenance plan:  7.5 mg every Mon, Fri; 5 mg all other days   Weekly warfarin total:  40 mg   Plan last modified:  Thomas Smallwood RPH (4/3/2020)   Next INR check:  10/19/2020   Priority:  Maintenance   Target end date:  Indefinite    Indications    History of DVT (deep vein thrombosis) [Z86.718]             Anticoagulation Episode Summary     INR check location:      Preferred lab:      Send INR reminders to:  [facility]    Comments:  [facility]      Anticoagulation Care Providers     Provider Role Specialty Phone number    Vijay Arango MD Referring Cardiology [phone]          Clinic Interview:  Patient Findings     Positives:  Change in health, Change in medications    Negatives:  Signs/symptoms of thrombosis, Signs/symptoms of bleeding,   Laboratory test error suspected, Change in alcohol use, Change in   activity, Upcoming invasive procedure, Emergency department visit,   Upcoming dental procedure, Missed doses, Extra doses, Change in   diet/appetite, Hospital admission, Bruising, Other complaints    Comments:  UTI - nitrofurantoin, 2 days left.       Clinical Outcomes     Negatives:  Major bleeding event, Thromboembolic event,   Anticoagulation-related hospital admission, Anticoagulation-related ED   visit, Anticoagulation-related fatality    Comments:  UTI - nitrofurantoin, 2 days left.         INR History:  Anticoagulation Monitoring 2020 2020 10/12/2020   INR 3.72 2.78 5.53   INR Date 2020 2020 10/12/2020   INR Goal 2.5-3.5 2.5-3.5 2.5-3.5   Trend Same Same Same   Last Week Total 40 mg 37.5 mg 40 mg   Next Week Total 40 mg 40 mg 30 mg   Sun 5 mg 5 mg 5 mg   Mon 7.5 mg 7.5 mg Hold (10/12)   Tue 5 mg 5 mg 2.5 mg (10/13)   Wed 5 mg 5 mg 5 mg   Thu 5 mg 5 mg 5 mg   Fri 7.5 mg 7.5 mg  7.5 mg   Sat 5 mg 5 mg 5 mg   Visit Report - - -   Some recent data might be hidden       Plan:  1. INR is Supratherapeutic today- see above in Anticoagulation Summary.   Will instruct Rachel Moser to Change their warfarin regimen- see above in Anticoagulation Summary.  2. Follow up in 1 week  3. They have been instructed to call if any changes in medications, doses, concerns, etc. To seek immediate medical attention if s/sx of bleeding develop or fall occurs. Patient expresses understanding and has no further questions at this time.    Thomas Smallwood, [facility]

## 2020-10-19 NOTE — TELEPHONE ENCOUNTER
PTS  TESTED POSITVE FOR COVID YESTERDAY AND IS IN [facility] BEING QUARANTINED. PT IS WANTING TO KNOW IF DR. WALL WILL SEND IN AN ORDER FOR HER TO GET TESTED AT [facility].

## 2020-10-19 NOTE — TELEPHONE ENCOUNTER
Order entered but she likely will need to go to the [facility] urgent care [place] in [place] to be tested

## 2020-10-22 NOTE — TELEPHONE ENCOUNTER
Checking with [facility] to see if they can do INR when they are seeing patient's spouse at home since they are quarantined for Covid.

## 2020-10-23 NOTE — TELEPHONE ENCOUNTER
Patient states she and spouse are quarantining for Covid. [facility] agreed to do INR on Tues 10/27 as courtesy since they are in the home for care for patient's spouse.      We will plan to manage warfarin via phone follow up and then see patient again in clinic after quarantine period.

## 2020-10-27 PROBLEM — J12.82 PNEUMONIA DUE TO COVID-19 VIRUS: Status: ACTIVE | Noted: 2020-01-01

## 2020-10-27 PROBLEM — N39.0 UTI (URINARY TRACT INFECTION): Status: ACTIVE | Noted: 2020-01-01

## 2020-10-27 PROBLEM — U07.1 PNEUMONIA DUE TO COVID-19 VIRUS: Status: ACTIVE | Noted: 2020-01-01

## 2020-10-30 PROBLEM — Z67.10 BLOOD TYPE A+: Status: ACTIVE | Noted: 2020-01-01

## 2020-10-30 NOTE — TELEPHONE ENCOUNTER
BRIDGET HARTLEY WITH [facility] CALLED REQUESTING VERBAL ORDERS FOR HOME HEALTH TO FOLLOW PT AMONG DISCHARGE FROM THE HOSPITAL SHE HAS COVID WITH PNEUMONIA. CALL BACK [phone]. PLEASE ADVISE.

## 2020-10-31 PROBLEM — J96.01 ACUTE RESPIRATORY FAILURE WITH HYPOXIA (HCC): Status: ACTIVE | Noted: 2020-01-01

## 2020-11-01 NOTE — PLAN OF CARE
Goal Outcome Evaluation:  Plan of Care Reviewed With: patient  Progress: improving   VSS, on 7L NC. Tylenol once for headache pain. Up with assistance to restroom. Gets very SOA with ambulation. Will continue to monitor.

## 2020-11-01 NOTE — PROGRESS NOTES
Pharmacy Consult: Warfarin Dosing/ Monitoring    Rachel Moser is a 74 y.o. female, estimated creatinine clearance is 54.1 mL/min (A) (by C-G formula based on SCr of 0.36 mg/dL (L)). weighing 54.7 kg (120 lb 11.2 oz).    PMH: Allergic, Bone infection, Cataract, High cholesterol, DVT, kidney infection, pulmonary embolus, transfusion, Hypertension, Left hip postoperative wound infection, Paralabral cyst of left hip, PICC in place, PONV, vena cava filter, Rheumatoid arteritis, Seasonal allergies, UTI, and Wears glasses.    Social History     Tobacco Use    Smoking status: Former Smoker     Types: Cigarettes     Quit date:      Years since quittin.8    Smokeless tobacco: Never Used    Tobacco comment: quit 50 years ago   Substance Use Topics    Alcohol use: No    Drug use: No     Results from last 7 days   Lab Units 20  0458 10/31/20  0621 10/30/20  0826 10/29/20  0436 10/28/20  0845 10/28/20  0844 10/27/20  1047 10/27/20  0708 10/27/20  0018   INR  4.09* 3.24* 3.02* 3.58*  --  2.84* 1.97* 2.11* 2.16*   HEMOGLOBIN g/dL 10.7* 11.7* 11.6* 10.9* 12.8  --   --  13.0 13.3   HEMATOCRIT % 31.5* 34.3 35.8 33.3* 38.8  --   --  39.3 39.6   PLATELETS 10*3/mm3 263 217 145 173 167  --   --  176 174     Results from last 7 days   Lab Units 208 10/31/20  0621 10/30/20  0826   SODIUM mmol/L 144 139 137   POTASSIUM mmol/L 3.8 3.7 4.0   CHLORIDE mmol/L 116* 113* 108*   CO2 mmol/L 18.2* 17.5* 17.6*   BUN mg/dL 10 5* 7*   CREATININE mg/dL 0.36* 0.44* 0.45*   CALCIUM mg/dL 7.1* 7.1* 7.0*   BILIRUBIN mg/dL 0.3 0.3 0.4   ALK PHOS U/L 55 57 52   ALT (SGPT) U/L 16 14 13   AST (SGOT) U/L 37* 34* 35*   GLUCOSE mg/dL 106* 219* 59*     Anticoagulation history: 10/12/2020 Per [facility]  Home Med: Warfarin 7.5 mg po qMF + 5 mg po qSSTWTh (40 mg/week) with documented INR of 5.53. This was confirmed regimen with patient over telephone on 10/28/2020)       Hospital Anticoagulation:  Consulting provider:   Sara  Start date: 10/27/2020 continued from home  Indication: Hx DVT  Target INR: 2.5-3.5 (per Dr. Ruiz order and past Anticoag clinic notes)  Expected duration: Indefinite  Bridge Therapy: None   Date 10/27  10/28  10/29  10/30  10/31  11/1           INR 2.16/ 2.11/ 1.97  2.84  3.58  3.02  3.24  4.09           Warfarin dose 5 mg  2 mg  None  2 mg  1 mg  Hold              Potential drug interactions:   -Acetaminophen - may result in an increased risk of bleeding. Elevations in INR have occurred within 1-2 weeks of initiating acetaminophen at moderate to high doses (2 and 4 g/day) in patients on stable warfarin therapy   -Dexamethasone (Moderate, started 10/30): may result in increased risk of bleeding or diminished effects of warfarin.    Will continue to monitor for drug-drug interaction as medications are added to patient's profile.      Relevant nutrition status: Diet Regular; Cardiac      Other:   Dietary advances -> changes in dietary vitamin K intake may alter response to warfarin.  Acute infection/inflammation may cause an increased sensitivity to warfarin     Lab: Albumin= 2.30 (11/1/2020)     Education complete?/ Date: [facility] - last visit on 10/12/2020     Assessment/Plan:  INR elevated at 4.09 today, up from 3.24 yesterday.   Will hold warfarin dose today and depending on INR tomorrow, consider giving 1 - 1.5 mg dose. Off note, patient was started on steroids on 10/30/20.  Further dosing per daily PT/INR and CBC.   Monitor: s/s bleed     Pharmacy will continue to follow until discharge or discontinuation of warfarin.     Julita Valero, PharmD, BCPS   Clinical Staff Pharmacist

## 2020-11-01 NOTE — CONSULTS
Referring Provider: Claudio Campos MD  [address] [address]  Reason for Consultation: COVID     Subjective   History of present illness: This is a 74-year-old female with a history of hypertension hyperlipidemia, rheumatoid arthritis and ESBL left prosthetic hip septic arthritis on suppressive antibiotic therapy who was admitted on October 26 with fever  The patient is well-known to me and the last time she was seen in clinic was on June 12.  She is on Augmentin as suppressive therapy for ESBL E. coli left prosthetic hip septic arthritis.  The patient presented to an urgent care center on October 24th with complaints of dysuria.  Apparently cultures from urgent care grew ESBL E. coli.  She presented to the emergency room on October 27 with fever and altered mental status.  Admission blood work revealed a normal white blood cell count with a negative procalcitonin.  Chest x-ray was unremarkable.  Covid testing came back positive.  Urine culture showed positive nitrite small leukocytes 3-5 white blood cells and trace bacteria only.  She was started on ertapenem 1 g IV every 24 hours.  At that time she was denying any shortness of breath cough and was satting well on room air.  No urine cultures were obtained on admission.  Blood cultures are negative.  While in the hospital the patient continued to be febrile to T-max of 101.1 on October 30.  Around October 30 she started to become hypoxic and was initiated on dexamethasone.  Currently she is requiring 7 L of oxygen via nasal cannula.  Currently the patient is satting 91% on 12 L high flow oxygen.  She reports increasing shortness of breath and cough.  Her left hip is without pain or drainage.  She denies a sore throat.  She reports no appetite.  She reports arthralgias and myalgias.    Past Medical History:   Diagnosis Date   • High cholesterol    • History of DVT (deep vein thrombosis)    • History of pulmonary embolus (PE)    •  Hypertension    • Presence of vena cava filter    • Rheumatoid arteritis (CMS/HCC)    ESBL left prosthetic hip septic arthritis currently on suppressive therapy    Past Surgical History:   Procedure Laterality Date   • BLADDER SUSPENSION     • ENDOSCOPIC FUNCTIONAL SINUS SURGERY (FESS)     • INCISION AND DRAINAGE HIP Left 7/21/2018   • TOTAL ABDOMINAL HYSTERECTOMY     • TOTAL HIP ARTHROPLASTY REVISION Left 4/25/2018    Procedure: REVISION OF LEFT ACETABULAR COMPONENT ;  Surgeon: Obed Barney MD;  Location: [facility];  Service: Orthopedics   • TOTAL HIP ARTHROPLASTY REVISION Left 08/11/2018    total hip revision with removal of iinfected total hip and placement of antibiotic spacer   • VENA CAVA FILTER INSERTION          reports that she quit smoking about 45 years ago. Her smoking use included cigarettes. She has never used smokeless tobacco. She reports that she does not drink alcohol or use drugs.    family history is not on file.    Allergies   Allergen Reactions   • Sulfa Antibiotics Rash     Other reaction(s): Skin irritation       Medication:  Antibiotics:  Ertapenem 1 g IV every 24 hours    Please refer to the medical record for a full medication list    Review of Systems  Pertinent items are noted in HPI, all other systems reviewed and negative    Objective   Vital Signs   Temp:  [97.4 °F (36.3 °C)-97.8 °F (36.6 °C)] 97.8 °F (36.6 °C)  Heart Rate:  [82-98] 98  Resp:  [20-26] 22  BP: (115-133)/(66-84) 133/73  90% on 7L NC    Physical Exam:   General: Ill appearing  HEENT: Normocephalic, atraumatic, PERRL,  no scleral icterus. Oropharynx is clear and moist  Neck: Supple, trachea is midline  Cardiovascular: Normal rate, regular rhythm, normal S1 and S2, no murmurs, rubs, or gallops   Respiratory: Coarse breath sounds bilaterally  GI: Abdomen is soft, nontender, nondistended, positive bowel sounds bilaterally  Musculoskeletal:  no edema, tenderness or deformity, left hip incision remains well-healed  Skin:  No rashes   Extremities: No E/C/C  Neurological: Alert and oriented, moving all 4 extremities  Psychiatric: Normal mood and affect     Results Review:   I reviewed the patient's new clinical results.  I reviewed the patient's new imaging results and agree with the interpretation.    Lab Results   Component Value Date    WBC 13.98 (H) 11/01/2020    HGB 10.7 (L) 11/01/2020    HCT 31.5 (L) 11/01/2020    MCV 88.2 11/01/2020     11/01/2020       Lab Results   Component Value Date    GLUCOSE 106 (H) 11/01/2020    BUN 10 11/01/2020    CREATININE 0.36 (L) 11/01/2020    EGFRIFNONA >150 11/01/2020    EGFRIFAFRI 99 09/29/2020    BCR 27.8 (H) 11/01/2020    CO2 18.2 (L) 11/01/2020    CALCIUM 7.1 (L) 11/01/2020    PROTENTOTREF 5.9 (L) 09/29/2020    ALBUMIN 2.30 (L) 11/01/2020    LABIL2 2.5 09/29/2020    AST 37 (H) 11/01/2020    ALT 16 11/01/2020     Ferritin 353 --> 719 -> 848 -> 655  CRP 3.83 --> 20.09 --> 4.98  Procalcitonin 0.05    Microbiology:  10/27 BCx neg x 2  10/27 RVP + COVID     Radiology:  Admission chest x-ray personally reviewed by me shows calcified granuloma in the right lung.  No infiltrates no pleural effusion    CT of the abdomen pelvis shows patchy groundglass bibasilar infiltrates.  Nonobstructing right renal stone.  No hydronephrosis.  Gallstones with biliary sludge.    10/30 chest x-ray shows increasing bilateral infiltrates    Assessment/Plan   Covid pneumonia  Acute hypoxic respiratory failure  ESBL E. coli UTI  ESBL E. coli left prosthetic hip septic arthritis on suppressive antibiotic therapy    Dexamethasone initiated on October 30.  Given the extent of the patient's hypoxic respiratory failure she is a candidate for remdesivir.  We will start remdesivir 200 mg IV times once followed by 100 mg IV every 24 hours x4 doses.  Inflammatory markers are decreasing which is reassuring.    Patient continues to report urinary symptoms despite her ertapenem making me think her symptoms are due to  interstitial cystitis and not a UTI.  We will repeat a UA with culture if indicated.  We will start Pyridium.  Continue ertapenem for 1 more day after which we will switch her back to her suppressive Augmentin.    Continue to monitor ferritin, CRP.  We will repeat a procalcitonin in the morning.    I discussed the patient's findings and my recommendations with patient and nursing staff

## 2020-11-01 NOTE — PROGRESS NOTES
[facility]  Clinical Pharmacy Department     Remdesivir Review Note    Rachel Moser is a 74 y.o. female with confirmed COVID-19 infection on day 5 of hospitalization.     Consulting Provider:  Dr. Minda Souza  Date of Confirmed SARS-CoV-2: 10/27/20  Date of Symptom Onset: 10/23/20?  Planned Duration of Therapy: 5 days  Other Antimicrobials: Ertapenem 1 gm q24h   Hydroxychloroquine or chloroquine prior to arrival: none    Allergies  Allergies as of 10/26/2020 - Reviewed 10/26/2020   Allergen Reaction Noted    Sulfa antibiotics Rash 05/03/2016       Microbiology:  Microbiology Results (last 10 days)       Procedure Component Value - Date/Time    Blood Culture - Blood, Arm, Left [[id number]] Collected: 10/27/20 0018    Lab Status: Final result Specimen: Blood from Arm, Left Updated: 11/01/20 0045     Blood Culture No growth at 5 days    Blood Culture - Blood, Arm, Right [[id number]] Collected: 10/27/20 0018    Lab Status: Final result Specimen: Blood from Arm, Right Updated: 11/01/20 0045     Blood Culture No growth at 5 days    Respiratory Panel PCR w/COVID-19(SARS-CoV-2) MONICO/WENDY/JHON/PAD/COR/MAD/JUAN In-House, NP Swab in UTM/VTM, 3-4 HR TAT - Swab, Nasopharynx [[id number]]  (Abnormal) Collected: 10/27/20 0018    Lab Status: Final result Specimen: Swab from Nasopharynx Updated: 10/27/20 0204     ADENOVIRUS, PCR Not Detected     Coronavirus 229E Not Detected     Coronavirus HKU1 Not Detected     Coronavirus NL63 Not Detected     Coronavirus OC43 Not Detected     COVID19 Detected     Human Metapneumovirus Not Detected     Human Rhinovirus/Enterovirus Not Detected     Influenza A PCR Not Detected     Influenza B PCR Not Detected     Parainfluenza Virus 1 Not Detected     Parainfluenza Virus 2 Not Detected     Parainfluenza Virus 3 Not Detected     Parainfluenza Virus 4 Not Detected     RSV, PCR Not Detected     Bordetella pertussis pcr Not Detected     Bordetella parapertussis PCR Not Detected     Chlamydophila pneumoniae  PCR Not Detected     Mycoplasma pneumo by PCR Not Detected    Narrative:      Fact sheet for providers: https://docs.[employer]/wp-content/uploads/ZYJ9254-2160-LK4.1-EUA-Provider-Fact-Sheet-3.pdf    Fact sheet for patients: https://docs.[employer]/wp-content/uploads/KLX4348-2557-ZK6.1-EUA-Patient-Fact-Sheet-1.pdf            Radiology/Imaging:  Ct Abdomen Pelvis Without Contrast    Result Date: 10/27/2020   1.  Patchy bibasilar groundglass opacities are superimposed on background fibrosis/scarring and concerning for multifocal pneumonia. 2.  Punctate nonobstructing right renal stone. No hydronephrosis. 3.  Gallstones and/or biliary sludge.  Discussed with Dr. Mccoy at 2:50 AM.  This report was finalized on 10/27/2020 2:54 AM by Dr. Hilary Tilley M.D.         Vitals/Labs/I&O  [unfilled]    Results from last 7 days   Lab Units 11/01/20  0458 10/31/20  0621 10/30/20  0826   WBC 10*3/mm3 13.98* 8.57 12.51*     Results from last 7 days   Lab Units 10/27/20  1851   PROCALCITONIN ng/mL 0.05     Results from last 7 days   Lab Units 11/01/20  0458 10/31/20  0621 10/30/20  0826   AST (SGOT) U/L 37* 34* 35*      Results from last 7 days   Lab Units 11/01/20  0458 10/31/20  0621 10/30/20  0826   ALT (SGPT) U/L 16 14 13       Estimated Creatinine Clearance: 54.1 mL/min (A) (by C-G formula based on SCr of 0.36 mg/dL (L)).  Results from last 7 days   Lab Units 11/01/20  0458 10/31/20  0621 10/30/20  0826   BUN mg/dL 10 5* 7*   CREATININE mg/dL 0.36* 0.44* 0.45*     Intake & Output (last 3 days)         10/29 0701 - 10/30 0700 10/30 0701 - 10/31 0700 10/31 0701 - 11/01 0700 11/01 0701 - 11/02 0700    P.O. 700 120 240     I.V. (mL/kg) 950 (17.4) 1000 (18.3)      Total Intake(mL/kg) 1650 (30.2) 1120 (20.5) 240 (4.4)     Urine (mL/kg/hr) 2100 (1.6) 2500 (1.9) 700 (0.5)     Stool 0 0      Total Output 2100 2500 700     Net -450 -1768 -460             Urine Unmeasured Occurrence   1 x     Stool Unmeasured Occurrence 1 x 3 x               Assessment/Plan:    Patient is hospitalized with confirmed, severe COVID-19 infection and started on remdesivir 200 mg IV once followed by 100 mg IV daily for 4 days (5 day total duration). All inclusions, exclusions, and monitoring requirements listed below have been reviewed.    Patient is hospitalized with confirmed COVID-19 infection  Patient is requiring ?2 L of oxygen to maintain oxygen saturations of ?94%   Baseline and daily LFTs and Scr have been ordered prior to remdesivir initiation  ALT is not ? 5 times the upper limit of normal  Patient is not on concomitant hydroxychloroquine or chloroquine       Thank you for involving pharmacy in this patient's care. Please contact pharmacy with any questions or concerns.                           Julita Valero [facility]  Clinical Pharmacist  11/01/20 09:54 EST

## 2020-11-01 NOTE — PROGRESS NOTES
[facility]     Mar/chart reviewed  Follow up covid19 pneumonia, CT associated ILD  Some cough and shortness of breath    Vital Sign Min/Max for last 24 hours  Temp  Min: 96.6 °F (35.9 °C)  Max: 97.8 °F (36.6 °C)   BP  Min: 115/84  Max: 133/73   Pulse  Min: 77  Max: 98   Resp  Min: 20  Max: 26   SpO2  Min: 90 %  Max: 94 %   Flow (L/min)  Min: 7  Max: 12   No data recorded     Appears ill, axox3,   perrl, eomi, normal sclera, no palpable thyroid nodules  mmm, no jvd, trachea midline, neck supple,  chest decreased ae bilaterally, + crackles, no wheezes,   rrr,   soft, nt, nd +bs,  no c/c/ e  Skin warm, dry no rashes    Labs: 11/1: reviewed:  inr 4.09  Glucose 106  Bun 10  Cr 0.36  Na 144  Bicarb 18.2  albmin 2.3  crp 4.98 (was 16.3)  Ferritin 655 (was 848)  ldh 616  D diimer 1.47  Wbc 13.98  hgb 10.7  plts 263    A/P:  1. COVID19 pneumonia -- agree with dexamethasone, she has some concerning elevation of inflammatory markers and several co-morbidities  2. Connective tissue asssociated ILD -- possibly could be related to nitrofurantoin as well, and I would recommend not resuming that medication if at all possible  3. Rheumatoid arthritis -- on chronic methotrexate therapy as well  4. Acute hypoxemic respiratory failure -- continue oxygen support, she is at high risk for further deterioration. Defer remedsivir and/or other therapies to ID  5. Elevated inflammaotry markers  6. UTI  7. History of pulmonary embolism -- therapeutic on coumadin  8. Hyperglycemia     She is high risk for further deterioration but she appears comfortable at the moment, oxygen saturations are acceptable.  Ok to remain on current unit for now. inflammatory markers are down a little bit today.

## 2020-11-01 NOTE — NURSING NOTE
Pt was on 10 L high flow with morning assessment. Now requiring 12 L high flow. Screen positive on sepsis check list. Dr. Souza aware, on the unit assessing the patient. ( See new orders)

## 2020-11-01 NOTE — PROGRESS NOTES
"DAILY PROGRESS NOTE  [facility]    Patient Identification:  Name: Rachel Moser  Age: 74 y.o.  Sex: female  :  1945  MRN: [id number]         Primary Care Physician: Nahun Wilkerson MD    Subjective:  Interval History:She feels worse.  A little short of air and sats were dipping down.  She is on 12 L O 2 .    Objective:    Scheduled Meds:atorvastatin, 20 mg, Oral, Daily  calcium 500 mg vitamin D 5 mcg (200 UT), 1 tablet, Oral, Daily  cetirizine, 10 mg, Oral, Daily  cholecalciferol, 2,000 Units, Oral, Daily  dexamethasone, 6 mg, Oral, Daily With Breakfast  ertapenem, 1 g, Intravenous, Q24H  famotidine, 20 mg, Oral, BID  ferrous sulfate, 325 mg, Oral, BID  folic acid, 1,600 mcg, Oral, Daily  furosemide, 40 mg, Oral, Daily  metoprolol succinate XL, 50 mg, Oral, Daily  multivitamin with minerals, 1 tablet, Oral, Daily  phenazopyridine, 200 mg, Oral, TID With Meals  potassium chloride, 40 mEq, Oral, Daily  [START ON 2020] remdesivir, 100 mg, Intravenous, Q24H  sodium chloride, 10 mL, Intravenous, Q12H      Continuous Infusions:Pharmacy Consult - Remdesivir,   Pharmacy to dose warfarin,         Vital signs in last 24 hours:  Temp:  [96.6 °F (35.9 °C)-97.8 °F (36.6 °C)] 96.6 °F (35.9 °C)  Heart Rate:  [77-98] 77  Resp:  [20-26] 20  BP: (115-133)/(63-84) 126/63    Intake/Output:    Intake/Output Summary (Last 24 hours) at 2020 1432  Last data filed at 2020 1100  Gross per 24 hour   Intake 240 ml   Output 900 ml   Net -660 ml       Exam:  /63 (BP Location: Right arm, Patient Position: Lying)   Pulse 77   Temp 96.6 °F (35.9 °C) (Oral)   Resp 20   Ht 149.9 cm (59\")   Wt 54.7 kg (120 lb 11.2 oz)   SpO2 90%   BMI 24.38 kg/m²     General Appearance:    Alert, cooperative, no distress   Head:    Normocephalic, without obvious abnormality, atraumatic   Eyes:       Throat:   Lips, tongue, gums normal   Neck:   Supple, symmetrical, trachea midline, no JVD   Lungs:     Clear to " auscultation bilaterally, respirations unlabored   Chest Wall:    No tenderness or deformity    Heart:    Regular rate and rhythm, S1 and S2 normal, no murmur,no  Rub or gallop   Abdomen:     Soft, nontender, bowel sounds active, no masses, no organomegaly    Extremities:   Extremities normal, atraumatic, no cyanosis or edema   Pulses:      Skin:   Skin is warm and dry,  no rashes or palpable lesions   Neurologic:   no focal deficits noted      Lab Results (last 72 hours)       Procedure Component Value Units Date/Time    Protime-INR [[id number]]  (Abnormal) Collected: 10/28/20 0844    Specimen: Blood Updated: 10/28/20 1005     Protime 28.9 Seconds      INR 2.84    D-dimer, Quantitative [[id number]]  (Normal) Collected: 10/28/20 0844    Specimen: Blood Updated: 10/28/20 1005     D-Dimer, Quantitative 0.32 MCGFEU/mL     Narrative:      The Stago D-Dimer test used in conjunction with a clinical pretest probability (PTP) assessment model, has been approved by the FDA to rule out the presence of venous thromboembolism (VTE) in outpatients suspected of deep venous thrombosis (DVT) or pulmonary embolism (PE). The cut-off for negative predictive value is <0.50 MCGFEU/mL.    Fibrinogen [[id number]]  (Abnormal) Collected: 10/28/20 0844    Specimen: Blood Updated: 10/28/20 1005     Fibrinogen 531 mg/dL     Ferritin [[id number]]  (Abnormal) Collected: 10/28/20 0844    Specimen: Blood Updated: 10/28/20 0959     Ferritin 371.00 ng/mL     Narrative:      Results may be falsely decreased if patient taking Biotin.      Lactate Dehydrogenase [[id number]]  (Abnormal) Collected: 10/28/20 0844    Specimen: Blood Updated: 10/28/20 0950      U/L      Comment: Specimen hemolyzed.  Results may be affected.       Comprehensive Metabolic Panel [[id number]]  (Abnormal) Collected: 10/28/20 0844    Specimen: Blood Updated: 10/28/20 0948     Glucose 97 mg/dL      BUN 10 mg/dL      Creatinine 0.45 mg/dL      Sodium 139 mmol/L      Potassium 4.0  mmol/L      Comment: Slight hemolysis detected by analyzer. Results may be affected.        Chloride 113 mmol/L      CO2 19.1 mmol/L      Calcium 7.2 mg/dL      Total Protein 5.5 g/dL      Albumin 3.00 g/dL      ALT (SGPT) 13 U/L      AST (SGOT) 25 U/L      Alkaline Phosphatase 45 U/L      Total Bilirubin 0.4 mg/dL      eGFR Non African Amer 137 mL/min/1.73      Globulin 2.5 gm/dL      A/G Ratio 1.2 g/dL      BUN/Creatinine Ratio 22.2     Anion Gap 6.9 mmol/L     Narrative:      GFR Normal >60  Chronic Kidney Disease <60  Kidney Failure <15      CK [[id number]]  (Normal) Collected: 10/28/20 0844    Specimen: Blood Updated: 10/28/20 0948     Creatine Kinase 47 U/L     C-reactive Protein [[id number]]  (Abnormal) Collected: 10/28/20 0844    Specimen: Blood Updated: 10/28/20 0948     C-Reactive Protein 3.83 mg/dL     CBC & Differential [[id number]]  (Abnormal) Collected: 10/28/20 0845    Specimen: Blood Updated: 10/28/20 0941    Narrative:      The following orders were created for panel order CBC & Differential.  Procedure                               Abnormality         Status                     ---------                               -----------         ------                     CBC Auto Differential[[id number]]        Abnormal            Final result                 Please view results for these tests on the individual orders.    CBC Auto Differential [[id number]]  (Abnormal) Collected: 10/28/20 0845    Specimen: Blood Updated: 10/28/20 0941     WBC 12.02 10*3/mm3      RBC 4.31 10*6/mm3      Hemoglobin 12.8 g/dL      Hematocrit 38.8 %      MCV 90.0 fL      MCH 29.7 pg      MCHC 33.0 g/dL      RDW 14.9 %      RDW-SD 48.0 fl      MPV 11.9 fL      Platelets 167 10*3/mm3      Neutrophil % 82.6 %      Lymphocyte % 8.2 %      Monocyte % 7.5 %      Eosinophil % 0.0 %      Basophil % 0.5 %      Immature Grans % 1.2 %      Neutrophils, Absolute 9.93 10*3/mm3      Lymphocytes, Absolute 0.98 10*3/mm3      Monocytes, Absolute  "0.90 10*3/mm3      Eosinophils, Absolute 0.00 10*3/mm3      Basophils, Absolute 0.06 10*3/mm3      Immature Grans, Absolute 0.15 10*3/mm3      nRBC 0.1 /100 WBC     Blood Culture - Blood, Arm, Left [[id number]] Collected: 10/27/20 0018    Specimen: Blood from Arm, Left Updated: 10/28/20 0045     Blood Culture No growth at 24 hours    Blood Culture - Blood, Arm, Right [[id number]] Collected: 10/27/20 0018    Specimen: Blood from Arm, Right Updated: 10/28/20 0045     Blood Culture No growth at 24 hours    Ferritin [[id number]]  (Abnormal) Collected: 10/27/20 1851    Specimen: Blood Updated: 10/27/20 2034     Ferritin 353.00 ng/mL     Narrative:      Results may be falsely decreased if patient taking Biotin.      Procalcitonin [[id number]]  (Normal) Collected: 10/27/20 1851    Specimen: Blood Updated: 10/27/20 1941     Procalcitonin 0.05 ng/mL     Narrative:      As a Marker for Sepsis (Non-Neonates):   1. <0.5 ng/mL represents a low risk of severe sepsis and/or septic shock.  1. >2 ng/mL represents a high risk of severe sepsis and/or septic shock.    As a Marker for Lower Respiratory Tract Infections that require antibiotic therapy:  PCT on Admission     Antibiotic Therapy             6-12 Hrs later  > 0.5                Strongly Recommended            >0.25 - <0.5         Recommended  0.1 - 0.25           Discouraged                   Remeasure/reassess PCT  <0.1                 Strongly Discouraged          Remeasure/reassess PCT      As 28 day mortality risk marker: \"Change in Procalcitonin Result\" (> 80 % or <=80 %) if Day 0 (or Day 1) and Day 4 values are available. Refer to http://www.[facility]-pct-calculator.com/   Change in PCT <=80 %   A decrease of PCT levels below or equal to 80 % defines a positive change in PCT test result representing a higher risk for 28-day all-cause mortality of patients diagnosed with severe sepsis or septic shock.  Change in PCT > 80 %   A decrease of PCT levels of more than 80 % defines a " negative change in PCT result representing a lower risk for 28-day all-cause mortality of patients diagnosed with severe sepsis or septic shock.                Results may be falsely decreased if patient taking Biotin.     Lactate Dehydrogenase [[id number]]  (Abnormal) Collected: 10/27/20 1851    Specimen: Blood Updated: 10/27/20 1933      U/L     D-dimer, Quantitative [[id number]]  (Normal) Collected: 10/27/20 1731    Specimen: Blood from Hand, Right Updated: 10/27/20 1822     D-Dimer, Quantitative 0.34 MCGFEU/mL     Narrative:      The Stago D-Dimer test used in conjunction with a clinical pretest probability (PTP) assessment model, has been approved by the FDA to rule out the presence of venous thromboembolism (VTE) in outpatients suspected of deep venous thrombosis (DVT) or pulmonary embolism (PE). The cut-off for negative predictive value is <0.50 MCGFEU/mL.    Protime-INR [[id number]]  (Abnormal) Collected: 10/27/20 1047    Specimen: Blood Updated: 10/27/20 1150     Protime 21.9 Seconds      INR 1.97    Basic Metabolic Panel [[id number]]  (Abnormal) Collected: 10/27/20 0708    Specimen: Blood from Hand, Left Updated: 10/27/20 0849     Glucose 130 mg/dL      BUN 12 mg/dL      Creatinine 0.59 mg/dL      Sodium 134 mmol/L      Potassium 3.6 mmol/L      Chloride 100 mmol/L      CO2 24.2 mmol/L      Calcium 7.9 mg/dL      eGFR Non African Amer 100 mL/min/1.73      BUN/Creatinine Ratio 20.3     Anion Gap 9.8 mmol/L     Narrative:      GFR Normal >60  Chronic Kidney Disease <60  Kidney Failure <15      Protime-INR [[id number]]  (Abnormal) Collected: 10/27/20 0708    Specimen: Blood from Hand, Left Updated: 10/27/20 0835     Protime 23.1 Seconds      INR 2.11    CBC Auto Differential [[id number]]  (Abnormal) Collected: 10/27/20 0708    Specimen: Blood from Hand, Left Updated: 10/27/20 0801     WBC 8.30 10*3/mm3      RBC 4.30 10*6/mm3      Hemoglobin 13.0 g/dL      Hematocrit 39.3 %      MCV 91.4 fL      MCH 30.2 pg       MCHC 33.1 g/dL      RDW 15.3 %      RDW-SD 50.5 fl      MPV 12.0 fL      Platelets 176 10*3/mm3      Neutrophil % 76.2 %      Lymphocyte % 13.6 %      Monocyte % 7.0 %      Eosinophil % 0.0 %      Basophil % 0.8 %      Immature Grans % 2.4 %      Neutrophils, Absolute 6.32 10*3/mm3      Lymphocytes, Absolute 1.13 10*3/mm3      Monocytes, Absolute 0.58 10*3/mm3      Eosinophils, Absolute 0.00 10*3/mm3      Basophils, Absolute 0.07 10*3/mm3      Immature Grans, Absolute 0.20 10*3/mm3      nRBC 0.0 /100 WBC     Respiratory Panel PCR w/COVID-19(SARS-CoV-2) MONICO/WENDY/JHON/PAD/COR/MAD/JUAN In-House, NP Swab in UTM/VTM, 3-4 HR TAT - Swab, Nasopharynx [[id number]]  (Abnormal) Collected: 10/27/20 0018    Specimen: Swab from Nasopharynx Updated: 10/27/20 0204     ADENOVIRUS, PCR Not Detected     Coronavirus 229E Not Detected     Coronavirus HKU1 Not Detected     Coronavirus NL63 Not Detected     Coronavirus OC43 Not Detected     COVID19 Detected     Human Metapneumovirus Not Detected     Human Rhinovirus/Enterovirus Not Detected     Influenza A PCR Not Detected     Influenza B PCR Not Detected     Parainfluenza Virus 1 Not Detected     Parainfluenza Virus 2 Not Detected     Parainfluenza Virus 3 Not Detected     Parainfluenza Virus 4 Not Detected     RSV, PCR Not Detected     Bordetella pertussis pcr Not Detected     Bordetella parapertussis PCR Not Detected     Chlamydophila pneumoniae PCR Not Detected     Mycoplasma pneumo by PCR Not Detected    Narrative:      Fact sheet for providers: https://docs.[employer]/wp-content/uploads/SYY7634-0489-HI2.1-EUA-Provider-Fact-Sheet-3.pdf    Fact sheet for patients: https://docs.[employer]/wp-content/uploads/XCH2349-2411-VE9.1-EUA-Patient-Fact-Sheet-1.pdf    Urinalysis, Microscopic Only - Urine, Catheter In/Out [[id number]]  (Abnormal) Collected: 10/27/20 0023    Specimen: Urine, Catheter In/Out Updated: 10/27/20 0152     RBC, UA None Seen /HPF      WBC, UA 3-5 /HPF      Bacteria,  UA Trace /HPF      Squamous Epithelial Cells, UA 0-2 /HPF      Hyaline Casts, UA None Seen /LPF      Methodology Manual Light Microscopy    [facility] Draw [[id number]] Collected: 10/27/20 0018    Specimen: Blood Updated: 10/27/20 0130    Narrative:      The following orders were created for panel order [place] Draw.  Procedure                               Abnormality         Status                     ---------                               -----------         ------                     Light Blue Top[[id number]]                                   Final result               Green Top (Gel)[[id number]]                                  Final result               Lavender Top[[id number]]                                     Final result               Gold Top - SST[[id number]]                                   Final result                 Please view results for these tests on the individual orders.    Lavender Top [[id number]] Collected: 10/27/20 0018    Specimen: Blood Updated: 10/27/20 0130     Extra Tube hold for add-on     Comment: Auto resulted       Gold Top - SST [[id number]] Collected: 10/27/20 0018    Specimen: Blood Updated: 10/27/20 0130     Extra Tube Hold for add-ons.     Comment: Auto resulted.       Light Blue Top [[id number]] Collected: 10/27/20 0018    Specimen: Blood Updated: 10/27/20 0130     Extra Tube hold for add-on     Comment: Auto resulted       Green Top (Gel) [[id number]] Collected: 10/27/20 0018    Specimen: Blood Updated: 10/27/20 0130     Extra Tube Hold for add-ons.     Comment: Auto resulted.       Protime-INR [[id number]]  (Abnormal) Collected: 10/27/20 0018    Specimen: Blood Updated: 10/27/20 0109     Protime 23.5 Seconds      INR 2.16    Comprehensive Metabolic Panel [[id number]]  (Abnormal) Collected: 10/27/20 0018    Specimen: Blood Updated: 10/27/20 0103     Glucose 113 mg/dL      BUN 10 mg/dL      Creatinine 0.70 mg/dL      Sodium 134 mmol/L      Potassium 3.2 mmol/L      Chloride 95 mmol/L       CO2 27.4 mmol/L      Calcium 8.2 mg/dL      Total Protein 6.5 g/dL      Albumin 3.80 g/dL      ALT (SGPT) 15 U/L      AST (SGOT) 22 U/L      Alkaline Phosphatase 53 U/L      Total Bilirubin 0.6 mg/dL      eGFR Non African Amer 82 mL/min/1.73      Globulin 2.7 gm/dL      A/G Ratio 1.4 g/dL      BUN/Creatinine Ratio 14.3     Anion Gap 11.6 mmol/L     Narrative:      GFR Normal >60  Chronic Kidney Disease <60  Kidney Failure <15      Lactic Acid, Plasma [[id number]]  (Normal) Collected: 10/27/20 0018    Specimen: Blood Updated: 10/27/20 0102     Lactate 1.2 mmol/L     Urinalysis With Culture If Indicated - Urine, Catheter In/Out [[id number]]  (Abnormal) Collected: 10/27/20 0023    Specimen: Urine, Catheter In/Out Updated: 10/27/20 0057     Color, UA Dark Yellow     Appearance, UA Clear     pH, UA 7.5     Specific Gravity, UA 1.019     Glucose, UA Negative     Ketones, UA 15 mg/dL (1+)     Bilirubin, UA Small (1+)     Blood, UA Negative     Protein,  mg/dL (2+)     Leuk Esterase, UA Small (1+)     Nitrite, UA Positive     Urobilinogen, UA 1.0 E.U./dL    CBC & Differential [[id number]]  (Abnormal) Collected: 10/27/20 0018    Specimen: Blood Updated: 10/27/20 0044    Narrative:      The following orders were created for panel order CBC & Differential.  Procedure                               Abnormality         Status                     ---------                               -----------         ------                     CBC Auto Differential[[id number]]        Abnormal            Final result                 Please view results for these tests on the individual orders.    CBC Auto Differential [[id number]]  (Abnormal) Collected: 10/27/20 0018    Specimen: Blood Updated: 10/27/20 0044     WBC 8.39 10*3/mm3      RBC 4.46 10*6/mm3      Hemoglobin 13.3 g/dL      Hematocrit 39.6 %      MCV 88.8 fL      MCH 29.8 pg      MCHC 33.6 g/dL      RDW 15.4 %      RDW-SD 48.1 fl      MPV 12.2 fL      Platelets 174 10*3/mm3       Neutrophil % 67.5 %      Lymphocyte % 18.2 %      Monocyte % 12.3 %      Eosinophil % 0.0 %      Basophil % 0.2 %      Immature Grans % 1.8 %      Neutrophils, Absolute 5.66 10*3/mm3      Lymphocytes, Absolute 1.53 10*3/mm3      Monocytes, Absolute 1.03 10*3/mm3      Eosinophils, Absolute 0.00 10*3/mm3      Basophils, Absolute 0.02 10*3/mm3      Immature Grans, Absolute 0.15 10*3/mm3      nRBC 0.0 /100 WBC           Data Review:  Results from last 7 days   Lab Units 11/01/20  0458 10/31/20  0621 10/30/20  0826   SODIUM mmol/L 144 139 137   POTASSIUM mmol/L 3.8 3.7 4.0   CHLORIDE mmol/L 116* 113* 108*   CO2 mmol/L 18.2* 17.5* 17.6*   BUN mg/dL 10 5* 7*   CREATININE mg/dL 0.36* 0.44* 0.45*   GLUCOSE mg/dL 106* 219* 59*   CALCIUM mg/dL 7.1* 7.1* 7.0*     Results from last 7 days   Lab Units 11/01/20  0458 10/31/20  0621 10/30/20  0826   WBC 10*3/mm3 13.98* 8.57 12.51*   HEMOGLOBIN g/dL 10.7* 11.7* 11.6*   HEMATOCRIT % 31.5* 34.3 35.8   PLATELETS 10*3/mm3 263 217 145             No results found for: TROPONINT      Results from last 7 days   Lab Units 11/01/20  0458 10/31/20  0621 10/30/20  0826   ALK PHOS U/L 55 57 52   BILIRUBIN mg/dL 0.3 0.3 0.4   ALT (SGPT) U/L 16 14 13   AST (SGOT) U/L 37* 34* 35*             Glucose   Date/Time Value Ref Range Status   10/31/2020 0411 235 (H) 70 - 130 mg/dL Final   10/30/2020 1334 108 70 - 130 mg/dL Final   10/30/2020 1109 59 (L) 70 - 130 mg/dL Final     Results from last 7 days   Lab Units 11/01/20  0458 10/31/20  0621 10/30/20  0826   INR  4.09* 3.24* 3.02*       Past Medical History:   Diagnosis Date   • Allergic    • Bone infection (CMS/HCC)     hip   • Cataract    • High cholesterol    • History of DVT (deep vein thrombosis)    • History of kidney infection     1 MONTH AGO   • History of pulmonary embolus (PE)    • History of transfusion    • Hypertension    • Left hip postoperative wound infection    • Paralabral cyst of left hip    • PICC (peripherally inserted central  catheter) in place    • PONV (postoperative nausea and vomiting)    • Presence of vena cava filter    • Rheumatoid arteritis (CMS/HCC)    • Seasonal allergies    • UTI (urinary tract infection)     diagnosed 4/2/18  medically treated presently   • Wears glasses        Assessment:  Active Hospital Problems    Diagnosis  POA   • **Pneumonia due to COVID-19 virus [U07.1, J12.89]  Yes   • Acute respiratory failure with hypoxia (CMS/[facility]) [J96.01]  Unknown   • Blood type A+ [Z67.10]  Unknown   • UTI (urinary tract infection) [N39.0]  Unknown   • Pulmonary emboli (CMS/[facility]) [I26.99]  Yes   • Mixed hyperlipidemia [E78.2]  Yes   • Long term (current) use of antibiotics [Z79.2]  Not Applicable   • Hypertension [I10]  Yes   • Rheumatoid arthritis involving multiple sites (CMS/[facility]) [M06.9]  Yes   • History of DVT (deep vein thrombosis) [Z86.718]  Not Applicable   • OA (osteoarthritis) of hip [M16.9]  Yes      Resolved Hospital Problems   No resolved problems to display.   Sepsis due to COVID-19 pneumonia and ESBL UTI    Plan:  Finish antibiotics for UTI and follow lab. COVID 19 is becoming symptomatic.  Continue Decadron and  ID and pulmonary consults noted.  Follow-up Covid lab.  O 2 and supportive care.    Eliecer Ruiz MD  11/1/2020  14:32 EST

## 2020-11-02 NOTE — PROGRESS NOTES
"DAILY PROGRESS NOTE  [facility]    Patient Identification:  Name: Rachel Moser  Age: 74 y.o.  Sex: female  :  1945  MRN: [id number]         Primary Care Physician: Nahun Wilkerson MD    Subjective:  Interval History:She feels worse.  A little short of air and sats were dipping down.  She is on 15 L O 2  Hi flow..    Objective:    Scheduled Meds:amoxicillin-clavulanate, 1 tablet, Oral, Q12H  atorvastatin, 20 mg, Oral, Daily  calcium 500 mg vitamin D 5 mcg (200 UT), 1 tablet, Oral, Daily  cetirizine, 10 mg, Oral, Daily  cholecalciferol, 2,000 Units, Oral, Daily  dexamethasone, 6 mg, Oral, Daily With Breakfast  famotidine, 20 mg, Oral, BID  ferrous sulfate, 325 mg, Oral, BID  folic acid, 1,600 mcg, Oral, Daily  furosemide, 40 mg, Intravenous, Once  furosemide, 40 mg, Oral, Daily  metoprolol succinate XL, 50 mg, Oral, Daily  multivitamin with minerals, 1 tablet, Oral, Daily  phenazopyridine, 200 mg, Oral, TID With Meals  potassium chloride, 40 mEq, Oral, Daily  remdesivir, 100 mg, Intravenous, Q24H  sodium chloride, 10 mL, Intravenous, Q12H      Continuous Infusions:Pharmacy Consult - Remdesivir,   Pharmacy to dose warfarin,         Vital signs in last 24 hours:  Temp:  [97.4 °F (36.3 °C)-98.2 °F (36.8 °C)] 97.7 °F (36.5 °C)  Heart Rate:  [65-79] 65  Resp:  [18] 18  BP: (124-138)/(69-77) 138/77    Intake/Output:    Intake/Output Summary (Last 24 hours) at 2020 1504  Last data filed at 2020 0500  Gross per 24 hour   Intake 360 ml   Output 300 ml   Net 60 ml       Exam:  /77 (BP Location: Right arm, Patient Position: Lying)   Pulse 65   Temp 97.7 °F (36.5 °C) (Oral)   Resp 18   Ht 149.9 cm (59\")   Wt 54.7 kg (120 lb 11.2 oz)   SpO2 90%   BMI 24.38 kg/m²     General Appearance:    Alert, cooperative, no distress   Head:    Normocephalic, without obvious abnormality, atraumatic   Eyes:       Throat:   Lips, tongue, gums normal   Neck:   Supple, symmetrical, trachea midline, " no JVD   Lungs:     Clear to auscultation bilaterally, respirations unlabored   Chest Wall:    No tenderness or deformity    Heart:    Regular rate and rhythm, S1 and S2 normal, no murmur,no  Rub or gallop   Abdomen:     Soft, nontender, bowel sounds active, no masses, no organomegaly    Extremities:   Extremities normal, atraumatic, no cyanosis or edema   Pulses:      Skin:   Skin is warm and dry,  no rashes or palpable lesions   Neurologic:   no focal deficits noted      Lab Results (last 72 hours)       Procedure Component Value Units Date/Time    Protime-INR [[id number]]  (Abnormal) Collected: 10/28/20 0844    Specimen: Blood Updated: 10/28/20 1005     Protime 28.9 Seconds      INR 2.84    D-dimer, Quantitative [[id number]]  (Normal) Collected: 10/28/20 0844    Specimen: Blood Updated: 10/28/20 1005     D-Dimer, Quantitative 0.32 MCGFEU/mL     Narrative:      The Stago D-Dimer test used in conjunction with a clinical pretest probability (PTP) assessment model, has been approved by the FDA to rule out the presence of venous thromboembolism (VTE) in outpatients suspected of deep venous thrombosis (DVT) or pulmonary embolism (PE). The cut-off for negative predictive value is <0.50 MCGFEU/mL.    Fibrinogen [[id number]]  (Abnormal) Collected: 10/28/20 0844    Specimen: Blood Updated: 10/28/20 1005     Fibrinogen 531 mg/dL     Ferritin [[id number]]  (Abnormal) Collected: 10/28/20 0844    Specimen: Blood Updated: 10/28/20 0959     Ferritin 371.00 ng/mL     Narrative:      Results may be falsely decreased if patient taking Biotin.      Lactate Dehydrogenase [[id number]]  (Abnormal) Collected: 10/28/20 0844    Specimen: Blood Updated: 10/28/20 0950      U/L      Comment: Specimen hemolyzed.  Results may be affected.       Comprehensive Metabolic Panel [[id number]]  (Abnormal) Collected: 10/28/20 0844    Specimen: Blood Updated: 10/28/20 0948     Glucose 97 mg/dL      BUN 10 mg/dL      Creatinine 0.45 mg/dL      Sodium  139 mmol/L      Potassium 4.0 mmol/L      Comment: Slight hemolysis detected by analyzer. Results may be affected.        Chloride 113 mmol/L      CO2 19.1 mmol/L      Calcium 7.2 mg/dL      Total Protein 5.5 g/dL      Albumin 3.00 g/dL      ALT (SGPT) 13 U/L      AST (SGOT) 25 U/L      Alkaline Phosphatase 45 U/L      Total Bilirubin 0.4 mg/dL      eGFR Non African Amer 137 mL/min/1.73      Globulin 2.5 gm/dL      A/G Ratio 1.2 g/dL      BUN/Creatinine Ratio 22.2     Anion Gap 6.9 mmol/L     Narrative:      GFR Normal >60  Chronic Kidney Disease <60  Kidney Failure <15      CK [[id number]]  (Normal) Collected: 10/28/20 0844    Specimen: Blood Updated: 10/28/20 0948     Creatine Kinase 47 U/L     C-reactive Protein [[id number]]  (Abnormal) Collected: 10/28/20 0844    Specimen: Blood Updated: 10/28/20 0948     C-Reactive Protein 3.83 mg/dL     CBC & Differential [[id number]]  (Abnormal) Collected: 10/28/20 0845    Specimen: Blood Updated: 10/28/20 0941    Narrative:      The following orders were created for panel order CBC & Differential.  Procedure                               Abnormality         Status                     ---------                               -----------         ------                     CBC Auto Differential[[id number]]        Abnormal            Final result                 Please view results for these tests on the individual orders.    CBC Auto Differential [[id number]]  (Abnormal) Collected: 10/28/20 0845    Specimen: Blood Updated: 10/28/20 0941     WBC 12.02 10*3/mm3      RBC 4.31 10*6/mm3      Hemoglobin 12.8 g/dL      Hematocrit 38.8 %      MCV 90.0 fL      MCH 29.7 pg      MCHC 33.0 g/dL      RDW 14.9 %      RDW-SD 48.0 fl      MPV 11.9 fL      Platelets 167 10*3/mm3      Neutrophil % 82.6 %      Lymphocyte % 8.2 %      Monocyte % 7.5 %      Eosinophil % 0.0 %      Basophil % 0.5 %      Immature Grans % 1.2 %      Neutrophils, Absolute 9.93 10*3/mm3      Lymphocytes, Absolute 0.98  "10*3/mm3      Monocytes, Absolute 0.90 10*3/mm3      Eosinophils, Absolute 0.00 10*3/mm3      Basophils, Absolute 0.06 10*3/mm3      Immature Grans, Absolute 0.15 10*3/mm3      nRBC 0.1 /100 WBC     Blood Culture - Blood, Arm, Left [[id number]] Collected: 10/27/20 0018    Specimen: Blood from Arm, Left Updated: 10/28/20 0045     Blood Culture No growth at 24 hours    Blood Culture - Blood, Arm, Right [[id number]] Collected: 10/27/20 0018    Specimen: Blood from Arm, Right Updated: 10/28/20 0045     Blood Culture No growth at 24 hours    Ferritin [[id number]]  (Abnormal) Collected: 10/27/20 1851    Specimen: Blood Updated: 10/27/20 2034     Ferritin 353.00 ng/mL     Narrative:      Results may be falsely decreased if patient taking Biotin.      Procalcitonin [[id number]]  (Normal) Collected: 10/27/20 1851    Specimen: Blood Updated: 10/27/20 1941     Procalcitonin 0.05 ng/mL     Narrative:      As a Marker for Sepsis (Non-Neonates):   1. <0.5 ng/mL represents a low risk of severe sepsis and/or septic shock.  1. >2 ng/mL represents a high risk of severe sepsis and/or septic shock.    As a Marker for Lower Respiratory Tract Infections that require antibiotic therapy:  PCT on Admission     Antibiotic Therapy             6-12 Hrs later  > 0.5                Strongly Recommended            >0.25 - <0.5         Recommended  0.1 - 0.25           Discouraged                   Remeasure/reassess PCT  <0.1                 Strongly Discouraged          Remeasure/reassess PCT      As 28 day mortality risk marker: \"Change in Procalcitonin Result\" (> 80 % or <=80 %) if Day 0 (or Day 1) and Day 4 values are available. Refer to http://www.[employer]-pct-calculator.com/   Change in PCT <=80 %   A decrease of PCT levels below or equal to 80 % defines a positive change in PCT test result representing a higher risk for 28-day all-cause mortality of patients diagnosed with severe sepsis or septic shock.  Change in PCT > 80 %   A decrease of PCT " levels of more than 80 % defines a negative change in PCT result representing a lower risk for 28-day all-cause mortality of patients diagnosed with severe sepsis or septic shock.                Results may be falsely decreased if patient taking Biotin.     Lactate Dehydrogenase [[id number]]  (Abnormal) Collected: 10/27/20 1851    Specimen: Blood Updated: 10/27/20 1933      U/L     D-dimer, Quantitative [[id number]]  (Normal) Collected: 10/27/20 1731    Specimen: Blood from Hand, Right Updated: 10/27/20 1822     D-Dimer, Quantitative 0.34 MCGFEU/mL     Narrative:      The Stago D-Dimer test used in conjunction with a clinical pretest probability (PTP) assessment model, has been approved by the FDA to rule out the presence of venous thromboembolism (VTE) in outpatients suspected of deep venous thrombosis (DVT) or pulmonary embolism (PE). The cut-off for negative predictive value is <0.50 MCGFEU/mL.    Protime-INR [[id number]]  (Abnormal) Collected: 10/27/20 1047    Specimen: Blood Updated: 10/27/20 1150     Protime 21.9 Seconds      INR 1.97    Basic Metabolic Panel [[id number]]  (Abnormal) Collected: 10/27/20 0708    Specimen: Blood from Hand, Left Updated: 10/27/20 0849     Glucose 130 mg/dL      BUN 12 mg/dL      Creatinine 0.59 mg/dL      Sodium 134 mmol/L      Potassium 3.6 mmol/L      Chloride 100 mmol/L      CO2 24.2 mmol/L      Calcium 7.9 mg/dL      eGFR Non African Amer 100 mL/min/1.73      BUN/Creatinine Ratio 20.3     Anion Gap 9.8 mmol/L     Narrative:      GFR Normal >60  Chronic Kidney Disease <60  Kidney Failure <15      Protime-INR [[id number]]  (Abnormal) Collected: 10/27/20 0708    Specimen: Blood from Hand, Left Updated: 10/27/20 0835     Protime 23.1 Seconds      INR 2.11    CBC Auto Differential [[id number]]  (Abnormal) Collected: 10/27/20 0708    Specimen: Blood from Hand, Left Updated: 10/27/20 0801     WBC 8.30 10*3/mm3      RBC 4.30 10*6/mm3      Hemoglobin 13.0 g/dL      Hematocrit 39.3 %       MCV 91.4 fL      MCH 30.2 pg      MCHC 33.1 g/dL      RDW 15.3 %      RDW-SD 50.5 fl      MPV 12.0 fL      Platelets 176 10*3/mm3      Neutrophil % 76.2 %      Lymphocyte % 13.6 %      Monocyte % 7.0 %      Eosinophil % 0.0 %      Basophil % 0.8 %      Immature Grans % 2.4 %      Neutrophils, Absolute 6.32 10*3/mm3      Lymphocytes, Absolute 1.13 10*3/mm3      Monocytes, Absolute 0.58 10*3/mm3      Eosinophils, Absolute 0.00 10*3/mm3      Basophils, Absolute 0.07 10*3/mm3      Immature Grans, Absolute 0.20 10*3/mm3      nRBC 0.0 /100 WBC     Respiratory Panel PCR w/COVID-19(SARS-CoV-2) MONICO/WENDY/JHON/PAD/COR/MAD/JUAN In-House, NP Swab in UTM/VTM, 3-4 HR TAT - Swab, Nasopharynx [[id number]]  (Abnormal) Collected: 10/27/20 0018    Specimen: Swab from Nasopharynx Updated: 10/27/20 0204     ADENOVIRUS, PCR Not Detected     Coronavirus 229E Not Detected     Coronavirus HKU1 Not Detected     Coronavirus NL63 Not Detected     Coronavirus OC43 Not Detected     COVID19 Detected     Human Metapneumovirus Not Detected     Human Rhinovirus/Enterovirus Not Detected     Influenza A PCR Not Detected     Influenza B PCR Not Detected     Parainfluenza Virus 1 Not Detected     Parainfluenza Virus 2 Not Detected     Parainfluenza Virus 3 Not Detected     Parainfluenza Virus 4 Not Detected     RSV, PCR Not Detected     Bordetella pertussis pcr Not Detected     Bordetella parapertussis PCR Not Detected     Chlamydophila pneumoniae PCR Not Detected     Mycoplasma pneumo by PCR Not Detected    Narrative:      Fact sheet for providers: https://docs.rimidi/wp-content/uploads/IZR0816-5104-LL7.1-EUA-Provider-Fact-Sheet-3.pdf    Fact sheet for patients: https://docs.rimidi/wp-content/uploads/XQD9004-7697-TC0.1-EUA-Patient-Fact-Sheet-1.pdf    Urinalysis, Microscopic Only - Urine, Catheter In/Out [[id number]]  (Abnormal) Collected: 10/27/20 0023    Specimen: Urine, Catheter In/Out Updated: 10/27/20 0152     RBC, UA None Seen /HPF       WBC, UA 3-5 /HPF      Bacteria, UA Trace /HPF      Squamous Epithelial Cells, UA 0-2 /HPF      Hyaline Casts, UA None Seen /LPF      Methodology Manual Light Microscopy    [place] Draw [[id number]] Collected: 10/27/20 0018    Specimen: Blood Updated: 10/27/20 0130    Narrative:      The following orders were created for panel order [place] Draw.  Procedure                               Abnormality         Status                     ---------                               -----------         ------                     Light Blue Top[[id number]]                                   Final result               Green Top (Gel)[[id number]]                                  Final result               Lavender Top[[id number]]                                     Final result               Gold Top - SST[[id number]]                                   Final result                 Please view results for these tests on the individual orders.    Lavender Top [[id number]] Collected: 10/27/20 0018    Specimen: Blood Updated: 10/27/20 0130     Extra Tube hold for add-on     Comment: Auto resulted       Gold Top - SST [[id number]] Collected: 10/27/20 0018    Specimen: Blood Updated: 10/27/20 0130     Extra Tube Hold for add-ons.     Comment: Auto resulted.       Light Blue Top [[id number]] Collected: 10/27/20 0018    Specimen: Blood Updated: 10/27/20 0130     Extra Tube hold for add-on     Comment: Auto resulted       Green Top (Gel) [[id number]] Collected: 10/27/20 0018    Specimen: Blood Updated: 10/27/20 0130     Extra Tube Hold for add-ons.     Comment: Auto resulted.       Protime-INR [[id number]]  (Abnormal) Collected: 10/27/20 0018    Specimen: Blood Updated: 10/27/20 0109     Protime 23.5 Seconds      INR 2.16    Comprehensive Metabolic Panel [[id number]]  (Abnormal) Collected: 10/27/20 0018    Specimen: Blood Updated: 10/27/20 0103     Glucose 113 mg/dL      BUN 10 mg/dL      Creatinine 0.70 mg/dL      Sodium 134 mmol/L      Potassium 3.2  mmol/L      Chloride 95 mmol/L      CO2 27.4 mmol/L      Calcium 8.2 mg/dL      Total Protein 6.5 g/dL      Albumin 3.80 g/dL      ALT (SGPT) 15 U/L      AST (SGOT) 22 U/L      Alkaline Phosphatase 53 U/L      Total Bilirubin 0.6 mg/dL      eGFR Non African Amer 82 mL/min/1.73      Globulin 2.7 gm/dL      A/G Ratio 1.4 g/dL      BUN/Creatinine Ratio 14.3     Anion Gap 11.6 mmol/L     Narrative:      GFR Normal >60  Chronic Kidney Disease <60  Kidney Failure <15      Lactic Acid, Plasma [[id number]]  (Normal) Collected: 10/27/20 0018    Specimen: Blood Updated: 10/27/20 0102     Lactate 1.2 mmol/L     Urinalysis With Culture If Indicated - Urine, Catheter In/Out [[id number]]  (Abnormal) Collected: 10/27/20 0023    Specimen: Urine, Catheter In/Out Updated: 10/27/20 0057     Color, UA Dark Yellow     Appearance, UA Clear     pH, UA 7.5     Specific Gravity, UA 1.019     Glucose, UA Negative     Ketones, UA 15 mg/dL (1+)     Bilirubin, UA Small (1+)     Blood, UA Negative     Protein,  mg/dL (2+)     Leuk Esterase, UA Small (1+)     Nitrite, UA Positive     Urobilinogen, UA 1.0 E.U./dL    CBC & Differential [[id number]]  (Abnormal) Collected: 10/27/20 0018    Specimen: Blood Updated: 10/27/20 0044    Narrative:      The following orders were created for panel order CBC & Differential.  Procedure                               Abnormality         Status                     ---------                               -----------         ------                     CBC Auto Differential[[id number]]        Abnormal            Final result                 Please view results for these tests on the individual orders.    CBC Auto Differential [[id number]]  (Abnormal) Collected: 10/27/20 0018    Specimen: Blood Updated: 10/27/20 0044     WBC 8.39 10*3/mm3      RBC 4.46 10*6/mm3      Hemoglobin 13.3 g/dL      Hematocrit 39.6 %      MCV 88.8 fL      MCH 29.8 pg      MCHC 33.6 g/dL      RDW 15.4 %      RDW-SD 48.1 fl      MPV 12.2 fL       Platelets 174 10*3/mm3      Neutrophil % 67.5 %      Lymphocyte % 18.2 %      Monocyte % 12.3 %      Eosinophil % 0.0 %      Basophil % 0.2 %      Immature Grans % 1.8 %      Neutrophils, Absolute 5.66 10*3/mm3      Lymphocytes, Absolute 1.53 10*3/mm3      Monocytes, Absolute 1.03 10*3/mm3      Eosinophils, Absolute 0.00 10*3/mm3      Basophils, Absolute 0.02 10*3/mm3      Immature Grans, Absolute 0.15 10*3/mm3      nRBC 0.0 /100 WBC           Data Review:  Results from last 7 days   Lab Units 11/02/20  0705 11/01/20  0458 10/31/20  0621   SODIUM mmol/L 144 144 139   POTASSIUM mmol/L 3.7 3.8 3.7   CHLORIDE mmol/L 114* 116* 113*   CO2 mmol/L 21.8* 18.2* 17.5*   BUN mg/dL 18 10 5*   CREATININE mg/dL 0.39* 0.36* 0.44*   GLUCOSE mg/dL 93 106* 219*   CALCIUM mg/dL 7.4* 7.1* 7.1*     Results from last 7 days   Lab Units 11/02/20  0705 11/01/20  0458 10/31/20  0621   WBC 10*3/mm3 13.19* 13.98* 8.57   HEMOGLOBIN g/dL 11.0* 10.7* 11.7*   HEMATOCRIT % 34.3 31.5* 34.3   PLATELETS 10*3/mm3 262 263 217             No results found for: TROPONINT      Results from last 7 days   Lab Units 11/02/20  0705 11/01/20  0458 10/31/20  0621   ALK PHOS U/L 66 55 57   BILIRUBIN mg/dL 0.4 0.3 0.3   BILIRUBIN DIRECT mg/dL <0.2  --   --    ALT (SGPT) U/L 16 16 14   AST (SGOT) U/L 37* 37* 34*             Glucose   Date/Time Value Ref Range Status   10/31/2020 0411 235 (H) 70 - 130 mg/dL Final     Results from last 7 days   Lab Units 11/02/20  0705 11/01/20  0458 10/31/20  0621   INR  4.03* 4.09* 3.24*       Past Medical History:   Diagnosis Date    Allergic     Bone infection (CMS/HCC)     hip    Cataract     High cholesterol     History of DVT (deep vein thrombosis)     History of kidney infection     1 MONTH AGO    History of pulmonary embolus (PE)     History of transfusion     Hypertension     Left hip postoperative wound infection     Paralabral cyst of left hip     PICC (peripherally inserted central catheter) in place     PONV  (postoperative nausea and vomiting)     Presence of vena cava filter     Rheumatoid arteritis (CMS/HCC)     Seasonal allergies     UTI (urinary tract infection)     diagnosed 4/2/18  medically treated presently    Wears glasses        Assessment:  Active Hospital Problems    Diagnosis  POA    **Pneumonia due to COVID-19 virus [U07.1, J12.89]  Yes    Acute respiratory failure with hypoxia (CMS/[facility]) [J96.01]  Unknown    Blood type A+ [Z67.10]  Unknown    UTI (urinary tract infection) [N39.0]  Unknown    Pulmonary emboli (CMS/[facility]) [I26.99]  Yes    Mixed hyperlipidemia [E78.2]  Yes    Long term (current) use of antibiotics [Z79.2]  Not Applicable    Hypertension [I10]  Yes    Rheumatoid arthritis involving multiple sites (CMS/[facility]) [M06.9]  Yes    History of DVT (deep vein thrombosis) [Z86.718]  Not Applicable    OA (osteoarthritis) of hip [M16.9]  Yes      Resolved Hospital Problems   No resolved problems to display.   Sepsis due to COVID-19 pneumonia and ESBL UTI  Present on admission  Acute hypoxic respiratory failure due to sepsis  Plan:  Finish antibiotics for UTI and follow lab. COVID 19 is becoming symptomatic and she is getting worse.  Continue Decadron and remdesivir, ID and pulmonary consults noted.  Follow-up Covid lab.  O 2 and supportive care.  Looks like she is going to have to go to the OptiFlow to keep her sats up.  [place] is certainly guarded.    Eliecer Ruiz MD  11/2/2020  15:04 EST

## 2020-11-02 NOTE — PROGRESS NOTES
Continued Stay Note  [facility]     Patient Name: Rachel Moser  MRN: [id number]  Today's Date: 11/2/2020    Admit Date: 10/26/2020    Discharge Plan     Row Name 11/02/20 1441       Plan    Plan  home with spouse and [facility] following    Patient/Family in Agreement with Plan  yes    Plan Comments  EPIC notes reviewed.  Plans to start on Optiflow.  [facility] following.  [facility] will follow. Mary Martin RN        Discharge Codes    No documentation.       Expected Discharge Date and Time     Expected Discharge Date Expected Discharge Time    Oct 30, 2020             Mary Martin RN

## 2020-11-02 NOTE — PROGRESS NOTES
LOS: 6 days     Chief Complaint:  COVID     Interval History: Afebrile, increasing oxygen requirements.  Patient is on 15 L high flow satting 88%.  She is also starting to get confused.  She states her dysuria has resolved but I am not sure she is a reliable historian at this time.  She continues to report shortness of breath or cough.    Vital Signs  Temp:  [96.6 °F (35.9 °C)-98.2 °F (36.8 °C)] 98.2 °F (36.8 °C)  Heart Rate:  [75-79] 75  Resp:  [18-20] 18  BP: (124-136)/(63-77) 136/77    Physical Exam:  General: Ill-appearing  Cardiovascular: RRR, trace LE edema   Respiratory: Coarse breath sounds bilaterally  GI: Soft, NT/ND, + bowel sounds bilaterally  Skin: No rashes     Antibiotics:  Remdesivir 100 mg IV every 24 hours     Results Review:    Lab Results   Component Value Date    WBC 13.98 (H) 11/01/2020    HGB 10.7 (L) 11/01/2020    HCT 31.5 (L) 11/01/2020    MCV 88.2 11/01/2020     11/01/2020     Lab Results   Component Value Date    GLUCOSE 93 11/02/2020    BUN 18 11/02/2020    CREATININE 0.39 (L) 11/02/2020    EGFRIFNONA >150 11/02/2020    EGFRIFAFRI 99 09/29/2020    BCR 46.2 (H) 11/02/2020    CO2 21.8 (L) 11/02/2020    CALCIUM 7.4 (L) 11/02/2020    PROTENTOTREF 5.9 (L) 09/29/2020    ALBUMIN 2.40 (L) 11/02/2020    LABIL2 2.5 09/29/2020    AST 37 (H) 11/02/2020    ALT 16 11/02/2020     Ferritin 353 --> 719 -> 848 -> 655 -> 528  CRP 3.83 --> 20.09 --> 4.98 -> 2.76  Procalcitonin 0.05    Urinalysis no leukocytes positive nitrites 6-12 white blood cells no bacteria seen    Microbiology:  11/2 UCx P  10/27 BCx neg x 2  10/27 RVP + COVID     Assessment/Plan   Covid pneumonia  Acute hypoxic respiratory failure  ESBL E. coli UTI  ESBL E. coli left prosthetic hip septic arthritis on suppressive antibiotic therapy    Dexamethasone initiated on October 30  Remdesivir initiated on November 1    Although her inflammatory markers have trended down her oxygen requirement has increased.  She is also starting to  become confused.  Asked the nurse to notify pulmonary medicine that she most likely requires OptiFlow.  The patient is a mouth breather some wondering if the mask would suit her better.  The patient completed a 7-day course of ertapenem will resume her suppressive Augmentin 500 mg p.o. twice daily    Addendum:  I have called the patient's son.  I provided him an update about his mother.  He would like us to try convalescent plasma.  I talked to him extensively about the risks and benefits and he agrees to proceed.  We will place an order for 1 unit of convalescent plasma today.    The following things were discussed with the patient:  1.  FDA has authorized emergency use of COVID-19 convalescent plasma which is not an FDA approved biological product     2. The patient or caregiver has the option to accept or refuse administration of COVID-19 convalescent plasma    3.  The significant known and potential risks and benefits of COVID-19 convalescent plasma and the extent to which is at risk and benefits are unknown    4.  Information on available alternative treatments and the risk and benefits of those alternatives

## 2020-11-02 NOTE — PLAN OF CARE
Goal Outcome Evaluation:Pt with decreased saturations with activity. Increased hi-flow to 15L. Have order for optiflow if needed. Sats 93-94% at rest. Appetite poor. Up with assist to BSC. Pt complained of SOA. Skin intact. Will give plasma as ordered. Will continue to monitor.  Plan of Care Reviewed With: patient  Progress: improving

## 2020-11-02 NOTE — PLAN OF CARE
Problem: Adult Inpatient Plan of Care  Goal: Plan of Care Review  Outcome: Ongoing, Progressing  Flowsheets (Taken 11/1/2020 1928)  Plan of Care Reviewed With: patient  Outcome Summary: Patient is currently requiring 10 L high flow oxygen. Positive sepsis screen today. Dr. Souza and Sara Pittman are both aware. Pt up to the chair most of shift today and IS at bedside and patient was instructed on proper technique. Monitor labs and VS closely.

## 2020-11-02 NOTE — PROGRESS NOTES
Pharmacy Consult: Warfarin Dosing/ Monitoring    Rachel Moser is a 74 y.o. female, estimated creatinine clearance is 54.1 mL/min (A) (by C-G formula based on SCr of 0.39 mg/dL (L)). weighing 54.7 kg (120 lb 11.2 oz).    PMH: Allergic, Bone infection, Cataract, High cholesterol, DVT, kidney infection, pulmonary embolus, transfusion, Hypertension, Left hip postoperative wound infection, Paralabral cyst of left hip, PICC in place, PONV, vena cava filter, Rheumatoid arteritis, Seasonal allergies, UTI, and Wears glasses.    Social History     Tobacco Use    Smoking status: Former Smoker     Types: Cigarettes     Quit date:      Years since quittin.8    Smokeless tobacco: Never Used    Tobacco comment: quit 50 years ago   Substance Use Topics    Alcohol use: No    Drug use: No     Results from last 7 days   Lab Units 20  0705 20  0458 10/31/20  0621 10/30/20  0826 10/29/20  0436 10/28/20  0845 10/28/20  0844 10/27/20  1047 10/27/20  0708   INR  4.03* 4.09* 3.24* 3.02* 3.58*  --  2.84* 1.97* 2.11*   HEMOGLOBIN g/dL 11.0* 10.7* 11.7* 11.6* 10.9* 12.8  --   --  13.0   HEMATOCRIT % 34.3 31.5* 34.3 35.8 33.3* 38.8  --   --  39.3   PLATELETS 10*3/mm3 262 263 217 145 173 167  --   --  176     Results from last 7 days   Lab Units 20  0705 20  0458 10/31/20  0621   SODIUM mmol/L 144 144 139   POTASSIUM mmol/L 3.7 3.8 3.7   CHLORIDE mmol/L 114* 116* 113*   CO2 mmol/L 21.8* 18.2* 17.5*   BUN mg/dL 18 10 5*   CREATININE mg/dL 0.39* 0.36* 0.44*   CALCIUM mg/dL 7.4* 7.1* 7.1*   BILIRUBIN mg/dL 0.4 0.3 0.3   ALK PHOS U/L 66 55 57   ALT (SGPT) U/L 16 16 14   AST (SGOT) U/L 37* 37* 34*   GLUCOSE mg/dL 93 106* 219*     Anticoagulation history: 10/12/2020 Per [facility]  Home Med: Warfarin 7.5 mg po qMF + 5 mg po qSSTWTh (40 mg/week) with documented INR of 5.53. This was confirmed regimen with patient over telephone on 10/28/2020)       Hospital Anticoagulation:  Consulting provider:   Sara  Start date: 10/27/2020 continued from home  Indication: Hx DVT  Target INR: 2.5-3.5 (per Dr. Ruiz order and past Anticoag clinic notes)  Expected duration: Indefinite  Bridge Therapy: None   Date 10/27  10/28  10/29  10/30  10/31  11/1  11/2         INR 2.16/ 2.11/ 1.97  2.84  3.58  3.02  3.24  4.09  4.03         Warfarin dose 5 mg  2 mg  None  2 mg  1 mg  Hold  Hold            Potential drug interactions:   -Acetaminophen - may result in an increased risk of bleeding. Elevations in INR have occurred within 1-2 weeks of initiating acetaminophen at moderate to high doses (2 and 4 g/day) in patients on stable warfarin therapy   -Dexamethasone (Moderate, started 10/30): may result in increased risk of bleeding or diminished effects of warfarin.    Will continue to monitor for drug-drug interaction as medications are added to patient's profile.      Relevant nutrition status: Diet Regular; Cardiac      Other:   Dietary advances -> changes in dietary vitamin K intake may alter response to warfarin.  Acute infection/inflammation may cause an increased sensitivity to warfarin     Lab: Albumin= 2.40 (11/2/2020)     Education complete?/ Date: [facility] - last visit on 10/12/2020     Assessment/Plan:  INR elevated at 4.03 today.  Will HOLD warfarin dose today   Further dosing per daily PT/INR and CBC.   Monitor: s/s bleed     Pharmacy will continue to follow until discharge or discontinuation of warfarin.     Jazzy Barnes, [facility]  Clinical Staff Pharmacist

## 2020-11-02 NOTE — PROGRESS NOTES
[facility]      Mar/chart reviewed  Follow up covid19 pneumonia, CT associated ILD  Some cough and shortness of breath  She feels a little more short of breath than yesterday  Oxygen up to 15 liters, but sat are mid 90's  She is in a chair    Vital Sign Min/Max for last 24 hours  Temp  Min: 97.4 °F (36.3 °C)  Max: 98.2 °F (36.8 °C)   BP  Min: 124/69  Max: 138/77   Pulse  Min: 65  Max: 79   Resp  Min: 18  Max: 18   SpO2  Min: 84 %  Max: 93 %   Flow (L/min)  Min: 10  Max: 15   No data recorded     Appears ill, axox3,   perrl, eomi, normal sclera, no palpable thyroid nodules  mmm, no jvd, trachea midline, neck supple,  chest decreased ae bilaterally, + crackles, no wheezes,   rrr,   soft, nt, nd +bs,  no c/c/ trace edema  Skin warm, dry no rashes    Labs: 11/2: reviewed:  inr 4.03  Na 144  Bicarb 21  Albumins 2.4  Cr 0.39  crp 2.76  Ferritin 528  Wbc 13  hgb 11  plts 262    A/P:  1. COVID19 pneumonia -- agree with dexamethasone, she has some concerning elevation of inflammatory markers and several co-morbidities  2. Connective tissue asssociated ILD -- possibly could be related to nitrofurantoin as well, and I would recommend not resuming that medication if at all possible  3. Rheumatoid arthritis -- on chronic methotrexate therapy as well  4. Acute hypoxemic respiratory failure -- continue oxygen support, she is at high risk for further deterioration. Defer remedsivir and/or other therapies to ID  5. Elevated inflammaotry markers  6. UTI  7. History of pulmonary embolism -- therapeutic on coumadin  8. Hyperglycemia    Worse today, will give an extra dose of lasix, repeat procal  I discussed intubation and mechanical ventilation with her today. I explained what that would involve and that she would have high mortality and best case prolonged critical illness.  I explained she has the option to decide against intubation.  She did not make a decision one way or another.  Will try optiflow.

## 2020-11-03 NOTE — PROGRESS NOTES
Pharmacy Consult: Warfarin Dosing/ Monitoring    Rachel Moser is a 74 y.o. female, estimated creatinine clearance is 54.1 mL/min (A) (by C-G formula based on SCr of 0.4 mg/dL (L)). weighing 54.7 kg (120 lb 11.2 oz).    PMH: Allergic, Bone infection, Cataract, High cholesterol, DVT, kidney infection, pulmonary embolus, transfusion, Hypertension, Left hip postoperative wound infection, Paralabral cyst of left hip, PICC in place, PONV, vena cava filter, Rheumatoid arteritis, Seasonal allergies, UTI, and Wears glasses.    Social History     Tobacco Use    Smoking status: Former Smoker     Types: Cigarettes     Quit date:      Years since quittin.8    Smokeless tobacco: Never Used    Tobacco comment: quit 50 years ago   Substance Use Topics    Alcohol use: No    Drug use: No     Results from last 7 days   Lab Units 20  0745 20  0705 20  0458 10/31/20  0621 10/30/20  0826 10/29/20  0436 10/28/20  0845 10/28/20  0844   INR  2.82* 4.03* 4.09* 3.24* 3.02* 3.58*  --  2.84*   HEMOGLOBIN g/dL 10.9* 11.0* 10.7* 11.7* 11.6* 10.9* 12.8  --    HEMATOCRIT % 31.9* 34.3 31.5* 34.3 35.8 33.3* 38.8  --    PLATELETS 10*3/mm3 252 262 263 217 145 173 167  --      Results from last 7 days   Lab Units 20  0745 20  0705 20  0458   SODIUM mmol/L 144 144 144   POTASSIUM mmol/L 3.2* 3.7 3.8   CHLORIDE mmol/L 108* 114* 116*   CO2 mmol/L 26.5 21.8* 18.2*   BUN mg/dL 21 18 10   CREATININE mg/dL 0.40* 0.39* 0.36*   CALCIUM mg/dL 7.4* 7.4* 7.1*   BILIRUBIN mg/dL 0.7 0.4 0.3   ALK PHOS U/L 77 66 55   ALT (SGPT) U/L 18 16 16   AST (SGOT) U/L 37* 37* 37*   GLUCOSE mg/dL 108* 93 106*     Anticoagulation history: 10/12/2020 Per [facility]  Home Med: Warfarin 7.5 mg po qMF + 5 mg po qSSTWTh (40 mg/week) with documented INR of 5.53. This was confirmed regimen with patient over telephone on 10/28/2020)       Hospital Anticoagulation:  Consulting provider: Dr. Ruiz  Start date: 10/27/2020  continued from home  Indication: Hx DVT  Target INR: 2.5-3.5 (per Dr. Ruiz order and past Anticoag clinic notes)  Expected duration: Indefinite  Bridge Therapy: None   Date 10/27  10/28  10/29  10/30  10/31  11/1  11/2  11/3       INR 2.16/ 2.11/ 1.97  2.84  3.58  3.02  3.24  4.09  4.03  2.82       Warfarin dose 5 mg  2 mg  None  2 mg  1 mg  Hold  Hold  1 mg          Potential drug interactions:   -Acetaminophen - may result in an increased risk of bleeding. Elevations in INR have occurred within 1-2 weeks of initiating acetaminophen at moderate to high doses (2 and 4 g/day) in patients on stable warfarin therapy   -Dexamethasone (Moderate, started 10/30): may result in increased risk of bleeding or diminished effects of warfarin.  - Augmentin - may result in an increased risk of bleeding.     Will continue to monitor for drug-drug interaction as medications are added to patient's profile.      Relevant nutrition status: Diet Regular; Cardiac      Other:   Dietary advances -> changes in dietary vitamin K intake may alter response to warfarin.  Acute infection/inflammation may cause an increased sensitivity to warfarin     Lab: Albumin= 2.70 (11/3/2020)     Education complete?/ Date: [facility] - last visit on 10/12/2020     Assessment/Plan:  INR= 2.82 today  Give Warfarin 1 mg po x1 dose today  Further dosing per daily PT/INR and CBC.   Monitor: s/s bleed     Pharmacy will continue to follow until discharge or discontinuation of warfarin.     Jazzy Barnes [facility]  Clinical Staff Pharmacist

## 2020-11-03 NOTE — PROGRESS NOTES
DAILY PROGRESS NOTE  [facility]    Patient Identification:  Name: Rachel Moser  Age: 74 y.o.  Sex: female  :  1945  MRN: [id number]         Primary Care Physician: Nahun Wilkerson MD    Subjective:  Interval History:She feels worse.  A little short of air and sats were dipping down.  She is on OPti flow.  Says she Doesn't want intubation or vent                                                                                                                                                                                                                                                                                                                                                                                                                                                                                                             Objective:    Scheduled Meds:amoxicillin-clavulanate, 1 tablet, Oral, Q12H  atorvastatin, 20 mg, Oral, Daily  calcium 500 mg vitamin D 5 mcg (200 UT), 1 tablet, Oral, Daily  cetirizine, 10 mg, Oral, Daily  cholecalciferol, 2,000 Units, Oral, Daily  dexamethasone, 6 mg, Oral, Daily With Breakfast  famotidine, 20 mg, Oral, BID  ferrous sulfate, 325 mg, Oral, BID  folic acid, 1,600 mcg, Oral, Daily  furosemide, 40 mg, Oral, Daily  metoprolol succinate XL, 50 mg, Oral, Daily  multivitamin with minerals, 1 tablet, Oral, Daily  potassium chloride, 40 mEq, Oral, Daily  remdesivir, 100 mg, Intravenous, Q24H  sodium chloride, 10 mL, Intravenous, Q12H      Continuous Infusions:Pharmacy Consult - Remdesivir,   Pharmacy to dose warfarin,         Vital signs in last 24 hours:  Temp:  [96.1 °F (35.6 °C)-98.2 °F (36.8 °C)] 97.6 °F (36.4 °C)  Heart Rate:  [62-85] 77  Resp:  [16-22] 18  BP: (122-154)/(67-87) 154/86    Intake/Output:    Intake/Output Summary (Last 24 hours) at 11/3/2020 1248  Last data filed at 11/3/2020 0900  Gross per 24 hour   Intake 370 ml   Output 3300 ml   Net  "-2930 ml       Exam:  /86 (BP Location: Right arm, Patient Position: Lying)   Pulse 77   Temp 97.6 °F (36.4 °C) (Oral)   Resp 18   Ht 149.9 cm (59\")   Wt 54.7 kg (120 lb 11.2 oz)   SpO2 96%   BMI 24.38 kg/m²     General Appearance:    Alert, cooperative, no distress   Head:    Normocephalic, without obvious abnormality, atraumatic   Eyes:       Throat:   Lips, tongue, gums normal   Neck:   Supple, symmetrical, trachea midline, no JVD   Lungs:     Clear to auscultation bilaterally, respirations unlabored   Chest Wall:    No tenderness or deformity    Heart:    Regular rate and rhythm, S1 and S2 normal, no murmur,no  Rub or gallop   Abdomen:     Soft, nontender, bowel sounds active, no masses, no organomegaly    Extremities:   Extremities normal, atraumatic, no cyanosis or edema   Pulses:      Skin:   Skin is warm and dry,  no rashes or palpable lesions   Neurologic:   no focal deficits noted      Lab Results (last 72 hours)       Procedure Component Value Units Date/Time    Protime-INR [[id number]]  (Abnormal) Collected: 10/28/20 0844    Specimen: Blood Updated: 10/28/20 1005     Protime 28.9 Seconds      INR 2.84    D-dimer, Quantitative [[id number]]  (Normal) Collected: 10/28/20 0844    Specimen: Blood Updated: 10/28/20 1005     D-Dimer, Quantitative 0.32 MCGFEU/mL     Narrative:      The Stago D-Dimer test used in conjunction with a clinical pretest probability (PTP) assessment model, has been approved by the FDA to rule out the presence of venous thromboembolism (VTE) in outpatients suspected of deep venous thrombosis (DVT) or pulmonary embolism (PE). The cut-off for negative predictive value is <0.50 MCGFEU/mL.    Fibrinogen [[id number]]  (Abnormal) Collected: 10/28/20 0844    Specimen: Blood Updated: 10/28/20 1005     Fibrinogen 531 mg/dL     Ferritin [[id number]]  (Abnormal) Collected: 10/28/20 0844    Specimen: Blood Updated: 10/28/20 0959     Ferritin 371.00 ng/mL     Narrative:      Results may be " falsely decreased if patient taking Biotin.      Lactate Dehydrogenase [[id number]]  (Abnormal) Collected: 10/28/20 0844    Specimen: Blood Updated: 10/28/20 0950      U/L      Comment: Specimen hemolyzed.  Results may be affected.       Comprehensive Metabolic Panel [[id number]]  (Abnormal) Collected: 10/28/20 0844    Specimen: Blood Updated: 10/28/20 0948     Glucose 97 mg/dL      BUN 10 mg/dL      Creatinine 0.45 mg/dL      Sodium 139 mmol/L      Potassium 4.0 mmol/L      Comment: Slight hemolysis detected by analyzer. Results may be affected.        Chloride 113 mmol/L      CO2 19.1 mmol/L      Calcium 7.2 mg/dL      Total Protein 5.5 g/dL      Albumin 3.00 g/dL      ALT (SGPT) 13 U/L      AST (SGOT) 25 U/L      Alkaline Phosphatase 45 U/L      Total Bilirubin 0.4 mg/dL      eGFR Non African Amer 137 mL/min/1.73      Globulin 2.5 gm/dL      A/G Ratio 1.2 g/dL      BUN/Creatinine Ratio 22.2     Anion Gap 6.9 mmol/L     Narrative:      GFR Normal >60  Chronic Kidney Disease <60  Kidney Failure <15      CK [[id number]]  (Normal) Collected: 10/28/20 0844    Specimen: Blood Updated: 10/28/20 0948     Creatine Kinase 47 U/L     C-reactive Protein [[id number]]  (Abnormal) Collected: 10/28/20 0844    Specimen: Blood Updated: 10/28/20 0948     C-Reactive Protein 3.83 mg/dL     CBC & Differential [[id number]]  (Abnormal) Collected: 10/28/20 0845    Specimen: Blood Updated: 10/28/20 0941    Narrative:      The following orders were created for panel order CBC & Differential.  Procedure                               Abnormality         Status                     ---------                               -----------         ------                     CBC Auto Differential[[id number]]        Abnormal            Final result                 Please view results for these tests on the individual orders.    CBC Auto Differential [[id number]]  (Abnormal) Collected: 10/28/20 0845    Specimen: Blood Updated: 10/28/20 0941     WBC  12.02 10*3/mm3      RBC 4.31 10*6/mm3      Hemoglobin 12.8 g/dL      Hematocrit 38.8 %      MCV 90.0 fL      MCH 29.7 pg      MCHC 33.0 g/dL      RDW 14.9 %      RDW-SD 48.0 fl      MPV 11.9 fL      Platelets 167 10*3/mm3      Neutrophil % 82.6 %      Lymphocyte % 8.2 %      Monocyte % 7.5 %      Eosinophil % 0.0 %      Basophil % 0.5 %      Immature Grans % 1.2 %      Neutrophils, Absolute 9.93 10*3/mm3      Lymphocytes, Absolute 0.98 10*3/mm3      Monocytes, Absolute 0.90 10*3/mm3      Eosinophils, Absolute 0.00 10*3/mm3      Basophils, Absolute 0.06 10*3/mm3      Immature Grans, Absolute 0.15 10*3/mm3      nRBC 0.1 /100 WBC     Blood Culture - Blood, Arm, Left [[id number]] Collected: 10/27/20 0018    Specimen: Blood from Arm, Left Updated: 10/28/20 0045     Blood Culture No growth at 24 hours    Blood Culture - Blood, Arm, Right [[id number]] Collected: 10/27/20 0018    Specimen: Blood from Arm, Right Updated: 10/28/20 0045     Blood Culture No growth at 24 hours    Ferritin [[id number]]  (Abnormal) Collected: 10/27/20 1851    Specimen: Blood Updated: 10/27/20 2034     Ferritin 353.00 ng/mL     Narrative:      Results may be falsely decreased if patient taking Biotin.      Procalcitonin [[id number]]  (Normal) Collected: 10/27/20 1851    Specimen: Blood Updated: 10/27/20 1941     Procalcitonin 0.05 ng/mL     Narrative:      As a Marker for Sepsis (Non-Neonates):   1. <0.5 ng/mL represents a low risk of severe sepsis and/or septic shock.  1. >2 ng/mL represents a high risk of severe sepsis and/or septic shock.    As a Marker for Lower Respiratory Tract Infections that require antibiotic therapy:  PCT on Admission     Antibiotic Therapy             6-12 Hrs later  > 0.5                Strongly Recommended            >0.25 - <0.5         Recommended  0.1 - 0.25           Discouraged                   Remeasure/reassess PCT  <0.1                 Strongly Discouraged          Remeasure/reassess PCT      As 28 day mortality  "risk marker: \"Change in Procalcitonin Result\" (> 80 % or <=80 %) if Day 0 (or Day 1) and Day 4 values are available. Refer to [email or web address]   Change in PCT <=80 %   A decrease of PCT levels below or equal to 80 % defines a positive change in PCT test result representing a higher risk for 28-day all-cause mortality of patients diagnosed with severe sepsis or septic shock.  Change in PCT > 80 %   A decrease of PCT levels of more than 80 % defines a negative change in PCT result representing a lower risk for 28-day all-cause mortality of patients diagnosed with severe sepsis or septic shock.                Results may be falsely decreased if patient taking Biotin.     Lactate Dehydrogenase [[id number]]  (Abnormal) Collected: 10/27/20 1851    Specimen: Blood Updated: 10/27/20 1933      U/L     D-dimer, Quantitative [[id number]]  (Normal) Collected: 10/27/20 1731    Specimen: Blood from Hand, Right Updated: 10/27/20 1822     D-Dimer, Quantitative 0.34 MCGFEU/mL     Narrative:      The Stago D-Dimer test used in conjunction with a clinical pretest probability (PTP) assessment model, has been approved by the FDA to rule out the presence of venous thromboembolism (VTE) in outpatients suspected of deep venous thrombosis (DVT) or pulmonary embolism (PE). The cut-off for negative predictive value is <0.50 MCGFEU/mL.    Protime-INR [[id number]]  (Abnormal) Collected: 10/27/20 1047    Specimen: Blood Updated: 10/27/20 1150     Protime 21.9 Seconds      INR 1.97    Basic Metabolic Panel [[id number]]  (Abnormal) Collected: 10/27/20 0708    Specimen: Blood from Hand, Left Updated: 10/27/20 0849     Glucose 130 mg/dL      BUN 12 mg/dL      Creatinine 0.59 mg/dL      Sodium 134 mmol/L      Potassium 3.6 mmol/L      Chloride 100 mmol/L      CO2 24.2 mmol/L      Calcium 7.9 mg/dL      eGFR Non African Amer 100 mL/min/1.73      BUN/Creatinine Ratio 20.3     Anion Gap 9.8 mmol/L     Narrative:      GFR Normal " >60  Chronic Kidney Disease <60  Kidney Failure <15      Protime-INR [[id number]]  (Abnormal) Collected: 10/27/20 0708    Specimen: Blood from Hand, Left Updated: 10/27/20 0835     Protime 23.1 Seconds      INR 2.11    CBC Auto Differential [[id number]]  (Abnormal) Collected: 10/27/20 0708    Specimen: Blood from Hand, Left Updated: 10/27/20 0801     WBC 8.30 10*3/mm3      RBC 4.30 10*6/mm3      Hemoglobin 13.0 g/dL      Hematocrit 39.3 %      MCV 91.4 fL      MCH 30.2 pg      MCHC 33.1 g/dL      RDW 15.3 %      RDW-SD 50.5 fl      MPV 12.0 fL      Platelets 176 10*3/mm3      Neutrophil % 76.2 %      Lymphocyte % 13.6 %      Monocyte % 7.0 %      Eosinophil % 0.0 %      Basophil % 0.8 %      Immature Grans % 2.4 %      Neutrophils, Absolute 6.32 10*3/mm3      Lymphocytes, Absolute 1.13 10*3/mm3      Monocytes, Absolute 0.58 10*3/mm3      Eosinophils, Absolute 0.00 10*3/mm3      Basophils, Absolute 0.07 10*3/mm3      Immature Grans, Absolute 0.20 10*3/mm3      nRBC 0.0 /100 WBC     Respiratory Panel PCR w/COVID-19(SARS-CoV-2) MONICO/WENDY/JHON/PAD/COR/MAD/JUAN In-House, NP Swab in UTM/VTM, 3-4 HR TAT - Swab, Nasopharynx [[id number]]  (Abnormal) Collected: 10/27/20 0018    Specimen: Swab from Nasopharynx Updated: 10/27/20 0204     ADENOVIRUS, PCR Not Detected     Coronavirus 229E Not Detected     Coronavirus HKU1 Not Detected     Coronavirus NL63 Not Detected     Coronavirus OC43 Not Detected     COVID19 Detected     Human Metapneumovirus Not Detected     Human Rhinovirus/Enterovirus Not Detected     Influenza A PCR Not Detected     Influenza B PCR Not Detected     Parainfluenza Virus 1 Not Detected     Parainfluenza Virus 2 Not Detected     Parainfluenza Virus 3 Not Detected     Parainfluenza Virus 4 Not Detected     RSV, PCR Not Detected     Bordetella pertussis pcr Not Detected     Bordetella parapertussis PCR Not Detected     Chlamydophila pneumoniae PCR Not Detected     Mycoplasma pneumo by PCR Not Detected    Narrative:       Fact sheet for providers: https://docs.iLost/wp-content/uploads/NQR2559-7525-XL7.1-EUA-Provider-Fact-Sheet-3.pdf    Fact sheet for patients: https://docs.iLost/wp-content/uploads/LRC4123-4295-GT9.1-EUA-Patient-Fact-Sheet-1.pdf    Urinalysis, Microscopic Only - Urine, Catheter In/Out [[id number]]  (Abnormal) Collected: 10/27/20 0023    Specimen: Urine, Catheter In/Out Updated: 10/27/20 0152     RBC, UA None Seen /HPF      WBC, UA 3-5 /HPF      Bacteria, UA Trace /HPF      Squamous Epithelial Cells, UA 0-2 /HPF      Hyaline Casts, UA None Seen /LPF      Methodology Manual Light Microscopy    [place] Draw [[id number]] Collected: 10/27/20 0018    Specimen: Blood Updated: 10/27/20 0130    Narrative:      The following orders were created for panel order [place] Draw.  Procedure                               Abnormality         Status                     ---------                               -----------         ------                     Light Blue Top[[id number]]                                   Final result               Green Top (Gel)[[id number]]                                  Final result               Lavender Top[[id number]]                                     Final result               Gold Top - SST[[id number]]                                   Final result                 Please view results for these tests on the individual orders.    Lavender Top [[id number]] Collected: 10/27/20 0018    Specimen: Blood Updated: 10/27/20 0130     Extra Tube hold for add-on     Comment: Auto resulted       Gold Top - SST [[id number]] Collected: 10/27/20 0018    Specimen: Blood Updated: 10/27/20 0130     Extra Tube Hold for add-ons.     Comment: Auto resulted.       Light Blue Top [[id number]] Collected: 10/27/20 0018    Specimen: Blood Updated: 10/27/20 0130     Extra Tube hold for add-on     Comment: Auto resulted       Green Top (Gel) [[id number]] Collected: 10/27/20 0018    Specimen: Blood Updated: 10/27/20 0130      Extra Tube Hold for add-ons.     Comment: Auto resulted.       Protime-INR [[id number]]  (Abnormal) Collected: 10/27/20 0018    Specimen: Blood Updated: 10/27/20 0109     Protime 23.5 Seconds      INR 2.16    Comprehensive Metabolic Panel [[id number]]  (Abnormal) Collected: 10/27/20 0018    Specimen: Blood Updated: 10/27/20 0103     Glucose 113 mg/dL      BUN 10 mg/dL      Creatinine 0.70 mg/dL      Sodium 134 mmol/L      Potassium 3.2 mmol/L      Chloride 95 mmol/L      CO2 27.4 mmol/L      Calcium 8.2 mg/dL      Total Protein 6.5 g/dL      Albumin 3.80 g/dL      ALT (SGPT) 15 U/L      AST (SGOT) 22 U/L      Alkaline Phosphatase 53 U/L      Total Bilirubin 0.6 mg/dL      eGFR Non African Amer 82 mL/min/1.73      Globulin 2.7 gm/dL      A/G Ratio 1.4 g/dL      BUN/Creatinine Ratio 14.3     Anion Gap 11.6 mmol/L     Narrative:      GFR Normal >60  Chronic Kidney Disease <60  Kidney Failure <15      Lactic Acid, Plasma [[id number]]  (Normal) Collected: 10/27/20 0018    Specimen: Blood Updated: 10/27/20 0102     Lactate 1.2 mmol/L     Urinalysis With Culture If Indicated - Urine, Catheter In/Out [[id number]]  (Abnormal) Collected: 10/27/20 0023    Specimen: Urine, Catheter In/Out Updated: 10/27/20 0057     Color, UA Dark Yellow     Appearance, UA Clear     pH, UA 7.5     Specific Gravity, UA 1.019     Glucose, UA Negative     Ketones, UA 15 mg/dL (1+)     Bilirubin, UA Small (1+)     Blood, UA Negative     Protein,  mg/dL (2+)     Leuk Esterase, UA Small (1+)     Nitrite, UA Positive     Urobilinogen, UA 1.0 E.U./dL    CBC & Differential [[id number]]  (Abnormal) Collected: 10/27/20 0018    Specimen: Blood Updated: 10/27/20 0044    Narrative:      The following orders were created for panel order CBC & Differential.  Procedure                               Abnormality         Status                     ---------                               -----------         ------                     CBC Auto  Differential[[id number]]        Abnormal            Final result                 Please view results for these tests on the individual orders.    CBC Auto Differential [[id number]]  (Abnormal) Collected: 10/27/20 0018    Specimen: Blood Updated: 10/27/20 0044     WBC 8.39 10*3/mm3      RBC 4.46 10*6/mm3      Hemoglobin 13.3 g/dL      Hematocrit 39.6 %      MCV 88.8 fL      MCH 29.8 pg      MCHC 33.6 g/dL      RDW 15.4 %      RDW-SD 48.1 fl      MPV 12.2 fL      Platelets 174 10*3/mm3      Neutrophil % 67.5 %      Lymphocyte % 18.2 %      Monocyte % 12.3 %      Eosinophil % 0.0 %      Basophil % 0.2 %      Immature Grans % 1.8 %      Neutrophils, Absolute 5.66 10*3/mm3      Lymphocytes, Absolute 1.53 10*3/mm3      Monocytes, Absolute 1.03 10*3/mm3      Eosinophils, Absolute 0.00 10*3/mm3      Basophils, Absolute 0.02 10*3/mm3      Immature Grans, Absolute 0.15 10*3/mm3      nRBC 0.0 /100 WBC           Data Review:  Results from last 7 days   Lab Units 11/03/20  0745 11/02/20  0705 11/01/20  0458   SODIUM mmol/L 144 144 144   POTASSIUM mmol/L 3.2* 3.7 3.8   CHLORIDE mmol/L 108* 114* 116*   CO2 mmol/L 26.5 21.8* 18.2*   BUN mg/dL 21 18 10   CREATININE mg/dL 0.40* 0.39* 0.36*   GLUCOSE mg/dL 108* 93 106*   CALCIUM mg/dL 7.4* 7.4* 7.1*     Results from last 7 days   Lab Units 11/03/20  0745 11/02/20  0705 11/01/20  0458   WBC 10*3/mm3 13.13* 13.19* 13.98*   HEMOGLOBIN g/dL 10.9* 11.0* 10.7*   HEMATOCRIT % 31.9* 34.3 31.5*   PLATELETS 10*3/mm3 252 262 263             No results found for: TROPONINT      Results from last 7 days   Lab Units 11/03/20  0745 11/02/20  0705 11/01/20  0458   ALK PHOS U/L 77 66 55   BILIRUBIN mg/dL 0.7 0.4 0.3   BILIRUBIN DIRECT mg/dL 0.2 <0.2  --    ALT (SGPT) U/L 18 16 16   AST (SGOT) U/L 37* 37* 37*             No results found for: POCGLU  Results from last 7 days   Lab Units 11/03/20  0745 11/02/20  0705 11/01/20  0458   INR  2.82* 4.03* 4.09*       Past Medical History:   Diagnosis Date   •  Allergic    • Bone infection (CMS/HCC)     hip   • Cataract    • High cholesterol    • History of DVT (deep vein thrombosis)    • History of kidney infection     1 MONTH AGO   • History of pulmonary embolus (PE)    • History of transfusion    • Hypertension    • Left hip postoperative wound infection    • Paralabral cyst of left hip    • PICC (peripherally inserted central catheter) in place    • PONV (postoperative nausea and vomiting)    • Presence of vena cava filter    • Rheumatoid arteritis (CMS/HCC)    • Seasonal allergies    • UTI (urinary tract infection)     diagnosed 4/2/18  medically treated presently   • Wears glasses        Assessment:  Active Hospital Problems    Diagnosis  POA   • **Pneumonia due to COVID-19 virus [U07.1, J12.89]  Yes   • Acute respiratory failure with hypoxia (CMS/HCC) [J96.01]  Unknown   • Blood type A+ [Z67.10]  Unknown   • UTI (urinary tract infection) [N39.0]  Unknown   • Pulmonary emboli (CMS/HCC) [I26.99]  Yes   • Mixed hyperlipidemia [E78.2]  Yes   • Long term (current) use of antibiotics [Z79.2]  Not Applicable   • Hypertension [I10]  Yes   • Rheumatoid arthritis involving multiple sites (CMS/HCC) [M06.9]  Yes   • History of DVT (deep vein thrombosis) [Z86.718]  Not Applicable   • OA (osteoarthritis) of hip [M16.9]  Yes      Resolved Hospital Problems   No resolved problems to display.   Sepsis due to COVID-19 pneumonia and ESBL UTI  Present on admission  Acute hypoxic respiratory failure due to sepsis  Plan:  Finished antibiotics for UTI and follow lab. COVID 19 is becoming symptomatic and she is getting worse.  Continue Decadron and remdesivir, ID and pulmonary consults noted.  Follow-up Covid lab.  O 2 and supportive care.  Continue OptiFlow to keep her sats up.  Thaxton is certainly guarded.    Eliecer Ruiz MD  11/3/2020  12:48 EST

## 2020-11-03 NOTE — PROGRESS NOTES
[facility]      Mar/chart reviewed  Follow up covid19 pneumonia, CT associated ILD  Some cough and shortness of breath  She asks if she can go home today    Vital Sign Min/Max for last 24 hours  Temp  Min: 96.1 °F (35.6 °C)  Max: 97.7 °F (36.5 °C)   BP  Min: 132/70  Max: 154/86   Pulse  Min: 62  Max: 85   Resp  Min: 16  Max: 22   SpO2  Min: 89 %  Max: 96 %   Flow (L/min)  Min: 15  Max: 60   No data recorded     Appears ill, axox3,   perrl, eomi, normal sclera, no palpable thyroid nodules  mmm, no jvd, trachea midline, neck supple,  chest decreased ae bilaterally, + crackles, no wheezes,   rrr,   soft, nt, nd +bs,  no c/c/ trace edema  Skin warm, dry no rashes    Labs: 11/3: reviewed:   d dimer > 20  inr 2.82  Bicarb 16.5  Albumin 2.7  crp 3.26  Ferritin 473    A/P:  1. COVID19 pneumonia -- agree with dexamethasone, she has some concerning elevation of inflammatory markers and several co-morbidities  2. Connective tissue asssociated ILD -- possibly could be related to nitrofurantoin as well, and I would recommend not resuming that medication if at all possible  3. Rheumatoid arthritis -- on chronic methotrexate therapy as well  4. Acute hypoxemic respiratory failure -- continue oxygen support, she is at high risk for further deterioration. Defer remedsivir and/or other therapies to ID  5. Elevated inflammaotry markers  6. UTI  7. History of pulmonary embolism -- therapeutic on coumadin  8. Hyperglycemia    Will increase her dexamethasone to bid, looks like we have some room to start wean oxygen, she looks less distressed to me today

## 2020-11-04 NOTE — PROGRESS NOTES
LOS: 8 days     Chief Complaint:  COVID     Interval History: Afebrile, increasing oxygen requirements.  States she feels better today.  States she does not feel short of breath.  Reports a cough.  Once again states she wants to go home.  Denies abdominal pain nausea vomiting or diarrhea.  States her appetite is fine    Vital Signs  Temp:  [96 °F (35.6 °C)-97.6 °F (36.4 °C)] 96.9 °F (36.1 °C)  Heart Rate:  [62-84] 62  Resp:  [16-21] 16  BP: (126-154)/(70-86) 141/74    Physical Exam:  General: Ill-appearing  Cardiovascular: RRR, trace LE edema   Respiratory: Coarse breath sounds bilaterally  GI: Soft, NT/ND, + bowel sounds bilaterally  Skin: No rashes     Antibiotics:  Remdesivir 100 mg IV every 24 hours  augmentin 500mg pO BID     Results Review:    Lab Results   Component Value Date    WBC 13.13 (H) 11/03/2020    HGB 10.9 (L) 11/03/2020    HCT 31.9 (L) 11/03/2020    MCV 87.6 11/03/2020     11/03/2020     Lab Results   Component Value Date    GLUCOSE 108 (H) 11/03/2020    BUN 21 11/03/2020    CREATININE 0.40 (L) 11/03/2020    EGFRIFNONA >150 11/03/2020    EGFRIFAFRI 99 09/29/2020    BCR 52.5 (H) 11/03/2020    CO2 26.5 11/03/2020    CALCIUM 7.4 (L) 11/03/2020    PROTENTOTREF 5.9 (L) 09/29/2020    ALBUMIN 2.70 (L) 11/03/2020    LABIL2 2.5 09/29/2020    AST 37 (H) 11/03/2020    ALT 18 11/03/2020     Ferritin 353 --> 719 -> 848 -> 655 -> 528 -> 473  CRP 3.83 --> 20.09 --> 4.98 -> 2.76 -> 3.26   Procalcitonin 0.05    Urinalysis no leukocytes positive nitrites 6-12 white blood cells no bacteria seen    Microbiology:  11/2 UCx P  10/27 BCx neg x 2  10/27 RVP + COVID     Assessment/Plan   Covid pneumonia  Acute hypoxic respiratory failure  ESBL E. coli UTI  ESBL E. coli left prosthetic hip septic arthritis on suppressive antibiotic therapy    Dexamethasone initiated on October 30  Remdesivir initiated on November 1  S/p convalescent plasma on November 2    Unfortunately, not much more to offer for COVID treatment.  Continue supportive care. COntinue suppressive augmentin 500mg PO BID for L hip PJI.

## 2020-11-04 NOTE — PROGRESS NOTES
[facility]      Mar/chart reviewed  Follow up covid19 pneumonia, CT associated ILD  Some cough and shortness of breath  Says she feels better    Vital Sign Min/Max for last 24 hours  Temp  Min: 96 °F (35.6 °C)  Max: 98.2 °F (36.8 °C)   BP  Min: 126/71  Max: 151/76   Pulse  Min: 62  Max: 84   Resp  Min: 16  Max: 21   SpO2  Min: 92 %  Max: 96 %   Flow (L/min)  Min: 55  Max: 60   No data recorded     Appears ill, axox3,   perrl, eomi, normal sclera, no palpable thyroid nodules  mmm, no jvd, trachea midline, neck supple,  chest decreased ae bilaterally, + crackles, no wheezes,   rrr,   soft, nt, nd +bs,  no c/c/ trace edema  Skin warm, dry no rashes    Labs: 11/4: reviewed:  inr 3.2  Glucose 166  Bun 20  Cr 0.39  Bicarb 26  Cr p 3.32  Wbc 17  hgb 11  plts 233    A/P:  1. COVID19 pneumonia -- agree with dexamethasone, she has some concerning elevation of inflammatory markers and several co-morbidities  2. Connective tissue asssociated ILD -- possibly could be related to nitrofurantoin as well, and I would recommend not resuming that medication if at all possible  3. Rheumatoid arthritis -- on chronic methotrexate therapy as well  4. Acute hypoxemic respiratory failure -- continue oxygen support, she is at high risk for further deterioration. Defer remedsivir and/or other therapies to ID  5. Elevated inflammaotry markers  6. UTI  7. History of pulmonary embolism -- therapeutic on coumadin  8. Hyperglycemia    I tried her on 15 liters, she desated.  Needs to move around more.  Continue current, she's not making much progress but not worsening.    Discussed with RN

## 2020-11-04 NOTE — PROGRESS NOTES
Pharmacy Consult: Warfarin Dosing/ Monitoring    Rachel Moser is a 74 y.o. female, estimated creatinine clearance is 54.1 mL/min (A) (by C-G formula based on SCr of 0.39 mg/dL (L)). weighing 54.7 kg (120 lb 11.2 oz).    PMH: Allergic, Bone infection, Cataract, High cholesterol, DVT, kidney infection, pulmonary embolus, transfusion, Hypertension, Left hip postoperative wound infection, Paralabral cyst of left hip, PICC in place, PONV, vena cava filter, Rheumatoid arteritis, Seasonal allergies, UTI, and Wears glasses.    Social History     Tobacco Use    Smoking status: Former Smoker     Types: Cigarettes     Quit date:      Years since quittin.8    Smokeless tobacco: Never Used    Tobacco comment: quit 50 years ago   Substance Use Topics    Alcohol use: No    Drug use: No     Results from last 7 days   Lab Units 20  0809 20  0745 20  0705 20  0458 10/31/20  0621 10/30/20  0826 10/29/20  0436   INR  3.22* 2.82* 4.03* 4.09* 3.24* 3.02* 3.58*   HEMOGLOBIN g/dL 11.0* 10.9* 11.0* 10.7* 11.7* 11.6* 10.9*   HEMATOCRIT % 33.7* 31.9* 34.3 31.5* 34.3 35.8 33.3*   PLATELETS 10*3/mm3 233 252 262 263 217 145 173     Results from last 7 days   Lab Units 20  0809 20  2241 20  0745 20  0705   SODIUM mmol/L 142  --  144 144   POTASSIUM mmol/L 4.5 4.6 3.2* 3.7   CHLORIDE mmol/L 109*  --  108* 114*   CO2 mmol/L 26.0  --  26.5 21.8*   BUN mg/dL   --   18   CREATININE mg/dL 0.39*  --  0.40* 0.39*   CALCIUM mg/dL 7.8*  --  7.4* 7.4*   BILIRUBIN mg/dL 0.7  --  0.7 0.4   ALK PHOS U/L 80  --  77 66   ALT (SGPT) U/L 19  --  18 16   AST (SGOT) U/L 32  --  37* 37*   GLUCOSE mg/dL 166*  --  108* 93     Anticoagulation history: 10/12/2020 Per [facility]  Home Med: Warfarin 7.5 mg po qMF + 5 mg po qSSTWTh (40 mg/week) with documented INR of 5.53. This was confirmed regimen with patient over telephone on 10/28/2020)       Hospital Anticoagulation:  Consulting provider:  Dr. Ruiz  Start date: 10/27/2020 continued from home  Indication: Hx DVT  Target INR: 2.5-3.5 (per Dr. Ruiz order and past Anticoag clinic notes)  Expected duration: Indefinite  Bridge Therapy: None   Date 10/27  10/28  10/29  10/30  10/31  11/1  11/2  11/3  11/4     INR 2.16/ 2.11/ 1.97  2.84  3.58  3.02  3.24  4.09  4.03  2.82  3.22     Warfarin dose 5 mg  2 mg  None  2 mg  1 mg  Hold  Hold  1 mg  0.5 mg        Potential drug interactions:   -Acetaminophen - may result in an increased risk of bleeding. Elevations in INR have occurred within 1-2 weeks of initiating acetaminophen at moderate to high doses (2 and 4 g/day) in patients on stable warfarin therapy   -Dexamethasone (Moderate, started 10/30): may result in increased risk of bleeding or diminished effects of warfarin.  - Augmentin - may result in an increased risk of bleeding.     Will continue to monitor for drug-drug interaction as medications are added to patient's profile.      Relevant nutrition status: Diet Regular; Cardiac      Other:   Dietary advances -> changes in dietary vitamin K intake may alter response to warfarin.  Acute infection/inflammation may cause an increased sensitivity to warfarin     Lab: Albumin= 2.90 (11/4/2020)     Education complete?/ Date: [facility] - last visit on 10/12/2020     Assessment/Plan:  INR= 3.22 today  Give Warfarin 0.5 mg po x1 dose today  Further dosing per daily PT/INR and CBC.   Monitor: s/s bleed     Pharmacy will continue to follow until discharge or discontinuation of warfarin.     Jazzy Barnes [facility]  Clinical Staff Pharmacist

## 2020-11-04 NOTE — PROGRESS NOTES
DAILY PROGRESS NOTE  [facility]    Patient Identification:  Name: Rachel Moser  Age: 74 y.o.  Sex: female  :  1945  MRN: [id number]         Primary Care Physician: Nahun Wilkerson MD    Subjective:  Interval History:She feels worse.  A little short of air and sats were dipping down.  She is on OPti flow.  Says she Doesn't want intubation or vent                                                                                                                                                                                                                                                                                                                                                                                                                                                                                                             Objective:    Scheduled Meds:amoxicillin-clavulanate, 1 tablet, Oral, Q12H  atorvastatin, 20 mg, Oral, Daily  calcium 500 mg vitamin D 5 mcg (200 UT), 1 tablet, Oral, Daily  cetirizine, 10 mg, Oral, Daily  cholecalciferol, 2,000 Units, Oral, Daily  dexamethasone, 6 mg, Oral, Q12H  famotidine, 20 mg, Oral, BID  ferrous sulfate, 325 mg, Oral, BID  folic acid, 1,600 mcg, Oral, Daily  furosemide, 40 mg, Oral, Daily  metoprolol succinate XL, 50 mg, Oral, Daily  multivitamin with minerals, 1 tablet, Oral, Daily  potassium chloride, 40 mEq, Oral, Daily  remdesivir, 100 mg, Intravenous, Q24H  sodium chloride, 10 mL, Intravenous, Q12H  warfarin, 0.5 mg, Oral, Once      Continuous Infusions:Pharmacy Consult - Remdesivir,   Pharmacy to dose warfarin,         Vital signs in last 24 hours:  Temp:  [96 °F (35.6 °C)-98.2 °F (36.8 °C)] 98.2 °F (36.8 °C)  Heart Rate:  [62-84] 77  Resp:  [16-21] 18  BP: (126-151)/(69-76) 149/69    Intake/Output:    Intake/Output Summary (Last 24 hours) at 2020 1541  Last data filed at 2020 1140  Gross per 24 hour   Intake 240 ml   Output  "1500 ml   Net -1260 ml       Exam:  /69 (BP Location: Left arm, Patient Position: Lying)   Pulse 77   Temp 98.2 °F (36.8 °C) (Oral)   Resp 18   Ht 149.9 cm (59\")   Wt 54.7 kg (120 lb 11.2 oz)   SpO2 93%   BMI 24.38 kg/m²     General Appearance:    Alert, cooperative, no distress   Head:    Normocephalic, without obvious abnormality, atraumatic   Eyes:       Throat:   Lips, tongue, gums normal   Neck:   Supple, symmetrical, trachea midline, no JVD   Lungs:     Clear to auscultation bilaterally, respirations unlabored   Chest Wall:    No tenderness or deformity    Heart:    Regular rate and rhythm, S1 and S2 normal, no murmur,no  Rub or gallop   Abdomen:     Soft, nontender, bowel sounds active, no masses, no organomegaly    Extremities:   Extremities normal, atraumatic, no cyanosis or edema   Pulses:      Skin:   Skin is warm and dry,  no rashes or palpable lesions   Neurologic:   no focal deficits noted      Lab Results (last 72 hours)       Procedure Component Value Units Date/Time    Protime-INR [[id number]]  (Abnormal) Collected: 10/28/20 0844    Specimen: Blood Updated: 10/28/20 1005     Protime 28.9 Seconds      INR 2.84    D-dimer, Quantitative [[id number]]  (Normal) Collected: 10/28/20 0844    Specimen: Blood Updated: 10/28/20 1005     D-Dimer, Quantitative 0.32 MCGFEU/mL     Narrative:      The Stago D-Dimer test used in conjunction with a clinical pretest probability (PTP) assessment model, has been approved by the FDA to rule out the presence of venous thromboembolism (VTE) in outpatients suspected of deep venous thrombosis (DVT) or pulmonary embolism (PE). The cut-off for negative predictive value is <0.50 MCGFEU/mL.    Fibrinogen [[id number]]  (Abnormal) Collected: 10/28/20 0844    Specimen: Blood Updated: 10/28/20 1005     Fibrinogen 531 mg/dL     Ferritin [[id number]]  (Abnormal) Collected: 10/28/20 0844    Specimen: Blood Updated: 10/28/20 0959     Ferritin 371.00 ng/mL     Narrative:      " Results may be falsely decreased if patient taking Biotin.      Lactate Dehydrogenase [[id number]]  (Abnormal) Collected: 10/28/20 0844    Specimen: Blood Updated: 10/28/20 0950      U/L      Comment: Specimen hemolyzed.  Results may be affected.       Comprehensive Metabolic Panel [[id number]]  (Abnormal) Collected: 10/28/20 0844    Specimen: Blood Updated: 10/28/20 0948     Glucose 97 mg/dL      BUN 10 mg/dL      Creatinine 0.45 mg/dL      Sodium 139 mmol/L      Potassium 4.0 mmol/L      Comment: Slight hemolysis detected by analyzer. Results may be affected.        Chloride 113 mmol/L      CO2 19.1 mmol/L      Calcium 7.2 mg/dL      Total Protein 5.5 g/dL      Albumin 3.00 g/dL      ALT (SGPT) 13 U/L      AST (SGOT) 25 U/L      Alkaline Phosphatase 45 U/L      Total Bilirubin 0.4 mg/dL      eGFR Non African Amer 137 mL/min/1.73      Globulin 2.5 gm/dL      A/G Ratio 1.2 g/dL      BUN/Creatinine Ratio 22.2     Anion Gap 6.9 mmol/L     Narrative:      GFR Normal >60  Chronic Kidney Disease <60  Kidney Failure <15      CK [[id number]]  (Normal) Collected: 10/28/20 0844    Specimen: Blood Updated: 10/28/20 0948     Creatine Kinase 47 U/L     C-reactive Protein [[id number]]  (Abnormal) Collected: 10/28/20 0844    Specimen: Blood Updated: 10/28/20 0948     C-Reactive Protein 3.83 mg/dL     CBC & Differential [[id number]]  (Abnormal) Collected: 10/28/20 0845    Specimen: Blood Updated: 10/28/20 0941    Narrative:      The following orders were created for panel order CBC & Differential.  Procedure                               Abnormality         Status                     ---------                               -----------         ------                     CBC Auto Differential[[id number]]        Abnormal            Final result                 Please view results for these tests on the individual orders.    CBC Auto Differential [[id number]]  (Abnormal) Collected: 10/28/20 0845    Specimen: Blood Updated: 10/28/20  0941     WBC 12.02 10*3/mm3      RBC 4.31 10*6/mm3      Hemoglobin 12.8 g/dL      Hematocrit 38.8 %      MCV 90.0 fL      MCH 29.7 pg      MCHC 33.0 g/dL      RDW 14.9 %      RDW-SD 48.0 fl      MPV 11.9 fL      Platelets 167 10*3/mm3      Neutrophil % 82.6 %      Lymphocyte % 8.2 %      Monocyte % 7.5 %      Eosinophil % 0.0 %      Basophil % 0.5 %      Immature Grans % 1.2 %      Neutrophils, Absolute 9.93 10*3/mm3      Lymphocytes, Absolute 0.98 10*3/mm3      Monocytes, Absolute 0.90 10*3/mm3      Eosinophils, Absolute 0.00 10*3/mm3      Basophils, Absolute 0.06 10*3/mm3      Immature Grans, Absolute 0.15 10*3/mm3      nRBC 0.1 /100 WBC     Blood Culture - Blood, Arm, Left [[id number]] Collected: 10/27/20 0018    Specimen: Blood from Arm, Left Updated: 10/28/20 0045     Blood Culture No growth at 24 hours    Blood Culture - Blood, Arm, Right [[id number]] Collected: 10/27/20 0018    Specimen: Blood from Arm, Right Updated: 10/28/20 0045     Blood Culture No growth at 24 hours    Ferritin [[id number]]  (Abnormal) Collected: 10/27/20 1851    Specimen: Blood Updated: 10/27/20 2034     Ferritin 353.00 ng/mL     Narrative:      Results may be falsely decreased if patient taking Biotin.      Procalcitonin [[id number]]  (Normal) Collected: 10/27/20 1851    Specimen: Blood Updated: 10/27/20 1941     Procalcitonin 0.05 ng/mL     Narrative:      As a Marker for Sepsis (Non-Neonates):   1. <0.5 ng/mL represents a low risk of severe sepsis and/or septic shock.  1. >2 ng/mL represents a high risk of severe sepsis and/or septic shock.    As a Marker for Lower Respiratory Tract Infections that require antibiotic therapy:  PCT on Admission     Antibiotic Therapy             6-12 Hrs later  > 0.5                Strongly Recommended            >0.25 - <0.5         Recommended  0.1 - 0.25           Discouraged                   Remeasure/reassess PCT  <0.1                 Strongly Discouraged          Remeasure/reassess PCT      As 28  "day mortality risk marker: \"Change in Procalcitonin Result\" (> 80 % or <=80 %) if Day 0 (or Day 1) and Day 4 values are available. Refer to http://www.[facility] Kin Packspct-calculator.com/   Change in PCT <=80 %   A decrease of PCT levels below or equal to 80 % defines a positive change in PCT test result representing a higher risk for 28-day all-cause mortality of patients diagnosed with severe sepsis or septic shock.  Change in PCT > 80 %   A decrease of PCT levels of more than 80 % defines a negative change in PCT result representing a lower risk for 28-day all-cause mortality of patients diagnosed with severe sepsis or septic shock.                Results may be falsely decreased if patient taking Biotin.     Lactate Dehydrogenase [[id number]]  (Abnormal) Collected: 10/27/20 1851    Specimen: Blood Updated: 10/27/20 1933      U/L     D-dimer, Quantitative [[id number]]  (Normal) Collected: 10/27/20 1731    Specimen: Blood from Hand, Right Updated: 10/27/20 1822     D-Dimer, Quantitative 0.34 MCGFEU/mL     Narrative:      The Stago D-Dimer test used in conjunction with a clinical pretest probability (PTP) assessment model, has been approved by the FDA to rule out the presence of venous thromboembolism (VTE) in outpatients suspected of deep venous thrombosis (DVT) or pulmonary embolism (PE). The cut-off for negative predictive value is <0.50 MCGFEU/mL.    Protime-INR [[id number]]  (Abnormal) Collected: 10/27/20 1047    Specimen: Blood Updated: 10/27/20 1150     Protime 21.9 Seconds      INR 1.97    Basic Metabolic Panel [[id number]]  (Abnormal) Collected: 10/27/20 0708    Specimen: Blood from Hand, Left Updated: 10/27/20 0849     Glucose 130 mg/dL      BUN 12 mg/dL      Creatinine 0.59 mg/dL      Sodium 134 mmol/L      Potassium 3.6 mmol/L      Chloride 100 mmol/L      CO2 24.2 mmol/L      Calcium 7.9 mg/dL      eGFR Non African Amer 100 mL/min/1.73      BUN/Creatinine Ratio 20.3     Anion Gap 9.8 mmol/L     Narrative:   "    GFR Normal >60  Chronic Kidney Disease <60  Kidney Failure <15      Protime-INR [[id number]]  (Abnormal) Collected: 10/27/20 0708    Specimen: Blood from Hand, Left Updated: 10/27/20 0835     Protime 23.1 Seconds      INR 2.11    CBC Auto Differential [[id number]]  (Abnormal) Collected: 10/27/20 0708    Specimen: Blood from Hand, Left Updated: 10/27/20 0801     WBC 8.30 10*3/mm3      RBC 4.30 10*6/mm3      Hemoglobin 13.0 g/dL      Hematocrit 39.3 %      MCV 91.4 fL      MCH 30.2 pg      MCHC 33.1 g/dL      RDW 15.3 %      RDW-SD 50.5 fl      MPV 12.0 fL      Platelets 176 10*3/mm3      Neutrophil % 76.2 %      Lymphocyte % 13.6 %      Monocyte % 7.0 %      Eosinophil % 0.0 %      Basophil % 0.8 %      Immature Grans % 2.4 %      Neutrophils, Absolute 6.32 10*3/mm3      Lymphocytes, Absolute 1.13 10*3/mm3      Monocytes, Absolute 0.58 10*3/mm3      Eosinophils, Absolute 0.00 10*3/mm3      Basophils, Absolute 0.07 10*3/mm3      Immature Grans, Absolute 0.20 10*3/mm3      nRBC 0.0 /100 WBC     Respiratory Panel PCR w/COVID-19(SARS-CoV-2) MONICO/WENDY/JHON/PAD/COR/MAD/JUAN In-House, NP Swab in UTM/VTM, 3-4 HR TAT - Swab, Nasopharynx [[id number]]  (Abnormal) Collected: 10/27/20 0018    Specimen: Swab from Nasopharynx Updated: 10/27/20 0204     ADENOVIRUS, PCR Not Detected     Coronavirus 229E Not Detected     Coronavirus HKU1 Not Detected     Coronavirus NL63 Not Detected     Coronavirus OC43 Not Detected     COVID19 Detected     Human Metapneumovirus Not Detected     Human Rhinovirus/Enterovirus Not Detected     Influenza A PCR Not Detected     Influenza B PCR Not Detected     Parainfluenza Virus 1 Not Detected     Parainfluenza Virus 2 Not Detected     Parainfluenza Virus 3 Not Detected     Parainfluenza Virus 4 Not Detected     RSV, PCR Not Detected     Bordetella pertussis pcr Not Detected     Bordetella parapertussis PCR Not Detected     Chlamydophila pneumoniae PCR Not Detected     Mycoplasma pneumo by PCR Not Detected     Narrative:      Fact sheet for providers: https://docs.[employer]/wp-content/uploads/ZWE7853-9223-MO3.1-EUA-Provider-Fact-Sheet-3.pdf    Fact sheet for patients: https://docs.[employer]/wp-content/uploads/EOG0853-0369-KG4.1-EUA-Patient-Fact-Sheet-1.pdf    Urinalysis, Microscopic Only - Urine, Catheter In/Out [[id number]]  (Abnormal) Collected: 10/27/20 0023    Specimen: Urine, Catheter In/Out Updated: 10/27/20 0152     RBC, UA None Seen /HPF      WBC, UA 3-5 /HPF      Bacteria, UA Trace /HPF      Squamous Epithelial Cells, UA 0-2 /HPF      Hyaline Casts, UA None Seen /LPF      Methodology Manual Light Microscopy    [facility] Draw [[id number]] Collected: 10/27/20 0018    Specimen: Blood Updated: 10/27/20 0130    Narrative:      The following orders were created for panel order [facility] Draw.  Procedure                               Abnormality         Status                     ---------                               -----------         ------                     Light Blue Top[[id number]]                                   Final result               Green Top (Gel)[[id number]]                                  Final result               Lavender Top[[id number]]                                     Final result               Gold Top - SST[[id number]]                                   Final result                 Please view results for these tests on the individual orders.    Lavender Top [[id number]] Collected: 10/27/20 0018    Specimen: Blood Updated: 10/27/20 0130     Extra Tube hold for add-on     Comment: Auto resulted       Gold Top - SST [[id number]] Collected: 10/27/20 0018    Specimen: Blood Updated: 10/27/20 0130     Extra Tube Hold for add-ons.     Comment: Auto resulted.       Light Blue Top [[id number]] Collected: 10/27/20 0018    Specimen: Blood Updated: 10/27/20 0130     Extra Tube hold for add-on     Comment: Auto resulted       Green Top (Gel) [[id number]] Collected: 10/27/20 0018    Specimen: Blood  Updated: 10/27/20 0130     Extra Tube Hold for add-ons.     Comment: Auto resulted.       Protime-INR [[id number]]  (Abnormal) Collected: 10/27/20 0018    Specimen: Blood Updated: 10/27/20 0109     Protime 23.5 Seconds      INR 2.16    Comprehensive Metabolic Panel [[id number]]  (Abnormal) Collected: 10/27/20 0018    Specimen: Blood Updated: 10/27/20 0103     Glucose 113 mg/dL      BUN 10 mg/dL      Creatinine 0.70 mg/dL      Sodium 134 mmol/L      Potassium 3.2 mmol/L      Chloride 95 mmol/L      CO2 27.4 mmol/L      Calcium 8.2 mg/dL      Total Protein 6.5 g/dL      Albumin 3.80 g/dL      ALT (SGPT) 15 U/L      AST (SGOT) 22 U/L      Alkaline Phosphatase 53 U/L      Total Bilirubin 0.6 mg/dL      eGFR Non African Amer 82 mL/min/1.73      Globulin 2.7 gm/dL      A/G Ratio 1.4 g/dL      BUN/Creatinine Ratio 14.3     Anion Gap 11.6 mmol/L     Narrative:      GFR Normal >60  Chronic Kidney Disease <60  Kidney Failure <15      Lactic Acid, Plasma [[id number]]  (Normal) Collected: 10/27/20 0018    Specimen: Blood Updated: 10/27/20 0102     Lactate 1.2 mmol/L     Urinalysis With Culture If Indicated - Urine, Catheter In/Out [[id number]]  (Abnormal) Collected: 10/27/20 0023    Specimen: Urine, Catheter In/Out Updated: 10/27/20 0057     Color, UA Dark Yellow     Appearance, UA Clear     pH, UA 7.5     Specific Gravity, UA 1.019     Glucose, UA Negative     Ketones, UA 15 mg/dL (1+)     Bilirubin, UA Small (1+)     Blood, UA Negative     Protein,  mg/dL (2+)     Leuk Esterase, UA Small (1+)     Nitrite, UA Positive     Urobilinogen, UA 1.0 E.U./dL    CBC & Differential [[id number]]  (Abnormal) Collected: 10/27/20 0018    Specimen: Blood Updated: 10/27/20 0044    Narrative:      The following orders were created for panel order CBC & Differential.  Procedure                               Abnormality         Status                     ---------                               -----------         ------                     CBC  Auto Differential[[id number]]        Abnormal            Final result                 Please view results for these tests on the individual orders.    CBC Auto Differential [[id number]]  (Abnormal) Collected: 10/27/20 0018    Specimen: Blood Updated: 10/27/20 0044     WBC 8.39 10*3/mm3      RBC 4.46 10*6/mm3      Hemoglobin 13.3 g/dL      Hematocrit 39.6 %      MCV 88.8 fL      MCH 29.8 pg      MCHC 33.6 g/dL      RDW 15.4 %      RDW-SD 48.1 fl      MPV 12.2 fL      Platelets 174 10*3/mm3      Neutrophil % 67.5 %      Lymphocyte % 18.2 %      Monocyte % 12.3 %      Eosinophil % 0.0 %      Basophil % 0.2 %      Immature Grans % 1.8 %      Neutrophils, Absolute 5.66 10*3/mm3      Lymphocytes, Absolute 1.53 10*3/mm3      Monocytes, Absolute 1.03 10*3/mm3      Eosinophils, Absolute 0.00 10*3/mm3      Basophils, Absolute 0.02 10*3/mm3      Immature Grans, Absolute 0.15 10*3/mm3      nRBC 0.0 /100 WBC           Data Review:  Results from last 7 days   Lab Units 11/04/20  0809 11/03/20 2241 11/03/20  0745 11/02/20  0705   SODIUM mmol/L 142  --  144 144   POTASSIUM mmol/L 4.5 4.6 3.2* 3.7   CHLORIDE mmol/L 109*  --  108* 114*   CO2 mmol/L 26.0  --  26.5 21.8*   BUN mg/dL 20  --  21 18   CREATININE mg/dL 0.39*  --  0.40* 0.39*   GLUCOSE mg/dL 166*  --  108* 93   CALCIUM mg/dL 7.8*  --  7.4* 7.4*     Results from last 7 days   Lab Units 11/04/20  0809 11/03/20  0745 11/02/20  0705   WBC 10*3/mm3 17.18* 13.13* 13.19*   HEMOGLOBIN g/dL 11.0* 10.9* 11.0*   HEMATOCRIT % 33.7* 31.9* 34.3   PLATELETS 10*3/mm3 233 252 262             No results found for: TROPONINT      Results from last 7 days   Lab Units 11/04/20  0809 11/03/20  0745 11/02/20  0705   ALK PHOS U/L 80 77 66   BILIRUBIN mg/dL 0.7 0.7 0.4   BILIRUBIN DIRECT mg/dL 0.2 0.2 <0.2   ALT (SGPT) U/L 19 18 16   AST (SGOT) U/L 32 37* 37*             No results found for: POCGLU  Results from last 7 days   Lab Units 11/04/20  0809 11/03/20  0745 11/02/20  0705   INR  3.22* 2.82*  4.03*       Past Medical History:   Diagnosis Date   • Allergic    • Bone infection (CMS/HCC)     hip   • Cataract    • High cholesterol    • History of DVT (deep vein thrombosis)    • History of kidney infection     1 MONTH AGO   • History of pulmonary embolus (PE)    • History of transfusion    • Hypertension    • Left hip postoperative wound infection    • Paralabral cyst of left hip    • PICC (peripherally inserted central catheter) in place    • PONV (postoperative nausea and vomiting)    • Presence of vena cava filter    • Rheumatoid arteritis (CMS/HCC)    • Seasonal allergies    • UTI (urinary tract infection)     diagnosed 4/2/18  medically treated presently   • Wears glasses        Assessment:  Active Hospital Problems    Diagnosis  POA   • **Pneumonia due to COVID-19 virus [U07.1, J12.89]  Yes   • Acute respiratory failure with hypoxia (CMS/HCC) [J96.01]  Unknown   • Blood type A+ [Z67.10]  Unknown   • UTI (urinary tract infection) [N39.0]  Unknown   • Pulmonary emboli (CMS/HCC) [I26.99]  Yes   • Mixed hyperlipidemia [E78.2]  Yes   • Long term (current) use of antibiotics [Z79.2]  Not Applicable   • Hypertension [I10]  Yes   • Rheumatoid arthritis involving multiple sites (CMS/HCC) [M06.9]  Yes   • History of DVT (deep vein thrombosis) [Z86.718]  Not Applicable   • OA (osteoarthritis) of hip [M16.9]  Yes      Resolved Hospital Problems   No resolved problems to display.   Sepsis due to COVID-19 pneumonia and ESBL UTI  Present on admission  Acute hypoxic respiratory failure due to sepsis  Plan:  Finished antibiotics for UTI and follow lab. COVID 19 is becoming symptomatic and she is getting worse.  Continue Decadron and remdesivir, ID and pulmonary consults noted.  Follow-up Covid lab.  O 2 and supportive care.  Continue OptiFlow to keep her sats up.  Dawson is certainly guarded.    Eliecer Ruiz MD  11/4/2020  15:41 EST

## 2020-11-04 NOTE — PLAN OF CARE
Goal Outcome Evaluation:  Plan of Care Reviewed With: patient  Progress: no change  Outcome Summary: Patient had no complaints through the night except for mild headache, treated with Tylenol. Remains on Optiflow SAT's stable but do drop with activity. SOA with activity. Up with assist to BSC. Potassium rechecked came back at 4.1. Has been sleeping well through the night. VSS. Will continue to monitor closely.

## 2020-11-04 NOTE — PROGRESS NOTES
Continued Stay Note  [facility]     Patient Name: Rachel Moser  MRN: [id number]  Today's Date: 11/4/2020    Admit Date: 10/26/2020    Discharge Plan     Row Name 11/04/20 1531       Plan    Plan Comments  Discussed  pt with RN who stated that pt may need [facility] at [place].  Call placed to [facility] nurse who will discuss with MD.  CCP will follow after PT eval to assist in level care assessemnt/needs.        Discharge Codes    No documentation.             MEETA Reece

## 2020-11-05 PROBLEM — T84.59XA CHRONIC INFECTION OF PROSTHETIC HIP (HCC): Status: ACTIVE | Noted: 2020-01-01

## 2020-11-05 PROBLEM — Z96.649 CHRONIC INFECTION OF PROSTHETIC HIP (HCC): Status: ACTIVE | Noted: 2020-01-01

## 2020-11-05 NOTE — PROGRESS NOTES
Continued Stay Note  [facility]     Patient Name: Rachel Moser  MRN: [id number]  Today's Date: 11/5/2020    Admit Date: 10/26/2020    Discharge Plan     Row Name 11/05/20 1603       Plan    Plan  home with spouse- [facility] ( follow up for acceptance) and [facility] following    Patient/Family in Agreement with Plan  yes    Plan Comments  Called and spoke with patient by telephone due to pandemic isolation precautions.  PT notes reviewed.  Discussed SNF, but patient remains adamant that she wants to return home.  Discussed HH and patient would like [facility] to follow at [place].  Referral sent to [facility].  Will follow up for acceptance.  She remains on 12 liters high flow o2 at this time.  Referral called to [facility] and she will follow for home o2. Mary Martin RN        Discharge Codes    No documentation.             Mary Martin RN

## 2020-11-05 NOTE — PROGRESS NOTES
Pharmacy Consult: Warfarin Dosing/ Monitoring    Rachel Moser is a 74 y.o. female, estimated creatinine clearance is 54.1 mL/min (A) (by C-G formula based on SCr of 0.44 mg/dL (L)). weighing 54.7 kg (120 lb 11.2 oz).    PMH: Allergic, Bone infection, Cataract, High cholesterol, DVT, kidney infection, pulmonary embolus, transfusion, Hypertension, Left hip postoperative wound infection, Paralabral cyst of left hip, PICC in place, PONV, vena cava filter, Rheumatoid arteritis, Seasonal allergies, UTI, and Wears glasses.    Social History     Tobacco Use    Smoking status: Former Smoker     Types: Cigarettes     Quit date:      Years since quittin.8    Smokeless tobacco: Never Used    Tobacco comment: quit 50 years ago   Substance Use Topics    Alcohol use: No    Drug use: No     Results from last 7 days   Lab Units 20  0601 20  0809 20  0745 20  0705 20  0458 10/31/20  0621 10/30/20  0826   INR  3.18* 3.22* 2.82* 4.03* 4.09* 3.24* 3.02*   HEMOGLOBIN g/dL 11.1* 11.0* 10.9* 11.0* 10.7* 11.7* 11.6*   HEMATOCRIT % 33.3* 33.7* 31.9* 34.3 31.5* 34.3 35.8   PLATELETS 10*3/mm3 190 233 252 262 263 217 145     Results from last 7 days   Lab Units 20  0601 20  0809 20  2241 20  0745   SODIUM mmol/L 139 142  --  144   POTASSIUM mmol/L 4.4 4.5 4.6 3.2*   CHLORIDE mmol/L 105 109*  --  108*   CO2 mmol/L 24.6 26.0  --  26.5   BUN mg/dL 21 20  --  21   CREATININE mg/dL 0.44* 0.39*  --  0.40*   CALCIUM mg/dL 7.5* 7.8*  --  7.4*   BILIRUBIN mg/dL 0.9 0.7  --  0.7   ALK PHOS U/L 84 80  --  77   ALT (SGPT) U/L 25 19  --  18   AST (SGOT) U/L 36* 32  --  37*   GLUCOSE mg/dL 150* 166*  --  108*     [facility]: [facility]  Home Med: Warfarin 7.5 mg po qMF + 5 mg po qSSTWTh (40 mg/week) with documented INR of 5.53. This was confirmed regimen with patient over telephone on 10/28/2020)       Hospital Anticoagulation:  Consulting provider:  Dr. Ruiz  Start date: 10/27/2020 continued from home  Indication: Hx DVT  Target INR: 2.5-3.5 (per Dr. Ruiz order and past Anticoag clinic notes)  Expected duration: Indefinite  Bridge Therapy: None   Date 10/27  10/28  10/29  10/30  10/31  11/1  11/2  11/3  11/4  11/5        INR 2.16/ 2.11/ 1.97  2.84  3.58  3.02  3.24  4.09  4.03  2.82  3.22  3.18        Warfarin dose 5 mg  2 mg  None  2 mg  1 mg  Hold  Hold  1 mg  0.5 mg  0.5 mg           Potential drug interactions:   -Acetaminophen - may result in an increased risk of bleeding. Elevations in INR have occurred within 1-2 weeks of initiating acetaminophen at moderate to high doses (2 and 4 g/day) in patients on stable warfarin therapy   -Dexamethasone (Moderate, started 10/30): may result in increased risk of bleeding or diminished effects of warfarin.  - Augmentin (home med) - may result in an increased risk of bleeding.     Will continue to monitor for drug-drug interaction as medications are added to patient's profile.      Relevant nutrition status: Diet Regular; Cardiac      Other:   Dietary advances -> changes in dietary vitamin K intake may alter response to warfarin.  Acute infection/inflammation may cause an increased sensitivity to warfarin     Lab: Albumin= 2.90 (11/4/2020) --> 2.6 (11/5)     Education complete?/ Date: [facility] - last visit on 10/12/2020     Assessment/Plan:  INR= 3.18 today (therapeutic).   Give Warfarin 0.5 mg po x1 dose today  Further dosing per daily PT/INR and CBC.   Monitor: s/s bleed     Pharmacy will continue to follow until discharge or discontinuation of warfarin.     Julita Valero, PharmD, BCPS   Clinical Staff Pharmacist

## 2020-11-05 NOTE — THERAPY EVALUATION
Patient Name: Rachel Moser  : 1945    MRN: [id number]                              Today's Date: 2020       Admit Date: 10/26/2020    Visit Dx:     ICD-10-CM ICD-9-CM   1. Pneumonia due to COVID-19 virus  U07.1 480.8    J12.89      Patient Active Problem List   Diagnosis   • Chronic left hip pain   • OA (osteoarthritis) of hip   • Hip dislocation, left (CMS/HCC)   • Status post left hip replacement   • Hypertension   • Rheumatoid arthritis involving multiple sites (CMS/HCC)   • History of DVT (deep vein thrombosis)   • Postoperative hypotension   • S/P revision of total hip   • Septic arthritis (CMS/HCC)   • Status post total hip replacement, left   • Pain of left hip joint   • Constipation   • Nausea and vomiting   • Supratherapeutic INR   • Long term (current) use of antibiotics   • Long term (current) use of anticoagulants   • Leukocytosis   • Dehydration   • Pulmonary emboli (CMS/[facility])   • Mixed hyperlipidemia   • High blood sugar   • Pneumonia due to COVID-19 virus   • UTI (urinary tract infection)   • Blood type A+   • Acute respiratory failure with hypoxia (CMS/HCC)     Past Medical History:   Diagnosis Date   • Allergic    • Bone infection (CMS/HCC)     hip   • Cataract    • High cholesterol    • History of DVT (deep vein thrombosis)    • History of kidney infection     1 MONTH AGO   • History of pulmonary embolus (PE)    • History of transfusion    • Hypertension    • Left hip postoperative wound infection    • Paralabral cyst of left hip    • PICC (peripherally inserted central catheter) in place    • PONV (postoperative nausea and vomiting)    • Presence of vena cava filter    • Rheumatoid arteritis (CMS/[facility])    • Seasonal allergies    • UTI (urinary tract infection)     diagnosed 19  medically treated presently   • Wears glasses      Past Surgical History:   Procedure Laterality Date   • BLADDER SUSPENSION     • CATARACT EXTRACTION, BILATERAL     • COLONOSCOPY     • ENDOSCOPIC  FUNCTIONAL SINUS SURGERY (FESS)     • HIP SPACER INSERTION WITH ANTIBIOTIC CEMENT Left 8/11/2018    Procedure: TOTAL HIP ARTHROPLASTY REVISION with removal of infected total hip and placement of antibiotic spacer;  Surgeon: Obed Barney MD;  Location: [facility];  Service: Orthopedics   • HIP SURGERY Left 1983   • HIP SURGERY Left 20069   • HIP SURGERY Left 2011   • INCISION AND DRAINAGE HIP Left 11/2/2016    Procedure: HIP INCISION AND DRAINAGE;  Surgeon: Obed Barney MD;  Location: [facility];  Service:    • INCISION AND DRAINAGE HIP Left 7/7/2018    Procedure: I&D and antibiotic bead placement left hip;  Surgeon: Obed Barney MD;  Location: [facility];  Service: Orthopedics   • INCISION AND DRAINAGE HIP Left 7/21/2018    Procedure: HIP INCISION AND DRAINAGE, LEFT WITH POLY HEAD CHANGE AND ANTIBIOTIC BEAD PLACEMENT;  Surgeon: Obed Barney MD;  Location: [facility];  Service: Orthopedics   • JOINT REPLACEMENT Left     HIP   • PERIPHERALLY INSERTED CENTRAL CATHETER INSERTION     • OR CLOSED RX TRAUMATIC HIP DISLOCATN Left 12/18/2017    Procedure: LEFT HIP CLOSED REDUCTION;  Surgeon: Obed Barney MD;  Location: [facility];  Service: Orthopedics   • SOFT TISSUE MASS EXCISION Left     hip 3/19/18   • TOTAL ABDOMINAL HYSTERECTOMY     • TOTAL HIP ARTHROPLASTY REVISION Left 4/25/2018    Procedure: REVISION OF LEFT ACETABULAR COMPONENT ;  Surgeon: Obed Barney MD;  Location: [facility];  Service: Orthopedics   • TOTAL HIP ARTHROPLASTY REVISION Left 08/11/2018    total hip revision with removal of iinfected total hip and placement of antibiotic spacer   • VENA CAVA FILTER INSERTION       General Information     Row Name 11/05/20 1023          Physical Therapy Time and Intention    Document Type  evaluation  -EJ     Mode of Treatment  physical therapy  -     Row Name 11/05/20 1023          General Information    Patient Profile Reviewed  yes  -EJ     Prior Level of Function  independent:;all  household mobility;community mobility;ADL's  -EJ     Existing Precautions/Restrictions  oxygen therapy device and L/min  -EJ     Barriers to Rehab  medically complex  -EJ     Row Name 11/05/20 1023          Living Environment    Lives With  spouse  -EJ     Row Name 11/05/20 1023          Cognition    Orientation Status (Cognition)  oriented x 3  -EJ     Row Name 11/05/20 1023          Safety Issues, Functional Mobility    Impairments Affecting Function (Mobility)  endurance/activity tolerance;strength;shortness of breath  -EJ       User Key  (r) = Recorded By, (t) = Taken By, (c) = Cosigned By    Initials Name Provider Type     Misty Bradford, PT Physical Therapist        Mobility     Row Name 11/05/20 1023          Bed Mobility    Bed Mobility  supine-sit  -EJ     Supine-Sit Bond (Bed Mobility)  standby assist  -EJ     Assistive Device (Bed Mobility)  bed rails  -EJ     Row Name 11/05/20 1023          Sit-Stand Transfer    Sit-Stand Bond (Transfers)  verbal cues;contact guard;standby assist  -EJ     Assistive Device (Sit-Stand Transfers)  walker, front-wheeled  -EJ     Row Name 11/05/20 1023          Gait/Stairs (Locomotion)    Bond Level (Gait)  verbal cues;contact guard;standby assist  -EJ     Assistive Device (Gait)  walker, front-wheeled  -EJ     Distance in Feet (Gait)  30 x 2  -EJ     Deviations/Abnormal Patterns (Gait)  yanni decreased;stride length decreased  -EJ     Bilateral Gait Deviations  forward flexed posture  -EJ     Comment (Gait/Stairs)  slow pace, cues for upright posture, steady, no LOB noted  -EJ       User Key  (r) = Recorded By, (t) = Taken By, (c) = Cosigned By    Initials Name Provider Type     Misty Bradford, PT Physical Therapist        Obj/Interventions     Row Name 11/05/20 1024          Range of Motion Comprehensive    General Range of Motion  no range of motion deficits identified  -     Row Name 11/05/20 1024          Strength Comprehensive  (MMT)    Comment, General Manual Muscle Testing (MMT) Assessment  generalized weakness  -EJ     Row Name 11/05/20 1024          Balance    Balance Assessment  sitting static balance;standing static balance;standing dynamic balance  -EJ     Static Sitting Balance  WFL  -EJ     Static Standing Balance  WFL  -EJ     Dynamic Standing Balance  WFL  -EJ       User Key  (r) = Recorded By, (t) = Taken By, (c) = Cosigned By    Initials Name Provider Type    EJ Misty Bradford, PT Physical Therapist        Goals/Plan     Row Name 11/05/20 1029          Transfer Goal 1 (PT)    Activity/Assistive Device (Transfer Goal 1, PT)  transfers, all;walker, rolling  -EJ     [place] Level/Cues Needed (Transfer Goal 1, PT)  supervision required  -EJ     Time Frame (Transfer Goal 1, PT)  10 days  -EJ     Row Name 11/05/20 1029          Gait Training Goal 1 (PT)    Activity/Assistive Device (Gait Training Goal 1, PT)  gait (walking locomotion);walker, rolling  -EJ     [place] Level (Gait Training Goal 1, PT)  supervision required  -EJ     Distance (Gait Training Goal 1, PT)  100  -EJ     Time Frame (Gait Training Goal 1, PT)  10 days  -EJ       User Key  (r) = Recorded By, (t) = Taken By, (c) = Cosigned By    Initials Name Provider Type    Misty Bass, PT Physical Therapist        Clinical Impression     Row Name 11/05/20 1025          Pain    Additional Documentation  Pain Scale: Numbers Pre/Post-Treatment (Group)  -EJ     [facility] Name 11/05/20 1025          Pain Scale: Numbers Pre/Post-Treatment    Pretreatment Pain Rating  0/10 - no pain  -EJ     Posttreatment Pain Rating  0/10 - no pain  -     Row Name 11/05/20 1025          Plan of Care Review    Plan of Care Reviewed With  patient  -EJ     Progress  improving  -EJ     Outcome Summary  Pt agreeable to PT sheldon this am She was admitted on 10/26 with PNA due to COVID-19.  Pt lives at home with her , is usually independent with ADls and mobility, and will  sometimes use a walker/cane for ambulation when needed. Pt currently with no complaints. She is on 12 L hi flow O2 at this time. She presents with decreased endurance/activity tolerance, generalized weakness, and decreased functional mobility.  Pt is currently moving fairly well. She is able to perform all bed mobility with supervision, STS with SBA/CGA, and ambulate short distances in room with Rwx and CGA. She has no c/o SOA with activity. Her O2 sats dropped to ~85-88% during activity, but then she recovered to 95% after treatment. Pt is hopeful to return home at [place]. She will continue to benefit from skilled PT to maximize safety, strength, endurance, and independence with mobility.  -     Row Name 11/05/20 1025          Therapy Assessment/Plan (PT)    Patient/Family Therapy Goals Statement (PT)  return home  -EJ     Rehab Potential (PT)  good, to achieve stated therapy goals  -EJ     Criteria for Skilled Interventions Met (PT)  yes  -EJ     Row Name 11/05/20 1025          Vital Signs    Pre SpO2 (%)  95  -EJ     O2 Delivery Pre Treatment  supplemental O2 12 L  -EJ     Intra SpO2 (%)  85  -EJ     O2 Delivery Intra Treatment  supplemental O2 15 L  -EJ     Post SpO2 (%)  95  -EJ     O2 Delivery Post Treatment  supplemental O2 12 L  -EJ     Pre Patient Position  Supine  -EJ     Intra Patient Position  Standing  -EJ     Post Patient Position  Sitting  -EJ     Row Name 11/05/20 1025          Positioning and Restraints    Pre-Treatment Position  in bed  -EJ     Post Treatment Position  chair  -EJ     In Chair  notified nsg;reclined;call light within reach;encouraged to call for assist;exit alarm on  -EJ       User Key  (r) = Recorded By, (t) = Taken By, (c) = Cosigned By    Initials Name Provider Type    Misty Bass, PT Physical Therapist        Outcome Measures     Row Name 11/05/20 1030          How much help from another person do you currently need...    Turning from your back to your side while in flat  bed without using bedrails?  4  -EJ     Moving from lying on back to sitting on the side of a flat bed without bedrails?  4  -EJ     Moving to and from a bed to a chair (including a wheelchair)?  3  -EJ     Standing up from a chair using your arms (e.g., wheelchair, bedside chair)?  3  -EJ     Climbing 3-5 steps with a railing?  3  -EJ     To walk in hospital room?  3  -EJ     AM-PAC 6 Clicks Score (PT)  20  -EJ     Row Name 11/05/20 1030          Functional Assessment    Outcome Measure Options  AM-PAC 6 Clicks Basic Mobility (PT)  -       User Key  (r) = Recorded By, (t) = Taken By, (c) = Cosigned By    Initials Name Provider Type    EJ Misty Bradford, PT Physical Therapist        Physical Therapy Education                 Title: PT OT SLP Therapies (In Progress)     Topic: Physical Therapy (In Progress)     Point: Mobility training (Done)     Learning Progress Summary           Patient Acceptance, E,TB,D, VU by  at 11/5/2020 1030                   Point: Home exercise program (Not Started)     Learner Progress:  Not documented in this visit.          Point: Body mechanics (Not Started)     Learner Progress:  Not documented in this visit.          Point: Precautions (Not Started)     Learner Progress:  Not documented in this visit.                      User Key     Initials Effective Dates Name Provider Type  04/03/18 -  Misty Bradford, PT Physical Therapist PT              PT Recommendation and Plan  Planned Therapy Interventions (PT): bed mobility training, gait training, home exercise program, patient/family education, strengthening, transfer training  Plan of Care Reviewed With: patient  Progress: improving  Outcome Summary: Pt agreeable to PT eval this am She was admitted on 10/26 with PNA due to COVID-19.  Pt lives at home with her , is usually independent with ADls and mobility, and will sometimes use a walker/cane for ambulation when needed. Pt currently with no  complaints. She is on 12 L hi flow O2 at this time. She presents with decreased endurance/activity tolerance, generalized weakness, and decreased functional mobility.  Pt is currently moving fairly well. She is able to perform all bed mobility with supervision, STS with SBA/CGA, and ambulate short distances in room with Rwx and CGA. She has no c/o SOA with activity. Her O2 sats dropped to ~85-88% during activity, but then she recovered to 95% after treatment. Pt is hopeful to return home at [place]. She will continue to benefit from skilled PT to maximize safety, strength, endurance, and independence with mobility.     Time Calculation:   PT Charges     Row Name 11/05/20 1031             Time Calculation    Start Time  0950  -EJ      Stop Time  1020  -EJ      Time Calculation (min)  30 min  -EJ      PT Received On  11/05/20  -EJ      PT - Next Appointment  11/06/20  -EJ      PT Goal Re-Cert Due Date  11/15/20  -EJ         Time Calculation- PT    Total Timed Code Minutes- PT  23 minute(s)  -EJ        User Key  (r) = Recorded By, (t) = Taken By, (c) = Cosigned By    Initials Name Provider Type    EJ Misty Bradford, PT Physical Therapist        Therapy Charges for Today     Code Description Service Date Service Provider Modifiers Qty    56149735582 HC PT EVAL MOD COMPLEXITY 2 11/5/2020 Misty Bradford, PT GP 1    04923913754 HC PT THER PROC EA 15 MIN 11/5/2020 Misty Bradford, PT GP 2          PT G-Codes  Outcome Measure Options: AM-PAC 6 Clicks Basic Mobility (PT)  AM-PAC 6 Clicks Score (PT): 20    Misty Bradford PT  11/5/2020

## 2020-11-05 NOTE — NURSING NOTE
Ambulated to BSC. SAT's dropped to 85% while moving. Once back in bed took patient a few minutes to recover but SAT's came back up to 92%-95%. Will monitor closely and if SAT's don't maintain, will put patient back on Optiflow.   Vito Espinoza is 68 y/o doing well post operatively pain is controlled, sitting up in chair. Symptoms improved. He states that his leg feels much better already. No shortness of breath, chest pain or headache. Adequate intake. No nausea or vomiting. Carrasquillo will be removed.  He was up ambulating this morning without difficulty. Plans to be up and ambulating later today.    Physical Exam:  Visit Vitals  /67 (BP Location: [facility], Patient Position: Sitting)   Pulse 75   Temp 97.9 °F (36.6 °C) (Oral)   Resp 16   Ht 6' (1.829 m)   Wt 71.5 kg   SpO2 98%   BMI 21.38 kg/m²       General :AAOx3. Comfortable, in no acute distress and well-appearing.  HEENT: Normal HEENT exam. Pupils are equal, round, and reactive to light.   Lungs: Normal effort. He is not in respiratory distress.   Chest: Symmetric chest wall expansion.   Neurological: Patient is alert and oriented to person, place and time.   MSK: SWENSON. Normal strength and range of motion.   Skin: Warm and dry. Dressed c/d/i with GERARDO drains in place  GERARDO drain: drain #1 0 cc output, drain #2 38 cc output over last 8 hours    A/P:  In stable condition. Improving. (POD#1).   Carrasquillo will be discontinued  PCA discontinued.  GERARDO drains will remain intact.   Encouraged to ambulate several times daily and work with PT/OT  Will update and discuss with Dr. Kelly Rodriguez PA-C  [facility]  Supervising Physician - Dr. Russell Mendoza

## 2020-11-05 NOTE — PROGRESS NOTES
"Adult Nutrition  Assessment/PES    Patient Name:  Rachel Moser  YOB: 1945  MRN: [id number]  Admit Date:  10/26/2020    Assessment Date:  11/5/2020    Comments:  Nutrition screen for LOS. Pt with Covid 19. On Regular diet with 25-75% intake. Staff encouraging good po. Will offer might shakes as desired.    Reason for Assessment     Row Name 11/05/20 1013          Reason for Assessment    Reason For Assessment  other (see comments) LOS     Diagnosis  -- covid 19 pneumonia         Nutrition/Diet History     Row Name 11/05/20 1013          Nutrition/Diet History    Typical Food/Fluid Intake  po 25-75%, po encouraged by staff         Anthropometrics     Row Name 11/05/20 1014          Anthropometrics    Height  149.9 cm (59.02\")     Weight  54.7 kg (120 lb 11.2 oz) not weighed by RD        Ideal Body Weight (IBW)    Ideal Body Weight (IBW) (kg)  43.61     % Ideal Body Weight  125.54        Body Mass Index (BMI)    BMI (kg/m2)  24.42     BMI Assessment  BMI 18.5-24.9: normal         Labs/Tests/Procedures/Meds     Row Name 11/05/20 1014          Labs/Procedures/Meds    Lab Results Reviewed  reviewed        Diagnostic Tests/Procedures    Diagnostic Test/Procedure Reviewed  reviewed        Medications    Pertinent Medications Reviewed  reviewed         Physical Findings     Row Name 11/05/20 1014          Physical Findings    Overall Physical Appearance  on oxygen therapy     Skin  -- ignacio 19         Estimated/Assessed Needs     Row Name 11/05/20 1014          Calculation Measurements    Height  149.9 cm (59.02\")         Nutrition Prescription Ordered     Row Name 11/05/20 1014          Nutrition Prescription PO    Current PO Diet  Regular     Common Modifiers  Cardiac         Evaluation of Received Nutrient/Fluid Intake     Row Name 11/05/20 1015          PO Evaluation    % PO Intake  25-75%               Problem/Interventions:  Problem 1     Row Name 11/05/20 1015          Nutrition Diagnoses Problem 1 "    Problem 1  Inadequate Nutrient Intake     Etiology (related to)  Medical Diagnosis               Intervention Goal     Row Name 11/05/20 1016          Intervention Goal    General  Maintain nutrition;Disease management/therapy;Reduce/improve symptoms     PO  Increase intake;PO intake (%)     PO Intake %  75 %     Weight  Maintain weight         Nutrition Intervention     Row Name 11/05/20 1016          Nutrition Intervention    RD/Tech Action  Care plan reviewd         Nutrition Prescription     Row Name 11/05/20 1016          Nutrition Prescription PO    PO Prescription  Begin/change supplement         Education/Evaluation     Row Name 11/05/20 1016          Education    Education  Will Instruct as appropriate        Monitor/Evaluation    Monitor  Per protocol           Electronically signed by:  Seble Walker RD  11/05/20 10:17 EST

## 2020-11-05 NOTE — PLAN OF CARE
Problem: Adult Inpatient Plan of Care  Goal: Plan of Care Review  Recent Flowsheet Documentation  Taken 11/5/2020 1025 by Misty Bradford, PT  Progress: improving  Plan of Care Reviewed With: patient  Outcome Summary: Pt agreeable to PT eval this am She was admitted on 10/26 with PNA due to COVID-19.  Pt lives at home with her , is usually independent with ADls and mobility, and will sometimes use a walker/cane for ambulation when needed. Pt currently with no complaints. She is on 12 L hi flow O2 at this time. She presents with decreased endurance/activity tolerance, generalized weakness, and decreased functional mobility.  Pt is currently moving fairly well. She is able to perform all bed mobility with supervision, STS with SBA/CGA, and ambulate short distances in room with Rwx and CGA. She has no c/o SOA with activity. Her O2 sats dropped to ~85-88% during activity, but then she recovered to 95% after treatment. Pt is hopeful to return home at [place]. She will continue to benefit from skilled PT to maximize safety, strength, endurance, and independence with mobility.   Patient was intermittently wearing a face mask during this therapy encounter. Therapist used appropriate personal protective equipment including gown, eye protection, mask and gloves.  Mask used was standard procedure mask. Appropriate PPE was worn during the entire therapy session. Hand hygiene was completed before and after therapy session. Patient is in enhanced droplet precautions.

## 2020-11-05 NOTE — PROGRESS NOTES
[facility]      Mar/chart reviewed  Follow up covid19 pneumonia, CT associated ILD  Some cough and shortness of breath  Says she feels better    Vital Sign Min/Max for last 24 hours  Temp  Min: 96.7 °F (35.9 °C)  Max: 98 °F (36.7 °C)   BP  Min: 122/66  Max: 149/74   Pulse  Min: 62  Max: 83   Resp  Min: 16  Max: 20   SpO2  Min: 85 %  Max: 99 %   Flow (L/min)  Min: 10  Max: 60   Weight  Min: 54.7 kg (120 lb 11.2 oz)  Max: 54.7 kg (120 lb 11.2 oz)     Appears ill, axox3,   perrl, eomi, normal sclera, no palpable thyroid nodules  mmm, no jvd, trachea midline, neck supple,  chest decreased ae bilaterally, + crackles, no wheezes,   rrr,   soft, nt, nd +bs,  no c/c/ trace edema  Skin warm, dry no rashes    Labs: 11/15: reviewed;  inr 3.18  Na 139  Bicarb 24  crp 2.3  ldh 791  Wbc 20  hgb 11    A/P:  1. COVID19 pneumonia -- agree with dexamethasone, she has some concerning elevation of inflammatory markers and several co-morbidities  2. Connective tissue asssociated ILD -- possibly could be related to nitrofurantoin as well, and I would recommend not resuming that medication if at all possible  3. Rheumatoid arthritis -- on chronic methotrexate therapy as well  4. Acute hypoxemic respiratory failure -- continue oxygen support, she is at high risk for further deterioration. Defer remedsivir and/or other therapies to ID  5. Elevated inflammaotry markers  6. UTI  7. History of pulmonary embolism -- therapeutic on coumadin  8. Hyperglycemia    She has managed to transition to high flow cannula, continue to wean oxygen as able.  Continue bid dexamethasone for now. Good improvement over last 24

## 2020-11-05 NOTE — DISCHARGE PLACEMENT REQUEST
"Meli Moser (74 y.o. Female)     Date of Birth Social Security Number Address Home Phone MRN    1945  [id number] [phone] [id number]    Mandaeism Marital Status          Samaritan        Admission Date Admission Type Admitting Provider Attending Provider Department, Room/Bed    10/26/20 Emergency Eliecer Ruiz MD Beard, Lyle E, MD [address], S619/1    Discharge Date Discharge Disposition Discharge Destination                       Attending Provider: Eliecer Ruiz MD    Allergies: Sulfa Antibiotics    Isolation: Enh Drop/Con   Infection: COVID (confirmed) (10/27/20)   Code Status: CPR    Ht: 149.9 cm (59.02\")   Wt: 54.7 kg (120 lb 11.2 oz)    Admission Cmt: None   Principal Problem: Pneumonia due to COVID-19 virus [U07.1,J12.89]                 Active Insurance as of 10/26/2020     Primary Coverage     Payor Plan Insurance Group Employer/Plan Group    MEDICARE MEDICARE A & B      Payor Plan Address Payor Plan Phone Number Payor Plan Fax Number Effective Dates    [address] [phone]  12/1/2011 - None Entered    [facility] [id number]       Subscriber Name Subscriber Birth Date Member ID       MELI MOSER 1945 [id number]           Secondary Coverage     Payor Plan Insurance Group Employer/Plan Group    [employer] [place] [employer] [place] [employer] [place]     Payor Plan Address Payor Plan Phone Number Payor Plan Fax Number Effective Dates    [address] [place] [address] [phone]  9/1/2012 - None Entered    [place] [address]       Subscriber Name Subscriber Birth Date Member ID       MELI MOSER 1945 [id number]                 Emergency Contacts      (Rel.) Home Phone Work Phone Mobile Phone    EhsanRamone (Spouse) [phone] -- [phone]    Ramone Moser Jr (Son) [phone] -- [phone]    Patience Moser [phone] -- [phone]          "

## 2020-11-05 NOTE — PLAN OF CARE
Goal Outcome Evaluation:  Plan of Care Reviewed With: patient  Progress: improving  Outcome Summary: Patient states that she feels better. Currently on 15 L high-flow O2 and off Optiflow, so far is tolerating the transition but will monitor closely. VSS. Up with assist to BSC. Still having SOA with exertion. Encourage intake of food and fluids. Has slept well through the night. Will continue to monitor closely.

## 2020-11-05 NOTE — PROGRESS NOTES
DAILY PROGRESS NOTE  [facility]    Patient Identification:  Name: Rachel Moser  Age: 74 y.o.  Sex: female  :  1945  MRN: [id number]         Primary Care Physician: Nahun Wilkerson MD    Subjective:  Interval History:She feels better.  A little short of air and sats were dipping down.  She is on high flow at 12 L  O 2 .  Says she Doesn't want intubation or vent                                                                                                                                                                                                                                                                                                                                                                                                                                                                                                             Objective:    Scheduled Meds:amoxicillin-clavulanate, 1 tablet, Oral, Q12H  atorvastatin, 20 mg, Oral, Daily  calcium 500 mg vitamin D 5 mcg (200 UT), 1 tablet, Oral, Daily  cetirizine, 10 mg, Oral, Daily  cholecalciferol, 2,000 Units, Oral, Daily  dexamethasone, 6 mg, Oral, Q12H  famotidine, 20 mg, Oral, BID  ferrous sulfate, 325 mg, Oral, BID  folic acid, 1,600 mcg, Oral, Daily  furosemide, 40 mg, Oral, Daily  metoprolol succinate XL, 50 mg, Oral, Daily  multivitamin with minerals, 1 tablet, Oral, Daily  potassium chloride, 40 mEq, Oral, Daily  remdesivir, 100 mg, Intravenous, Q24H  sodium chloride, 10 mL, Intravenous, Q12H  warfarin, 0.5 mg, Oral, Once      Continuous Infusions:Pharmacy Consult - Remdesivir,   Pharmacy to dose warfarin,         Vital signs in last 24 hours:  Temp:  [96.7 °F (35.9 °C)-98 °F (36.7 °C)] 96.7 °F (35.9 °C)  Heart Rate:  [62-83] 77  Resp:  [16-20] 16  BP: (122-149)/(66-82) 122/66    Intake/Output:    Intake/Output Summary (Last 24 hours) at 2020 1316  Last data filed at 2020 0830  Gross per 24 hour   Intake 180  "ml   Output 1500 ml   Net -1320 ml       Exam:  /66 (BP Location: Right arm, Patient Position: Sitting)   Pulse 77   Temp 96.7 °F (35.9 °C) (Oral)   Resp 16   Ht 149.9 cm (59.02\")   Wt 54.7 kg (120 lb 11.2 oz) Comment: not weighed by RD  SpO2 92%   BMI 24.37 kg/m²     General Appearance:    Alert, cooperative, no distress   Head:    Normocephalic, without obvious abnormality, atraumatic   Eyes:       Throat:   Lips, tongue, gums normal   Neck:   Supple, symmetrical, trachea midline, no JVD   Lungs:     Clear to auscultation bilaterally, respirations unlabored   Chest Wall:    No tenderness or deformity    Heart:    Regular rate and rhythm, S1 and S2 normal, no murmur,no  Rub or gallop   Abdomen:     Soft, nontender, bowel sounds active, no masses, no organomegaly    Extremities:   Extremities normal, atraumatic, no cyanosis or edema   Pulses:      Skin:   Skin is warm and dry,  no rashes or palpable lesions   Neurologic:   no focal deficits noted      Lab Results (last 72 hours)       Procedure Component Value Units Date/Time    Protime-INR [[id number]]  (Abnormal) Collected: 10/28/20 0844    Specimen: Blood Updated: 10/28/20 1005     Protime 28.9 Seconds      INR 2.84    D-dimer, Quantitative [[id number]]  (Normal) Collected: 10/28/20 0844    Specimen: Blood Updated: 10/28/20 1005     D-Dimer, Quantitative 0.32 MCGFEU/mL     Narrative:      The Stago D-Dimer test used in conjunction with a clinical pretest probability (PTP) assessment model, has been approved by the FDA to rule out the presence of venous thromboembolism (VTE) in outpatients suspected of deep venous thrombosis (DVT) or pulmonary embolism (PE). The cut-off for negative predictive value is <0.50 MCGFEU/mL.    Fibrinogen [[id number]]  (Abnormal) Collected: 10/28/20 0844    Specimen: Blood Updated: 10/28/20 1005     Fibrinogen 531 mg/dL     Ferritin [[id number]]  (Abnormal) Collected: 10/28/20 0844    Specimen: Blood Updated: 10/28/20 0959     " Ferritin 371.00 ng/mL     Narrative:      Results may be falsely decreased if patient taking Biotin.      Lactate Dehydrogenase [[id number]]  (Abnormal) Collected: 10/28/20 0844    Specimen: Blood Updated: 10/28/20 0950      U/L      Comment: Specimen hemolyzed.  Results may be affected.       Comprehensive Metabolic Panel [[id number]]  (Abnormal) Collected: 10/28/20 0844    Specimen: Blood Updated: 10/28/20 0948     Glucose 97 mg/dL      BUN 10 mg/dL      Creatinine 0.45 mg/dL      Sodium 139 mmol/L      Potassium 4.0 mmol/L      Comment: Slight hemolysis detected by analyzer. Results may be affected.        Chloride 113 mmol/L      CO2 19.1 mmol/L      Calcium 7.2 mg/dL      Total Protein 5.5 g/dL      Albumin 3.00 g/dL      ALT (SGPT) 13 U/L      AST (SGOT) 25 U/L      Alkaline Phosphatase 45 U/L      Total Bilirubin 0.4 mg/dL      eGFR Non African Amer 137 mL/min/1.73      Globulin 2.5 gm/dL      A/G Ratio 1.2 g/dL      BUN/Creatinine Ratio 22.2     Anion Gap 6.9 mmol/L     Narrative:      GFR Normal >60  Chronic Kidney Disease <60  Kidney Failure <15      CK [[id number]]  (Normal) Collected: 10/28/20 0844    Specimen: Blood Updated: 10/28/20 0948     Creatine Kinase 47 U/L     C-reactive Protein [[id number]]  (Abnormal) Collected: 10/28/20 0844    Specimen: Blood Updated: 10/28/20 0948     C-Reactive Protein 3.83 mg/dL     CBC & Differential [[id number]]  (Abnormal) Collected: 10/28/20 0845    Specimen: Blood Updated: 10/28/20 0941    Narrative:      The following orders were created for panel order CBC & Differential.  Procedure                               Abnormality         Status                     ---------                               -----------         ------                     CBC Auto Differential[[id number]]        Abnormal            Final result                 Please view results for these tests on the individual orders.    CBC Auto Differential [[id number]]  (Abnormal) Collected: 10/28/20  0845    Specimen: Blood Updated: 10/28/20 0941     WBC 12.02 10*3/mm3      RBC 4.31 10*6/mm3      Hemoglobin 12.8 g/dL      Hematocrit 38.8 %      MCV 90.0 fL      MCH 29.7 pg      MCHC 33.0 g/dL      RDW 14.9 %      RDW-SD 48.0 fl      MPV 11.9 fL      Platelets 167 10*3/mm3      Neutrophil % 82.6 %      Lymphocyte % 8.2 %      Monocyte % 7.5 %      Eosinophil % 0.0 %      Basophil % 0.5 %      Immature Grans % 1.2 %      Neutrophils, Absolute 9.93 10*3/mm3      Lymphocytes, Absolute 0.98 10*3/mm3      Monocytes, Absolute 0.90 10*3/mm3      Eosinophils, Absolute 0.00 10*3/mm3      Basophils, Absolute 0.06 10*3/mm3      Immature Grans, Absolute 0.15 10*3/mm3      nRBC 0.1 /100 WBC     Blood Culture - Blood, Arm, Left [[id number]] Collected: 10/27/20 0018    Specimen: Blood from Arm, Left Updated: 10/28/20 0045     Blood Culture No growth at 24 hours    Blood Culture - Blood, Arm, Right [[id number]] Collected: 10/27/20 0018    Specimen: Blood from Arm, Right Updated: 10/28/20 0045     Blood Culture No growth at 24 hours    Ferritin [[id number]]  (Abnormal) Collected: 10/27/20 1851    Specimen: Blood Updated: 10/27/20 2034     Ferritin 353.00 ng/mL     Narrative:      Results may be falsely decreased if patient taking Biotin.      Procalcitonin [[id number]]  (Normal) Collected: 10/27/20 1851    Specimen: Blood Updated: 10/27/20 1941     Procalcitonin 0.05 ng/mL     Narrative:      As a Marker for Sepsis (Non-Neonates):   1. <0.5 ng/mL represents a low risk of severe sepsis and/or septic shock.  1. >2 ng/mL represents a high risk of severe sepsis and/or septic shock.    As a Marker for Lower Respiratory Tract Infections that require antibiotic therapy:  PCT on Admission     Antibiotic Therapy             6-12 Hrs later  > 0.5                Strongly Recommended            >0.25 - <0.5         Recommended  0.1 - 0.25           Discouraged                   Remeasure/reassess PCT  <0.1                 Strongly Discouraged     "      Remeasure/reassess PCT      As 28 day mortality risk marker: \"Change in Procalcitonin Result\" (> 80 % or <=80 %) if Day 0 (or Day 1) and Day 4 values are available. Refer to http://www.[facility]-pct-calculator.com/   Change in PCT <=80 %   A decrease of PCT levels below or equal to 80 % defines a positive change in PCT test result representing a higher risk for 28-day all-cause mortality of patients diagnosed with severe sepsis or septic shock.  Change in PCT > 80 %   A decrease of PCT levels of more than 80 % defines a negative change in PCT result representing a lower risk for 28-day all-cause mortality of patients diagnosed with severe sepsis or septic shock.                Results may be falsely decreased if patient taking Biotin.     Lactate Dehydrogenase [[id number]]  (Abnormal) Collected: 10/27/20 1851    Specimen: Blood Updated: 10/27/20 1933      U/L     D-dimer, Quantitative [[id number]]  (Normal) Collected: 10/27/20 1731    Specimen: Blood from Hand, Right Updated: 10/27/20 1822     D-Dimer, Quantitative 0.34 MCGFEU/mL     Narrative:      The Stago D-Dimer test used in conjunction with a clinical pretest probability (PTP) assessment model, has been approved by the FDA to rule out the presence of venous thromboembolism (VTE) in outpatients suspected of deep venous thrombosis (DVT) or pulmonary embolism (PE). The cut-off for negative predictive value is <0.50 MCGFEU/mL.    Protime-INR [[id number]]  (Abnormal) Collected: 10/27/20 1047    Specimen: Blood Updated: 10/27/20 1150     Protime 21.9 Seconds      INR 1.97    Basic Metabolic Panel [[id number]]  (Abnormal) Collected: 10/27/20 0708    Specimen: Blood from Hand, Left Updated: 10/27/20 0849     Glucose 130 mg/dL      BUN 12 mg/dL      Creatinine 0.59 mg/dL      Sodium 134 mmol/L      Potassium 3.6 mmol/L      Chloride 100 mmol/L      CO2 24.2 mmol/L      Calcium 7.9 mg/dL      eGFR Non African Amer 100 mL/min/1.73      BUN/Creatinine Ratio 20.3     " Anion Gap 9.8 mmol/L     Narrative:      GFR Normal >60  Chronic Kidney Disease <60  Kidney Failure <15      Protime-INR [[id number]]  (Abnormal) Collected: 10/27/20 0708    Specimen: Blood from Hand, Left Updated: 10/27/20 0835     Protime 23.1 Seconds      INR 2.11    CBC Auto Differential [[id number]]  (Abnormal) Collected: 10/27/20 0708    Specimen: Blood from Hand, Left Updated: 10/27/20 0801     WBC 8.30 10*3/mm3      RBC 4.30 10*6/mm3      Hemoglobin 13.0 g/dL      Hematocrit 39.3 %      MCV 91.4 fL      MCH 30.2 pg      MCHC 33.1 g/dL      RDW 15.3 %      RDW-SD 50.5 fl      MPV 12.0 fL      Platelets 176 10*3/mm3      Neutrophil % 76.2 %      Lymphocyte % 13.6 %      Monocyte % 7.0 %      Eosinophil % 0.0 %      Basophil % 0.8 %      Immature Grans % 2.4 %      Neutrophils, Absolute 6.32 10*3/mm3      Lymphocytes, Absolute 1.13 10*3/mm3      Monocytes, Absolute 0.58 10*3/mm3      Eosinophils, Absolute 0.00 10*3/mm3      Basophils, Absolute 0.07 10*3/mm3      Immature Grans, Absolute 0.20 10*3/mm3      nRBC 0.0 /100 WBC     Respiratory Panel PCR w/COVID-19(SARS-CoV-2) MONICO/WENDY/JHON/PAD/COR/MAD/JUAN In-House, NP Swab in UTM/VTM, 3-4 HR TAT - Swab, Nasopharynx [[id number]]  (Abnormal) Collected: 10/27/20 0018    Specimen: Swab from Nasopharynx Updated: 10/27/20 0204     ADENOVIRUS, PCR Not Detected     Coronavirus 229E Not Detected     Coronavirus HKU1 Not Detected     Coronavirus NL63 Not Detected     Coronavirus OC43 Not Detected     COVID19 Detected     Human Metapneumovirus Not Detected     Human Rhinovirus/Enterovirus Not Detected     Influenza A PCR Not Detected     Influenza B PCR Not Detected     Parainfluenza Virus 1 Not Detected     Parainfluenza Virus 2 Not Detected     Parainfluenza Virus 3 Not Detected     Parainfluenza Virus 4 Not Detected     RSV, PCR Not Detected     Bordetella pertussis pcr Not Detected     Bordetella parapertussis PCR Not Detected     Chlamydophila pneumoniae PCR Not Detected      Mycoplasma pneumo by PCR Not Detected    Narrative:      Fact sheet for providers: https://docs.[employer]/wp-content/uploads/DTI0178-3403-TX8.1-EUA-Provider-Fact-Sheet-3.pdf    Fact sheet for patients: https://docs.[employer]/wp-content/uploads/AIY2484-8880-GI3.1-EUA-Patient-Fact-Sheet-1.pdf    Urinalysis, Microscopic Only - Urine, Catheter In/Out [[id number]]  (Abnormal) Collected: 10/27/20 0023    Specimen: Urine, Catheter In/Out Updated: 10/27/20 0152     RBC, UA None Seen /HPF      WBC, UA 3-5 /HPF      Bacteria, UA Trace /HPF      Squamous Epithelial Cells, UA 0-2 /HPF      Hyaline Casts, UA None Seen /LPF      Methodology Manual Light Microscopy    [place] Draw [[id number]] Collected: 10/27/20 0018    Specimen: Blood Updated: 10/27/20 0130    Narrative:      The following orders were created for panel order [place] Draw.  Procedure                               Abnormality         Status                     ---------                               -----------         ------                     Light Blue Top[[id number]]                                   Final result               Green Top (Gel)[[id number]]                                  Final result               Lavender Top[[id number]]                                     Final result               Gold Top - SST[[id number]]                                   Final result                 Please view results for these tests on the individual orders.    Lavender Top [[id number]] Collected: 10/27/20 0018    Specimen: Blood Updated: 10/27/20 0130     Extra Tube hold for add-on     Comment: Auto resulted       Gold Top - SST [[id number]] Collected: 10/27/20 0018    Specimen: Blood Updated: 10/27/20 0130     Extra Tube Hold for add-ons.     Comment: Auto resulted.       Light Blue Top [[id number]] Collected: 10/27/20 0018    Specimen: Blood Updated: 10/27/20 0130     Extra Tube hold for add-on     Comment: Auto resulted       Green Top (Gel) [[id number]] Collected:  10/27/20 0018    Specimen: Blood Updated: 10/27/20 0130     Extra Tube Hold for add-ons.     Comment: Auto resulted.       Protime-INR [[id number]]  (Abnormal) Collected: 10/27/20 0018    Specimen: Blood Updated: 10/27/20 0109     Protime 23.5 Seconds      INR 2.16    Comprehensive Metabolic Panel [[id number]]  (Abnormal) Collected: 10/27/20 0018    Specimen: Blood Updated: 10/27/20 0103     Glucose 113 mg/dL      BUN 10 mg/dL      Creatinine 0.70 mg/dL      Sodium 134 mmol/L      Potassium 3.2 mmol/L      Chloride 95 mmol/L      CO2 27.4 mmol/L      Calcium 8.2 mg/dL      Total Protein 6.5 g/dL      Albumin 3.80 g/dL      ALT (SGPT) 15 U/L      AST (SGOT) 22 U/L      Alkaline Phosphatase 53 U/L      Total Bilirubin 0.6 mg/dL      eGFR Non African Amer 82 mL/min/1.73      Globulin 2.7 gm/dL      A/G Ratio 1.4 g/dL      BUN/Creatinine Ratio 14.3     Anion Gap 11.6 mmol/L     Narrative:      GFR Normal >60  Chronic Kidney Disease <60  Kidney Failure <15      Lactic Acid, Plasma [[id number]]  (Normal) Collected: 10/27/20 0018    Specimen: Blood Updated: 10/27/20 0102     Lactate 1.2 mmol/L     Urinalysis With Culture If Indicated - Urine, Catheter In/Out [[id number]]  (Abnormal) Collected: 10/27/20 0023    Specimen: Urine, Catheter In/Out Updated: 10/27/20 0057     Color, UA Dark Yellow     Appearance, UA Clear     pH, UA 7.5     Specific Gravity, UA 1.019     Glucose, UA Negative     Ketones, UA 15 mg/dL (1+)     Bilirubin, UA Small (1+)     Blood, UA Negative     Protein,  mg/dL (2+)     Leuk Esterase, UA Small (1+)     Nitrite, UA Positive     Urobilinogen, UA 1.0 E.U./dL    CBC & Differential [[id number]]  (Abnormal) Collected: 10/27/20 0018    Specimen: Blood Updated: 10/27/20 0044    Narrative:      The following orders were created for panel order CBC & Differential.  Procedure                               Abnormality         Status                     ---------                               -----------          ------                     CBC Auto Differential[[id number]]        Abnormal            Final result                 Please view results for these tests on the individual orders.    CBC Auto Differential [[id number]]  (Abnormal) Collected: 10/27/20 0018    Specimen: Blood Updated: 10/27/20 0044     WBC 8.39 10*3/mm3      RBC 4.46 10*6/mm3      Hemoglobin 13.3 g/dL      Hematocrit 39.6 %      MCV 88.8 fL      MCH 29.8 pg      MCHC 33.6 g/dL      RDW 15.4 %      RDW-SD 48.1 fl      MPV 12.2 fL      Platelets 174 10*3/mm3      Neutrophil % 67.5 %      Lymphocyte % 18.2 %      Monocyte % 12.3 %      Eosinophil % 0.0 %      Basophil % 0.2 %      Immature Grans % 1.8 %      Neutrophils, Absolute 5.66 10*3/mm3      Lymphocytes, Absolute 1.53 10*3/mm3      Monocytes, Absolute 1.03 10*3/mm3      Eosinophils, Absolute 0.00 10*3/mm3      Basophils, Absolute 0.02 10*3/mm3      Immature Grans, Absolute 0.15 10*3/mm3      nRBC 0.0 /100 WBC           Data Review:  Results from last 7 days   Lab Units 11/05/20  0601 11/04/20  0809 11/03/20  2241 11/03/20  0745   SODIUM mmol/L 139 142  --  144   POTASSIUM mmol/L 4.4 4.5 4.6 3.2*   CHLORIDE mmol/L 105 109*  --  108*   CO2 mmol/L 24.6 26.0  --  26.5   BUN mg/dL 21 20  --  21   CREATININE mg/dL 0.44* 0.39*  --  0.40*   GLUCOSE mg/dL 150* 166*  --  108*   CALCIUM mg/dL 7.5* 7.8*  --  7.4*     Results from last 7 days   Lab Units 11/05/20  0601 11/04/20  0809 11/03/20  0745   WBC 10*3/mm3 20.55* 17.18* 13.13*   HEMOGLOBIN g/dL 11.1* 11.0* 10.9*   HEMATOCRIT % 33.3* 33.7* 31.9*   PLATELETS 10*3/mm3 190 233 252             No results found for: TROPONINT      Results from last 7 days   Lab Units 11/05/20  0601 11/04/20  0809 11/03/20  0745   ALK PHOS U/L 84 80 77   BILIRUBIN mg/dL 0.9 0.7 0.7   BILIRUBIN DIRECT mg/dL 0.3 0.2 0.2   ALT (SGPT) U/L 25 19 18   AST (SGOT) U/L 36* 32 37*             No results found for: POCGLU  Results from last 7 days   Lab Units 11/05/20  0601 11/04/20  0809  11/03/20  0745   INR  3.18* 3.22* 2.82*       Past Medical History:   Diagnosis Date   • Allergic    • Bone infection (CMS/HCC)     hip   • Cataract    • High cholesterol    • History of DVT (deep vein thrombosis)    • History of kidney infection     1 MONTH AGO   • History of pulmonary embolus (PE)    • History of transfusion    • Hypertension    • Left hip postoperative wound infection    • Paralabral cyst of left hip    • PICC (peripherally inserted central catheter) in place    • PONV (postoperative nausea and vomiting)    • Presence of vena cava filter    • Rheumatoid arteritis (CMS/HCC)    • Seasonal allergies    • UTI (urinary tract infection)     diagnosed 4/2/18  medically treated presently   • Wears glasses        Assessment:  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 [T84.59XA, Z96.649]  Not Applicable   • Acute respiratory failure with hypoxia (CMS/HCC) [J96.01]  Unknown   • Blood type A+ [Z67.10]  Unknown   • UTI (urinary tract infection) [N39.0]  Unknown   • Pulmonary emboli (CMS/HCC) [I26.99]  Yes   • Mixed hyperlipidemia [E78.2]  Yes   • Long term (current) use of antibiotics [Z79.2]  Not Applicable   • Hypertension [I10]  Yes   • Rheumatoid arthritis involving multiple sites (CMS/HCC) [M06.9]  Yes   • History of DVT (deep vein thrombosis) [Z86.718]  Not Applicable   • OA (osteoarthritis) of hip [M16.9]  Yes      Resolved Hospital Problems   No resolved problems to display.   Sepsis due to COVID-19 pneumonia and ESBL UTI  Present on admission  Acute hypoxic respiratory failure due to sepsis  Plan:  Finished antibiotics for UTI and follow lab. COVID 19 is becoming symptomatic and she is getting worse.  Continue Decadron and remdesivir, ID and pulmonary consults noted.  Follow-up Covid lab.  O 2 and supportive care.  Continue High flow to keep her sats up.  [place] is certainly  guarded.    Eliecer Ruiz MD  11/5/2020  13:16 EST   n/a

## 2020-11-06 NOTE — PLAN OF CARE
Problem: Adult Inpatient Plan of Care  Goal: Plan of Care Review  Recent Flowsheet Documentation  Taken 11/6/2020 1444 by Shannan Taveras PTA  Progress: no change  Plan of Care Reviewed With: patient  Outcome Summary: Pt req freq rest break to keep O2 sats above 90% w/ amb and bed mobility today. Pt O2 set at 9.5 L per RN req for amb. Pt amb 30', 40' w/ HHA and use of cane. Pt unsteady w/o HHA and very forward flexed at hips. Pt performed BLE HEP w/ assist for SLR, seated march (LLE), LAQ (LLE). Pt would benefit from SNF after dc to improve her strength, mobility, and endurance.    Patient was wearing a face mask during this therapy encounter. Therapist used appropriate personal protective equipment including gown, eye protection, mask and gloves.  Mask used was standard procedure mask. Appropriate PPE was worn during the entire therapy session. Hand hygiene was completed before and after therapy session. Patient is in enhanced droplet precautions.

## 2020-11-06 NOTE — PLAN OF CARE
Problem: Adult Inpatient Plan of Care  Goal: Plan of Care Review  Recent Flowsheet Documentation  Taken 11/6/2020 1444 by Shannan Taveras PTA  Progress: no change  Plan of Care Reviewed With: patient  Outcome Summary: Pt req freq rest break to keep O2 sats above 90% w/ amb and bed mobility today. Pt O2 set at 9.5 L per RN req for amb. Pt amb 30', 40' w/ HHA and use of cane. Pt unsteady w/o HHA and very forward flexed at hips. Pt performed BLE HEP w/ assist for SLR, seated march (LLE), LAQ (LLE).  PT will prog as pt nilsa.

## 2020-11-06 NOTE — PROGRESS NOTES
DAILY PROGRESS NOTE  [facility]    Patient Identification:  Name: Rachel Moser  Age: 74 y.o.  Sex: female  :  1945  MRN: [id number]         Primary Care Physician: Nahun Wilkerson MD    Subjective:  Interval History:She feels better.  A little short of air and sats were dipping down.  She is on high flow at 12 L  O 2 .  Says she Doesn't want intubation or vent                                                                                                                                                                                                                                                                                                                                                                                                                                                                                                             Objective:    Scheduled Meds:amoxicillin-clavulanate, 1 tablet, Oral, Q12H  atorvastatin, 20 mg, Oral, Daily  calcium 500 mg vitamin D 5 mcg (200 UT), 1 tablet, Oral, Daily  cetirizine, 10 mg, Oral, Daily  cholecalciferol, 2,000 Units, Oral, Daily  dexamethasone, 6 mg, Oral, Q12H  famotidine, 20 mg, Oral, BID  ferrous sulfate, 325 mg, Oral, BID  folic acid, 1,600 mcg, Oral, Daily  furosemide, 40 mg, Oral, Daily  metoprolol succinate XL, 50 mg, Oral, Daily  multivitamin with minerals, 1 tablet, Oral, Daily  potassium chloride, 40 mEq, Oral, Daily  sodium chloride, 10 mL, Intravenous, Q12H  warfarin, 1 mg, Oral, Once      Continuous Infusions:Pharmacy to dose warfarin,         Vital signs in last 24 hours:  Temp:  [96.2 °F (35.7 °C)-98.8 °F (37.1 °C)] 98.2 °F (36.8 °C)  Heart Rate:  [65-83] 83  Resp:  [16-18] 18  BP: (131-144)/(71-72) 131/72    Intake/Output:    Intake/Output Summary (Last 24 hours) at 2020 1412  Last data filed at 2020 0541  Gross per 24 hour   Intake 600 ml   Output 400 ml   Net 200 ml       Exam:  /72 (BP Location:  "Right arm, Patient Position: Lying)   Pulse 83   Temp 98.2 °F (36.8 °C) (Oral)   Resp 18   Ht 149.9 cm (59.02\")   Wt 54.7 kg (120 lb 11.2 oz) Comment: not weighed by RD  SpO2 (!) 89%   BMI 24.37 kg/m²     General Appearance:    Alert, cooperative, no distress   Head:    Normocephalic, without obvious abnormality, atraumatic   Eyes:       Throat:   Lips, tongue, gums normal   Neck:   Supple, symmetrical, trachea midline, no JVD   Lungs:     Clear to auscultation bilaterally, respirations unlabored   Chest Wall:    No tenderness or deformity    Heart:    Regular rate and rhythm, S1 and S2 normal, no murmur,no  Rub or gallop   Abdomen:     Soft, nontender, bowel sounds active, no masses, no organomegaly    Extremities:   Extremities normal, atraumatic, no cyanosis or edema   Pulses:      Skin:   Skin is warm and dry,  no rashes or palpable lesions   Neurologic:   no focal deficits noted      Lab Results (last 72 hours)       Procedure Component Value Units Date/Time    Protime-INR [[id number]]  (Abnormal) Collected: 10/28/20 0844    Specimen: Blood Updated: 10/28/20 1005     Protime 28.9 Seconds      INR 2.84    D-dimer, Quantitative [[id number]]  (Normal) Collected: 10/28/20 0844    Specimen: Blood Updated: 10/28/20 1005     D-Dimer, Quantitative 0.32 MCGFEU/mL     Narrative:      The Stago D-Dimer test used in conjunction with a clinical pretest probability (PTP) assessment model, has been approved by the FDA to rule out the presence of venous thromboembolism (VTE) in outpatients suspected of deep venous thrombosis (DVT) or pulmonary embolism (PE). The cut-off for negative predictive value is <0.50 MCGFEU/mL.    Fibrinogen [[id number]]  (Abnormal) Collected: 10/28/20 0844    Specimen: Blood Updated: 10/28/20 1005     Fibrinogen 531 mg/dL     Ferritin [[id number]]  (Abnormal) Collected: 10/28/20 0844    Specimen: Blood Updated: 10/28/20 0959     Ferritin 371.00 ng/mL     Narrative:      Results may be falsely " decreased if patient taking Biotin.      Lactate Dehydrogenase [[id number]]  (Abnormal) Collected: 10/28/20 0844    Specimen: Blood Updated: 10/28/20 0950      U/L      Comment: Specimen hemolyzed.  Results may be affected.       Comprehensive Metabolic Panel [[id number]]  (Abnormal) Collected: 10/28/20 0844    Specimen: Blood Updated: 10/28/20 0948     Glucose 97 mg/dL      BUN 10 mg/dL      Creatinine 0.45 mg/dL      Sodium 139 mmol/L      Potassium 4.0 mmol/L      Comment: Slight hemolysis detected by analyzer. Results may be affected.        Chloride 113 mmol/L      CO2 19.1 mmol/L      Calcium 7.2 mg/dL      Total Protein 5.5 g/dL      Albumin 3.00 g/dL      ALT (SGPT) 13 U/L      AST (SGOT) 25 U/L      Alkaline Phosphatase 45 U/L      Total Bilirubin 0.4 mg/dL      eGFR Non African Amer 137 mL/min/1.73      Globulin 2.5 gm/dL      A/G Ratio 1.2 g/dL      BUN/Creatinine Ratio 22.2     Anion Gap 6.9 mmol/L     Narrative:      GFR Normal >60  Chronic Kidney Disease <60  Kidney Failure <15      CK [[id number]]  (Normal) Collected: 10/28/20 0844    Specimen: Blood Updated: 10/28/20 0948     Creatine Kinase 47 U/L     C-reactive Protein [[id number]]  (Abnormal) Collected: 10/28/20 0844    Specimen: Blood Updated: 10/28/20 0948     C-Reactive Protein 3.83 mg/dL     CBC & Differential [[id number]]  (Abnormal) Collected: 10/28/20 0845    Specimen: Blood Updated: 10/28/20 0941    Narrative:      The following orders were created for panel order CBC & Differential.  Procedure                               Abnormality         Status                     ---------                               -----------         ------                     CBC Auto Differential[[id number]]        Abnormal            Final result                 Please view results for these tests on the individual orders.    CBC Auto Differential [[id number]]  (Abnormal) Collected: 10/28/20 0845    Specimen: Blood Updated: 10/28/20 0941     WBC 12.02  10*3/mm3      RBC 4.31 10*6/mm3      Hemoglobin 12.8 g/dL      Hematocrit 38.8 %      MCV 90.0 fL      MCH 29.7 pg      MCHC 33.0 g/dL      RDW 14.9 %      RDW-SD 48.0 fl      MPV 11.9 fL      Platelets 167 10*3/mm3      Neutrophil % 82.6 %      Lymphocyte % 8.2 %      Monocyte % 7.5 %      Eosinophil % 0.0 %      Basophil % 0.5 %      Immature Grans % 1.2 %      Neutrophils, Absolute 9.93 10*3/mm3      Lymphocytes, Absolute 0.98 10*3/mm3      Monocytes, Absolute 0.90 10*3/mm3      Eosinophils, Absolute 0.00 10*3/mm3      Basophils, Absolute 0.06 10*3/mm3      Immature Grans, Absolute 0.15 10*3/mm3      nRBC 0.1 /100 WBC     Blood Culture - Blood, Arm, Left [[id number]] Collected: 10/27/20 0018    Specimen: Blood from Arm, Left Updated: 10/28/20 0045     Blood Culture No growth at 24 hours    Blood Culture - Blood, Arm, Right [[id number]] Collected: 10/27/20 0018    Specimen: Blood from Arm, Right Updated: 10/28/20 0045     Blood Culture No growth at 24 hours    Ferritin [[id number]]  (Abnormal) Collected: 10/27/20 1851    Specimen: Blood Updated: 10/27/20 2034     Ferritin 353.00 ng/mL     Narrative:      Results may be falsely decreased if patient taking Biotin.      Procalcitonin [[id number]]  (Normal) Collected: 10/27/20 1851    Specimen: Blood Updated: 10/27/20 1941     Procalcitonin 0.05 ng/mL     Narrative:      As a Marker for Sepsis (Non-Neonates):   1. <0.5 ng/mL represents a low risk of severe sepsis and/or septic shock.  1. >2 ng/mL represents a high risk of severe sepsis and/or septic shock.    As a Marker for Lower Respiratory Tract Infections that require antibiotic therapy:  PCT on Admission     Antibiotic Therapy             6-12 Hrs later  > 0.5                Strongly Recommended            >0.25 - <0.5         Recommended  0.1 - 0.25           Discouraged                   Remeasure/reassess PCT  <0.1                 Strongly Discouraged          Remeasure/reassess PCT      As 28 day mortality risk  "marker: \"Change in Procalcitonin Result\" (> 80 % or <=80 %) if Day 0 (or Day 1) and Day 4 values are available. Refer to http://www.[facility]-pct-calculator.com/   Change in PCT <=80 %   A decrease of PCT levels below or equal to 80 % defines a positive change in PCT test result representing a higher risk for 28-day all-cause mortality of patients diagnosed with severe sepsis or septic shock.  Change in PCT > 80 %   A decrease of PCT levels of more than 80 % defines a negative change in PCT result representing a lower risk for 28-day all-cause mortality of patients diagnosed with severe sepsis or septic shock.                Results may be falsely decreased if patient taking Biotin.     Lactate Dehydrogenase [[id number]]  (Abnormal) Collected: 10/27/20 1851    Specimen: Blood Updated: 10/27/20 1933      U/L     D-dimer, Quantitative [[id number]]  (Normal) Collected: 10/27/20 1731    Specimen: Blood from Hand, Right Updated: 10/27/20 1822     D-Dimer, Quantitative 0.34 MCGFEU/mL     Narrative:      The Stago D-Dimer test used in conjunction with a clinical pretest probability (PTP) assessment model, has been approved by the FDA to rule out the presence of venous thromboembolism (VTE) in outpatients suspected of deep venous thrombosis (DVT) or pulmonary embolism (PE). The cut-off for negative predictive value is <0.50 MCGFEU/mL.    Protime-INR [[id number]]  (Abnormal) Collected: 10/27/20 1047    Specimen: Blood Updated: 10/27/20 1150     Protime 21.9 Seconds      INR 1.97    Basic Metabolic Panel [[id number]]  (Abnormal) Collected: 10/27/20 0708    Specimen: Blood from Hand, Left Updated: 10/27/20 0849     Glucose 130 mg/dL      BUN 12 mg/dL      Creatinine 0.59 mg/dL      Sodium 134 mmol/L      Potassium 3.6 mmol/L      Chloride 100 mmol/L      CO2 24.2 mmol/L      Calcium 7.9 mg/dL      eGFR Non African Amer 100 mL/min/1.73      BUN/Creatinine Ratio 20.3     Anion Gap 9.8 mmol/L     Narrative:      GFR Normal " >60  Chronic Kidney Disease <60  Kidney Failure <15      Protime-INR [[id number]]  (Abnormal) Collected: 10/27/20 0708    Specimen: Blood from Hand, Left Updated: 10/27/20 0835     Protime 23.1 Seconds      INR 2.11    CBC Auto Differential [[id number]]  (Abnormal) Collected: 10/27/20 0708    Specimen: Blood from Hand, Left Updated: 10/27/20 0801     WBC 8.30 10*3/mm3      RBC 4.30 10*6/mm3      Hemoglobin 13.0 g/dL      Hematocrit 39.3 %      MCV 91.4 fL      MCH 30.2 pg      MCHC 33.1 g/dL      RDW 15.3 %      RDW-SD 50.5 fl      MPV 12.0 fL      Platelets 176 10*3/mm3      Neutrophil % 76.2 %      Lymphocyte % 13.6 %      Monocyte % 7.0 %      Eosinophil % 0.0 %      Basophil % 0.8 %      Immature Grans % 2.4 %      Neutrophils, Absolute 6.32 10*3/mm3      Lymphocytes, Absolute 1.13 10*3/mm3      Monocytes, Absolute 0.58 10*3/mm3      Eosinophils, Absolute 0.00 10*3/mm3      Basophils, Absolute 0.07 10*3/mm3      Immature Grans, Absolute 0.20 10*3/mm3      nRBC 0.0 /100 WBC     Respiratory Panel PCR w/COVID-19(SARS-CoV-2) MONICO/WENDY/JHON/PAD/COR/MAD/JUAN In-House, NP Swab in UTM/VTM, 3-4 HR TAT - Swab, Nasopharynx [[id number]]  (Abnormal) Collected: 10/27/20 0018    Specimen: Swab from Nasopharynx Updated: 10/27/20 0204     ADENOVIRUS, PCR Not Detected     Coronavirus 229E Not Detected     Coronavirus HKU1 Not Detected     Coronavirus NL63 Not Detected     Coronavirus OC43 Not Detected     COVID19 Detected     Human Metapneumovirus Not Detected     Human Rhinovirus/Enterovirus Not Detected     Influenza A PCR Not Detected     Influenza B PCR Not Detected     Parainfluenza Virus 1 Not Detected     Parainfluenza Virus 2 Not Detected     Parainfluenza Virus 3 Not Detected     Parainfluenza Virus 4 Not Detected     RSV, PCR Not Detected     Bordetella pertussis pcr Not Detected     Bordetella parapertussis PCR Not Detected     Chlamydophila pneumoniae PCR Not Detected     Mycoplasma pneumo by PCR Not Detected    Narrative:       Fact sheet for providers: https://docs.[employer]/wp-content/uploads/KSD0055-8119-KL5.1-EUA-Provider-Fact-Sheet-3.pdf    Fact sheet for patients: https://docs.[employer]/wp-content/uploads/BPN8071-1608-YN3.1-EUA-Patient-Fact-Sheet-1.pdf    Urinalysis, Microscopic Only - Urine, Catheter In/Out [[id number]]  (Abnormal) Collected: 10/27/20 0023    Specimen: Urine, Catheter In/Out Updated: 10/27/20 0152     RBC, UA None Seen /HPF      WBC, UA 3-5 /HPF      Bacteria, UA Trace /HPF      Squamous Epithelial Cells, UA 0-2 /HPF      Hyaline Casts, UA None Seen /LPF      Methodology Manual Light Microscopy    [place] Draw [[id number]] Collected: 10/27/20 0018    Specimen: Blood Updated: 10/27/20 0130    Narrative:      The following orders were created for panel order [place] Draw.  Procedure                               Abnormality         Status                     ---------                               -----------         ------                     Light Blue Top[[id number]]                                   Final result               Green Top (Gel)[[id number]]                                  Final result               Lavender Top[[id number]]                                     Final result               Gold Top - SST[[id number]]                                   Final result                 Please view results for these tests on the individual orders.    Lavender Top [[id number]] Collected: 10/27/20 0018    Specimen: Blood Updated: 10/27/20 0130     Extra Tube hold for add-on     Comment: Auto resulted       Gold Top - SST [[id number]] Collected: 10/27/20 0018    Specimen: Blood Updated: 10/27/20 0130     Extra Tube Hold for add-ons.     Comment: Auto resulted.       Light Blue Top [[id number]] Collected: 10/27/20 0018    Specimen: Blood Updated: 10/27/20 0130     Extra Tube hold for add-on     Comment: Auto resulted       Green Top (Gel) [[id number]] Collected: 10/27/20 0018    Specimen: Blood Updated: 10/27/20 0130      Extra Tube Hold for add-ons.     Comment: Auto resulted.       Protime-INR [[id number]]  (Abnormal) Collected: 10/27/20 0018    Specimen: Blood Updated: 10/27/20 0109     Protime 23.5 Seconds      INR 2.16    Comprehensive Metabolic Panel [[id number]]  (Abnormal) Collected: 10/27/20 0018    Specimen: Blood Updated: 10/27/20 0103     Glucose 113 mg/dL      BUN 10 mg/dL      Creatinine 0.70 mg/dL      Sodium 134 mmol/L      Potassium 3.2 mmol/L      Chloride 95 mmol/L      CO2 27.4 mmol/L      Calcium 8.2 mg/dL      Total Protein 6.5 g/dL      Albumin 3.80 g/dL      ALT (SGPT) 15 U/L      AST (SGOT) 22 U/L      Alkaline Phosphatase 53 U/L      Total Bilirubin 0.6 mg/dL      eGFR Non African Amer 82 mL/min/1.73      Globulin 2.7 gm/dL      A/G Ratio 1.4 g/dL      BUN/Creatinine Ratio 14.3     Anion Gap 11.6 mmol/L     Narrative:      GFR Normal >60  Chronic Kidney Disease <60  Kidney Failure <15      Lactic Acid, Plasma [[id number]]  (Normal) Collected: 10/27/20 0018    Specimen: Blood Updated: 10/27/20 0102     Lactate 1.2 mmol/L     Urinalysis With Culture If Indicated - Urine, Catheter In/Out [[id number]]  (Abnormal) Collected: 10/27/20 0023    Specimen: Urine, Catheter In/Out Updated: 10/27/20 0057     Color, UA Dark Yellow     Appearance, UA Clear     pH, UA 7.5     Specific Gravity, UA 1.019     Glucose, UA Negative     Ketones, UA 15 mg/dL (1+)     Bilirubin, UA Small (1+)     Blood, UA Negative     Protein,  mg/dL (2+)     Leuk Esterase, UA Small (1+)     Nitrite, UA Positive     Urobilinogen, UA 1.0 E.U./dL    CBC & Differential [[id number]]  (Abnormal) Collected: 10/27/20 0018    Specimen: Blood Updated: 10/27/20 0044    Narrative:      The following orders were created for panel order CBC & Differential.  Procedure                               Abnormality         Status                     ---------                               -----------         ------                     CBC Auto  Differential[[id number]]        Abnormal            Final result                 Please view results for these tests on the individual orders.    CBC Auto Differential [[id number]]  (Abnormal) Collected: 10/27/20 0018    Specimen: Blood Updated: 10/27/20 0044     WBC 8.39 10*3/mm3      RBC 4.46 10*6/mm3      Hemoglobin 13.3 g/dL      Hematocrit 39.6 %      MCV 88.8 fL      MCH 29.8 pg      MCHC 33.6 g/dL      RDW 15.4 %      RDW-SD 48.1 fl      MPV 12.2 fL      Platelets 174 10*3/mm3      Neutrophil % 67.5 %      Lymphocyte % 18.2 %      Monocyte % 12.3 %      Eosinophil % 0.0 %      Basophil % 0.2 %      Immature Grans % 1.8 %      Neutrophils, Absolute 5.66 10*3/mm3      Lymphocytes, Absolute 1.53 10*3/mm3      Monocytes, Absolute 1.03 10*3/mm3      Eosinophils, Absolute 0.00 10*3/mm3      Basophils, Absolute 0.02 10*3/mm3      Immature Grans, Absolute 0.15 10*3/mm3      nRBC 0.0 /100 WBC           Data Review:  Results from last 7 days   Lab Units 11/06/20 0544 11/05/20  0601 11/04/20  0809   SODIUM mmol/L 138 139 142   POTASSIUM mmol/L 4.2 4.4 4.5   CHLORIDE mmol/L 104 105 109*   CO2 mmol/L 27.3 24.6 26.0   BUN mg/dL 22 21 20   CREATININE mg/dL 0.37* 0.44* 0.39*   GLUCOSE mg/dL 152* 150* 166*   CALCIUM mg/dL 7.8* 7.5* 7.8*     Results from last 7 days   Lab Units 11/06/20 0544 11/05/20  0601 11/04/20  0809   WBC 10*3/mm3 18.43* 20.55* 17.18*   HEMOGLOBIN g/dL 10.9* 11.1* 11.0*   HEMATOCRIT % 33.1* 33.3* 33.7*   PLATELETS 10*3/mm3 178 190 233             No results found for: TROPONINT      Results from last 7 days   Lab Units 11/06/20  0544 11/05/20  0601 11/04/20  0809   ALK PHOS U/L 80 84 80   BILIRUBIN mg/dL 0.9 0.9 0.7   BILIRUBIN DIRECT mg/dL 0.3 0.3 0.2   ALT (SGPT) U/L 25 25 19   AST (SGOT) U/L 31 36* 32             No results found for: POCGLU  Results from last 7 days   Lab Units 11/06/20  0544 11/05/20  0601 11/04/20  0809   INR  2.81* 3.18* 3.22*       Past Medical History:   Diagnosis Date   •  Allergic    • Bone infection (CMS/HCC)     hip   • Cataract    • High cholesterol    • History of DVT (deep vein thrombosis)    • History of kidney infection     1 MONTH AGO   • History of pulmonary embolus (PE)    • History of transfusion    • Hypertension    • Left hip postoperative wound infection    • Paralabral cyst of left hip    • PICC (peripherally inserted central catheter) in place    • PONV (postoperative nausea and vomiting)    • Presence of vena cava filter    • Rheumatoid arteritis (CMS/HCC)    • Seasonal allergies    • UTI (urinary tract infection)     diagnosed 4/2/18  medically treated presently   • Wears glasses        Assessment:  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 [T84.59XA, Z96.649]  Not Applicable   • Acute respiratory failure with hypoxia (CMS/HCC) [J96.01]  Unknown   • Blood type A+ [Z67.10]  Unknown   • UTI (urinary tract infection) [N39.0]  Unknown   • Pulmonary emboli (CMS/HCC) [I26.99]  Yes   • Mixed hyperlipidemia [E78.2]  Yes   • Long term (current) use of antibiotics [Z79.2]  Not Applicable   • Hypertension [I10]  Yes   • Rheumatoid arthritis involving multiple sites (CMS/HCC) [M06.9]  Yes   • History of DVT (deep vein thrombosis) [Z86.718]  Not Applicable   • OA (osteoarthritis) of hip [M16.9]  Yes      Resolved Hospital Problems   No resolved problems to display.   Sepsis due to COVID-19 pneumonia and ESBL UTI  Present on admission  Acute hypoxic respiratory failure due to sepsis  Plan:  Finished antibiotics for UTI and follow lab. COVID 19 is symptomatic and she current RX.  Continue Decadron and remdesivir, ID and pulmonary consults noted.  Follow-up Covid lab.  O 2 and supportive care.  Continue High flow to keep her sats up.  [place] is certainly guarded.    Eliecer Ruiz MD  11/6/2020  14:12 EST

## 2020-11-06 NOTE — PLAN OF CARE
Problem: Adult Inpatient Plan of Care  Goal: Plan of Care Review  Outcome: Ongoing, Progressing  Flowsheets  Taken 11/6/2020 1803 by Maricruz River RN  Progress: improving  Outcome Summary: Patient is requiring 8 L high flow o2, RN attempted to wean, but SAT's drop with activity. Pt states that she is feeling much better. Physical therapy today and patient up in the chair this afternoon. IS at bedside. Continue to monitor.  Taken 11/6/2020 1444 by Shannan Taveras PTA  Plan of Care Reviewed With: patient

## 2020-11-06 NOTE — PROGRESS NOTES
LOS: 10 days     Chief Complaint:  COVID     Interval History: Afebrile, decreased oxygen requirement she is now on 7 L high flow.  States she feels better with decreased shortness of breath or cough.  No abdominal pain nausea vomiting or diarrhea.    Vital Signs  Temp:  [96.2 °F (35.7 °C)-97.7 °F (36.5 °C)] 97.7 °F (36.5 °C)  Heart Rate:  [65-83] 65  Resp:  [16-18] 18  BP: (122-141)/(66-82) 140/71    Physical Exam:  General: Ill-appearing  Cardiovascular: RRR, trace LE edema   Respiratory: Coarse breath sounds bilaterally  GI: Soft, NT/ND, + bowel sounds bilaterally  Skin: No rashes     Antibiotics:  Remdesivir 100 mg IV every 24 hours  augmentin 500mg pO BID     Results Review:    Lab Results   Component Value Date    WBC 18.43 (H) 11/06/2020    HGB 10.9 (L) 11/06/2020    HCT 33.1 (L) 11/06/2020    MCV 89.5 11/06/2020     11/06/2020     Lab Results   Component Value Date    GLUCOSE 152 (H) 11/06/2020    BUN 22 11/06/2020    CREATININE 0.37 (L) 11/06/2020    EGFRIFNONA >150 11/06/2020    EGFRIFAFRI 99 09/29/2020    BCR 59.5 (H) 11/06/2020    CO2 27.3 11/06/2020    CALCIUM 7.8 (L) 11/06/2020    PROTENTOTREF 5.9 (L) 09/29/2020    ALBUMIN 2.60 (L) 11/06/2020    LABIL2 2.5 09/29/2020    AST 31 11/06/2020    ALT 25 11/06/2020     Ferritin 353 --> 719 -> 848 -> 655 -> 528 -> 473 -> 476  CRP 3.83 --> 20.09 --> 4.98 -> 2.76 -> 3.26 -> 147  Procalcitonin 0.05    Urinalysis no leukocytes positive nitrites 6-12 white blood cells no bacteria seen    Microbiology:  11/2 UCx yeast  10/27 BCx neg x 2  10/27 RVP + COVID     Assessment/Plan   Covid pneumonia  Acute hypoxic respiratory failure  ESBL E. coli UTI  ESBL E. coli left prosthetic hip septic arthritis on suppressive antibiotic therapy  Leukocytosis    Dexamethasone initiated on October 30  Remdesivir initiated on November 1  S/p convalescent plasma on November 2    Patient is doing better with decreasing oxygen requirements.  Inflammatory markers have decreased.   Her white blood cell count has increased but this is most likely due to increasing steroids.  She remains afebrile.  Continue supportive care.  Not much more to offer from an ID standpoint for Covid treatment    Continue suppressive Augmentin 500 mg p.o. twice daily.  This is for her prosthetic joint infection    We will see the patient again on Monday.  Please do not hesitate to call us with questions or concerns

## 2020-11-06 NOTE — PROGRESS NOTES
[facility]      Mar/chart reviewed  Follow up covid19 pneumonia, CT associated ILD  Some cough and shortness of breath  Says she feels better    Vital Sign Min/Max for last 24 hours  Temp  Min: 96.2 °F (35.7 °C)  Max: 98.8 °F (37.1 °C)   BP  Min: 122/66  Max: 144/71   Pulse  Min: 65  Max: 82   Resp  Min: 16  Max: 18   SpO2  Min: 88 %  Max: 96 %   Flow (L/min)  Min: 5  Max: 9   No data recorded     Appears ill, axox3,   perrl, eomi, normal sclera, no palpable thyroid nodules  mmm, no jvd, trachea midline, neck supple,  chest decreased ae bilaterally, + crackles, no wheezes,   rrr,   soft, nt, nd +bs,  no c/c/ trace edema  Skin warm, dry no rashes    Labs: 11/6: reviewed;  inr 2.8  Na 138  Cr 0.37  Bicarb 27  Wbc 18  hgb 10.9  plts 178    A/P:  1. COVID19 pneumonia -- agree with dexamethasone, she has some concerning elevation of inflammatory markers and several co-morbidities  2. Connective tissue asssociated ILD -- possibly could be related to nitrofurantoin as well, and I would recommend not resuming that medication if at all possible  3. Rheumatoid arthritis -- on chronic methotrexate therapy as well  4. Acute hypoxemic respiratory failure -- continue oxygen support, she is at high risk for further deterioration. Defer remedsivir and/or other therapies to ID  5. Elevated inflammaotry markers  6. UTI  7. History of pulmonary embolism -- therapeutic on coumadin  8. Hyperglycemia     She has managed to transition to high flow cannula, continue to wean oxygen as able.  Continue bid dexamethasone for now. Drop to daily tomorrow if still making good progress, home over weekend or on Monday if she continues to improve. She will need outpatient pulmonary follow up.    walker

## 2020-11-06 NOTE — PROGRESS NOTES
Pharmacy Consult: Warfarin Dosing/ Monitoring    Rachel Moser is a 74 y.o. female, estimated creatinine clearance is 54.1 mL/min (A) (by C-G formula based on SCr of 0.44 mg/dL (L)). weighing 54.7 kg (120 lb 11.2 oz).    PMH: Allergic, Bone infection, Cataract, High cholesterol, DVT, kidney infection, pulmonary embolus, transfusion, Hypertension, Left hip postoperative wound infection, Paralabral cyst of left hip, PICC in place, PONV, vena cava filter, Rheumatoid arteritis, Seasonal allergies, UTI, and Wears glasses.    Social History     Tobacco Use    Smoking status: Former Smoker     Types: Cigarettes     Quit date:      Years since quittin.8    Smokeless tobacco: Never Used    Tobacco comment: quit 50 years ago   Substance Use Topics    Alcohol use: No    Drug use: No     Results from last 7 days   Lab Units 20  0544 20  0601 20  0809 20  0745 20  0705 20  0458 10/31/20  0621   INR  2.81* 3.18* 3.22* 2.82* 4.03* 4.09* 3.24*   HEMOGLOBIN g/dL 10.9* 11.1* 11.0* 10.9* 11.0* 10.7* 11.7*   HEMATOCRIT % 33.1* 33.3* 33.7* 31.9* 34.3 31.5* 34.3   PLATELETS 10*3/mm3 178 190 233 252 262 263 217     Results from last 7 days   Lab Units 20  0544 20  0601 20  0809   SODIUM mmol/L 138 139 142   POTASSIUM mmol/L 4.2 4.4 4.5   CHLORIDE mmol/L 104 105 109*   CO2 mmol/L 27.3 24.6 26.0   BUN mg/dL 22 21 20   CREATININE mg/dL 0.37* 0.44* 0.39*   CALCIUM mg/dL 7.8* 7.5* 7.8*   BILIRUBIN mg/dL 0.9 0.9 0.7   ALK PHOS U/L 80 84 80   ALT (SGPT) U/L 25 25 19   AST (SGOT) U/L 31 36* 32   GLUCOSE mg/dL 152* 150* 166*     Anticoagulation history: 10/12/2020 Per [facility]  Home Med: Warfarin 7.5 mg po qMF + 5 mg po qSSTWTh (40 mg/week) with documented INR of 5.53. This was confirmed regimen with patient over telephone on 10/28/2020)       Hospital Anticoagulation:  Consulting provider: Dr. Ruiz  Start date: 10/27/2020 continued from home  Indication: Hx  DVT  Target INR: 2.5-3.5 (per Dr. Ruiz order and past Anticoag clinic notes)  Expected duration: Indefinite  Bridge Therapy: None   Date 10/27  10/28  10/29  10/30  10/31  11/1  11/2  11/3  11/4  11/5 11/6       INR 2.16/ 2.11/ 1.97  2.84  3.58  3.02  3.24  4.09  4.03  2.82  3.22  3.18 2.81       Warfarin dose 5 mg  2 mg  None  2 mg  1 mg  Hold  Hold  1 mg  0.5 mg  0.5 mg 1 mg          Potential drug interactions:   -Acetaminophen - may result in an increased risk of bleeding. Elevations in INR have occurred within 1-2 weeks of initiating acetaminophen at moderate to high doses (2 and 4 g/day) in patients on stable warfarin therapy   -Dexamethasone (Moderate, started 10/30): may result in increased risk of bleeding or diminished effects of warfarin.  - Augmentin (home med) - may result in an increased risk of bleeding.     Will continue to monitor for drug-drug interaction as medications are added to patient's profile.      Relevant nutrition status: Diet Regular; Cardiac      Other:   Dietary advances -> changes in dietary vitamin K intake may alter response to warfarin.  Acute infection/inflammation may cause an increased sensitivity to warfarin     Lab: Albumin= 2.90 (11/4/2020) --> 2.6 (11/6)     Education complete?/ Date: [facility] - last visit on 10/12/2020     Assessment/Plan:  INR= 2.81 today (therapeutic).   Give Warfarin 1 mg po x1 dose today  Further dosing per daily PT/INR and CBC.   Monitor: s/s bleed     Pharmacy will continue to follow until discharge or discontinuation of warfarin.     Jama Webber, PharmD, BCPS  Clinical Staff Pharmacist  Ext. [phone]

## 2020-11-06 NOTE — PLAN OF CARE
Goal Outcome Evaluation:  Plan of Care Reviewed With: patient  Progress: improving  Outcome Summary: Patient had mild headache, treated with Tylenol. Up with assist to BSC. Have been able to wean down to 7L HFO2 and is currently tolerating. Still has SOA with exertion, takes a minute but does recover after activity. VSS. States that she is feeling better. Will continue to monitor closely.

## 2020-11-06 NOTE — PROGRESS NOTES
Continued Stay Note  [facility]     Patient Name: Rachel Moser  MRN: [id number]  Today's Date: 11/6/2020    Admit Date: 10/26/2020    Discharge Plan     Row Name 11/06/20 0930       Plan    Plan  home with spouse- [facility] and [facility] following    Plan Comments  [facility]. Plan is for patient to return home with [facility] to follow and [facility] to provide home o2 if needed.  Patient remains on high flow o2 @ this time.  CCP will follow up on Monday. Mary Martin RN        Discharge Codes    No documentation.             Mary Martin RN

## 2020-11-06 NOTE — THERAPY TREATMENT NOTE
Patient Name: Rachel Moser  : 1945    MRN: [id number]                              Today's Date: 2020       Admit Date: 10/26/2020    Visit Dx:     ICD-10-CM ICD-9-CM   1. Pneumonia due to COVID-19 virus  U07.1 480.8    J12.89      Patient Active Problem List   Diagnosis   • Chronic left hip pain   • OA (osteoarthritis) of hip   • Hip dislocation, left (CMS/HCC)   • Status post left hip replacement   • Hypertension   • Rheumatoid arthritis involving multiple sites (CMS/[facility])   • History of DVT (deep vein thrombosis)   • Postoperative hypotension   • S/P revision of total hip   • Septic arthritis (CMS/[facility])   • Status post total hip replacement, left   • Pain of left hip joint   • Constipation   • Nausea and vomiting   • Supratherapeutic INR   • Long term (current) use of antibiotics   • Long term (current) use of anticoagulants   • Leukocytosis   • Dehydration   • Pulmonary emboli (CMS/[facility])   • Mixed hyperlipidemia   • High blood sugar   • Pneumonia due to COVID-19 virus   • UTI (urinary tract infection)   • Blood type A+   • Acute respiratory failure with hypoxia (CMS/[facility])   • 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     Past Medical History:   Diagnosis Date   • Allergic    • Bone infection (CMS/[facility])     hip   • Cataract    • High cholesterol    • History of DVT (deep vein thrombosis)    • History of kidney infection     1 MONTH AGO   • History of pulmonary embolus (PE)    • History of transfusion    • Hypertension    • Left hip postoperative wound infection    • Paralabral cyst of left hip    • PICC (peripherally inserted central catheter) in place    • PONV (postoperative nausea and vomiting)    • Presence of vena cava filter    • Rheumatoid arteritis (CMS/[facility])    • Seasonal allergies    • UTI (urinary tract infection)     diagnosed 19  medically treated presently   • Wears glasses      Past Surgical History:   Procedure Laterality Date   • BLADDER SUSPENSION     • CATARACT  EXTRACTION, BILATERAL     • COLONOSCOPY     • ENDOSCOPIC FUNCTIONAL SINUS SURGERY (FESS)     • HIP SPACER INSERTION WITH ANTIBIOTIC CEMENT Left 8/11/2018    Procedure: TOTAL HIP ARTHROPLASTY REVISION with removal of infected total hip and placement of antibiotic spacer;  Surgeon: Obed Barney MD;  Location: [facility];  Service: Orthopedics   • HIP SURGERY Left 1983   • HIP SURGERY Left 20069   • HIP SURGERY Left 2011   • INCISION AND DRAINAGE HIP Left 11/2/2016    Procedure: HIP INCISION AND DRAINAGE;  Surgeon: Obed Barney MD;  Location: [facility] OR;  Service:    • INCISION AND DRAINAGE HIP Left 7/7/2018    Procedure: I&D and antibiotic bead placement left hip;  Surgeon: Obed Barney MD;  Location: [facility] OR;  Service: Orthopedics   • INCISION AND DRAINAGE HIP Left 7/21/2018    Procedure: HIP INCISION AND DRAINAGE, LEFT WITH POLY HEAD CHANGE AND ANTIBIOTIC BEAD PLACEMENT;  Surgeon: Obed Barney MD;  Location: [facility];  Service: Orthopedics   • JOINT REPLACEMENT Left     HIP   • PERIPHERALLY INSERTED CENTRAL CATHETER INSERTION     • DE CLOSED RX TRAUMATIC HIP DISLOCATN Left 12/18/2017    Procedure: LEFT HIP CLOSED REDUCTION;  Surgeon: Obed Barney MD;  Location: [facility];  Service: Orthopedics   • SOFT TISSUE MASS EXCISION Left     hip 3/19/18   • TOTAL ABDOMINAL HYSTERECTOMY     • TOTAL HIP ARTHROPLASTY REVISION Left 4/25/2018    Procedure: REVISION OF LEFT ACETABULAR COMPONENT ;  Surgeon: Obed Barney MD;  Location: [facility];  Service: Orthopedics   • TOTAL HIP ARTHROPLASTY REVISION Left 08/11/2018    total hip revision with removal of iinfected total hip and placement of antibiotic spacer   • VENA CAVA FILTER INSERTION       General Information     Row Name 11/06/20 1439          Physical Therapy Time and Intention    Document Type  therapy note (daily note)  -PH     Mode of Treatment  physical therapy  -PH     Row Name 11/06/20 1439          Safety Issues, Functional  Mobility    Impairments Affecting Function (Mobility)  shortness of breath;endurance/activity tolerance;strength;balance;postural/trunk control  -PH       User Key  (r) = Recorded By, (t) = Taken By, (c) = Cosigned By    Initials Name Provider Type     Shannan Taveras PTA Physical Therapy Assistant        Mobility     Row Name 11/06/20 1439          Bed Mobility    Bed Mobility  supine-sit  -PH     Supine-Sit [place] (Bed Mobility)  standby assist  -PH     Assistive Device (Bed Mobility)  bed rails  -PH     Comment (Bed Mobility)  Pt's O2 dropped to 70 at 9L at EOB and req 3 min to raise to 95  -PH     Row Name 11/06/20 1439          Transfers    Comment (Transfers)  STS x 2;  -PH     Row Name 11/06/20 1439          Sit-Stand Transfer    Sit-Stand [place] (Transfers)  contact guard;verbal cues  -PH     Assistive Device (Sit-Stand Transfers)  cane, straight HHA  -PH     Row Name 11/06/20 1439          Gait/Stairs (Locomotion)    [place] Level (Gait)  contact guard;verbal cues  -PH     Assistive Device (Gait)  cane, quad;other (see comments) HHA  -PH     Distance in Feet (Gait)  30', 40' w/ O2 sats at 91% after 1st trial and 85% after second trial and taking a few min to rise to 91%.  -PH     Deviations/Abnormal Patterns (Gait)  gait speed decreased;stride length decreased  -PH     Bilateral Gait Deviations  forward flexed posture  -PH     Comment (Gait/Stairs)  slow pace/pt very forward flexed req HHA otherwise unsteady - no LOB  -PH       User Key  (r) = Recorded By, (t) = Taken By, (c) = Cosigned By    Initials Name Provider Type     Shannan Taveras PTA Physical Therapy Assistant        Obj/Interventions     Row Name 11/06/20 1442          Motor Skills    Therapeutic Exercise  other (see comments) BLE: (assist  LLE for seated march, LAQ - painful/limited ROM) - AP, LAQ, seated march, add w/ pillow/abd w/ resist x 5 sec, SLR w/ assist, GS x 5 sec, SAQ x 5 sec - all x 10-15 reps   -PH       User Key  (r) = Recorded By, (t) = Taken By, (c) = Cosigned By    Initials Name Provider Type    PH Shannan Taveras PTA Physical Therapy Assistant        Goals/Plan    No documentation.       Clinical Impression     Row Name 11/06/20 1444          Pain    Additional Documentation  Pain Scale: Numbers Pre/Post-Treatment (Group)  -PH     Row Name 11/06/20 1444          Pain Scale: Numbers Pre/Post-Treatment    Pretreatment Pain Rating  0/10 - no pain  -PH     Posttreatment Pain Rating  0/10 - no pain  -PH     Row Name 11/06/20 1444          Plan of Care Review    Plan of Care Reviewed With  patient  -PH     Progress  no change  -PH     Outcome Summary  Pt req freq rest break to keep O2 sats above 90% w/ amb and bed mobility today. Pt O2 set at 9.5 L per RN req for amb. Pt amb 30', 40' w/ HHA and use of cane. Pt unsteady w/o HHA and very forward flexed at hips. Pt performed BLE HEP w/ assist for SLR, seated march (LLE), LAQ (LLE). Pt would benefit from SNF after dc to improve her strength, mobility, and endurance.  -PH     Row Name 11/06/20 1444          Positioning and Restraints    Pre-Treatment Position  in bed  -PH     Post Treatment Position  chair  -PH     In Chair  reclined;call light within reach;encouraged to call for assist;exit alarm on  -PH       User Key  (r) = Recorded By, (t) = Taken By, (c) = Cosigned By    Initials Name Provider Type    PH Shannan Taveras PTA Physical Therapy Assistant        Outcome Measures     Row Name 11/06/20 1443          How much help from another person do you currently need...    Turning from your back to your side while in flat bed without using bedrails?  4  -PH     Moving from lying on back to sitting on the side of a flat bed without bedrails?  4  -PH     Moving to and from a bed to a chair (including a wheelchair)?  3  -PH     Standing up from a chair using your arms (e.g., wheelchair, bedside chair)?  3  -PH     Climbing 3-5 steps with a  railing?  3  -PH     To walk in hospital room?  3  -PH     AM-PAC 6 Clicks Score (PT)  20  -PH     Row Name 11/06/20 1447          Functional Assessment    Outcome Measure Options  AM-PAC 6 Clicks Basic Mobility (PT)  -       User Key  (r) = Recorded By, (t) = Taken By, (c) = Cosigned By    Initials Name Provider Type     Shannan Taveras PTA Physical Therapy Assistant        Physical Therapy Education                 Title: PT OT SLP Therapies (Done)     Topic: Physical Therapy (Done)     Point: Mobility training (Done)     Learning Progress Summary           Patient Acceptance, E,D, DU,NR,VU by  at 11/6/2020 1448    Acceptance, E,TB,D, VU by  at 11/5/2020 1030                   Point: Home exercise program (Done)     Learning Progress Summary           Patient Acceptance, E,D, DU,NR,VU by  at 11/6/2020 1448                   Point: Body mechanics (Done)     Learning Progress Summary           Patient Acceptance, E,D, DU,NR,VU by  at 11/6/2020 1448                   Point: Precautions (Done)     Learning Progress Summary           Patient Acceptance, E,D, DU,NR,VU by  at 11/6/2020 1448                               User Key     Initials Effective Dates Name Provider Type Discipline     04/03/18 -  Misty Bradford, PT Physical Therapist PT     08/20/19 -  Shannan Taveras PTA Physical Therapy Assistant PT              PT Recommendation and Plan     Plan of Care Reviewed With: patient  Progress: no change  Outcome Summary: Pt req freq rest break to keep O2 sats above 90% w/ amb and bed mobility today. Pt O2 set at 9.5 L per RN req for amb. Pt amb 30', 40' w/ HHA and use of cane. Pt unsteady w/o HHA and very forward flexed at hips. Pt performed BLE HEP w/ assist for SLR, seated march (LLE), LAQ (LLE). Pt would benefit from SNF after dc to improve her strength, mobility, and endurance.     Time Calculation:   PT Charges     Row Name 11/06/20 0856             Time Calculation    Start  Time  1357  -PH      Stop Time  1423  -PH      Time Calculation (min)  26 min  -PH      PT Received On  11/06/20  -PH      PT - Next Appointment  11/07/20  -PH        User Key  (r) = Recorded By, (t) = Taken By, (c) = Cosigned By    Initials Name Provider Type    PH Shannan Taveras PTA Physical Therapy Assistant        Therapy Charges for Today     Code Description Service Date Service Provider Modifiers Qty    40682679884  PT THERAPEUTIC ACT EA 15 MIN 11/6/2020 Shannan Taveras PTA GP 2          PT G-Codes  Outcome Measure Options: AM-PAC 6 Clicks Basic Mobility (PT)  AM-PAC 6 Clicks Score (PT): 20    Shannan Taveras PTA  11/6/2020

## 2020-11-06 NOTE — PLAN OF CARE
Problem: Adult Inpatient Plan of Care  Goal: Plan of Care Review  Outcome: Ongoing, Progressing  Flowsheets  Taken 11/5/2020 1923 by Maricruz River RN  Outcome Summary: Pt is currently on 8 L high flow o2, all other VS are stable. Pt A & O x 4. No new complaints today, states that she is feeling better. Walked with therapy and was in the chair this afternoon. IS at bedside and patient has been using throughout the shift. Continue to monitor.  Taken 11/5/2020 1025 by Misty Bradford, PT  Progress: improving  Plan of Care Reviewed With: patient

## 2020-11-07 NOTE — PROGRESS NOTES
Pharmacy Consult: Warfarin Dosing/ Monitoring    Rachel Moser is a 74 y.o. female, estimated creatinine clearance is 54.1 mL/min (A) (by C-G formula based on SCr of 0.4 mg/dL (L)). weighing 54.7 kg (120 lb 11.2 oz).     has a past medical history of Allergic, Bone infection (CMS/HCC), Cataract, High cholesterol, History of DVT (deep vein thrombosis), History of kidney infection, History of pulmonary embolus (PE), History of transfusion, Hypertension, Left hip postoperative wound infection, Paralabral cyst of left hip, PICC (peripherally inserted central catheter) in place, PONV (postoperative nausea and vomiting), Presence of vena cava filter, Rheumatoid arteritis (CMS/HCC), Seasonal allergies, UTI (urinary tract infection), and Wears glasses.    Social History     Tobacco Use    Smoking status: Former Smoker     Types: Cigarettes     Quit date:      Years since quittin.8    Smokeless tobacco: Never Used    Tobacco comment: quit 50 years ago   Substance Use Topics    Alcohol use: No    Drug use: No       Results from last 7 days   Lab Units 20  0551 20  0544 20  0601 20  0809 20  0745 20  0705 20  0458   INR  2.40* 2.81* 3.18* 3.22* 2.82* 4.03* 4.09*   HEMOGLOBIN g/dL 11.4* 10.9* 11.1* 11.0* 10.9* 11.0* 10.7*   HEMATOCRIT % 34.4 33.1* 33.3* 33.7* 31.9* 34.3 31.5*   PLATELETS 10*3/mm3 176 178 190 233 252 262 263     Results from last 7 days   Lab Units 20  0551 20  0544 20  0601   SODIUM mmol/L 137 138 139   POTASSIUM mmol/L 4.3 4.2 4.4   CHLORIDE mmol/L 102 104 105   CO2 mmol/L 28.6 27.3 24.6   BUN mg/dL 23 22 21   CREATININE mg/dL 0.40* 0.37* 0.44*   CALCIUM mg/dL 8.0* 7.8* 7.5*   BILIRUBIN mg/dL 1.0 0.9 0.9   ALK PHOS U/L 80 80 84   ALT (SGPT) U/L 27 25 25   AST (SGOT) U/L 28 31 36*   GLUCOSE mg/dL 161* 152* 150*     Anticoagulation history: 10/12/2020 Per [facility]  Home Med: Warfarin 7.5 mg po qMF + 5 mg po qSSTWTh (40  mg/week) with documented INR of 5.53. This was confirmed regimen with patient over telephone on 10/28/2020)      Hospital Anticoagulation:  Consulting provider: Dr. Ruiz  Start date: 10/27/20 continued from home  Indication: Hx DVT  Target INR: 2.5-3.5 (per Dr. Ruiz order and past Anticoag clinic notes)  Expected duration: indefinite   Bridge Therapy: No                Date 10/27 10/28 10/29 10/30 10/31 11/1 11/2 11/3 11/4 11/5 11/6 11/7   INR 2.16 2.84 3.58 3.02 3.24 4.09 4.03 2.82 3.22 3.18 2.81 2.40   Warfarin dose 5 mg 2 mg none 2 mg 1 mg hold hold 1 mg 0.5 mg 0.5 mg 2 mg 1 mg     Potential drug interactions:   -Acetaminophen - may result in an increased risk of bleeding. Elevations in INR have occurred within 1-2 weeks of initiating acetaminophen at moderate to high doses (2 and 4 g/day) in patients on stable warfarin therapy   -Dexamethasone (Moderate, started 10/30): may result in increased risk of bleeding or diminished effects of warfarin.  - Augmentin (home med) - may result in an increased risk of bleeding.      Will continue to monitor for drug-drug interaction as medications are added to patient's profile.     Relevant nutrition status: Diet Regular Cardiac    Other:   Dietary advances -> changes in dietary vitamin K intake may alter response to warfarin.  Acute infection/inflammation may cause an increased sensitivity to warfarin     Lab: Albumin= 2.90 (11/4/2020) --> 2.6 (11/6)    Education complete?/ Date: [facility] - last visit on 10/12/2020    Assessment/Plan:  Dose warfarin 2 mg po today inr=2.4  Monitor s/s bleeding  Follow up daily INR and CBC    Pharmacy will continue to follow until discharge or discontinuation of warfarin.     Radha Hopper, [facility]  Staff Clinical Pharmacist  11/7/2020

## 2020-11-07 NOTE — PROGRESS NOTES
LOS: 11 days   Patient Care Team:  Nahun Wilkerson MD as PCP - General (Internal Medicine)  Vijay Arango MD as Consulting Physician (Cardiology)  Rachel Krishnan [facility] as Pharmacist  Nancy Smalls MD as Consulting Physician (Urogynecology)    Subjective     Patient reports she is feeling good she really wants to go home and she wants a good bath.  She is having no nausea no vomiting no diarrhea no cough no chest pain in fact no pain anywhere she does not feel short of breath she is currently on 9 L nasal cannula O2 oxygen saturations are about 92%    Review of Systems:          Objective     Vital Signs  Vital Sign Min/Max for last 24 hours  Temp  Min: 98.1 °F (36.7 °C)  Max: 98.7 °F (37.1 °C)   BP  Min: 119/74  Max: 133/73   Pulse  Min: 88  Max: 99   Resp  Min: 18  Max: 18   SpO2  Min: 77 %  Max: 93 %   Flow (L/min)  Min: 9  Max: 90   No data recorded        Ventilator/Non-Invasive Ventilation Settings (From admission, onward)    None                       Body mass index is 24.37 kg/m².  I/O last 3 completed shifts:  In: 670 [P.O.:670]  Out: 1550 [Urine:1550]  I/O this shift:  In: 200 [P.O.:200]  Out: 600 [Urine:600]        Physical Exam:  General Appearance: Well-developed white female resting comfortably in bed in no apparent distress  Eyes: Conjunctiva are clear and anicteric pupils are equal  ENT: Mucous membranes are moist no erythema or exudates, nasal septum midline.  Neck: No adenopathy or thyromegaly no jugular venous tension trachea midline  Lungs: Clear nonlabored symmetric expansion no wheezes rales or rhonchi  Cardiac: Regular rate rhythm no murmur no gallop  Abdomen: Soft nontender no palpable hepatosplenomegaly or masses  : Not examined  Musculoskeletal: Grossly normal  Skin: No jaundice no petechiae skin is warm and dry  Neuro: She is alert oriented cooperative pleasant grossly intact  Extremities/P Vascular: No clubbing no cyanosis no edema palpable radial and dorsalis pedis pulses  bilaterally she has no palpable cords no pain with dorsiflexion of the feet  MSE: Seems to be in good spirits       Labs:  Results from last 7 days   Lab Units 11/07/20  0551 11/06/20  0544 11/05/20  0601 11/04/20  0809 11/03/20  2241 11/03/20  0745 11/02/20  0705 11/01/20  0458   GLUCOSE mg/dL 161* 152* 150* 166*  --  108* 93 106*   SODIUM mmol/L 137 138 139 142  --  144 144 144   POTASSIUM mmol/L 4.3 4.2 4.4 4.5 4.6 3.2* 3.7 3.8   MAGNESIUM mg/dL  --   --   --   --   --  2.1  --   --    CO2 mmol/L 28.6 27.3 24.6 26.0  --  26.5 21.8* 18.2*   CHLORIDE mmol/L 102 104 105 109*  --  108* 114* 116*   ANION GAP mmol/L 6.4 6.7 9.4 7.0  --  9.5 8.2 9.8   CREATININE mg/dL 0.40* 0.37* 0.44* 0.39*  --  0.40* 0.39* 0.36*   BUN mg/dL 23 22 21 20  --  21 18 10   BUN / CREAT RATIO  57.5* 59.5* 47.7* 51.3*  --  52.5* 46.2* 27.8*   CALCIUM mg/dL 8.0* 7.8* 7.5* 7.8*  --  7.4* 7.4* 7.1*   EGFR IF NONAFRICN AM mL/min/1.73 >150 >150 140 >150  --  >150 >150 >150   ALK PHOS U/L 80 80 84 80  --  77 66 55   TOTAL PROTEIN g/dL 5.2* 5.0* 5.1* 5.2*  --  5.4* 4.8* 4.8*   ALT (SGPT) U/L 27 25 25 19  --  18 16 16   AST (SGOT) U/L 28 31 36* 32  --  37* 37* 37*   BILIRUBIN mg/dL 1.0 0.9 0.9 0.7  --  0.7 0.4 0.3   ALBUMIN g/dL 2.70* 2.60* 2.60* 2.90*  --  2.70* 2.40* 2.30*   GLOBULIN gm/dL 2.5 2.4 2.5 2.3  --  2.7 2.4 2.5     Estimated Creatinine Clearance: 54.1 mL/min (A) (by C-G formula based on SCr of 0.4 mg/dL (L)).      Results from last 7 days   Lab Units 11/07/20  0551 11/06/20  0544 11/05/20  0601 11/04/20  0809 11/03/20  0745 11/02/20  0705 11/01/20  0458   WBC 10*3/mm3 19.56* 18.43* 20.55* 17.18* 13.13* 13.19* 13.98*   RBC 10*6/mm3 3.89 3.70* 3.76* 3.75* 3.64* 3.77 3.57*   HEMOGLOBIN g/dL 11.4* 10.9* 11.1* 11.0* 10.9* 11.0* 10.7*   HEMATOCRIT % 34.4 33.1* 33.3* 33.7* 31.9* 34.3 31.5*   MCV fL 88.4 89.5 88.6 89.9 87.6 91.0 88.2   MCH pg 29.3 29.5 29.5 29.3 29.9 29.2 30.0   MCHC g/dL 33.1 32.9 33.3 32.6 34.2 32.1 34.0   RDW % 14.7 14.6 14.7  14.8 15.0 15.5* 15.5*   RDW-SD fl 45.8 47.8 46.9 47.8 47.7 50.4 49.0   MPV fL 12.0 11.9 10.9 11.4 11.5 11.9 11.9   PLATELETS 10*3/mm3 176 178 190 233 252 262 263   NEUTROPHIL % %  --   --   --   --  81.7*  --   --    LYMPHOCYTE % %  --   --   --   --  7.3*  --   --    MONOCYTES % %  --   --   --   --  5.6  --   --    EOSINOPHIL % %  --   --   --   --  0.0*  --   --    BASOPHIL % %  --   --   --   --  0.5  --   --    IMM GRAN % %  --   --   --   --  4.9*  --   --    NEUTROS ABS 10*3/mm3 18.56* 17.12* 18.86* 15.12* 10.73* 12.53* 12.30*   LYMPHS ABS 10*3/mm3  --   --   --   --  0.96  --   --    MONOS ABS 10*3/mm3  --   --   --   --  0.74  --   --    EOS ABS 10*3/mm3  --   --   --   --  0.00  --   --    BASOS ABS 10*3/mm3  --   --   --   --  0.06  --   --    IMMATURE GRANS (ABS) 10*3/mm3  --   --   --   --  0.64*  --   --    NRBC /100 WBC  --   --   --   --  0.1  --   --          Results from last 7 days   Lab Units 11/07/20 0551 11/06/20 0544 11/05/20  0601   CK TOTAL U/L 22 27 35             Results from last 7 days   Lab Units 11/02/20  0705   PROCALCITONIN ng/mL 0.03     Results from last 7 days   Lab Units 11/07/20 0551 11/06/20 0544 11/05/20  0601 11/04/20  0809 11/03/20  0745 11/02/20  0705 11/01/20  0458   INR  2.40* 2.81* 3.18* 3.22* 2.82* 4.03* 4.09*     Microbiology Results (last 10 days)     Procedure Component Value - Date/Time    Urine Culture - Urine, Urine, Clean Catch [[id number]]  (Abnormal) Collected: 11/02/20 0526    Lab Status: Final result Specimen: Urine, Clean Catch Updated: 11/03/20 1011     Urine Culture Yeast isolated     Comment: No further workup                   amoxicillin-clavulanate, 1 tablet, Oral, Q12H  atorvastatin, 20 mg, Oral, Daily  calcium 500 mg vitamin D 5 mcg (200 UT), 1 tablet, Oral, Daily  cetirizine, 10 mg, Oral, Daily  cholecalciferol, 2,000 Units, Oral, Daily  dexamethasone, 6 mg, Oral, Q12H  famotidine, 20 mg, Oral, BID  ferrous sulfate, 325 mg, Oral, BID  folic acid,  1,600 mcg, Oral, Daily  furosemide, 40 mg, Oral, Daily  metoprolol succinate XL, 50 mg, Oral, Daily  multivitamin with minerals, 1 tablet, Oral, Daily  potassium chloride, 40 mEq, Oral, Daily  sodium chloride, 10 mL, Intravenous, Q12H  warfarin, 2 mg, Oral, Once      Pharmacy to dose warfarin,         Diagnostics:  Ct Abdomen Pelvis Without Contrast    Result Date: 10/27/2020  CT ABDOMEN PELVIS WO CONTRAST-  HISTORY:  Abdominal pain, UTI.  TECHNIQUE:  CT images of the abdomen and pelvis were obtained without intravenous contrast. Reformatted images were reviewed. Radiation dose reduction techniques were utilized, including automated exposure control and exposure modulation based on body size.  COMPARISON:  CT abdomen and pelvis 02/10/2019.  FINDINGS CT ABDOMEN/PELVIS: Heart size is normal. There is no pericardial effusion. There is calcific coronary artery atherosclerosis. There are patchy groundglass opacities in the lung bases, new from prior CT, superimposed on background fibrosis/scarring. Pleural spaces are clear. The liver is normal in size. There are calcified hepatic and splenic granulomata. The gallbladder is present. There is layering within the gallbladder, suggestive of stones and/or sludge. The pancreas and adrenal glands are within normal limits. There is a punctate nonobstructing right renal stone. There is no hydronephrosis. There is extensive calcific aortoiliac and branch vessel atherosclerosis. There is an infrarenal IVC filter. There is a tiny hiatal hernia. There is no bowel obstruction. There is colonic diverticulosis without CT evidence of acute diverticulitis. The bladder is mildly distended. The uterus is not seen. There is no adnexal mass. There is a left hip total arthroplasty with streak artifact limiting evaluation of adjacent structures. There is multilevel degenerative disc disease. There is diffuse osseous demineralization. There are old rib fractures.  Limitations: Evaluation of the  solid parenchymal organs and vasculature is limited due to lack of intravenous contrast.       1.  Patchy bibasilar groundglass opacities are superimposed on background fibrosis/scarring and concerning for multifocal pneumonia. 2.  Punctate nonobstructing right renal stone. No hydronephrosis. 3.  Gallstones and/or biliary sludge.  Discussed with Dr. Mccoy at 2:50 AM.  This report was finalized on 10/27/2020 2:54 AM by Dr. Hilary Tilley M.D.      Xr Chest 1 View    Result Date: 10/30/2020  ONE VIEW PORTABLE CHEST  HISTORY: Covid 19 pneumonia. Shortness of breath.  FINDINGS: There are scattered areas of pneumonia throughout both lungs showing worsening from 3 days ago. The heart is slightly enlarged without change.  This report was finalized on 10/30/2020 3:16 PM by Dr. Laith Whitney M.D.      Xr Chest 1 View    Result Date: 10/27/2020  XR CHEST 1 VW-  HISTORY:  Fever.  COMPARISON:  Chest radiograph 08/19/2018.  FINDINGS:  A single portable view of the chest was obtained. The cardiac silhouette and mediastinal and hilar contours are within normal limits and not significantly changed. There is calcific aortic atherosclerosis. A calcified granuloma in the mid to lower right lung is not significantly changed. There is no new focal consolidation. Pleural spaces are clear. There is multilevel degenerative disc disease. The tip of an IVC filter is partially visualized. The previously noted right PICC has been removed.  CONCLUSION(S):   1.  No new focal consolidation or effusion.  This report was finalized on 10/27/2020 1:40 AM by Dr. Hilary Tilley M.D.               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 [T84.59XA, Z96.649]  Not Applicable   • Acute respiratory failure with hypoxia (CMS/HCC) [J96.01]  Unknown   • Blood type A+ [Z67.10]  Unknown   • UTI (urinary tract infection) [N39.0]   Unknown   • Pulmonary emboli (CMS/HCC) [I26.99]  Yes   • Mixed hyperlipidemia [E78.2]  Yes   • Long term (current) use of antibiotics [Z79.2]  Not Applicable   • Hypertension [I10]  Yes   • Rheumatoid arthritis involving multiple sites (CMS/HCC) [M06.9]  Yes   • History of DVT (deep vein thrombosis) [Z86.718]  Not Applicable   • OA (osteoarthritis) of hip [M16.9]  Yes      Resolved Hospital Problems   No resolved problems to display.         Assessment/Plan     1. COVID-19 infection and pneumonia she is on higher dose Decadron therapy but with her decline will probably continue inflammatory markers are variable some better some worse.  2. Acute hypoxemic respiratory failure her O2 requirements have gone up again we will have to continue to monitor closely  3. Rheumatoid arthritis on chronic methotrexate therapy  4. Interstitial lung disease probably rheumatoid lung but she has been on chronic nitrofurantoin and that should be avoided in the future  5. UTI on Augmentin  6. History of pulmonary embolism on chronic anticoagulation PT/INR therapeutic.  D-dimer is elevated which is probably related to Covid she is already anticoagulated.  7. Hyperglycemia    Plan for disposition:    Richard Barahona MD  11/07/20  16:43 EST    Time:

## 2020-11-07 NOTE — PROGRESS NOTES
Name: Rachel Moser ADMIT: 10/26/2020   : 1945  PCP: Nahun Wilkerson MD    MRN: [id number] LOS: 11 days   AGE/SEX: 74 y.o. female  ROOM: [facility]     Subjective   Subjective    Patient seen.     Objective   Objective   Vital Signs  Temp:  [98.1 °F (36.7 °C)-98.7 °F (37.1 °C)] 98.3 °F (36.8 °C)  Heart Rate:  [88-99] 99  Resp:  [18] 18  BP: (119-133)/(61-84) 119/74  SpO2:  [77 %-93 %] 77 %  on  Flow (L/min):  [9-90] 9;   Device (Oxygen Therapy): high-flow nasal cannula  Body mass index is 24.37 kg/m².  Physical Exam     General Appearance:    Alert, cooperative, no distress, AAOx3  Head:    Normocephalic, without obvious abnormality, atraumatic  Eyes:    PERRL, conjunctiva/corneas clear, EOM's intact, both eyes  Throat:   Lips, tongue, gums normal; oral mucosa pink and moist  Neck:   Supple, symmetrical, trachea midline, no JVD  Lungs:     Clear to auscultation bilaterally, respirations unlabored  Chest Wall:    No tenderness or deformity   Heart:    Regular rate and rhythm, S1 and S2 normal, no murmur,no  Rub or gallop  Abdomen:     Soft, non-tender, bowel sounds active, no masses, no organomegaly   Extremities:   Extremities normal, atraumatic, no cyanosis or edema  Pulses:   Pulses palpable in all extremities  Skin:   Skin is warm and dry,  no rashes or palpable lesions  Neurologic:   CNII-XII intact, motor strength grossly intact, sensation grossly intact to light touch, no focal deficits noted        Results Review     I reviewed the patient's new clinical results.  Results from last 7 days   Lab Units 20  0620  0809   WBC 10*3/mm3 19.56* 18.43* 20.55* 17.18*   HEMOGLOBIN g/dL 11.4* 10.9* 11.1* 11.0*   PLATELETS 10*3/mm3 176 178 190 233     Results from last 7 days   Lab Units 2044 20  0601 20  0809   SODIUM mmol/L 137 138 139 142   POTASSIUM mmol/L 4.3 4.2 4.4 4.5   CHLORIDE mmol/L 102 104 105 109*   CO2 mmol/L 28.6 27.3  24.6 26.0   BUN mg/dL 23 22 21 20   CREATININE mg/dL 0.40* 0.37* 0.44* 0.39*   GLUCOSE mg/dL 161* 152* 150* 166*   Estimated Creatinine Clearance: 54.1 mL/min (A) (by C-G formula based on SCr of 0.4 mg/dL (L)).  Results from last 7 days   Lab Units 11/07/20  0551 11/06/20  0544 11/05/20  0601 11/04/20  0809   ALBUMIN g/dL 2.70* 2.60* 2.60* 2.90*   BILIRUBIN mg/dL 1.0 0.9 0.9 0.7   ALK PHOS U/L 80 80 84 80   AST (SGOT) U/L 28 31 36* 32   ALT (SGPT) U/L 27 25 25 19     Results from last 7 days   Lab Units 11/07/20  0551 11/06/20  0544 11/05/20  0601 11/04/20  0809 11/03/20  0745   CALCIUM mg/dL 8.0* 7.8* 7.5* 7.8* 7.4*   ALBUMIN g/dL 2.70* 2.60* 2.60* 2.90* 2.70*   MAGNESIUM mg/dL  --   --   --   --  2.1     Results from last 7 days   Lab Units 11/02/20  0705   PROCALCITONIN ng/mL 0.03     COVID19   Date Value Ref Range Status   10/27/2020 Detected (C) Not Detected - Ref. Range Final     SARS-CoV-2, JOSHUA   Date Value Ref Range Status   10/19/2020 Not Detected Not Detected Final     Comment:     This nucleic acid amplification test was developed and its performance  characteristics determined by [employer]. Nucleic acid  amplification tests include PCR and TMA. This test has not been FDA  cleared or approved. This test has been authorized by FDA under an  Emergency Use Authorization (EUA). This test is only authorized for  the duration of time the declaration that circumstances exist  justifying the authorization of the emergency use of in vitro  diagnostic tests for detection of SARS-CoV-2 virus and/or diagnosis  of COVID-19 infection under section 564(b)(1) of the Act, 21 U.S.C.  360bbb-3(b) (1), unless the authorization is terminated or revoked  sooner.  When diagnostic testing is negative, the possibility of a false  negative result should be considered in the context of a patient's  recent exposures and the presence of clinical signs and symptoms  consistent with COVID-19. An individual without symptoms  of COVID-19  and who is not shedding SARS-CoV-2 virus would expect to have a  negative (not detected) result in this assay.     No results found for: HGBA1C, POCGLU    XR Chest 1 View  ONE VIEW PORTABLE CHEST     HISTORY: Covid 19 pneumonia. Shortness of breath.     FINDINGS: There are scattered areas of pneumonia throughout both lungs  showing worsening from 3 days ago. The heart is slightly enlarged  without change.     This report was finalized on 10/30/2020 3:16 PM by Dr. Laith Whitney M.D.       Scheduled Medications  amoxicillin-clavulanate, 1 tablet, Oral, Q12H  atorvastatin, 20 mg, Oral, Daily  calcium 500 mg vitamin D 5 mcg (200 UT), 1 tablet, Oral, Daily  cetirizine, 10 mg, Oral, Daily  cholecalciferol, 2,000 Units, Oral, Daily  dexamethasone, 6 mg, Oral, Q12H  famotidine, 20 mg, Oral, BID  ferrous sulfate, 325 mg, Oral, BID  folic acid, 1,600 mcg, Oral, Daily  furosemide, 40 mg, Oral, Daily  metoprolol succinate XL, 50 mg, Oral, Daily  multivitamin with minerals, 1 tablet, Oral, Daily  potassium chloride, 40 mEq, Oral, Daily  sodium chloride, 10 mL, Intravenous, Q12H  warfarin, 2 mg, Oral, Once    Infusions  Pharmacy to dose warfarin,     Diet  Diet Regular; Cardiac       Assessment/Plan     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 [T84.59XA, Z96.649]  Not Applicable   • Acute respiratory failure with hypoxia (CMS/[facility]) [J96.01]  Unknown   • Blood type A+ [Z67.10]  Unknown   • UTI (urinary tract infection) [N39.0]  Unknown   • Pulmonary emboli (CMS/HCC) [I26.99]  Yes   • Mixed hyperlipidemia [E78.2]  Yes   • Long term (current) use of antibiotics [Z79.2]  Not Applicable   • Hypertension [I10]  Yes   • Rheumatoid arthritis involving multiple sites (CMS/HCC) [M06.9]  Yes   • History of DVT (deep vein thrombosis) [Z86.718]  Not Applicable   • OA (osteoarthritis) of hip [M16.9]  Yes       Resolved Hospital Problems   No resolved problems to display.       74 y.o. female admitted with Pneumonia due to COVID-19 virus.      Assessment and plan  1. Acute respiratory failure with hypoxia due to COVID-19 virus infection, patient will be continued on supplemental oxygen.  Continue dexamethasone therapy, patient has some concerning elevation of inflammatory markers and several comorbidities, she is at high risk of requiring mechanical ventilator.  Continue to monitor closely.  2.  Hyperlipidemia, continue Lipitor therapy.  3.  Hypertension, continue Toprol-XL.  4.  History of DVT, patient on Coumadin, INR in the therapeutic range.  5.  Further plans based on hospital course.      Bartolo Adam MD  [facility]  11/07/20  16:10 EST     I wore protective equipment throughout this patient encounter including a face mask, gloves and protective eyewear.  Hand hygiene was performed before donning protective equipment and after removal when leaving the room.

## 2020-11-07 NOTE — PLAN OF CARE
Goal Outcome Evaluation:Condition improving. Oxygen needs decreased. Up to BSC with assist. Appetite remains poor. Continues to be weak, refusing rehaband wants to go home.will continue to monitor.  Plan of Care Reviewed With: patient  Progress: improving

## 2020-11-08 NOTE — PROGRESS NOTES
Name: Rachel Moser ADMIT: 10/26/2020   : 1945  PCP: Nahun Wilkerson MD    MRN: [id number] LOS: 12 days   AGE/SEX: 74 y.o. female  ROOM: [facility]     Subjective   Subjective     Patient seen at bedside, patient is lying in bed.       Objective   Objective   Vital Signs  Temp:  [97.5 °F (36.4 °C)-98.8 °F (37.1 °C)] 98.8 °F (37.1 °C)  Heart Rate:  [75-85] 84  Resp:  [18-20] 20  BP: (112-159)/(65-84) 112/79  SpO2:  [82 %-96 %] 95 %  on  Flow (L/min):  [9-40] 40;   Device (Oxygen Therapy): heated;high-flow nasal cannula  Body mass index is 24.37 kg/m².  Physical Exam    General Appearance:    Alert, cooperative, no distress, AAOx3  Head:    Normocephalic, without obvious abnormality, atraumatic  Eyes:    PERRL, conjunctiva/corneas clear, EOM's intact, both eyes  Throat:   Lips, tongue, gums normal; oral mucosa pink and moist  Neck:   Supple, symmetrical, trachea midline, no JVD  Lungs:     Clear to auscultation bilaterally, respirations unlabored  Chest Wall:    No tenderness or deformity   Heart:    Regular rate and rhythm, S1 and S2 normal, no murmur,no  Rub or gallop  Abdomen:     Soft, non-tender, bowel sounds active, no masses, no organomegaly   Extremities:   Extremities normal, atraumatic, no cyanosis or edema  Pulses:   Pulses palpable in all extremities  Skin:   Skin is warm and dry,  no rashes or palpable lesions  Neurologic:   CNII-XII intact, motor strength grossly intact, sensation grossly intact to light touch, no focal deficits noted    Results Review     I reviewed the patient's new clinical results.  Results from last 7 days   Lab Units 20  0820  0551 20  0544 20  0601   WBC 10*3/mm3 24.32* 19.56* 18.43* 20.55*   HEMOGLOBIN g/dL 11.5* 11.4* 10.9* 11.1*   PLATELETS 10*3/mm3 159 176 178 190     Results from last 7 days   Lab Units 20  0809 20  0551 20  0544 20  0601   SODIUM mmol/L 141 137 138 139   POTASSIUM mmol/L 4.1 4.3 4.2 4.4   CHLORIDE  mmol/L 102 102 104 105   CO2 mmol/L 27.3 28.6 27.3 24.6   BUN mg/dL 25* 23 22 21   CREATININE mg/dL 0.47* 0.40* 0.37* 0.44*   GLUCOSE mg/dL 152* 161* 152* 150*   Estimated Creatinine Clearance: 54.1 mL/min (A) (by C-G formula based on SCr of 0.47 mg/dL (L)).  Results from last 7 days   Lab Units 11/08/20  0809 11/07/20 0551 11/06/20 0544 11/05/20  0601   ALBUMIN g/dL 2.80* 2.70* 2.60* 2.60*   BILIRUBIN mg/dL 0.9 1.0 0.9 0.9   ALK PHOS U/L 80 80 80 84   AST (SGOT) U/L 27 28 31 36*   ALT (SGPT) U/L 29 27 25 25     Results from last 7 days   Lab Units 11/08/20  0809 11/07/20 0551 11/06/20 0544 11/05/20  0601 11/03/20  0745   CALCIUM mg/dL 7.8* 8.0* 7.8* 7.5*   < > 7.4*   ALBUMIN g/dL 2.80* 2.70* 2.60* 2.60*   < > 2.70*   MAGNESIUM mg/dL  --   --   --   --   --  2.1    < > = values in this interval not displayed.     Results from last 7 days   Lab Units 11/08/20  0809 11/02/20  0705   PROCALCITONIN ng/mL 0.06 0.03     COVID19   Date Value Ref Range Status   10/27/2020 Detected (C) Not Detected - Ref. Range Final     SARS-CoV-2, JOSHUA   Date Value Ref Range Status   10/19/2020 Not Detected Not Detected Final     Comment:     This nucleic acid amplification test was developed and its performance  characteristics determined by [employer]. Nucleic acid  amplification tests include PCR and TMA. This test has not been FDA  cleared or approved. This test has been authorized by FDA under an  Emergency Use Authorization (EUA). This test is only authorized for  the duration of time the declaration that circumstances exist  justifying the authorization of the emergency use of in vitro  diagnostic tests for detection of SARS-CoV-2 virus and/or diagnosis  of COVID-19 infection under section 564(b)(1) of the Act, 21 U.S.C.  360bbb-3(b) (1), unless the authorization is terminated or revoked  sooner.  When diagnostic testing is negative, the possibility of a false  negative result should be considered in the context of a  patient's  recent exposures and the presence of clinical signs and symptoms  consistent with COVID-19. An individual without symptoms of COVID-19  and who is not shedding SARS-CoV-2 virus would expect to have a  negative (not detected) result in this assay.     No results found for: HGBA1C, POCGLU    XR Chest 1 View  Narrative: XR CHEST 1 VW-     HISTORY: Female who is 74 years-old,  worsening oxygenation     TECHNIQUE: Frontal view of the chest     COMPARISON: 10/30/2020     FINDINGS: The heart is enlarged. Aorta is calcified. Lung volume is low  with vascular crowding. Mild prominence of interstitial markings.  Previous bilateral infiltrates appear mildly less dense. No pleural  effusion, or pneumothorax. Old granulomatous disease is seen. No acute  osseous process.     Impression: Previous bilateral infiltrates appear mildly less dense,  continued follow-up recommended.     This report was finalized on 11/8/2020 10:35 AM by Dr. Nahun Brock M.D.       Scheduled Medications  amoxicillin-clavulanate, 1 tablet, Oral, Q12H  atorvastatin, 20 mg, Oral, Daily  calcium 500 mg vitamin D 5 mcg (200 UT), 1 tablet, Oral, Daily  cetirizine, 10 mg, Oral, Daily  cholecalciferol, 2,000 Units, Oral, Daily  dexamethasone, 6 mg, Oral, Q12H  famotidine, 20 mg, Oral, BID  ferrous sulfate, 325 mg, Oral, BID  folic acid, 1,600 mcg, Oral, Daily  furosemide, 40 mg, Oral, Daily  metoprolol succinate XL, 50 mg, Oral, Daily  multivitamin with minerals, 1 tablet, Oral, Daily  potassium chloride, 40 mEq, Oral, Daily  sodium chloride, 10 mL, Intravenous, Q12H  warfarin, 2 mg, Oral, Once    Infusions  Pharmacy to dose warfarin,     Diet  Diet Regular; Cardiac       Assessment/Plan     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 [T84.59XA, Z96.649]  Not Applicable   • Acute respiratory failure with hypoxia (CMS/HCC)  [J96.01]  Unknown   • Blood type A+ [Z67.10]  Unknown   • UTI (urinary tract infection) [N39.0]  Unknown   • Pulmonary emboli (CMS/HCC) [I26.99]  Yes   • Mixed hyperlipidemia [E78.2]  Yes   • Long term (current) use of antibiotics [Z79.2]  Not Applicable   • Hypertension [I10]  Yes   • Rheumatoid arthritis involving multiple sites (CMS/HCC) [M06.9]  Yes   • History of DVT (deep vein thrombosis) [Z86.718]  Not Applicable   • OA (osteoarthritis) of hip [M16.9]  Yes      Resolved Hospital Problems   No resolved problems to display.       74 y.o. female admitted with Pneumonia due to COVID-19 virus.      Assessment and plan  1. Acute respiratory failure with hypoxia due to COVID-19 virus infection, patient will be continued on supplemental oxygen.  Continue dexamethasone therapy, patient has some concerning elevation of inflammatory markers and several comorbidities, she is at high risk of requiring mechanical ventilator.  Continue to monitor closely.  2.  Hyperlipidemia, continue Lipitor therapy.  3.  Hypertension, continue Toprol-XL.  4.  History of DVT, patient on Coumadin, INR in the therapeutic range.  5.  Further plans based on hospital course.    Bartolo Adam MD  [facility]  11/08/20  15:29 EST      I wore protective equipment throughout this patient encounter including a face mask, gloves and protective eyewear.  Hand hygiene was performed before donning protective equipment and after removal when leaving the room.

## 2020-11-08 NOTE — PROGRESS NOTES
Pharmacy Consult: Warfarin Dosing/ Monitoring    Rachel Moser is a 74 y.o. female, estimated creatinine clearance is 54.1 mL/min (A) (by C-G formula based on SCr of 0.47 mg/dL (L)). weighing 54.7 kg (120 lb 11.2 oz).     has a past medical history of Allergic, Bone infection (CMS/HCC), Cataract, High cholesterol, History of DVT (deep vein thrombosis), History of kidney infection, History of pulmonary embolus (PE), History of transfusion, Hypertension, Left hip postoperative wound infection, Paralabral cyst of left hip, PICC (peripherally inserted central catheter) in place, PONV (postoperative nausea and vomiting), Presence of vena cava filter, Rheumatoid arteritis (CMS/HCC), Seasonal allergies, UTI (urinary tract infection), and Wears glasses.    Social History     Tobacco Use    Smoking status: Former Smoker     Types: Cigarettes     Quit date:      Years since quittin.8    Smokeless tobacco: Never Used    Tobacco comment: quit 50 years ago   Substance Use Topics    Alcohol use: No    Drug use: No       Results from last 7 days   Lab Units 20  0820  0551 20  0544 20  0601 20  0809 20  0745 20  0705   INR  2.80* 2.40* 2.81* 3.18* 3.22* 2.82* 4.03*   HEMOGLOBIN g/dL 11.5* 11.4* 10.9* 11.1* 11.0* 10.9* 11.0*   HEMATOCRIT % 34.8 34.4 33.1* 33.3* 33.7* 31.9* 34.3   PLATELETS 10*3/mm3 159 176 178 190 233 252 262     Results from last 7 days   Lab Units 20  0809 20  0551 20  0544   SODIUM mmol/L 141 137 138   POTASSIUM mmol/L 4.1 4.3 4.2   CHLORIDE mmol/L 102 102 104   CO2 mmol/L 27.3 28.6 27.3   BUN mg/dL 25* 23 22   CREATININE mg/dL 0.47* 0.40* 0.37*   CALCIUM mg/dL 7.8* 8.0* 7.8*   BILIRUBIN mg/dL 0.9 1.0 0.9   ALK PHOS U/L 80 80 80   ALT (SGPT) U/L 29 27 25   AST (SGOT) U/L 27 28 31   GLUCOSE mg/dL 152* 161* 152*     Anticoagulation history: 10/12/2020 Per [facility]  Home Med: Warfarin 7.5 mg po qMF + 5 mg po qSSTWTh (40  mg/week) with documented INR of 5.53. This was confirmed regimen with patient over telephone on 10/28/2020)      Hospital Anticoagulation:  Consulting provider: Dr. Ruiz  Start date: 10/27/20 continued from home  Indication: Hx DVT  Target INR: 2.5-3.5 (per Dr. Ruiz order and past Anticoag clinic notes)  Expected duration: indefinite   Bridge Therapy: No                Date 10/27 10/28 10/29 10/30 10/31 11/1 11/2 11/3 11/4 11/5 11/6 11/7   INR 2.16 2.84 3.58 3.02 3.24 4.09 4.03 2.82 3.22 3.18 2.81 2.40   Warfarin dose 5 mg 2 mg none 2 mg 1 mg hold hold 1 mg 0.5 mg 0.5 mg 2 mg 2 mg                  11/8                  2.8                 2mg  Potential drug interactions:   -Acetaminophen - may result in an increased risk of bleeding. Elevations in INR have occurred within 1-2 weeks of initiating acetaminophen at moderate to high doses (2 and 4 g/day) in patients on stable warfarin therapy   -Dexamethasone (Moderate, started 10/30): may result in increased risk of bleeding or diminished effects of warfarin.  - Augmentin (home med) - may result in an increased risk of bleeding.      Will continue to monitor for drug-drug interaction as medications are added to patient's profile.     Relevant nutrition status: Diet Regular Cardiac    Other:   Dietary advances -> changes in dietary vitamin K intake may alter response to warfarin.  Acute infection/inflammation may cause an increased sensitivity to warfarin     Lab: Albumin= 2.90 (11/4/2020) --> 2.6 (11/6)    Education complete?/ Date: [facility] - last visit on 10/12/2020    Assessment/Plan:  Dose warfarin 2 mg po today as INR = 2.8 today  Monitor s/s bleeding  Follow up daily INR and CBC    Pharmacy will continue to follow until discharge or discontinuation of warfarin.     Giovanni Escalona, [facility]  Staff Clinical Pharmacist  11/8/2020

## 2020-11-08 NOTE — PROGRESS NOTES
LOS: 12 days   Patient Care Team:  Nahun Wilkerson MD as PCP - General (Internal Medicine)  Vijay Arango MD as Consulting Physician (Cardiology)  Rachel Krishnan [facility] as Pharmacist  Nancy Smalls MD as Consulting Physician (Urogynecology)    Subjective     Called this morning with increasing O2 requirements.  Nurses just could not get her oxygen is now on OptiFlow at 40 L and 70% her oxygen saturations are 98% and she denies any shortness of breath.  Denies cough denies chest pain denies lower extremity pain    Review of Systems:          Objective     Vital Signs  Vital Sign Min/Max for last 24 hours  Temp  Min: 97.5 °F (36.4 °C)  Max: 98.3 °F (36.8 °C)   BP  Min: 119/74  Max: 159/84   Pulse  Min: 75  Max: 99   Resp  Min: 18  Max: 20   SpO2  Min: 77 %  Max: 96 %   Flow (L/min)  Min: 9  Max: 90   No data recorded        Ventilator/Non-Invasive Ventilation Settings (From admission, onward)    None                       Body mass index is 24.37 kg/m².  I/O last 3 completed shifts:  In: 1230 [P.O.:1230]  Out: 2150 [Urine:2150]  No intake/output data recorded.        Physical Exam:  General Appearance: Well-developed white female resting comfortably in bed in no apparent distress  Eyes: Conjunctiva are clear and anicteric pupils are equal  ENT: Mucous membranes are moist no erythema or exudates, nasal septum midline.  Neck: No adenopathy or thyromegaly no jugular venous distension trachea midline  Lungs: Clear nonlabored symmetric expansion no wheezes rales or rhonchi  Cardiac: Regular rate rhythm no murmur no gallop  Abdomen: Soft nontender no palpable hepatosplenomegaly or masses  : Not examined  Musculoskeletal: Grossly normal  Skin: No jaundice no petechiae skin is warm and dry  Neuro: She is alert oriented cooperative pleasant grossly intact  Extremities/P Vascular: No clubbing no cyanosis no edema palpable radial and dorsalis pedis pulses bilaterally she has no palpable cords no pain with  dorsiflexion of the feet  MSE: Seems to be in good spirits       Labs:  Results from last 7 days   Lab Units 11/08/20  0809 11/07/20  0551 11/06/20  0544 11/05/20  0601 11/04/20  0809 11/03/20  2241 11/03/20  0745 11/02/20  0705   GLUCOSE mg/dL 152* 161* 152* 150* 166*  --  108* 93   SODIUM mmol/L 141 137 138 139 142  --  144 144   POTASSIUM mmol/L 4.1 4.3 4.2 4.4 4.5 4.6 3.2* 3.7   MAGNESIUM mg/dL  --   --   --   --   --   --  2.1  --    CO2 mmol/L 27.3 28.6 27.3 24.6 26.0  --  26.5 21.8*   CHLORIDE mmol/L 102 102 104 105 109*  --  108* 114*   ANION GAP mmol/L 11.7 6.4 6.7 9.4 7.0  --  9.5 8.2   CREATININE mg/dL 0.47* 0.40* 0.37* 0.44* 0.39*  --  0.40* 0.39*   BUN mg/dL 25* 23 22 21 20  --  21 18   BUN / CREAT RATIO  53.2* 57.5* 59.5* 47.7* 51.3*  --  52.5* 46.2*   CALCIUM mg/dL 7.8* 8.0* 7.8* 7.5* 7.8*  --  7.4* 7.4*   EGFR IF NONAFRICN AM mL/min/1.73 130 >150 >150 140 >150  --  >150 >150   ALK PHOS U/L 80 80 80 84 80  --  77 66   TOTAL PROTEIN g/dL 5.2* 5.2* 5.0* 5.1* 5.2*  --  5.4* 4.8*   ALT (SGPT) U/L 29 27 25 25 19  --  18 16   AST (SGOT) U/L 27 28 31 36* 32  --  37* 37*   BILIRUBIN mg/dL 0.9 1.0 0.9 0.9 0.7  --  0.7 0.4   ALBUMIN g/dL 2.80* 2.70* 2.60* 2.60* 2.90*  --  2.70* 2.40*   GLOBULIN gm/dL 2.4 2.5 2.4 2.5 2.3  --  2.7 2.4     Estimated Creatinine Clearance: 54.1 mL/min (A) (by C-G formula based on SCr of 0.47 mg/dL (L)).      Results from last 7 days   Lab Units 11/08/20  0809 11/07/20  0551 11/06/20  0544 11/05/20  0601 11/04/20  0809 11/03/20  0745 11/02/20  0705   WBC 10*3/mm3 24.32* 19.56* 18.43* 20.55* 17.18* 13.13* 13.19*   RBC 10*6/mm3 3.86 3.89 3.70* 3.76* 3.75* 3.64* 3.77   HEMOGLOBIN g/dL 11.5* 11.4* 10.9* 11.1* 11.0* 10.9* 11.0*   HEMATOCRIT % 34.8 34.4 33.1* 33.3* 33.7* 31.9* 34.3   MCV fL 90.2 88.4 89.5 88.6 89.9 87.6 91.0   MCH pg 29.8 29.3 29.5 29.5 29.3 29.9 29.2   MCHC g/dL 33.0 33.1 32.9 33.3 32.6 34.2 32.1   RDW % 14.7 14.7 14.6 14.7 14.8 15.0 15.5*   RDW-SD fl 48.2 45.8 47.8 46.9  47.8 47.7 50.4   MPV fL 12.7* 12.0 11.9 10.9 11.4 11.5 11.9   PLATELETS 10*3/mm3 159 176 178 190 233 252 262   NEUTROPHIL % %  --   --   --   --   --  81.7*  --    LYMPHOCYTE % %  --   --   --   --   --  7.3*  --    MONOCYTES % %  --   --   --   --   --  5.6  --    EOSINOPHIL % %  --   --   --   --   --  0.0*  --    BASOPHIL % %  --   --   --   --   --  0.5  --    IMM GRAN % %  --   --   --   --   --  4.9*  --    NEUTROS ABS 10*3/mm3 22.86* 18.56* 17.12* 18.86* 15.12* 10.73* 12.53*   LYMPHS ABS 10*3/mm3  --   --   --   --   --  0.96  --    MONOS ABS 10*3/mm3  --   --   --   --   --  0.74  --    EOS ABS 10*3/mm3  --   --   --   --   --  0.00  --    BASOS ABS 10*3/mm3  --   --   --   --   --  0.06  --    IMMATURE GRANS (ABS) 10*3/mm3  --   --   --   --   --  0.64*  --    NRBC /100 WBC  --   --   --   --   --  0.1  --          Results from last 7 days   Lab Units 11/08/20  0809 11/07/20 0551 11/06/20  0544   CK TOTAL U/L 20 22 27             Results from last 7 days   Lab Units 11/02/20  0705   PROCALCITONIN ng/mL 0.03     Results from last 7 days   Lab Units 11/08/20  0809 11/07/20  0551 11/06/20  0544 11/05/20  0601 11/04/20  0809 11/03/20  0745 11/02/20  0705   INR  2.80* 2.40* 2.81* 3.18* 3.22* 2.82* 4.03*     Microbiology Results (last 10 days)     Procedure Component Value - Date/Time    Urine Culture - Urine, Urine, Clean Catch [[id number]]  (Abnormal) Collected: 11/02/20 0526    Lab Status: Final result Specimen: Urine, Clean Catch Updated: 11/03/20 1011     Urine Culture Yeast isolated     Comment: No further workup                   amoxicillin-clavulanate, 1 tablet, Oral, Q12H  atorvastatin, 20 mg, Oral, Daily  calcium 500 mg vitamin D 5 mcg (200 UT), 1 tablet, Oral, Daily  cetirizine, 10 mg, Oral, Daily  cholecalciferol, 2,000 Units, Oral, Daily  dexamethasone, 6 mg, Oral, Q12H  famotidine, 20 mg, Oral, BID  ferrous sulfate, 325 mg, Oral, BID  folic acid, 1,600 mcg, Oral, Daily  furosemide, 40 mg, Oral,  Daily  metoprolol succinate XL, 50 mg, Oral, Daily  multivitamin with minerals, 1 tablet, Oral, Daily  potassium chloride, 40 mEq, Oral, Daily  sodium chloride, 10 mL, Intravenous, Q12H      Pharmacy to dose warfarin,         Diagnostics:  Ct Abdomen Pelvis Without Contrast    Result Date: 10/27/2020  CT ABDOMEN PELVIS WO CONTRAST-  HISTORY:  Abdominal pain, UTI.  TECHNIQUE:  CT images of the abdomen and pelvis were obtained without intravenous contrast. Reformatted images were reviewed. Radiation dose reduction techniques were utilized, including automated exposure control and exposure modulation based on body size.  COMPARISON:  CT abdomen and pelvis 02/10/2019.  FINDINGS CT ABDOMEN/PELVIS: Heart size is normal. There is no pericardial effusion. There is calcific coronary artery atherosclerosis. There are patchy groundglass opacities in the lung bases, new from prior CT, superimposed on background fibrosis/scarring. Pleural spaces are clear. The liver is normal in size. There are calcified hepatic and splenic granulomata. The gallbladder is present. There is layering within the gallbladder, suggestive of stones and/or sludge. The pancreas and adrenal glands are within normal limits. There is a punctate nonobstructing right renal stone. There is no hydronephrosis. There is extensive calcific aortoiliac and branch vessel atherosclerosis. There is an infrarenal IVC filter. There is a tiny hiatal hernia. There is no bowel obstruction. There is colonic diverticulosis without CT evidence of acute diverticulitis. The bladder is mildly distended. The uterus is not seen. There is no adnexal mass. There is a left hip total arthroplasty with streak artifact limiting evaluation of adjacent structures. There is multilevel degenerative disc disease. There is diffuse osseous demineralization. There are old rib fractures.  Limitations: Evaluation of the solid parenchymal organs and vasculature is limited due to lack of  intravenous contrast.       1.  Patchy bibasilar groundglass opacities are superimposed on background fibrosis/scarring and concerning for multifocal pneumonia. 2.  Punctate nonobstructing right renal stone. No hydronephrosis. 3.  Gallstones and/or biliary sludge.  Discussed with Dr. Mccoy at 2:50 AM.  This report was finalized on 10/27/2020 2:54 AM by Dr. Hilary Tilley M.D.      Xr Chest 1 View    Result Date: 10/30/2020  ONE VIEW PORTABLE CHEST  HISTORY: Covid 19 pneumonia. Shortness of breath.  FINDINGS: There are scattered areas of pneumonia throughout both lungs showing worsening from 3 days ago. The heart is slightly enlarged without change.  This report was finalized on 10/30/2020 3:16 PM by Dr. Laith Whitney M.D.      Xr Chest 1 View    Result Date: 10/27/2020  XR CHEST 1 VW-  HISTORY:  Fever.  COMPARISON:  Chest radiograph 08/19/2018.  FINDINGS:  A single portable view of the chest was obtained. The cardiac silhouette and mediastinal and hilar contours are within normal limits and not significantly changed. There is calcific aortic atherosclerosis. A calcified granuloma in the mid to lower right lung is not significantly changed. There is no new focal consolidation. Pleural spaces are clear. There is multilevel degenerative disc disease. The tip of an IVC filter is partially visualized. The previously noted right PICC has been removed.  CONCLUSION(S):   1.  No new focal consolidation or effusion.  This report was finalized on 10/27/2020 1:40 AM by Dr. Hilary Tilley M.D.               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 [T84.59XA, Z96.649]  Not Applicable   • Acute respiratory failure with hypoxia (CMS/HCC) [J96.01]  Unknown   • Blood type A+ [Z67.10]  Unknown   • UTI (urinary tract infection) [N39.0]  Unknown   • Pulmonary emboli (CMS/HCC) [I26.99]  Yes   • Mixed  hyperlipidemia [E78.2]  Yes   • Long term (current) use of antibiotics [Z79.2]  Not Applicable   • Hypertension [I10]  Yes   • Rheumatoid arthritis involving multiple sites (CMS/HCC) [M06.9]  Yes   • History of DVT (deep vein thrombosis) [Z86.718]  Not Applicable   • OA (osteoarthritis) of hip [M16.9]  Yes      Resolved Hospital Problems   No resolved problems to display.     Chest x-ray just done no report, I do not see acute changes other than a slightly lower lung volume than the 10/30 study I do not see anything that stands out is a definite new infiltrate    Assessment/Plan     1. COVID-19 infection and pneumonia she is on higher dose Decadron therapy   2. Acute hypoxemic respiratory failure her O2 requirements have gone up again, I do not see anything definite new on her chest x-ray,  and pro calcitonin is normal.  She is therapeutically anticoagulated PE is less likely.  She seems to have responded very well to the heated high flow organ to be able to wean her oxygen significantly I think.  We will just observe if she has further decline we may have to think about a CT angiogram  3. Rheumatoid arthritis on chronic methotrexate therapy  4. Interstitial lung disease probably rheumatoid lung but she has been on chronic nitrofurantoin and that should be avoided in the future  5. UTI on Augmentin  6. History of pulmonary embolism on chronic anticoagulation PT/INR therapeutic.  D-dimer is elevated which is probably related to Covid she is already anticoagulated.  7. Hyperglycemia    Plan for disposition:    Richard Barahona MD  11/08/20  10:04 EST    Time:

## 2020-11-09 NOTE — PROGRESS NOTES
Continued Stay Note  [facility]     Patient Name: Rachel Moser  MRN: [id number]  Today's Date: 11/9/2020    Admit Date: 10/26/2020    Discharge Plan     Row Name 11/09/20 1533       Plan    Plan  Return home with spouse and [facility] following and [place] if O2 needed    Patient/Family in Agreement with Plan  yes    Plan Comments  Spoke with patient by telephone due to pandemic isolation.  She states she prefers to return home at DC, but will consider SNf if needed, she did not want a referral at present.  Still on Opti-nataliya @ 30l.  H following. CCP will continue to follow.  BHumeniuk RN Becky S. [place], RN

## 2020-11-09 NOTE — THERAPY TREATMENT NOTE
Patient Name: Rachel Moser  : 1945    MRN: [id number]                              Today's Date: 2020       Admit Date: 10/26/2020    Visit Dx:     ICD-10-CM ICD-9-CM   1. Pneumonia due to COVID-19 virus  U07.1 480.8    J12.89      Patient Active Problem List   Diagnosis   • Chronic left hip pain   • OA (osteoarthritis) of hip   • Hip dislocation, left (CMS/HCC)   • Status post left hip replacement   • Hypertension   • Rheumatoid arthritis involving multiple sites (CMS/[facility])   • History of DVT (deep vein thrombosis)   • Postoperative hypotension   • S/P revision of total hip   • Septic arthritis (CMS/[facility])   • Status post total hip replacement, left   • Pain of left hip joint   • Constipation   • Nausea and vomiting   • Supratherapeutic INR   • Long term (current) use of antibiotics   • Long term (current) use of anticoagulants   • Leukocytosis   • Dehydration   • Pulmonary emboli (CMS/[facility])   • Mixed hyperlipidemia   • High blood sugar   • Pneumonia due to COVID-19 virus   • UTI (urinary tract infection)   • Blood type A+   • Acute respiratory failure with hypoxia (CMS/[facility])   • 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     Past Medical History:   Diagnosis Date   • Allergic    • Bone infection (CMS/[facility])     hip   • Cataract    • High cholesterol    • History of DVT (deep vein thrombosis)    • History of kidney infection     1 MONTH AGO   • History of pulmonary embolus (PE)    • History of transfusion    • Hypertension    • Left hip postoperative wound infection    • Paralabral cyst of left hip    • PICC (peripherally inserted central catheter) in place    • PONV (postoperative nausea and vomiting)    • Presence of vena cava filter    • Rheumatoid arteritis (CMS/[facility])    • Seasonal allergies    • UTI (urinary tract infection)     diagnosed 18  medically treated presently   • Wears glasses      Past Surgical History:   Procedure Laterality Date   • BLADDER SUSPENSION     • CATARACT  EXTRACTION, BILATERAL     • COLONOSCOPY     • ENDOSCOPIC FUNCTIONAL SINUS SURGERY (FESS)     • HIP SPACER INSERTION WITH ANTIBIOTIC CEMENT Left 8/11/2018    Procedure: TOTAL HIP ARTHROPLASTY REVISION with removal of infected total hip and placement of antibiotic spacer;  Surgeon: Obed Barney MD;  Location: [facility];  Service: Orthopedics   • HIP SURGERY Left 1983   • HIP SURGERY Left 20069   • HIP SURGERY Left 2011   • INCISION AND DRAINAGE HIP Left 11/2/2016    Procedure: HIP INCISION AND DRAINAGE;  Surgeon: Obed Barney MD;  Location: [facility] OR;  Service:    • INCISION AND DRAINAGE HIP Left 7/7/2018    Procedure: I&D and antibiotic bead placement left hip;  Surgeon: Obed Barney MD;  Location: [facility] OR;  Service: Orthopedics   • INCISION AND DRAINAGE HIP Left 7/21/2018    Procedure: HIP INCISION AND DRAINAGE, LEFT WITH POLY HEAD CHANGE AND ANTIBIOTIC BEAD PLACEMENT;  Surgeon: Obed Barney MD;  Location: [facility];  Service: Orthopedics   • JOINT REPLACEMENT Left     HIP   • PERIPHERALLY INSERTED CENTRAL CATHETER INSERTION     • NC CLOSED RX TRAUMATIC HIP DISLOCATN Left 12/18/2017    Procedure: LEFT HIP CLOSED REDUCTION;  Surgeon: Obed Barney MD;  Location: [facility];  Service: Orthopedics   • SOFT TISSUE MASS EXCISION Left     hip 3/19/18   • TOTAL ABDOMINAL HYSTERECTOMY     • TOTAL HIP ARTHROPLASTY REVISION Left 4/25/2018    Procedure: REVISION OF LEFT ACETABULAR COMPONENT ;  Surgeon: Obed Barney MD;  Location: [facility];  Service: Orthopedics   • TOTAL HIP ARTHROPLASTY REVISION Left 08/11/2018    total hip revision with removal of iinfected total hip and placement of antibiotic spacer   • VENA CAVA FILTER INSERTION       General Information     Row Name 11/09/20 1342          Physical Therapy Time and Intention    Document Type  therapy note (daily note)  -PH     Mode of Treatment  physical therapy  -PH     Row Name 11/09/20 1342          Safety Issues, Functional  Mobility    Impairments Affecting Function (Mobility)  shortness of breath;endurance/activity tolerance;strength;balance;postural/trunk control  -PH       User Key  (r) = Recorded By, (t) = Taken By, (c) = Cosigned By    Initials Name Provider Type    Shannan Duran PTA Physical Therapy Assistant        Mobility     Row Name 11/09/20 1342          Bed Mobility    Comment (Bed Mobility)  UI w/ pt on 50L heated O2; PTA deferred transfers / gait today 2/2 pt's high O2 req.  -PH       User Key  (r) = Recorded By, (t) = Taken By, (c) = Cosigned By    Initials Name Provider Type    Shannan Duran PTA Physical Therapy Assistant        Obj/Interventions     Row Name 11/09/20 1344          Motor Skills    Therapeutic Exercise  other (see comments) BLE/BUE (RLE w/ assist for SLR and seated march) - AP, LAQ, seated march, abd w/ resist, add w/ pillow - both w/ 5 sec hold, SLR, GS, shoulder: abd/add, flex/ext, horiz abd/add, - all 2 x 10 reps; Pt's O2 sats dropped to 84% while performing seated BLE.  -PH       User Key  (r) = Recorded By, (t) = Taken By, (c) = Cosigned By    Initials Name Provider Type    Shannan Duran PTA Physical Therapy Assistant        Goals/Plan    No documentation.       Clinical Impression     Row Name 11/09/20 1346          Pain    Additional Documentation  Pain Scale: Numbers Pre/Post-Treatment (Group)  -PH     Row Name 11/09/20 1346          Pain Scale: Numbers Pre/Post-Treatment    Pretreatment Pain Rating  0/10 - no pain  -PH     Posttreatment Pain Rating  0/10 - no pain  -PH     Row Name 11/09/20 1346          Plan of Care Review    Plan of Care Reviewed With  patient  -PH     Outcome Summary  Pt agreeable to PT and performed BLE / BUE HEP only today 2/2 Pt's high O2 req. Pt performed seated BLE w/ pt desatting to 84% and taking 20 sec to incr to 90%. Pt performed remainder of HEP reclined in chair w/ O2 sats bet 88 - 95%. PT will prog as pt nilsa.  -PH     Row  Name 11/09/20 1346          Vital Signs    Pre SpO2 (%)  100  -PH     O2 Delivery Pre Treatment  hi-flow  -PH     Intra SpO2 (%)  84 84 seated in chair; 88 - 95% reclined - cueing for deep breathing.  -PH     Post SpO2 (%)  93  -PH     Row Name 11/09/20 1346          Positioning and Restraints    Pre-Treatment Position  sitting in chair/recliner  -PH     Post Treatment Position  chair  -PH     In Chair  reclined;call light within reach;encouraged to call for assist;exit alarm on  -PH       User Key  (r) = Recorded By, (t) = Taken By, (c) = Cosigned By    Initials Name Provider Type     Shannan Taveras PTA Physical Therapy Assistant        Outcome Measures     Row Name 11/09/20 1350          How much help from another person do you currently need...    Turning from your back to your side while in flat bed without using bedrails?  4  -PH     Moving from lying on back to sitting on the side of a flat bed without bedrails?  4  -PH     Moving to and from a bed to a chair (including a wheelchair)?  3  -PH     Standing up from a chair using your arms (e.g., wheelchair, bedside chair)?  3  -PH     Climbing 3-5 steps with a railing?  3  -PH     To walk in hospital room?  3  -PH     AM-PAC 6 Clicks Score (PT)  20  -PH     Row Name 11/09/20 1350          Functional Assessment    Outcome Measure Options  AM-PAC 6 Clicks Basic Mobility (PT)  -PH       User Key  (r) = Recorded By, (t) = Taken By, (c) = Cosigned By    Initials Name Provider Type     Shannan Taveras PTA Physical Therapy Assistant        Physical Therapy Education                 Title: PT OT SLP Therapies (Done)     Topic: Physical Therapy (Done)     Point: Mobility training (Done)     Learning Progress Summary           Patient Acceptance, E,D, VU,DU by  at 11/9/2020 1350    Acceptance, E,D, DU,NR,VU by  at 11/6/2020 1448    Acceptance, E,TB,D, VU by  at 11/5/2020 1030                   Point: Home exercise program (Done)     Learning  Progress Summary           Patient Acceptance, E,D, VU,DU by  at 11/9/2020 1350    Acceptance, E,D, DU,NR,VU by PH at 11/6/2020 1448                   Point: Body mechanics (Done)     Learning Progress Summary           Patient Acceptance, E,D, VU,DU by  at 11/9/2020 1350    Acceptance, E,D, DU,NR,VU by  at 11/6/2020 1448                   Point: Precautions (Done)     Learning Progress Summary           Patient Acceptance, E,D, VU,DU by  at 11/9/2020 1350    Acceptance, E,D, DU,NR,VU by  at 11/6/2020 1448                               User Key     Initials Effective Dates Name Provider Type Discipline     04/03/18 -  Misty Bradford, PT Physical Therapist PT     08/20/19 -  Shannan Taveras PTA Physical Therapy Assistant PT              PT Recommendation and Plan     Plan of Care Reviewed With: patient  Progress: no change  Outcome Summary: Pt agreeable to PT and performed BLE / BUE HEP only today 2/2 Pt's high O2 req. Pt performed seated BLE w/ pt desatting to 84% and taking 20 sec to incr to 90%. Pt performed remainder of HEP reclined in chair w/ O2 sats bet 88 - 95%. PT will prog as pt nilsa.     Time Calculation:   PT Charges     Row Name 11/09/20 1351             Time Calculation    Start Time  1314  -PH      Stop Time  1339  -PH      Time Calculation (min)  25 min  -PH      PT Received On  11/09/20  -      PT - Next Appointment  11/11/20  -        User Key  (r) = Recorded By, (t) = Taken By, (c) = Cosigned By    Initials Name Provider Type     Shannan Taveras PTA Physical Therapy Assistant        Therapy Charges for Today     Code Description Service Date Service Provider Modifiers Qty    27974936321 HC PT THER PROC EA 15 MIN 11/9/2020 Shannan Taveras PTA GP 2          PT G-Codes  Outcome Measure Options: AM-PAC 6 Clicks Basic Mobility (PT)  AM-PAC 6 Clicks Score (PT): 20    Shannan Taveras PTA  11/9/2020

## 2020-11-09 NOTE — PLAN OF CARE
Goal Outcome Evaluation:  Plan of Care Reviewed With: patient  Progress: no change  Outcome Summary: Patient had mild c/o pain in left leg, treated with Tylenol. Up with assist to BSC. Remains on Optiflow at this time, maybe able to wean back to high-flow as SAT's are maintaining in high 90's. SOA with exertion. Patient very focused on being discharged, had reinterrate that her oxygen needs have changed again and her respiratory status needs to improve before she will be discharged. Video called patient  and son so she could see and speak with them which seemed to help some. VSS overall. Will continue to monitor closely.

## 2020-11-09 NOTE — PLAN OF CARE
Goal Outcome Evaluation:  Plan of Care Reviewed With: patient  Progress: no change     VSS. Optiflow setting decreased. Pt up to chair most of the day. Appetite good. Bath and linen change. No complaints.

## 2020-11-09 NOTE — PLAN OF CARE
Problem: Adult Inpatient Plan of Care  Goal: Plan of Care Review  Recent Flowsheet Documentation  Taken 11/9/2020 1346 by Shannan Taveras PTA  Plan of Care Reviewed With: patient  Outcome Summary: Pt agreeable to PT and performed BLE / BUE HEP only today 2/2 Pt's high O2 req. Pt performed seated BLE w/ pt desatting to 84% and taking 20 sec to incr to 90%. Pt performed remainder of HEP reclined in chair w/ O2 sats bet 88 - 95%. PT will prog as pt nilsa.    Patient was wearing a face mask during this therapy encounter. Therapist used appropriate personal protective equipment including gown, eye protection, mask and gloves.  Mask used was standard procedure mask. Appropriate PPE was worn during the entire therapy session. Hand hygiene was completed before and after therapy session. Patient is in enhanced droplet precautions.

## 2020-11-09 NOTE — PROGRESS NOTES
Name: Rachel Moser ADMIT: 10/26/2020   : 1945  PCP: Nahun Wilkerson MD    MRN: [id number] LOS: 13 days   AGE/SEX: 74 y.o. female  ROOM: [facility]   Subjective   Chief Complaint   Patient presents with   • Fever   • Urinary Tract Infection     On 30L optiflow  Dyspnea is fair    Tolerating po    ROS  No f/c  No n/v  No cp/palp  +soa/cough    Objective   Vital Signs  Temp:  [96.2 °F (35.7 °C)-98.1 °F (36.7 °C)] 98.1 °F (36.7 °C)  Heart Rate:  [64-89] 81  Resp:  [18-24] 24  BP: (123-151)/(66-84) 135/70  SpO2:  [94 %-99 %] 95 %  on  Flow (L/min):  [40-50] 50;   Device (Oxygen Therapy): humidified;high-flow nasal cannula  Body mass index is 24.37 kg/m².    Physical Exam  Constitutional:       General: She is in acute distress.      Appearance: She is ill-appearing.   HENT:      Head: Normocephalic and atraumatic.   Eyes:      General: No scleral icterus.  Neck:      Musculoskeletal: Neck supple.   Cardiovascular:      Rate and Rhythm: Regular rhythm.      Heart sounds: Normal heart sounds.   Pulmonary:      Effort: Respiratory distress present.      Breath sounds: No wheezing.   Abdominal:      General: There is no distension.      Palpations: Abdomen is soft.   Neurological:      Mental Status: She is alert.         Results Review:       I reviewed the patient's new clinical results.  Results from last 7 days   Lab Units 20  0748 20  0809 2051 20  0544   WBC 10*3/mm3 25.02* 24.32* 19.56* 18.43*   HEMOGLOBIN g/dL 11.3* 11.5* 11.4* 10.9*   PLATELETS 10*3/mm3 157 159 176 178     Results from last 7 days   Lab Units 20  0748 20  0809 20  0551 20  0544   SODIUM mmol/L 138 141 137 138   POTASSIUM mmol/L 4.5 4.1 4.3 4.2   CHLORIDE mmol/L 99 102 102 104   CO2 mmol/L 27.6 27.3 28.6 27.3   BUN mg/dL 28* 25* 23 22   CREATININE mg/dL 0.50* 0.47* 0.40* 0.37*   GLUCOSE mg/dL 147* 152* 161* 152*   Estimated Creatinine Clearance: 54.1 mL/min (A) (by C-G formula based  on SCr of 0.5 mg/dL (L)).  Results from last 7 days   Lab Units 11/09/20  0748 11/08/20  0809 11/07/20  0551 11/06/20  0544   ALBUMIN g/dL 2.90* 2.80* 2.70* 2.60*   BILIRUBIN mg/dL 0.6 0.9 1.0 0.9   ALK PHOS U/L 75 80 80 80   AST (SGOT) U/L 31 27 28 31   ALT (SGPT) U/L 30 29 27 25     Results from last 7 days   Lab Units 11/09/20  0748 11/08/20  0809 11/07/20  0551 11/06/20  0544  11/03/20  0745   CALCIUM mg/dL 7.8* 7.8* 8.0* 7.8*   < > 7.4*   ALBUMIN g/dL 2.90* 2.80* 2.70* 2.60*   < > 2.70*   MAGNESIUM mg/dL  --   --   --   --   --  2.1    < > = values in this interval not displayed.     Results from last 7 days   Lab Units 11/08/20  0809   PROCALCITONIN ng/mL 0.06       Coag   Results from last 7 days   Lab Units 11/09/20  0748 11/08/20  0809 11/07/20  0551 11/06/20  0544 11/05/20  0601 11/04/20  0809 11/03/20  0745   INR  3.58* 2.80* 2.40* 2.81* 3.18* 3.22* 2.82*     HbA1C   Lab Results   Component Value Date    HGBA1C 5.30 09/29/2020     Infection   Results from last 7 days   Lab Units 11/08/20  0809   PROCALCITONIN ng/mL 0.06     Radiology(recent) Xr Chest 1 View    Result Date: 11/8/2020  Previous bilateral infiltrates appear mildly less dense, continued follow-up recommended.  This report was finalized on 11/8/2020 10:35 AM by Dr. Nahun Brock M.D.      No results found for: TROPONINT, TROPONINI, BNP  No components found for: TSH;2    amoxicillin-clavulanate, 1 tablet, Oral, Q12H  atorvastatin, 20 mg, Oral, Daily  calcium 500 mg vitamin D 5 mcg (200 UT), 1 tablet, Oral, Daily  cetirizine, 10 mg, Oral, Daily  cholecalciferol, 2,000 Units, Oral, Daily  dexamethasone, 6 mg, Oral, Q12H  famotidine, 20 mg, Oral, BID  ferrous sulfate, 325 mg, Oral, BID  folic acid, 1,600 mcg, Oral, Daily  furosemide, 40 mg, Oral, Daily  metoprolol succinate XL, 50 mg, Oral, Daily  multivitamin with minerals, 1 tablet, Oral, Daily  potassium chloride, 40 mEq, Oral, Daily  sodium chloride, 10 mL, Intravenous, Q12H      Pharmacy  to dose warfarin,     Diet Regular; Cardiac      Assessment/Plan      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 [T84.59XA, Z96.649]  Not Applicable   • Acute respiratory failure with hypoxia (CMS/[facility]) [J96.01]  Yes   • Blood type A+ [Z67.10]  Yes   • UTI (urinary tract infection) [N39.0]  Yes   • Pulmonary emboli (CMS/[facility]) [I26.99]  Yes   • Mixed hyperlipidemia [E78.2]  Yes   • Long term (current) use of antibiotics [Z79.2]  Not Applicable   • Hypertension [I10]  Yes   • Rheumatoid arthritis involving multiple sites (CMS/[facility]) [M06.9]  Yes   • History of DVT (deep vein thrombosis) [Z86.718]  Not Applicable   • OA (osteoarthritis) of hip [M16.9]  Yes      Resolved Hospital Problems   No resolved problems to display.       1. Acute respiratory failure with hypoxia due to COVID-19 virus infection, patient will be continued on supplemental oxygen.  Continue dexamethasone therapy, patient has some concerning elevation of inflammatory markers and several comorbidities, she is at high risk of requiring mechanical ventilator.  Continue to monitor closely. Completed remdesivir and convolasecent plasma  2.  Hyperlipidemia, continue Lipitor therapy.  3.  Hypertension, continue Toprol-XL.  4.  History of DVT, patient on Coumadin, INR is high, pharmacy is dosing  5. On suppressive abx- augmentin- for left prosthetic hip septic arthritis      VARGHESE RN  VARGHESE Souza    Thanks to specialists       Rob Esposito MD  [facility]  11/09/20  11:22 EST

## 2020-11-09 NOTE — PROGRESS NOTES
Pharmacy Consult: Warfarin Dosing/ Monitoring    Rachel Moser is a 74 y.o. female, estimated creatinine clearance is 54.1 mL/min (A) (by C-G formula based on SCr of 0.5 mg/dL (L)). weighing 54.7 kg (120 lb 11.2 oz).    She has a past medical history of Allergic, Bone infection (CMS/HCC), Cataract, High cholesterol, History of DVT (deep vein thrombosis), History of kidney infection, History of pulmonary embolus (PE), History of transfusion, Hypertension, Left hip postoperative wound infection, Paralabral cyst of left hip, PICC (peripherally inserted central catheter) in place, PONV (postoperative nausea and vomiting), Presence of vena cava filter, Rheumatoid arteritis (CMS/HCC), Seasonal allergies, UTI (urinary tract infection), and Wears glasses.    Social History     Tobacco Use    Smoking status: Former Smoker     Types: Cigarettes     Quit date:      Years since quittin.8    Smokeless tobacco: Never Used    Tobacco comment: quit 50 years ago   Substance Use Topics    Alcohol use: No    Drug use: No       Results from last 7 days   Lab Units 20  0748 20  0809 20  0551 20  0544 20  0601 20  0809 20  0745   INR  3.58* 2.80* 2.40* 2.81* 3.18* 3.22* 2.82*   HEMOGLOBIN g/dL  --  11.5* 11.4* 10.9* 11.1* 11.0* 10.9*   HEMATOCRIT %  --  34.8 34.4 33.1* 33.3* 33.7* 31.9*   PLATELETS 10*3/mm3  --  159 176 178 190 233 252     Results from last 7 days   Lab Units 20  0748 20  0809 20  0551   SODIUM mmol/L 138 141 137   POTASSIUM mmol/L 4.5 4.1 4.3   CHLORIDE mmol/L 99 102 102   CO2 mmol/L 27.6 27.3 28.6   BUN mg/dL 28* 25* 23   CREATININE mg/dL 0.50* 0.47* 0.40*   CALCIUM mg/dL 7.8* 7.8* 8.0*   BILIRUBIN mg/dL 0.6 0.9 1.0   ALK PHOS U/L 75 80 80   ALT (SGPT) U/L 30 29 27   AST (SGOT) U/L 31 27 28   GLUCOSE mg/dL 147* 152* 161*     Anticoagulation history: 10/12/2020 Per [facility]  Home Med: Warfarin 7.5 mg po qMF + 5 mg po qSSTWTh (40  mg/week) with documented INR of 5.53. This was confirmed regimen with patient over telephone on 10/28/2020    Hospital Anticoagulation:  Consulting provider: Dr. Ruiz  Start date: 10/27/20 continued from home  Indication: Hx DVT  Target INR: 2.5-3.5 (per Dr. Ruiz order and past Anticoag clinic notes)  Expected duration: indefinite   Bridge Therapy: No                Date 10/27 10/28 10/29 10/30 10/31 11/1 11/2 11/3 11/4 11/5 11/6 11/7   INR 2.16 2.84 3.58 3.02 3.24 4.09 4.03 2.82 3.22 3.18 2.81 2.40   Warfarin dose 5 mg 2 mg none 2 mg 1 mg hold hold 1 mg 0.5 mg 0.5 mg 2 mg 2 mg     Date 11/8 11/9             INR 2.8 3.58             Warfarin dose 2mg                  Potential drug interactions:   -Acetaminophen - may result in an increased risk of bleeding. Elevations in INR have occurred within 1-2 weeks of initiating acetaminophen at moderate to high doses (2 and 4 g/day) in patients on stable warfarin therapy   -Dexamethasone (Moderate, started 10/30): may result in increased risk of bleeding or diminished effects of warfarin.  - Augmentin (home med) - may result in an increased risk of bleeding.      Will continue to monitor for drug-drug interaction as medications are added to patient's profile.     Relevant nutrition status: Diet Regular Cardiac    Other:   Dietary advances -> changes in dietary vitamin K intake may alter response to warfarin.  Acute infection/inflammation may cause an increased sensitivity to warfarin    Education complete?/ Date: [facility] - last visit on 10/12/2020    Assessment/Plan:  Sharp interval rise in INR, now slightly supratherapeutic today.  Note doses since admission have been substantially lower than home maintenance regimen with therapeutic to supratherapeutic INRs.  Hold warfarin tonight and monitor trend.  Monitor s/sx bleeding.  No new H/H today.  Follow up daily INR and CBC    Pharmacy will continue to follow until discharge or discontinuation of  warfarin.     Sona Haji, [facility]  Staff Clinical Pharmacist  11/9/2020

## 2020-11-09 NOTE — PROGRESS NOTES
LOS: 13 days     Chief Complaint:  COVID     Interval History: Afebrile, oxygen status worsened over the weekend and she was put back on OptiFlow.  Continues to states she is fine.  Denies shortness of breath denies cough denies abdominal pain nausea vomiting or diarrhea.  Denies any lower extremity pain to me    Vital Signs  Temp:  [96.2 °F (35.7 °C)-98.8 °F (37.1 °C)] 96.2 °F (35.7 °C)  Heart Rate:  [64-86] 64  Resp:  [18-21] 18  BP: (112-151)/(79-84) 151/84    Physical Exam:  General: Ill-appearing  Cardiovascular: RRR, trace LE edema   Respiratory: Coarse breath sounds bilaterally  GI: Soft, NT/ND, + bowel sounds bilaterally  Skin: No rashes     Antibiotics:  augmentin 500mg pO BID     Results Review:    Lab Results   Component Value Date    WBC 24.32 (H) 11/08/2020    HGB 11.5 (L) 11/08/2020    HCT 34.8 11/08/2020    MCV 90.2 11/08/2020     11/08/2020     Lab Results   Component Value Date    GLUCOSE 152 (H) 11/08/2020    BUN 25 (H) 11/08/2020    CREATININE 0.47 (L) 11/08/2020    EGFRIFNONA 130 11/08/2020    EGFRIFAFRI 99 09/29/2020    BCR 53.2 (H) 11/08/2020    CO2 27.3 11/08/2020    CALCIUM 7.8 (L) 11/08/2020    PROTENTOTREF 5.9 (L) 09/29/2020    ALBUMIN 2.80 (L) 11/08/2020    LABIL2 2.5 09/29/2020    AST 27 11/08/2020    ALT 29 11/08/2020     Ferritin 353 --> 719 -> 848 -> 655 -> 528 -> 473 -> 476  CRP 3.83 --> 20.09 --> 4.98 -> 2.76 -> 3.26 -> 147  Procalcitonin 0.05    Urinalysis no leukocytes positive nitrites 6-12 white blood cells no bacteria seen    Microbiology:  11/2 UCx yeast  10/27 BCx neg x 2  10/27 RVP + COVID     Assessment/Plan   Covid pneumonia  Acute hypoxic respiratory failure  ESBL E. coli UTI  ESBL E. coli left prosthetic hip septic arthritis on suppressive antibiotic therapy  Leukocytosis    Dexamethasone initiated on October 30  Remdesivir initiated on November 1 -and completed a 5-day course  S/p convalescent plasma on November 2    To [facility] supportive care.  Unfortunately not  much more to offer from an ID standpoint for Covid treatment.    Continue suppressive antibiotic (Augmentin 500 mg p.o. twice daily) therapy for her left prosthetic hip septic arthritis.    ID will sign off.  Please do not hesitate to call us with further questions or concerns

## 2020-11-10 NOTE — PROGRESS NOTES
LOS: 14 days   Patient Care Team:  Nahun Wilkerson MD as PCP - General (Internal Medicine)  Vijay Arango MD as Consulting Physician (Cardiology)  [facility] as Pharmacist  Nancy Smalls MD as Consulting Physician (Urogynecology)    Subjective     Patient reports she is doing pretty well today she does not feel short of breath on the heated high flow nasal cannula she is not really having any pain she still wants to go home, it is almost a running joke she says the same thing to me every day.    Review of Systems:          Objective     Vital Signs  Vital Sign Min/Max for last 24 hours  Temp  Min: 97.1 °F (36.2 °C)  Max: 98.1 °F (36.7 °C)   BP  Min: 117/65  Max: 137/71   Pulse  Min: 73  Max: 89   Resp  Min: 20  Max: 24   SpO2  Min: 90 %  Max: 95 %   Flow (L/min)  Min: 40  Max: 50   No data recorded        Ventilator/Non-Invasive Ventilation Settings (From admission, onward)    None                       Body mass index is 24.37 kg/m².  I/O last 3 completed shifts:  In: 180 [P.O.:180]  Out: 1300 [Urine:1300]  No intake/output data recorded.        Physical Exam:  General Appearance: Well-developed white female resting comfortably in bed in no apparent distress.  She is on 50 L 38% FiO2 with oxygen saturations of 99% we need to try and wean O2 further.  Eyes: Conjunctiva are clear and anicteric pupils are equal  ENT: Mucous membranes are moist no erythema or exudates, nasal septum midline.  Neck: No adenopathy or thyromegaly no jugular venous distension trachea midline  Lungs: Clear nonlabored symmetric expansion no wheezes rales or rhonchi  Cardiac: Regular rate rhythm no murmur no gallop  Abdomen: Soft nontender no palpable hepatosplenomegaly or masses  : Not examined  Musculoskeletal: Grossly normal  Skin: No jaundice no petechiae skin is warm and dry  Neuro: She is alert oriented cooperative pleasant grossly intact  Extremities/P Vascular: No clubbing no cyanosis no edema palpable radial  and dorsalis pedis pulses bilaterally she has no palpable cords no pain with dorsiflexion of the feet  MSE: Seems to be in good spirits       Labs:  Results from last 7 days   Lab Units 11/10/20  0546 11/09/20  0748 11/08/20  0809 11/07/20  0551 11/06/20  0544 11/05/20  0601 11/04/20  0809   GLUCOSE mg/dL 190* 147* 152* 161* 152* 150* 166*   SODIUM mmol/L 136 138 141 137 138 139 142   POTASSIUM mmol/L 4.5 4.5 4.1 4.3 4.2 4.4 4.5   CO2 mmol/L 26.0 27.6 27.3 28.6 27.3 24.6 26.0   CHLORIDE mmol/L 98 99 102 102 104 105 109*   ANION GAP mmol/L 12.0 11.4 11.7 6.4 6.7 9.4 7.0   CREATININE mg/dL 0.43* 0.50* 0.47* 0.40* 0.37* 0.44* 0.39*   BUN mg/dL 36* 28* 25* 23 22 21 20   BUN / CREAT RATIO  83.7* 56.0* 53.2* 57.5* 59.5* 47.7* 51.3*   CALCIUM mg/dL 7.7* 7.8* 7.8* 8.0* 7.8* 7.5* 7.8*   EGFR IF NONAFRICN AM mL/min/1.73 144 121 130 >150 >150 140 >150   ALK PHOS U/L 75 75 80 80 80 84 80   TOTAL PROTEIN g/dL 5.0* 4.9* 5.2* 5.2* 5.0* 5.1* 5.2*   ALT (SGPT) U/L 32 30 29 27 25 25 19   AST (SGOT) U/L 32 31 27 28 31 36* 32   BILIRUBIN mg/dL 0.7 0.6 0.9 1.0 0.9 0.9 0.7   ALBUMIN g/dL 2.50* 2.90* 2.80* 2.70* 2.60* 2.60* 2.90*   GLOBULIN gm/dL 2.5 2.0 2.4 2.5 2.4 2.5 2.3     Estimated Creatinine Clearance: 54.1 mL/min (A) (by C-G formula based on SCr of 0.43 mg/dL (L)).      Results from last 7 days   Lab Units 11/10/20  0546 11/09/20  0748 11/08/20  0809 11/07/20  0551 11/06/20  0544 11/05/20  0601 11/04/20  0809   WBC 10*3/mm3 26.55* 25.02* 24.32* 19.56* 18.43* 20.55* 17.18*   RBC 10*6/mm3 3.54* 3.73* 3.86 3.89 3.70* 3.76* 3.75*   HEMOGLOBIN g/dL 10.7* 11.3* 11.5* 11.4* 10.9* 11.1* 11.0*   HEMATOCRIT % 32.1* 33.6* 34.8 34.4 33.1* 33.3* 33.7*   MCV fL 90.7 90.1 90.2 88.4 89.5 88.6 89.9   MCH pg 30.2 30.3 29.8 29.3 29.5 29.5 29.3   MCHC g/dL 33.3 33.6 33.0 33.1 32.9 33.3 32.6   RDW % 14.9 14.9 14.7 14.7 14.6 14.7 14.8   RDW-SD fl 48.4 48.2 48.2 45.8 47.8 46.9 47.8   MPV fL 13.2* 12.6* 12.7* 12.0 11.9 10.9 11.4   PLATELETS 10*3/mm3 154  157 159 176 178 190 233   NEUTROS ABS 10*3/mm3 24.12* 24.24* 22.86* 18.56* 17.12* 18.86* 15.12*   NRBC /100 WBC  --  2.0*  --   --   --   --   --          Results from last 7 days   Lab Units 11/10/20  0546 11/09/20  0748 11/08/20  0809   CK TOTAL U/L 28 24 20             Results from last 7 days   Lab Units 11/08/20  0809   PROCALCITONIN ng/mL 0.06     Results from last 7 days   Lab Units 11/10/20  0546 11/09/20  0748 11/08/20  0809 11/07/20  0551 11/06/20  0544 11/05/20  0601 11/04/20  0809   INR  3.74* 3.58* 2.80* 2.40* 2.81* 3.18* 3.22*     Microbiology Results (last 10 days)     Procedure Component Value - Date/Time    Urine Culture - Urine, Urine, Clean Catch [[id number]]  (Abnormal) Collected: 11/02/20 0526    Lab Status: Final result Specimen: Urine, Clean Catch Updated: 11/03/20 1011     Urine Culture Yeast isolated     Comment: No further workup                   amoxicillin-clavulanate, 1 tablet, Oral, Q12H  atorvastatin, 20 mg, Oral, Daily  calcium 500 mg vitamin D 5 mcg (200 UT), 1 tablet, Oral, Daily  cetirizine, 10 mg, Oral, Daily  cholecalciferol, 2,000 Units, Oral, Daily  dexamethasone, 6 mg, Oral, Q12H  famotidine, 20 mg, Oral, BID  ferrous sulfate, 325 mg, Oral, BID  folic acid, 1,600 mcg, Oral, Daily  furosemide, 40 mg, Oral, Daily  metoprolol succinate XL, 50 mg, Oral, Daily  multivitamin with minerals, 1 tablet, Oral, Daily  potassium chloride, 40 mEq, Oral, Daily  sodium chloride, 10 mL, Intravenous, Q12H      Pharmacy to dose warfarin,         Diagnostics:  Ct Abdomen Pelvis Without Contrast    Result Date: 10/27/2020  CT ABDOMEN PELVIS WO CONTRAST-  HISTORY:  Abdominal pain, UTI.  TECHNIQUE:  CT images of the abdomen and pelvis were obtained without intravenous contrast. Reformatted images were reviewed. Radiation dose reduction techniques were utilized, including automated exposure control and exposure modulation based on body size.  COMPARISON:  CT abdomen and pelvis 02/10/2019.  FINDINGS  CT ABDOMEN/PELVIS: Heart size is normal. There is no pericardial effusion. There is calcific coronary artery atherosclerosis. There are patchy groundglass opacities in the lung bases, new from prior CT, superimposed on background fibrosis/scarring. Pleural spaces are clear. The liver is normal in size. There are calcified hepatic and splenic granulomata. The gallbladder is present. There is layering within the gallbladder, suggestive of stones and/or sludge. The pancreas and adrenal glands are within normal limits. There is a punctate nonobstructing right renal stone. There is no hydronephrosis. There is extensive calcific aortoiliac and branch vessel atherosclerosis. There is an infrarenal IVC filter. There is a tiny hiatal hernia. There is no bowel obstruction. There is colonic diverticulosis without CT evidence of acute diverticulitis. The bladder is mildly distended. The uterus is not seen. There is no adnexal mass. There is a left hip total arthroplasty with streak artifact limiting evaluation of adjacent structures. There is multilevel degenerative disc disease. There is diffuse osseous demineralization. There are old rib fractures.  Limitations: Evaluation of the solid parenchymal organs and vasculature is limited due to lack of intravenous contrast.       1.  Patchy bibasilar groundglass opacities are superimposed on background fibrosis/scarring and concerning for multifocal pneumonia. 2.  Punctate nonobstructing right renal stone. No hydronephrosis. 3.  Gallstones and/or biliary sludge.  Discussed with Dr. Mccoy at 2:50 AM.  This report was finalized on 10/27/2020 2:54 AM by Dr. Hilary Tilley M.D.      Xr Chest 1 View    Result Date: 10/30/2020  ONE VIEW PORTABLE CHEST  HISTORY: Covid 19 pneumonia. Shortness of breath.  FINDINGS: There are scattered areas of pneumonia throughout both lungs showing worsening from 3 days ago. The heart is slightly enlarged without change.  This report was finalized on  10/30/2020 3:16 PM by Dr. Laith Whitney M.D.      Xr Chest 1 View    Result Date: 10/27/2020  XR CHEST 1 VW-  HISTORY:  Fever.  COMPARISON:  Chest radiograph 08/19/2018.  FINDINGS:  A single portable view of the chest was obtained. The cardiac silhouette and mediastinal and hilar contours are within normal limits and not significantly changed. There is calcific aortic atherosclerosis. A calcified granuloma in the mid to lower right lung is not significantly changed. There is no new focal consolidation. Pleural spaces are clear. There is multilevel degenerative disc disease. The tip of an IVC filter is partially visualized. The previously noted right PICC has been removed.  CONCLUSION(S):   1.  No new focal consolidation or effusion.  This report was finalized on 10/27/2020 1:40 AM by Dr. Hilary Tilley M.D.               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 [T84.59XA, Z96.649]  Not Applicable   • Acute respiratory failure with hypoxia (CMS/HCC) [J96.01]  Yes   • Blood type A+ [Z67.10]  Yes   • UTI (urinary tract infection) [N39.0]  Yes   • Pulmonary emboli (CMS/HCC) [I26.99]  Yes   • Mixed hyperlipidemia [E78.2]  Yes   • Long term (current) use of antibiotics [Z79.2]  Not Applicable   • Hypertension [I10]  Yes   • Rheumatoid arthritis involving multiple sites (CMS/HCC) [M06.9]  Yes   • History of DVT (deep vein thrombosis) [Z86.718]  Not Applicable   • OA (osteoarthritis) of hip [M16.9]  Yes      Resolved Hospital Problems   No resolved problems to display.         Assessment/Plan     1. COVID-19 infection and pneumonia she is on higher dose Decadron therapy to continue looks like overall her inflammatory markers are improving  2. Acute hypoxemic respiratory failure her O2 requirements are improving again wean O2  3. Rheumatoid arthritis on chronic methotrexate therapy this may need to  be held for a while  4. Interstitial lung disease probably rheumatoid lung but she has been on chronic nitrofurantoin and that should be avoided in the future also the methotrexate needs to be monitored.  5. On Augmentin for suppressive antibiotic therapy for left prosthetic hip septic arthritis  6. History of pulmonary embolism on chronic anticoagulation PT/INR therapeutic.  D-dimer is elevated which is probably related to Covid she is already anticoagulated.  7. Hyperglycemia  8. Leukocytosis this may be related to steroids but her white count continues to climb, is a little unusual, everything else is getting better.  Did check a procalcitonin again and it is absolutely normal.  We will have to remain vigilant    Plan for disposition:    Richard Barahona MD  11/10/20  09:07 EST    Time:

## 2020-11-10 NOTE — PLAN OF CARE
Goal Outcome Evaluation:  Plan of Care Reviewed With: patient  Progress: no change  Outcome Summary: Patient continues on optiflow 50L at 38%. VSS this shift. Pt uses BSC but desates to mid to low 80's and takes a while to recover. No complaints voiced this shift. Will continue to monitor.

## 2020-11-10 NOTE — PLAN OF CARE
Goal Outcome Evaluation:  Plan of Care Reviewed With: patient  Progress: no change  Outcome Summary: Pt continues on optiflow 50 liters at 38%, maintains sats around 90%.  Vss, no complains of pain.  Did not want a bath or to sit up in the chair today.  Will continue to monitor.

## 2020-11-10 NOTE — PROGRESS NOTES
Pharmacy Consult: Warfarin Dosing/ Monitoring    Rachel Moser is a 74 y.o. female, estimated creatinine clearance is 54.1 mL/min (A) (by C-G formula based on SCr of 0.43 mg/dL (L)). weighing 54.7 kg (120 lb 11.2 oz).    She has a past medical history of Allergic, Bone infection (CMS/HCC), Cataract, High cholesterol, History of DVT (deep vein thrombosis), History of kidney infection, History of pulmonary embolus (PE), History of transfusion, Hypertension, Left hip postoperative wound infection, Paralabral cyst of left hip, PICC (peripherally inserted central catheter) in place, PONV (postoperative nausea and vomiting), Presence of vena cava filter, Rheumatoid arteritis (CMS/HCC), Seasonal allergies, UTI (urinary tract infection), and Wears glasses.    Social History     Tobacco Use    Smoking status: Former Smoker     Types: Cigarettes     Quit date:      Years since quittin.8    Smokeless tobacco: Never Used    Tobacco comment: quit 50 years ago   Substance Use Topics    Alcohol use: No    Drug use: No       Results from last 7 days   Lab Units 11/10/20  0546 20  0748 20  0809 20  0551 20  0544 20  0601 20  0809   INR  3.74* 3.58* 2.80* 2.40* 2.81* 3.18* 3.22*   HEMOGLOBIN g/dL 10.7* 11.3* 11.5* 11.4* 10.9* 11.1* 11.0*   HEMATOCRIT % 32.1* 33.6* 34.8 34.4 33.1* 33.3* 33.7*   PLATELETS 10*3/mm3 154 157 159 176 178 190 233     Results from last 7 days   Lab Units 11/10/20  0546 20  0748 20  0809   SODIUM mmol/L 136 138 141   POTASSIUM mmol/L 4.5 4.5 4.1   CHLORIDE mmol/L 98 99 102   CO2 mmol/L 26.0 27.6 27.3   BUN mg/dL 36* 28* 25*   CREATININE mg/dL 0.43* 0.50* 0.47*   CALCIUM mg/dL 7.7* 7.8* 7.8*   BILIRUBIN mg/dL 0.7 0.6 0.9   ALK PHOS U/L 75 75 80   ALT (SGPT) U/L 32 30 29   AST (SGOT) U/L 32 31 27   GLUCOSE mg/dL 190* 147* 152*     Anticoagulation history: 10/12/2020 Per [facility]  Home Med: Warfarin 7.5 mg po qMF + 5 mg po qSSTWTh (40  mg/week) with documented INR of 5.53. This was confirmed regimen with patient over telephone on 10/28/2020    Hospital Anticoagulation:  Consulting provider: Dr. Ruiz  Start date: 10/27/20 continued from home  Indication: Hx DVT  Target INR: 2.5-3.5 (per Dr. Ruiz order and past Anticoag clinic notes)  Expected duration: indefinite   Bridge Therapy: No                Date 10/27 10/28 10/29 10/30 10/31 11/1 11/2 11/3 11/4 11/5 11/6 11/7   INR 2.16 2.84 3.58 3.02 3.24 4.09 4.03 2.82 3.22 3.18 2.81 2.40   Warfarin dose 5 mg 2 mg none 2 mg 1 mg hold hold 1 mg 0.5 mg 0.5 mg 2 mg 2 mg     Date 11/8 11/9 11/10            INR 2.8 3.58 3.74            Warfarin dose 2mg HOLD HOLD                Potential drug interactions:   -Acetaminophen - may result in an increased risk of bleeding. Elevations in INR have occurred within 1-2 weeks of initiating acetaminophen at moderate to high doses (2 and 4 g/day) in patients on stable warfarin therapy   -Dexamethasone (Moderate, started 10/30): may result in increased risk of bleeding or diminished effects of warfarin.  - Augmentin (home med) - may result in an increased risk of bleeding.      Will continue to monitor for drug-drug interaction as medications are added to patient's profile.     Relevant nutrition status: Diet Regular Cardiac    Other:   Dietary advances -> changes in dietary vitamin K intake may alter response to warfarin.  Acute infection/inflammation may cause an increased sensitivity to warfarin    Education complete?/ Date: [facility] - last visit on 10/12/2020    Assessment/Plan:  INR trend since 11/7 is upward. Today it is 3.74. Note that doses since admission have been substantially lower than home maintenance regimen with therapeutic to supratherapeutic INRs. Nevertheless will need to hold warfarin tonight and recheck level tomorrow.   Monitor s/sx bleeding.    Follow up daily INR    Pharmacy will continue to follow until discharge or  discontinuation of warfarin.     Giovanni Escalona, [facility]  Staff Clinical Pharmacist  11/10/2020

## 2020-11-10 NOTE — PROGRESS NOTES
Name: Rachel Moser ADMIT: 10/26/2020   : 1945  PCP: Nahun Wilkerson MD    MRN: [id number] LOS: 14 days   AGE/SEX: 74 y.o. female  ROOM: [facility]   Subjective   Chief Complaint   Patient presents with   • Fever   • Urinary Tract Infection     On 50L optiflow  Dyspnea is fair    Tolerating po    ROS  No f/c  No n/v  No cp/palp  +soa/cough    Objective   Vital Signs  Temp:  [96.8 °F (36 °C)-98.1 °F (36.7 °C)] 96.8 °F (36 °C)  Heart Rate:  [73-87] 86  Resp:  [20-24] 22  BP: (117-137)/(65-78) 137/78  SpO2:  [90 %-95 %] 93 %  on  Flow (L/min):  [50] 50;   Device (Oxygen Therapy): heated;high-flow nasal cannula  Body mass index is 24.37 kg/m².    Physical Exam  Constitutional:       General: She is in acute distress.      Appearance: She is ill-appearing.   HENT:      Head: Normocephalic and atraumatic.   Eyes:      General: No scleral icterus.  Neck:      Musculoskeletal: Neck supple.   Cardiovascular:      Rate and Rhythm: Regular rhythm.      Heart sounds: Normal heart sounds.   Pulmonary:      Effort: Respiratory distress present.      Breath sounds: No wheezing.   Abdominal:      General: There is no distension.      Palpations: Abdomen is soft.   Neurological:      Mental Status: She is alert.         Results Review:       I reviewed the patient's new clinical results.  Results from last 7 days   Lab Units 11/10/20  0546 11/09/20  0748 2009 20  0551   WBC 10*3/mm3 26.55* 25.02* 24.32* 19.56*   HEMOGLOBIN g/dL 10.7* 11.3* 11.5* 11.4*   PLATELETS 10*3/mm3 154 157 159 176     Results from last 7 days   Lab Units 11/10/20  0546 2048 20  0809 20  0551   SODIUM mmol/L 136 138 141 137   POTASSIUM mmol/L 4.5 4.5 4.1 4.3   CHLORIDE mmol/L 98 99 102 102   CO2 mmol/L 26.0 27.6 27.3 28.6   BUN mg/dL 36* 28* 25* 23   CREATININE mg/dL 0.43* 0.50* 0.47* 0.40*   GLUCOSE mg/dL 190* 147* 152* 161*   Estimated Creatinine Clearance: 54.1 mL/min (A) (by C-G formula based on SCr of  0.43 mg/dL (L)).  Results from last 7 days   Lab Units 11/10/20  0546 11/09/20  0748 11/08/20  0809 11/07/20  0551   ALBUMIN g/dL 2.50* 2.90* 2.80* 2.70*   BILIRUBIN mg/dL 0.7 0.6 0.9 1.0   ALK PHOS U/L 75 75 80 80   AST (SGOT) U/L 32 31 27 28   ALT (SGPT) U/L 32 30 29 27     Results from last 7 days   Lab Units 11/10/20  0546 11/09/20  0748 11/08/20  0809 11/07/20  0551   CALCIUM mg/dL 7.7* 7.8* 7.8* 8.0*   ALBUMIN g/dL 2.50* 2.90* 2.80* 2.70*     Results from last 7 days   Lab Units 11/10/20  0546 11/08/20  0809   PROCALCITONIN ng/mL 0.05 0.06       Coag   Results from last 7 days   Lab Units 11/10/20  0546 11/09/20  0748 11/08/20  0809 11/07/20  0551 11/06/20  0544 11/05/20  0601 11/04/20  0809   INR  3.74* 3.58* 2.80* 2.40* 2.81* 3.18* 3.22*     HbA1C   Lab Results   Component Value Date    HGBA1C 5.30 09/29/2020     Infection   Results from last 7 days   Lab Units 11/10/20  0546 11/08/20  0809   PROCALCITONIN ng/mL 0.05 0.06     Radiology(recent) No radiology results for the last day  No results found for: TROPONINT, TROPONINI, BNP  No components found for: TSH;2    amoxicillin-clavulanate, 1 tablet, Oral, Q12H  atorvastatin, 20 mg, Oral, Daily  calcium 500 mg vitamin D 5 mcg (200 UT), 1 tablet, Oral, Daily  cetirizine, 10 mg, Oral, Daily  cholecalciferol, 2,000 Units, Oral, Daily  dexamethasone, 6 mg, Oral, Q12H  famotidine, 20 mg, Oral, BID  ferrous sulfate, 325 mg, Oral, BID  folic acid, 1,600 mcg, Oral, Daily  furosemide, 40 mg, Oral, Daily  metoprolol succinate XL, 50 mg, Oral, Daily  multivitamin with minerals, 1 tablet, Oral, Daily  potassium chloride, 40 mEq, Oral, Daily  sodium chloride, 10 mL, Intravenous, Q12H      Pharmacy to dose warfarin,     Diet Regular; Cardiac      Assessment/Plan      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 [T84.59XA, Z96.649]  Not Applicable   •  Acute respiratory failure with hypoxia (CMS/[facility]) [J96.01]  Yes   • Blood type A+ [Z67.10]  Yes   • UTI (urinary tract infection) [N39.0]  Yes   • Pulmonary emboli (CMS/[facility]) [I26.99]  Yes   • Mixed hyperlipidemia [E78.2]  Yes   • Long term (current) use of antibiotics [Z79.2]  Not Applicable   • Hypertension [I10]  Yes   • Rheumatoid arthritis involving multiple sites (CMS/[facility]) [M06.9]  Yes   • History of DVT (deep vein thrombosis) [Z86.718]  Not Applicable   • OA (osteoarthritis) of hip [M16.9]  Yes      Resolved Hospital Problems   No resolved problems to display.       1. Acute respiratory failure with hypoxia due to COVID-19 virus infection, patient will be continued on supplemental oxygen.  Continue dexamethasone therapy, patient has some concerning elevation of inflammatory markers and several comorbidities, she is at high risk of requiring mechanical ventilator.  Continue to monitor closely. Completed remdesivir and convolasecent plasma. +leukocytosis but no evidence for additional infection at this at this point but need to monitor closely  2.  Hyperlipidemia, continue Lipitor therapy.  3.  Hypertension, continue Toprol-XL.  4.  History of DVT, patient on Coumadin, INR is high, pharmacy is dosing  5. On suppressive abx- augmentin- for left prosthetic hip septic arthritis      VARGHESE RN      Thanks to specialists       Rob Esposito MD  [facility]  11/10/20  11:22 EST

## 2020-11-11 NOTE — PLAN OF CARE
Goal Outcome Evaluation:  Plan of Care Reviewed With: patient  Progress: no change  Outcome Summary: pt remains on optiflow, around 0300 RT turned up the O2% d/t pts sats remaining at 86-87% after using the bsc. will wean down as tolerated. otherwise no complaints

## 2020-11-11 NOTE — PROGRESS NOTES
Adult Nutrition  Assessment/PES    Patient Name:  Rachel Moser  YOB: 1945  MRN: [id number]  Admit Date:  10/26/2020    Assessment Date:  11/11/2020    Comments:  Follow up, LOS day 15.    EMR reviewed for data. Pt remains in isolation for covid-19 infection, RD did not enter room. On supportive treatment for pneumonia associated with the virus. Intake remains suboptimal at meal times, about 25%. No n/v/d reported; had diarrhea upon adm. Last BM on 11/10. Continue to encourage PO intake. Sending shakes BID. Following.    Reason for Assessment     Row Name 11/11/20 1336          Reason for Assessment    Reason For Assessment  follow-up protocol         Nutrition/Diet History     Row Name 11/11/20 1336          Nutrition/Diet History    Typical Food/Fluid Intake  tolerating PO, continues supportive measures for covid-19 tx.           Labs/Tests/Procedures/Meds     Row Name 11/11/20 1337          Labs/Procedures/Meds    Lab Results Reviewed  reviewed     Lab Results Comments  Glu, BUN, WBC, ferritin        Diagnostic Tests/Procedures    Diagnostic Test/Procedure Reviewed  reviewed        Medications    Pertinent Medications Reviewed  reviewed     Pertinent Medications Comments  abx (for hip), ca, vit D, decadron, pepcid, folic acid, diuretic, KCl, coumadin         Physical Findings     Row Name 11/11/20 1339          Physical Findings    Overall Physical Appearance  on oxygen therapy     Skin  other (see comments) B 19 skin intact           Nutrition Prescription Ordered     Row Name 11/11/20 1341          Nutrition Prescription PO    Current PO Diet  Regular     Supplement  Mighty Shake     Supplement Frequency  2 times a day     Common Modifiers  Cardiac         Evaluation of Received Nutrient/Fluid Intake     Row Name 11/11/20 1341          PO Evaluation    Number of Days PO Intake Evaluated  3 days     % PO Intake  25%               Problem/Interventions:          Intervention Goal     Row Name  11/11/20 1342          Intervention Goal    General  Maintain nutrition;Reduce/improve symptoms;Disease management/therapy     PO  Tolerate PO;Increase intake;PO intake (%)     PO Intake %  75 %     Weight  No significant weight loss         Nutrition Intervention     Row Name 11/11/20 1342          Nutrition Intervention    RD/Tech Action  Follow Tx progress;Care plan reviewd           Education/Evaluation     Row Name 11/11/20 1343          Monitor/Evaluation    Monitor  Per protocol;PO intake;Pertinent labs           Electronically signed by:  Luana Heck RD  11/11/20 13:43 EST

## 2020-11-11 NOTE — PROGRESS NOTES
Name: Rachel Moser ADMIT: 10/26/2020   : 1945  PCP: Nahun Wilkerson MD    MRN: [id number] LOS: 15 days   AGE/SEX: 74 y.o. female  ROOM: [facility]   Subjective   Chief Complaint   Patient presents with   • Fever   • Urinary Tract Infection     On 50L optiflow  Dyspnea is moderate    Tolerating po but not great diet    ROS  No f/c  No n/v  No cp/palp  +soa/cough    Objective   Vital Signs  Temp:  [98 °F (36.7 °C)-98.9 °F (37.2 °C)] 98.9 °F (37.2 °C)  Heart Rate:  [81-97] 91  Resp:  [18] 18  BP: (124-142)/(71-85) 124/78  SpO2:  [90 %-93 %] 93 %  on  Flow (L/min):  [50] 50;   Device (Oxygen Therapy): high-flow nasal cannula;heated;humidified  Body mass index is 24.37 kg/m².    Physical Exam  Constitutional:       General: She is in acute distress.      Appearance: She is ill-appearing.   HENT:      Head: Normocephalic and atraumatic.   Eyes:      General: No scleral icterus.  Neck:      Musculoskeletal: Neck supple.   Cardiovascular:      Rate and Rhythm: Regular rhythm.      Heart sounds: Normal heart sounds.   Pulmonary:      Effort: Respiratory distress present.      Breath sounds: No wheezing.   Abdominal:      General: There is no distension.      Palpations: Abdomen is soft.   Neurological:      Mental Status: She is alert.         Results Review:       I reviewed the patient's new clinical results.  Results from last 7 days   Lab Units 11/11/20  0813 11/10/20  0546 11/09/20  0748 20  0809   WBC 10*3/mm3 32.45* 26.55* 25.02* 24.32*   HEMOGLOBIN g/dL 10.1* 10.7* 11.3* 11.5*   PLATELETS 10*3/mm3 186 154 157 159     Results from last 7 days   Lab Units 11/11/20  0813 11/10/20  0546 2048 20  0809   SODIUM mmol/L 140 136 138 141   POTASSIUM mmol/L 4.2 4.5 4.5 4.1   CHLORIDE mmol/L 101 98 99 102   CO2 mmol/L 26.2 26.0 27.6 27.3   BUN mg/dL 44* 36* 28* 25*   CREATININE mg/dL 0.49* 0.43* 0.50* 0.47*   GLUCOSE mg/dL 198* 190* 147* 152*   Estimated Creatinine Clearance: 54.1 mL/min (A)  (by C-G formula based on SCr of 0.49 mg/dL (L)).  Results from last 7 days   Lab Units 11/11/20  0813 11/10/20  0546 11/09/20  0748 11/08/20  0809   ALBUMIN g/dL 2.80* 2.50* 2.90* 2.80*   BILIRUBIN mg/dL 0.7 0.7 0.6 0.9   ALK PHOS U/L 74 75 75 80   AST (SGOT) U/L 28 32 31 27   ALT (SGPT) U/L 32 32 30 29     Results from last 7 days   Lab Units 11/11/20  0813 11/10/20  0546 11/09/20  0748 11/08/20  0809   CALCIUM mg/dL 7.8* 7.7* 7.8* 7.8*   ALBUMIN g/dL 2.80* 2.50* 2.90* 2.80*     Results from last 7 days   Lab Units 11/11/20  0813 11/10/20  0546 11/08/20  0809   PROCALCITONIN ng/mL 0.09 0.05 0.06       Coag   Results from last 7 days   Lab Units 11/11/20  0813 11/10/20  0546 11/09/20  0748 11/08/20  0809 11/07/20  0551 11/06/20  0544 11/05/20  0601   INR  2.93* 3.74* 3.58* 2.80* 2.40* 2.81* 3.18*     HbA1C   Lab Results   Component Value Date    HGBA1C 5.30 09/29/2020     Infection   Results from last 7 days   Lab Units 11/11/20  0813 11/10/20  0546 11/08/20  0809   PROCALCITONIN ng/mL 0.09 0.05 0.06     Radiology(recent) No radiology results for the last day  Troponin T   Date Value Ref Range Status   11/11/2020 <0.010 0.000 - 0.030 ng/mL Final     No components found for: TSH;2    amoxicillin-clavulanate, 1 tablet, Oral, Q12H  atorvastatin, 20 mg, Oral, Daily  calcium 500 mg vitamin D 5 mcg (200 UT), 1 tablet, Oral, Daily  cetirizine, 10 mg, Oral, Daily  cholecalciferol, 2,000 Units, Oral, Daily  dexamethasone, 6 mg, Oral, Q12H  famotidine, 20 mg, Oral, BID  ferrous sulfate, 325 mg, Oral, BID  folic acid, 1,600 mcg, Oral, Daily  furosemide, 40 mg, Oral, Daily  metoprolol succinate XL, 50 mg, Oral, Daily  multivitamin with minerals, 1 tablet, Oral, Daily  potassium chloride, 40 mEq, Oral, Daily  sodium chloride, 10 mL, Intravenous, Q12H  warfarin, 0.5 mg, Oral, Once      Pharmacy to dose warfarin,     Diet Regular; Cardiac      Assessment/Plan      Active Hospital Problems    Diagnosis  POA   • **Pneumonia due to  COVID-19 virus [U07.1, J12.89]  Yes   • 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 [T84.59XA, Z96.649]  Not Applicable   • Acute respiratory failure with hypoxia (CMS/HCC) [J96.01]  Yes   • Blood type A+ [Z67.10]  Yes   • UTI (urinary tract infection) [N39.0]  Yes   • Pulmonary emboli (CMS/HCC) [I26.99]  Yes   • Mixed hyperlipidemia [E78.2]  Yes   • Long term (current) use of antibiotics [Z79.2]  Not Applicable   • Hypertension [I10]  Yes   • Rheumatoid arthritis involving multiple sites (CMS/HCC) [M06.9]  Yes   • History of DVT (deep vein thrombosis) [Z86.718]  Not Applicable   • OA (osteoarthritis) of hip [M16.9]  Yes      Resolved Hospital Problems   No resolved problems to display.       1. Acute respiratory failure with hypoxia due to COVID-19 virus infection, patient will be continued on supplemental oxygen.  Continue dexamethasone therapy, patient has some concerning elevation of inflammatory markers and several comorbidities, she is at high risk of requiring mechanical ventilator.  Continue to monitor closely. Completed remdesivir and convolasecent plasma. +leukocytosis but no evidence for additional infection at this at this point but need to monitor closely. Procal has remained low and afebrile.    2.  Hyperlipidemia, continue Lipitor therapy.  3.  Hypertension, continue Toprol-XL.  4.  History of DVT, patient on Coumadin, INR is high, pharmacy is dosing  5. On suppressive abx- augmentin- for left prosthetic hip septic arthritis      DW RN  DW pharmacy    Thanks to specialists. Continue to monitor closely      Rob Esposito MD  [facility]  11/11/20  11:22 EST

## 2020-11-11 NOTE — PROGRESS NOTES
Pharmacy Consult: Warfarin Dosing/ Monitoring    Rachel Moser is a 74 y.o. female, estimated creatinine clearance is 54.1 mL/min (A) (by C-G formula based on SCr of 0.49 mg/dL (L)). weighing 54.7 kg (120 lb 11.2 oz).    She has a past medical history of Allergic, Bone infection (CMS/HCC), Cataract, High cholesterol, History of DVT (deep vein thrombosis), History of kidney infection, History of pulmonary embolus (PE), History of transfusion, Hypertension, Left hip postoperative wound infection, Paralabral cyst of left hip, PICC (peripherally inserted central catheter) in place, PONV (postoperative nausea and vomiting), Presence of vena cava filter, Rheumatoid arteritis (CMS/HCC), Seasonal allergies, UTI (urinary tract infection), and Wears glasses.    Social History     Tobacco Use    Smoking status: Former Smoker     Types: Cigarettes     Quit date:      Years since quittin.8    Smokeless tobacco: Never Used    Tobacco comment: quit 50 years ago   Substance Use Topics    Alcohol use: No    Drug use: No       Results from last 7 days   Lab Units 20  0813 11/10/20  0546 20  0748 20  0809 20  0551 20  0544 20  0601   INR  2.93* 3.74* 3.58* 2.80* 2.40* 2.81* 3.18*   HEMOGLOBIN g/dL 10.1* 10.7* 11.3* 11.5* 11.4* 10.9* 11.1*   HEMATOCRIT % 30.5* 32.1* 33.6* 34.8 34.4 33.1* 33.3*   PLATELETS 10*3/mm3 186 154 157 159 176 178 190     Results from last 7 days   Lab Units 20  0813 11/10/20  0546 20  0748   SODIUM mmol/L 140 136 138   POTASSIUM mmol/L 4.2 4.5 4.5   CHLORIDE mmol/L 101 98 99   CO2 mmol/L 26.2 26.0 27.6   BUN mg/dL 44* 36* 28*   CREATININE mg/dL 0.49* 0.43* 0.50*   CALCIUM mg/dL 7.8* 7.7* 7.8*   BILIRUBIN mg/dL 0.7 0.7 0.6   ALK PHOS U/L 74 75 75   ALT (SGPT) U/L 32 32 30   AST (SGOT) U/L 28 32 31   GLUCOSE mg/dL 198* 190* 147*     Anticoagulation history: 10/12/2020 Per [facility]  Home Med: Warfarin 7.5 mg po qMF + 5 mg po qSSTWTh (40  mg/week) with documented INR of 5.53. This was confirmed regimen with patient over telephone on 10/28/2020    Hospital Anticoagulation:  Consulting provider: Dr. Ruiz  Start date: 10/27/20 continued from home  Indication: Hx DVT  Target INR: 2.5-3.5 (per Dr. Ruiz order and past Anticoag clinic notes)  Expected duration: indefinite   Bridge Therapy: No                Date 10/27 10/28 10/29 10/30 10/31 11/1 11/2 11/3 11/4 11/5 11/6 11/7   INR 2.16 2.84 3.58 3.02 3.24 4.09 4.03 2.82 3.22 3.18 2.81 2.40   Warfarin dose 5 mg 2 mg none 2 mg 1 mg hold hold 1 mg 0.5 mg 0.5 mg 2 mg 2 mg     Date 11/8 11/9 11/10 11/11           INR 2.8 3.58 3.74 2.93           Warfarin dose 2mg HOLD HOLD 0.5 mg               Potential drug interactions:   -Acetaminophen - may result in an increased risk of bleeding. Elevations in INR have occurred within 1-2 weeks of initiating acetaminophen at moderate to high doses (2 and 4 g/day) in patients on stable warfarin therapy   -Dexamethasone (Moderate, started 10/30): may result in increased risk of bleeding or diminished effects of warfarin.  - Augmentin (home med) - may result in an increased risk of bleeding.      Will continue to monitor for drug-drug interaction as medications are added to patient's profile.     Relevant nutrition status: Diet Regular 25%    Other:   Dietary advances -> changes in dietary vitamin K intake may alter response to warfarin.  Acute infection/inflammation may cause an increased sensitivity to warfarin    Education complete/ Date: [facility] - last visit on 10/12/2020    Assessment/Plan:  INR 2.93 decreased today post warfarin hold x 2 days. INR within goal now therefore will start warfarin at a low dose 0.5 mg x 1. Note that doses since admission have been substantially lower than home maintenance regimen with therapeutic to supratherapeutic INRs. Recheck INR tomorrow.   Monitor s/sx bleeding.    Follow up daily INR    Pharmacy will continue to  follow until discharge or discontinuation of warfarin.     Addie Rock, [facility]  Staff Clinical Pharmacist  11/11/2020

## 2020-11-11 NOTE — PROGRESS NOTES
LOS: 15 days   Patient Care Team:  Nahun Wilkerson MD as PCP - General (Internal Medicine)  Vijay Arango MD as Consulting Physician (Cardiology)  Rachel Krishnan [facility] as Pharmacist  Nancy Smalls MD as Consulting Physician (Urogynecology)    Subjective     Patient sitting up in a chair today she reports she is feeling better she laughed and said I am not going to tell you today referring to her usual request on how are you doing I am ready to go home response that she gives me.  She is not really coughing she is not having any pain no nausea vomiting or diarrhea.  She is eating and drinking not a whole lot but she thinks enough    Review of Systems:          Objective     Vital Signs  Vital Sign Min/Max for last 24 hours  Temp  Min: 96.8 °F (36 °C)  Max: 98.4 °F (36.9 °C)   BP  Min: 126/71  Max: 142/85   Pulse  Min: 81  Max: 97   Resp  Min: 18  Max: 22   SpO2  Min: 90 %  Max: 93 %   Flow (L/min)  Min: 50  Max: 50   No data recorded        Ventilator/Non-Invasive Ventilation Settings (From admission, onward)    None                       Body mass index is 24.37 kg/m².  I/O last 3 completed shifts:  In: 120 [P.O.:120]  Out: 2050 [Urine:2050]  I/O this shift:  In: 120 [P.O.:120]  Out: -         Physical Exam:  General Appearance: Well-developed white female resting comfortably in bed in no apparent distress.  She is on 50 L 40% FiO2 with oxygen saturations of 97% we need to try and wean O2 further.  Eyes: Conjunctiva are clear and anicteric pupils are equal  ENT: Mucous membranes are moist no erythema or exudates, nasal septum midline.  Neck: No adenopathy or thyromegaly no jugular venous distension trachea midline  Lungs: Clear nonlabored symmetric expansion no wheezes rales or rhonchi  Cardiac: Regular rate rhythm no murmur no gallop  Abdomen: Soft nontender no palpable hepatosplenomegaly or masses  : Not examined  Musculoskeletal: Grossly normal  Skin: No jaundice no petechiae skin is warm and  dry  Neuro: She is alert oriented cooperative pleasant grossly intact  Extremities/P Vascular: No clubbing no cyanosis no edema palpable radial and dorsalis pedis pulses bilaterally she has no palpable cords no pain with dorsiflexion of the feet  MSE: Seems to be in very good spirits today       Labs:  Results from last 7 days   Lab Units 11/11/20  0813 11/10/20  0546 11/09/20  0748 11/08/20  0809 11/07/20  0551 11/06/20  0544 11/05/20  0601   GLUCOSE mg/dL 198* 190* 147* 152* 161* 152* 150*   SODIUM mmol/L 140 136 138 141 137 138 139   POTASSIUM mmol/L 4.2 4.5 4.5 4.1 4.3 4.2 4.4   CO2 mmol/L 26.2 26.0 27.6 27.3 28.6 27.3 24.6   CHLORIDE mmol/L 101 98 99 102 102 104 105   ANION GAP mmol/L 12.8 12.0 11.4 11.7 6.4 6.7 9.4   CREATININE mg/dL 0.49* 0.43* 0.50* 0.47* 0.40* 0.37* 0.44*   BUN mg/dL 44* 36* 28* 25* 23 22 21   BUN / CREAT RATIO  89.8* 83.7* 56.0* 53.2* 57.5* 59.5* 47.7*   CALCIUM mg/dL 7.8* 7.7* 7.8* 7.8* 8.0* 7.8* 7.5*   EGFR IF NONAFRICN AM mL/min/1.73 124 144 121 130 >150 >150 140   ALK PHOS U/L 74 75 75 80 80 80 84   TOTAL PROTEIN g/dL 5.1* 5.0* 4.9* 5.2* 5.2* 5.0* 5.1*   ALT (SGPT) U/L 32 32 30 29 27 25 25   AST (SGOT) U/L 28 32 31 27 28 31 36*   BILIRUBIN mg/dL 0.7 0.7 0.6 0.9 1.0 0.9 0.9   ALBUMIN g/dL 2.80* 2.50* 2.90* 2.80* 2.70* 2.60* 2.60*   GLOBULIN gm/dL 2.3 2.5 2.0 2.4 2.5 2.4 2.5     Estimated Creatinine Clearance: 54.1 mL/min (A) (by C-G formula based on SCr of 0.49 mg/dL (L)).      Results from last 7 days   Lab Units 11/11/20  0813 11/10/20  0546 11/09/20  0748 11/08/20  0809 11/07/20  0551 11/06/20  0544 11/05/20  0601   WBC 10*3/mm3 32.45* 26.55* 25.02* 24.32* 19.56* 18.43* 20.55*   RBC 10*6/mm3 3.36* 3.54* 3.73* 3.86 3.89 3.70* 3.76*   HEMOGLOBIN g/dL 10.1* 10.7* 11.3* 11.5* 11.4* 10.9* 11.1*   HEMATOCRIT % 30.5* 32.1* 33.6* 34.8 34.4 33.1* 33.3*   MCV fL 90.8 90.7 90.1 90.2 88.4 89.5 88.6   MCH pg 30.1 30.2 30.3 29.8 29.3 29.5 29.5   MCHC g/dL 33.1 33.3 33.6 33.0 33.1 32.9 33.3   RDW %  15.4 14.9 14.9 14.7 14.7 14.6 14.7   RDW-SD fl 49.5 48.4 48.2 48.2 45.8 47.8 46.9   MPV fL 13.0* 13.2* 12.6* 12.7* 12.0 11.9 10.9   PLATELETS 10*3/mm3 186 154 157 159 176 178 190   NEUTROS ABS 10*3/mm3 29.85* 24.12* 24.24* 22.86* 18.56* 17.12* 18.86*   NRBC /100 WBC 2.0*  --  2.0*  --   --   --   --          Results from last 7 days   Lab Units 11/11/20  0813 11/10/20  0546 11/09/20  0748   CK TOTAL U/L 13* 28 24             Results from last 7 days   Lab Units 11/10/20  0546 11/08/20  0809   PROCALCITONIN ng/mL 0.05 0.06     Results from last 7 days   Lab Units 11/11/20  0813 11/10/20  0546 11/09/20  0748 11/08/20  0809 11/07/20  0551 11/06/20  0544 11/05/20  0601   INR  2.93* 3.74* 3.58* 2.80* 2.40* 2.81* 3.18*     Microbiology Results (last 10 days)     Procedure Component Value - Date/Time    Urine Culture - Urine, Urine, Clean Catch [[id number]]  (Abnormal) Collected: 11/02/20 0526    Lab Status: Final result Specimen: Urine, Clean Catch Updated: 11/03/20 1011     Urine Culture Yeast isolated     Comment: No further workup                   amoxicillin-clavulanate, 1 tablet, Oral, Q12H  atorvastatin, 20 mg, Oral, Daily  calcium 500 mg vitamin D 5 mcg (200 UT), 1 tablet, Oral, Daily  cetirizine, 10 mg, Oral, Daily  cholecalciferol, 2,000 Units, Oral, Daily  dexamethasone, 6 mg, Oral, Q12H  famotidine, 20 mg, Oral, BID  ferrous sulfate, 325 mg, Oral, BID  folic acid, 1,600 mcg, Oral, Daily  furosemide, 40 mg, Oral, Daily  metoprolol succinate XL, 50 mg, Oral, Daily  multivitamin with minerals, 1 tablet, Oral, Daily  potassium chloride, 40 mEq, Oral, Daily  sodium chloride, 10 mL, Intravenous, Q12H      Pharmacy to dose warfarin,         Diagnostics:  Ct Abdomen Pelvis Without Contrast    Result Date: 10/27/2020  CT ABDOMEN PELVIS WO CONTRAST-  HISTORY:  Abdominal pain, UTI.  TECHNIQUE:  CT images of the abdomen and pelvis were obtained without intravenous contrast. Reformatted images were reviewed. Radiation dose  reduction techniques were utilized, including automated exposure control and exposure modulation based on body size.  COMPARISON:  CT abdomen and pelvis 02/10/2019.  FINDINGS CT ABDOMEN/PELVIS: Heart size is normal. There is no pericardial effusion. There is calcific coronary artery atherosclerosis. There are patchy groundglass opacities in the lung bases, new from prior CT, superimposed on background fibrosis/scarring. Pleural spaces are clear. The liver is normal in size. There are calcified hepatic and splenic granulomata. The gallbladder is present. There is layering within the gallbladder, suggestive of stones and/or sludge. The pancreas and adrenal glands are within normal limits. There is a punctate nonobstructing right renal stone. There is no hydronephrosis. There is extensive calcific aortoiliac and branch vessel atherosclerosis. There is an infrarenal IVC filter. There is a tiny hiatal hernia. There is no bowel obstruction. There is colonic diverticulosis without CT evidence of acute diverticulitis. The bladder is mildly distended. The uterus is not seen. There is no adnexal mass. There is a left hip total arthroplasty with streak artifact limiting evaluation of adjacent structures. There is multilevel degenerative disc disease. There is diffuse osseous demineralization. There are old rib fractures.  Limitations: Evaluation of the solid parenchymal organs and vasculature is limited due to lack of intravenous contrast.       1.  Patchy bibasilar groundglass opacities are superimposed on background fibrosis/scarring and concerning for multifocal pneumonia. 2.  Punctate nonobstructing right renal stone. No hydronephrosis. 3.  Gallstones and/or biliary sludge.  Discussed with Dr. Mccoy at 2:50 AM.  This report was finalized on 10/27/2020 2:54 AM by Dr. Hilary Tilley M.D.      Xr Chest 1 View    Result Date: 10/30/2020  ONE VIEW PORTABLE CHEST  HISTORY: Covid 19 pneumonia. Shortness of breath.  FINDINGS:  There are scattered areas of pneumonia throughout both lungs showing worsening from 3 days ago. The heart is slightly enlarged without change.  This report was finalized on 10/30/2020 3:16 PM by Dr. Laith Whitney M.D.      Xr Chest 1 View    Result Date: 10/27/2020  XR CHEST 1 VW-  HISTORY:  Fever.  COMPARISON:  Chest radiograph 08/19/2018.  FINDINGS:  A single portable view of the chest was obtained. The cardiac silhouette and mediastinal and hilar contours are within normal limits and not significantly changed. There is calcific aortic atherosclerosis. A calcified granuloma in the mid to lower right lung is not significantly changed. There is no new focal consolidation. Pleural spaces are clear. There is multilevel degenerative disc disease. The tip of an IVC filter is partially visualized. The previously noted right PICC has been removed.  CONCLUSION(S):   1.  No new focal consolidation or effusion.  This report was finalized on 10/27/2020 1:40 AM by Dr. Hilary Tilley M.D.               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 [T84.59XA, Z96.649]  Not Applicable   • Acute respiratory failure with hypoxia (CMS/HCC) [J96.01]  Yes   • Blood type A+ [Z67.10]  Yes   • UTI (urinary tract infection) [N39.0]  Yes   • Pulmonary emboli (CMS/HCC) [I26.99]  Yes   • Mixed hyperlipidemia [E78.2]  Yes   • Long term (current) use of antibiotics [Z79.2]  Not Applicable   • Hypertension [I10]  Yes   • Rheumatoid arthritis involving multiple sites (CMS/HCC) [M06.9]  Yes   • History of DVT (deep vein thrombosis) [Z86.718]  Not Applicable   • OA (osteoarthritis) of hip [M16.9]  Yes      Resolved Hospital Problems   No resolved problems to display.         Assessment/Plan     1. COVID-19 infection and pneumonia she is on higher dose Decadron therapy to continue   2. Acute hypoxemic respiratory failure her O2  requirements are improving, looks like they bumped her up a little bit overnight while she slept but weaning her back down today.  3. Rheumatoid arthritis on chronic methotrexate therapy this may need to be held for a while  4. Interstitial lung disease probably rheumatoid lung but she has been on chronic nitrofurantoin and that should be avoided in the future also the methotrexate needs to be monitored.  5. On Augmentin for suppressive antibiotic therapy for left prosthetic hip septic arthritis  6. History of pulmonary embolism on chronic anticoagulation PT/INR therapeutic.  D-dimer is elevated which is probably related to Covid she is already anticoagulated.  7. Hyperglycemia  8. Leukocytosis this may be related to steroids but her white count continues to climb, is a little unusual, everything else is getting better.  Did check a procalcitonin again and it is absolutely normal.  And clinically she is much better.  We will have to remain vigilant.  Only 1 BM so I would think C. difficile less likely    Plan for disposition:    Richard Barahona MD  11/11/20  10:32 EST    Time:

## 2020-11-12 NOTE — PROGRESS NOTES
Pharmacy Consult: Warfarin Dosing/ Monitoring    Rachel Moser is a 74 y.o. female, estimated creatinine clearance is 48.6 mL/min (by C-G formula based on SCr of 0.89 mg/dL). weighing 54.7 kg (120 lb 11.2 oz).    She has a past medical history of Allergic, Bone infection (CMS/HCC), Cataract, High cholesterol, History of DVT (deep vein thrombosis), History of kidney infection, History of pulmonary embolus (PE), History of transfusion, Hypertension, Left hip postoperative wound infection, Paralabral cyst of left hip, PICC (peripherally inserted central catheter) in place, PONV (postoperative nausea and vomiting), Presence of vena cava filter, Rheumatoid arteritis (CMS/HCC), Seasonal allergies, UTI (urinary tract infection), and Wears glasses.    Social History     Tobacco Use    Smoking status: Former Smoker     Types: Cigarettes     Quit date:      Years since quittin.8    Smokeless tobacco: Never Used    Tobacco comment: quit 50 years ago   Substance Use Topics    Alcohol use: No    Drug use: No       Results from last 7 days   Lab Units 20  1104 20  0813 11/10/20  0546 20  0748 20  0809 20  0551 20  0544   INR  3.13* 2.93* 3.74* 3.58* 2.80* 2.40* 2.81*   HEMOGLOBIN g/dL 9.6* 10.1* 10.7* 11.3* 11.5* 11.4* 10.9*   HEMATOCRIT % 29.2* 30.5* 32.1* 33.6* 34.8 34.4 33.1*   PLATELETS 10*3/mm3 227 186 154 157 159 176 178     Results from last 7 days   Lab Units 20  1112 20  0813 11/10/20  0546   SODIUM mmol/L 134* 140 136   POTASSIUM mmol/L 4.6 4.2 4.5   CHLORIDE mmol/L 96* 101 98   CO2 mmol/L 18.1* 26.2 26.0   BUN mg/dL 72* 44* 36*   CREATININE mg/dL 0.89 0.49* 0.43*   CALCIUM mg/dL 7.7* 7.8* 7.7*   BILIRUBIN mg/dL 0.8 0.7 0.7   ALK PHOS U/L 79 74 75   ALT (SGPT) U/L 32 32 32   AST (SGOT) U/L 34* 28 32   GLUCOSE mg/dL 301* 198* 190*     Anticoagulation history: 10/12/2020 Per [facility]  Home Med: Warfarin 7.5 mg po qMF + 5 mg po qSSTWTh (40  mg/week) with documented INR of 5.53. This was confirmed regimen with patient over telephone on 10/28/2020    Hospital Anticoagulation:  Consulting provider: Dr. Ruiz  Start date: 10/27/20 continued from home  Indication: Hx DVT  Target INR: 2.5-3.5 (per Dr. Ruiz order and past Anticoag clinic notes)  Expected duration: indefinite   Bridge Therapy: No                Date 10/27 10/28 10/29 10/30 10/31 11/1 11/2 11/3 11/4 11/5 11/6 11/7   INR 2.16 2.84 3.58 3.02 3.24 4.09 4.03 2.82 3.22 3.18 2.81 2.40   Warfarin dose 5 mg 2 mg none 2 mg 1 mg hold hold 1 mg 0.5 mg 0.5 mg 2 mg 2 mg     Date 11/8 11/9 11/10 11/11 11/12          INR 2.8 3.58 3.74 2.93 3.13          Warfarin dose 2mg HOLD HOLD 0.5 mg               Potential drug interactions:   -Acetaminophen - may result in an increased risk of bleeding. Elevations in INR have occurred within 1-2 weeks of initiating acetaminophen at moderate to high doses (2 and 4 g/day) in patients on stable warfarin therapy   -Dexamethasone (Moderate, started 10/30): may result in increased risk of bleeding or diminished effects of warfarin.  - Augmentin (home med) - may result in an increased risk of bleeding.      Will continue to monitor for drug-drug interaction as medications are added to patient's profile.     Relevant nutrition status: Diet Regular 25%    Other:   Dietary advances -> changes in dietary vitamin K intake may alter response to warfarin.  Acute infection/inflammation may cause an increased sensitivity to warfarin    Education complete?/ Date: [facility] - last visit on 10/12/2020    Assessment/Plan:  INR 2.93 decreased today post warfarin hold x 2 days. INR within goal now therefore will start warfarin at a low dose 0.5 mg daily. Note that doses since admission have been substantially lower than home maintenance regimen with therapeutic to supratherapeutic INRs. Recheck INR tomorrow.   Monitor s/sx bleeding.    Follow up daily INR    Pharmacy will  continue to follow until discharge or discontinuation of warfarin.     Helio Louis, [facility]  Staff Clinical Pharmacist  11/12/2020

## 2020-11-12 NOTE — SIGNIFICANT NOTE
11/12/20 1230   OTHER   Discipline physical therapy assistant   Rehab Time/Intention   Session Not Performed other (see comments)  (RN req PT hold for today w/ pt depressed / fatigued, but wants PT to continue. PT will follow up tomorrow.)

## 2020-11-12 NOTE — PROGRESS NOTES
LOS: 16 days   Patient Care Team:  Nahun Wilkerson MD as PCP - General (Internal Medicine)  Vijay Arango MD as Consulting Physician (Cardiology)  Rachel Krishnan [facility] as Pharmacist  Nancy Smalls MD as Consulting Physician (Urogynecology)    Subjective     Patient sitting up in bed says she is feeling better she does not really feel short of breath on O2.  No chest pain no cough no nausea no vomiting no diarrhea no lower extremity pain or swelling.    Review of Systems:          Objective     Vital Signs  Vital Sign Min/Max for last 24 hours  Temp  Min: 97.5 °F (36.4 °C)  Max: 100.3 °F (37.9 °C)   BP  Min: 112/74  Max: 134/87   Pulse  Min: 89  Max: 107   Resp  Min: 18  Max: 20   SpO2  Min: 90 %  Max: 97 %   Flow (L/min)  Min: 40  Max: 40   No data recorded        Ventilator/Non-Invasive Ventilation Settings (From admission, onward)    None                       Body mass index is 24.37 kg/m².  I/O last 3 completed shifts:  In: 440 [P.O.:440]  Out: 1451 [Urine:1450; Stool:1]  I/O this shift:  In: -   Out: 101 [Urine:100; Stool:1]        Physical Exam:  General Appearance: Well-developed white female resting comfortably in bed in no apparent distress.  She is on 40 L 40% FiO2 with oxygen saturations of 94% we need to try and wean O2 further.  Eyes: Conjunctiva are clear and anicteric pupils are equal  ENT: Mucous membranes are moist no erythema or exudates, nasal septum midline.  Neck: No adenopathy or thyromegaly no jugular venous distension trachea midline  Lungs: Clear nonlabored symmetric expansion no wheezes rales or rhonchi  Cardiac: Regular rate rhythm no murmur no gallop  Abdomen: Soft nontender no palpable hepatosplenomegaly or masses  : Not examined  Musculoskeletal: Grossly normal  Skin: No jaundice no petechiae skin is warm and dry  Neuro: She is alert oriented cooperative pleasant grossly intact  Extremities/P Vascular: No clubbing no cyanosis no edema palpable radial and dorsalis pedis  pulses bilaterally she has no palpable cords no pain with dorsiflexion of the feet  MSE: Seems to be in very good spirits today       Labs:  Results from last 7 days   Lab Units 11/11/20  0813 11/10/20  0546 11/09/20 0748 11/08/20  0809 11/07/20  0551 11/06/20  0544   GLUCOSE mg/dL 198* 190* 147* 152* 161* 152*   SODIUM mmol/L 140 136 138 141 137 138   POTASSIUM mmol/L 4.2 4.5 4.5 4.1 4.3 4.2   CO2 mmol/L 26.2 26.0 27.6 27.3 28.6 27.3   CHLORIDE mmol/L 101 98 99 102 102 104   ANION GAP mmol/L 12.8 12.0 11.4 11.7 6.4 6.7   CREATININE mg/dL 0.49* 0.43* 0.50* 0.47* 0.40* 0.37*   BUN mg/dL 44* 36* 28* 25* 23 22   BUN / CREAT RATIO  89.8* 83.7* 56.0* 53.2* 57.5* 59.5*   CALCIUM mg/dL 7.8* 7.7* 7.8* 7.8* 8.0* 7.8*   EGFR IF NONAFRICN AM mL/min/1.73 124 144 121 130 >150 >150   ALK PHOS U/L 74 75 75 80 80 80   TOTAL PROTEIN g/dL 5.1* 5.0* 4.9* 5.2* 5.2* 5.0*   ALT (SGPT) U/L 32 32 30 29 27 25   AST (SGOT) U/L 28 32 31 27 28 31   BILIRUBIN mg/dL 0.7 0.7 0.6 0.9 1.0 0.9   ALBUMIN g/dL 2.80* 2.50* 2.90* 2.80* 2.70* 2.60*   GLOBULIN gm/dL 2.3 2.5 2.0 2.4 2.5 2.4     Estimated Creatinine Clearance: 54.1 mL/min (A) (by C-G formula based on SCr of 0.49 mg/dL (L)).      Results from last 7 days   Lab Units 11/11/20  0813 11/10/20  0546 11/09/20 0748 11/08/20  0809 11/07/20  0551 11/06/20  0544   WBC 10*3/mm3 32.45* 26.55* 25.02* 24.32* 19.56* 18.43*   RBC 10*6/mm3 3.36* 3.54* 3.73* 3.86 3.89 3.70*   HEMOGLOBIN g/dL 10.1* 10.7* 11.3* 11.5* 11.4* 10.9*   HEMATOCRIT % 30.5* 32.1* 33.6* 34.8 34.4 33.1*   MCV fL 90.8 90.7 90.1 90.2 88.4 89.5   MCH pg 30.1 30.2 30.3 29.8 29.3 29.5   MCHC g/dL 33.1 33.3 33.6 33.0 33.1 32.9   RDW % 15.4 14.9 14.9 14.7 14.7 14.6   RDW-SD fl 49.5 48.4 48.2 48.2 45.8 47.8   MPV fL 13.0* 13.2* 12.6* 12.7* 12.0 11.9   PLATELETS 10*3/mm3 186 154 157 159 176 178   NEUTROS ABS 10*3/mm3 29.85* 24.12* 24.24* 22.86* 18.56* 17.12*   NRBC /100 WBC 2.0*  --  2.0*  --   --   --          Results from last 7 days   Lab  Units 11/11/20  0813 11/10/20  0546 11/09/20  0748   CK TOTAL U/L 13* 28 24   TROPONIN T ng/mL <0.010  --   --              Results from last 7 days   Lab Units 11/11/20  0813 11/10/20  0546 11/08/20  0809   PROCALCITONIN ng/mL 0.09 0.05 0.06     Results from last 7 days   Lab Units 11/12/20  1104 11/11/20  0813 11/10/20  0546 11/09/20  0748 11/08/20  0809 11/07/20  0551 11/06/20  0544   INR  3.13* 2.93* 3.74* 3.58* 2.80* 2.40* 2.81*     Microbiology Results (last 10 days)     ** No results found for the last 240 hours. **              amoxicillin-clavulanate, 1 tablet, Oral, Q12H  atorvastatin, 20 mg, Oral, Daily  calcium 500 mg vitamin D 5 mcg (200 UT), 1 tablet, Oral, Daily  cetirizine, 10 mg, Oral, Daily  cholecalciferol, 2,000 Units, Oral, Daily  dexamethasone, 6 mg, Oral, Q12H  famotidine, 20 mg, Oral, BID  ferrous sulfate, 325 mg, Oral, BID  folic acid, 1,600 mcg, Oral, Daily  furosemide, 40 mg, Oral, Daily  metoprolol succinate XL, 50 mg, Oral, Daily  multivitamin with minerals, 1 tablet, Oral, Daily  potassium chloride, 40 mEq, Oral, Daily  sodium chloride, 10 mL, Intravenous, Q12H      Pharmacy to dose warfarin,         Diagnostics:  Ct Abdomen Pelvis Without Contrast    Result Date: 10/27/2020  CT ABDOMEN PELVIS WO CONTRAST-  HISTORY:  Abdominal pain, UTI.  TECHNIQUE:  CT images of the abdomen and pelvis were obtained without intravenous contrast. Reformatted images were reviewed. Radiation dose reduction techniques were utilized, including automated exposure control and exposure modulation based on body size.  COMPARISON:  CT abdomen and pelvis 02/10/2019.  FINDINGS CT ABDOMEN/PELVIS: Heart size is normal. There is no pericardial effusion. There is calcific coronary artery atherosclerosis. There are patchy groundglass opacities in the lung bases, new from prior CT, superimposed on background fibrosis/scarring. Pleural spaces are clear. The liver is normal in size. There are calcified hepatic and splenic  granulomata. The gallbladder is present. There is layering within the gallbladder, suggestive of stones and/or sludge. The pancreas and adrenal glands are within normal limits. There is a punctate nonobstructing right renal stone. There is no hydronephrosis. There is extensive calcific aortoiliac and branch vessel atherosclerosis. There is an infrarenal IVC filter. There is a tiny hiatal hernia. There is no bowel obstruction. There is colonic diverticulosis without CT evidence of acute diverticulitis. The bladder is mildly distended. The uterus is not seen. There is no adnexal mass. There is a left hip total arthroplasty with streak artifact limiting evaluation of adjacent structures. There is multilevel degenerative disc disease. There is diffuse osseous demineralization. There are old rib fractures.  Limitations: Evaluation of the solid parenchymal organs and vasculature is limited due to lack of intravenous contrast.       1.  Patchy bibasilar groundglass opacities are superimposed on background fibrosis/scarring and concerning for multifocal pneumonia. 2.  Punctate nonobstructing right renal stone. No hydronephrosis. 3.  Gallstones and/or biliary sludge.  Discussed with Dr. Mccoy at 2:50 AM.  This report was finalized on 10/27/2020 2:54 AM by Dr. Hilary Tilley M.D.      Xr Chest 1 View    Result Date: 10/30/2020  ONE VIEW PORTABLE CHEST  HISTORY: Covid 19 pneumonia. Shortness of breath.  FINDINGS: There are scattered areas of pneumonia throughout both lungs showing worsening from 3 days ago. The heart is slightly enlarged without change.  This report was finalized on 10/30/2020 3:16 PM by Dr. Laith Whitney M.D.      Xr Chest 1 View    Result Date: 10/27/2020  XR CHEST 1 VW-  HISTORY:  Fever.  COMPARISON:  Chest radiograph 08/19/2018.  FINDINGS:  A single portable view of the chest was obtained. The cardiac silhouette and mediastinal and hilar contours are within normal limits and not significantly changed.  There is calcific aortic atherosclerosis. A calcified granuloma in the mid to lower right lung is not significantly changed. There is no new focal consolidation. Pleural spaces are clear. There is multilevel degenerative disc disease. The tip of an IVC filter is partially visualized. The previously noted right PICC has been removed.  CONCLUSION(S):   1.  No new focal consolidation or effusion.  This report was finalized on 10/27/2020 1:40 AM by Dr. Hilary Tilley M.D.               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 [T84.59XA, Z96.649]  Not Applicable   • Acute respiratory failure with hypoxia (CMS/HCC) [J96.01]  Yes   • Blood type A+ [Z67.10]  Yes   • UTI (urinary tract infection) [N39.0]  Yes   • Pulmonary emboli (CMS/HCC) [I26.99]  Yes   • Mixed hyperlipidemia [E78.2]  Yes   • Long term (current) use of antibiotics [Z79.2]  Not Applicable   • Hypertension [I10]  Yes   • Rheumatoid arthritis involving multiple sites (CMS/HCC) [M06.9]  Yes   • History of DVT (deep vein thrombosis) [Z86.718]  Not Applicable   • OA (osteoarthritis) of hip [M16.9]  Yes      Resolved Hospital Problems   No resolved problems to display.         Assessment/Plan     1. COVID-19 infection and pneumonia she is on higher dose Decadron therapy to continue tomorrow would be 10 days of this will probably drop her dose down tomorrow.  2. Acute hypoxemic respiratory failure her O2 requirements are improving, continue weaning O2 as tolerated  3. Rheumatoid arthritis on chronic methotrexate therapy this may need to be held for a while  4. Interstitial lung disease probably rheumatoid lung but she has been on chronic nitrofurantoin and that should be avoided in the future also the methotrexate needs to be monitored.  5. On Augmentin for suppressive antibiotic therapy for left prosthetic hip septic arthritis  6. History  of pulmonary embolism on chronic anticoagulation PT/INR therapeutic.  D-dimer is elevated which is probably related to Covid she is already anticoagulated.  7. Hyperglycemia  8. Leukocytosis this may be related to steroids but her white count continues to climb, is a little unusual, everything else is getting better.  Did check a procalcitonin again and it is absolutely normal.  And clinically she is much better.  We will have to remain vigilant.  Only 1 BM yesterday and today so I would think C. difficile less likely.  The continued increasing white count however is very concerning to me.  We may need to consider hematology get a look at peripheral smear etc.    Plan for disposition:    Richard Barahona MD  11/12/20  11:33 EST    Time:

## 2020-11-12 NOTE — PROGRESS NOTES
Continued Stay Note  [facility]     Patient Name: Rachel Moser  MRN: [id number]  Today's Date: 11/12/2020    Admit Date: 10/26/2020    Discharge Plan     Row Name 11/12/20 8590       Plan    Plan  Return home with spouse and [facility] following.    Patient/Family in Agreement with Plan  yes    Plan Comments  Patient currently on 40l [place] O2.  [facility] following.  Do not anticipate DC over weekend.  CCP will F/U on Monday.  BHumeniuk RN Becky S. Humeniuk, RN

## 2020-11-12 NOTE — PLAN OF CARE
Goal Outcome Evaluation:  Plan of Care Reviewed With: patient  Progress: improving  Outcome Summary: pt continues to be on optiflow, up to bsc with x1 assist once. O2 weaned by RT, sats maintaining around 90-95%. pt seems to be in better spirits

## 2020-11-12 NOTE — PROGRESS NOTES
Name: Rachel Moser ADMIT: 10/26/2020   : 1945  PCP: Nahun Wilkerson MD    MRN: [id number] LOS: 16 days   AGE/SEX: 74 y.o. female  ROOM: [facility]   Subjective   Chief Complaint   Patient presents with   • Fever   • Urinary Tract Infection     On 40L optiflow  Dyspnea is moderate  BG higher today  Tolerating po      ROS  No f/c  No n/v  No cp/palp  +soa/cough    Objective   Vital Signs  Temp:  [96.9 °F (36.1 °C)-100.3 °F (37.9 °C)] 96.9 °F (36.1 °C)  Heart Rate:  [] 101  Resp:  [18-24] 24  BP: (100-134)/(62-87) 100/62  SpO2:  [90 %-97 %] 96 %  on  Flow (L/min):  [40] 40;   Device (Oxygen Therapy): heated;high-flow nasal cannula  Body mass index is 24.37 kg/m².    Physical Exam  Constitutional:       General: She is in acute distress.      Appearance: She is ill-appearing.   HENT:      Head: Normocephalic and atraumatic.   Eyes:      General: No scleral icterus.  Neck:      Musculoskeletal: Neck supple.   Cardiovascular:      Rate and Rhythm: Regular rhythm.      Heart sounds: Normal heart sounds.   Pulmonary:      Effort: Respiratory distress present.      Breath sounds: No wheezing.   Abdominal:      General: There is no distension.      Palpations: Abdomen is soft.   Neurological:      Mental Status: She is alert.         Results Review:       I reviewed the patient's new clinical results.  Results from last 7 days   Lab Units 20  1104 20  0813 11/10/20  0546 20  0748   WBC 10*3/mm3 49.23* 32.45* 26.55* 25.02*   HEMOGLOBIN g/dL 9.6* 10.1* 10.7* 11.3*   PLATELETS 10*3/mm3 227 186 154 157     Results from last 7 days   Lab Units 20  1112 20  0813 11/10/20  0546 20  0748   SODIUM mmol/L 134* 140 136 138   POTASSIUM mmol/L 4.6 4.2 4.5 4.5   CHLORIDE mmol/L 96* 101 98 99   CO2 mmol/L 18.1* 26.2 26.0 27.6   BUN mg/dL 72* 44* 36* 28*   CREATININE mg/dL 0.89 0.49* 0.43* 0.50*   GLUCOSE mg/dL 301* 198* 190* 147*   Estimated Creatinine Clearance: 48.6 mL/min (by C-G  formula based on SCr of 0.89 mg/dL).  Results from last 7 days   Lab Units 11/12/20  1112 11/11/20  0813 11/10/20  0546 11/09/20  0748   ALBUMIN g/dL 3.10* 2.80* 2.50* 2.90*   BILIRUBIN mg/dL 0.8 0.7 0.7 0.6   ALK PHOS U/L 79 74 75 75   AST (SGOT) U/L 34* 28 32 31   ALT (SGPT) U/L 32 32 32 30     Results from last 7 days   Lab Units 11/12/20  1112 11/11/20  0813 11/10/20  0546 11/09/20  0748   CALCIUM mg/dL 7.7* 7.8* 7.7* 7.8*   ALBUMIN g/dL 3.10* 2.80* 2.50* 2.90*     Results from last 7 days   Lab Units 11/11/20  0813 11/10/20  0546 11/08/20  0809   PROCALCITONIN ng/mL 0.09 0.05 0.06       Coag   Results from last 7 days   Lab Units 11/12/20  1104 11/11/20  0813 11/10/20  0546 11/09/20  0748 11/08/20  0809 11/07/20  0551 11/06/20  0544   INR  3.13* 2.93* 3.74* 3.58* 2.80* 2.40* 2.81*     HbA1C   Lab Results   Component Value Date    HGBA1C 5.30 09/29/2020     Infection   Results from last 7 days   Lab Units 11/11/20  0813 11/10/20  0546 11/08/20  0809   PROCALCITONIN ng/mL 0.09 0.05 0.06     Radiology(recent) No radiology results for the last day  Troponin T   Date Value Ref Range Status   11/11/2020 <0.010 0.000 - 0.030 ng/mL Final     No components found for: TSH;2    amoxicillin-clavulanate, 1 tablet, Oral, Q12H  atorvastatin, 20 mg, Oral, Daily  calcium 500 mg vitamin D 5 mcg (200 UT), 1 tablet, Oral, Daily  cetirizine, 10 mg, Oral, Daily  cholecalciferol, 2,000 Units, Oral, Daily  dexamethasone, 6 mg, Oral, Q12H  famotidine, 20 mg, Oral, BID  ferrous sulfate, 325 mg, Oral, BID  folic acid, 1,600 mcg, Oral, Daily  furosemide, 40 mg, Oral, Daily  metoprolol succinate XL, 50 mg, Oral, Daily  multivitamin with minerals, 1 tablet, Oral, Daily  potassium chloride, 40 mEq, Oral, Daily  sodium chloride, 10 mL, Intravenous, Q12H  warfarin, 0.5 mg, Oral, Daily      Pharmacy to dose warfarin,     Diet Regular; Cardiac      Assessment/Plan      Active Hospital Problems    Diagnosis  POA   • **Pneumonia due to COVID-19  virus [U07.1, J12.89]  Yes   • 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 [T84.59XA, Z96.649]  Not Applicable   • Acute respiratory failure with hypoxia (CMS/HCC) [J96.01]  Yes   • Blood type A+ [Z67.10]  Yes   • UTI (urinary tract infection) [N39.0]  Yes   • Pulmonary emboli (CMS/HCC) [I26.99]  Yes   • Mixed hyperlipidemia [E78.2]  Yes   • Long term (current) use of antibiotics [Z79.2]  Not Applicable   • Hypertension [I10]  Yes   • Rheumatoid arthritis involving multiple sites (CMS/[facility]) [M06.9]  Yes   • History of DVT (deep vein thrombosis) [Z86.718]  Not Applicable   • OA (osteoarthritis) of hip [M16.9]  Yes      Resolved Hospital Problems   No resolved problems to display.       1. Acute respiratory failure with hypoxia due to COVID-19 virus infection, patient will be continued on supplemental oxygen. On dexamethasone therapy BID, patient has  several comorbidities, she is at high risk of requiring mechanical ventilator.  Continue to monitor closely. Completed remdesivir and convolasecent plasma. +leukocytosis but no evidence for additional infection at this at this point but need to monitor closely- Pulmonary brought up idea to consult Hematology just to make sure no other thoughts. Procal has remained low and afebrile. If has any diarrhea would then check c-diff but none reported thus far    2.  Hyperlipidemia, continue therapy.  3.  Hypertension, continue Toprol-XL.  4.  History of DVT, patient on Coumadin,  pharmacy is dosing  5. On suppressive abx- augmentin- for left prosthetic hip septic arthritis      DW RN  [facility] pharmacy    Thanks to specialists. Continue to monitor closely as she remains ill      Rob Esposito MD  [facility]  11/12/20  11:22 EST

## 2020-11-13 NOTE — PROGRESS NOTES
Name: Rachel Moser ADMIT: 10/26/2020   : 1945  PCP: Nahun Wilkerson MD    MRN: [id number] LOS: 17 days   AGE/SEX: 74 y.o. female  ROOM: [facility]   Subjective   Chief Complaint   Patient presents with   • Fever   • Urinary Tract Infection     On 40L optiflow  Dyspnea is moderate  Tolerating po  A little more ill appearing today, she is mildly confused      ROS  No f/c  No n/v  No cp/palp  +soa/cough    Objective   Vital Signs  Temp:  [97.3 °F (36.3 °C)-97.8 °F (36.6 °C)] 97.8 °F (36.6 °C)  Heart Rate:  [] 100  Resp:  [18-20] 20  BP: (102-113)/(59-71) 113/67  SpO2:  [90 %-95 %] 91 %  on  Flow (L/min):  [40] 40;   Device (Oxygen Therapy): high-flow nasal cannula  Body mass index is 24.37 kg/m².    Physical Exam  Constitutional:       General: She is in acute distress.      Appearance: She is ill-appearing.   HENT:      Head: Normocephalic and atraumatic.   Eyes:      General: No scleral icterus.  Neck:      Musculoskeletal: Neck supple.   Cardiovascular:      Rate and Rhythm: Regular rhythm.      Heart sounds: Normal heart sounds.   Pulmonary:      Effort: Respiratory distress present.      Breath sounds: No wheezing.   Abdominal:      General: There is no distension.      Palpations: Abdomen is soft.   Neurological:      Mental Status: She is alert.         Results Review:       I reviewed the patient's new clinical results.  Results from last 7 days   Lab Units 20  0548 20  1104 20  0813 11/10/20  0546   WBC 10*3/mm3 45.09* 49.23* 32.45* 26.55*   HEMOGLOBIN g/dL 8.5* 9.6* 10.1* 10.7*   PLATELETS 10*3/mm3 221 227 186 154     Results from last 7 days   Lab Units 20  0548 20  1112 20  0813 11/10/20  0546   SODIUM mmol/L 132* 134* 140 136   POTASSIUM mmol/L 5.0 4.6 4.2 4.5   CHLORIDE mmol/L 95* 96* 101 98   CO2 mmol/L 23.6 18.1* 26.2 26.0   BUN mg/dL 90* 72* 44* 36*   CREATININE mg/dL 1.12* 0.89 0.49* 0.43*   GLUCOSE mg/dL 179* 301* 198* 190*   Estimated Creatinine  Clearance: 38.6 mL/min (A) (by C-G formula based on SCr of 1.12 mg/dL (H)).  Results from last 7 days   Lab Units 11/13/20  0548 11/12/20  1112 11/11/20  0813 11/10/20  0546   ALBUMIN g/dL 2.80* 3.10* 2.80* 2.50*   BILIRUBIN mg/dL 0.9 0.8 0.7 0.7   ALK PHOS U/L 74 79 74 75   AST (SGOT) U/L 47* 34* 28 32   ALT (SGPT) U/L 34* 32 32 32     Results from last 7 days   Lab Units 11/13/20  0548 11/12/20  1112 11/11/20  0813 11/10/20  0546   CALCIUM mg/dL 7.6* 7.7* 7.8* 7.7*   ALBUMIN g/dL 2.80* 3.10* 2.80* 2.50*     Results from last 7 days   Lab Units 11/11/20  0813 11/10/20  0546 11/08/20  0809   PROCALCITONIN ng/mL 0.09 0.05 0.06       Coag   Results from last 7 days   Lab Units 11/13/20  0548 11/12/20  1104 11/11/20  0813 11/10/20  0546 11/09/20  0748 11/08/20  0809 11/07/20  0551   INR  2.86* 3.13* 2.93* 3.74* 3.58* 2.80* 2.40*     HbA1C   Lab Results   Component Value Date    HGBA1C 5.30 09/29/2020     Infection   Results from last 7 days   Lab Units 11/11/20  0813 11/10/20  0546 11/08/20  0809   PROCALCITONIN ng/mL 0.09 0.05 0.06     Radiology(recent) No radiology results for the last day  Troponin T   Date Value Ref Range Status   11/11/2020 <0.010 0.000 - 0.030 ng/mL Final     No components found for: TSH;2    amoxicillin-clavulanate, 1 tablet, Oral, Q12H  atorvastatin, 20 mg, Oral, Daily  calcium 500 mg vitamin D 5 mcg (200 UT), 1 tablet, Oral, Daily  cetirizine, 10 mg, Oral, Daily  cholecalciferol, 2,000 Units, Oral, Daily  [START ON 11/14/2020] dexamethasone, 6 mg, Oral, Daily With Breakfast  famotidine, 20 mg, Oral, BID  ferrous sulfate, 325 mg, Oral, BID  folic acid, 1,600 mcg, Oral, Daily  furosemide, 40 mg, Oral, Daily  insulin lispro, 0-7 Units, Subcutaneous, TID AC  metoprolol succinate XL, 50 mg, Oral, Daily  multivitamin with minerals, 1 tablet, Oral, Daily  potassium chloride, 40 mEq, Oral, Daily  sodium chloride, 10 mL, Intravenous, Q12H  warfarin, 1 mg, Oral, Daily      Pharmacy to dose warfarin,      Diet Regular; Cardiac      Assessment/Plan      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 [T84.59XA, Z96.649]  Not Applicable   • Acute respiratory failure with hypoxia (CMS/[facility]) [J96.01]  Yes   • Blood type A+ [Z67.10]  Yes   • UTI (urinary tract infection) [N39.0]  Yes   • Pulmonary emboli (CMS/[facility]) [I26.99]  Yes   • Mixed hyperlipidemia [E78.2]  Yes   • Long term (current) use of antibiotics [Z79.2]  Not Applicable   • Hypertension [I10]  Yes   • Rheumatoid arthritis involving multiple sites (CMS/[facility]) [M06.9]  Yes   • History of DVT (deep vein thrombosis) [Z86.718]  Not Applicable   • OA (osteoarthritis) of hip [M16.9]  Yes      Resolved Hospital Problems   No resolved problems to display.       1. Acute respiratory failure with hypoxia due to COVID-19 virus infection, patient will be continued on supplemental oxygen. On dexamethasone therapy BID- transitioning to daily today per Pulmonary, patient has  several comorbidities, she is at high risk of requiring mechanical ventilator.  Continue to monitor closely. Completed remdesivir and convolasecent plasma. +leukocytosis but no evidence for additional infection at this at this point but need to monitor closely- Hematology has seen and checking some additional studies. Procal has remained low and afebrile. No diarrhea to suggest C-diff    2.  Hyperlipidemia, continue therapy.  3.  Hypertension, continue Toprol-XL.  4.  History of DVT, patient on Coumadin,  pharmacy is dosing  5. On suppressive abx- augmentin- for left prosthetic hip septic arthritis      DW RN  DW pharmacy    Thanks to specialists. Continue to monitor closely as she remains ill      Rob Esposito MD  [facility]  11/13/20  11:22 EST

## 2020-11-13 NOTE — PROGRESS NOTES
Pharmacy Consult: Warfarin Dosing/ Monitoring    Rachel Moser is a 74 y.o. female, estimated creatinine clearance is 38.6 mL/min (A) (by C-G formula based on SCr of 1.12 mg/dL (H)). weighing 54.7 kg (120 lb 11.2 oz).    She has a past medical history of Allergic, Bone infection (CMS/HCC), Cataract, High cholesterol, History of DVT (deep vein thrombosis), History of kidney infection, History of pulmonary embolus (PE), History of transfusion, Hypertension, Left hip postoperative wound infection, Paralabral cyst of left hip, PICC (peripherally inserted central catheter) in place, PONV (postoperative nausea and vomiting), Presence of vena cava filter, Rheumatoid arteritis (CMS/HCC), Seasonal allergies, UTI (urinary tract infection), and Wears glasses.    Social History     Tobacco Use    Smoking status: Former Smoker     Types: Cigarettes     Quit date:      Years since quittin.8    Smokeless tobacco: Never Used    Tobacco comment: quit 50 years ago   Substance Use Topics    Alcohol use: No    Drug use: No       Results from last 7 days   Lab Units 20  0548 20  1104 20  0813 11/10/20  0546 20  0748 20  0809 20  0551   INR  2.86* 3.13* 2.93* 3.74* 3.58* 2.80* 2.40*   HEMOGLOBIN g/dL 8.5* 9.6* 10.1* 10.7* 11.3* 11.5* 11.4*   HEMATOCRIT % 26.5* 29.2* 30.5* 32.1* 33.6* 34.8 34.4   PLATELETS 10*3/mm3 221 227 186 154 157 159 176     Results from last 7 days   Lab Units 20  0548 20  1112 20  0813   SODIUM mmol/L 132* 134* 140   POTASSIUM mmol/L 5.0 4.6 4.2   CHLORIDE mmol/L 95* 96* 101   CO2 mmol/L 23.6 18.1* 26.2   BUN mg/dL 90* 72* 44*   CREATININE mg/dL 1.12* 0.89 0.49*   CALCIUM mg/dL 7.6* 7.7* 7.8*   BILIRUBIN mg/dL 0.9 0.8 0.7   ALK PHOS U/L 74 79 74   ALT (SGPT) U/L 34* 32 32   AST (SGOT) U/L 47* 34* 28   GLUCOSE mg/dL 179* 301* 198*     Anticoagulation history: 10/12/2020 Per [facility]  Home Med: Warfarin 7.5 mg po qMF + 5 mg po qSSTWTh  (40 mg/week) with documented INR of 5.53. This was confirmed regimen with patient over telephone on 10/28/2020    Hospital Anticoagulation:  Consulting provider: Dr. Ruiz  Start date: 10/27/20 continued from home  Indication: Hx DVT  Target INR: 2.5-3.5 (per Dr. Ruiz order and past Anticoag clinic notes)  Expected duration: indefinite   Bridge Therapy: No                Date 10/27 10/28 10/29 10/30 10/31 11/1 11/2 11/3 11/4 11/5 11/6 11/7   INR 2.16 2.84 3.58 3.02 3.24 4.09 4.03 2.82 3.22 3.18 2.81 2.40   Warfarin dose 5 mg 2 mg none 2 mg 1 mg hold hold 1 mg 0.5 mg 0.5 mg 2 mg 2 mg     Date 11/8 11/9 11/10 11/11 11/12 11/13         INR 2.8 3.58 3.74 2.93 3.13 2.86         Warfarin dose 2mg HOLD HOLD 0.5 mg 0.5mg 1mg             Potential drug interactions:   -Acetaminophen - may result in an increased risk of bleeding. Elevations in INR have occurred within 1-2 weeks of initiating acetaminophen at moderate to high doses (2 and 4 g/day) in patients on stable warfarin therapy   -Dexamethasone (Moderate, started 10/30): may result in increased risk of bleeding or diminished effects of warfarin.  - Augmentin (home med) - may result in an increased risk of bleeding.      Will continue to monitor for drug-drug interaction as medications are added to patient's profile.     Relevant nutrition status: Diet Regular 25%    Other:   Dietary advances -> changes in dietary vitamin K intake may alter response to warfarin.  Acute infection/inflammation may cause an increased sensitivity to warfarin    Education complete?/ Date: [facility] - last visit on 10/12/2020    Assessment/Plan:  INR therapeutic and relatively stable x 3 days.   Received 7mg warfarin in last week, however much lower doses last few days.  Will attempt 1mg PO qday starting tonight, although honestly pt may require 0.5mg a few times a week based on recent trend.  Note that doses since admission have been substantially lower than home  maintenance regimen with therapeutic to supratherapeutic INRs. Recheck INR tomorrow.   Monitor s/sx bleeding.    Follow up daily INR    Pharmacy will continue to follow until discharge or discontinuation of warfarin.     Sona Haji [facility]  Staff Clinical Pharmacist  11/13/2020

## 2020-11-13 NOTE — CONSULTS
.     REASON FOR CONSULTATION:   leukocytosis  Provide an opinion on any further workup or treatment                             REQUESTING PHYSICIAN: Claudio Campos MD  RECORDS OBTAINED:  Records of the patients history including those from the electronic medical record were reviewed and summarized in detail.    HISTORY OF PRESENT ILLNESS:  The patient is a 74 y.o. year old female  who is here for follow-up with the above-mentioned history.    Patient came to ER 10/27/2020 complaining of UTI.  Burning and frequency which started the Friday before.  Macrobid from urgent care on Saturday produced no relief.  Patient's  tested positive for Covid recently.  Patient was Covid positive on admission.    During the admission, developed acute respiratory failure with hypoxia due to COVID-19.  Received remdesivir and convalescent plasma.    We were consulted for leukocytosis because she has no evidence of additional infection.    History of PE.  On chronic coagulation.    Dr. Barahona noted although leukocytosis may be related to steroids, because her WBC continues to climb and everything else is getting better and pro calcitonin is normal clinically she is better, we were consulted    Noted she has had chronic leukocytosis for years.  Certainly worse recently.    Denies any fever, chills, weight loss, night sweats prior to admission.    Past Medical History:   Diagnosis Date   • Allergic    • Bone infection (CMS/HCC)     hip   • Cataract    • High cholesterol    • History of DVT (deep vein thrombosis)    • History of kidney infection     1 MONTH AGO   • History of pulmonary embolus (PE)    • History of transfusion    • Hypertension    • Left hip postoperative wound infection    • Paralabral cyst of left hip    • PICC (peripherally inserted central catheter) in place    • PONV (postoperative nausea and vomiting)    • Presence of vena cava filter    • Rheumatoid arteritis (CMS/HCC)    • Seasonal allergies    • UTI  (urinary tract infection)     diagnosed 4/2/18  medically treated presently   • Wears glasses      Past Surgical History:   Procedure Laterality Date   • BLADDER SUSPENSION     • CATARACT EXTRACTION, BILATERAL     • COLONOSCOPY     • ENDOSCOPIC FUNCTIONAL SINUS SURGERY (FESS)     • HIP SPACER INSERTION WITH ANTIBIOTIC CEMENT Left 8/11/2018    Procedure: TOTAL HIP ARTHROPLASTY REVISION with removal of infected total hip and placement of antibiotic spacer;  Surgeon: Obed Barney MD;  Location: [facility];  Service: Orthopedics   • HIP SURGERY Left 1983   • HIP SURGERY Left 20069   • HIP SURGERY Left 2011   • INCISION AND DRAINAGE HIP Left 11/2/2016    Procedure: HIP INCISION AND DRAINAGE;  Surgeon: Obed Barney MD;  Location: [facility] OR;  Service:    • INCISION AND DRAINAGE HIP Left 7/7/2018    Procedure: I&D and antibiotic bead placement left hip;  Surgeon: Obed Barney MD;  Location: [facility];  Service: Orthopedics   • INCISION AND DRAINAGE HIP Left 7/21/2018    Procedure: HIP INCISION AND DRAINAGE, LEFT WITH POLY HEAD CHANGE AND ANTIBIOTIC BEAD PLACEMENT;  Surgeon: Obed Barney MD;  Location: [facility];  Service: Orthopedics   • JOINT REPLACEMENT Left     HIP   • PERIPHERALLY INSERTED CENTRAL CATHETER INSERTION     • CT CLOSED RX TRAUMATIC HIP DISLOCATN Left 12/18/2017    Procedure: LEFT HIP CLOSED REDUCTION;  Surgeon: Obed Barney MD;  Location: [facility];  Service: Orthopedics   • SOFT TISSUE MASS EXCISION Left     hip 3/19/18   • TOTAL ABDOMINAL HYSTERECTOMY     • TOTAL HIP ARTHROPLASTY REVISION Left 4/25/2018    Procedure: REVISION OF LEFT ACETABULAR COMPONENT ;  Surgeon: Obed Barney MD;  Location: [facility];  Service: Orthopedics   • TOTAL HIP ARTHROPLASTY REVISION Left 08/11/2018    total hip revision with removal of iinfected total hip and placement of antibiotic spacer   • VENA CAVA FILTER INSERTION         MEDICATIONS    Current Facility-Administered Medications:   •   acetaminophen (TYLENOL) tablet 650 mg, 650 mg, Oral, Q4H PRN, Melody Johnson, APRN, 650 mg at 11/13/20 0043  •  amoxicillin-clavulanate (AUGMENTIN) 500-125 MG per tablet 500 mg, 1 tablet, Oral, Q12H, Minda Souza MD, 500 mg at 11/13/20 0815  •  atorvastatin (LIPITOR) tablet 20 mg, 20 mg, Oral, Daily, Melody Johnson APRN, 20 mg at 11/13/20 0815  •  calcium 500 mg vitamin D 5 mcg (200 UT) per tablet 1 tablet, 1 tablet, Oral, Daily, Melody Johnson APRN, 1 tablet at 11/13/20 0815  •  cetirizine (zyrTEC) tablet 10 mg, 10 mg, Oral, Daily, Alfonso Carnes MD, 10 mg at 11/13/20 0815  •  cholecalciferol (VITAMIN D3) tablet 2,000 Units, 2,000 Units, Oral, Daily, Melody Johnson APRN, 2,000 Units at 11/13/20 0816  •  dexamethasone (DECADRON) tablet 6 mg, 6 mg, Oral, Q12H, Alfonso Carnes MD, 6 mg at 11/13/20 0815  •  dextrose (D50W) 25 g/ 50mL Intravenous Solution 25 g, 25 g, Intravenous, Q15 Min PRN, Rob Esposito MD  •  dextrose (GLUTOSE) oral gel 15 g, 15 g, Oral, Q15 Min PRN, Rob Esposito MD  •  famotidine (PEPCID) tablet 20 mg, 20 mg, Oral, BID, Alfonso Carnes MD, 20 mg at 11/13/20 0816  •  ferrous sulfate tablet 325 mg, 325 mg, Oral, BID, Melody Johnson APRN, 325 mg at 11/13/20 0816  •  folic acid (FOLVITE) tablet 1,600 mcg, 1,600 mcg, Oral, Daily, Eliecer Ruiz MD, 1,600 mcg at 11/13/20 0816  •  furosemide (LASIX) tablet 40 mg, 40 mg, Oral, Daily, Melody Johnson APRN, 40 mg at 11/13/20 0816  •  glucagon (human recombinant) (GLUCAGEN DIAGNOSTIC) injection 1 mg, 1 mg, Subcutaneous, Q15 Min PRN, Rob Esposito MD  •  guaiFENesin-codeine (ROMILAR-AC) syrup 5 mL, 5 mL, Oral, Q6H PRN, Rob Esposito MD  •  insulin lispro (humaLOG) injection 0-7 Units, 0-7 Units, Subcutaneous, TID AC, Rob Esposito MD, 2 Units at 11/13/20 0819  •  metoprolol succinate XL (TOPROL-XL) 24 hr tablet 50 mg, 50 mg, Oral, Daily, Melody Johnson APRN, 50 mg at 11/13/20  0819  •  multivitamin with minerals 1 tablet, 1 tablet, Oral, Daily, Melody Johnson, APRN, 1 tablet at 20 0816  •  nitroglycerin (NITROSTAT) SL tablet 0.4 mg, 0.4 mg, Sublingual, Q5 Min PRN, Melody Johnson, APRN  •  ondansetron (ZOFRAN) injection 4 mg, 4 mg, Intravenous, Q6H PRN, Melody Johnson, APRN, 4 mg at 10/28/20 1041  •  Pharmacy to dose warfarin, , Does not apply, Continuous PRN, Eliecer Ruiz MD  •  polyethylene glycol (MIRALAX) packet 17 g, 17 g, Oral, BID PRN, Melody Johnson, APRN  •  potassium chloride (K-DUR,KLOR-CON) ER tablet 40 mEq, 40 mEq, Oral, PRN, 40 mEq at 20 1810 **OR** potassium chloride (KLOR-CON) packet 40 mEq, 40 mEq, Oral, PRN **OR** potassium chloride 10 mEq in 100 mL IVPB, 10 mEq, Intravenous, Q1H PRN, Melody Johnson, APRN  •  potassium chloride (K-DUR,KLOR-CON) ER tablet 40 mEq, 40 mEq, Oral, Daily, Melody Johnson, APRN, 40 mEq at 20 0816  •  sennosides-docusate (PERICOLACE) 8.6-50 MG per tablet 2 tablet, 2 tablet, Oral, BID PRN, Melody Johnson, APRN  •  sodium chloride 0.9 % flush 10 mL, 10 mL, Intravenous, PRN, Manuel Mccoy MD  •  sodium chloride 0.9 % flush 10 mL, 10 mL, Intravenous, Q12H, Melody Johnson, APRN, 10 mL at 20 0818  •  sodium chloride 0.9 % flush 10 mL, 10 mL, Intravenous, PRN, Melody Johnson M, APRN  •  warfarin (COUMADIN) tablet 0.5 mg, 0.5 mg, Oral, Daily, Eliecer Ruiz MD, 0.5 mg at 20 1736    ALLERGIES:     Allergies   Allergen Reactions   • Sulfa Antibiotics Rash     Other reaction(s): Skin irritation       SOCIAL HISTORY:       Social History     Socioeconomic History   • Marital status:      Spouse name: Not on file   • Number of children: Not on file   • Years of education: Not on file   • Highest education level: Not on file   Tobacco Use   • Smoking status: Former Smoker     Types: Cigarettes     Quit date:      Years since quittin.8   • Smokeless tobacco: Never  Used   • Tobacco comment: quit 50 years ago   Substance and Sexual Activity   • Alcohol use: No   • Drug use: No   • Sexual activity: Defer     Partners: Male   Lifestyle   • Physical activity     Days per week: 3 days     Minutes per session: 20 min   • Stress: Not on file   Social History Narrative             FAMILY HISTORY:  Family History   Problem Relation Age of Onset   • Malig Hyperthermia Neg Hx        REVIEW OF SYSTEMS:  Review of Systems   Constitutional: Negative for activity change.   HENT: Negative for nosebleeds and trouble swallowing.    Respiratory: Negative for shortness of breath and wheezing.    Cardiovascular: Negative for chest pain and palpitations.   Gastrointestinal: Negative for constipation, diarrhea and nausea.   Genitourinary: Negative for dysuria and hematuria.   Musculoskeletal: Negative for arthralgias and myalgias.   Skin: Negative for rash and wound.   Neurological: Negative for seizures and syncope.   Hematological: Negative for adenopathy. Does not bruise/bleed easily.   Psychiatric/Behavioral: Negative for confusion.              Vitals:    11/12/20 1939 11/13/20 0048 11/13/20 0716 11/13/20 0757   BP: 102/71 106/68  108/59   BP Location: Right arm Right arm  Right arm   Patient Position: Sitting Lying  Lying   Pulse: 108 91 90 94   Resp: 18 20  20   Temp: 97.6 °F (36.4 °C) 97.7 °F (36.5 °C)  97.3 °F (36.3 °C)   TempSrc: Oral Oral  Oral   SpO2: 95% 90% 94%    Weight:       Height:         No flowsheet data found.   PHYSICAL EXAM:    Chronically ill-appearing lady who looks older than her age.  Thin.  Looks to be in no respiratory distress but quite weak.  Minimally interactive, which appears to be due to her significant fatigue.      RECENT LABS:        WBC   Date Value Ref Range Status   11/13/2020 45.09 (C) 3.40 - 10.80 10*3/mm3 Final   11/12/2020 49.23 (C) 3.40 - 10.80 10*3/mm3 Final   11/11/2020 32.45 (C) 3.40 - 10.80 10*3/mm3 Final     Hemoglobin   Date Value Ref  Range Status   11/13/2020 8.5 (L) 12.0 - 15.9 g/dL Final   11/12/2020 9.6 (L) 12.0 - 15.9 g/dL Final   11/11/2020 10.1 (L) 12.0 - 15.9 g/dL Final     Platelets   Date Value Ref Range Status   11/13/2020 221 140 - 450 10*3/mm3 Final   11/12/2020 227 140 - 450 10*3/mm3 Final   11/11/2020 186 140 - 450 10*3/mm3 Final       Assessment/Plan   Rachel Moser [id number]     *Leukocytosis  · Predominance of mature segmented neutrophils.  · However has had circulating myelocytes and metamyelocytes which is unusual.  Also has had circulating NRBC.  · On Decadron, recovering from Covid.  However, WBC rising recently.  · On same dose of decadron, 6mg bid, since 11/3/20.  · Elevations in wbc dates through at least 10/2016  · Because she has had chronic leukocytosis, she could have an underlying CML or other hematologic problem.    *anemia  · Unremarkable: creatinine, bilirubin  · Ferritin not low  · Check iron panel, retic Hb, b12, folate    Plan  Flow cytometry  FISH for bcr/abl  Iron panel, b12, folate  I will follow peripherally, watching labs and the chart.    Records reviewed and summarized including hospitalist note and pulmonary note.  Peripheral smear personally viewed by me and shows a predominance of mature segmented neutrophils and significant leukocytosis.

## 2020-11-13 NOTE — PROGRESS NOTES
LOS: 17 days   Patient Care Team:  Nahun Wilkerson MD as PCP - General (Internal Medicine)  Vijay Arango MD as Consulting Physician (Cardiology)  Rachel Krishnan [facility] as Pharmacist  Nancy Smalls MD as Consulting Physician (Urogynecology)    Subjective     Patient is resting in bed she does not have any complaints she does not feel short of breath no cough no chest pain no nausea vomiting diarrhea.    Review of Systems:          Objective     Vital Signs  Vital Sign Min/Max for last 24 hours  Temp  Min: 96.9 °F (36.1 °C)  Max: 97.7 °F (36.5 °C)   BP  Min: 100/62  Max: 108/59   Pulse  Min: 90  Max: 108   Resp  Min: 18  Max: 24   SpO2  Min: 90 %  Max: 96 %   Flow (L/min)  Min: 40  Max: 40   No data recorded        Ventilator/Non-Invasive Ventilation Settings (From admission, onward)    None                       Body mass index is 24.37 kg/m².  I/O last 3 completed shifts:  In: 200 [P.O.:200]  Out: 401 [Urine:400; Stool:1]  No intake/output data recorded.        Physical Exam:  General Appearance: Well-developed white female resting comfortably in bed in no apparent distress.  She is on 40 L 43% FiO2 with oxygen saturations of 96% we need to try and wean O2 further.  Eyes: Conjunctiva are clear and anicteric pupils are equal  ENT: Mucous membranes are moist no erythema or exudates, nasal septum midline.  Neck: No adenopathy or thyromegaly no jugular venous distension trachea midline  Lungs: Clear nonlabored symmetric expansion no wheezes rales or rhonchi  Cardiac: Regular rate rhythm no murmur no gallop  Abdomen: Soft nontender no palpable hepatosplenomegaly or masses  : Not examined  Musculoskeletal: Grossly normal  Skin: No jaundice no petechiae skin is warm and dry  Neuro: She is alert oriented cooperative pleasant grossly intact  Extremities/P Vascular: No clubbing no cyanosis no edema palpable radial and dorsalis pedis pulses bilaterally she has no palpable cords no pain with dorsiflexion of the  feet  MSE: Seems to be in very good spirits today       Labs:  Results from last 7 days   Lab Units 11/13/20  0548 11/12/20  1112 11/11/20  0813 11/10/20  0546 11/09/20  0748 11/08/20  0809 11/07/20  0551   GLUCOSE mg/dL 179* 301* 198* 190* 147* 152* 161*   SODIUM mmol/L 132* 134* 140 136 138 141 137   POTASSIUM mmol/L 5.0 4.6 4.2 4.5 4.5 4.1 4.3   CO2 mmol/L 23.6 18.1* 26.2 26.0 27.6 27.3 28.6   CHLORIDE mmol/L 95* 96* 101 98 99 102 102   ANION GAP mmol/L 13.4 19.9* 12.8 12.0 11.4 11.7 6.4   CREATININE mg/dL 1.12* 0.89 0.49* 0.43* 0.50* 0.47* 0.40*   BUN mg/dL 90* 72* 44* 36* 28* 25* 23   BUN / CREAT RATIO  80.4* 80.9* 89.8* 83.7* 56.0* 53.2* 57.5*   CALCIUM mg/dL 7.6* 7.7* 7.8* 7.7* 7.8* 7.8* 8.0*   EGFR IF NONAFRICN AM mL/min/1.73 48* 62 124 144 121 130 >150   ALK PHOS U/L 74 79 74 75 75 80 80   TOTAL PROTEIN g/dL 4.8* 5.0* 5.1* 5.0* 4.9* 5.2* 5.2*   ALT (SGPT) U/L 34* 32 32 32 30 29 27   AST (SGOT) U/L 47* 34* 28 32 31 27 28   BILIRUBIN mg/dL 0.9 0.8 0.7 0.7 0.6 0.9 1.0   ALBUMIN g/dL 2.80* 3.10* 2.80* 2.50* 2.90* 2.80* 2.70*   GLOBULIN gm/dL 2.0 1.9 2.3 2.5 2.0 2.4 2.5     Estimated Creatinine Clearance: 38.6 mL/min (A) (by C-G formula based on SCr of 1.12 mg/dL (H)).      Results from last 7 days   Lab Units 11/13/20  0548 11/12/20  1104 11/11/20  0813 11/10/20  0546 11/09/20  0748 11/08/20  0809 11/07/20  0551   WBC 10*3/mm3 45.09* 49.23* 32.45* 26.55* 25.02* 24.32* 19.56*   RBC 10*6/mm3 2.78* 3.14* 3.36* 3.54* 3.73* 3.86 3.89   HEMOGLOBIN g/dL 8.5* 9.6* 10.1* 10.7* 11.3* 11.5* 11.4*   HEMATOCRIT % 26.5* 29.2* 30.5* 32.1* 33.6* 34.8 34.4   MCV fL 95.3 93.0 90.8 90.7 90.1 90.2 88.4   MCH pg 30.6 30.6 30.1 30.2 30.3 29.8 29.3   MCHC g/dL 32.1 32.9 33.1 33.3 33.6 33.0 33.1   RDW % 16.7* 16.0* 15.4 14.9 14.9 14.7 14.7   RDW-SD fl 56.1* 51.8 49.5 48.4 48.2 48.2 45.8   MPV fL 13.4* 13.0* 13.0* 13.2* 12.6* 12.7* 12.0   PLATELETS 10*3/mm3 221 227 186 154 157 159 176   NEUTROS ABS 10*3/mm3 41.35* 38.40* 29.85* 24.12*  24.24* 22.86* 18.56*   NRBC /100 WBC 2.1* 8.0* 2.0*  --  2.0*  --   --          Results from last 7 days   Lab Units 11/13/20  0548 11/12/20  1112 11/11/20  0813   CK TOTAL U/L 60 21 13*   TROPONIN T ng/mL  --   --  <0.010             Results from last 7 days   Lab Units 11/11/20  0813 11/10/20  0546 11/08/20  0809   PROCALCITONIN ng/mL 0.09 0.05 0.06     Results from last 7 days   Lab Units 11/13/20  0548 11/12/20  1104 11/11/20  0813 11/10/20  0546 11/09/20  0748 11/08/20  0809 11/07/20  0551   INR  2.86* 3.13* 2.93* 3.74* 3.58* 2.80* 2.40*     Microbiology Results (last 10 days)     ** No results found for the last 240 hours. **              amoxicillin-clavulanate, 1 tablet, Oral, Q12H  atorvastatin, 20 mg, Oral, Daily  calcium 500 mg vitamin D 5 mcg (200 UT), 1 tablet, Oral, Daily  cetirizine, 10 mg, Oral, Daily  cholecalciferol, 2,000 Units, Oral, Daily  dexamethasone, 6 mg, Oral, Q12H  famotidine, 20 mg, Oral, BID  ferrous sulfate, 325 mg, Oral, BID  folic acid, 1,600 mcg, Oral, Daily  furosemide, 40 mg, Oral, Daily  insulin lispro, 0-7 Units, Subcutaneous, TID AC  metoprolol succinate XL, 50 mg, Oral, Daily  multivitamin with minerals, 1 tablet, Oral, Daily  potassium chloride, 40 mEq, Oral, Daily  sodium chloride, 10 mL, Intravenous, Q12H  warfarin, 0.5 mg, Oral, Daily      Pharmacy to dose warfarin,         Diagnostics:  Ct Abdomen Pelvis Without Contrast    Result Date: 10/27/2020  CT ABDOMEN PELVIS WO CONTRAST-  HISTORY:  Abdominal pain, UTI.  TECHNIQUE:  CT images of the abdomen and pelvis were obtained without intravenous contrast. Reformatted images were reviewed. Radiation dose reduction techniques were utilized, including automated exposure control and exposure modulation based on body size.  COMPARISON:  CT abdomen and pelvis 02/10/2019.  FINDINGS CT ABDOMEN/PELVIS: Heart size is normal. There is no pericardial effusion. There is calcific coronary artery atherosclerosis. There are patchy  groundglass opacities in the lung bases, new from prior CT, superimposed on background fibrosis/scarring. Pleural spaces are clear. The liver is normal in size. There are calcified hepatic and splenic granulomata. The gallbladder is present. There is layering within the gallbladder, suggestive of stones and/or sludge. The pancreas and adrenal glands are within normal limits. There is a punctate nonobstructing right renal stone. There is no hydronephrosis. There is extensive calcific aortoiliac and branch vessel atherosclerosis. There is an infrarenal IVC filter. There is a tiny hiatal hernia. There is no bowel obstruction. There is colonic diverticulosis without CT evidence of acute diverticulitis. The bladder is mildly distended. The uterus is not seen. There is no adnexal mass. There is a left hip total arthroplasty with streak artifact limiting evaluation of adjacent structures. There is multilevel degenerative disc disease. There is diffuse osseous demineralization. There are old rib fractures.  Limitations: Evaluation of the solid parenchymal organs and vasculature is limited due to lack of intravenous contrast.       1.  Patchy bibasilar groundglass opacities are superimposed on background fibrosis/scarring and concerning for multifocal pneumonia. 2.  Punctate nonobstructing right renal stone. No hydronephrosis. 3.  Gallstones and/or biliary sludge.  Discussed with Dr. Mccoy at 2:50 AM.  This report was finalized on 10/27/2020 2:54 AM by Dr. Hilary Tilley M.D.      Xr Chest 1 View    Result Date: 10/30/2020  ONE VIEW PORTABLE CHEST  HISTORY: Covid 19 pneumonia. Shortness of breath.  FINDINGS: There are scattered areas of pneumonia throughout both lungs showing worsening from 3 days ago. The heart is slightly enlarged without change.  This report was finalized on 10/30/2020 3:16 PM by Dr. Laith Whitney M.D.      Xr Chest 1 View    Result Date: 10/27/2020  XR CHEST 1 VW-  HISTORY:  Fever.  COMPARISON:   Chest radiograph 08/19/2018.  FINDINGS:  A single portable view of the chest was obtained. The cardiac silhouette and mediastinal and hilar contours are within normal limits and not significantly changed. There is calcific aortic atherosclerosis. A calcified granuloma in the mid to lower right lung is not significantly changed. There is no new focal consolidation. Pleural spaces are clear. There is multilevel degenerative disc disease. The tip of an IVC filter is partially visualized. The previously noted right PICC has been removed.  CONCLUSION(S):   1.  No new focal consolidation or effusion.  This report was finalized on 10/27/2020 1:40 AM by Dr. Hilary Tilley M.D.               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 [T84.59XA, Z96.649]  Not Applicable   • Acute respiratory failure with hypoxia (CMS/HCC) [J96.01]  Yes   • Blood type A+ [Z67.10]  Yes   • UTI (urinary tract infection) [N39.0]  Yes   • Pulmonary emboli (CMS/HCC) [I26.99]  Yes   • Mixed hyperlipidemia [E78.2]  Yes   • Long term (current) use of antibiotics [Z79.2]  Not Applicable   • Hypertension [I10]  Yes   • Rheumatoid arthritis involving multiple sites (CMS/HCC) [M06.9]  Yes   • History of DVT (deep vein thrombosis) [Z86.718]  Not Applicable   • OA (osteoarthritis) of hip [M16.9]  Yes      Resolved Hospital Problems   No resolved problems to display.         Assessment/Plan     1. COVID-19 infection and pneumonia she is on higher dose Decadron therapy she is now had 10 days of this 6 mg twice daily I do not think I want to just stop her from this level but will drop to 6 mg once daily  2. Acute hypoxemic respiratory failure her O2 requirements are improving, continue weaning O2 as tolerated  3. Rheumatoid arthritis on chronic methotrexate therapy this may need to be held for a while  4. Interstitial lung disease  probably rheumatoid lung but she has been on chronic nitrofurantoin and that should be avoided in the future also the methotrexate needs to be monitored.  5. On Augmentin for suppressive antibiotic therapy for left prosthetic hip septic arthritis  6. History of pulmonary embolism on chronic anticoagulation PT/INR therapeutic.  D-dimer is elevated which is probably related to Covid she is already anticoagulated.  7. Hyperglycemia  8. Leukocytosis slight improvement in white count today still not entirely certain the etiology hematology's consult appreciated.    Plan for disposition:    Richard Barahona MD  11/13/20  10:14 EST    Time:

## 2020-11-13 NOTE — CONSULTS
Adult Nutrition  Assessment/PES    Patient Name:  Rachel Moser  YOB: 1945  MRN: [id number]  Admit Date:  10/26/2020    Assessment Date:  11/13/2020    Comments: RN consult for poor intake    EMR reviewed for data. Nutrition has been following per initial consult for poor intake on 11/5. Pt continues on supportive measures for her +covid-19/pna dx. No n/v/d, very weak however. Flow sheet blank as to PO intake a meals; minimal fluid intake recorded. RD started Might[employer]akes on 11/5 to supplement.     Will liberalize diet to Regular (dc cardiac restriction); change supplement to Boost glucose control TID given elevated BG. Encourage intake; may need feeding assist and verbal cues to improve eating/drinking.     RD to continue to monitor.     Reason for Assessment     Row Name 11/13/20 1342          Reason for Assessment    Reason For Assessment  nurse/nurse practitioner consult chronic poor intake     Identified At Risk by Screening Criteria  large or nonhealing wound, burn or pressure injury         Nutrition/Diet History     Row Name 11/13/20 1342          Nutrition/Diet History    Typical Food/Fluid Intake  no intake data for past few days in flow sheet; minimal fluid intake charted     Factors Affecting Nutritional Intake  compromised airway         Anthropometrics     Row Name 11/13/20 1343          Ideal Body Weight (IBW)    % of Ideal Body Weight Assessment  other (see comments)        Usual Body Weight (UBW)    Weight Loss Time Frame  no new wt since 11/5 (120 lb)         Labs/Tests/Procedures/Meds     Row Name 11/13/20 1344          Labs/Procedures/Meds    Lab Results Reviewed  reviewed     Lab Results Comments  Na, Cl, GLu, BUN, Cr        Diagnostic Tests/Procedures    Diagnostic Test/Procedures Comments  no new        Medications    Pertinent Medications Reviewed  reviewed         Physical Findings     Row Name 11/13/20 1346          Physical Findings    Overall Physical Appearance  on  oxygen therapy     Skin  other (see comments) Miguelito 17, no impairment           Nutrition Prescription Ordered     Row Name 11/13/20 1348          Nutrition Prescription PO    Common Modifiers  Cardiac                 Problem/Interventions:  Problem 1     Row Name 11/13/20 1348          Nutrition Diagnoses Problem 1    Problem 1  Inadequate Nutrient Intake     Etiology (related to)  Medical Diagnosis     Pulmonary/Critical Care  Other (comment);Pneumonia COVID 19               Intervention Goal     Row Name 11/13/20 1345          Intervention Goal    General  Maintain nutrition;Reduce/improve symptoms;Disease management/therapy     PO  Tolerate PO;Increase intake;PO intake (%)     PO Intake %  75 %     Weight  No significant weight loss         Nutrition Intervention     Row Name 11/13/20 1343          Nutrition Intervention    RD/Tech Action  Recommend/ordered;Adjusted supplement;Follow Tx progress;Care plan reviewd;Supplement provided     Recommended/Ordered  Diet         Nutrition Prescription     Row Name 11/13/20 3506          Nutrition Prescription PO    PO Prescription  Begin/change supplement;Begin/change diet     Begin/Change Diet to  Regular     Supplement  Boost Glucose Control     Supplement Frequency  3 times a day     New PO Prescription Ordered?  Yes         Education/Evaluation     Row Name 11/13/20 2999          Education    Education  Will Instruct as appropriate        Monitor/Evaluation    Monitor  Per protocol;PO intake;Pertinent labs;I&O;Weight;Skin status;Symptoms           Electronically signed by:  Luana Heck RD  11/13/20 13:51 EST

## 2020-11-13 NOTE — PLAN OF CARE
Problem: Adult Inpatient Plan of Care  Goal: Plan of Care Review  Recent Flowsheet Documentation  Taken 11/13/2020 0958 by Shannan Taveras PTA  Progress: declining  Plan of Care Reviewed With: patient  Outcome Summary: Pt very weak and req mod/max A for bed mobility and transfer to BSC in a few shuffling steps. Pt's O2 sats dropping to low 70's at EOB and taking 3-4 min to raise to mid 80's. Pt req extra time to follow cues/directions. PT will prog as pt nilsa.    Patient was not wearing a face mask during this therapy encounter. Therapist used appropriate personal protective equipment including gown, eye protection, mask and gloves.  Mask used was standard procedure mask. Appropriate PPE was worn during the entire therapy session. Hand hygiene was completed before and after therapy session. Patient is in enhanced droplet precautions.

## 2020-11-13 NOTE — PLAN OF CARE
Goal Outcome Evaluation:  Plan of Care Reviewed With: patient  Progress: no change  Outcome Summary: remains on optiflow. pt very weak, using bedpan instead of bsc. q2 turns to protect skin.

## 2020-11-13 NOTE — THERAPY TREATMENT NOTE
Patient Name: Rachel Moser  : 1945    MRN: [id number]                              Today's Date: 2020       Admit Date: 10/26/2020    Visit Dx:     ICD-10-CM ICD-9-CM   1. Pneumonia due to COVID-19 virus  U07.1 480.8    J12.89      Patient Active Problem List   Diagnosis   • Chronic left hip pain   • OA (osteoarthritis) of hip   • Hip dislocation, left (CMS/HCC)   • Status post left hip replacement   • Hypertension   • Rheumatoid arthritis involving multiple sites (CMS/[facility])   • History of DVT (deep vein thrombosis)   • Postoperative hypotension   • S/P revision of total hip   • Septic arthritis (CMS/[facility])   • Status post total hip replacement, left   • Pain of left hip joint   • Constipation   • Nausea and vomiting   • Supratherapeutic INR   • Long term (current) use of antibiotics   • Long term (current) use of anticoagulants   • Leukocytosis   • Dehydration   • Pulmonary emboli (CMS/[facility])   • Mixed hyperlipidemia   • High blood sugar   • Pneumonia due to COVID-19 virus   • UTI (urinary tract infection)   • Blood type A+   • Acute respiratory failure with hypoxia (CMS/[facility])   • 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     Past Medical History:   Diagnosis Date   • Allergic    • Bone infection (CMS/[facility])     hip   • Cataract    • High cholesterol    • History of DVT (deep vein thrombosis)    • History of kidney infection     1 MONTH AGO   • History of pulmonary embolus (PE)    • History of transfusion    • Hypertension    • Left hip postoperative wound infection    • Paralabral cyst of left hip    • PICC (peripherally inserted central catheter) in place    • PONV (postoperative nausea and vomiting)    • Presence of vena cava filter    • Rheumatoid arteritis (CMS/[facility])    • Seasonal allergies    • UTI (urinary tract infection)     diagnosed 18  medically treated presently   • Wears glasses      Past Surgical History:   Procedure Laterality Date   • BLADDER SUSPENSION     • CATARACT  EXTRACTION, BILATERAL     • COLONOSCOPY     • ENDOSCOPIC FUNCTIONAL SINUS SURGERY (FESS)     • HIP SPACER INSERTION WITH ANTIBIOTIC CEMENT Left 8/11/2018    Procedure: TOTAL HIP ARTHROPLASTY REVISION with removal of infected total hip and placement of antibiotic spacer;  Surgeon: Obed Barney MD;  Location: [facility];  Service: Orthopedics   • HIP SURGERY Left 1983   • HIP SURGERY Left 20069   • HIP SURGERY Left 2011   • INCISION AND DRAINAGE HIP Left 11/2/2016    Procedure: HIP INCISION AND DRAINAGE;  Surgeon: Obed Barney MD;  Location: [facility] OR;  Service:    • INCISION AND DRAINAGE HIP Left 7/7/2018    Procedure: I&D and antibiotic bead placement left hip;  Surgeon: Obed Barney MD;  Location: [facility] OR;  Service: Orthopedics   • INCISION AND DRAINAGE HIP Left 7/21/2018    Procedure: HIP INCISION AND DRAINAGE, LEFT WITH POLY HEAD CHANGE AND ANTIBIOTIC BEAD PLACEMENT;  Surgeon: Obed Barney MD;  Location: [facility];  Service: Orthopedics   • JOINT REPLACEMENT Left     HIP   • PERIPHERALLY INSERTED CENTRAL CATHETER INSERTION     • RI CLOSED RX TRAUMATIC HIP DISLOCATN Left 12/18/2017    Procedure: LEFT HIP CLOSED REDUCTION;  Surgeon: Obed Barney MD;  Location: [facility];  Service: Orthopedics   • SOFT TISSUE MASS EXCISION Left     hip 3/19/18   • TOTAL ABDOMINAL HYSTERECTOMY     • TOTAL HIP ARTHROPLASTY REVISION Left 4/25/2018    Procedure: REVISION OF LEFT ACETABULAR COMPONENT ;  Surgeon: Obed Barney MD;  Location: [facility];  Service: Orthopedics   • TOTAL HIP ARTHROPLASTY REVISION Left 08/11/2018    total hip revision with removal of iinfected total hip and placement of antibiotic spacer   • VENA CAVA FILTER INSERTION       General Information     Row Name 11/13/20 8212          Physical Therapy Time and Intention    Document Type  therapy note (daily note)  -PH     Mode of Treatment  physical therapy  -PH     Row Name 11/13/20 7978          Safety Issues, Functional  Mobility    Impairments Affecting Function (Mobility)  endurance/activity tolerance;strength;postural/trunk control;shortness of breath;balance  -PH       User Key  (r) = Recorded By, (t) = Taken By, (c) = Cosigned By    Initials Name Provider Type    PH Shannan Taveras PTA Physical Therapy Assistant        Mobility     Row Name 11/13/20 0988          Bed Mobility    Bed Mobility  supine-sit;sit-supine;rolling right;rolling left  -PH     All Activities, Shedd (Bed Mobility)  -- MD in room assisted to scoot pt to HOB  -PH     Rolling Left Shedd (Bed Mobility)  moderate assist (50% patient effort);maximum assist (25% patient effort);1 person assist  -PH     Rolling Right Shedd (Bed Mobility)  moderate assist (50% patient effort);maximum assist (25% patient effort);1 person assist  -PH     Supine-Sit Shedd (Bed Mobility)  moderate assist (50% patient effort);1 person assist;verbal cues;nonverbal cues (demo/gesture)  -PH     Sit-Supine Shedd (Bed Mobility)  maximum assist (25% patient effort);1 person assist;verbal cues;nonverbal cues (demo/gesture)  -     Assistive Device (Bed Mobility)  bed rails;head of bed elevated  -PH     Comment (Bed Mobility)  Pt req to transfer to BSC at beg of session; Very weak and req much more assist and extra time to respond to cues/directions  -PH     Row Name 11/13/20 7231          Transfers    Comment (Transfers)  STS x 2; Pt transferred directly to BSC although pt taking a few shuffling steps / very weak / forward flexed.  -PH     Row Name 11/13/20 7510          Bed-Chair Transfer    Bed-Chair Shedd (Transfers)  verbal cues;nonverbal cues (demo/gesture);maximum assist (25% patient effort)  -PH     Assistive Device (Bed-Chair Transfers)  other (see comments) Pt held around waist for transfer  -PH     Row Name 11/13/20 0125          Sit-Stand Transfer    Sit-Stand Shedd (Transfers)  maximum assist (25% patient effort);nonverbal  cues (demo/gesture);verbal cues  -PH     Assistive Device (Sit-Stand Transfers)  other (see comments) pt held around waist for transfer.  -PH     Row Name 11/13/20 0950          Gait/Stairs (Locomotion)    Comment (Gait/Stairs)  a few slow, uncoordinated shuffling steps to BSC and back to EOB  -PH       User Key  (r) = Recorded By, (t) = Taken By, (c) = Cosigned By    Initials Name Provider Type     Shannan Taveras PTA Physical Therapy Assistant        Obj/Interventions     Row Name 11/13/20 0906          Motor Skills    Therapeutic Exercise  other (see comments) BLE: AP, HS, SAQ, hip abd/add - all x 10 reps: PROM for LLE except AP; Assist for all  -PH     Row Name 11/13/20 0962          Balance    Balance Assessment  sitting static balance;standing static balance  -PH     Static Standing Balance  severe impairment;supported;standing  -PH     Comment, Balance  static sit - min A /CGA w/ pt fatiguing quickly - O2 sats dropping to low 70's and cued for deep breathing through NC; Pt freq asking for water w/ O2 sats dropping to high 70's  -PH       User Key  (r) = Recorded By, (t) = Taken By, (c) = Cosigned By    Initials Name Provider Type     Shannan Taveras PTA Physical Therapy Assistant        Goals/Plan    No documentation.       Clinical Impression     Row Name 11/13/20 0958          Pain    Additional Documentation  Pain Scale: Numbers Pre/Post-Treatment (Group)  -PH     [facility] Name 11/13/20 0958          Pain Scale: Numbers Pre/Post-Treatment    Pretreatment Pain Rating  0/10 - no pain  -PH     Posttreatment Pain Rating  0/10 - no pain  -PH     Pre/Posttreatment Pain Comment  pt grimacing at end of session, but denies pain  -PH     Row Name 11/13/20 0958          Plan of Care Review    Plan of Care Reviewed With  patient  -PH     Progress  declining  -PH     Outcome Summary  Pt very weak and req mod/max A for bed mobility and transfer to BSC in a few shuffling steps. Pt's O2 sats dropping to low  70's at EOB and taking 3-4 min to raise to mid 80's. Pt req extra time to follow cues/directions. PT will prog as pt nilsa.  -PH     Row Name 11/13/20 0958          Vital Signs    O2 Delivery Pre Treatment  hi-flow  -PH     Intra SpO2 (%)  70  -PH     O2 Delivery Intra Treatment  hi-flow  -PH     Post SpO2 (%)  93  -PH     O2 Delivery Post Treatment  hi-flow  -PH     Row Name 11/13/20 0958          Positioning and Restraints    Pre-Treatment Position  in bed  -PH     Post Treatment Position  bed  -PH     In Bed  fowlers;call light within reach;encouraged to call for assist;exit alarm on  -PH       User Key  (r) = Recorded By, (t) = Taken By, (c) = Cosigned By    Initials Name Provider Type    Shannan Duran PTA Physical Therapy Assistant        Outcome Measures     Row Name 11/13/20 1001          How much help from another person do you currently need...    Turning from your back to your side while in flat bed without using bedrails?  2  -PH     Moving from lying on back to sitting on the side of a flat bed without bedrails?  2  -PH     Moving to and from a bed to a chair (including a wheelchair)?  2  -PH     Standing up from a chair using your arms (e.g., wheelchair, bedside chair)?  2  -PH     Climbing 3-5 steps with a railing?  1  -PH     To walk in hospital room?  1  -PH     AM-PAC 6 Clicks Score (PT)  10  -PH     Row Name 11/13/20 1001          Functional Assessment    Outcome Measure Options  AM-PAC 6 Clicks Basic Mobility (PT)  -PH       User Key  (r) = Recorded By, (t) = Taken By, (c) = Cosigned By    Initials Name Provider Type     Shannan Taveras PTA Physical Therapy Assistant        Physical Therapy Education                 Title: PT OT SLP Therapies (Done)     Topic: Physical Therapy (Done)     Point: Mobility training (Done)     Learning Progress Summary           Patient Acceptance, E,D, NR,VU by  at 11/13/2020 1001    Acceptance, E, VU by  at 11/13/2020 0739    Acceptance,  E,D, VU,DU by  at 11/9/2020 1350    Acceptance, E,D, DU,NR,VU by  at 11/6/2020 1448    Acceptance, E,TB,D, VU by  at 11/5/2020 1030                   Point: Home exercise program (Done)     Learning Progress Summary           Patient Acceptance, E,D, NR,VU by  at 11/13/2020 1001    Acceptance, E, VU by  at 11/13/2020 0739    Acceptance, E,D, VU,DU by  at 11/9/2020 1350    Acceptance, E,D, DU,NR,VU by  at 11/6/2020 1448                   Point: Body mechanics (Done)     Learning Progress Summary           Patient Acceptance, E,D, NR,VU by  at 11/13/2020 1001    Acceptance, E, VU by  at 11/13/2020 0739    Acceptance, E,D, VU,DU by  at 11/9/2020 1350    Acceptance, E,D, DU,NR,VU by  at 11/6/2020 1448                   Point: Precautions (Done)     Learning Progress Summary           Patient Acceptance, E,D, NR,VU by  at 11/13/2020 1001    Acceptance, E, VU by  at 11/13/2020 0739    Acceptance, E,D, VU,DU by  at 11/9/2020 1350    Acceptance, E,D, DU,NR,VU by  at 11/6/2020 1448                               User Key     Initials Effective Dates Name Provider Type Discipline     04/03/18 -  Misty Bradford, PT Physical Therapist PT     08/20/19 -  Shannan Taveras PTA Physical Therapy Assistant PT     09/17/19 -  Richard Kirkland, RN Registered Nurse Nurse              PT Recommendation and Plan     Plan of Care Reviewed With: patient  Progress: declining  Outcome Summary: Pt very weak and req mod/max A for bed mobility and transfer to BSC in a few shuffling steps. Pt's O2 sats dropping to low 70's at EOB and taking 3-4 min to raise to mid 80's. Pt req extra time to follow cues/directions. PT will prog as pt nilsa.     Time Calculation:   PT Charges     Row Name 11/13/20 1002             Time Calculation    Start Time  0848  -PH      Stop Time  0912  -      Time Calculation (min)  24 min  -PH      PT Received On  11/13/20  -PH      PT - Next Appointment  11/14/20  -PH         User Key  (r) = Recorded By, (t) = Taken By, (c) = Cosigned By    Initials Name Provider Type    Shannan Duran PTA Physical Therapy Assistant        Therapy Charges for Today     Code Description Service Date Service Provider Modifiers Qty    [id number]  PT THERAPEUTIC ACT EA 15 MIN 11/13/2020 Shannan Taveras PTA GP 2          PT G-Codes  Outcome Measure Options: AM-PAC 6 Clicks Basic Mobility (PT)  AM-PAC 6 Clicks Score (PT): 10    Shannan Taveras PTA  11/13/2020

## 2020-11-14 NOTE — PROGRESS NOTES
"                                              LOS: 18 days   Patient Care Team:  Nahun Wilkerson MD as PCP - General (Internal Medicine)  Vijay Arango MD as Consulting Physician (Cardiology)  Rachel Krishnan [facility] as Pharmacist  Nancy Smalls MD as Consulting Physician (Urogynecology)    Chief Complaint:  F/up COVID-19 pneumonia, respiratory failure medical problems listed below    Subjective   Interval History  I reviewed the admission note, progress notes, PMH, PSH, Family hx, social history, imagings and prior records on this admission, summarized the finding in my note and formulated a transition of care plan.     On 5 L O2.  Expiratory 7%.  She is confused and unable to provide with history.  She stated that she is feeling fine.  When asked about the year, she said it is 1992    REVIEW OF SYSTEMS:   Cannot obtain due to confusion    Ventilator/Non-Invasive Ventilation Settings (From admission, onward)    None                Physical Exam:     Vital Signs  Temp:  [97.4 °F (36.3 °C)-98.6 °F (37 °C)] 97.4 °F (36.3 °C)  Heart Rate:  [100-112] 104  Resp:  [20] 20  BP: (101-124)/(66-70) 114/66    Intake/Output Summary (Last 24 hours) at 11/14/2020 1144  Last data filed at 11/14/2020 0856  Gross per 24 hour   Intake 775 ml   Output 500 ml   Net 275 ml     Flowsheet Rows      First Filed Value   Admission Height  149.9 cm (59\") Documented at 10/26/2020 2310   Admission Weight  54.7 kg (120 lb 11.2 oz) Documented at 10/27/2020 0409          General Appearance:   Alert, cooperative, in no acute distress   ENMT:  Mallampati score 2, dry mucous membrane   Eyes:  Pupils equal and reactive to light. EOMI   Neck:   Trachea midline. No thyromegaly.   Lungs:    Bilateral basilar crackles, L >R.  No wheezing.  Nonlabored breathing    Heart:   Regular rhythm and normal rate, normal S1 and S2, no         murmur   Skin:   No rash but ecchymosis in arms   Abdomen:    Soft. No tenderness. No HSM.   Neuro:  Conscious, alert, " oriented x3. Strength 5/5 in upper and lower  ext   Extremities:  No cyanosis, clubbing or edema.  Warm extremities and well-perfused          Results Review:        Results from last 7 days   Lab Units 11/14/20  0932 11/13/20  0548 11/12/20  1112   SODIUM mmol/L 124* 132* 134*   POTASSIUM mmol/L 5.0 5.0 4.6   CHLORIDE mmol/L 93* 95* 96*   CO2 mmol/L 18.6* 23.6 18.1*   BUN mg/dL 94* 90* 72*   CREATININE mg/dL 1.08* 1.12* 0.89   GLUCOSE mg/dL 178* 179* 301*   CALCIUM mg/dL 7.2* 7.6* 7.7*     Results from last 7 days   Lab Units 11/14/20  0932 11/13/20  0548 11/12/20  1112 11/11/20  0813   CK TOTAL U/L 70 60 21 13*   TROPONIN T ng/mL  --   --   --  <0.010     Results from last 7 days   Lab Units 11/13/20  0548 11/12/20  1104 11/11/20  0813   WBC 10*3/mm3 45.09* 49.23* 32.45*   HEMOGLOBIN g/dL 8.5* 9.6* 10.1*   HEMATOCRIT % 26.5* 29.2* 30.5*   PLATELETS 10*3/mm3 221 227 186     Results from last 7 days   Lab Units 11/13/20  0548 11/12/20  1104 11/11/20  0813   INR  2.86* 3.13* 2.93*           I reviewed the patient's new clinical results.        Medication Review:   amoxicillin-clavulanate, 1 tablet, Oral, Q12H  atorvastatin, 20 mg, Oral, Daily  calcium 500 mg vitamin D 5 mcg (200 UT), 1 tablet, Oral, Daily  cetirizine, 10 mg, Oral, Daily  cholecalciferol, 2,000 Units, Oral, Daily  dexamethasone, 6 mg, Oral, Daily With Breakfast  famotidine, 20 mg, Oral, BID  ferrous sulfate, 325 mg, Oral, BID  folic acid, 1,600 mcg, Oral, Daily  furosemide, 40 mg, Oral, Daily  insulin lispro, 0-7 Units, Subcutaneous, TID AC  metoprolol succinate XL, 50 mg, Oral, Daily  multivitamin with minerals, 1 tablet, Oral, Daily  potassium chloride, 40 mEq, Oral, Daily  sodium chloride, 10 mL, Intravenous, Q12H  warfarin, 1 mg, Oral, Daily        Pharmacy to dose warfarin,         Diagnostic imaging:  I personally and independently reviewed the following images:  CXR 11/8/2020: Diffuse bilateral interstitial pulmonary infiltrates      Lung  portion of CT of the abdomen 10/27/2020: Bilateral groundglass opacities      Assessment     1. Bilateral COVID-19 pneumonia, diagnosed 10/27  2. Acute hypoxic respiratory failure, secondary #1  3. Rheumatoid arthritis, on methotrexate  4. Elevated inflammatory markers suggestive of cytokine storm  5. 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 History of left prosthetic hip arthritis, on chronic antibiotics  7. History of PE, on anticoagulation  8. Steroid-induced hyperglycemia  9. Hyponatremia    All problems new to me    Plan     · Dexamethasone 6 mg daily for 3 days then taper down to 3 mg daily for 3 days.  She received 10 days of twice daily dose  · Insulin as needed for hyperglycemia  · Continue monitoring inflammatory markers for prognostication  · DVT prophylaxis/PE treatment with warfarin.      Dallas Duncan MD  11/14/20  11:44 EST          This note was dictated utilizing Dragon dictation

## 2020-11-14 NOTE — NURSING NOTE
Patient is awake and drinking water, but will not eat breakfast, educated about calorie deficit but still will not eat or take supplements.  She was only willing to take the most important medications this AM, so misc meds like vitamin D will have to wait until later.  Refusing to ambulate, sit up in chair, or sit on side of bed.  Verbalized to she was okay and wouldn't converse more than that.  She is oriented to person, place, and time, but not grasping the full situation.  Will continue to encourage intake, activity, and plan participation.  Will try rest of medications on next rounding.

## 2020-11-14 NOTE — PROGRESS NOTES
*Leukocytosis  · Predominance of mature segmented neutrophils.  · However has had circulating myelocytes and metamyelocytes which is unusual.  Also has had circulating NRBC.  · On Decadron, recovering from Covid.  However, WBC rising recently.  · On same dose of decadron, 6mg bid, since 11/3/20.  · Elevations in wbc dates through at least 10/2016  · Because she has had chronic leukocytosis, she could have an underlying CML or other hematologic problem.  Await FISH for BCR ABL and flow from 11/13/2020      *anemia  · Unremarkable: creatinine, bilirubin, iron labs, B12, folate, reticulated hemoglobin  · Ferritin not low     Plan  Flow cytometry  FISH for bcr/abl  Iron panel, b12, folate  I will follow peripherally, watching labs and the chart.    Await CBC from today.

## 2020-11-14 NOTE — PROGRESS NOTES
Pharmacy Consult: Warfarin Dosing/ Monitoring    Rachel Moser is a 74 y.o. female, estimated creatinine clearance is 40.1 mL/min (A) (by C-G formula based on SCr of 1.08 mg/dL (H)). weighing 54.7 kg (120 lb 11.2 oz).    She has a past medical history of Allergic, Bone infection (CMS/HCC), Cataract, High cholesterol, History of DVT (deep vein thrombosis), History of kidney infection, History of pulmonary embolus (PE), History of transfusion, Hypertension, Left hip postoperative wound infection, Paralabral cyst of left hip, PICC (peripherally inserted central catheter) in place, PONV (postoperative nausea and vomiting), Presence of vena cava filter, Rheumatoid arteritis (CMS/HCC), Seasonal allergies, UTI (urinary tract infection), and Wears glasses.    Social History     Tobacco Use    Smoking status: Former Smoker     Types: Cigarettes     Quit date:      Years since quittin.9    Smokeless tobacco: Never Used    Tobacco comment: quit 50 years ago   Substance Use Topics    Alcohol use: No    Drug use: No       Results from last 7 days   Lab Units 20  1158 20  0548 20  1104 20  0813 11/10/20  0546 20  0748 20  0809   INR  2.05* 2.86* 3.13* 2.93* 3.74* 3.58* 2.80*   HEMOGLOBIN g/dL 7.6* 8.5* 9.6* 10.1* 10.7* 11.3* 11.5*   HEMATOCRIT % 22.6* 26.5* 29.2* 30.5* 32.1* 33.6* 34.8   PLATELETS 10*3/mm3 193 221 227 186 154 157 159     Results from last 7 days   Lab Units 20  0932 20  0548 20  1112   SODIUM mmol/L 124* 132* 134*   POTASSIUM mmol/L 5.0 5.0 4.6   CHLORIDE mmol/L 93* 95* 96*   CO2 mmol/L 18.6* 23.6 18.1*   BUN mg/dL 94* 90* 72*   CREATININE mg/dL 1.08* 1.12* 0.89   CALCIUM mg/dL 7.2* 7.6* 7.7*   BILIRUBIN mg/dL 1.5* 0.9 0.8   ALK PHOS U/L 77 74 79   ALT (SGPT) U/L 64* 34* 32   AST (SGOT) U/L 68* 47* 34*   GLUCOSE mg/dL 178* 179* 301*     Anticoagulation history: 10/12/2020 Per [facility]  Home Med: Warfarin 7.5 mg po qMF + 5 mg po  qSSTWTh (40 mg/week) with documented INR of 5.53. This was confirmed regimen with patient over telephone on 10/28/2020    Hospital Anticoagulation:  Consulting provider: Dr. Ruiz  Start date: 10/27/20 continued from home  Indication: Hx DVT  Target INR: 2.5-3.5 (per Dr. Ruiz order and past Anticoag clinic notes)  Expected duration: indefinite   Bridge Therapy: No                Date 10/27 10/28 10/29 10/30 10/31 11/1 11/2 11/3 11/4 11/5 11/6 11/7   INR 2.16 2.84 3.58 3.02 3.24 4.09 4.03 2.82 3.22 3.18 2.81 2.40   Warfarin dose 5 mg 2 mg none 2 mg 1 mg hold hold 1 mg 0.5 mg 0.5 mg 2 mg 2 mg     Date 11/8 11/9 11/10 11/11 11/12 11/13 11/14        INR 2.8 3.58 3.74 2.93 3.13 2.86 2.05        Warfarin dose 2mg HOLD HOLD 0.5 mg 0.5mg 1mg 2 mg            Potential drug interactions:   -Acetaminophen - may result in an increased risk of bleeding. Elevations in INR have occurred within 1-2 weeks of initiating acetaminophen at moderate to high doses (2 and 4 g/day) in patients on stable warfarin therapy   -Dexamethasone (Moderate, started 10/30): may result in increased risk of bleeding or diminished effects of warfarin.  - Augmentin (home med) - may result in an increased risk of bleeding.      Will continue to monitor for drug-drug interaction as medications are added to patient's profile.     Relevant nutrition status: Diet Regular; last charted 11/14 Breakfast and Lunch 0% - noted that she refuses to eat    Other:   Dietary advances -> changes in dietary vitamin K intake may alter response to warfarin.  Acute infection/inflammation may cause an increased sensitivity to warfarin    Education complete?/ Date: [facility] - last visit on 10/12/2020    Assessment/Plan:  Today's INR= 2.05 which dropped significantly overnight and is now below goal range of 2.5-3.5. Will give warfarin 2 mg PO x1 this evening and resume 1 mg PO qday dosing starting tomorrow evening. Note that doses since admission have been  substantially lower than home maintenance regimen with therapeutic to supratherapeutic INRs. Patient also consuming 0% of diet. Recheck INR tomorrow 11/15.   Monitor s/sx bleeding.    Follow up daily INR    Pharmacy will continue to follow until discharge or discontinuation of warfarin.     Nelson Brock, [facility]  Staff Clinical Pharmacist  11/14/2020

## 2020-11-14 NOTE — PLAN OF CARE
Goal Outcome Evaluation:  Plan of Care Reviewed With: patient  Progress: improving   VSS. Turn q2. No c/o pain. Refused meds last night. Spoke with . O2 demands are improving. Patient now on 5L NC. INC of bladder during the shift. Will cont to monitor.

## 2020-11-15 NOTE — PROGRESS NOTES
"                                              LOS: 19 days   Patient Care Team:  Nahun Wilkerson MD as PCP - General (Internal Medicine)  Vijay Arango MD as Consulting Physician (Cardiology)  Rachel Krishnan [facility] as Pharmacist  Nancy Smalls MD as Consulting Physician (Urogynecology)    Chief Complaint:  F/up COVID-19 pneumonia, respiratory failure medical problems listed below    Subjective   Interval History  Remains confused.  Unable to provide with history.  She answers doing fine on every question I asked her.    On 5 L O2.      REVIEW OF SYSTEMS:   Cannot obtain due to confusion    Ventilator/Non-Invasive Ventilation Settings (From admission, onward)    None                Physical Exam:     Vital Signs  Temp:  [97.1 °F (36.2 °C)-98.7 °F (37.1 °C)] 98 °F (36.7 °C)  Heart Rate:  [] 83  Resp:  [16-20] 16  BP: (104-123)/(51-76) 105/53    Intake/Output Summary (Last 24 hours) at 11/15/2020 1436  Last data filed at 11/15/2020 1219  Gross per 24 hour   Intake 900 ml   Output 1100 ml   Net -200 ml     Flowsheet Rows      First Filed Value   Admission Height  149.9 cm (59\") Documented at 10/26/2020 2310   Admission Weight  54.7 kg (120 lb 11.2 oz) Documented at 10/27/2020 0409          General Appearance:   Alert, cooperative, in no acute distress   ENMT:  Mallampati score 2, dry mucous membrane   Eyes:  Pupils equal and reactive to light. EOMI   Neck:   Trachea midline. No thyromegaly.   Lungs:    Bilateral basilar crackles, L >R.  No wheezing.  Nonlabored breathing    Heart:   Regular rhythm and normal rate, normal S1 and S2, no         murmur   Skin:   No rash but ecchymosis in arms   Abdomen:    Soft. No tenderness. No HSM.   Neuro:  Conscious, alert, oriented x3. Strength 5/5 in upper and lower  ext   Extremities:  No cyanosis, clubbing or edema.  Warm extremities and well-perfused          Results Review:        Results from last 7 days   Lab Units 11/15/20  0830 11/14/20  0932 11/13/20  0548 "   SODIUM mmol/L 130* 124* 132*   POTASSIUM mmol/L 3.8 5.0 5.0   CHLORIDE mmol/L 95* 93* 95*   CO2 mmol/L 22.2 18.6* 23.6   BUN mg/dL 61* 94* 90*   CREATININE mg/dL 0.74 1.08* 1.12*   GLUCOSE mg/dL 153* 178* 179*   CALCIUM mg/dL 7.4* 7.2* 7.6*     Results from last 7 days   Lab Units 11/15/20  0830 11/14/20  0932 11/13/20  0548  11/11/20  0813   CK TOTAL U/L 40 70 60   < > 13*   TROPONIN T ng/mL  --   --   --   --  <0.010    < > = values in this interval not displayed.     Results from last 7 days   Lab Units 11/15/20  0830 11/14/20  1158 11/13/20  0548   WBC 10*3/mm3 33.44* 41.42* 45.09*   HEMOGLOBIN g/dL 7.0* 7.6* 8.5*   HEMATOCRIT % 21.6* 22.6* 26.5*   PLATELETS 10*3/mm3 166 193 221     Results from last 7 days   Lab Units 11/15/20  0830 11/14/20  1158 11/13/20  0548   INR  1.87* 2.05* 2.86*           I reviewed the patient's new clinical results.        Medication Review:   amoxicillin-clavulanate, 1 tablet, Oral, Q12H  atorvastatin, 20 mg, Oral, Daily  calcium 500 mg vitamin D 5 mcg (200 UT), 1 tablet, Oral, Daily  cetirizine, 10 mg, Oral, Daily  cholecalciferol, 2,000 Units, Oral, Daily  dexamethasone, 6 mg, Oral, Daily With Breakfast  famotidine, 20 mg, Oral, BID  ferrous sulfate, 325 mg, Oral, BID  folic acid, 1,600 mcg, Oral, Daily  furosemide, 20 mg, Intravenous, Once  furosemide, 40 mg, Oral, Daily  insulin lispro, 0-7 Units, Subcutaneous, TID AC  metoprolol succinate XL, 50 mg, Oral, Daily  multivitamin with minerals, 1 tablet, Oral, Daily  potassium chloride, 40 mEq, Oral, Daily  sodium chloride, 10 mL, Intravenous, Q12H  warfarin, 1 mg, Oral, Daily  warfarin, 2 mg, Oral, Once        Pharmacy to dose warfarin,         Diagnostic imaging:  I personally and independently reviewed the following images:  CXR 11/8/2020: Diffuse bilateral interstitial pulmonary infiltrates      Lung portion of CT of the abdomen 10/27/2020: Bilateral groundglass opacities      Assessment     1. Bilateral COVID-19 pneumonia,  diagnosed 10/27  2. Acute hypoxic respiratory failure, secondary #1  3. Rheumatoid arthritis, on methotrexate  4. Elevated inflammatory markers suggestive of cytokine storm  5. 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 History of left prosthetic hip arthritis, on chronic antibiotics  7. History of PE, on anticoagulation  8. Steroid-induced hyperglycemia  9. Confusion, secondary to above  10. Hyponatremia        Plan     · Change dexamethasone to 3 mg daily for 2 days then stop.  Not quite sure of steroid now are making her more confused than helping  · Insulin as needed for hyperglycemia  · Can stop checking inflammatory markers daily since they are going down and patient has been >2 weeks into her illness.  · DVT prophylaxis/PE treatment with warfarin.    Can start evaluation for discharge.  From respiratory perspective, I doubt that her condition will get worse    Dallas Duncan MD  11/15/20  14:36 EST          This note was dictated utilizing Dragon dictation

## 2020-11-15 NOTE — PROGRESS NOTES
Name: Rachel Moser ADMIT: 10/26/2020   : 1945  PCP: Nahun Wilkerson MD    MRN: [id number] LOS: 19 days   AGE/SEX: 74 y.o. female  ROOM: [facility]     Subjective   Subjective   Patient seen today.        Objective   Objective   Vital Signs  Temp:  [97.1 °F (36.2 °C)-98.7 °F (37.1 °C)] 97.9 °F (36.6 °C)  Heart Rate:  [73-90] 74  Resp:  [16-18] 16  BP: (104-123)/(51-76) 107/60  SpO2:  [91 %-100 %] 93 %  on  Flow (L/min):  [2-6] 2;   Device (Oxygen Therapy): nasal cannula  Body mass index is 24.37 kg/m².  Physical Exam     General: patient is awake and alert.  Head and ENT: normocephalic and atraumatic.  Lungs: symmetric expansion and equal air entry bilaterally.  Heart: regular rate, rhythm, no murmurs.  Abdomen: soft, nontender, bowel sounds present.  Extremities:  No clubbing, cyanosis or edema.  Neurologic:  No focal neurological deficits present.  Cranial nerves intact.  Psychiatry:  Normal mood and affect.  Skin:  Warm and no rash.    Results Review     I reviewed the patient's new clinical results.  Results from last 7 days   Lab Units 11/15/20  0830 20  1158 20  0548 20  1104   WBC 10*3/mm3 33.44* 41.42* 45.09* 49.23*   HEMOGLOBIN g/dL 7.0* 7.6* 8.5* 9.6*   PLATELETS 10*3/mm3 166 193 221 227     Results from last 7 days   Lab Units 11/15/20  0830 20  0932 20  0548 20  1112   SODIUM mmol/L 130* 124* 132* 134*   POTASSIUM mmol/L 3.8 5.0 5.0 4.6   CHLORIDE mmol/L 95* 93* 95* 96*   CO2 mmol/L 22.2 18.6* 23.6 18.1*   BUN mg/dL 61* 94* 90* 72*   CREATININE mg/dL 0.74 1.08* 1.12* 0.89   GLUCOSE mg/dL 153* 178* 179* 301*   Estimated Creatinine Clearance: 54.1 mL/min (by C-G formula based on SCr of 0.74 mg/dL).  Results from last 7 days   Lab Units 11/15/20  0830 20  0932 20  0548 20  1112   ALBUMIN g/dL 2.90* 2.90* 2.80* 3.10*   BILIRUBIN mg/dL 1.4* 1.5* 0.9 0.8   ALK PHOS U/L 72 77 74 79   AST (SGOT) U/L 56* 68* 47* 34*   ALT (SGPT) U/L 60* 64* 34* 32      Results from last 7 days   Lab Units 11/15/20  0830 11/14/20  0932 11/13/20  0548 11/12/20  1112   CALCIUM mg/dL 7.4* 7.2* 7.6* 7.7*   ALBUMIN g/dL 2.90* 2.90* 2.80* 3.10*     Results from last 7 days   Lab Units 11/11/20  0813 11/10/20  0546   PROCALCITONIN ng/mL 0.09 0.05     COVID19   Date Value Ref Range Status   10/27/2020 Detected (C) Not Detected - Ref. Range Final     SARS-CoV-2, JOSHUA   Date Value Ref Range Status   10/19/2020 Not Detected Not Detected Final     Comment:     This nucleic acid amplification test was developed and its performance  characteristics determined by [employer]. Nucleic acid  amplification tests include PCR and TMA. This test has not been FDA  cleared or approved. This test has been authorized by FDA under an  Emergency Use Authorization (EUA). This test is only authorized for  the duration of time the declaration that circumstances exist  justifying the authorization of the emergency use of in vitro  diagnostic tests for detection of SARS-CoV-2 virus and/or diagnosis  of COVID-19 infection under section 564(b)(1) of the Act, 21 U.S.C.  360bbb-3(b) (1), unless the authorization is terminated or revoked  sooner.  When diagnostic testing is negative, the possibility of a false  negative result should be considered in the context of a patient's  recent exposures and the presence of clinical signs and symptoms  consistent with COVID-19. An individual without symptoms of COVID-19  and who is not shedding SARS-CoV-2 virus would expect to have a  negative (not detected) result in this assay.     Glucose   Date/Time Value Ref Range Status   11/15/2020 1725 216 (H) 70 - 130 mg/dL Final   11/15/2020 1146 216 (H) 70 - 130 mg/dL Final   11/15/2020 0640 170 (H) 70 - 130 mg/dL Final   11/14/2020 2042 199 (H) 70 - 130 mg/dL Final   11/14/2020 1620 233 (H) 70 - 130 mg/dL Final   11/14/2020 1122 206 (H) 70 - 130 mg/dL Final   11/14/2020 0639 206 (H) 70 - 130 mg/dL Final       XR Chest 1  View  Narrative: XR CHEST 1 VW-     HISTORY: Female who is 74 years-old,  worsening oxygenation     TECHNIQUE: Frontal view of the chest     COMPARISON: 10/30/2020     FINDINGS: The heart is enlarged. Aorta is calcified. Lung volume is low  with vascular crowding. Mild prominence of interstitial markings.  Previous bilateral infiltrates appear mildly less dense. No pleural  effusion, or pneumothorax. Old granulomatous disease is seen. No acute  osseous process.     Impression: Previous bilateral infiltrates appear mildly less dense,  continued follow-up recommended.     This report was finalized on 11/8/2020 10:35 AM by Dr. Nahun Brock M.D.       Scheduled Medications  amoxicillin-clavulanate, 1 tablet, Oral, Q12H  atorvastatin, 20 mg, Oral, Daily  calcium 500 mg vitamin D 5 mcg (200 UT), 1 tablet, Oral, Daily  cetirizine, 10 mg, Oral, Daily  cholecalciferol, 2,000 Units, Oral, Daily  [START ON 11/16/2020] dexamethasone, 3 mg, Oral, Daily With Breakfast  famotidine, 20 mg, Oral, BID  ferrous sulfate, 325 mg, Oral, BID  folic acid, 1,600 mcg, Oral, Daily  furosemide, 40 mg, Oral, Daily  insulin lispro, 0-7 Units, Subcutaneous, TID AC  metoprolol succinate XL, 50 mg, Oral, Daily  multivitamin with minerals, 1 tablet, Oral, Daily  potassium chloride, 40 mEq, Oral, Daily  sodium chloride, 10 mL, Intravenous, Q12H  warfarin, 1 mg, Oral, Daily    Infusions  Pharmacy to dose warfarin,     Diet  Diet Regular       Assessment/Plan     Active Hospital Problems    Diagnosis  POA   • **Pneumonia due to COVID-19 virus [U07.1, J12.89]  Yes   • Chronic infection of prosthetic hip (CMS/[facility])left ESBL E coli [T84.59XA, Z96.649]  Not Applicable   • Acute respiratory failure with hypoxia (CMS/[facility]) [J96.01]  Yes   • Blood type A+ [Z67.10]  Yes   • UTI (urinary tract infection) [N39.0]  Yes   • Pulmonary emboli (CMS/[facility]) [I26.99]  Yes   • Mixed hyperlipidemia [E78.2]  Yes   • Long term (current) use of antibiotics [Z79.2]  Not  Applicable   • Hypertension [I10]  Yes   • Rheumatoid arthritis involving multiple sites (CMS/HCC) [M06.9]  Yes   • History of DVT (deep vein thrombosis) [Z86.718]  Not Applicable   • OA (osteoarthritis) of hip [M16.9]  Yes      Resolved Hospital Problems   No resolved problems to display.       74 y.o. female admitted with Pneumonia due to COVID-19 virus.      Assessment plan  1. Acute respiratory failure with hypoxia, patient has underlying COVID-19 pneumonia, pulmonary on board, follow recommendations.  2.  Hyperlipidemia, continue statin therapy.  3.  Hypertension, continue Toprol-XL.  4.  History of DVT, patient on Coumadin,  pharmacy is dosing.  5.  Anemia, drop in hemoglobin noted, transfuse PRBCs.  Appreciate hematology/oncology input.  6.  Further plans based on hospital course.      Bartolo Adam MD  [facility]  11/15/20  18:05 EST     I wore protective equipment throughout this patient encounter including a face mask, gloves and protective eyewear.  Hand hygiene was performed before donning protective equipment and after removal when leaving the room.

## 2020-11-15 NOTE — PLAN OF CARE
Goal Outcome Evaluation:  Plan of Care Reviewed With: patient  Progress: improving   VSS on 6L NC, no C/o pain, turn, patient was able to take medications last night, but still did not want to eat. Will continue to monitor.

## 2020-11-15 NOTE — PROGRESS NOTES
*Leukocytosis  · Predominance of mature segmented neutrophils.  · However has had circulating myelocytes and metamyelocytes which is unusual.  Also has had circulating NRBC.  · On Decadron, recovering from Covid.  However, WBC rising recently.  · On same dose of decadron, 6mg bid, since 11/3/20.  · Elevations in wbc dates through at least 10/2016  · Because she has had chronic leukocytosis, she could have an underlying CML or other hematologic problem.  Await FISH for BCR ABL and flow from 11/13/2020  CBC 33.4, from 41.4, from 45.1, from 49.2.        *anemia  · Unremarkable: creatinine, bilirubin, iron labs, B12, folate, reticulated hemoglobin  · Ferritin not low  · Hb down to 7.  Transfuse 2 units PRBC today.     Plan  Await flow cytometry  Await FISH for bcr/abl  Transfuse 2 units PRBC    I will follow peripherally, watching labs and the chart.

## 2020-11-15 NOTE — PLAN OF CARE
Problem: Adult Inpatient Plan of Care  Goal: Plan of Care Review  Outcome: Ongoing, Progressing  Flowsheets  Taken 11/15/2020 1808 by Giovanni Chauhan, RN  Progress: improving  Outcome Summary: Improving clinically with better labs, down to 2L O2 from 5L today, up to chair for some time, but her mental state hasn't improved.  She is still mildly confused, understands where she is, at, and time.  Understanding some of the care she is getting,but not comprehending cause and effect.  Her main issue is FTT, only drinking water, and not eating. Getting to be a very large calorie deficit.  Taken 11/15/2020 0603 by Evans Ramires, RN  Plan of Care Reviewed With: patient  Goal: Patient-Specific Goal (Individualized)  Outcome: Ongoing, Progressing  Goal: Absence of Hospital-Acquired Illness or Injury  Outcome: Ongoing, Progressing  Intervention: Identify and Manage Fall Risk  Recent Flowsheet Documentation  Taken 11/15/2020 1600 by Giovanni Chauhan, RN  Safety Promotion/Fall Prevention: safety round/check completed  Taken 11/15/2020 1443 by Giovanni Chauhan, RN  Safety Promotion/Fall Prevention:   activity supervised   clutter free environment maintained   fall prevention program maintained   muscle strengthening facilitated   nonskid shoes/slippers when out of bed   room organization consistent   safety round/check completed   toileting scheduled  Taken 11/15/2020 1219 by Giovanni Chauhan, RN  Safety Promotion/Fall Prevention:   activity supervised   clutter free environment maintained   fall prevention program maintained   muscle strengthening facilitated   nonskid shoes/slippers when out of bed   room organization consistent   safety round/check completed   toileting scheduled   mobility aid in reach   lighting adjusted   gait belt  Taken 11/15/2020 1026 by Giovanni Chauhan, RN  Safety Promotion/Fall Prevention:   activity supervised   clutter free environment maintained   fall prevention program maintained   lighting adjusted    mobility aid in reach   muscle strengthening facilitated   nonskid shoes/slippers when out of bed   safety round/check completed   room organization consistent   toileting scheduled  Taken 11/15/2020 0946 by Giovanni Chauhan RN  Safety Promotion/Fall Prevention:   activity supervised   clutter free environment maintained   fall prevention program maintained   mobility aid in reach   muscle strengthening facilitated   nonskid shoes/slippers when out of bed   room organization consistent   safety round/check completed   toileting scheduled  Intervention: Prevent Skin Injury  Recent Flowsheet Documentation  Taken 11/15/2020 1600 by Giovanni Chauhan RN  Body Position: supine  Taken 11/15/2020 1443 by Giovanni Chauhan RN  Body Position:   legs elevated   log-rolled   supine   weight shift assistance provided  Taken 11/15/2020 1219 by Giovanni Chauhan RN  Body Position:   turned   weight shift assistance provided   tilted, right   dangle, side of bed   legs elevated   log-rolled  Taken 11/15/2020 1026 by Giovanni Chauhan RN  Body Position:   turned   dangle, side of bed   side-lying, left   legs elevated  Taken 11/15/2020 0946 by Giovanni Chauhan RN  Body Position:   log-rolled   legs elevated   dangle, side of bed   weight shift assistance provided   turned  Intervention: Prevent Infection  Recent Flowsheet Documentation  Taken 11/15/2020 1600 by Giovanni Chauhan RN  Infection Prevention:   cohorting utilized   environmental surveillance performed   equipment surfaces disinfected   hand hygiene promoted   personal protective equipment utilized   rest/sleep promoted   single patient room provided   visitors restricted/screened  Taken 11/15/2020 1443 by Giovanni Chauhan RN  Infection Prevention:   cohorting utilized   environmental surveillance performed   hand hygiene promoted   rest/sleep promoted   single patient room provided   visitors restricted/screened   personal protective equipment utilized   equipment surfaces  disinfected  Taken 11/15/2020 1219 by Giovanni Chauhan RN  Infection Prevention:   cohorting utilized   equipment surfaces disinfected   hand hygiene promoted   personal protective equipment utilized   visitors restricted/screened   single patient room provided   rest/sleep promoted   environmental surveillance performed  Taken 11/15/2020 1026 by Giovanni Chauhan RN  Infection Prevention:   cohorting utilized   environmental surveillance performed   equipment surfaces disinfected   hand hygiene promoted   personal protective equipment utilized   rest/sleep promoted   single patient room provided   visitors restricted/screened  Taken 11/15/2020 0946 by Giovanni Chauhan RN  Infection Prevention:   cohorting utilized   environmental surveillance performed   equipment surfaces disinfected   hand hygiene promoted   personal protective equipment utilized   rest/sleep promoted   visitors restricted/screened   single patient room provided  Goal: Optimal Comfort and Wellbeing  Outcome: Ongoing, Progressing  Intervention: Provide Person-Centered Care  Recent Flowsheet Documentation  Taken 11/15/2020 1443 by Giovanni Chauhan RN  Trust Relationship/Rapport:   care explained   choices provided   questions answered   questions encouraged   reassurance provided   thoughts/feelings acknowledged  Goal: Readiness for Transition of Care  Outcome: Ongoing, Progressing     Problem: Fall Injury Risk  Goal: Absence of Fall and Fall-Related Injury  Outcome: Ongoing, Progressing  Intervention: Identify and Manage Contributors to Fall Injury Risk  Recent Flowsheet Documentation  Taken 11/15/2020 1600 by Giovanni Chauhan RN  Self-Care Promotion: independence encouraged  Taken 11/15/2020 1443 by Giovanni Chauhan RN  Medication Review/Management: medications reviewed  Taken 11/15/2020 1219 by Giovanni Chauhan RN  Medication Review/Management: medications reviewed  Taken 11/15/2020 1026 by Giovanni Chauhan RN  Medication Review/Management: medications  reviewed  Taken 11/15/2020 0946 by Giovanni hCauhan, RN  Medication Review/Management: medications reviewed  Intervention: Promote Injury-Free Environment  Recent Flowsheet Documentation  Taken 11/15/2020 1600 by Giovanni Chauhan, RN  Safety Promotion/Fall Prevention: safety round/check completed  Taken 11/15/2020 1443 by Giovanni Chauhan, RN  Safety Promotion/Fall Prevention:   activity supervised   clutter free environment maintained   fall prevention program maintained   muscle strengthening facilitated   nonskid shoes/slippers when out of bed   room organization consistent   safety round/check completed   toileting scheduled  Taken 11/15/2020 1219 by Giovanni Chauhan, RN  Safety Promotion/Fall Prevention:   activity supervised   clutter free environment maintained   fall prevention program maintained   muscle strengthening facilitated   nonskid shoes/slippers when out of bed   room organization consistent   safety round/check completed   toileting scheduled   mobility aid in reach   lighting adjusted   gait belt  Taken 11/15/2020 1026 by Giovanni Chauhan, RN  Safety Promotion/Fall Prevention:   activity supervised   clutter free environment maintained   fall prevention program maintained   lighting adjusted   mobility aid in reach   muscle strengthening facilitated   nonskid shoes/slippers when out of bed   safety round/check completed   room organization consistent   toileting scheduled  Taken 11/15/2020 0946 by Giovanni Chauhan, RN  Safety Promotion/Fall Prevention:   activity supervised   clutter free environment maintained   fall prevention program maintained   mobility aid in reach   muscle strengthening facilitated   nonskid shoes/slippers when out of bed   room organization consistent   safety round/check completed   toileting scheduled     Problem: Breathing Pattern Ineffective  Goal: Effective Breathing Pattern  Outcome: Ongoing, Progressing  Intervention: Promote Improved Breathing Pattern  Recent Flowsheet  Documentation  Taken 11/15/2020 1600 by Giovanni Chauhan RN  Head of Bed ([facility]): HOB at 45 degrees  Taken 11/15/2020 1443 by Giovanni Chauhan RN  Head of Bed (HOB): HOB at 30-45 degrees  Taken 11/15/2020 1219 by Giovanni Chauhan RN  Head of Bed ([facility]): HOB at 45 degrees  Taken 11/15/2020 1026 by Giovanni Chauhan RN  Head of Bed ([facility]): HOB at 60-90 degrees     Problem: Skin Injury Risk Increased  Goal: Skin Health and Integrity  Outcome: Ongoing, Progressing  Intervention: Optimize Skin Protection  Recent Flowsheet Documentation  Taken 11/15/2020 1600 by Giovanni Chauhan RN  Head of Bed ([facility]): HOB at 45 degrees  Taken 11/15/2020 1443 by Giovanni Chauhan RN  Pressure Reduction Techniques:   frequent weight shift encouraged   heels elevated off bed   positioned off wounds   pressure points protected  Head of Bed ([facility]): HOB at 30-45 degrees  Pressure Reduction Devices:   pressure-redistributing mattress utilized   positioning supports utilized   heel offloading device utilized  Skin Protection: incontinence pads utilized  Taken 11/15/2020 1219 by Giovanni Chauhan RN  Head of Bed ([facility]): HOB at 45 degrees  Taken 11/15/2020 1026 by Giovanni Chauhan RN  Head of Bed ([facility]): HOB at 60-90 degrees  Intervention: Promote and Optimize Oral Intake  Recent Flowsheet Documentation  Taken 11/15/2020 1443 by Giovanni Chauhan RN  Oral Nutrition Promotion:   calorie-dense foods provided   calorie-dense liquids provided   Goal Outcome Evaluation:  Plan of Care Reviewed With: patient  Progress: improving  Outcome Summary: Improving clinically with better labs, down to 2L O2 from 5L today, up to chair for some time, but her mental state hasn't improved.  She is still mildly confused, understands where she is, at, and time.  Understanding some of the care she is getting,but not comprehending cause and effect.  Her main issue is FTT, only drinking water, and not eating. Getting to be a very large calorie deficit.

## 2020-11-15 NOTE — PROGRESS NOTES
Pharmacy Consult: Warfarin Dosing/ Monitoring    Rachel Moser is a 74 y.o. female, estimated creatinine clearance is 40.1 mL/min (A) (by C-G formula based on SCr of 1.08 mg/dL (H)). weighing 54.7 kg (120 lb 11.2 oz).    She has a past medical history of Allergic, Bone infection (CMS/HCC), Cataract, High cholesterol, History of DVT (deep vein thrombosis), History of kidney infection, History of pulmonary embolus (PE), History of transfusion, Hypertension, Left hip postoperative wound infection, Paralabral cyst of left hip, PICC (peripherally inserted central catheter) in place, PONV (postoperative nausea and vomiting), Presence of vena cava filter, Rheumatoid arteritis (CMS/HCC), Seasonal allergies, UTI (urinary tract infection), and Wears glasses.    Social History     Tobacco Use    Smoking status: Former Smoker     Types: Cigarettes     Quit date:      Years since quittin.9    Smokeless tobacco: Never Used    Tobacco comment: quit 50 years ago   Substance Use Topics    Alcohol use: No    Drug use: No       Results from last 7 days   Lab Units 11/15/20  0830 20  1158 20  0548 20  1104 20  0813 11/10/20  0546 20  0748   INR  1.87* 2.05* 2.86* 3.13* 2.93* 3.74* 3.58*   HEMOGLOBIN g/dL 7.0* 7.6* 8.5* 9.6* 10.1* 10.7* 11.3*   HEMATOCRIT % 21.6* 22.6* 26.5* 29.2* 30.5* 32.1* 33.6*   PLATELETS 10*3/mm3 166 193 221 227 186 154 157     Results from last 7 days   Lab Units 20  0932 20  0548 20  1112   SODIUM mmol/L 124* 132* 134*   POTASSIUM mmol/L 5.0 5.0 4.6   CHLORIDE mmol/L 93* 95* 96*   CO2 mmol/L 18.6* 23.6 18.1*   BUN mg/dL 94* 90* 72*   CREATININE mg/dL 1.08* 1.12* 0.89   CALCIUM mg/dL 7.2* 7.6* 7.7*   BILIRUBIN mg/dL 1.5* 0.9 0.8   ALK PHOS U/L 77 74 79   ALT (SGPT) U/L 64* 34* 32   AST (SGOT) U/L 68* 47* 34*   GLUCOSE mg/dL 178* 179* 301*     Anticoagulation history: 10/12/2020 Per [facility]  Home Med: Warfarin 7.5 mg po qMF + 5 mg po  qSSTWTh (40 mg/week) with documented INR of 5.53. This was confirmed regimen with patient over telephone on 10/28/2020    Hospital Anticoagulation:  Consulting provider: Dr. Ruiz  Start date: 10/27/20 continued from home  Indication: Hx DVT  Target INR: 2.5-3.5 (per Dr. Ruiz order and past Anticoag clinic notes)  Expected duration: indefinite   Bridge Therapy: No                Date 10/27 10/28 10/29 10/30 10/31 11/1 11/2 11/3 11/4 11/5 11/6 11/7   INR 2.16 2.84 3.58 3.02 3.24 4.09 4.03 2.82 3.22 3.18 2.81 2.40   Warfarin dose 5 mg 2 mg none 2 mg 1 mg hold hold 1 mg 0.5 mg 0.5 mg 2 mg 2 mg     Date 11/8 11/9 11/10 11/11 11/12 11/13 11/14 11/15       INR 2.8 3.58 3.74 2.93 3.13 2.86 2.05 1.87       Warfarin dose 2mg HOLD HOLD 0.5 mg 0.5mg 1mg 2 mg 3mg           Potential drug interactions:   -Acetaminophen - may result in an increased risk of bleeding. Elevations in INR have occurred within 1-2 weeks of initiating acetaminophen at moderate to high doses (2 and 4 g/day) in patients on stable warfarin therapy   -Dexamethasone (Moderate, started 10/30): may result in increased risk of bleeding or diminished effects of warfarin.  - Augmentin (home med) - may result in an increased risk of bleeding.      Will continue to monitor for drug-drug interaction as medications are added to patient's profile.     Relevant nutrition status: Diet Regular; last charted 11/14 Breakfast and Lunch 0% - noted that she refuses to eat    Other:   Dietary advances -> changes in dietary vitamin K intake may alter response to warfarin.  Acute infection/inflammation may cause an increased sensitivity to warfarin    Education complete?/ Date: [facility] - last visit on 10/12/2020    Assessment/Plan:  Today's INR= 1.87 which dropped overnight even after getting 2mg yesterday. Therefore will give 3mg total of warfarin tonight (Give the scheduled 1mg of warfarin tonight along with an extra 2mg for a total of 3mg of warfarin on  11/15/20) Note that doses since admission have been substantially lower than home maintenance regimen with therapeutic to supratherapeutic INRs. Patient also consuming 0% of diet. Recheck INR tomorrow 11/16.   Monitor s/sx bleeding.    Follow up daily INR    Pharmacy will continue to follow until discharge or discontinuation of warfarin.     Giovanni Escalona [facility]  Staff Clinical Pharmacist  11/15/2020

## 2020-11-15 NOTE — PROGRESS NOTES
Name: Rachel Moser ADMIT: 10/26/2020   : 1945  PCP: Nahun Wilkerson MD    MRN: [id number] LOS: 18 days   AGE/SEX: 74 y.o. female  ROOM: [facility]     Subjective   Subjective     Patient seen at bedside, no new complaints.       Objective   Objective   Vital Signs  Temp:  [97.4 °F (36.3 °C)-98.6 °F (37 °C)] 97.6 °F (36.4 °C)  Heart Rate:  [] 106  Resp:  [18-20] 18  BP: (101-124)/(61-70) 107/61  SpO2:  [87 %-98 %] 89 %  on  Flow (L/min):  [5-40] 5;   Device (Oxygen Therapy): humidified;nasal cannula  Body mass index is 24.37 kg/m².  Physical Exam     General: patient is awake and alert.  Head and ENT: normocephalic and atraumatic.  Lungs: symmetric expansion and equal air entry bilaterally.  Heart: regular rate, rhythm, no murmurs.  Abdomen: soft, nontender, bowel sounds present.  Extremities:  No clubbing, cyanosis or edema.  Neurologic:  No focal neurological deficits present.  Cranial nerves intact.  Psychiatry:  Normal mood and affect.  Skin:  Warm and no rash.    Results Review     I reviewed the patient's new clinical results.  Results from last 7 days   Lab Units 20  1158 20  0548 20  1104 20  0813   WBC 10*3/mm3 41.42* 45.09* 49.23* 32.45*   HEMOGLOBIN g/dL 7.6* 8.5* 9.6* 10.1*   PLATELETS 10*3/mm3 193 221 227 186     Results from last 7 days   Lab Units 20  0932 20  0548 20  1112 20  0813   SODIUM mmol/L 124* 132* 134* 140   POTASSIUM mmol/L 5.0 5.0 4.6 4.2   CHLORIDE mmol/L 93* 95* 96* 101   CO2 mmol/L 18.6* 23.6 18.1* 26.2   BUN mg/dL 94* 90* 72* 44*   CREATININE mg/dL 1.08* 1.12* 0.89 0.49*   GLUCOSE mg/dL 178* 179* 301* 198*   Estimated Creatinine Clearance: 40.1 mL/min (A) (by C-G formula based on SCr of 1.08 mg/dL (H)).  Results from last 7 days   Lab Units 20  0932 20  0548 20  1112 20  0813   ALBUMIN g/dL 2.90* 2.80* 3.10* 2.80*   BILIRUBIN mg/dL 1.5* 0.9 0.8 0.7   ALK PHOS U/L 77 74 79 74   AST (SGOT) U/L 68* 47*  34* 28   ALT (SGPT) U/L 64* 34* 32 32     Results from last 7 days   Lab Units 11/14/20  0932 11/13/20  0548 11/12/20  1112 11/11/20  0813   CALCIUM mg/dL 7.2* 7.6* 7.7* 7.8*   ALBUMIN g/dL 2.90* 2.80* 3.10* 2.80*     Results from last 7 days   Lab Units 11/11/20  0813 11/10/20  0546 11/08/20  0809   PROCALCITONIN ng/mL 0.09 0.05 0.06     COVID19   Date Value Ref Range Status   10/27/2020 Detected (C) Not Detected - Ref. Range Final     SARS-CoV-2, JOSHUA   Date Value Ref Range Status   10/19/2020 Not Detected Not Detected Final     Comment:     This nucleic acid amplification test was developed and its performance  characteristics determined by [facility] [employer]. Nucleic acid  amplification tests include PCR and TMA. This test has not been FDA  cleared or approved. This test has been authorized by FDA under an  Emergency Use Authorization (EUA). This test is only authorized for  the duration of time the declaration that circumstances exist  justifying the authorization of the emergency use of in vitro  diagnostic tests for detection of SARS-CoV-2 virus and/or diagnosis  of COVID-19 infection under section 564(b)(1) of the Act, 21 U.S.C.  360bbb-3(b) (1), unless the authorization is terminated or revoked  sooner.  When diagnostic testing is negative, the possibility of a false  negative result should be considered in the context of a patient's  recent exposures and the presence of clinical signs and symptoms  consistent with COVID-19. An individual without symptoms of COVID-19  and who is not shedding SARS-CoV-2 virus would expect to have a  negative (not detected) result in this assay.     Glucose   Date/Time Value Ref Range Status   11/14/2020 1620 233 (H) 70 - 130 mg/dL Final   11/14/2020 1122 206 (H) 70 - 130 mg/dL Final   11/14/2020 0639 206 (H) 70 - 130 mg/dL Final   11/13/2020 2026 245 (H) 70 - 130 mg/dL Final   11/13/2020 1648 251 (H) 70 - 130 mg/dL Final   11/13/2020 1106 236 (H) 70 - 130 mg/dL Final    11/13/2020 0645 197 (H) 70 - 130 mg/dL Final       XR Chest 1 View  Narrative: XR CHEST 1 VW-     HISTORY: Female who is 74 years-old,  worsening oxygenation     TECHNIQUE: Frontal view of the chest     COMPARISON: 10/30/2020     FINDINGS: The heart is enlarged. Aorta is calcified. Lung volume is low  with vascular crowding. Mild prominence of interstitial markings.  Previous bilateral infiltrates appear mildly less dense. No pleural  effusion, or pneumothorax. Old granulomatous disease is seen. No acute  osseous process.     Impression: Previous bilateral infiltrates appear mildly less dense,  continued follow-up recommended.     This report was finalized on 11/8/2020 10:35 AM by Dr. Nahun Brock M.D.       Scheduled Medications  amoxicillin-clavulanate, 1 tablet, Oral, Q12H  atorvastatin, 20 mg, Oral, Daily  calcium 500 mg vitamin D 5 mcg (200 UT), 1 tablet, Oral, Daily  cetirizine, 10 mg, Oral, Daily  cholecalciferol, 2,000 Units, Oral, Daily  dexamethasone, 6 mg, Oral, Daily With Breakfast  famotidine, 20 mg, Oral, BID  ferrous sulfate, 325 mg, Oral, BID  folic acid, 1,600 mcg, Oral, Daily  furosemide, 40 mg, Oral, Daily  insulin lispro, 0-7 Units, Subcutaneous, TID AC  metoprolol succinate XL, 50 mg, Oral, Daily  multivitamin with minerals, 1 tablet, Oral, Daily  potassium chloride, 40 mEq, Oral, Daily  sodium chloride, 10 mL, Intravenous, Q12H  [START ON 11/15/2020] warfarin, 1 mg, Oral, Daily    Infusions  Pharmacy to dose warfarin,     Diet  Diet Regular       Assessment/Plan     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 [T84.59XA, Z96.649]  Not Applicable   • Acute respiratory failure with hypoxia (CMS/HCC) [J96.01]  Yes   • Blood type A+ [Z67.10]  Yes   • UTI (urinary tract infection) [N39.0]  Yes   • Pulmonary emboli (CMS/HCC) [I26.99]  Yes   • Mixed hyperlipidemia [E78.2]   Yes   • Long term (current) use of antibiotics [Z79.2]  Not Applicable   • Hypertension [I10]  Yes   • Rheumatoid arthritis involving multiple sites (CMS/HCC) [M06.9]  Yes   • History of DVT (deep vein thrombosis) [Z86.718]  Not Applicable   • OA (osteoarthritis) of hip [M16.9]  Yes      Resolved Hospital Problems   No resolved problems to display.       74 y.o. female admitted with Pneumonia due to COVID-19 virus.      Assessment plan  1. Acute respiratory failure with hypoxia, patient has underlying COVID-19 pneumonia, pulmonary on board, follow recommendations.  2.  Hyperlipidemia, continue statin therapy.  3.  Hypertension, continue Toprol-XL.  4.  History of DVT, patient on Coumadin,  pharmacy is dosing.  5.  Further plans based on hospital course.    Bartolo Adam MD  [facility]  11/14/20  19:40 EST     I wore protective equipment throughout this patient encounter including a face mask, gloves and protective eyewear.  Hand hygiene was performed before donning protective equipment and after removal when leaving the room.

## 2020-11-16 NOTE — PROGRESS NOTES
Name: Rachel Moser ADMIT: 10/26/2020   : 1945  PCP: Nahun Wilkerson MD    MRN: [id number] LOS: 20 days   AGE/SEX: 74 y.o. female  ROOM: [facility]     Subjective   Subjective   Patient seen today.        Objective   Objective   Vital Signs  Temp:  [97 °F (36.1 °C)-98 °F (36.7 °C)] 97.2 °F (36.2 °C)  Heart Rate:  [71-93] 76  Resp:  [16-18] 18  BP: (107-138)/(58-75) 133/64  SpO2:  [89 %-100 %] 95 %  on  Flow (L/min):  [2-4] 3;   Device (Oxygen Therapy): nasal cannula;humidified  Body mass index is 24.37 kg/m².  Physical Exam     General: patient is awake and alert.  Head and ENT: normocephalic and atraumatic.  Lungs: symmetric expansion and equal air entry bilaterally.  Heart: regular rate, rhythm, no murmurs.  Abdomen: soft, nontender, bowel sounds present.  Extremities:  No clubbing, cyanosis or edema.  Neurologic:  No focal neurological deficits present.  Cranial nerves intact.  Psychiatry:  Normal mood and affect.  Skin:  Warm and no rash.    Results Review     I reviewed the patient's new clinical results.  Results from last 7 days   Lab Units 20  0851 11/15/20  0830 20  1158 20  0548   WBC 10*3/mm3 25.98* 33.44* 41.42* 45.09*   HEMOGLOBIN g/dL 12.3 7.0* 7.6* 8.5*   PLATELETS 10*3/mm3 136* 166 193 221     Results from last 7 days   Lab Units 20  0851 11/15/20  0830 20  0932 20  0548   SODIUM mmol/L 130* 130* 124* 132*   POTASSIUM mmol/L 3.2* 3.8 5.0 5.0   CHLORIDE mmol/L 91* 95* 93* 95*   CO2 mmol/L 26.3 22.2 18.6* 23.6   BUN mg/dL 38* 61* 94* 90*   CREATININE mg/dL 0.58 0.74 1.08* 1.12*   GLUCOSE mg/dL 190* 153* 178* 179*   Estimated Creatinine Clearance: 54.1 mL/min (by C-G formula based on SCr of 0.58 mg/dL).  Results from last 7 days   Lab Units 20  0851 11/15/20  0830 20  0932 20  0548   ALBUMIN g/dL 3.10* 2.90* 2.90* 2.80*   BILIRUBIN mg/dL 1.9* 1.4* 1.5* 0.9   ALK PHOS U/L 100 72 77 74   AST (SGOT) U/L 50* 56* 68* 47*   ALT (SGPT) U/L 60* 60*  64* 34*     Results from last 7 days   Lab Units 11/16/20  0851 11/15/20  0830 11/14/20  0932 11/13/20  0548   CALCIUM mg/dL 7.3* 7.4* 7.2* 7.6*   ALBUMIN g/dL 3.10* 2.90* 2.90* 2.80*     Results from last 7 days   Lab Units 11/11/20  0813 11/10/20  0546   PROCALCITONIN ng/mL 0.09 0.05     COVID19   Date Value Ref Range Status   10/27/2020 Detected (C) Not Detected - Ref. Range Final     SARS-CoV-2, JOSHUA   Date Value Ref Range Status   10/19/2020 Not Detected Not Detected Final     Comment:     This nucleic acid amplification test was developed and its performance  characteristics determined by [facility]. Nucleic acid  amplification tests include PCR and TMA. This test has not been FDA  cleared or approved. This test has been authorized by FDA under an  Emergency Use Authorization (EUA). This test is only authorized for  the duration of time the declaration that circumstances exist  justifying the authorization of the emergency use of in vitro  diagnostic tests for detection of SARS-CoV-2 virus and/or diagnosis  of COVID-19 infection under section 564(b)(1) of the Act, 21 U.S.C.  360bbb-3(b) (1), unless the authorization is terminated or revoked  sooner.  When diagnostic testing is negative, the possibility of a false  negative result should be considered in the context of a patient's  recent exposures and the presence of clinical signs and symptoms  consistent with COVID-19. An individual without symptoms of COVID-19  and who is not shedding SARS-CoV-2 virus would expect to have a  negative (not detected) result in this assay.     Glucose   Date/Time Value Ref Range Status   11/16/2020 1216 248 (H) 70 - 130 mg/dL Final   11/16/2020 0642 164 (H) 70 - 130 mg/dL Final   11/15/2020 2132 188 (H) 70 - 130 mg/dL Final   11/15/2020 1725 216 (H) 70 - 130 mg/dL Final   11/15/2020 1146 216 (H) 70 - 130 mg/dL Final   11/15/2020 0640 170 (H) 70 - 130 mg/dL Final   11/14/2020 2042 199 (H) 70 - 130 mg/dL Final       XR  Chest 1 View  Narrative: XR CHEST 1 VW-     HISTORY: Female who is 74 years-old,  worsening oxygenation     TECHNIQUE: Frontal view of the chest     COMPARISON: 10/30/2020     FINDINGS: The heart is enlarged. Aorta is calcified. Lung volume is low  with vascular crowding. Mild prominence of interstitial markings.  Previous bilateral infiltrates appear mildly less dense. No pleural  effusion, or pneumothorax. Old granulomatous disease is seen. No acute  osseous process.     Impression: Previous bilateral infiltrates appear mildly less dense,  continued follow-up recommended.     This report was finalized on 11/8/2020 10:35 AM by Dr. Nahun Brock M.D.       Scheduled Medications  amoxicillin-clavulanate, 1 tablet, Oral, Q12H  atorvastatin, 20 mg, Oral, Daily  calcium 500 mg vitamin D 5 mcg (200 UT), 1 tablet, Oral, Daily  cetirizine, 10 mg, Oral, Daily  cholecalciferol, 2,000 Units, Oral, Daily  dexamethasone, 3 mg, Oral, Daily With Breakfast  famotidine, 20 mg, Oral, BID  ferrous sulfate, 325 mg, Oral, BID  folic acid, 1,600 mcg, Oral, Daily  furosemide, 40 mg, Oral, Daily  insulin lispro, 0-7 Units, Subcutaneous, TID AC  metoprolol succinate XL, 50 mg, Oral, Daily  multivitamin with minerals, 1 tablet, Oral, Daily  potassium chloride, 40 mEq, Oral, Daily  sodium chloride, 10 mL, Intravenous, Q12H  warfarin, 2 mg, Oral, Once    Infusions  Pharmacy to dose warfarin,     Diet  Diet Regular       Assessment/Plan     Active Hospital Problems    Diagnosis  POA   • **Pneumonia due to COVID-19 virus [U07.1, J12.89]  Yes   • Chronic infection of prosthetic hip (CMS/[facility])left ESBL E coli [T84.59XA, Z96.649]  Not Applicable   • Acute respiratory failure with hypoxia (CMS/[facility]) [J96.01]  Yes   • Blood type A+ [Z67.10]  Yes   • UTI (urinary tract infection) [N39.0]  Yes   • Pulmonary emboli (CMS/[facility]) [I26.99]  Yes   • Mixed hyperlipidemia [E78.2]  Yes   • Long term (current) use of antibiotics [Z79.2]  Not Applicable   •  Hypertension [I10]  Yes   • Rheumatoid arthritis involving multiple sites (CMS/HCC) [M06.9]  Yes   • History of DVT (deep vein thrombosis) [Z86.718]  Not Applicable   • OA (osteoarthritis) of hip [M16.9]  Yes      Resolved Hospital Problems   No resolved problems to display.       74 y.o. female admitted with Pneumonia due to COVID-19 virus.      Assessment plan  1. Acute respiratory failure with hypoxia, patient has underlying COVID-19 pneumonia, pulmonary on board, follow recommendations.  2.  Hyperlipidemia, continue statin therapy.  3.  Hypertension, continue Toprol-XL.  4.  History of DVT, patient on Coumadin,  pharmacy is dosing. INR is 1.88 today.   5.  Anemia, s/p PRBCs transfusion.  Appreciate hematology/oncology input.  6.  Further plans based on hospital course.      Bartolo Adam MD  [facility]  11/16/20  14:36 EST     I wore protective equipment throughout this patient encounter including a face mask, gloves and protective eyewear.  Hand hygiene was performed before donning protective equipment and after removal when leaving the room.

## 2020-11-16 NOTE — PROGRESS NOTES
Subjective     HISTORY OF PRESENT ILLNESS:   The patient is a 74 y.o. year old female  who is here for follow-up with the above-mentioned history.     Patient came to ER 10/27/2020 complaining of UTI.  Burning and frequency which started the Friday before.  Macrobid from urgent care on Saturday produced no relief.  Patient's  tested positive for Covid recently.  Patient was Covid positive on admission.     During the admission, developed acute respiratory failure with hypoxia due to COVID-19.  Received remdesivir and convalescent plasma.     We were consulted for leukocytosis because she has no evidence of additional infection.     History of PE.  On chronic coagulation.     Dr. Barahona noted although leukocytosis may be related to steroids, because her WBC continues to climb and everything else is getting better and pro calcitonin is normal clinically she is better, we were consulted     Noted she has had chronic leukocytosis for years.  Certainly worse recently.     Denies any fever, chills, weight loss, night sweats prior to admission.      Past Medical History, Past Surgical History, Social History, Family History have been reviewed and are without significant changes except as mentioned.    Review of Systems   Constitutional: Positive for fatigue.   HENT: Negative.    Respiratory: Positive for cough.    Cardiovascular: Negative.    Gastrointestinal: Negative.    Genitourinary: Negative.    Musculoskeletal: Negative.    Skin: Negative.    Neurological: Negative.    Hematological: Negative.    Psychiatric/Behavioral: Negative.       Not completed    Medications:  The current medication list was reviewed in the EMR    ALLERGIES:    Allergies   Allergen Reactions   • Sulfa Antibiotics Rash     Other reaction(s): Skin irritation       Objective      Vitals:    11/15/20 2010 11/16/20 0000 11/16/20 0826 11/16/20 0947   BP: 138/74 126/64 133/75    BP Location: Right arm Right arm Right arm    Patient  Position: Lying Lying Lying    Pulse: 79 71 93    Resp: 18 18 18    Temp: 97.5 °F (36.4 °C) 97 °F (36.1 °C) 97.1 °F (36.2 °C)    TempSrc: Oral Oral Oral    SpO2: 95% 92% (!) 89% 95%   Weight:       Height:         No flowsheet data found.    Physical Exam  Not done    RECENT LABS:  Hematology WBC   Date Value Ref Range Status   11/15/2020 33.44 (C) 3.40 - 10.80 10*3/mm3 Final     RBC   Date Value Ref Range Status   11/15/2020 2.25 (L) 3.77 - 5.28 10*6/mm3 Final     Hemoglobin   Date Value Ref Range Status   11/15/2020 7.0 (L) 12.0 - 15.9 g/dL Final     Hematocrit   Date Value Ref Range Status   11/15/2020 21.6 (L) 34.0 - 46.6 % Final     Platelets   Date Value Ref Range Status   11/15/2020 166 140 - 450 10*3/mm3 Final          Assessment/Plan      The nature of the leukocytosis in this patient is uncertain. We have sent samples for BCR-ABL and the report of this will become available in a few days. The platelet count remains low. The platelet count is stable at 166,000. The differential in the white count shows no obvious blasts or excessive lymphocytes or immature cells. That leaves me to be believe that her process is an inflammatory process more than anything else. I am going to check a C-reactive protein and I am going to also obtain an LDH. Obviously in the background of COVID very likely these numbers are going to be abnormally high.     Otherwise, I have not advised any other issues in the care of this patient at this time.     I have discussed the case with my partner, Elliott Finn MD, during our checkout rounds last night.                    11/16/2020      CC:

## 2020-11-16 NOTE — THERAPY TREATMENT NOTE
Patient Name: Rachel Moser  : 1945    MRN: [id number]                              Today's Date: 2020       Admit Date: 10/26/2020    Visit Dx:     ICD-10-CM ICD-9-CM   1. Pneumonia due to COVID-19 virus  U07.1 480.8    J12.89      Patient Active Problem List   Diagnosis   • Chronic left hip pain   • OA (osteoarthritis) of hip   • Hip dislocation, left (CMS/HCC)   • Status post left hip replacement   • Hypertension   • Rheumatoid arthritis involving multiple sites (CMS/[facility])   • History of DVT (deep vein thrombosis)   • Postoperative hypotension   • S/P revision of total hip   • Septic arthritis (CMS/[facility])   • Status post total hip replacement, left   • Pain of left hip joint   • Constipation   • Nausea and vomiting   • Supratherapeutic INR   • Long term (current) use of antibiotics   • Long term (current) use of anticoagulants   • Leukocytosis   • Dehydration   • Pulmonary emboli (CMS/[facility])   • Mixed hyperlipidemia   • High blood sugar   • Pneumonia due to COVID-19 virus   • UTI (urinary tract infection)   • Blood type A+   • Acute respiratory failure with hypoxia (CMS/[facility])   • 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     Past Medical History:   Diagnosis Date   • Allergic    • Bone infection (CMS/[facility])     hip   • Cataract    • High cholesterol    • History of DVT (deep vein thrombosis)    • History of kidney infection     1 MONTH AGO   • History of pulmonary embolus (PE)    • History of transfusion    • Hypertension    • Left hip postoperative wound infection    • Paralabral cyst of left hip    • PICC (peripherally inserted central catheter) in place    • PONV (postoperative nausea and vomiting)    • Presence of vena cava filter    • Rheumatoid arteritis (CMS/[facility])    • Seasonal allergies    • UTI (urinary tract infection)     diagnosed 19  medically treated presently   • Wears glasses      Past Surgical History:   Procedure Laterality Date   • BLADDER SUSPENSION     • CATARACT  EXTRACTION, BILATERAL     • COLONOSCOPY     • ENDOSCOPIC FUNCTIONAL SINUS SURGERY (FESS)     • HIP SPACER INSERTION WITH ANTIBIOTIC CEMENT Left 8/11/2018    Procedure: TOTAL HIP ARTHROPLASTY REVISION with removal of infected total hip and placement of antibiotic spacer;  Surgeon: Obed Barney MD;  Location: [facility];  Service: Orthopedics   • HIP SURGERY Left 1983   • HIP SURGERY Left 20069   • HIP SURGERY Left 2011   • INCISION AND DRAINAGE HIP Left 11/2/2016    Procedure: HIP INCISION AND DRAINAGE;  Surgeon: Obed Barney MD;  Location: [facility] OR;  Service:    • INCISION AND DRAINAGE HIP Left 7/7/2018    Procedure: I&D and antibiotic bead placement left hip;  Surgeon: Obed Barney MD;  Location: [facility] OR;  Service: Orthopedics   • INCISION AND DRAINAGE HIP Left 7/21/2018    Procedure: HIP INCISION AND DRAINAGE, LEFT WITH POLY HEAD CHANGE AND ANTIBIOTIC BEAD PLACEMENT;  Surgeon: Obed Barney MD;  Location: [facility];  Service: Orthopedics   • JOINT REPLACEMENT Left     HIP   • PERIPHERALLY INSERTED CENTRAL CATHETER INSERTION     • AK CLOSED RX TRAUMATIC HIP DISLOCATN Left 12/18/2017    Procedure: LEFT HIP CLOSED REDUCTION;  Surgeon: Obed Barney MD;  Location: [facility];  Service: Orthopedics   • SOFT TISSUE MASS EXCISION Left     hip 3/19/18   • TOTAL ABDOMINAL HYSTERECTOMY     • TOTAL HIP ARTHROPLASTY REVISION Left 4/25/2018    Procedure: REVISION OF LEFT ACETABULAR COMPONENT ;  Surgeon: Obed Barney MD;  Location: [facility];  Service: Orthopedics   • TOTAL HIP ARTHROPLASTY REVISION Left 08/11/2018    total hip revision with removal of iinfected total hip and placement of antibiotic spacer   • VENA CAVA FILTER INSERTION       General Information     Row Name 11/16/20 1533          Physical Therapy Time and Intention    Document Type  therapy note (daily note)  -PH     Mode of Treatment  physical therapy  -PH     Row Name 11/16/20 1533          Safety Issues, Functional  Mobility    Impairments Affecting Function (Mobility)  cognition;balance;endurance/activity tolerance;coordination;strength;shortness of breath  -PH       User Key  (r) = Recorded By, (t) = Taken By, (c) = Cosigned By    Initials Name Provider Type    PH Shannan Taveras PTA Physical Therapy Assistant        Mobility     Row Name 11/16/20 1533          Bed Mobility    Bed Mobility  supine-sit  -PH     Rolling Left [facility] (Bed Mobility)  nonverbal cues (demo/gesture);verbal cues;maximum assist (25% patient effort)  -PH     Supine-Sit [facility] (Bed Mobility)  maximum assist (25% patient effort);verbal cues;nonverbal cues (demo/gesture)  -PH     Assistive Device (Bed Mobility)  bed rails;head of bed elevated  -PH     Comment (Bed Mobility)  Very little assist from pt 2/2 weakness; O2 sats dropping to 85% when HOB elevated then when performing s>s; extra time to build O2 sats to 90% - approx 2 min then  3 min; Pt appearing confused w/ directions to breath through nc deeply; Pt very forward flexed and EOB / unable to correct w/ cues  -PH     Row Name 11/16/20 1533          Transfers    Comment (Transfers)  Direct transfer to chair w/ pt unable to stand upright; Little assist from pt; pt rested in chair after transfer w/ O2 sats again in mid 80's and req a few min to incr to 88-90% before beg HEP  -PH     Row Name 11/16/20 1533          Bed-Chair Transfer    Bed-Chair [facility] (Transfers)  maximum assist (25% patient effort);1 person assist;verbal cues;nonverbal cues (demo/gesture)  -PH     Row Name 11/16/20 1533          Gait/Stairs (Locomotion)    Distance in Feet (Gait)  Pt too weak to take steps  -PH     Comment (Gait/Stairs)  pt too weak to perform gait.  -PH       User Key  (r) = Recorded By, (t) = Taken By, (c) = Cosigned By    Initials Name Provider Type    PH Shannan Taveras PTA Physical Therapy Assistant        Obj/Interventions     Row Name 11/16/20 1533          Motor Skills     Therapeutic Exercise  other (see comments) Pt very weak w/ assist throughout - continual redirection w/ vc/tc; BLE: AP, HS, SAQ, SLR, hip abd/add - all x 10 reps - very effortful  -PH     Row Name 11/16/20 6784          Balance    Balance Assessment  sitting static balance;standing static balance  -PH     Static Standing Balance  severe impairment;supported;standing;unable to perform activity  -PH     Comment, Balance  static sit - SV w/ pt exhibiting extreme kyphotic posture;  -PH       User Key  (r) = Recorded By, (t) = Taken By, (c) = Cosigned By    Initials Name Provider Type    PH Shannan Taveras, PTA Physical Therapy Assistant        Goals/Plan    No documentation.       Clinical Impression     Row Name 11/16/20 0206          Pain    Additional Documentation  Pain Scale: Numbers Pre/Post-Treatment (Group)  -PH     Row Name 11/16/20 9942          Pain Scale: Numbers Pre/Post-Treatment    Pretreatment Pain Rating  0/10 - no pain  -PH     Posttreatment Pain Rating  0/10 - no pain  -PH     Row Name 11/16/20 6754          Plan of Care Review    Plan of Care Reviewed With  patient  -PH     Progress  no change  -PH     Outcome Summary  Pt req max A x 1 for all activities today w/ O2 dropping into mid 80's throughout session and req a few min to return to 88-90%. Pt appearing confused and having difficulty following cues/ directions. Pt req direct transfer b>c 2/2 extreme weakness. Pt would benefit from SNF after dc to improve her strenght, mobility, and endurance.  -PH     Row Name 11/16/20 4071          Vital Signs    Pre SpO2 (%)  93  -PH     O2 Delivery Pre Treatment  nasal cannula  -PH     Intra SpO2 (%)  85  -PH     O2 Delivery Intra Treatment  nasal cannula  -PH     Post SpO2 (%)  91  -PH     O2 Delivery Post Treatment  nasal cannula  -PH     Row Name 11/16/20 7364          Positioning and Restraints    Pre-Treatment Position  in bed  -PH     Post Treatment Position  chair  -PH     In Chair   reclined;call light within reach;encouraged to call for assist;exit alarm on  -PH       User Key  (r) = Recorded By, (t) = Taken By, (c) = Cosigned By    Initials Name Provider Type    Shannan Duran PTA Physical Therapy Assistant        Outcome Measures     Row Name 11/16/20 1543          How much help from another person do you currently need...    Turning from your back to your side while in flat bed without using bedrails?  2  -PH     Moving from lying on back to sitting on the side of a flat bed without bedrails?  2  -PH     Moving to and from a bed to a chair (including a wheelchair)?  1  -PH     Standing up from a chair using your arms (e.g., wheelchair, bedside chair)?  2  -PH     Climbing 3-5 steps with a railing?  1  -PH     To walk in hospital room?  1  -PH     AM-PAC 6 Clicks Score (PT)  9  -PH     Row Name 11/16/20 1543          Functional Assessment    Outcome Measure Options  AM-PAC 6 Clicks Basic Mobility (PT)  -PH       User Key  (r) = Recorded By, (t) = Taken By, (c) = Cosigned By    Initials Name Provider Type     Shannan Taveras PTA Physical Therapy Assistant        Physical Therapy Education                 Title: PT OT SLP Therapies (Done)     Topic: Physical Therapy (Done)     Point: Mobility training (Done)     Learning Progress Summary           Patient Acceptance, E,D, VU,NR by  at 11/16/2020 1544    Acceptance, E, NR,VU by  at 11/16/2020 0741    Acceptance, E,D, NR,VU by  at 11/13/2020 1001    Acceptance, E, VU by  at 11/13/2020 0739    Acceptance, E,D, VU,DU by  at 11/9/2020 1350    Acceptance, E,D, DU,NR,VU by  at 11/6/2020 1448    Acceptance, E,TB,D, VU by  at 11/5/2020 1030                   Point: Home exercise program (Done)     Learning Progress Summary           Patient Acceptance, E,D, VU,NR by  at 11/16/2020 1544    Acceptance, E, NR,VU by  at 11/16/2020 0741    Acceptance, E,D, NR,VU by  at 11/13/2020 1001    Acceptance, E, VU by  at  11/13/2020 0739    Acceptance, E,D, VU,DU by  at 11/9/2020 1350    Acceptance, E,D, DU,NR,VU by  at 11/6/2020 1448                   Point: Body mechanics (Done)     Learning Progress Summary           Patient Acceptance, E,D, VU,NR by  at 11/16/2020 1544    Acceptance, E, NR,VU by  at 11/16/2020 0741    Acceptance, E,D, NR,VU by PH at 11/13/2020 1001    Acceptance, E, VU by  at 11/13/2020 0739    Acceptance, E,D, VU,DU by PH at 11/9/2020 1350    Acceptance, E,D, DU,NR,VU by  at 11/6/2020 1448                   Point: Precautions (Done)     Learning Progress Summary           Patient Acceptance, E,D, VU,NR by  at 11/16/2020 1544    Acceptance, E, NR,VU by  at 11/16/2020 0741    Acceptance, E,D, NR,VU by  at 11/13/2020 1001    Acceptance, E, VU by  at 11/13/2020 0739    Acceptance, E,D, VU,DU by PH at 11/9/2020 1350    Acceptance, E,D, DU,NR,VU by  at 11/6/2020 1448                               User Key     Initials Effective Dates Name Provider Type Discipline     04/03/18 -  Misty Bradford, PT Physical Therapist PT     08/20/19 -  Shannan Taveras PTA Physical Therapy Assistant PT     09/17/19 -  Richard Kirkland, RN Registered Nurse Nurse              PT Recommendation and Plan     Plan of Care Reviewed With: patient  Progress: no change  Outcome Summary: Pt req max A x 1 for all activities today w/ O2 dropping into mid 80's throughout session and req a few min to return to 88-90%. Pt appearing confused and having difficulty following cues/ directions. Pt req direct transfer b>c 2/2 extreme weakness. Pt would benefit from SNF after dc to improve her strenght, mobility, and endurance.     Time Calculation:   PT Charges     Row Name 11/16/20 1544             Time Calculation    Start Time  1455  -PH      Stop Time  1518  -PH      Time Calculation (min)  23 min  -PH      PT Received On  11/16/20  -PH      PT - Next Appointment  11/18/20  -PH        User Key  (r) = Recorded By,  (t) = Taken By, (c) = Cosigned By    Initials Name Provider Type    Shannan Duran PTA Physical Therapy Assistant        Therapy Charges for Today     Code Description Service Date Service Provider Modifiers Qty    90238087978  PT THERAPEUTIC ACT EA 15 MIN 11/16/2020 Shannan Taveras PTA GP 2          PT G-Codes  Outcome Measure Options: AM-PAC 6 Clicks Basic Mobility (PT)  AM-PAC 6 Clicks Score (PT): 9    Shannan Taveras PTA  11/16/2020

## 2020-11-16 NOTE — PLAN OF CARE
Problem: Adult Inpatient Plan of Care  Goal: Plan of Care Review  Recent Flowsheet Documentation  Taken 11/16/2020 8340 by Shannan Taveras PTA  Progress: no change  Plan of Care Reviewed With: patient  Outcome Summary: Pt req max A x 1 for all activities today w/ O2 dropping into mid 80's throughout session and req a few min to return to 88-90%. Pt appearing confused and having difficulty following cues/ directions. Pt req direct transfer b>c 2/2 extreme weakness. Pt would benefit from SNF after dc to improve her strenght, mobility, and endurance.    Patient was not wearing a face mask during this therapy encounter. Therapist used appropriate personal protective equipment including gown, eye protection, mask and gloves.  Mask used was standard procedure mask. Appropriate PPE was worn during the entire therapy session. Hand hygiene was completed before and after therapy session. Patient is in enhanced droplet precautions.

## 2020-11-16 NOTE — PROGRESS NOTES
"                                              LOS: 20 days   Patient Care Team:  Nahun Wilkerson MD as PCP - General (Internal Medicine)  Vijay Arango MD as Consulting Physician (Cardiology)  Rachel Krishnan [facility] as Pharmacist  Nancy Smalls MD as Consulting Physician (Urogynecology)    Chief Complaint:  F/up COVID-19 pneumonia, respiratory failure medical problems listed below    Subjective   Interval History  Less confused.  She was able to tell me the year and the president.  Not completely back to normal though.  She still answer the same answers for different questions.      Remains on 5 L O2/min but her SpO2 96%. .      REVIEW OF SYSTEMS:   Cannot obtain due to confusion    Ventilator/Non-Invasive Ventilation Settings (From admission, onward)    None                Physical Exam:     Vital Signs  Temp:  [97 °F (36.1 °C)-98.1 °F (36.7 °C)] 97.1 °F (36.2 °C)  Heart Rate:  [71-93] 93  Resp:  [16-18] 18  BP: (105-138)/(51-75) 133/75    Intake/Output Summary (Last 24 hours) at 11/16/2020 1024  Last data filed at 11/15/2020 2115  Gross per 24 hour   Intake 1120 ml   Output 1250 ml   Net -130 ml     Flowsheet Rows      First Filed Value   Admission Height  149.9 cm (59\") Documented at 10/26/2020 2310   Admission Weight  54.7 kg (120 lb 11.2 oz) Documented at 10/27/2020 0409          General Appearance:   Alert, cooperative, in no acute distress   ENMT: External ears normal. Moist tongue   Eyes:  Pupils equal and reactive to light. EOMI   Neck:   Trachea midline. No thyromegaly.   Lungs:    Non labored breathing. Symmetrical chest expansion.        Skin:   No rash but ecchymosis in arms   Abdomen:    Overweight.   Neuro:  Conscious, alert, oriented x3. Moves all extremities   Extremities:  No cyanosis, clubbing or edema.            Results Review:        Results from last 7 days   Lab Units 11/16/20  0851 11/15/20  0830 11/14/20  0932   SODIUM mmol/L 130* 130* 124*   POTASSIUM mmol/L 3.2* 3.8 5.0   CHLORIDE " mmol/L 91* 95* 93*   CO2 mmol/L 26.3 22.2 18.6*   BUN mg/dL 38* 61* 94*   CREATININE mg/dL 0.58 0.74 1.08*   GLUCOSE mg/dL 190* 153* 178*   CALCIUM mg/dL 7.3* 7.4* 7.2*     Results from last 7 days   Lab Units 11/16/20  0851 11/15/20  0830 11/14/20  0932  11/11/20  0813   CK TOTAL U/L 44 40 70   < > 13*   TROPONIN T ng/mL  --   --   --   --  <0.010    < > = values in this interval not displayed.     Results from last 7 days   Lab Units 11/15/20  0830 11/14/20  1158 11/13/20  0548   WBC 10*3/mm3 33.44* 41.42* 45.09*   HEMOGLOBIN g/dL 7.0* 7.6* 8.5*   HEMATOCRIT % 21.6* 22.6* 26.5*   PLATELETS 10*3/mm3 166 193 221     Results from last 7 days   Lab Units 11/16/20  0851 11/15/20  0830 11/14/20  1158   INR  1.88* 1.87* 2.05*           I reviewed the patient's new clinical results.        Medication Review:   amoxicillin-clavulanate, 1 tablet, Oral, Q12H  atorvastatin, 20 mg, Oral, Daily  calcium 500 mg vitamin D 5 mcg (200 UT), 1 tablet, Oral, Daily  cetirizine, 10 mg, Oral, Daily  cholecalciferol, 2,000 Units, Oral, Daily  dexamethasone, 3 mg, Oral, Daily With Breakfast  famotidine, 20 mg, Oral, BID  ferrous sulfate, 325 mg, Oral, BID  folic acid, 1,600 mcg, Oral, Daily  furosemide, 40 mg, Oral, Daily  insulin lispro, 0-7 Units, Subcutaneous, TID AC  metoprolol succinate XL, 50 mg, Oral, Daily  multivitamin with minerals, 1 tablet, Oral, Daily  potassium chloride, 40 mEq, Oral, Daily  sodium chloride, 10 mL, Intravenous, Q12H  warfarin, 2 mg, Oral, Once        Pharmacy to dose warfarin,         Diagnostic imaging:  I personally and independently reviewed the following images:  CXR 11/8/2020: Diffuse bilateral interstitial pulmonary infiltrates      Lung portion of CT of the abdomen 10/27/2020: Bilateral groundglass opacities      Assessment     1. Bilateral COVID-19 pneumonia, diagnosed 10/27  2. Acute hypoxic respiratory failure, secondary #1  3. Rheumatoid arthritis, on methotrexate  4. Elevated inflammatory markers  suggestive of cytokine storm  5. 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 History of left prosthetic hip arthritis, on chronic antibiotics  7. History of PE, on anticoagulation  8. Steroid-induced hyperglycemia  9. Confusion, secondary to above  10. Hyponatremia        Plan     · Dexamethasone 3 mg daily for 2 days (day 2) then stop.  · Insulin as needed for hyperglycemia  · Encouraged to use I-S  · DVT prophylaxis/PE treatment with warfarin.    Can start evaluation for discharge.  From respiratory perspective, I doubt that her condition will get worse as she has passed the initial viral phase as well as the inflammatory phase.  Her CRP has normalized but ferritin remains slightly elevated.    Dallas Duncan MD  11/16/20  10:24 EST          This note was dictated utilizing Dragon dictation

## 2020-11-16 NOTE — PROGRESS NOTES
Pharmacy Consult: Warfarin Dosing/ Monitoring    Rachel Moser is a 74 y.o. female, estimated creatinine clearance is 40.1 mL/min (A) (by C-G formula based on SCr of 1.08 mg/dL (H)). weighing 54.7 kg (120 lb 11.2 oz).    She has a past medical history of Allergic, Bone infection (CMS/HCC), Cataract, High cholesterol, History of DVT (deep vein thrombosis), History of kidney infection, History of pulmonary embolus (PE), History of transfusion, Hypertension, Left hip postoperative wound infection, Paralabral cyst of left hip, PICC (peripherally inserted central catheter) in place, PONV (postoperative nausea and vomiting), Presence of vena cava filter, Rheumatoid arteritis (CMS/HCC), Seasonal allergies, UTI (urinary tract infection), and Wears glasses.    Social History     Tobacco Use    Smoking status: Former Smoker     Types: Cigarettes     Quit date:      Years since quittin.9    Smokeless tobacco: Never Used    Tobacco comment: quit 50 years ago   Substance Use Topics    Alcohol use: No    Drug use: No       Results from last 7 days   Lab Units 11/15/20  0830 20  1158 20  0548 20  1104 20  0813 11/10/20  0546 20  0748   INR  1.87* 2.05* 2.86* 3.13* 2.93* 3.74* 3.58*   HEMOGLOBIN g/dL 7.0* 7.6* 8.5* 9.6* 10.1* 10.7* 11.3*   HEMATOCRIT % 21.6* 22.6* 26.5* 29.2* 30.5* 32.1* 33.6*   PLATELETS 10*3/mm3 166 193 221 227 186 154 157     Results from last 7 days   Lab Units 20  0932 20  0548 20  1112   SODIUM mmol/L 124* 132* 134*   POTASSIUM mmol/L 5.0 5.0 4.6   CHLORIDE mmol/L 93* 95* 96*   CO2 mmol/L 18.6* 23.6 18.1*   BUN mg/dL 94* 90* 72*   CREATININE mg/dL 1.08* 1.12* 0.89   CALCIUM mg/dL 7.2* 7.6* 7.7*   BILIRUBIN mg/dL 1.5* 0.9 0.8   ALK PHOS U/L 77 74 79   ALT (SGPT) U/L 64* 34* 32   AST (SGOT) U/L 68* 47* 34*   GLUCOSE mg/dL 178* 179* 301*     Anticoagulation history: 10/12/2020 Per [facility]  Home Med: Warfarin 7.5 mg po qMF + 5 mg po  qSSTWTh (40 mg/week) with documented INR of 5.53. This was confirmed regimen with patient over telephone on 10/28/2020    Hospital Anticoagulation:  Consulting provider: Dr. Ruiz  Start date: 10/27/20 continued from home  Indication: Hx DVT  Target INR: 2.5-3.5 (per Dr. Ruiz order and past Anticoag clinic notes)  Expected duration: indefinite   Bridge Therapy: No                Date 10/27 10/28 10/29 10/30 10/31 11/1 11/2 11/3 11/4 11/5 11/6 11/7   INR 2.16 2.84 3.58 3.02 3.24 4.09 4.03 2.82 3.22 3.18 2.81 2.40   Warfarin dose 5 mg 2 mg none 2 mg 1 mg hold hold 1 mg 0.5 mg 0.5 mg 2 mg 2 mg     Date 11/8 11/9 11/10 11/11 11/12 11/13 11/14 11/15 11/16      INR 2.8 3.58 3.74 2.93 3.13 2.86 2.05 1.87 1.88      Warfarin dose 2 mg HOLD HOLD 0.5 mg 0.5 mg 1 mg 2 mg 3 mg 2 mg          Potential drug interactions:   -Acetaminophen - may result in an increased risk of bleeding. Elevations in INR have occurred within 1-2 weeks of initiating acetaminophen at moderate to high doses (2 and 4 g/day) in patients on stable warfarin therapy   -Dexamethasone (Moderate, started 10/30): may result in increased risk of bleeding or diminished effects of warfarin.  - Augmentin (home med) - may result in an increased risk of bleeding.      Will continue to monitor for drug-drug interaction as medications are added to patient's profile.     Relevant nutrition status: Diet Regular    Other:   Dietary advances -> changes in dietary vitamin K intake may alter response to warfarin.  Acute infection/inflammation may cause an increased sensitivity to warfarin    Education complete?/ Date: [facility] - last visit on 10/12/2020    Assessment/Plan:  Today's INR= 1.88  Give Warfarin 2 mg po x1 today  Monitor s/sx bleeding.    Follow up daily INR    Pharmacy will continue to follow until discharge or discontinuation of warfarin.     Jazzy Barnes [facility]  Clinical Staff Pharmacist

## 2020-11-16 NOTE — PLAN OF CARE
Problem: Adult Inpatient Plan of Care  Goal: Plan of Care Review  Outcome: Ongoing, Progressing  Flowsheets (Taken 11/16/2020 1805)  Outcome Summary: Pt AxOx3, did not know situation. Pleasant today with no complaints. Up to chair this evening. Still has very little appetite but managed to eat some of her breakfast and some of her dinner. Hem checking BCR-ABL. Anticipate d/c soon. Will require CCP input and a SNF or HH. On 3L NC  Goal: Patient-Specific Goal (Individualized)  Outcome: Ongoing, Progressing  Goal: Absence of Hospital-Acquired Illness or Injury  Outcome: Ongoing, Progressing  Intervention: Identify and Manage Fall Risk  Recent Flowsheet Documentation  Taken 11/16/2020 1748 by Richard Kirkland, RN  Safety Promotion/Fall Prevention:   activity supervised   assistive device/personal items within reach   clutter free environment maintained   fall prevention program maintained   nonskid shoes/slippers when out of bed   room organization consistent   safety round/check completed  Taken 11/16/2020 1559 by Richard Kirkland, RN  Safety Promotion/Fall Prevention:   activity supervised   assistive device/personal items within reach   clutter free environment maintained   fall prevention program maintained   nonskid shoes/slippers when out of bed   room organization consistent   safety round/check completed  Taken 11/16/2020 1400 by Richard Kirkland, RN  Safety Promotion/Fall Prevention:   activity supervised   clutter free environment maintained   assistive device/personal items within reach   fall prevention program maintained   nonskid shoes/slippers when out of bed   safety round/check completed   room organization consistent  Taken 11/16/2020 1210 by Richard Kirkland, RN  Safety Promotion/Fall Prevention:   activity supervised   assistive device/personal items within reach   clutter free environment maintained   fall prevention program maintained   nonskid shoes/slippers when out of bed   room organization  consistent   safety round/check completed  Taken 11/16/2020 0958 by Richard Kirkland RN  Safety Promotion/Fall Prevention:   activity supervised   assistive device/personal items within reach   clutter free environment maintained   fall prevention program maintained   nonskid shoes/slippers when out of bed   room organization consistent   safety round/check completed  Taken 11/16/2020 0744 by Richard Kirkland RN  Safety Promotion/Fall Prevention:   activity supervised   assistive device/personal items within reach   clutter free environment maintained   fall prevention program maintained   nonskid shoes/slippers when out of bed   safety round/check completed   room organization consistent  Intervention: Prevent Skin Injury  Recent Flowsheet Documentation  Taken 11/16/2020 1559 by Richard Kirkland RN  Body Position: (up in chair) --  Taken 11/16/2020 1400 by Richard Kirkland RN  Body Position:   turned   side-lying, left  Taken 11/16/2020 1210 by Richard Kirkland RN  Body Position:   turned   supine  Taken 11/16/2020 0958 by Richard Kirkland RN  Body Position:   turned   side-lying, right  Taken 11/16/2020 0744 by Richard Kirkland RN  Body Position: position maintained  Intervention: Prevent Infection  Recent Flowsheet Documentation  Taken 11/16/2020 0744 by Richard Kirkland RN  Infection Prevention: environmental surveillance performed  Goal: Optimal Comfort and Wellbeing  Outcome: Ongoing, Progressing  Goal: Readiness for Transition of Care  Outcome: Ongoing, Progressing     Problem: Fall Injury Risk  Goal: Absence of Fall and Fall-Related Injury  Outcome: Ongoing, Progressing  Intervention: Identify and Manage Contributors to Fall Injury Risk  Recent Flowsheet Documentation  Taken 11/16/2020 1400 by Richard Kirkland RN  Self-Care Promotion: independence encouraged  Taken 11/16/2020 0744 by Richard Kirkland RN  Medication Review/Management: medications reviewed  Self-Care Promotion: independence  encouraged  Intervention: Promote Injury-Free Environment  Recent Flowsheet Documentation  Taken 11/16/2020 1748 by Richard Kirkland, RN  Safety Promotion/Fall Prevention:   activity supervised   assistive device/personal items within reach   clutter free environment maintained   fall prevention program maintained   nonskid shoes/slippers when out of bed   room organization consistent   safety round/check completed  Taken 11/16/2020 1559 by Richard Kirkland, RN  Safety Promotion/Fall Prevention:   activity supervised   assistive device/personal items within reach   clutter free environment maintained   fall prevention program maintained   nonskid shoes/slippers when out of bed   room organization consistent   safety round/check completed  Taken 11/16/2020 1400 by Richadr Kirkland, RN  Safety Promotion/Fall Prevention:   activity supervised   clutter free environment maintained   assistive device/personal items within reach   fall prevention program maintained   nonskid shoes/slippers when out of bed   safety round/check completed   room organization consistent  Taken 11/16/2020 1210 by Richard Kirkland, RN  Safety Promotion/Fall Prevention:   activity supervised   assistive device/personal items within reach   clutter free environment maintained   fall prevention program maintained   nonskid shoes/slippers when out of bed   room organization consistent   safety round/check completed  Taken 11/16/2020 0958 by Richard Kirkland, RN  Safety Promotion/Fall Prevention:   activity supervised   assistive device/personal items within reach   clutter free environment maintained   fall prevention program maintained   nonskid shoes/slippers when out of bed   room organization consistent   safety round/check completed  Taken 11/16/2020 0744 by Richard Kirkland, RN  Safety Promotion/Fall Prevention:   activity supervised   assistive device/personal items within reach   clutter free environment maintained   fall prevention program  maintained   nonskid shoes/slippers when out of bed   safety round/check completed   room organization consistent     Problem: Breathing Pattern Ineffective  Goal: Effective Breathing Pattern  Outcome: Ongoing, Progressing  Intervention: Promote Improved Breathing Pattern  Recent Flowsheet Documentation  Taken 11/16/2020 1400 by Richard Kirkland RN  Supportive Measures:   active listening utilized   counseling provided  Head of Bed (HOB): HOB elevated  Taken 11/16/2020 1210 by Richard Kirkland RN  Head of Bed (HOB): HOB elevated  Taken 11/16/2020 0958 by Richard Kirkland RN  Head of Bed (HOB): HOB elevated  Taken 11/16/2020 0744 by Richard Kirkland RN  Supportive Measures:   active listening utilized   counseling provided  Head of Bed (HOB): HOB elevated     Problem: Skin Injury Risk Increased  Goal: Skin Health and Integrity  Outcome: Ongoing, Progressing  Intervention: Optimize Skin Protection  Recent Flowsheet Documentation  Taken 11/16/2020 1400 by Richard Kirkland RN  Pressure Reduction Techniques:   frequent weight shift encouraged   weight shift assistance provided   sit time limited to 2 hours  Head of Bed (HOB): HOB elevated  Pressure Reduction Devices:   heel offloading device utilized   positioning supports utilized   pressure-redistributing mattress utilized  Skin Protection:   adhesive use limited   incontinence pads utilized   tubing/devices free from skin contact  Taken 11/16/2020 1210 by Richard Kirkland RN  Head of Bed (HOB): HOB elevated  Taken 11/16/2020 0958 by Richard Kirkland RN  Head of Bed (HOB): HOB elevated  Taken 11/16/2020 0744 by Richard Kirkland RN  Pressure Reduction Techniques:   frequent weight shift encouraged   weight shift assistance provided   sit time limited to 2 hours  Head of Bed (HOB): HOB elevated  Pressure Reduction Devices:   positioning supports utilized   pressure-redistributing mattress utilized  Skin Protection:   adhesive use limited   incontinence pads utilized    tubing/devices free from skin contact   Goal Outcome Evaluation:  Plan of Care Reviewed With: patient  Progress: no change  Outcome Summary: Pt AxOx3, did not know situation. Pleasant today with no complaints. Up to chair this evening. Still has very little appetite but managed to eat some of her breakfast and some of her dinner. Hem checking BCR-ABL. Anticipate d/c soon. Will require CCP input and a SNF or HH. On 3L NC

## 2020-11-17 NOTE — PLAN OF CARE
Goal Outcome Evaluation:  Plan of Care Reviewed With: patient  Progress: no change  Outcome Summary: Pt A&O. No c/o pain. Up to bsc once during shift with max assist, purewick in place. Potassium redrawn, results WNL. Pt on 3L nc. Turned q2h. No further changes.

## 2020-11-17 NOTE — PROGRESS NOTES
"                                              LOS: 21 days   Patient Care Team:  Nahun Wilkerson MD as PCP - General (Internal Medicine)  Vijay Arango MD as Consulting Physician (Cardiology)  Rachel Krishnan [facility] as Pharmacist  Nancy Smalls MD as Consulting Physician (Urogynecology)    Chief Complaint:  F/up COVID-19 pneumonia, respiratory failure medical problems listed below    Subjective   Interval History  On oxygen 3 L/min.  Denies shortness of breath or cough.      Ventilator/Non-Invasive Ventilation Settings (From admission, onward)    None                Physical Exam:     Vital Signs  Temp:  [97.2 °F (36.2 °C)-98.9 °F (37.2 °C)] 98.9 °F (37.2 °C)  Heart Rate:  [76-97] 87  Resp:  [18] 18  BP: (121-135)/(64-69) 130/65    Intake/Output Summary (Last 24 hours) at 11/17/2020 1139  Last data filed at 11/17/2020 0011  Gross per 24 hour   Intake 50 ml   Output 1300 ml   Net -1250 ml     Flowsheet Rows      First Filed Value   Admission Height  149.9 cm (59\") Documented at 10/26/2020 2310   Admission Weight  54.7 kg (120 lb 11.2 oz) Documented at 10/27/2020 0409          General Appearance:   Alert, cooperative, in no acute distress               Lungs:    Non labored breathing. Symmetrical chest expansion.                Neuro:  Conscious, alert, oriented x3. Moves all extremities   Extremities:  No cyanosis, clubbing or edema.            Results Review:        Results from last 7 days   Lab Units 11/17/20  0929 11/16/20 2017 11/16/20  0851 11/15/20  0830   SODIUM mmol/L 131*  --  130* 130*   POTASSIUM mmol/L 3.6 3.8 3.2* 3.8   CHLORIDE mmol/L 94*  --  91* 95*   CO2 mmol/L 28.7  --  26.3 22.2   BUN mg/dL 27*  --  38* 61*   CREATININE mg/dL 0.44*  --  0.58 0.74   GLUCOSE mg/dL 156*  --  190* 153*   CALCIUM mg/dL 8.1*  --  7.3* 7.4*     Results from last 7 days   Lab Units 11/17/20  0929 11/16/20  0851 11/15/20  0830 11/11/20  0813   CK TOTAL U/L 38 44 40   < > 13*   TROPONIN T ng/mL  --   --   --   --  " <0.010    < > = values in this interval not displayed.     Results from last 7 days   Lab Units 11/17/20  0929 11/16/20  0851 11/15/20  0830   WBC 10*3/mm3 23.49* 25.98* 33.44*   HEMOGLOBIN g/dL 11.7* 12.3 7.0*   HEMATOCRIT % 35.3 35.7 21.6*   PLATELETS 10*3/mm3 121* 136* 166     Results from last 7 days   Lab Units 11/17/20  0929 11/16/20  0851 11/15/20  0830   INR  2.18* 1.88* 1.87*           I reviewed the patient's new clinical results.        Medication Review:   amoxicillin-clavulanate, 1 tablet, Oral, Q12H  atorvastatin, 20 mg, Oral, Daily  calcium 500 mg vitamin D 5 mcg (200 UT), 1 tablet, Oral, Daily  cetirizine, 10 mg, Oral, Daily  cholecalciferol, 2,000 Units, Oral, Daily  famotidine, 20 mg, Oral, BID  ferrous sulfate, 325 mg, Oral, BID  folic acid, 1,600 mcg, Oral, Daily  furosemide, 40 mg, Oral, Daily  insulin lispro, 0-7 Units, Subcutaneous, TID AC  metoprolol succinate XL, 50 mg, Oral, Daily  multivitamin with minerals, 1 tablet, Oral, Daily  potassium chloride, 40 mEq, Oral, Daily  sodium chloride, 10 mL, Intravenous, Q12H        Pharmacy to dose warfarin,         Diagnostic imaging:  I personally and independently reviewed the following images:  CXR 11/8/2020: Diffuse bilateral interstitial pulmonary infiltrates      Lung portion of CT of the abdomen 10/27/2020: Bilateral groundglass opacities      Assessment     1. Bilateral COVID-19 pneumonia, diagnosed 10/27  2. Acute hypoxic respiratory failure, secondary #1  3. Rheumatoid arthritis, on methotrexate  4. Elevated inflammatory markers suggestive of cytokine storm  5. 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 History of left prosthetic hip arthritis, on chronic antibiotics  7. History of PE, on anticoagulation  8. Steroid-induced hyperglycemia  9. Confusion, secondary to above  10. Hyponatremia        Plan     · Finish dexamethasone therapy.  No need to renew  · Insulin as needed for hyperglycemia  · Encouraged to use I-S  · DVT prophylaxis/PE treatment with  warfarin.    Can start evaluation for discharge.  From respiratory perspective, I doubt that her condition will get worse as she has passed the initial viral phase as well as the inflammatory phase.  Her CRP has normalized but ferritin remains slightly elevated.    Dallas Duncan MD  11/17/20  11:39 EST          This note was dictated utilizing Dragon dictation

## 2020-11-17 NOTE — NURSING NOTE
From infection control, Dr. Souza reviewed pt's case and states pt is okay to discontinue isolation.    11-17-20 6995

## 2020-11-17 NOTE — PROGRESS NOTES
Continued Stay Note  [facility]     Patient Name: Rachel Moser  MRN: [id number]  Today's Date: 11/17/2020    Admit Date: 10/26/2020    Discharge Plan     Row Name 11/17/20 1134       Plan    Plan  SNF vs. home with spouse and with [facility] following.    Patient/Family in Agreement with Plan  yes    Plan Comments  Per PT note - patient will need SNF.  Spoke with  Ramone by telephone.  He is aware that patient needs SNF at [place], he is agreeable to referrals to facilities accepting COVID + patients.  Spoke with Davina/Radha -no beds available.  Spoke with Sarkis probably no beds and Stephon Paul possibly a bed.  She will evaluate.  Spoke with  and he is aware.  RN and MD will encourage patient that she needs SNF.  CCP will continue to follow.  BHumeniuk RN            Expected Discharge Date and Time     Expected Discharge Date Expected Discharge Time    Nov 17, 2020             Becky S. Humeniuk, RN

## 2020-11-17 NOTE — PROGRESS NOTES
Pharmacy Consult: Warfarin Dosing/ Monitoring    Rachel Moser is a 74 y.o. female, estimated creatinine clearance is 54.1 mL/min (A) (by C-G formula based on SCr of 0.44 mg/dL (L)). weighing 54.7 kg (120 lb 11.2 oz).    She has a past medical history of Allergic, Bone infection (CMS/HCC), Cataract, High cholesterol, History of DVT (deep vein thrombosis), History of kidney infection, History of pulmonary embolus (PE), History of transfusion, Hypertension, Left hip postoperative wound infection, Paralabral cyst of left hip, PICC (peripherally inserted central catheter) in place, PONV (postoperative nausea and vomiting), Presence of vena cava filter, Rheumatoid arteritis (CMS/HCC), Seasonal allergies, UTI (urinary tract infection), and Wears glasses.    Social History     Tobacco Use    Smoking status: Former Smoker     Types: Cigarettes     Quit date:      Years since quittin.9    Smokeless tobacco: Never Used    Tobacco comment: quit 50 years ago   Substance Use Topics    Alcohol use: No    Drug use: No       Results from last 7 days   Lab Units 20  0920  0851 11/15/20  0830 20  1158 20  0548 20  1104 20  0813   INR  2.18* 1.88* 1.87* 2.05* 2.86* 3.13* 2.93*   HEMOGLOBIN g/dL 11.7* 12.3 7.0* 7.6* 8.5* 9.6* 10.1*   HEMATOCRIT % 35.3 35.7 21.6* 22.6* 26.5* 29.2* 30.5*   PLATELETS 10*3/mm3 121* 136* 166 193 221 227 186     Results from last 7 days   Lab Units 20  0929 20  2017 20  0851 11/15/20  0830   SODIUM mmol/L 131*  --  130* 130*   POTASSIUM mmol/L 3.6 3.8 3.2* 3.8   CHLORIDE mmol/L 94*  --  91* 95*   CO2 mmol/L 28.7  --  26.3 22.2   BUN mg/dL 27*  --  38* 61*   CREATININE mg/dL 0.44*  --  0.58 0.74   CALCIUM mg/dL 8.1*  --  7.3* 7.4*   BILIRUBIN mg/dL 1.6*  --  1.9* 1.4*   ALK PHOS U/L 113  --  100 72   ALT (SGPT) U/L 56*  --  60* 60*   AST (SGOT) U/L 50*  --  50* 56*   GLUCOSE mg/dL 156*  --  190* 153*     Anticoagulation history: 10/12/2020 Per  [facility]  Home Med: Warfarin 7.5 mg po qMF + 5 mg po qSSTWTh (40 mg/week) with documented INR of 5.53. This was confirmed regimen with patient over telephone on 10/28/2020    Hospital Anticoagulation:  Consulting provider: Dr. Ruiz  Start date: 10/27/20 continued from home  Indication: Hx DVT  Target INR: 2.5-3.5 (per Dr. Ruiz order and past Anticoag clinic notes)  Expected duration: indefinite   Bridge Therapy: No                Date 10/27 10/28 10/29 10/30 10/31 11/1 11/2 11/3 11/4 11/5 11/6 11/7   INR 2.16 2.84 3.58 3.02 3.24 4.09 4.03 2.82 3.22 3.18 2.81 2.40   Warfarin dose 5 mg 2 mg none 2 mg 1 mg hold hold 1 mg 0.5 mg 0.5 mg 2 mg 2 mg     Date 11/8 11/9 11/10 11/11 11/12 11/13 11/14 11/15 11/16 11/17     INR 2.8 3.58 3.74 2.93 3.13 2.86 2.05 1.87 1.88 1.96     Warfarin dose 2 mg HOLD HOLD 0.5 mg 0.5 mg 1 mg 2 mg 3 mg 2 mg 2 mg       Potential drug interactions:   -Acetaminophen - may result in an increased risk of bleeding. Elevations in INR have occurred within 1-2 weeks of initiating acetaminophen at moderate to high doses (2 and 4 g/day) in patients on stable warfarin therapy   -Dexamethasone (Moderate, started 10/30): may result in increased risk of bleeding or diminished effects of warfarin.  - Augmentin (home med) - may result in an increased risk of bleeding.      Will continue to monitor for drug-drug interaction as medications are added to patient's profile.     Relevant nutrition status: Diet Regular    Other:   Dietary advances -> changes in dietary vitamin K intake may alter response to warfarin.  Acute infection/inflammation may cause an increased sensitivity to warfarin    Education complete?/ Date: [facility] - last visit on 10/12/2020    Assessment/Plan:  Today's INR= 1.96  Give Warfarin 2 mg po x1 today  Monitor s/sx bleeding.    Follow up daily INR    Pharmacy will continue to follow until discharge or discontinuation of warfarin.     Jazzy Barnes  [facility]  Clinical Staff Pharmacist

## 2020-11-17 NOTE — PROGRESS NOTES
Name: Rachel Moser ADMIT: 10/26/2020   : 1945  PCP: Nahun Wilkerson MD    MRN: [id number] LOS: 21 days   AGE/SEX: 74 y.o. female  ROOM: [facility]     Subjective   Subjective   Patient seen today.       Objective   Objective   Vital Signs  Temp:  [97.2 °F (36.2 °C)-98.9 °F (37.2 °C)] 98.9 °F (37.2 °C)  Heart Rate:  [75-97] 75  Resp:  [18] 18  BP: (121-154)/(65-79) 154/79  SpO2:  [91 %-94 %] 91 %  on  Flow (L/min):  [3] 3;   Device (Oxygen Therapy): nasal cannula;humidified  Body mass index is 24.37 kg/m².  Physical Exam     General: patient is awake and alert.  Head and ENT: normocephalic and atraumatic.  Lungs: symmetric expansion and equal air entry bilaterally.  Heart: regular rate, rhythm, no murmurs.  Abdomen: soft, nontender, bowel sounds present.  Extremities:  No clubbing, cyanosis or edema.  Neurologic:  No focal neurological deficits present.  Cranial nerves intact.  Psychiatry:  Normal mood and affect.  Skin:  Warm and no rash.       Results Review     I reviewed the patient's new clinical results.  Results from last 7 days   Lab Units 20  0920  0851 11/15/20  0830 20  1158   WBC 10*3/mm3 23.49* 25.98* 33.44* 41.42*   HEMOGLOBIN g/dL 11.7* 12.3 7.0* 7.6*   PLATELETS 10*3/mm3 121* 136* 166 193     Results from last 7 days   Lab Units 20  0929 20  0851 11/15/20  0830 20  0932   SODIUM mmol/L 131*  --  130* 130* 124*   POTASSIUM mmol/L 3.6 3.8 3.2* 3.8 5.0   CHLORIDE mmol/L 94*  --  91* 95* 93*   CO2 mmol/L 28.7  --  26.3 22.2 18.6*   BUN mg/dL 27*  --  38* 61* 94*   CREATININE mg/dL 0.44*  --  0.58 0.74 1.08*   GLUCOSE mg/dL 156*  --  190* 153* 178*   Estimated Creatinine Clearance: 54.1 mL/min (A) (by C-G formula based on SCr of 0.44 mg/dL (L)).  Results from last 7 days   Lab Units 20  0929 20  0851 11/15/20  0830 20  0932   ALBUMIN g/dL 3.00* 3.10* 2.90* 2.90*   BILIRUBIN mg/dL 1.6* 1.9* 1.4* 1.5*   ALK PHOS U/L 113 100 72  77   AST (SGOT) U/L 50* 50* 56* 68*   ALT (SGPT) U/L 56* 60* 60* 64*     Results from last 7 days   Lab Units 11/17/20  0929 11/16/20  0851 11/15/20  0830 11/14/20  0932   CALCIUM mg/dL 8.1* 7.3* 7.4* 7.2*   ALBUMIN g/dL 3.00* 3.10* 2.90* 2.90*     Results from last 7 days   Lab Units 11/11/20  0813   PROCALCITONIN ng/mL 0.09     COVID19   Date Value Ref Range Status   11/17/2020 Not Detected Not Detected - Ref. Range Final   10/27/2020 Detected (C) Not Detected - Ref. Range Final     SARS-CoV-2, JOSHUA   Date Value Ref Range Status   10/19/2020 Not Detected Not Detected Final     Comment:     This nucleic acid amplification test was developed and its performance  characteristics determined by [employer]. Nucleic acid  amplification tests include PCR and TMA. This test has not been FDA  cleared or approved. This test has been authorized by FDA under an  Emergency Use Authorization (EUA). This test is only authorized for  the duration of time the declaration that circumstances exist  justifying the authorization of the emergency use of in vitro  diagnostic tests for detection of SARS-CoV-2 virus and/or diagnosis  of COVID-19 infection under section 564(b)(1) of the Act, 21 U.S.C.  360bbb-3(b) (1), unless the authorization is terminated or revoked  sooner.  When diagnostic testing is negative, the possibility of a false  negative result should be considered in the context of a patient's  recent exposures and the presence of clinical signs and symptoms  consistent with COVID-19. An individual without symptoms of COVID-19  and who is not shedding SARS-CoV-2 virus would expect to have a  negative (not detected) result in this assay.     Glucose   Date/Time Value Ref Range Status   11/17/2020 1218 170 (H) 70 - 130 mg/dL Final   11/17/2020 0835 167 (H) 70 - 130 mg/dL Final   11/17/2020 0656 195 (H) 70 - 130 mg/dL Final   11/16/2020 2110 280 (H) 70 - 130 mg/dL Final   11/16/2020 1725 209 (H) 70 - 130 mg/dL Final    11/16/2020 1216 248 (H) 70 - 130 mg/dL Final   11/16/2020 0642 164 (H) 70 - 130 mg/dL Final       XR Chest 1 View  Narrative: XR CHEST 1 VW-     HISTORY: Female who is 74 years-old,  worsening oxygenation     TECHNIQUE: Frontal view of the chest     COMPARISON: 10/30/2020     FINDINGS: The heart is enlarged. Aorta is calcified. Lung volume is low  with vascular crowding. Mild prominence of interstitial markings.  Previous bilateral infiltrates appear mildly less dense. No pleural  effusion, or pneumothorax. Old granulomatous disease is seen. No acute  osseous process.     Impression: Previous bilateral infiltrates appear mildly less dense,  continued follow-up recommended.     This report was finalized on 11/8/2020 10:35 AM by Dr. Nahun Brock M.D.       Scheduled Medications  amoxicillin-clavulanate, 1 tablet, Oral, Q12H  atorvastatin, 20 mg, Oral, Daily  calcium 500 mg vitamin D 5 mcg (200 UT), 1 tablet, Oral, Daily  cetirizine, 10 mg, Oral, Daily  cholecalciferol, 2,000 Units, Oral, Daily  famotidine, 20 mg, Oral, BID  ferrous sulfate, 325 mg, Oral, BID  folic acid, 1,600 mcg, Oral, Daily  furosemide, 40 mg, Oral, Daily  insulin lispro, 0-7 Units, Subcutaneous, TID AC  metoprolol succinate XL, 50 mg, Oral, Daily  multivitamin with minerals, 1 tablet, Oral, Daily  potassium chloride, 40 mEq, Oral, Daily  sodium chloride, 10 mL, Intravenous, Q12H  warfarin, 2 mg, Oral, Once    Infusions  Pharmacy to dose warfarin,     Diet  Diet Regular       Assessment/Plan     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 [T84.59XA, Z96.649]  Not Applicable   • Acute respiratory failure with hypoxia (CMS/HCC) [J96.01]  Yes   • Blood type A+ [Z67.10]  Yes   • UTI (urinary tract infection) [N39.0]  Yes   • Pulmonary emboli (CMS/HCC) [I26.99]  Yes   • Mixed hyperlipidemia [E78.2]  Yes   • Long term  (current) use of antibiotics [Z79.2]  Not Applicable   • Hypertension [I10]  Yes   • Rheumatoid arthritis involving multiple sites (CMS/HCC) [M06.9]  Yes   • History of DVT (deep vein thrombosis) [Z86.718]  Not Applicable   • OA (osteoarthritis) of hip [M16.9]  Yes      Resolved Hospital Problems   No resolved problems to display.       74 y.o. female admitted with Pneumonia due to COVID-19 virus.      Assessment plan  1. Acute respiratory failure with hypoxia, patient has underlying COVID-19 pneumonia, pulmonary on board, follow recommendations. Continue Oxygen.  2.  Hyperlipidemia, continue statin therapy.  3.  Hypertension, continue Toprol-XL.  4.  History of DVT, patient on Coumadin,  pharmacy is dosing. INR is 2.18 today.   5.  Anemia, s/p PRBCs transfusion.  Appreciate hematology/oncology input.  6.  Further plans based on hospital course. Start DC planning.         Bartolo Adam MD  [facility]  11/17/20  15:18 EST    I wore protective equipment throughout this patient encounter including a face mask, gloves and protective eyewear.  Hand hygiene was performed before donning protective equipment and after removal when leaving the room.

## 2020-11-17 NOTE — PLAN OF CARE
Problem: Adult Inpatient Plan of Care  Goal: Plan of Care Review  Outcome: Ongoing, Progressing  Flowsheets (Taken 11/17/2020 1824)  Outcome Summary: Pt technically AxO to orientation questions but forgetful, slightly confused at times. Q2turn and up to chair latter part of evening. Only ate some dinner, quite but eager to go home. D/C tomorrow hopefully  Goal: Patient-Specific Goal (Individualized)  Outcome: Ongoing, Progressing  Goal: Absence of Hospital-Acquired Illness or Injury  Outcome: Ongoing, Progressing  Intervention: Identify and Manage Fall Risk  Recent Flowsheet Documentation  Taken 11/17/2020 1801 by Richard Kirkland, RN  Safety Promotion/Fall Prevention:   activity supervised   assistive device/personal items within reach   clutter free environment maintained   fall prevention program maintained   nonskid shoes/slippers when out of bed   safety round/check completed   room organization consistent  Taken 11/17/2020 1605 by Richard Kirkland, RN  Safety Promotion/Fall Prevention:   activity supervised   assistive device/personal items within reach   clutter free environment maintained   fall prevention program maintained   nonskid shoes/slippers when out of bed   room organization consistent   safety round/check completed  Taken 11/17/2020 1408 by Richard Kirkland, RN  Safety Promotion/Fall Prevention:   activity supervised   assistive device/personal items within reach   clutter free environment maintained   fall prevention program maintained   nonskid shoes/slippers when out of bed   room organization consistent   safety round/check completed  Taken 11/17/2020 1200 by Richard Kirkland, RN  Safety Promotion/Fall Prevention:   activity supervised   assistive device/personal items within reach   clutter free environment maintained   fall prevention program maintained   nonskid shoes/slippers when out of bed   safety round/check completed   room organization consistent  Taken 11/17/2020 0954 by Isael  PAWEL Ramos  Safety Promotion/Fall Prevention:   activity supervised   assistive device/personal items within reach   clutter free environment maintained   fall prevention program maintained   nonskid shoes/slippers when out of bed   room organization consistent   safety round/check completed  Taken 11/17/2020 0752 by Richard Kirkland RN  Safety Promotion/Fall Prevention:   activity supervised   assistive device/personal items within reach   clutter free environment maintained   fall prevention program maintained   nonskid shoes/slippers when out of bed   room organization consistent   safety round/check completed  Intervention: Prevent Skin Injury  Recent Flowsheet Documentation  Taken 11/17/2020 1801 by Richard Kirkland RN  Body Position: (up in chair) --  Taken 11/17/2020 1408 by Richard Kirkland RN  Body Position:   weight shift assistance provided   position maintained  Taken 11/17/2020 1200 by Richard Kirkland RN  Body Position:   turned   supine  Taken 11/17/2020 1026 by Richard Kirkland RN  Body Position:   turned   side-lying, right  Taken 11/17/2020 0954 by Richard Kirkland RN  Body Position:   turned   weight shift assistance provided   side-lying, left  Taken 11/17/2020 0752 by Richard Kirkland RN  Body Position:   weight shift assistance provided   position changed independently  Intervention: Prevent Infection  Recent Flowsheet Documentation  Taken 11/17/2020 1801 by Richard Kirkland RN  Infection Prevention: environmental surveillance performed  Taken 11/17/2020 1605 by Richard Kirkland RN  Infection Prevention: environmental surveillance performed  Taken 11/17/2020 1408 by Richard Kirkland RN  Infection Prevention: environmental surveillance performed  Taken 11/17/2020 1200 by Richard Kirkland RN  Infection Prevention: environmental surveillance performed  Taken 11/17/2020 0954 by Richard Kirkland RN  Infection Prevention: environmental surveillance performed  Taken 11/17/2020 0752 by Richard Kirkland  RN  Infection Prevention: environmental surveillance performed  Goal: Optimal Comfort and Wellbeing  Outcome: Ongoing, Progressing  Goal: Readiness for Transition of Care  Outcome: Ongoing, Progressing     Problem: Fall Injury Risk  Goal: Absence of Fall and Fall-Related Injury  Outcome: Ongoing, Progressing  Intervention: Identify and Manage Contributors to Fall Injury Risk  Recent Flowsheet Documentation  Taken 11/17/2020 1408 by Richard Kirkland RN  Self-Care Promotion: independence encouraged  Taken 11/17/2020 0752 by Richard Kirkland, RN  Medication Review/Management: medications reviewed  Self-Care Promotion: independence encouraged  Intervention: Promote Injury-Free Environment  Recent Flowsheet Documentation  Taken 11/17/2020 1801 by Richard Kirkland, RN  Safety Promotion/Fall Prevention:   activity supervised   assistive device/personal items within reach   clutter free environment maintained   fall prevention program maintained   nonskid shoes/slippers when out of bed   safety round/check completed   room organization consistent  Taken 11/17/2020 1605 by Richard Kirkland, RN  Safety Promotion/Fall Prevention:   activity supervised   assistive device/personal items within reach   clutter free environment maintained   fall prevention program maintained   nonskid shoes/slippers when out of bed   room organization consistent   safety round/check completed  Taken 11/17/2020 1408 by Richard Kirkland, RN  Safety Promotion/Fall Prevention:   activity supervised   assistive device/personal items within reach   clutter free environment maintained   fall prevention program maintained   nonskid shoes/slippers when out of bed   room organization consistent   safety round/check completed  Taken 11/17/2020 1200 by iRchard Kirkland, RN  Safety Promotion/Fall Prevention:   activity supervised   assistive device/personal items within reach   clutter free environment maintained   fall prevention program maintained   nonskid  shoes/slippers when out of bed   safety round/check completed   room organization consistent  Taken 11/17/2020 0954 by Richard Kirkland RN  Safety Promotion/Fall Prevention:   activity supervised   assistive device/personal items within reach   clutter free environment maintained   fall prevention program maintained   nonskid shoes/slippers when out of bed   room organization consistent   safety round/check completed  Taken 11/17/2020 0752 by Richard Kirkland RN  Safety Promotion/Fall Prevention:   activity supervised   assistive device/personal items within reach   clutter free environment maintained   fall prevention program maintained   nonskid shoes/slippers when out of bed   room organization consistent   safety round/check completed     Problem: Breathing Pattern Ineffective  Goal: Effective Breathing Pattern  Outcome: Ongoing, Progressing  Intervention: Promote Improved Breathing Pattern  Recent Flowsheet Documentation  Taken 11/17/2020 1408 by Richard Kirkland RN  Supportive Measures:   active listening utilized   counseling provided  Head of Bed (HOB): HOB elevated  Taken 11/17/2020 1200 by Richard Kirkland RN  Head of Bed (HOB): HOB elevated  Taken 11/17/2020 0954 by Richard Kirkland RN  Head of Bed (HOB): HOB elevated  Taken 11/17/2020 0752 by Richard Kirkland RN  Supportive Measures:   active listening utilized   counseling provided  Head of Bed (HOB): HOB elevated     Problem: Skin Injury Risk Increased  Goal: Skin Health and Integrity  Outcome: Ongoing, Progressing  Intervention: Optimize Skin Protection  Recent Flowsheet Documentation  Taken 11/17/2020 1408 by Richard Kirkland RN  Pressure Reduction Techniques:   frequent weight shift encouraged   weight shift assistance provided   sit time limited to 2 hours  Head of Bed (HOB): HOB elevated  Pressure Reduction Devices:   positioning supports utilized   heel offloading device utilized   pressure-redistributing mattress utilized  Skin Protection:    adhesive use limited   incontinence pads utilized   tubing/devices free from skin contact  Taken 11/17/2020 1200 by Richard Kirkland, RN  Head of Bed (\[facility]\""): HOB elevated  Taken 11/17/2020 0954 by Richard Kirkland RN  Head of Bed (\[facility]\""): HOB elevated  Taken 11/17/2020 0752 by Richard Kirkland, RN  Pressure Reduction Techniques:   frequent weight shift encouraged   weight shift assistance provided   sit time limited to 2 hours  Head of Bed (\[facility]\""): \[facility]\"" elevated  Pressure Reduction Devices:   positioning supports utilized   pressure-redistributing mattress utilized   heel offloading device utilized  Skin Protection:   adhesive use limited   incontinence pads utilized   tubing/devices free from skin contact   Goal Outcome Evaluation:  Plan of Care Reviewed With: patient  Progress: no change  Outcome Summary: Pt technically AxO to orientation questions but forgetful, slightly confused at times. Q2turn and up to chair latter part of evening. Only ate some dinner, quite but eager to go home. D/C tomorrow hopefully

## 2020-11-17 NOTE — PROGRESS NOTES
Continued Stay Note  [facility]     Patient Name: Rachel Moser  MRN: [id number]  Today's Date: 11/17/2020    Admit Date: 10/26/2020    Discharge Plan     Row Name 11/17/20 1417       Plan    Plan  SNF, awaiting accepting facility, [facility] also following.    Patient/Family in Agreement with Plan  yes    Plan Comments  Repat COVID is negative.   states he has been at [facility] in the past and would like a referral there - spoke with Davina and she will evaluate.  Packet started and in CCP office.  BHumeniuk RN    Row Name 11/17/20 7968       Plan    Plan  SNF vs. home with spouse and with [facility] following.    Patient/Family in Agreement with Plan  yes    Plan Comments  Per PT note - patient will need SNF.  Spoke with  Ramone by telephone.  He is aware that patient needs SNF at [facility], he is agreeable to referrals to facilities accepting COVID + patients.  Spoke with Davina/Radha -no beds available.  Spoke with Sarkis probably no beds and Stephon Paul possibly a bed.  She will evaluate.  Spoke with  and he is aware.  RN and MD will encourage patient that she needs SNF.  CCP will continue to follow.  BHumeniuk RN            Expected Discharge Date and Time     Expected Discharge Date Expected Discharge Time    Nov 17, 2020             Becky S. Humeniuk, RN

## 2020-11-18 NOTE — PROGRESS NOTES
Pharmacy Consult: Warfarin Dosing/ Monitoring    Rachel Moser is a 74 y.o. female, estimated creatinine clearance is 54.1 mL/min (A) (by C-G formula based on SCr of 0.44 mg/dL (L)). weighing 54.7 kg (120 lb 11.2 oz).    She has a past medical history of Allergic, Bone infection (CMS/HCC), Cataract, High cholesterol, History of DVT (deep vein thrombosis), History of kidney infection, History of pulmonary embolus (PE), History of transfusion, Hypertension, Left hip postoperative wound infection, Paralabral cyst of left hip, PICC (peripherally inserted central catheter) in place, PONV (postoperative nausea and vomiting), Presence of vena cava filter, Rheumatoid arteritis (CMS/HCC), Seasonal allergies, UTI (urinary tract infection), and Wears glasses.    Social History     Tobacco Use    Smoking status: Former Smoker     Types: Cigarettes     Quit date:      Years since quittin.9    Smokeless tobacco: Never Used    Tobacco comment: quit 50 years ago   Substance Use Topics    Alcohol use: No    Drug use: No       Results from last 7 days   Lab Units 20  0920  0851 11/15/20  0830 20  1158 20  0548 20  1104 20  0813   INR  2.18* 1.88* 1.87* 2.05* 2.86* 3.13* 2.93*   HEMOGLOBIN g/dL 11.7* 12.3 7.0* 7.6* 8.5* 9.6* 10.1*   HEMATOCRIT % 35.3 35.7 21.6* 22.6* 26.5* 29.2* 30.5*   PLATELETS 10*3/mm3 121* 136* 166 193 221 227 186     Results from last 7 days   Lab Units 20  0929 20  2017 20  0851 11/15/20  0830   SODIUM mmol/L 131*  --  130* 130*   POTASSIUM mmol/L 3.6 3.8 3.2* 3.8   CHLORIDE mmol/L 94*  --  91* 95*   CO2 mmol/L 28.7  --  26.3 22.2   BUN mg/dL 27*  --  38* 61*   CREATININE mg/dL 0.44*  --  0.58 0.74   CALCIUM mg/dL 8.1*  --  7.3* 7.4*   BILIRUBIN mg/dL 1.6*  --  1.9* 1.4*   ALK PHOS U/L 113  --  100 72   ALT (SGPT) U/L 56*  --  60* 60*   AST (SGOT) U/L 50*  --  50* 56*   GLUCOSE mg/dL 156*  --  190* 153*     Anticoagulation history: 10/12/2020 Per  [facility]  Home Med: Warfarin 7.5 mg po qMF + 5 mg po qSSTWTh (40 mg/week) with documented INR of 5.53. This was confirmed regimen with patient over telephone on 10/28/2020    Hospital Anticoagulation:  Consulting provider: Dr. Ruiz  Start date: 10/27/20 continued from home  Indication: Hx DVT  Target INR: 2.5-3.5 (per Dr. Ruiz order and past Anticoag clinic notes)  Expected duration: indefinite   Bridge Therapy: No                Date 10/27 10/28 10/29 10/30 10/31 11/1 11/2 11/3 11/4 11/5 11/6 11/7   INR 2.16 2.84 3.58 3.02 3.24 4.09 4.03 2.82 3.22 3.18 2.81 2.40   Warfarin dose 5 mg 2 mg none 2 mg 1 mg hold hold 1 mg 0.5 mg 0.5 mg 2 mg 2 mg     Date 11/8 11/9 11/10 11/11 11/12 11/13 11/14 11/15 11/16 11/17 11/18    INR 2.8 3.58 3.74 2.93 3.13 2.86 2.05 1.87 1.88 2.18 2.45    Warfarin dose 2 mg HOLD HOLD 0.5 mg 0.5 mg 1 mg 2 mg 3 mg 2 mg 2 mg 0.5 mg      Potential drug interactions:   -Acetaminophen - may result in an increased risk of bleeding. Elevations in INR have occurred within 1-2 weeks of initiating acetaminophen at moderate to high doses (2 and 4 g/day) in patients on stable warfarin therapy   -Dexamethasone (Moderate, started 10/30): may result in increased risk of bleeding or diminished effects of warfarin.  - Augmentin (home med) - may result in an increased risk of bleeding.      Will continue to monitor for drug-drug interaction as medications are added to patient's profile.     Relevant nutrition status: Diet Regular    Other:   Dietary advances -> changes in dietary vitamin K intake may alter response to warfarin.  Acute infection/inflammation may cause an increased sensitivity to warfarin    Education complete?/ Date: [facility] - last visit on 10/12/2020    Assessment/Plan:  Today's INR= 2.45  Give Warfarin 0.5 mg po x1 today  Monitor s/sx bleeding.    Follow up daily INR    Pharmacy will continue to follow until discharge or discontinuation of warfarin.      Jazzy Barnes, [facility], located within [facility]  Clinical Staff Pharmacist

## 2020-11-18 NOTE — PLAN OF CARE
Goal Outcome Evaluation:  Plan of Care Reviewed With: patient  Progress: no change  Outcome Summary: A&Ox4 but forgetful at times. Q2 turn, pt refusing some turns, education provided about skin care and importance of turns. Pt resting well. 3L nc. VSS. No further changes.

## 2020-11-18 NOTE — NURSING NOTE
Report called to RN at [facility]. Will continue to monitor pt in meantime while awaiting EMS.    11-18-20 8665

## 2020-11-18 NOTE — DISCHARGE SUMMARY
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 [T84.59XA, Z96.649]  Not Applicable   • Acute respiratory failure with hypoxia (CMS/[facility]) [J96.01]  Yes   • Blood type A+ [Z67.10]  Yes   • UTI (urinary tract infection) [N39.0]  Yes   • Pulmonary emboli (CMS/[facility]) [I26.99]  Yes   • Mixed hyperlipidemia [E78.2]  Yes   • Long term (current) use of antibiotics [Z79.2]  Not Applicable   • Hypertension [I10]  Yes   • Rheumatoid arthritis involving multiple sites (CMS/[facility]) [M06.9]  Yes   • History of DVT (deep vein thrombosis) [Z86.718]  Not Applicable   • OA (osteoarthritis) of hip [M16.9]  Yes      Resolved Hospital Problems   No resolved problems to display.          Consults     Date and Time Order Name Status Description    11/12/2020 1458 Hematology & Oncology Inpatient Consult Completed     10/31/2020 1543 Inpatient Pulmonology Consult Completed     10/31/2020 1543 Inpatient Infectious Diseases Consult Completed     10/27/2020 0209 [facility] (on-call MD unless specified) Details Completed         Hospital Course  84-year-old female, admitted to the hospital, complaint of fever, she had UTI.  She tested positive for COVID-19 virus infection.  Patient was diagnosed to have acute respiratory failure with hypoxia, patient was seen by pulmonary and ID service.  She was getting Decadron and antiviral therapy.  Patient has responded well to the treatment.  She was on OptiFlow for about a day.  She is currently on 3 L oxygen.  She has responded well to the treatment, she is deemed stable to be discharged to a rehab facility as patient is extremely weak and would need continuation of physical therapy at a facility.  I have seen and examined patient at bedside,  total time spent on discharge day management for this patient is more than 30 minutes.  Patient is on Augmentin chronic antibiotic suppressive therapy.  Please note total time spent on discharge day management is more than 30 minutes.        Condition on Discharge: Improved.     Temp:  [97 °F (36.1 °C)-98.9 °F (37.2 °C)] 97 °F (36.1 °C)  Heart Rate:  [75-91] 91  Resp:  [18] 18  BP: (118-154)/(70-79) 135/70  Body mass index is 24.37 kg/m².    Physical Exam   General: patient is awake and alert.  Head and ENT: normocephalic and atraumatic.  Lungs: symmetric expansion and equal air entry bilaterally.  Heart: regular rate, rhythm, no murmurs.  Abdomen: soft, nontender, bowel sounds present.  Extremities:  No clubbing, cyanosis or edema.  Neurologic:  No focal neurological deficits present.  Cranial nerves intact.  Psychiatry:  Normal mood and affect.  Skin:  Warm and no rash.         Disposition: Skilled Nursing Facility (DC - External)       Discharge Medications      Changes to Medications      Instructions Start Date   polyethylene glycol packet  Commonly known as: MIRALAX  What changed:   · when to take this  · reasons to take this   17 g, Oral, 2 Times Daily      predniSONE 5 MG tablet  Commonly known as: DELTASONE  What changed:   · how much to take  · when to take this   5 mg, Oral, Daily PRN      warfarin 5 MG tablet  Commonly known as: COUMADIN  What changed:   · how much to take  · when to take this   TAKE 1.5 TABLETS (7.5MG) BY MOUTH MONDAY AND FRIDAY AND TAKE ONE TABLET (5MG) BY MOUTH ALL OTHER DAYS OR AS DIRECTED         Continue These Medications      Instructions Start Date   amoxicillin-clavulanate 875-125 MG per tablet  Commonly known as: AUGMENTIN   1 tablet, Oral, 2 Times Daily      CALCIUM 1200+D3 PO   600 mg, Oral, 2 times daily      cholecalciferol 25 MCG (1000 UT) tablet  Commonly known as: VITAMIN D3   2,000 Units, Oral, Daily      CRANBERRY PO   160 mg, Oral, Take 2 tablets by mouth daily       FerrouSul 325 (65 FE) MG tablet  Generic drug: ferrous sulfate   65 mg, Oral, 2 times daily, On hold for sx.      folic acid 800 MCG tablet  Commonly known as: FOLVITE   2 tablets, Oral, Daily      furosemide 40 MG tablet  Commonly known as: LASIX   40 mg, Oral, Daily      HM VITAMIN B50 COMPLEX PO   1 tablet, Oral, Daily      leucovorin 5 MG tablet   5 mg, Oral, Daily      meclizine 25 MG tablet  Commonly known as: ANTIVERT   Take 1 tablet 3 times daily as needed for dizziness      methotrexate 2.5 MG tablet   2.5 mg, Oral, Weekly, 8 tablets once a week on Monday's      metoprolol succinate XL 50 MG 24 hr tablet  Commonly known as: TOPROL-XL   50 mg, Oral, Daily      multivitamin with minerals tablet tablet   1 tablet, Oral, Daily      potassium chloride 20 MEQ CR tablet  Commonly known as: K-DUR,KLOR-CON   Take 2 tablets by mouth once daily      sennosides-docusate 8.6-50 MG per tablet  Commonly known as: PERICOLACE   2 tablets, Oral, 2 Times Daily PRN      simvastatin 40 MG tablet  Commonly known as: ZOCOR   40 mg, Oral, Every Morning         Stop These Medications    nitrofurantoin (macrocrystal-monohydrate) 100 MG capsule  Commonly known as: MACROBID     phenazopyridine 100 MG tablet  Commonly known as: PYRIDIUM             Additional Instructions for the Follow-ups that You Need to Schedule     Discharge Follow-up with PCP   As directed       Currently Documented PCP:    Nahun Wilkerson MD    PCP Phone Number:    [phone]     Follow Up Details: Follow up with PCP in 7 days.            Contact information for follow-up providers     Nahun Wilkerson MD .    Specialty: Internal Medicine  Why: Follow up with PCP in 7 days.  Contact information:  [address]  [place] [address]  [phone]                   Contact information for after-discharge care     Home Medical Care     [facility] .    Service: Home Health Services  Contact information:  [address] [facility]  [address]  [facility] [address] [phone]                            Pending Labs     Order Current Status    BCR-ABL FISH In process    Flow Cytometry, Blood In process         Bartolo Adam MD  11/18/20  11:28 EST    Discharge time spent greater than 30 minutes.

## 2020-11-18 NOTE — NURSING NOTE
"Spoke with pts  at 0615. Pts  frustrated there is no set SNF yet and no exact d/c time. RN told pts  that as soon as this information was available it would be passed along to him.  states he wants to speak with CCP \"as soon as they get in this morning.\" This message will be relayed to the appropriate person.   "

## 2020-11-18 NOTE — PROGRESS NOTES
"Continued Stay Note  [facility]     Patient Name: Rachel Moser  MRN: [id number]  Today's Date: 11/18/2020    Admit Date: 10/26/2020    Discharge Plan     Row Name 11/18/20 1526       Plan    Plan  [facility]    Patient/Family in Agreement with Plan  yes    Plan Comments  Repeat C\"OVID is positive.  Spoke with  by telephone, he would like referral to [facility].  Spoke with [facility] has a skilled bed available.   is agreeable.  [facility] scheduled for 7:30 p.m.  Packet to RN.  BHumeniuk RN            Expected Discharge Date and Time     Expected Discharge Date Expected Discharge Time    Nov 18, 2020             Becky S. Humeniuk, RN    "

## 2020-11-19 NOTE — PROGRESS NOTES
Case Management Discharge Note      Final Note: DC'd to skilled bed at [facility] via [facility]. Mary Martin RN         Selected Continued Care - Discharged on 11/18/2020 Admission date: 10/26/2020 - Discharge disposition: Skilled Nursing Facility (DC - External)    Destination Coordination complete    Service Provider Selected Services Address Phone Fax Patient Preferred    [facility]  Skilled Nursing [address] [facility] [address] [phone] --          Durable Medical Equipment    No services have been selected for the patient.              Dialysis/Infusion    No services have been selected for the patient.              Home Medical Care Coordination complete    Service Provider Selected Services Address Phone Fax Patient Preferred    [facility] [place]  Home Health Services [address] [address] [phone] --          Therapy    No services have been selected for the patient.              Community Resources    No services have been selected for the patient.                  Transportation Services  Ambulance: [facility]    Final Discharge Disposition Code: 03 - skilled nursing facility (SNF)

## 2020-11-20 LAB
CELLS ANALYZED: 200
CELLS COUNTED: 200
CHROM ANALY RESULT (ISCN): NORMAL
CLINICAL CYTOGENETICIST SPEC: NORMAL
DIAGNOSTIC IMP SPEC-IMP: NORMAL
SPECIMEN SOURCE: NORMAL

## 2020-12-07 LAB — REF LAB TEST METHOD: NORMAL

## 2021-07-01 NOTE — PROGRESS NOTES
Patient: Rachel Moser  YOB: 1945 73 y.o. female  Medical Record Number: [id number]    Chief Complaints:   Chief Complaint   Patient presents with   • Left Hip - Follow-up       History of Present Illness:Rachel Moser is a 73 y.o. female who presents with  Left hip spacer -  compplicated by ESBL infection. Tolerating spacer without problem-  On po ABX per ID - tolerating well. Is ambulating with cane  -  No pain    Allergies:   Allergies   Allergen Reactions   • Sulfa Antibiotics Rash     Other reaction(s): Skin irritation       Medications:   Current Outpatient Medications   Medication Sig Dispense Refill   • amoxicillin (AMOXIL) 500 MG capsule Take 1,000 mg by mouth 2 (Two) Times a Day.     • B Complex-Biotin-FA (HM VITAMIN B50 COMPLEX PO) Take 1 tablet by mouth Daily.     • Calcium-Magnesium-Vitamin D (CALCIUM 1200+D3 PO) Take  by mouth.     • cholecalciferol (VITAMIN D3) 1000 units tablet Take 2,000 Units by mouth Daily.     • CRANBERRY PO Take  by mouth. Take 2 tablets by mouth daily     • ferrous sulfate (FERROUSUL) 325 (65 FE) MG tablet Take 325 mg by mouth Daily With Breakfast. On hold for sx.     • FOLIC ACID PO Take 4 tablets by mouth daily     • furosemide (LASIX) 40 MG tablet Take 40 mg by mouth Daily.     • InFLIXimab (REMICADE IV) Infuse  into a venous catheter Every 28 (Twenty-Eight) Days.     • leucovorin 5 MG tablet Take 5 mg by mouth 1 (One) Time Per Week.     • methotrexate 2.5 MG tablet Take 2.5 mg by mouth 1 (One) Time Per Week. 8 tablets once a week     • metoprolol succinate XL (TOPROL-XL) 50 MG 24 hr tablet Take 50 mg by mouth Daily.     • Multiple Vitamins-Minerals (MULTIVITAMIN ADULT PO) Take 1 tablet by mouth Daily.     • polyethylene glycol (MIRALAX) packet Take 17 g by mouth 2 (Two) Times a Day.     • potassium chloride (K-DUR,KLOR-CON) 20 MEQ CR tablet TAKE 2 TABLETS BY MOUTH ONCE DAILY 180 tablet 1   • predniSONE (DELTASONE) 10 MG tablet Take 10 mg by mouth As Needed.   "   • simvastatin (ZOCOR) 40 MG tablet Take 40 mg by mouth Every Morning.     • warfarin (COUMADIN) 2 MG tablet Take 5mg today then 3mg daily starting 2/13.     • warfarin (COUMADIN) 5 MG tablet Take 1.5 tablets by mouth Daily. 135 tablet 0   • gabapentin (NEURONTIN) 300 MG capsule Take 1 capsule by mouth 3 (Three) Times a Day. 90 capsule 3   • sennosides-docusate sodium (SENOKOT-S) 8.6-50 MG tablet Take 2 tablets by mouth 2 (Two) Times a Day As Needed for Constipation.       No current facility-administered medications for this visit.      Facility-Administered Medications Ordered in Other Visits   Medication Dose Route Frequency Provider Last Rate Last Dose   • mupirocin (BACTROBAN) 2 % nasal ointment   Nasal BID Obed Barney MD             The following portions of the patient's history were reviewed and updated as appropriate: allergies, current medications, past family history, past medical history, past social history, past surgical history and problem list.    Review of Systems:   A 14 point review of systems was performed. All systems negative except pertinent positives/negative listed in HPI above    Physical Exam:   Vitals:    10/11/19 1338   Temp: 97.5 °F (36.4 °C)   Weight: 58.5 kg (129 lb)   Height: 149.9 cm (59\")       General: A and O x 3, ASA, NAD    SCLERA:    Normal    DENTITION:   Normal  Healed incision -  No swelling or erythema  Ambulating with cane   -  moderate trendelenberg gait       Radiology:  Xrays 2views left hip (AP bilateral hips, and lateral of the hip) ordered and reviewed for evaluation of hip pain  demonstrating  a well positioned total hipspacer without evidence of wear, loosening, or osteoarthritis. In comparison to previous films there are no changes    Assessment/Plan: let hip spacer  -  Continue with current measures -  No plans for reimplantation unless bone loss, increasing pain.  I am apprehensive to reoperate given her severe bone loss and h/o infection along with the " fact that she is tolerating her current condition quite well. CRP has normalized. Continue PO abx as long as she tolerates if ID agrees. RTO 3 months  Check CRP, ESR in 3 months.      Obed Barney MD  10/11/2019   2021

## 2022-02-10 NOTE — THERAPY TREATMENT NOTE
Acute Care - Physical Therapy Treatment Note  [facility]     Patient Name: Rachel Moser  : 1945  MRN: [id number]  Today's Date: 2018  Onset of Illness/Injury or Date of Surgery: 18          Admit Date: 2018    Visit Dx:    ICD-10-CM ICD-9-CM   1. Difficulty walking R26.2 719.7   2. S/P revision of total hip Z96.649 V43.64     Patient Active Problem List   Diagnosis   • Chronic left hip pain   • OA (osteoarthritis) of hip   • Hip dislocation, left (CMS/HCC)   • Status post left hip replacement   • Hypertension   • Rheumatoid arteritis   • History of DVT (deep vein thrombosis)   • Postoperative hypotension   • S/P revision of total hip       Therapy Treatment          Rehabilitation Treatment Summary     Row Name 18 1200             Treatment Time/Intention    Discipline physical therapy assistant  -      Document Type therapy note (daily note)  -      Subjective Information complains of;weakness;fatigue;pain;nausea/vomiting  -      Care Plan Review patient/other agree to care plan  -      Care Plan Review, Other Participant(s) family;spouse  -      Comment drains, isol prec  -      Existing Precautions/Restrictions fall;hip;left  -      Recorded by [] Michelle Jamison PTA 18      Row Name 18 1200             Bed Mobility Assessment/Treatment    Comment (Bed Mobility) in chair  -      Recorded by [] Michelle Jamison PTA 18      Row Name 18 1200             Sit-Stand Transfer    Sit-Stand [place] (Transfers) contact guard  -      Assistive Device (Sit-Stand Transfers) walker, front-wheelal  -      Recorded by [] Michelle Jamison PTA 18      Row Name 18 1200             Stand-Sit Transfer    Stand-Sit [place] (Transfers) stand by assist;verbal cues  -      Assistive Device (Stand-Sit Transfers) walker, front-wheelal  -      Recorded by [] Michelle Jamison PTA 18      Row Name 18  Kacie Khalil 1200             Gait/Stairs Assessment/Training    [place] Level (Gait) contact guard;stand by assist;verbal cues  -      Assistive Device (Gait) walker, front-wheeled  -      Distance in Feet (Gait) 70  -JM      Deviations/Abnormal Patterns (Gait) antalgic;yanni decreased;base of support, narrow  -JM      Bilateral Gait Deviations forward flexed posture  -      Comment (Gait/Stairs) apprehensive on incr amb dist due to nausea  -JM      Recorded by [] Michelle Jamison PTA 07/09/18 1718      Row Name 07/09/18 1200             Positioning and Restraints    Pre-Treatment Position sitting in chair/recliner  -JM      In Chair reclined;call light within reach;encouraged to call for assist;with family/caregiver;with nsg  -JM      Recorded by [] Michelle Jamison PTA 07/09/18 1718      Row Name 07/09/18 1200             Pain Scale: Numbers Pre/Post-Treatment    Pain Scale: Numbers, Pretreatment 3/10  -JM      Pain Location - Side Left  -JM      Pain Location hip  -JM      Recorded by [] Michelle Jamison PTA 07/09/18 1718      Row Name                Wound 07/07/18 1358 Left hip incision    Wound - Properties Group Date first assessed: 07/07/18 [HH] Time first assessed: 1358 [] Side: Left [HH] Location: hip [HH] Type: incision [HH] Recorded by:  [] Tamara Lyons RN 07/07/18 1358      User Key  (r) = Recorded By, (t) = Taken By, (c) = Cosigned By    Initials Name Effective Dates Discipline     Tamara Lyons RN 06/16/16 -  Nurse    LEXA Jamison PTA 03/07/18 -  PT          Wound 07/07/18 1358 Left hip incision (Active)   Dressing Appearance dry;intact;no drainage 7/9/2018  4:02 PM   Closure REINALDO 7/9/2018  4:02 PM   Drainage Amount none 7/9/2018  4:02 PM   Dressing Care, Wound gauze;low-adherent 7/9/2018  8:47 AM             Physical Therapy Education     Title: PT OT SLP Therapies (Done)     Topic: Physical Therapy (Done)     Point: Mobility training (Done)    Learning Progress Summary      Learner Status Readiness Method Response Comment Documented by    Patient Done Acceptance E,TB [facility] 07/09/18 1719     Done Acceptance E VU  CN 07/08/18 0929    Family Done Acceptance E,TB [facility] 07/09/18 1719          Point: Home exercise program (Done)    Learning Progress Summary     Learner Status Readiness Method Response Comment Documented by    Patient Done Acceptance E,TB [facility] 07/09/18 1719     Done Acceptance E VU   07/08/18 0929    Family Done Acceptance E,TB [facility] 07/09/18 1719          Point: Body mechanics (Done)    Learning Progress Summary     Learner Status Readiness Method Response Comment Documented by    Patient Done Acceptance E,TB [facility] 07/09/18 1719     Done Acceptance E VU  CN 07/08/18 0929    Family Done Acceptance E,[facility] 07/09/18 1719          Point: Precautions (Done)    Learning Progress Summary     Learner Status Readiness Method Response Comment Documented by    Patient Done Acceptance E,[facility] 07/09/18 1719     Done Acceptance E VU   07/08/18 0929    Family Done Acceptance E,[facility] 07/09/18 1719                      User Key     Initials Effective Dates Name Provider Type Discipline     03/07/18 -  Michelle Jamison, PTA Physical Therapy Assistant PT     04/03/18 -  Carmelina Painting, PT Physical Therapist PT                    PT Recommendation and Plan     Plan of Care Reviewed With: patient, spouse, family  Progress: improving  Outcome Summary: pt agreeable to amb , but nausea limiting dist, min pain reported          Outcome Measures     Row Name 07/09/18 1200 07/08/18 0900          How much help from another person do you currently need...    Turning from your back to your side while in flat bed without using bedrails? 3  -JM 2  -CN     Moving from lying on back to sitting on the side of a flat bed without bedrails? 3  -JM 2  -CN     Moving to and from a bed to a chair (including a wheelchair)? 3  -JM 3  -CN     Standing up from a chair using your arms  (e.g., wheelchair, bedside chair)? 3  -LEXA 3  -CN     Climbing 3-5 steps with a railing? 2  -LEXA 2  -CN     To walk in hospital room? 3  -JM 3  -CN     AM-PAC 6 Clicks Score 17  -JM 15  -CN        Functional Assessment    Outcome Measure Options  -- AM-PAC 6 Clicks Basic Mobility (PT)  -CN       User Key  (r) = Recorded By, (t) = Taken By, (c) = Cosigned By    Initials Name Provider Type    LEXA Jamison PTA Physical Therapy Assistant    COLLINS Painting, PT Physical Therapist           Time Calculation:         PT Charges     Row Name 07/09/18 1433             Time Calculation    Start Time 1130  -      Stop Time 1145  -      Time Calculation (min) 15 min  -      PT Received On 07/09/18  -LEXA      PT - Next Appointment 07/10/18  -LEXA         Time Calculation- PT    Total Timed Code Minutes- PT 15 minute(s)  -        User Key  (r) = Recorded By, (t) = Taken By, (c) = Cosigned By    Initials Name Provider Type    LEXA Jamison PTA Physical Therapy Assistant        Therapy Suggested Charges     Code   Minutes Charges    None           Therapy Charges for Today     Code Description Service Date Service Provider Modifiers Qty    82237362312 HC PT THER PROC EA 15 MIN 7/9/2018 Michelle Jamison PTA GP 1          PT G-Codes  Outcome Measure Options: AM-PAC 6 Clicks Basic Mobility (PT)    Michelle Jamison PTA  7/9/2018

## (undated) DEVICE — CEMENT MIXING SYSTEM WITH FEMORAL BREAKWAY NOZZLE: Brand: REVOLUTION

## (undated) DEVICE — SUT VIC 0 CT1 36IN J946H

## (undated) DEVICE — GLV SURG SENSICARE W/ALOE PF LF 8 STRL

## (undated) DEVICE — SUT VIC 1 CT1 36IN J947H

## (undated) DEVICE — TOTAL TRAY, 16FR 10ML SIL FOLEY, URN: Brand: MEDLINE

## (undated) DEVICE — GLV SURG SENSICARE W/ALOE PF LF 7.5 STRL

## (undated) DEVICE — APPL DURAPREP IODOPHOR APL 26ML

## (undated) DEVICE — GLV SURG SENSICARE MICRO PF LF 7 STRL

## (undated) DEVICE — 3.2MM X 18.3MM METAL CUTTING HELIOCOIDAL RASP

## (undated) DEVICE — DRSNG GZ PETROLTM XEROFORM CURAD 1X8IN STRL

## (undated) DEVICE — CULT AER/ANAEROB FASTIDIOUS BACT

## (undated) DEVICE — SPNG GZ WOVN 4X4IN 12PLY 10/BX STRL

## (undated) DEVICE — SPNG LAP 18X18IN LF STRL PK/5

## (undated) DEVICE — ANTIBACTERIAL UNDYED BRAIDED (POLYGLACTIN 910), SYNTHETIC ABSORBABLE SUTURE: Brand: COATED VICRYL

## (undated) DEVICE — DIFFUSER: Brand: CORE, MAESTRO

## (undated) DEVICE — IMMOB KN 3PNL DLX CANVS 22IN BLU

## (undated) DEVICE — Device

## (undated) DEVICE — ENCORE® LATEX ORTHO SIZE 7.5, STERILE LATEX POWDER-FREE SURGICAL GLOVE: Brand: ENCORE

## (undated) DEVICE — 3 BONE CEMENT MIXER: Brand: MIXEVAC

## (undated) DEVICE — NDL SPINE 22G 31/2IN BLK

## (undated) DEVICE — SPIRAL GIGLI SAW BLADE 20 IN

## (undated) DEVICE — DRSNG BURN ACTICOAT FLEX 7 1X24IN

## (undated) DEVICE — 1.6MM X 5.5MM METAL CUTTING HELIOCOIDAL RASP

## (undated) DEVICE — KT DRN EVAC WND PVC PCH WTROC RND 10F400

## (undated) DEVICE — VITAL SIGNS™ ADULT ANESTHESIA BREATHING CIRCUIT: Brand: VITAL SIGNS™

## (undated) DEVICE — REFLECTION FLEXIBLE DRILL 35MM: Brand: REFLECTION

## (undated) DEVICE — PAD GRND REM POLYHESIVE A/ DISP

## (undated) DEVICE — 3M™ IOBAN™ 2 ANTIMICROBIAL INCISE DRAPE 6650EZ: Brand: IOBAN™ 2

## (undated) DEVICE — BNDG ELAS CO-FLEX SLF ADHR 6IN 5YD LF STRL

## (undated) DEVICE — SUT PDS 1 CT1 36IN Z347H

## (undated) DEVICE — REDAPT LOCKING SCREW 30MM
Type: IMPLANTABLE DEVICE | Site: HIP | Status: NON-FUNCTIONAL
Brand: REDAPT
Removed: 2018-04-25

## (undated) DEVICE — DRSNG SURESITE123 4X10IN

## (undated) DEVICE — CONTAINER,SPECIMEN,OR STERILE,4OZ: Brand: MEDLINE

## (undated) DEVICE — PREMIUM WET SKIN PREP TRAY: Brand: MEDLINE INDUSTRIES, INC.

## (undated) DEVICE — 3M™ IOBAN™ 2 ANTIMICROBIAL INCISE DRAPE 6640EZ: Brand: IOBAN™ 2

## (undated) DEVICE — OCCLUSIVE GAUZE STRIP,3% BISMUTH TRIBROMOPHENATE IN PETROLATUM BLEND: Brand: XEROFORM

## (undated) DEVICE — DRSNG SURESITE WNDW 4X4.5

## (undated) DEVICE — PK HIP TOTL 40

## (undated) DEVICE — POOLE SUCTION HANDLE: Brand: CARDINAL HEALTH

## (undated) DEVICE — REFLECTION FLEXIBLE DRILL 25MM: Brand: REFLECTION

## (undated) DEVICE — PREP SOL POVIDONE/IODINE BT 4OZ

## (undated) DEVICE — MAT FLR ABSORBENT LG 4FT 10 2.5FT

## (undated) DEVICE — PICO SINGLE USE NEGATIVE PRESSURE WOUND THERAPY SYSTEM 10CM X 30CM 4IN. X 12IN.: Brand: PICO

## (undated) DEVICE — BLOOD TRANSFUSION FILTER: Brand: HAEMONETICS

## (undated) DEVICE — PAD,ABDOMINAL,8"X10",ST,LF: Brand: MEDLINE

## (undated) DEVICE — HANDPIECE SET WITH COAXIAL HIGH FLOW TIP AND SUCTION TUBE: Brand: INTERPULSE

## (undated) DEVICE — OIL CARTRIDGE: Brand: CORE, MAESTRO

## (undated) DEVICE — QUICKSET GRATER HEAD 46MM: Brand: QUICKSET

## (undated) DEVICE — QUICKSET GRATER HEAD 47MM: Brand: QUICKSET

## (undated) DEVICE — COAXIAL FEMORAL CANAL TIP

## (undated) DEVICE — STPLR SKIN VISISTAT WD 35CT

## (undated) DEVICE — REDAPT STRAIGHT SHAFT DRILL 25MM: Brand: REDAPT

## (undated) DEVICE — IRRIGATOR BULB ASEPTO 60CC STRL

## (undated) DEVICE — PENCL E/S HNDSWCH ROCKR CB

## (undated) DEVICE — DRAPE,REIN 53X77,STERILE: Brand: MEDLINE

## (undated) DEVICE — DRSNG SURESITE WNDW 2.38X2.75

## (undated) DEVICE — DRSNG BRDR MEPILEX P/OP SIL 4X12IN

## (undated) DEVICE — RADIAL OSTEOTOME BLADE SIZE 14: Brand: RENOVATION

## (undated) DEVICE — PK ATS CUST W CARDIOTOMY RESEVOIR

## (undated) DEVICE — 2108 SERIES SAGITTAL BLADE (19.5 X 1.27 X 81.0MM)

## (undated) DEVICE — TOWEL,OR,DSP,ST,BLUE,STD,4/PK,20PK/CS: Brand: MEDLINE

## (undated) DEVICE — T-DRAPE,EXTREMITY,STERILE: Brand: MEDLINE

## (undated) DEVICE — GLV SURG SENSICARE GREEN W/ALOE PF LF 7 STRL